# Patient Record
Sex: FEMALE | Race: WHITE | Employment: OTHER | URBAN - NONMETROPOLITAN AREA
[De-identification: names, ages, dates, MRNs, and addresses within clinical notes are randomized per-mention and may not be internally consistent; named-entity substitution may affect disease eponyms.]

---

## 2017-01-11 ENCOUNTER — TELEPHONE (OUTPATIENT)
Dept: UROLOGY | Age: 57
End: 2017-01-11

## 2017-01-11 DIAGNOSIS — N20.0 KIDNEY STONE: Primary | ICD-10-CM

## 2017-01-13 ENCOUNTER — OFFICE VISIT (OUTPATIENT)
Dept: UROLOGY | Age: 57
End: 2017-01-13
Payer: MEDICARE

## 2017-01-13 VITALS
WEIGHT: 212 LBS | BODY MASS INDEX: 37.56 KG/M2 | DIASTOLIC BLOOD PRESSURE: 76 MMHG | SYSTOLIC BLOOD PRESSURE: 136 MMHG | OXYGEN SATURATION: 98 % | HEART RATE: 76 BPM | HEIGHT: 63 IN | TEMPERATURE: 98.6 F

## 2017-01-13 DIAGNOSIS — M54.50 ACUTE RIGHT-SIDED LOW BACK PAIN WITHOUT SCIATICA: Primary | ICD-10-CM

## 2017-01-13 LAB
BILIRUBIN, POC: NORMAL
BLOOD URINE, POC: NORMAL
CLARITY, POC: CLEAR
COLOR, POC: YELLOW
GLUCOSE URINE, POC: NORMAL
KETONES, POC: NORMAL
LEUKOCYTE EST, POC: NORMAL
NITRITE, POC: NORMAL
PH, POC: 6
PROTEIN, POC: NORMAL
SPECIFIC GRAVITY, POC: 1.02
UROBILINOGEN, POC: 0.2

## 2017-01-13 PROCEDURE — G8427 DOCREV CUR MEDS BY ELIG CLIN: HCPCS | Performed by: PHYSICIAN ASSISTANT

## 2017-01-13 PROCEDURE — 99214 OFFICE O/P EST MOD 30 MIN: CPT | Performed by: PHYSICIAN ASSISTANT

## 2017-01-13 PROCEDURE — 3017F COLORECTAL CA SCREEN DOC REV: CPT | Performed by: PHYSICIAN ASSISTANT

## 2017-01-13 PROCEDURE — G8484 FLU IMMUNIZE NO ADMIN: HCPCS | Performed by: PHYSICIAN ASSISTANT

## 2017-01-13 PROCEDURE — 4004F PT TOBACCO SCREEN RCVD TLK: CPT | Performed by: PHYSICIAN ASSISTANT

## 2017-01-13 PROCEDURE — G8419 CALC BMI OUT NRM PARAM NOF/U: HCPCS | Performed by: PHYSICIAN ASSISTANT

## 2017-01-13 PROCEDURE — 81002 URINALYSIS NONAUTO W/O SCOPE: CPT | Performed by: PHYSICIAN ASSISTANT

## 2017-01-13 PROCEDURE — 3014F SCREEN MAMMO DOC REV: CPT | Performed by: PHYSICIAN ASSISTANT

## 2017-01-13 ASSESSMENT — ENCOUNTER SYMPTOMS
SPUTUM PRODUCTION: 0
ORTHOPNEA: 0
EYE REDNESS: 0
HEMOPTYSIS: 0
DIARRHEA: 0
EYE DISCHARGE: 0
CONSTIPATION: 0

## 2017-01-20 RX ORDER — CHLORTHALIDONE 50 MG/1
50 TABLET ORAL DAILY
Qty: 30 TABLET | Refills: 11 | Status: SHIPPED | OUTPATIENT
Start: 2017-01-20 | End: 2019-02-22 | Stop reason: SDUPTHER

## 2017-01-20 RX ORDER — POTASSIUM CITRATE 15 MEQ/1
1 TABLET, EXTENDED RELEASE ORAL 3 TIMES DAILY
Qty: 90 TABLET | Refills: 11 | Status: ON HOLD | OUTPATIENT
Start: 2017-01-20 | End: 2018-12-17 | Stop reason: ALTCHOICE

## 2017-02-01 PROCEDURE — 93299 PR REM INTERROG ICPMS/SCRMS <30 D TECH REVIEW: CPT | Performed by: CLINICAL NURSE SPECIALIST

## 2017-02-01 PROCEDURE — 93298 REM INTERROG DEV EVAL SCRMS: CPT | Performed by: CLINICAL NURSE SPECIALIST

## 2017-02-02 DIAGNOSIS — Z95.818 STATUS POST PLACEMENT OF IMPLANTABLE LOOP RECORDER: Primary | ICD-10-CM

## 2017-02-02 DIAGNOSIS — R55 ATYPICAL SYNCOPE: ICD-10-CM

## 2017-03-04 ENCOUNTER — OFFICE VISIT (OUTPATIENT)
Dept: URGENT CARE | Age: 57
End: 2017-03-04
Payer: MEDICARE

## 2017-03-04 VITALS
DIASTOLIC BLOOD PRESSURE: 73 MMHG | BODY MASS INDEX: 37.56 KG/M2 | RESPIRATION RATE: 18 BRPM | SYSTOLIC BLOOD PRESSURE: 107 MMHG | OXYGEN SATURATION: 92 % | TEMPERATURE: 98.6 F | HEART RATE: 82 BPM | WEIGHT: 212 LBS | HEIGHT: 63 IN

## 2017-03-04 DIAGNOSIS — J44.1 COPD EXACERBATION (HCC): Primary | ICD-10-CM

## 2017-03-04 DIAGNOSIS — R05.9 COUGH: ICD-10-CM

## 2017-03-04 DIAGNOSIS — F17.200 TOBACCO USE DISORDER: ICD-10-CM

## 2017-03-04 PROCEDURE — 3017F COLORECTAL CA SCREEN DOC REV: CPT | Performed by: FAMILY MEDICINE

## 2017-03-04 PROCEDURE — G8417 CALC BMI ABV UP PARAM F/U: HCPCS | Performed by: FAMILY MEDICINE

## 2017-03-04 PROCEDURE — 4004F PT TOBACCO SCREEN RCVD TLK: CPT | Performed by: FAMILY MEDICINE

## 2017-03-04 PROCEDURE — G8926 SPIRO NO PERF OR DOC: HCPCS | Performed by: FAMILY MEDICINE

## 2017-03-04 PROCEDURE — G8427 DOCREV CUR MEDS BY ELIG CLIN: HCPCS | Performed by: FAMILY MEDICINE

## 2017-03-04 PROCEDURE — 99406 BEHAV CHNG SMOKING 3-10 MIN: CPT | Performed by: FAMILY MEDICINE

## 2017-03-04 PROCEDURE — G8484 FLU IMMUNIZE NO ADMIN: HCPCS | Performed by: FAMILY MEDICINE

## 2017-03-04 PROCEDURE — 99214 OFFICE O/P EST MOD 30 MIN: CPT | Performed by: FAMILY MEDICINE

## 2017-03-04 PROCEDURE — 3014F SCREEN MAMMO DOC REV: CPT | Performed by: FAMILY MEDICINE

## 2017-03-04 PROCEDURE — 3023F SPIROM DOC REV: CPT | Performed by: FAMILY MEDICINE

## 2017-03-04 PROCEDURE — 96372 THER/PROPH/DIAG INJ SC/IM: CPT | Performed by: FAMILY MEDICINE

## 2017-03-04 RX ORDER — LEVOFLOXACIN 500 MG/1
500 TABLET, FILM COATED ORAL DAILY
Qty: 5 TABLET | Refills: 0 | Status: SHIPPED | OUTPATIENT
Start: 2017-03-04 | End: 2017-03-09

## 2017-03-04 RX ORDER — BENZONATATE 100 MG/1
100 CAPSULE ORAL 3 TIMES DAILY PRN
Qty: 20 CAPSULE | Refills: 0 | Status: SHIPPED | OUTPATIENT
Start: 2017-03-04 | End: 2017-03-11

## 2017-03-04 RX ORDER — METHYLPREDNISOLONE SODIUM SUCCINATE 40 MG/ML
80 INJECTION, POWDER, LYOPHILIZED, FOR SOLUTION INTRAMUSCULAR; INTRAVENOUS ONCE
Status: COMPLETED | OUTPATIENT
Start: 2017-03-04 | End: 2017-03-04

## 2017-03-04 RX ORDER — PREDNISONE 20 MG/1
40 TABLET ORAL DAILY
Qty: 10 TABLET | Refills: 0 | Status: SHIPPED | OUTPATIENT
Start: 2017-03-04 | End: 2017-03-09

## 2017-03-04 RX ORDER — ALBUTEROL SULFATE 90 UG/1
2 AEROSOL, METERED RESPIRATORY (INHALATION) EVERY 6 HOURS PRN
Qty: 1 INHALER | Refills: 0 | Status: SHIPPED | OUTPATIENT
Start: 2017-03-04 | End: 2018-07-31 | Stop reason: SDUPTHER

## 2017-03-04 RX ADMIN — METHYLPREDNISOLONE SODIUM SUCCINATE 80 MG: 40 INJECTION, POWDER, LYOPHILIZED, FOR SOLUTION INTRAMUSCULAR; INTRAVENOUS at 09:44

## 2017-03-04 ASSESSMENT — ENCOUNTER SYMPTOMS
WHEEZING: 1
SORE THROAT: 1
DIARRHEA: 0
VOMITING: 0
SINUS PRESSURE: 1
RHINORRHEA: 1
NAUSEA: 0
SHORTNESS OF BREATH: 1
COUGH: 1
CHEST TIGHTNESS: 0
CONSTIPATION: 0
ABDOMINAL PAIN: 0

## 2017-03-07 ENCOUNTER — OFFICE VISIT (OUTPATIENT)
Dept: CARDIOLOGY | Age: 57
End: 2017-03-07
Payer: MEDICARE

## 2017-03-07 VITALS
HEART RATE: 65 BPM | SYSTOLIC BLOOD PRESSURE: 120 MMHG | BODY MASS INDEX: 37.21 KG/M2 | HEIGHT: 63 IN | DIASTOLIC BLOOD PRESSURE: 78 MMHG | WEIGHT: 210 LBS

## 2017-03-07 DIAGNOSIS — R06.02 SOB (SHORTNESS OF BREATH): ICD-10-CM

## 2017-03-07 DIAGNOSIS — I10 ESSENTIAL HYPERTENSION: ICD-10-CM

## 2017-03-07 DIAGNOSIS — F17.200 SMOKER: ICD-10-CM

## 2017-03-07 DIAGNOSIS — Z95.818 STATUS POST PLACEMENT OF IMPLANTABLE LOOP RECORDER: ICD-10-CM

## 2017-03-07 DIAGNOSIS — Z82.49 FAMILY HISTORY OF CORONARY ARTERY DISEASE: ICD-10-CM

## 2017-03-07 DIAGNOSIS — R07.9 CHEST PAIN, UNSPECIFIED TYPE: Primary | ICD-10-CM

## 2017-03-07 PROCEDURE — 3017F COLORECTAL CA SCREEN DOC REV: CPT | Performed by: NURSE PRACTITIONER

## 2017-03-07 PROCEDURE — 99214 OFFICE O/P EST MOD 30 MIN: CPT | Performed by: NURSE PRACTITIONER

## 2017-03-07 PROCEDURE — G8427 DOCREV CUR MEDS BY ELIG CLIN: HCPCS | Performed by: NURSE PRACTITIONER

## 2017-03-07 PROCEDURE — G8484 FLU IMMUNIZE NO ADMIN: HCPCS | Performed by: NURSE PRACTITIONER

## 2017-03-07 PROCEDURE — G8417 CALC BMI ABV UP PARAM F/U: HCPCS | Performed by: NURSE PRACTITIONER

## 2017-03-07 PROCEDURE — 93291 INTERROG DEV EVAL SCRMS IP: CPT | Performed by: NURSE PRACTITIONER

## 2017-03-07 PROCEDURE — 3014F SCREEN MAMMO DOC REV: CPT | Performed by: NURSE PRACTITIONER

## 2017-03-07 PROCEDURE — 4004F PT TOBACCO SCREEN RCVD TLK: CPT | Performed by: NURSE PRACTITIONER

## 2017-03-07 RX ORDER — LISINOPRIL 10 MG/1
10 TABLET ORAL DAILY
COMMUNITY
End: 2017-09-01 | Stop reason: SDUPTHER

## 2017-03-12 ENCOUNTER — APPOINTMENT (OUTPATIENT)
Dept: CT IMAGING | Age: 57
End: 2017-03-12
Payer: MEDICARE

## 2017-03-12 ENCOUNTER — HOSPITAL ENCOUNTER (EMERGENCY)
Age: 57
Discharge: HOME | End: 2017-03-12
Attending: EMERGENCY MEDICINE
Payer: MEDICARE

## 2017-03-12 VITALS
BODY MASS INDEX: 37.39 KG/M2 | OXYGEN SATURATION: 99 % | DIASTOLIC BLOOD PRESSURE: 97 MMHG | HEIGHT: 63 IN | SYSTOLIC BLOOD PRESSURE: 163 MMHG | WEIGHT: 211 LBS | HEART RATE: 70 BPM | TEMPERATURE: 98.5 F | RESPIRATION RATE: 20 BRPM

## 2017-03-12 DIAGNOSIS — N20.0 NEPHROLITHIASIS: ICD-10-CM

## 2017-03-12 DIAGNOSIS — N13.9 OBSTRUCTIVE UROPATHY: Primary | ICD-10-CM

## 2017-03-12 LAB
ALBUMIN SERPL-MCNC: 4.3 G/DL (ref 3.5–5.2)
ALP BLD-CCNC: 67 U/L (ref 35–104)
ALT SERPL-CCNC: 12 U/L (ref 5–33)
ANION GAP SERPL CALCULATED.3IONS-SCNC: 15 MMOL/L (ref 7–19)
AST SERPL-CCNC: 13 U/L (ref 5–32)
BACTERIA: NEGATIVE /HPF
BASOPHILS ABSOLUTE: 0 K/UL (ref 0–0.2)
BASOPHILS RELATIVE PERCENT: 0.2 % (ref 0–1)
BILIRUB SERPL-MCNC: 0.3 MG/DL (ref 0.2–1.2)
BILIRUBIN URINE: NEGATIVE
BLOOD, URINE: ABNORMAL
BUN BLDV-MCNC: 11 MG/DL (ref 6–20)
CALCIUM SERPL-MCNC: 9.5 MG/DL (ref 8.6–10)
CHLORIDE BLD-SCNC: 99 MMOL/L (ref 98–111)
CLARITY: CLEAR
CO2: 28 MMOL/L (ref 22–29)
COLOR: YELLOW
CREAT SERPL-MCNC: 0.8 MG/DL (ref 0.5–0.9)
EOSINOPHILS ABSOLUTE: 0.2 K/UL (ref 0–0.6)
EOSINOPHILS RELATIVE PERCENT: 1.8 % (ref 0–5)
EPITHELIAL CELLS, UA: 0 /HPF (ref 0–5)
GFR NON-AFRICAN AMERICAN: >60
GLOBULIN: 2.8 G/DL
GLUCOSE BLD-MCNC: 89 MG/DL (ref 74–109)
GLUCOSE URINE: NEGATIVE MG/DL
HCT VFR BLD CALC: 44.1 % (ref 37–47)
HEMOGLOBIN: 14.1 G/DL (ref 12–16)
HYALINE CASTS: 0 /HPF (ref 0–8)
KETONES, URINE: NEGATIVE MG/DL
LEUKOCYTE ESTERASE, URINE: NEGATIVE
LIPASE: 74 U/L (ref 13–60)
LYMPHOCYTES ABSOLUTE: 3.2 K/UL (ref 1.1–4.5)
LYMPHOCYTES RELATIVE PERCENT: 29.5 % (ref 20–40)
MCH RBC QN AUTO: 27.4 PG (ref 27–31)
MCHC RBC AUTO-ENTMCNC: 32 G/DL (ref 33–37)
MCV RBC AUTO: 85.8 FL (ref 81–99)
MONOCYTES ABSOLUTE: 0.8 K/UL (ref 0–0.9)
MONOCYTES RELATIVE PERCENT: 7.5 % (ref 0–10)
NEUTROPHILS ABSOLUTE: 6.5 K/UL (ref 1.5–7.5)
NEUTROPHILS RELATIVE PERCENT: 61 % (ref 50–65)
NITRITE, URINE: NEGATIVE
PDW BLD-RTO: 13.6 % (ref 11.5–14.5)
PH UA: 7
PLATELET # BLD: 125 K/UL (ref 130–400)
PMV BLD AUTO: 12.4 FL (ref 7.4–10.4)
POTASSIUM SERPL-SCNC: 4.8 MMOL/L (ref 3.5–5)
PROTEIN UA: NEGATIVE MG/DL
RBC # BLD: 5.14 M/UL (ref 4.2–5.4)
RBC UA: 11 /HPF (ref 0–4)
SODIUM BLD-SCNC: 142 MMOL/L (ref 136–145)
SPECIFIC GRAVITY UA: 1.01
TOTAL PROTEIN: 7.1 G/DL (ref 6.6–8.7)
UROBILINOGEN, URINE: 0.2 E.U./DL
WBC # BLD: 10.7 K/UL (ref 4.8–10.8)
WBC UA: 0 /HPF (ref 0–5)

## 2017-03-12 PROCEDURE — 83690 ASSAY OF LIPASE: CPT

## 2017-03-12 PROCEDURE — 74150 CT ABDOMEN W/O CONTRAST: CPT

## 2017-03-12 PROCEDURE — 36415 COLL VENOUS BLD VENIPUNCTURE: CPT

## 2017-03-12 PROCEDURE — 96372 THER/PROPH/DIAG INJ SC/IM: CPT

## 2017-03-12 PROCEDURE — 85025 COMPLETE CBC W/AUTO DIFF WBC: CPT

## 2017-03-12 PROCEDURE — 80053 COMPREHEN METABOLIC PANEL: CPT

## 2017-03-12 PROCEDURE — 6360000002 HC RX W HCPCS: Performed by: PHYSICIAN ASSISTANT

## 2017-03-12 PROCEDURE — 96375 TX/PRO/DX INJ NEW DRUG ADDON: CPT

## 2017-03-12 PROCEDURE — 99284 EMERGENCY DEPT VISIT MOD MDM: CPT

## 2017-03-12 PROCEDURE — 81001 URINALYSIS AUTO W/SCOPE: CPT

## 2017-03-12 PROCEDURE — 99283 EMERGENCY DEPT VISIT LOW MDM: CPT | Performed by: PHYSICIAN ASSISTANT

## 2017-03-12 PROCEDURE — 96374 THER/PROPH/DIAG INJ IV PUSH: CPT

## 2017-03-12 RX ORDER — PROMETHAZINE HYDROCHLORIDE 25 MG/ML
12.5 INJECTION, SOLUTION INTRAMUSCULAR; INTRAVENOUS ONCE
Status: COMPLETED | OUTPATIENT
Start: 2017-03-12 | End: 2017-03-12

## 2017-03-12 RX ORDER — MORPHINE SULFATE 4 MG/ML
4 INJECTION, SOLUTION INTRAMUSCULAR; INTRAVENOUS ONCE
Status: COMPLETED | OUTPATIENT
Start: 2017-03-12 | End: 2017-03-12

## 2017-03-12 RX ORDER — ONDANSETRON 2 MG/ML
4 INJECTION INTRAMUSCULAR; INTRAVENOUS ONCE
Status: COMPLETED | OUTPATIENT
Start: 2017-03-12 | End: 2017-03-12

## 2017-03-12 RX ORDER — PROMETHAZINE HYDROCHLORIDE 25 MG/1
25 TABLET ORAL EVERY 6 HOURS PRN
Qty: 20 TABLET | Refills: 0 | Status: SHIPPED | OUTPATIENT
Start: 2017-03-12 | End: 2017-07-11

## 2017-03-12 RX ADMIN — PROMETHAZINE HYDROCHLORIDE 12.5 MG: 25 INJECTION, SOLUTION INTRAMUSCULAR; INTRAVENOUS at 18:20

## 2017-03-12 RX ADMIN — MORPHINE SULFATE 4 MG: 4 INJECTION, SOLUTION INTRAMUSCULAR; INTRAVENOUS at 15:50

## 2017-03-12 RX ADMIN — HYDROMORPHONE HYDROCHLORIDE 1 MG: 1 INJECTION, SOLUTION INTRAMUSCULAR; INTRAVENOUS; SUBCUTANEOUS at 18:20

## 2017-03-12 RX ADMIN — PROMETHAZINE HYDROCHLORIDE 12.5 MG: 25 INJECTION, SOLUTION INTRAMUSCULAR; INTRAVENOUS at 17:17

## 2017-03-12 RX ADMIN — HYDROMORPHONE HYDROCHLORIDE 1 MG: 1 INJECTION, SOLUTION INTRAMUSCULAR; INTRAVENOUS; SUBCUTANEOUS at 17:17

## 2017-03-12 RX ADMIN — ONDANSETRON 4 MG: 2 INJECTION INTRAMUSCULAR; INTRAVENOUS at 15:50

## 2017-03-12 ASSESSMENT — PAIN SCALES - GENERAL
PAINLEVEL_OUTOF10: 10
PAINLEVEL_OUTOF10: 10
PAINLEVEL_OUTOF10: 7
PAINLEVEL_OUTOF10: 10
PAINLEVEL_OUTOF10: 10

## 2017-03-12 ASSESSMENT — ENCOUNTER SYMPTOMS
ABDOMINAL PAIN: 0
WHEEZING: 0
VOMITING: 0
CONSTIPATION: 0
DIARRHEA: 0
NAUSEA: 0
COUGH: 0
BACK PAIN: 0
SHORTNESS OF BREATH: 0

## 2017-03-13 ENCOUNTER — OFFICE VISIT (OUTPATIENT)
Dept: UROLOGY | Age: 57
End: 2017-03-13
Payer: MEDICARE

## 2017-03-13 VITALS
OXYGEN SATURATION: 95 % | WEIGHT: 210 LBS | SYSTOLIC BLOOD PRESSURE: 132 MMHG | HEIGHT: 63 IN | HEART RATE: 78 BPM | TEMPERATURE: 97 F | BODY MASS INDEX: 37.21 KG/M2 | DIASTOLIC BLOOD PRESSURE: 70 MMHG

## 2017-03-13 DIAGNOSIS — N13.5 URETERAL STRICTURE, LEFT: ICD-10-CM

## 2017-03-13 DIAGNOSIS — N23 RENAL COLIC ON LEFT SIDE: Primary | ICD-10-CM

## 2017-03-13 LAB
BILIRUBIN, POC: NORMAL
BLOOD URINE, POC: NORMAL
CLARITY, POC: NORMAL
COLOR, POC: NORMAL
GLUCOSE URINE, POC: NORMAL
KETONES, POC: NORMAL
LEUKOCYTE EST, POC: NORMAL
NITRITE, POC: NORMAL
PH, POC: 8
PROTEIN, POC: NORMAL
SPECIFIC GRAVITY, POC: 1.01
UROBILINOGEN, POC: 0.2

## 2017-03-13 PROCEDURE — 3017F COLORECTAL CA SCREEN DOC REV: CPT | Performed by: UROLOGY

## 2017-03-13 PROCEDURE — G8427 DOCREV CUR MEDS BY ELIG CLIN: HCPCS | Performed by: UROLOGY

## 2017-03-13 PROCEDURE — 4004F PT TOBACCO SCREEN RCVD TLK: CPT | Performed by: UROLOGY

## 2017-03-13 PROCEDURE — 99214 OFFICE O/P EST MOD 30 MIN: CPT | Performed by: UROLOGY

## 2017-03-13 PROCEDURE — 81002 URINALYSIS NONAUTO W/O SCOPE: CPT | Performed by: UROLOGY

## 2017-03-13 PROCEDURE — 3014F SCREEN MAMMO DOC REV: CPT | Performed by: UROLOGY

## 2017-03-13 PROCEDURE — G8417 CALC BMI ABV UP PARAM F/U: HCPCS | Performed by: UROLOGY

## 2017-03-13 PROCEDURE — G8484 FLU IMMUNIZE NO ADMIN: HCPCS | Performed by: UROLOGY

## 2017-03-13 RX ORDER — PREDNISONE 10 MG/1
TABLET ORAL
COMMUNITY
Start: 2017-03-07 | End: 2017-07-11

## 2017-03-13 RX ORDER — OXYCODONE AND ACETAMINOPHEN 10; 325 MG/1; MG/1
1 TABLET ORAL EVERY 4 HOURS PRN
COMMUNITY
End: 2018-01-23 | Stop reason: SDUPTHER

## 2017-03-13 RX ORDER — HYDROCODONE BITARTRATE AND ACETAMINOPHEN 7.5; 325 MG/1; MG/1
1 TABLET ORAL EVERY 6 HOURS PRN
Qty: 30 TABLET | Refills: 0 | Status: SHIPPED | OUTPATIENT
Start: 2017-03-13 | End: 2017-03-20

## 2017-03-13 RX ORDER — TAMSULOSIN HYDROCHLORIDE 0.4 MG/1
0.4 CAPSULE ORAL DAILY
Qty: 14 CAPSULE | Refills: 1 | Status: SHIPPED | OUTPATIENT
Start: 2017-03-13 | End: 2017-09-20

## 2017-03-13 RX ORDER — AZITHROMYCIN 250 MG/1
TABLET, FILM COATED ORAL
COMMUNITY
Start: 2017-03-07 | End: 2017-07-11

## 2017-03-13 ASSESSMENT — ENCOUNTER SYMPTOMS
SORE THROAT: 0
BLURRED VISION: 0
WHEEZING: 0
NAUSEA: 0
COUGH: 1
SHORTNESS OF BREATH: 1
HEARTBURN: 0
BACK PAIN: 1
DOUBLE VISION: 0

## 2017-03-16 ENCOUNTER — HOSPITAL ENCOUNTER (OUTPATIENT)
Dept: CT IMAGING | Age: 57
Discharge: HOME | End: 2017-03-16
Payer: MEDICARE

## 2017-03-16 DIAGNOSIS — N23 RENAL COLIC ON LEFT SIDE: ICD-10-CM

## 2017-03-16 PROCEDURE — 6360000004 HC RX CONTRAST MEDICATION: Performed by: UROLOGY

## 2017-03-16 PROCEDURE — 74178 CT ABD&PLV WO CNTR FLWD CNTR: CPT

## 2017-03-16 RX ADMIN — IOVERSOL 75 ML: 741 INJECTION INTRA-ARTERIAL; INTRAVENOUS at 13:10

## 2017-03-20 ENCOUNTER — OFFICE VISIT (OUTPATIENT)
Dept: UROLOGY | Age: 57
End: 2017-03-20
Payer: MEDICARE

## 2017-03-20 VITALS
HEIGHT: 63 IN | OXYGEN SATURATION: 96 % | BODY MASS INDEX: 36.86 KG/M2 | WEIGHT: 208 LBS | TEMPERATURE: 98.3 F | HEART RATE: 81 BPM

## 2017-03-20 DIAGNOSIS — N20.0 RENAL CALCULUS, LEFT: Primary | ICD-10-CM

## 2017-03-20 LAB
APPEARANCE FLUID: CLEAR
BILIRUBIN, POC: NORMAL
BLOOD URINE, POC: NORMAL
CLARITY, POC: NORMAL
COLOR, POC: NORMAL
GLUCOSE URINE, POC: NORMAL
KETONES, POC: NORMAL
LEUKOCYTE EST, POC: NORMAL
NITRITE, POC: NORMAL
PH, POC: 6
PROTEIN, POC: NORMAL
SPECIFIC GRAVITY, POC: 1.02
UROBILINOGEN, POC: 0.2

## 2017-03-20 PROCEDURE — 3014F SCREEN MAMMO DOC REV: CPT | Performed by: UROLOGY

## 2017-03-20 PROCEDURE — G8417 CALC BMI ABV UP PARAM F/U: HCPCS | Performed by: UROLOGY

## 2017-03-20 PROCEDURE — 99214 OFFICE O/P EST MOD 30 MIN: CPT | Performed by: UROLOGY

## 2017-03-20 PROCEDURE — G8484 FLU IMMUNIZE NO ADMIN: HCPCS | Performed by: UROLOGY

## 2017-03-20 PROCEDURE — G8427 DOCREV CUR MEDS BY ELIG CLIN: HCPCS | Performed by: UROLOGY

## 2017-03-20 PROCEDURE — 4004F PT TOBACCO SCREEN RCVD TLK: CPT | Performed by: UROLOGY

## 2017-03-20 PROCEDURE — 3017F COLORECTAL CA SCREEN DOC REV: CPT | Performed by: UROLOGY

## 2017-03-20 ASSESSMENT — ENCOUNTER SYMPTOMS
NAUSEA: 0
BLURRED VISION: 0
SHORTNESS OF BREATH: 1
WHEEZING: 0
COUGH: 1
HEARTBURN: 0
BACK PAIN: 1
DOUBLE VISION: 0
SORE THROAT: 0

## 2017-04-19 DIAGNOSIS — Z95.818 STATUS POST PLACEMENT OF IMPLANTABLE LOOP RECORDER: Primary | ICD-10-CM

## 2017-04-19 DIAGNOSIS — R55 ATYPICAL SYNCOPE: ICD-10-CM

## 2017-04-19 PROCEDURE — 93299 PR REM INTERROG ICPMS/SCRMS <30 D TECH REVIEW: CPT | Performed by: CLINICAL NURSE SPECIALIST

## 2017-04-19 PROCEDURE — 93298 REM INTERROG DEV EVAL SCRMS: CPT | Performed by: CLINICAL NURSE SPECIALIST

## 2017-05-23 ENCOUNTER — HOSPITAL ENCOUNTER (OUTPATIENT)
Dept: CT IMAGING | Age: 57
Discharge: HOME | End: 2017-05-23
Payer: MEDICARE

## 2017-05-23 ENCOUNTER — OFFICE VISIT (OUTPATIENT)
Dept: URGENT CARE | Age: 57
End: 2017-05-23
Payer: MEDICARE

## 2017-05-23 VITALS
TEMPERATURE: 98.4 F | OXYGEN SATURATION: 99 % | DIASTOLIC BLOOD PRESSURE: 67 MMHG | BODY MASS INDEX: 36.7 KG/M2 | WEIGHT: 207.2 LBS | RESPIRATION RATE: 20 BRPM | HEART RATE: 69 BPM | SYSTOLIC BLOOD PRESSURE: 107 MMHG

## 2017-05-23 DIAGNOSIS — M54.5 ACUTE RIGHT-SIDED LOW BACK PAIN, WITH SCIATICA PRESENCE UNSPECIFIED: ICD-10-CM

## 2017-05-23 DIAGNOSIS — M54.5 ACUTE RIGHT-SIDED LOW BACK PAIN, WITH SCIATICA PRESENCE UNSPECIFIED: Primary | ICD-10-CM

## 2017-05-23 LAB
APPEARANCE FLUID: CLEAR
BILIRUBIN, POC: NORMAL
BLOOD URINE, POC: NORMAL
CLARITY, POC: CLEAR
COLOR, POC: YELLOW
GLUCOSE URINE, POC: NORMAL
KETONES, POC: NORMAL
LEUKOCYTE EST, POC: NORMAL
NITRITE, POC: NORMAL
PH, POC: 6.5
PROTEIN, POC: NORMAL
SPECIFIC GRAVITY, POC: 1.01
UROBILINOGEN, POC: 0.2

## 2017-05-23 PROCEDURE — 4004F PT TOBACCO SCREEN RCVD TLK: CPT | Performed by: NURSE PRACTITIONER

## 2017-05-23 PROCEDURE — 99213 OFFICE O/P EST LOW 20 MIN: CPT | Performed by: NURSE PRACTITIONER

## 2017-05-23 PROCEDURE — G8427 DOCREV CUR MEDS BY ELIG CLIN: HCPCS | Performed by: NURSE PRACTITIONER

## 2017-05-23 PROCEDURE — 3017F COLORECTAL CA SCREEN DOC REV: CPT | Performed by: NURSE PRACTITIONER

## 2017-05-23 PROCEDURE — 74176 CT ABD & PELVIS W/O CONTRAST: CPT

## 2017-05-23 PROCEDURE — 3014F SCREEN MAMMO DOC REV: CPT | Performed by: NURSE PRACTITIONER

## 2017-05-23 PROCEDURE — 81002 URINALYSIS NONAUTO W/O SCOPE: CPT | Performed by: NURSE PRACTITIONER

## 2017-05-23 PROCEDURE — G8417 CALC BMI ABV UP PARAM F/U: HCPCS | Performed by: NURSE PRACTITIONER

## 2017-05-23 ASSESSMENT — ENCOUNTER SYMPTOMS: BACK PAIN: 1

## 2017-05-24 ENCOUNTER — TELEPHONE (OUTPATIENT)
Dept: UROLOGY | Age: 57
End: 2017-05-24

## 2017-05-25 ENCOUNTER — TELEPHONE (OUTPATIENT)
Dept: UROLOGY | Age: 57
End: 2017-05-25

## 2017-06-01 DIAGNOSIS — R55 ATYPICAL SYNCOPE: ICD-10-CM

## 2017-06-01 DIAGNOSIS — Z95.818 STATUS POST PLACEMENT OF IMPLANTABLE LOOP RECORDER: Primary | ICD-10-CM

## 2017-06-01 PROCEDURE — 93298 REM INTERROG DEV EVAL SCRMS: CPT | Performed by: NURSE PRACTITIONER

## 2017-06-01 PROCEDURE — 93299 PR REM INTERROG ICPMS/SCRMS <30 D TECH REVIEW: CPT | Performed by: NURSE PRACTITIONER

## 2017-06-05 ENCOUNTER — TRANSCRIBE ORDERS (OUTPATIENT)
Dept: ADMINISTRATIVE | Facility: HOSPITAL | Age: 57
End: 2017-06-05

## 2017-06-05 DIAGNOSIS — M51.16 NEURITIS OR RADICULITIS DUE TO RUPTURE OF LUMBAR INTERVERTEBRAL DISC: Primary | ICD-10-CM

## 2017-06-05 DIAGNOSIS — M51.9 LUMBAR DISC DISORDER: ICD-10-CM

## 2017-06-05 DIAGNOSIS — M51.17 INTERVERTEBRAL DISC DISORDER WITH RADICULOPATHY OF LUMBOSACRAL REGION: ICD-10-CM

## 2017-06-08 ENCOUNTER — HOSPITAL ENCOUNTER (OUTPATIENT)
Dept: CT IMAGING | Facility: HOSPITAL | Age: 57
Discharge: HOME | End: 2017-06-08
Attending: PAIN MEDICINE | Admitting: PAIN MEDICINE

## 2017-06-08 DIAGNOSIS — M51.9 LUMBAR DISC DISORDER: ICD-10-CM

## 2017-06-08 LAB — CREAT BLDA-MCNC: 0.7 MG/DL (ref 0.6–1.3)

## 2017-06-08 PROCEDURE — 0 IOPAMIDOL 61 % SOLUTION: Performed by: PAIN MEDICINE

## 2017-06-08 PROCEDURE — 72133 CT LUMBAR SPINE W/O & W/DYE: CPT

## 2017-06-08 PROCEDURE — 82565 ASSAY OF CREATININE: CPT

## 2017-06-08 RX ADMIN — IOPAMIDOL 100 ML: 612 INJECTION, SOLUTION INTRAVENOUS at 12:00

## 2017-06-18 ENCOUNTER — APPOINTMENT (OUTPATIENT)
Dept: CT IMAGING | Facility: HOSPITAL | Age: 57
End: 2017-06-18

## 2017-06-18 ENCOUNTER — HOSPITAL ENCOUNTER (EMERGENCY)
Age: 57
Discharge: LEFT BEFORE DISCHARGE | End: 2017-06-18
Payer: MEDICARE

## 2017-06-18 ENCOUNTER — HOSPITAL ENCOUNTER (EMERGENCY)
Facility: HOSPITAL | Age: 57
Discharge: HOME | End: 2017-06-19
Attending: EMERGENCY MEDICINE | Admitting: EMERGENCY MEDICINE

## 2017-06-18 VITALS
OXYGEN SATURATION: 99 % | RESPIRATION RATE: 18 BRPM | DIASTOLIC BLOOD PRESSURE: 83 MMHG | SYSTOLIC BLOOD PRESSURE: 138 MMHG | TEMPERATURE: 97.3 F | HEART RATE: 77 BPM

## 2017-06-18 DIAGNOSIS — R10.9 RIGHT FLANK PAIN: Primary | ICD-10-CM

## 2017-06-18 LAB
ALBUMIN SERPL-MCNC: 3.9 G/DL (ref 3.5–5)
ALBUMIN/GLOB SERPL: 1.4 G/DL (ref 1.1–2.5)
ALP SERPL-CCNC: 49 U/L (ref 24–120)
ALT SERPL W P-5'-P-CCNC: 30 U/L (ref 0–54)
ANION GAP SERPL CALCULATED.3IONS-SCNC: 8 MMOL/L (ref 4–13)
AST SERPL-CCNC: 27 U/L (ref 7–45)
BACTERIA UR QL AUTO: ABNORMAL /HPF
BASOPHILS # BLD AUTO: 0.02 10*3/MM3 (ref 0–0.2)
BASOPHILS NFR BLD AUTO: 0.3 % (ref 0–2)
BILIRUB SERPL-MCNC: 0.3 MG/DL (ref 0.1–1)
BILIRUB UR QL STRIP: NEGATIVE
BUN BLD-MCNC: 10 MG/DL (ref 5–21)
BUN/CREAT SERPL: 15.6 (ref 7–25)
CALCIUM SPEC-SCNC: 9.2 MG/DL (ref 8.4–10.4)
CHLORIDE SERPL-SCNC: 105 MMOL/L (ref 98–110)
CLARITY UR: CLEAR
CO2 SERPL-SCNC: 31 MMOL/L (ref 24–31)
COLOR UR: YELLOW
CREAT BLD-MCNC: 0.64 MG/DL (ref 0.5–1.4)
CRP SERPL-MCNC: <0.5 MG/DL (ref 0–0.99)
DEPRECATED RDW RBC AUTO: 41.5 FL (ref 40–54)
EOSINOPHIL # BLD AUTO: 0.23 10*3/MM3 (ref 0–0.7)
EOSINOPHIL NFR BLD AUTO: 2.9 % (ref 0–4)
ERYTHROCYTE [DISTWIDTH] IN BLOOD BY AUTOMATED COUNT: 13.4 % (ref 12–15)
GFR SERPL CREATININE-BSD FRML MDRD: 96 ML/MIN/1.73
GLOBULIN UR ELPH-MCNC: 2.8 GM/DL
GLUCOSE BLD-MCNC: 88 MG/DL (ref 70–100)
GLUCOSE UR STRIP-MCNC: NEGATIVE MG/DL
HCT VFR BLD AUTO: 38.8 % (ref 37–47)
HGB BLD-MCNC: 12.4 G/DL (ref 12–16)
HGB UR QL STRIP.AUTO: NEGATIVE
HYALINE CASTS UR QL AUTO: ABNORMAL /LPF
IMM GRANULOCYTES # BLD: 0.02 10*3/MM3 (ref 0–0.03)
IMM GRANULOCYTES NFR BLD: 0.3 % (ref 0–5)
KETONES UR QL STRIP: NEGATIVE
LEUKOCYTE ESTERASE UR QL STRIP.AUTO: ABNORMAL
LYMPHOCYTES # BLD AUTO: 3.8 10*3/MM3 (ref 0.72–4.86)
LYMPHOCYTES NFR BLD AUTO: 47.6 % (ref 15–45)
MCH RBC QN AUTO: 27.2 PG (ref 28–32)
MCHC RBC AUTO-ENTMCNC: 32 G/DL (ref 33–36)
MCV RBC AUTO: 85.1 FL (ref 82–98)
MONOCYTES # BLD AUTO: 0.59 10*3/MM3 (ref 0.19–1.3)
MONOCYTES NFR BLD AUTO: 7.4 % (ref 4–12)
NEUTROPHILS # BLD AUTO: 3.33 10*3/MM3 (ref 1.87–8.4)
NEUTROPHILS NFR BLD AUTO: 41.5 % (ref 39–78)
NITRITE UR QL STRIP: NEGATIVE
PH UR STRIP.AUTO: 7.5 [PH] (ref 5–8)
PLATELET # BLD AUTO: 208 10*3/MM3 (ref 130–400)
PMV BLD AUTO: 12.3 FL (ref 6–12)
POTASSIUM BLD-SCNC: 4.1 MMOL/L (ref 3.5–5.3)
PROT SERPL-MCNC: 6.7 G/DL (ref 6.3–8.7)
PROT UR QL STRIP: NEGATIVE
RBC # BLD AUTO: 4.56 10*6/MM3 (ref 4.2–5.4)
RBC # UR: ABNORMAL /HPF
REF LAB TEST METHOD: ABNORMAL
SODIUM BLD-SCNC: 144 MMOL/L (ref 135–145)
SP GR UR STRIP: 1.01 (ref 1–1.03)
SQUAMOUS #/AREA URNS HPF: ABNORMAL /HPF
UROBILINOGEN UR QL STRIP: ABNORMAL
WBC NRBC COR # BLD: 7.99 10*3/MM3 (ref 4.8–10.8)
WBC UR QL AUTO: ABNORMAL /HPF

## 2017-06-18 PROCEDURE — 80053 COMPREHEN METABOLIC PANEL: CPT | Performed by: EMERGENCY MEDICINE

## 2017-06-18 PROCEDURE — 99283 EMERGENCY DEPT VISIT LOW MDM: CPT

## 2017-06-18 PROCEDURE — 86140 C-REACTIVE PROTEIN: CPT | Performed by: EMERGENCY MEDICINE

## 2017-06-18 PROCEDURE — 81001 URINALYSIS AUTO W/SCOPE: CPT | Performed by: EMERGENCY MEDICINE

## 2017-06-18 PROCEDURE — 74176 CT ABD & PELVIS W/O CONTRAST: CPT

## 2017-06-18 PROCEDURE — 25010000002 HYDROMORPHONE PER 4 MG: Performed by: EMERGENCY MEDICINE

## 2017-06-18 PROCEDURE — 96361 HYDRATE IV INFUSION ADD-ON: CPT

## 2017-06-18 PROCEDURE — 25010000002 ONDANSETRON PER 1 MG: Performed by: EMERGENCY MEDICINE

## 2017-06-18 PROCEDURE — 4500000002 HC ER NO CHARGE

## 2017-06-18 PROCEDURE — 96374 THER/PROPH/DIAG INJ IV PUSH: CPT

## 2017-06-18 PROCEDURE — 96375 TX/PRO/DX INJ NEW DRUG ADDON: CPT

## 2017-06-18 PROCEDURE — 85025 COMPLETE CBC W/AUTO DIFF WBC: CPT | Performed by: EMERGENCY MEDICINE

## 2017-06-18 RX ORDER — TAMSULOSIN HYDROCHLORIDE 0.4 MG/1
CAPSULE ORAL
COMMUNITY
Start: 2017-03-13 | End: 2021-04-21

## 2017-06-18 RX ORDER — SUCRALFATE ORAL 1 G/10ML
SUSPENSION ORAL
COMMUNITY
Start: 2013-10-02 | End: 2020-01-03

## 2017-06-18 RX ORDER — POTASSIUM CITRATE 15 MEQ/1
TABLET, EXTENDED RELEASE ORAL
COMMUNITY
Start: 2017-01-20 | End: 2020-01-03

## 2017-06-18 RX ORDER — PROMETHAZINE HYDROCHLORIDE 25 MG/1
TABLET ORAL
COMMUNITY
Start: 2017-03-12 | End: 2021-04-21

## 2017-06-18 RX ORDER — OMEPRAZOLE 20 MG/1
CAPSULE, DELAYED RELEASE ORAL
COMMUNITY
End: 2020-01-03

## 2017-06-18 RX ORDER — CHOLECALCIFEROL (VITAMIN D3) 125 MCG
CAPSULE ORAL
COMMUNITY
End: 2020-01-03

## 2017-06-18 RX ORDER — LISINOPRIL 10 MG/1
10 TABLET ORAL DAILY
COMMUNITY
End: 2021-07-21 | Stop reason: HOSPADM

## 2017-06-18 RX ORDER — CHLORTHALIDONE 50 MG/1
TABLET ORAL
COMMUNITY
Start: 2017-01-20 | End: 2020-01-03

## 2017-06-18 RX ORDER — ALPRAZOLAM 2 MG/1
TABLET ORAL
COMMUNITY
End: 2020-01-03

## 2017-06-18 RX ORDER — PREDNISONE 10 MG/1
TABLET ORAL
COMMUNITY
Start: 2017-03-07 | End: 2020-01-03

## 2017-06-18 RX ORDER — ACYCLOVIR 50 MG/G
OINTMENT TOPICAL
COMMUNITY
End: 2021-04-21

## 2017-06-18 RX ORDER — CHOLECALCIFEROL (VITAMIN D3) 25 MCG
CAPSULE ORAL
COMMUNITY
End: 2020-01-03

## 2017-06-18 RX ORDER — ATENOLOL 50 MG/1
TABLET ORAL
COMMUNITY
End: 2020-01-03

## 2017-06-18 RX ORDER — ALBUTEROL SULFATE 90 UG/1
AEROSOL, METERED RESPIRATORY (INHALATION)
COMMUNITY
Start: 2017-03-04 | End: 2020-01-03

## 2017-06-18 RX ORDER — OXYCODONE AND ACETAMINOPHEN 10; 325 MG/1; MG/1
1 TABLET ORAL EVERY 8 HOURS PRN
COMMUNITY
End: 2021-06-01

## 2017-06-18 RX ORDER — ACYCLOVIR 200 MG/1
CAPSULE ORAL
COMMUNITY
End: 2020-01-03

## 2017-06-18 RX ORDER — DICYCLOMINE HYDROCHLORIDE 10 MG/1
CAPSULE ORAL
COMMUNITY
End: 2020-01-03

## 2017-06-18 RX ORDER — ONDANSETRON 2 MG/ML
4 INJECTION INTRAMUSCULAR; INTRAVENOUS ONCE
Status: COMPLETED | OUTPATIENT
Start: 2017-06-18 | End: 2017-06-18

## 2017-06-18 RX ORDER — GABAPENTIN 300 MG/1
CAPSULE ORAL
COMMUNITY
Start: 2013-08-28 | End: 2020-01-03

## 2017-06-18 RX ORDER — FLUOXETINE 10 MG/1
CAPSULE ORAL
COMMUNITY
End: 2020-01-03

## 2017-06-18 RX ADMIN — SODIUM CHLORIDE 500 ML: 0.9 INJECTION, SOLUTION INTRAVENOUS at 22:45

## 2017-06-18 RX ADMIN — HYDROMORPHONE HYDROCHLORIDE 1 MG: 1 INJECTION, SOLUTION INTRAMUSCULAR; INTRAVENOUS; SUBCUTANEOUS at 23:15

## 2017-06-18 RX ADMIN — ONDANSETRON 4 MG: 2 INJECTION INTRAMUSCULAR; INTRAVENOUS at 23:15

## 2017-06-18 ASSESSMENT — PAIN DESCRIPTION - PAIN TYPE: TYPE: ACUTE PAIN

## 2017-06-18 ASSESSMENT — PAIN SCALES - GENERAL: PAINLEVEL_OUTOF10: 10

## 2017-06-18 ASSESSMENT — PAIN DESCRIPTION - ORIENTATION: ORIENTATION: RIGHT

## 2017-06-18 ASSESSMENT — PAIN DESCRIPTION - LOCATION: LOCATION: FLANK

## 2017-06-19 VITALS
TEMPERATURE: 97.7 F | BODY MASS INDEX: 36.23 KG/M2 | OXYGEN SATURATION: 99 % | HEIGHT: 63 IN | RESPIRATION RATE: 18 BRPM | WEIGHT: 204.5 LBS | HEART RATE: 64 BPM | SYSTOLIC BLOOD PRESSURE: 138 MMHG | DIASTOLIC BLOOD PRESSURE: 94 MMHG

## 2017-06-19 NOTE — ED PROVIDER NOTES
Subjective   Patient is a 57 y.o. female presenting with flank pain.   Flank Pain   Pain location:  R flank  Pain quality: aching and dull    Pain quality: not bloating, not burning, not squeezing and not tearing    Pain severity:  Moderate  Onset quality:  Gradual  Timing:  Constant  Progression:  Worsening  Chronicity:  New  Context: not alcohol use, not awakening from sleep, not diet changes, not eating, not previous surgeries, not recent illness, not recent travel, not sick contacts, not suspicious food intake and not trauma    Relieved by:  Nothing  Worsened by:  Nothing  Ineffective treatments:  None tried  Associated symptoms: no anorexia, no belching, no chest pain, no chills, no cough, no dysuria, no fatigue, no fever, no flatus, no hematemesis, no hematochezia, no hematuria, no melena, no nausea, no shortness of breath and no vomiting    Risk factors: no aspirin use and no recent hospitalization        Review of Systems   Constitutional: Negative.  Negative for chills, fatigue and fever.   HENT: Negative.    Eyes: Negative.    Respiratory: Negative.  Negative for cough and shortness of breath.    Cardiovascular: Negative.  Negative for chest pain.   Gastrointestinal: Negative for anorexia, flatus, hematemesis, hematochezia, melena, nausea and vomiting.   Endocrine: Negative.    Genitourinary: Positive for flank pain. Negative for dysuria and hematuria.   Skin: Negative.    Neurological: Negative.    Hematological: Negative.    All other systems reviewed and are negative.      Past Medical History:   Diagnosis Date   • Anxiety    • COPD (chronic obstructive pulmonary disease)    • Depression    • Emphysema/COPD    • Hypertension    • Injury of back    • Kidney stone        No Known Allergies    Past Surgical History:   Procedure Laterality Date   • CHOLECYSTECTOMY     • FINGER SURGERY     • HYSTERECTOMY     • KIDNEY STONE SURGERY     • KIDNEY SURGERY         History reviewed. No pertinent family  history.    Social History     Social History   • Marital status:      Spouse name: N/A   • Number of children: N/A   • Years of education: N/A     Social History Main Topics   • Smoking status: Current Every Day Smoker     Packs/day: 1.00     Types: Cigarettes   • Smokeless tobacco: None   • Alcohol use No   • Drug use: No   • Sexual activity: Not Asked     Other Topics Concern   • None     Social History Narrative   • None           Objective   Physical Exam   Constitutional: She is oriented to person, place, and time. She appears well-developed and well-nourished.  Non-toxic appearance.   HENT:   Head: Normocephalic and atraumatic.   Mouth/Throat: Oropharynx is clear and moist.   Eyes: Conjunctivae are normal. Pupils are equal, round, and reactive to light.   Neck: Normal range of motion. Neck supple. No hepatojugular reflux and no JVD present.   Cardiovascular: Normal rate, regular rhythm, normal heart sounds and intact distal pulses.  PMI is not displaced.  Exam reveals no decreased pulses.    No murmur heard.  Pulmonary/Chest: Effort normal and breath sounds normal. No accessory muscle usage. No apnea. No respiratory distress. She has no decreased breath sounds. She has no wheezes.   Abdominal: Normal appearance, normal aorta and bowel sounds are normal. She exhibits no shifting dullness, no distension, no fluid wave, no abdominal bruit, no ascites, no pulsatile midline mass and no mass. There is no tenderness. There is no guarding.   Musculoskeletal:        Lumbar back: She exhibits decreased range of motion. She exhibits no tenderness, no bony tenderness, no swelling, no edema, no deformity, no spasm and normal pulse.   Neurological: She is alert and oriented to person, place, and time. She has normal strength and normal reflexes. No cranial nerve deficit. GCS eye subscore is 4. GCS verbal subscore is 5. GCS motor subscore is 6.   Skin: Skin is warm and dry.   Psychiatric: She has a normal mood and  affect. Her behavior is normal.   Nursing note and vitals reviewed.      Procedures         ED Course  ED Course   Comment By Time   Patient complaining of flank pain she got a history of chronic back pain and has a history of injections to her back last time she had injections in April having pain in the flank and the lower back and subsequently had CTs with and without contrast of the lspine performed on June 6 which was negative for any acute abscess and now she comes in with flank pain history of kidney stones Shahram Huff MD 06/18 2202   Case turned over to Dr hawkins there is no neurological or clinical indication of an epidural abscess . The recent ct l spine done to rule that out is neg today it is more flank pain going into her groin Shahram Huff MD 06/18 2220                  MDM  Number of Diagnoses or Management Options  Right flank pain: new and requires workup  Diagnosis management comments: D/w pt about the results of the CT.  I explained to her that I did not have a source for the flank pain considering the CT results were essentially negative outside of some bilateral renal stones.  We will have her follow up with her chronic pain physician since he has been the one doing injections on her back and is also performed other ancillary testing for her back pain. Pt instructed to return to the ED if they has any further issues or new complaints.          Amount and/or Complexity of Data Reviewed  Clinical lab tests: reviewed and ordered  Tests in the radiology section of CPT®: ordered and reviewed  Tests in the medicine section of CPT®: ordered and reviewed  Independent visualization of images, tracings, or specimens: yes    Risk of Complications, Morbidity, and/or Mortality  Presenting problems: moderate  Diagnostic procedures: moderate  Management options: moderate    Patient Progress  Patient progress: stable      Final diagnoses:   Right flank pain            Chepe Hawkins MD  06/19/17 0007

## 2017-06-21 ENCOUNTER — HOSPITAL ENCOUNTER (EMERGENCY)
Facility: HOSPITAL | Age: 57
Discharge: HOME | End: 2017-06-21
Attending: EMERGENCY MEDICINE | Admitting: EMERGENCY MEDICINE

## 2017-06-21 VITALS
RESPIRATION RATE: 20 BRPM | BODY MASS INDEX: 36.14 KG/M2 | SYSTOLIC BLOOD PRESSURE: 119 MMHG | HEIGHT: 63 IN | WEIGHT: 204 LBS | TEMPERATURE: 97.8 F | OXYGEN SATURATION: 97 % | HEART RATE: 84 BPM | DIASTOLIC BLOOD PRESSURE: 72 MMHG

## 2017-06-21 DIAGNOSIS — G89.29 CHRONIC BILATERAL LOW BACK PAIN WITH SCIATICA, SCIATICA LATERALITY UNSPECIFIED: Primary | ICD-10-CM

## 2017-06-21 DIAGNOSIS — M54.40 CHRONIC BILATERAL LOW BACK PAIN WITH SCIATICA, SCIATICA LATERALITY UNSPECIFIED: Primary | ICD-10-CM

## 2017-06-21 PROCEDURE — 99282 EMERGENCY DEPT VISIT SF MDM: CPT

## 2017-06-21 RX ORDER — KETOROLAC TROMETHAMINE 10 MG/1
10 TABLET, FILM COATED ORAL EVERY 6 HOURS PRN
Qty: 15 TABLET | Refills: 0 | Status: SHIPPED | OUTPATIENT
Start: 2017-06-21 | End: 2020-01-03

## 2017-06-21 RX ORDER — HYDROMORPHONE HYDROCHLORIDE 2 MG/1
2 TABLET ORAL EVERY 6 HOURS PRN
Qty: 12 TABLET | Refills: 0 | Status: SHIPPED | OUTPATIENT
Start: 2017-06-21 | End: 2020-01-03

## 2017-06-21 NOTE — ED PROVIDER NOTES
Subjective back and leg pain  History of Present Illness  Speech is 57-year-old white female comes in today with chief complaint of back pain.  The patient has a long-standing history of chronic back pain for which she is currently on Percocet.  She admitted to me that she has had to stop her OxyContin.  It was done on her own accord.  She tells me it was horrible withdrawal and she was going through that again.  She tells me that she attempted very careful with her medication however at this point she is taken too much of her Percocet because she said she had to get up during the night a couple nights and take an extra pill.  He has been seen here by Dr. Dahl who CT of her abdomen pelvis and they did not find anything acute.  She was subsequently discharged to home.  The pain has seemed to worsen over the last several weeks.  She denies any urinary symptoms or paresthesias.  She denies any new injury.      Review of Systems   Genitourinary: Negative for dysuria, frequency and urgency.   Musculoskeletal: Positive for back pain.        Leg pain       Past Medical History:   Diagnosis Date   • Anxiety    • COPD (chronic obstructive pulmonary disease)    • Depression    • Emphysema/COPD    • Hypertension    • Injury of back    • Kidney stone        No Known Allergies    Past Surgical History:   Procedure Laterality Date   • CHOLECYSTECTOMY     • FINGER SURGERY     • HYSTERECTOMY     • KIDNEY STONE SURGERY     • KIDNEY SURGERY         History reviewed. No pertinent family history.    Social History     Social History   • Marital status:      Spouse name: N/A   • Number of children: N/A   • Years of education: N/A     Social History Main Topics   • Smoking status: Current Every Day Smoker     Packs/day: 1.00     Types: Cigarettes   • Smokeless tobacco: None   • Alcohol use No   • Drug use: No   • Sexual activity: Not Asked     Other Topics Concern   • None     Social History Narrative           Objective    Physical Exam   Constitutional: She is oriented to person, place, and time. She appears well-developed and well-nourished.   HENT:   Head: Normocephalic and atraumatic.   Right Ear: External ear normal.   Left Ear: External ear normal.   Eyes: EOM are normal. Right eye exhibits no discharge. Left eye exhibits no discharge.   Neck: Normal range of motion. Neck supple. No thyromegaly present.   Pulmonary/Chest: Effort normal. No respiratory distress.   Musculoskeletal: Normal range of motion. She exhibits tenderness. She exhibits no edema or deformity.   Tenderness to palpation in the lower back   Neurological: She is alert and oriented to person, place, and time. She has normal reflexes. No cranial nerve deficit.   Skin: Skin is warm and dry. No rash noted.   Psychiatric: Judgment normal.   Nursing note and vitals reviewed.      Procedures         ED Course  ED Course                  MDM  Number of Diagnoses or Management Options  Chronic bilateral low back pain with sciatica, sciatica laterality unspecified: established and worsening  Risk of Complications, Morbidity, and/or Mortality  Presenting problems: low  Diagnostic procedures: low  Management options: low    Patient Progress  Patient progress: stable      Final diagnoses:   Chronic bilateral low back pain with sciatica, sciatica laterality unspecified            Patrizia Zavala MD  06/21/17 2006

## 2017-06-22 NOTE — DISCHARGE INSTRUCTIONS
I recommend that you follow with your own primary care physician tomorrow.  Please call pain management, but not to give additional pain medication.  Of course if your symptoms worsen or you are unable to follow-up he should feel free to return to the ER at any time.

## 2017-06-22 NOTE — PROGRESS NOTES
Completed a  ED readmission questionnaire for COPD. Called [facility] for pt to make sure they are in network with [facility].  May Hoover RN.6/21/2017.7:45 PM.

## 2017-06-23 RX ORDER — GABAPENTIN 300 MG/1
300 CAPSULE ORAL 4 TIMES DAILY
Qty: 120 CAPSULE | Refills: 11 | Status: SHIPPED | OUTPATIENT
Start: 2017-06-23 | End: 2017-09-28 | Stop reason: SDUPTHER

## 2017-07-02 ENCOUNTER — HOSPITAL ENCOUNTER (EMERGENCY)
Facility: HOSPITAL | Age: 57
Discharge: HOME | End: 2017-07-02
Admitting: EMERGENCY MEDICINE

## 2017-07-02 VITALS
TEMPERATURE: 98 F | HEIGHT: 63 IN | SYSTOLIC BLOOD PRESSURE: 110 MMHG | HEART RATE: 75 BPM | BODY MASS INDEX: 36.41 KG/M2 | RESPIRATION RATE: 16 BRPM | WEIGHT: 205.5 LBS | OXYGEN SATURATION: 98 % | DIASTOLIC BLOOD PRESSURE: 68 MMHG

## 2017-07-02 DIAGNOSIS — M54.50 CHRONIC BILATERAL LOW BACK PAIN WITHOUT SCIATICA: Primary | ICD-10-CM

## 2017-07-02 DIAGNOSIS — G89.29 CHRONIC BILATERAL LOW BACK PAIN WITHOUT SCIATICA: Primary | ICD-10-CM

## 2017-07-02 PROCEDURE — 96372 THER/PROPH/DIAG INJ SC/IM: CPT

## 2017-07-02 PROCEDURE — 25010000002 ORPHENADRINE CITRATE PER 60 MG: Performed by: NURSE PRACTITIONER

## 2017-07-02 PROCEDURE — 25010000002 KETOROLAC TROMETHAMINE PER 15 MG: Performed by: NURSE PRACTITIONER

## 2017-07-02 PROCEDURE — 99283 EMERGENCY DEPT VISIT LOW MDM: CPT

## 2017-07-02 RX ORDER — TIZANIDINE 4 MG/1
4 TABLET ORAL EVERY 8 HOURS PRN
Qty: 15 TABLET | Refills: 0 | Status: SHIPPED | OUTPATIENT
Start: 2017-07-02 | End: 2020-01-03

## 2017-07-02 RX ORDER — ORPHENADRINE CITRATE 30 MG/ML
60 INJECTION INTRAMUSCULAR; INTRAVENOUS ONCE
Status: COMPLETED | OUTPATIENT
Start: 2017-07-02 | End: 2017-07-02

## 2017-07-02 RX ORDER — KETOROLAC TROMETHAMINE 30 MG/ML
60 INJECTION, SOLUTION INTRAMUSCULAR; INTRAVENOUS ONCE
Status: COMPLETED | OUTPATIENT
Start: 2017-07-02 | End: 2017-07-02

## 2017-07-02 RX ORDER — METHYLPREDNISOLONE 4 MG/1
TABLET ORAL
Qty: 21 TABLET | Refills: 0 | Status: SHIPPED | OUTPATIENT
Start: 2017-07-02 | End: 2020-01-03

## 2017-07-02 RX ADMIN — KETOROLAC TROMETHAMINE 60 MG: 30 INJECTION, SOLUTION INTRAMUSCULAR at 21:10

## 2017-07-02 RX ADMIN — ORPHENADRINE CITRATE 60 MG: 30 INJECTION INTRAMUSCULAR; INTRAVENOUS at 21:11

## 2017-07-03 NOTE — ED PROVIDER NOTES
"Subjective   HPI Comments:  is a 57-year-old white female here with exacerbation of her chronic low back pain radiating into her right lower extremity and leg.  This is been going on for the past month.  She was seen here a week ago in the emergency department per Dr. Zavala and states that she \"helped her out because she gave me an extra pain pills.\"  Patient states that she pushed about her yard a few weeks ago and that is when her pain started.  She denies any incontinence of urine or stool.  She has a follow-up appointment with pain management on July 7.    Patient is a 57 y.o. female presenting with back pain.   History provided by:  Patient   used: No    Back Pain       Review of Systems   Constitutional: Negative.    HENT: Negative.    Eyes: Negative.    Respiratory: Negative.    Cardiovascular: Negative.    Gastrointestinal: Negative.    Endocrine: Negative.    Genitourinary: Negative.    Musculoskeletal: Positive for back pain.        Pt presents with exacerbation with chronic low back pain with rle radic. She states that this has been going on for the past month. She states that she is followed by pain management.  She was seen here about a week ago and states that Dr. Zavala \"helped her out and now she is needing more of her pain medicine.\"  Patient is followed by pain management and has an appointment on July 7 for further treatment.  He denies any incontinence of urine or stool.  She had a negative CAT scan about 3 weeks ago of her L-spine.   Skin: Negative.    Allergic/Immunologic: Negative.    Neurological: Negative.    Hematological: Negative.    Psychiatric/Behavioral: Negative.    All other systems reviewed and are negative.      Past Medical History:   Diagnosis Date   • Anxiety    • COPD (chronic obstructive pulmonary disease)    • Depression    • Emphysema/COPD    • Hypertension    • Injury of back    • Kidney stone        No Known Allergies    Past Surgical " History:   Procedure Laterality Date   • CHOLECYSTECTOMY     • FINGER SURGERY     • HYSTERECTOMY     • KIDNEY STONE SURGERY     • KIDNEY SURGERY         History reviewed. No pertinent family history.    Social History     Social History   • Marital status:      Spouse name: N/A   • Number of children: N/A   • Years of education: N/A     Social History Main Topics   • Smoking status: Current Every Day Smoker     Packs/day: 1.00     Types: Cigarettes   • Smokeless tobacco: None   • Alcohol use No   • Drug use: No   • Sexual activity: Not Asked     Other Topics Concern   • None     Social History Narrative   • None       Prior to Admission medications    Medication Sig Start Date End Date Taking? Authorizing Provider   acyclovir (ZOVIRAX) 200 MG capsule Take  by mouth.    Historical Provider, MD   acyclovir (ZOVIRAX) 5 % ointment Apply  topically.    Historical Provider, MD   albuterol (PROVENTIL HFA;VENTOLIN HFA) 108 (90 BASE) MCG/ACT inhaler Inhale. 3/4/17   Historical Provider, MD   ALPRAZolam (XANAX) 2 MG tablet Take  by mouth.    Historical Provider, MD   aspirin 81 MG tablet Take  by mouth.    Historical Provider, MD   atenolol (TENORMIN) 50 MG tablet Take  by mouth.    Historical Provider, MD   chlorthalidone (HYGROTEN) 50 MG tablet Take  by mouth. 1/20/17   Historical Provider, MD   Cholecalciferol (VITAMIN D-3) 1000 UNITS capsule Take  by mouth.    Historical Provider, MD   dicyclomine (BENTYL) 10 MG capsule Take  by mouth.    Historical Provider, MD   FLUoxetine (PROzac) 10 MG capsule Take  by mouth.    Historical Provider, MD   gabapentin (NEURONTIN) 300 MG capsule Take  by mouth. 8/28/13   Historical Provider, MD   HYDROmorphone (DILAUDID) 2 MG tablet Take 1 tablet by mouth Every 6 (Six) Hours As Needed for Moderate Pain (4-6). 6/21/17   Patrizia Zavala MD   ketorolac (TORADOL) 10 MG tablet Take 1 tablet by mouth Every 6 (Six) Hours As Needed for Moderate Pain (4-6). 6/21/17   Patrizia MCMULLEN  "MD Lucas   lisinopril (PRINIVIL,ZESTRIL) 10 MG tablet Take  by mouth.    Historical Provider, MD   omeprazole (priLOSEC) 20 MG capsule Take  by mouth.    Historical Provider, MD   oxyCODONE-acetaminophen (PERCOCET)  MG per tablet Take  by mouth.    Historical Provider, MD   Potassium Citrate ER 15 MEQ (1620 MG) tablet controlled-release Take  by mouth. 1/20/17   Historical Provider, MD   predniSONE (DELTASONE) 10 MG tablet  3/7/17   Historical Provider, MD   promethazine (PHENERGAN) 25 MG tablet Take  by mouth. 3/12/17   Historical Provider, MD   sucralfate (CARAFATE) 1 GM/10ML suspension Take  by mouth. 10/2/13   Historical Provider, MD   tamsulosin (FLOMAX) 0.4 MG capsule 24 hr capsule Take  by mouth. 3/13/17   Historical Provider, MD   vitamin B-12 (CYANOCOBALAMIN) 500 MCG tablet Take  by mouth.    Historical Provider, MD       /68  Pulse 75  Temp 98 °F (36.7 °C)  Resp 16  Ht 63\" (160 cm)  Wt 205 lb 8 oz (93.2 kg)  SpO2 98%  BMI 36.4 kg/m2    Objective   Physical Exam   Constitutional: She is oriented to person, place, and time. She appears well-developed and well-nourished.   HENT:   Head: Normocephalic and atraumatic.   Eyes: Conjunctivae and EOM are normal. Pupils are equal, round, and reactive to light.   Neck: Normal range of motion. Neck supple. No tracheal deviation present. No thyromegaly present.   Cardiovascular: Normal rate, regular rhythm, normal heart sounds and intact distal pulses.    Pulmonary/Chest: Effort normal and breath sounds normal. No respiratory distress. She has no wheezes. She has no rales. She exhibits no tenderness.   Abdominal: Soft. Bowel sounds are normal.   Musculoskeletal:   Moderate tenderness on palpation of paraspinal muscles of lower lumbar spine bilat. No stepoff or laxity noted. dtrs intact. Foot push/pull strong, equal.    Neurological: She is alert and oriented to person, place, and time. She has normal reflexes. No cranial nerve deficit.   Skin: " Skin is warm and dry.   Psychiatric: She has a normal mood and affect. Her behavior is normal. Judgment and thought content normal.   Nursing note and vitals reviewed.      Procedures         Lab Results (last 24 hours)     ** No results found for the last 24 hours. **          No orders to display       ED Course  ED Course   Comment By Time   Advised patient that we would be able to administer some non narcotic pain medication.  Advised patient that the ER could not manage her chronic pain and referred to [facility].  Patient has an appointment with pain management on July 7 and advised her to follow-up with him at that time. Nasra Hair, ADIN 07/02 2110          MDM  Number of Diagnoses or Management Options  Chronic bilateral low back pain without sciatica: established and worsening  Patient Progress  Patient progress: stable      Final diagnoses:   Chronic bilateral low back pain without sciatica          ADIN Gibbs  07/02/17 6804

## 2017-07-05 LAB
ALBUMIN SERPL-MCNC: 4 G/DL (ref 3.5–5.2)
ALP BLD-CCNC: 48 U/L (ref 35–104)
ALT SERPL-CCNC: 12 U/L (ref 5–33)
ANION GAP SERPL CALCULATED.3IONS-SCNC: 13 MMOL/L (ref 7–19)
AST SERPL-CCNC: 16 U/L (ref 5–32)
BASOPHILS ABSOLUTE: 0 K/UL (ref 0–0.2)
BASOPHILS RELATIVE PERCENT: 0.5 % (ref 0–1)
BILIRUB SERPL-MCNC: <0.2 MG/DL (ref 0.2–1.2)
BUN BLDV-MCNC: 22 MG/DL (ref 6–20)
CALCIUM SERPL-MCNC: 9.4 MG/DL (ref 8.6–10)
CHLORIDE BLD-SCNC: 103 MMOL/L (ref 98–111)
CHOLESTEROL, TOTAL: 159 MG/DL (ref 160–199)
CO2: 27 MMOL/L (ref 22–29)
CREAT SERPL-MCNC: 0.8 MG/DL (ref 0.5–0.9)
EOSINOPHILS ABSOLUTE: 0.3 K/UL (ref 0–0.6)
EOSINOPHILS RELATIVE PERCENT: 2.9 % (ref 0–5)
GFR NON-AFRICAN AMERICAN: >60
GLUCOSE BLD-MCNC: 107 MG/DL (ref 74–109)
HCT VFR BLD CALC: 42.6 % (ref 37–47)
HDLC SERPL-MCNC: 47 MG/DL (ref 65–121)
HEMOGLOBIN: 13.2 G/DL (ref 12–16)
LDL CHOLESTEROL CALCULATED: 96 MG/DL
LYMPHOCYTES ABSOLUTE: 2.6 K/UL (ref 1.1–4.5)
LYMPHOCYTES RELATIVE PERCENT: 30.3 % (ref 20–40)
MCH RBC QN AUTO: 27.2 PG (ref 27–31)
MCHC RBC AUTO-ENTMCNC: 31 G/DL (ref 33–37)
MCV RBC AUTO: 87.7 FL (ref 81–99)
MONOCYTES ABSOLUTE: 0.7 K/UL (ref 0–0.9)
MONOCYTES RELATIVE PERCENT: 8.5 % (ref 0–10)
NEUTROPHILS ABSOLUTE: 4.9 K/UL (ref 1.5–7.5)
NEUTROPHILS RELATIVE PERCENT: 57.6 % (ref 50–65)
PDW BLD-RTO: 13.5 % (ref 11.5–14.5)
PLATELET # BLD: 214 K/UL (ref 130–400)
PMV BLD AUTO: 12.1 FL (ref 9.4–12.3)
POTASSIUM SERPL-SCNC: 5.2 MMOL/L (ref 3.5–5)
RBC # BLD: 4.86 M/UL (ref 4.2–5.4)
SODIUM BLD-SCNC: 143 MMOL/L (ref 136–145)
TOTAL PROTEIN: 7.4 G/DL (ref 6.6–8.7)
TRIGL SERPL-MCNC: 78 MG/DL (ref 150–199)
WBC # BLD: 8.6 K/UL (ref 4.8–10.8)

## 2017-07-11 ENCOUNTER — OFFICE VISIT (OUTPATIENT)
Dept: INTERNAL MEDICINE | Age: 57
End: 2017-07-11
Payer: MEDICARE

## 2017-07-11 VITALS
BODY MASS INDEX: 36.23 KG/M2 | DIASTOLIC BLOOD PRESSURE: 70 MMHG | WEIGHT: 204.5 LBS | HEART RATE: 73 BPM | HEIGHT: 63 IN | TEMPERATURE: 98.3 F | OXYGEN SATURATION: 97 % | SYSTOLIC BLOOD PRESSURE: 120 MMHG

## 2017-07-11 DIAGNOSIS — Z11.4 SCREENING FOR HIV WITHOUT PRESENCE OF RISK FACTORS: ICD-10-CM

## 2017-07-11 DIAGNOSIS — I10 ESSENTIAL HYPERTENSION: Primary | ICD-10-CM

## 2017-07-11 DIAGNOSIS — Z12.11 SCREENING FOR COLON CANCER: ICD-10-CM

## 2017-07-11 DIAGNOSIS — F17.218 CIGARETTE NICOTINE DEPENDENCE WITH OTHER NICOTINE-INDUCED DISORDER: ICD-10-CM

## 2017-07-11 DIAGNOSIS — N20.0 KIDNEY STONES: ICD-10-CM

## 2017-07-11 DIAGNOSIS — Z12.31 ENCOUNTER FOR SCREENING MAMMOGRAM FOR BREAST CANCER: ICD-10-CM

## 2017-07-11 PROCEDURE — 99214 OFFICE O/P EST MOD 30 MIN: CPT | Performed by: PHYSICIAN ASSISTANT

## 2017-07-11 ASSESSMENT — PATIENT HEALTH QUESTIONNAIRE - PHQ9
SUM OF ALL RESPONSES TO PHQ9 QUESTIONS 1 & 2: 2
SUM OF ALL RESPONSES TO PHQ QUESTIONS 1-9: 2
2. FEELING DOWN, DEPRESSED OR HOPELESS: 1
1. LITTLE INTEREST OR PLEASURE IN DOING THINGS: 1

## 2017-07-12 DIAGNOSIS — R55 ATYPICAL SYNCOPE: ICD-10-CM

## 2017-07-12 DIAGNOSIS — Z95.818 STATUS POST PLACEMENT OF IMPLANTABLE LOOP RECORDER: Primary | ICD-10-CM

## 2017-07-12 PROCEDURE — 93299 PR REM INTERROG ICPMS/SCRMS <30 D TECH REVIEW: CPT | Performed by: CLINICAL NURSE SPECIALIST

## 2017-07-12 PROCEDURE — 93298 REM INTERROG DEV EVAL SCRMS: CPT | Performed by: CLINICAL NURSE SPECIALIST

## 2017-07-18 ENCOUNTER — HOSPITAL ENCOUNTER (OUTPATIENT)
Dept: WOMENS IMAGING | Age: 57
Discharge: HOME | End: 2017-07-18
Payer: MEDICARE

## 2017-07-18 ENCOUNTER — TELEPHONE (OUTPATIENT)
Dept: CARDIOLOGY | Age: 57
End: 2017-07-18

## 2017-07-18 DIAGNOSIS — Z12.31 ENCOUNTER FOR SCREENING MAMMOGRAM FOR BREAST CANCER: ICD-10-CM

## 2017-07-18 PROCEDURE — G0202 SCR MAMMO BI INCL CAD: HCPCS

## 2017-08-11 RX ORDER — ACYCLOVIR 200 MG/1
CAPSULE ORAL
Qty: 100 CAPSULE | Refills: 1 | Status: SHIPPED | OUTPATIENT
Start: 2017-08-11 | End: 2018-06-29 | Stop reason: SDUPTHER

## 2017-08-23 DIAGNOSIS — R55 ATYPICAL SYNCOPE: ICD-10-CM

## 2017-08-23 DIAGNOSIS — Z95.818 STATUS POST PLACEMENT OF IMPLANTABLE LOOP RECORDER: Primary | ICD-10-CM

## 2017-08-23 PROCEDURE — 93298 REM INTERROG DEV EVAL SCRMS: CPT | Performed by: NURSE PRACTITIONER

## 2017-08-23 PROCEDURE — 93299 PR REM INTERROG ICPMS/SCRMS <30 D TECH REVIEW: CPT | Performed by: NURSE PRACTITIONER

## 2017-09-01 RX ORDER — LISINOPRIL 10 MG/1
TABLET ORAL
Qty: 90 TABLET | Refills: 0 | Status: SHIPPED | OUTPATIENT
Start: 2017-09-01 | End: 2018-02-16 | Stop reason: SDUPTHER

## 2017-09-06 RX ORDER — METOPROLOL SUCCINATE 50 MG/1
50 TABLET, EXTENDED RELEASE ORAL DAILY
Qty: 30 TABLET | Refills: 3 | Status: SHIPPED | OUTPATIENT
Start: 2017-09-06 | End: 2018-01-18 | Stop reason: SDUPTHER

## 2017-09-20 ENCOUNTER — HOSPITAL ENCOUNTER (OUTPATIENT)
Dept: GENERAL RADIOLOGY | Age: 57
Discharge: HOME | End: 2017-09-20
Payer: MEDICARE

## 2017-09-20 ENCOUNTER — OFFICE VISIT (OUTPATIENT)
Dept: UROLOGY | Age: 57
End: 2017-09-20
Payer: MEDICARE

## 2017-09-20 VITALS
HEIGHT: 65 IN | SYSTOLIC BLOOD PRESSURE: 136 MMHG | OXYGEN SATURATION: 97 % | BODY MASS INDEX: 35.16 KG/M2 | HEART RATE: 81 BPM | DIASTOLIC BLOOD PRESSURE: 70 MMHG | TEMPERATURE: 97.8 F | WEIGHT: 211 LBS

## 2017-09-20 DIAGNOSIS — R10.9 LEFT FLANK PAIN: ICD-10-CM

## 2017-09-20 DIAGNOSIS — N20.0 RENAL CALCULUS, LEFT: Primary | ICD-10-CM

## 2017-09-20 DIAGNOSIS — N20.0 RENAL CALCULUS, LEFT: ICD-10-CM

## 2017-09-20 LAB
APPEARANCE FLUID: CLEAR
BILIRUBIN, POC: NORMAL
BLOOD URINE, POC: NORMAL
CLARITY, POC: NORMAL
COLOR, POC: NORMAL
GLUCOSE URINE, POC: NORMAL
KETONES, POC: NORMAL
LEUKOCYTE EST, POC: NORMAL
NITRITE, POC: NORMAL
PH, POC: 6.5
PROTEIN, POC: NORMAL
SPECIFIC GRAVITY, POC: 1.02
UROBILINOGEN, POC: 0.2

## 2017-09-20 PROCEDURE — 74000 XR ABDOMEN LIMITED (KUB): CPT

## 2017-09-20 PROCEDURE — 3017F COLORECTAL CA SCREEN DOC REV: CPT | Performed by: PHYSICIAN ASSISTANT

## 2017-09-20 PROCEDURE — 4004F PT TOBACCO SCREEN RCVD TLK: CPT | Performed by: PHYSICIAN ASSISTANT

## 2017-09-20 PROCEDURE — 3014F SCREEN MAMMO DOC REV: CPT | Performed by: PHYSICIAN ASSISTANT

## 2017-09-20 PROCEDURE — G8417 CALC BMI ABV UP PARAM F/U: HCPCS | Performed by: PHYSICIAN ASSISTANT

## 2017-09-20 PROCEDURE — G8427 DOCREV CUR MEDS BY ELIG CLIN: HCPCS | Performed by: PHYSICIAN ASSISTANT

## 2017-09-20 PROCEDURE — 99213 OFFICE O/P EST LOW 20 MIN: CPT | Performed by: PHYSICIAN ASSISTANT

## 2017-09-20 ASSESSMENT — ENCOUNTER SYMPTOMS
HEARTBURN: 0
BLURRED VISION: 0
SHORTNESS OF BREATH: 0
SORE THROAT: 0
DOUBLE VISION: 0
WHEEZING: 0
NAUSEA: 0

## 2017-09-28 RX ORDER — GABAPENTIN 300 MG/1
300 CAPSULE ORAL 4 TIMES DAILY
Qty: 120 CAPSULE | Refills: 0 | OUTPATIENT
Start: 2017-09-28 | End: 2017-10-04 | Stop reason: SDUPTHER

## 2017-10-03 ENCOUNTER — OFFICE VISIT (OUTPATIENT)
Dept: UROLOGY | Age: 57
End: 2017-10-03
Payer: MEDICARE

## 2017-10-03 ENCOUNTER — HOSPITAL ENCOUNTER (OUTPATIENT)
Dept: CT IMAGING | Age: 57
Discharge: HOME | End: 2017-10-03
Payer: MEDICARE

## 2017-10-03 VITALS
BODY MASS INDEX: 37.39 KG/M2 | WEIGHT: 211 LBS | HEART RATE: 81 BPM | HEIGHT: 63 IN | OXYGEN SATURATION: 91 % | TEMPERATURE: 95.9 F

## 2017-10-03 DIAGNOSIS — N20.0 BILATERAL KIDNEY STONES: Primary | ICD-10-CM

## 2017-10-03 DIAGNOSIS — R10.9 LEFT FLANK PAIN: ICD-10-CM

## 2017-10-03 DIAGNOSIS — N20.0 RENAL CALCULUS, LEFT: ICD-10-CM

## 2017-10-03 LAB
APPEARANCE FLUID: NORMAL
BILIRUBIN, POC: NORMAL
BLOOD URINE, POC: NORMAL
CLARITY, POC: CLEAR
COLOR, POC: YELLOW
GLUCOSE URINE, POC: NORMAL
KETONES, POC: NORMAL
LEUKOCYTE EST, POC: NORMAL
NITRITE, POC: NORMAL
PH, POC: 7.5
PROTEIN, POC: NORMAL
SPECIFIC GRAVITY, POC: 1.02
UROBILINOGEN, POC: 0.2

## 2017-10-03 PROCEDURE — G8484 FLU IMMUNIZE NO ADMIN: HCPCS | Performed by: PHYSICIAN ASSISTANT

## 2017-10-03 PROCEDURE — G8417 CALC BMI ABV UP PARAM F/U: HCPCS | Performed by: PHYSICIAN ASSISTANT

## 2017-10-03 PROCEDURE — 3014F SCREEN MAMMO DOC REV: CPT | Performed by: PHYSICIAN ASSISTANT

## 2017-10-03 PROCEDURE — 3017F COLORECTAL CA SCREEN DOC REV: CPT | Performed by: PHYSICIAN ASSISTANT

## 2017-10-03 PROCEDURE — 99213 OFFICE O/P EST LOW 20 MIN: CPT | Performed by: PHYSICIAN ASSISTANT

## 2017-10-03 PROCEDURE — 4004F PT TOBACCO SCREEN RCVD TLK: CPT | Performed by: PHYSICIAN ASSISTANT

## 2017-10-03 PROCEDURE — 74176 CT ABD & PELVIS W/O CONTRAST: CPT

## 2017-10-03 PROCEDURE — G8427 DOCREV CUR MEDS BY ELIG CLIN: HCPCS | Performed by: PHYSICIAN ASSISTANT

## 2017-10-03 ASSESSMENT — ENCOUNTER SYMPTOMS
HEARTBURN: 0
WHEEZING: 0
SHORTNESS OF BREATH: 0
EYE REDNESS: 0
EYE DISCHARGE: 0
NAUSEA: 0
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
## 2017-10-04 DIAGNOSIS — Z95.818 STATUS POST PLACEMENT OF IMPLANTABLE LOOP RECORDER: Primary | ICD-10-CM

## 2017-10-04 DIAGNOSIS — R55 ATYPICAL SYNCOPE: ICD-10-CM

## 2017-10-04 PROCEDURE — 93298 REM INTERROG DEV EVAL SCRMS: CPT | Performed by: CLINICAL NURSE SPECIALIST

## 2017-10-04 PROCEDURE — 93299 PR REM INTERROG ICPMS/SCRMS <30 D TECH REVIEW: CPT | Performed by: CLINICAL NURSE SPECIALIST

## 2017-10-04 RX ORDER — GABAPENTIN 300 MG/1
300 CAPSULE ORAL 4 TIMES DAILY
Qty: 120 CAPSULE | Refills: 0 | Status: ON HOLD | OUTPATIENT
Start: 2017-10-04 | End: 2021-09-02 | Stop reason: HOSPADM

## 2017-10-31 ENCOUNTER — TELEPHONE (OUTPATIENT)
Dept: CARDIOLOGY | Age: 57
End: 2017-10-31

## 2017-11-15 DIAGNOSIS — Z95.818 STATUS POST PLACEMENT OF IMPLANTABLE LOOP RECORDER: Primary | ICD-10-CM

## 2017-11-15 DIAGNOSIS — R55 ATYPICAL SYNCOPE: ICD-10-CM

## 2017-11-15 PROCEDURE — 93299 PR REM INTERROG ICPMS/SCRMS <30 D TECH REVIEW: CPT | Performed by: CLINICAL NURSE SPECIALIST

## 2017-11-15 PROCEDURE — 93298 REM INTERROG DEV EVAL SCRMS: CPT | Performed by: CLINICAL NURSE SPECIALIST

## 2017-12-10 ENCOUNTER — HOSPITAL ENCOUNTER (EMERGENCY)
Age: 57
Discharge: HOME | End: 2017-12-10
Payer: MEDICARE

## 2017-12-10 ENCOUNTER — APPOINTMENT (OUTPATIENT)
Dept: GENERAL RADIOLOGY | Age: 57
End: 2017-12-10
Payer: MEDICARE

## 2017-12-10 VITALS
HEIGHT: 63 IN | DIASTOLIC BLOOD PRESSURE: 81 MMHG | BODY MASS INDEX: 35.26 KG/M2 | TEMPERATURE: 98.7 F | WEIGHT: 199 LBS | OXYGEN SATURATION: 98 % | SYSTOLIC BLOOD PRESSURE: 130 MMHG | RESPIRATION RATE: 18 BRPM | HEART RATE: 76 BPM

## 2017-12-10 DIAGNOSIS — S93.432A SPRAIN OF TIBIOFIBULAR LIGAMENT OF LEFT ANKLE, INITIAL ENCOUNTER: Primary | ICD-10-CM

## 2017-12-10 PROCEDURE — 73630 X-RAY EXAM OF FOOT: CPT

## 2017-12-10 PROCEDURE — 73610 X-RAY EXAM OF ANKLE: CPT

## 2017-12-10 PROCEDURE — 99282 EMERGENCY DEPT VISIT SF MDM: CPT | Performed by: NURSE PRACTITIONER

## 2017-12-10 PROCEDURE — 96372 THER/PROPH/DIAG INJ SC/IM: CPT

## 2017-12-10 PROCEDURE — 6360000002 HC RX W HCPCS: Performed by: NURSE PRACTITIONER

## 2017-12-10 PROCEDURE — 99283 EMERGENCY DEPT VISIT LOW MDM: CPT

## 2017-12-10 RX ORDER — KETOROLAC TROMETHAMINE 30 MG/ML
60 INJECTION, SOLUTION INTRAMUSCULAR; INTRAVENOUS ONCE
Status: COMPLETED | OUTPATIENT
Start: 2017-12-10 | End: 2017-12-10

## 2017-12-10 RX ORDER — NAPROXEN 500 MG/1
500 TABLET ORAL 2 TIMES DAILY
Qty: 20 TABLET | Refills: 0 | Status: SHIPPED | OUTPATIENT
Start: 2017-12-10 | End: 2018-05-11 | Stop reason: ALTCHOICE

## 2017-12-10 RX ADMIN — KETOROLAC TROMETHAMINE 60 MG: 30 INJECTION, SOLUTION INTRAMUSCULAR at 13:50

## 2017-12-10 ASSESSMENT — PAIN SCALES - GENERAL
PAINLEVEL_OUTOF10: 5
PAINLEVEL_OUTOF10: 5

## 2017-12-10 ASSESSMENT — PAIN DESCRIPTION - LOCATION: LOCATION: FOOT

## 2017-12-10 ASSESSMENT — PAIN DESCRIPTION - PAIN TYPE: TYPE: ACUTE PAIN

## 2017-12-10 NOTE — ED PROVIDER NOTES
Heart Attack Maternal Grandmother     Diabetes Maternal Grandmother     Coronary Art Dis Maternal Grandmother     Heart Defect Maternal Grandmother     Urolithiasis Other     Tuberculosis Other      pt states unknown    Ulcerative Colitis Other      pt states unknown    Seizures Son     Diabetes Paternal Grandmother           SOCIAL HISTORY       Social History     Social History    Marital status:      Spouse name: N/A    Number of children: N/A    Years of education: N/A     Social History Main Topics    Smoking status: Current Some Day Smoker     Packs/day: 0.50     Years: 35.00    Smokeless tobacco: Never Used    Alcohol use No    Drug use: No    Sexual activity: Not Asked     Other Topics Concern    None     Social History Narrative    None       SCREENINGS    Pass Christian Coma Scale  Eye Opening: Spontaneous  Best Verbal Response: Oriented  Best Motor Response: Obeys commands  [place] Coma Scale Score: 15      PHYSICAL EXAM    (up to 7 for level 4, 8 or more for level 5)     ED Triage Vitals [12/10/17 1237]   BP Temp Temp src Pulse Resp SpO2 Height Weight   -- 98.5 °F (36.9 °C) -- 79 20 96 % 5' 3\" (1.6 m) 199 lb (90.3 kg)       Physical Exam   Constitutional: She is oriented to person, place, and time. She appears well-developed and well-nourished. No distress. Musculoskeletal: She exhibits edema and tenderness. She exhibits no deformity. Feet:    Neurological: She is alert and oriented to person, place, and time. No cranial nerve deficit. Skin: Skin is warm and dry. She is not diaphoretic. No erythema. Psychiatric: She has a normal mood and affect. Nursing note and vitals reviewed.         DIAGNOSTIC RESULTS     RADIOLOGY:   Non-plain film images such as CT, Ultrasound and MRI are read by the radiologist. Plain radiographic images are visualized and preliminarily interpreted by No att. providers found with the below findings:        Interpretation per the Radiologist below,

## 2017-12-21 ENCOUNTER — TELEPHONE (OUTPATIENT)
Dept: CARDIOLOGY | Age: 57
End: 2017-12-21

## 2018-01-17 ENCOUNTER — TELEPHONE (OUTPATIENT)
Dept: INTERNAL MEDICINE | Age: 58
End: 2018-01-17

## 2018-01-18 ENCOUNTER — OFFICE VISIT (OUTPATIENT)
Dept: INTERNAL MEDICINE | Age: 58
End: 2018-01-18
Payer: MEDICARE

## 2018-01-18 VITALS
DIASTOLIC BLOOD PRESSURE: 70 MMHG | OXYGEN SATURATION: 97 % | SYSTOLIC BLOOD PRESSURE: 110 MMHG | TEMPERATURE: 97.6 F | BODY MASS INDEX: 37.03 KG/M2 | HEART RATE: 91 BPM | WEIGHT: 209 LBS | HEIGHT: 63 IN

## 2018-01-18 DIAGNOSIS — I10 ESSENTIAL HYPERTENSION: Primary | ICD-10-CM

## 2018-01-18 PROCEDURE — G8484 FLU IMMUNIZE NO ADMIN: HCPCS | Performed by: NURSE PRACTITIONER

## 2018-01-18 PROCEDURE — 99213 OFFICE O/P EST LOW 20 MIN: CPT | Performed by: NURSE PRACTITIONER

## 2018-01-18 PROCEDURE — 3017F COLORECTAL CA SCREEN DOC REV: CPT | Performed by: NURSE PRACTITIONER

## 2018-01-18 PROCEDURE — 3014F SCREEN MAMMO DOC REV: CPT | Performed by: NURSE PRACTITIONER

## 2018-01-18 PROCEDURE — 4004F PT TOBACCO SCREEN RCVD TLK: CPT | Performed by: NURSE PRACTITIONER

## 2018-01-18 PROCEDURE — G8427 DOCREV CUR MEDS BY ELIG CLIN: HCPCS | Performed by: NURSE PRACTITIONER

## 2018-01-18 PROCEDURE — G8417 CALC BMI ABV UP PARAM F/U: HCPCS | Performed by: NURSE PRACTITIONER

## 2018-01-18 RX ORDER — METOPROLOL SUCCINATE 50 MG/1
50 TABLET, EXTENDED RELEASE ORAL DAILY
Qty: 30 TABLET | Refills: 5 | Status: SHIPPED | OUTPATIENT
Start: 2018-01-18 | End: 2018-07-16 | Stop reason: SDUPTHER

## 2018-01-18 ASSESSMENT — ENCOUNTER SYMPTOMS
PHOTOPHOBIA: 0
RHINORRHEA: 0
COLOR CHANGE: 0
BACK PAIN: 0
VOMITING: 0
SHORTNESS OF BREATH: 0
COUGH: 0
NAUSEA: 0
VOICE CHANGE: 0
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
## 2018-01-23 ENCOUNTER — OFFICE VISIT (OUTPATIENT)
Dept: INTERNAL MEDICINE | Age: 58
End: 2018-01-23
Payer: MEDICARE

## 2018-01-23 VITALS
HEART RATE: 73 BPM | BODY MASS INDEX: 38.09 KG/M2 | OXYGEN SATURATION: 95 % | WEIGHT: 215 LBS | HEIGHT: 63 IN | SYSTOLIC BLOOD PRESSURE: 118 MMHG | RESPIRATION RATE: 16 BRPM | DIASTOLIC BLOOD PRESSURE: 74 MMHG

## 2018-01-23 DIAGNOSIS — F11.29 OPIOID DEPENDENCE WITH OPIOID-INDUCED DISORDER (HCC): Primary | ICD-10-CM

## 2018-01-23 PROCEDURE — 99213 OFFICE O/P EST LOW 20 MIN: CPT | Performed by: NURSE PRACTITIONER

## 2018-01-23 PROCEDURE — 4004F PT TOBACCO SCREEN RCVD TLK: CPT | Performed by: NURSE PRACTITIONER

## 2018-01-23 PROCEDURE — G8417 CALC BMI ABV UP PARAM F/U: HCPCS | Performed by: NURSE PRACTITIONER

## 2018-01-23 PROCEDURE — 3014F SCREEN MAMMO DOC REV: CPT | Performed by: NURSE PRACTITIONER

## 2018-01-23 PROCEDURE — G8484 FLU IMMUNIZE NO ADMIN: HCPCS | Performed by: NURSE PRACTITIONER

## 2018-01-23 PROCEDURE — 3017F COLORECTAL CA SCREEN DOC REV: CPT | Performed by: NURSE PRACTITIONER

## 2018-01-23 PROCEDURE — G8427 DOCREV CUR MEDS BY ELIG CLIN: HCPCS | Performed by: NURSE PRACTITIONER

## 2018-01-23 RX ORDER — OXYCODONE AND ACETAMINOPHEN 10; 325 MG/1; MG/1
1 TABLET ORAL EVERY 4 HOURS PRN
Qty: 20 TABLET | Refills: 0 | Status: SHIPPED | OUTPATIENT
Start: 2018-01-23 | End: 2018-01-26

## 2018-01-23 RX ORDER — FLUOXETINE HYDROCHLORIDE 20 MG/1
40 CAPSULE ORAL 2 TIMES DAILY
Status: ON HOLD | COMMUNITY
Start: 2018-01-19 | End: 2021-08-23 | Stop reason: HOSPADM

## 2018-01-23 ASSESSMENT — ENCOUNTER SYMPTOMS
COLOR CHANGE: 0
PHOTOPHOBIA: 0
NAUSEA: 0
VOMITING: 0
VOICE CHANGE: 0
COUGH: 0
BACK PAIN: 0
RHINORRHEA: 0
SHORTNESS OF BREATH: 0

## 2018-01-23 NOTE — PROGRESS NOTES
History:   Procedure Laterality Date    ABDOMEN SURGERY      Liposuction    BACK SURGERY  2011    Dr Fatuma Ram      reduction    CARDIAC CATHETERIZATION      x 4-Dr Rose Melton CHOLECYSTECTOMY      COLONOSCOPY  8-18-08    Dr Aisha Still    COLONOSCOPY  9-18-12    Dr Ro Lopez Left 10/5/2016    PLACEMENT OF STENT  performed by Devendra Michaels MD at [address] [facility], COLON, DIAGNOSTIC      FINGER SURGERY      FOOT SURGERY      Dr Zainab Beckham  9/2012    multiple    LITHOTRIPSY      LITHOTRIPSY Left 10/5/2016    LITHOTRIPSY LASER performed by Devendra Michaels MD at [address] [facility]  [place]  06-24-11    SKIN BIOPSY      TOE SURGERY      right great toe    TONSILLECTOMY      UPPER GASTROINTESTINAL ENDOSCOPY  8-29-08    Dr Cecelia Álvarez ENDOSCOPY  8-19-08    Dr Cecelia Álvarez ENDOSCOPY  10-24-13    Dr Taylor Canas Left 10/5/2016    URETEROSCOPY performed by Devendra Michaels MD at [facility] OR       Family History   Problem Relation Age of Onset    Other Father      committed suicide 2 years ago.  Heart Defect Mother      dysrythmia    Heart Disease Mother     Other Mother      h pylori/esoph.  issues    Stroke Maternal Grandmother     Heart Attack Maternal Grandmother     Diabetes Maternal Grandmother     Coronary Art Dis Maternal Grandmother     Heart Defect Maternal Grandmother     Urolithiasis Other     Tuberculosis Other      pt states unknown    Ulcerative Colitis Other      pt states unknown    Seizures Son     Diabetes Paternal Grandmother        Social History   Substance Use Topics    Smoking status: Current Some Day Smoker     Packs/day: 0.50     Years: 35.00    Smokeless tobacco: Never Used    Alcohol use No      Current Outpatient Prescriptions   Medication Sig Dispense Refill    FLUoxetine (PROZAC) 20 MG capsule       metoprolol succinate (TOPROL XL) 50 MG extended release tablet Take 1 tablet by mouth daily 30 tablet 5    naproxen (NAPROSYN) 500 MG tablet Take 1 tablet by mouth 2 times daily 20 tablet 0    gabapentin (NEURONTIN) 300 MG capsule Take 1 capsule by mouth 4 times daily Indications: Backache, 1 in am, 1 at noon, 2 in the pm. 120 capsule 0    lisinopril (PRINIVIL;ZESTRIL) 10 MG tablet TAKE 1 TABLET BY MOUTH ONCE DAILY. 90 tablet 0    acyclovir (ZOVIRAX) 200 MG capsule TAKE 1 CAPSULE 5 TIMES DAILY AS NEEDED 100 capsule 1    albuterol sulfate  (90 BASE) MCG/ACT inhaler Inhale 2 puffs into the lungs every 6 hours as needed for Wheezing 1 Inhaler 0    chlorthalidone (HYGROTEN) 50 MG tablet Take 1 tablet by mouth daily 30 tablet 11    Potassium Citrate ER 15 MEQ (1620 MG) TBCR Take 1 capsule by mouth three times daily 90 tablet 11    dicyclomine (BENTYL) 10 MG capsule Take 10 mg by mouth 3 times daily      omeprazole (PRILOSEC) 20 MG capsule Take 20 mg by mouth daily Indications: Acid Indigestion       ALPRAZolam (XANAX) 2 MG tablet Take 2 mg by mouth nightly       acyclovir (ZOVIRAX) 5 % ointment Apply topically every 3 hours as needed Apply topically every 3 hours.  aspirin 81 MG tablet Take 81 mg by mouth daily.  vitamin B-12 (CYANOCOBALAMIN) 500 MCG tablet Take 500 mcg by mouth daily.  sucralfate (CARAFATE) 1 GM/10ML suspension Take 10 mLs by mouth 3 times daily. (Patient taking differently: Take 1 g by mouth 2 times daily as needed ) 900 mL 3    oxyCODONE-acetaminophen (PERCOCET)  MG per tablet Take 1 tablet by mouth every 4 hours as needed for Pain for up to 3 days. Earliest Fill Date: 1/23/18 20 tablet 0     No current facility-administered medications for this visit.       No Known Allergies    Health Maintenance   Topic Date Due    Diabetic foot exam  01/08/1970    A1C test sounds. Pulmonary/Chest: Effort normal and breath sounds normal.   Abdominal: Soft. Bowel sounds are normal.   Musculoskeletal: Normal range of motion. Neurological: She is alert and oriented to person, place, and time. Skin: Skin is warm and dry. Psychiatric: She has a normal mood and affect. Her behavior is normal.   Nursing note and vitals reviewed. /74   Pulse 73   Resp 16   Ht 5' 3\" (1.6 m)   Wt 215 lb (97.5 kg)   SpO2 95%   BMI 38.09 kg/m²     Assessment:      1. Opioid dependence with opioid-induced disorder ([facility] [address]         Plan:     I have spoken with Dr. Tristin Echeverria and he agrees to provide her with #20 Percocet 10 to get her through until her pain management appointment. We will fax a note to their office to make them aware of this. She has been informed that we will not do this on a regular basis and she needs to make every effort to make her pain management appointments. Patient verbalizes understanding. Patient given educational materials - see patient instructions. Discussed use, benefit, and side effects of prescribed medications. All patient questions answered. Pt voiced understanding. Reviewed health maintenance. Instructed to continue current medications, diet and exercise. Patient agreed with treatment plan. Follow up as directed. MEDICATIONS:  No orders of the defined types were placed in this encounter. Quality & Risk Score Accuracy - MEDICARE ADVANTAGE    Last edited 45/08/60 28:75 CST by AMARIS Walker         ORDERS:  No orders of the defined types were placed in this encounter. Follow-up:  Return if symptoms worsen or fail to improve. PATIENT INSTRUCTIONS:  There are no Patient Instructions on file for this visit.   Electronically signed by AMARIS Walker on 1/23/2018 at 4:53 PM

## 2018-02-16 RX ORDER — LISINOPRIL 10 MG/1
TABLET ORAL
Qty: 90 TABLET | Refills: 3 | Status: SHIPPED | OUTPATIENT
Start: 2018-02-16 | End: 2019-02-19

## 2018-02-16 NOTE — TELEPHONE ENCOUNTER
Requested Prescriptions     Pending Prescriptions Disp Refills    lisinopril (PRINIVIL;ZESTRIL) 10 MG tablet [Pharmacy Med Name: LISINOPRIL 10 MG TAB] 90 tablet 3     Sig: TAKE 1 TABLET BY MOUTH ONCE DAILY.

## 2018-03-13 ENCOUNTER — TELEPHONE (OUTPATIENT)
Dept: INTERNAL MEDICINE | Age: 58
End: 2018-03-13

## 2018-03-13 DIAGNOSIS — K62.5 RECTAL BLEED: Primary | ICD-10-CM

## 2018-03-13 NOTE — TELEPHONE ENCOUNTER
Pt has started having some rectal bleeding Sunday night and wants to get a referral to Dr. Munira Brar. She has seen Ashlyn Loomis and does not want to see Ashlyn Loomis. He has a history of a GI bleed. I have pended referral if okay.

## 2018-04-03 ENCOUNTER — OFFICE VISIT (OUTPATIENT)
Dept: UROLOGY | Age: 58
End: 2018-04-03
Payer: MEDICARE

## 2018-04-03 ENCOUNTER — HOSPITAL ENCOUNTER (OUTPATIENT)
Dept: GENERAL RADIOLOGY | Age: 58
Discharge: HOME | End: 2018-04-03
Payer: MEDICARE

## 2018-04-03 ENCOUNTER — TELEPHONE (OUTPATIENT)
Dept: CARDIOLOGY | Age: 58
End: 2018-04-03

## 2018-04-03 VITALS
DIASTOLIC BLOOD PRESSURE: 64 MMHG | SYSTOLIC BLOOD PRESSURE: 106 MMHG | HEART RATE: 78 BPM | HEIGHT: 63 IN | BODY MASS INDEX: 37.92 KG/M2 | WEIGHT: 214 LBS | TEMPERATURE: 95.3 F

## 2018-04-03 DIAGNOSIS — R10.9 LEFT FLANK PAIN: ICD-10-CM

## 2018-04-03 DIAGNOSIS — R30.0 DYSURIA: ICD-10-CM

## 2018-04-03 DIAGNOSIS — N20.0 BILATERAL KIDNEY STONES: ICD-10-CM

## 2018-04-03 DIAGNOSIS — N20.0 BILATERAL KIDNEY STONES: Primary | ICD-10-CM

## 2018-04-03 LAB
BACTERIA URINE, POC: 0
BILIRUBIN URINE: 1 MG/DL
BLOOD, URINE: NEGATIVE
CASTS URINE, POC: ABNORMAL
CLARITY: CLEAR
COLOR: YELLOW
CRYSTALS URINE, POC: ABNORMAL
EPI CELLS URINE, POC: ABNORMAL
GLUCOSE URINE: ABNORMAL
KETONES, URINE: POSITIVE
LEUKOCYTE EST, POC: POSITIVE
NITRITE, URINE: NEGATIVE
PH UA: 5.5 (ref 4.5–8)
PROTEIN UA: POSITIVE
RBC URINE, POC: 0
SPECIFIC GRAVITY UA: 1.03 (ref 1–1.03)
UROBILINOGEN, URINE: NORMAL
WBC URINE, POC: ABNORMAL
YEAST URINE, POC: ABNORMAL

## 2018-04-03 PROCEDURE — 81000 URINALYSIS NONAUTO W/SCOPE: CPT | Performed by: PHYSICIAN ASSISTANT

## 2018-04-03 PROCEDURE — 3017F COLORECTAL CA SCREEN DOC REV: CPT | Performed by: PHYSICIAN ASSISTANT

## 2018-04-03 PROCEDURE — 3014F SCREEN MAMMO DOC REV: CPT | Performed by: PHYSICIAN ASSISTANT

## 2018-04-03 PROCEDURE — 99213 OFFICE O/P EST LOW 20 MIN: CPT | Performed by: PHYSICIAN ASSISTANT

## 2018-04-03 PROCEDURE — 4004F PT TOBACCO SCREEN RCVD TLK: CPT | Performed by: PHYSICIAN ASSISTANT

## 2018-04-03 PROCEDURE — 74018 RADEX ABDOMEN 1 VIEW: CPT

## 2018-04-03 PROCEDURE — G8417 CALC BMI ABV UP PARAM F/U: HCPCS | Performed by: PHYSICIAN ASSISTANT

## 2018-04-03 PROCEDURE — G8427 DOCREV CUR MEDS BY ELIG CLIN: HCPCS | Performed by: PHYSICIAN ASSISTANT

## 2018-04-03 PROCEDURE — P9612 CATHETERIZE FOR URINE SPEC: HCPCS | Performed by: PHYSICIAN ASSISTANT

## 2018-04-03 ASSESSMENT — ENCOUNTER SYMPTOMS
WHEEZING: 0
SHORTNESS OF BREATH: 0
BLURRED VISION: 0
BACK PAIN: 0
VOMITING: 0
SINUS PAIN: 0
EYE PAIN: 0
ABDOMINAL PAIN: 0

## 2018-04-05 LAB
ORGANISM: ABNORMAL
URINE CULTURE, ROUTINE: ABNORMAL
URINE CULTURE, ROUTINE: ABNORMAL

## 2018-04-10 ENCOUNTER — TELEPHONE (OUTPATIENT)
Dept: UROLOGY | Age: 58
End: 2018-04-10

## 2018-05-02 ENCOUNTER — OFFICE VISIT (OUTPATIENT)
Dept: UROLOGY | Age: 58
End: 2018-05-02
Payer: MEDICARE

## 2018-05-02 DIAGNOSIS — N39.0 URINARY TRACT INFECTION WITHOUT HEMATURIA, SITE UNSPECIFIED: Primary | ICD-10-CM

## 2018-05-02 LAB
APPEARANCE FLUID: ABNORMAL
BILIRUBIN, POC: ABNORMAL
BLOOD URINE, POC: ABNORMAL
CLARITY, POC: CLEAR
COLOR, POC: YELLOW
GLUCOSE URINE, POC: ABNORMAL
KETONES, POC: ABNORMAL
LEUKOCYTE EST, POC: ABNORMAL
NITRITE, POC: POSITIVE
PH, POC: 5.5
PROTEIN, POC: ABNORMAL
SPECIFIC GRAVITY, POC: 1.03
UROBILINOGEN, POC: 0.2

## 2018-05-02 PROCEDURE — G8427 DOCREV CUR MEDS BY ELIG CLIN: HCPCS | Performed by: PHYSICIAN ASSISTANT

## 2018-05-02 PROCEDURE — 3017F COLORECTAL CA SCREEN DOC REV: CPT | Performed by: PHYSICIAN ASSISTANT

## 2018-05-02 PROCEDURE — 4004F PT TOBACCO SCREEN RCVD TLK: CPT | Performed by: PHYSICIAN ASSISTANT

## 2018-05-02 PROCEDURE — G8417 CALC BMI ABV UP PARAM F/U: HCPCS | Performed by: PHYSICIAN ASSISTANT

## 2018-05-02 PROCEDURE — 99999 PR OFFICE/OUTPT VISIT,PROCEDURE ONLY: CPT | Performed by: PHYSICIAN ASSISTANT

## 2018-05-02 PROCEDURE — 99214 OFFICE O/P EST MOD 30 MIN: CPT | Performed by: PHYSICIAN ASSISTANT

## 2018-05-02 PROCEDURE — P9612 CATHETERIZE FOR URINE SPEC: HCPCS | Performed by: PHYSICIAN ASSISTANT

## 2018-05-02 PROCEDURE — 81003 URINALYSIS AUTO W/O SCOPE: CPT | Performed by: PHYSICIAN ASSISTANT

## 2018-05-02 RX ORDER — OXYCODONE AND ACETAMINOPHEN 10; 325 MG/1; MG/1
1 TABLET ORAL EVERY 8 HOURS PRN
COMMUNITY
Start: 2018-04-26 | End: 2018-06-12 | Stop reason: SDUPTHER

## 2018-05-02 ASSESSMENT — ENCOUNTER SYMPTOMS
DOUBLE VISION: 0
BLURRED VISION: 0
SORE THROAT: 0
WHEEZING: 0
HEARTBURN: 0
NAUSEA: 0
SHORTNESS OF BREATH: 0

## 2018-05-05 LAB
ORGANISM: ABNORMAL
URINE CULTURE, ROUTINE: ABNORMAL
URINE CULTURE, ROUTINE: ABNORMAL

## 2018-05-08 ENCOUNTER — TELEPHONE (OUTPATIENT)
Dept: UROLOGY | Age: 58
End: 2018-05-08

## 2018-05-11 ENCOUNTER — OFFICE VISIT (OUTPATIENT)
Dept: CARDIOLOGY | Age: 58
End: 2018-05-11
Payer: MEDICARE

## 2018-05-11 VITALS
DIASTOLIC BLOOD PRESSURE: 62 MMHG | WEIGHT: 214 LBS | HEART RATE: 71 BPM | BODY MASS INDEX: 37.92 KG/M2 | SYSTOLIC BLOOD PRESSURE: 124 MMHG | HEIGHT: 63 IN

## 2018-05-11 DIAGNOSIS — R07.89 CHEST PRESSURE: ICD-10-CM

## 2018-05-11 DIAGNOSIS — Z95.818 STATUS POST PLACEMENT OF IMPLANTABLE LOOP RECORDER: ICD-10-CM

## 2018-05-11 DIAGNOSIS — F17.200 SMOKER: ICD-10-CM

## 2018-05-11 DIAGNOSIS — R00.2 PALPITATIONS: ICD-10-CM

## 2018-05-11 DIAGNOSIS — I10 ESSENTIAL HYPERTENSION: Primary | ICD-10-CM

## 2018-05-11 PROCEDURE — 4004F PT TOBACCO SCREEN RCVD TLK: CPT | Performed by: CLINICAL NURSE SPECIALIST

## 2018-05-11 PROCEDURE — 99214 OFFICE O/P EST MOD 30 MIN: CPT | Performed by: CLINICAL NURSE SPECIALIST

## 2018-05-11 PROCEDURE — 3017F COLORECTAL CA SCREEN DOC REV: CPT | Performed by: CLINICAL NURSE SPECIALIST

## 2018-05-11 PROCEDURE — 93285 PRGRMG DEV EVAL SCRMS IP: CPT | Performed by: CLINICAL NURSE SPECIALIST

## 2018-05-11 PROCEDURE — G8417 CALC BMI ABV UP PARAM F/U: HCPCS | Performed by: CLINICAL NURSE SPECIALIST

## 2018-05-11 PROCEDURE — G8427 DOCREV CUR MEDS BY ELIG CLIN: HCPCS | Performed by: CLINICAL NURSE SPECIALIST

## 2018-05-11 RX ORDER — SUCRALFATE 1 G/1
1 TABLET ORAL 2 TIMES DAILY PRN
Status: ON HOLD | COMMUNITY
End: 2022-09-29 | Stop reason: HOSPADM

## 2018-05-15 ENCOUNTER — TELEPHONE (OUTPATIENT)
Dept: GASTROENTEROLOGY | Age: 58
End: 2018-05-15

## 2018-05-15 ENCOUNTER — TELEPHONE (OUTPATIENT)
Dept: INTERNAL MEDICINE | Age: 58
End: 2018-05-15

## 2018-05-15 DIAGNOSIS — K62.5 RECTAL BLEED: Primary | ICD-10-CM

## 2018-05-16 ENCOUNTER — OFFICE VISIT (OUTPATIENT)
Dept: UROLOGY | Age: 58
End: 2018-05-16
Payer: MEDICARE

## 2018-05-16 VITALS
DIASTOLIC BLOOD PRESSURE: 78 MMHG | TEMPERATURE: 96.7 F | WEIGHT: 217 LBS | BODY MASS INDEX: 38.45 KG/M2 | HEIGHT: 63 IN | HEART RATE: 97 BPM | SYSTOLIC BLOOD PRESSURE: 136 MMHG

## 2018-05-16 DIAGNOSIS — N39.0 URINARY TRACT INFECTION WITHOUT HEMATURIA, SITE UNSPECIFIED: Primary | ICD-10-CM

## 2018-05-16 DIAGNOSIS — N20.0 KIDNEY STONES: ICD-10-CM

## 2018-05-16 LAB
BACTERIA URINE, POC: NORMAL
BILIRUBIN URINE: 0 MG/DL
BLOOD, URINE: POSITIVE
CASTS URINE, POC: NORMAL
CLARITY: CLEAR
COLOR: YELLOW
CRYSTALS URINE, POC: NORMAL
EPI CELLS URINE, POC: NORMAL
GLUCOSE URINE: NORMAL
KETONES, URINE: NEGATIVE
LEUKOCYTE EST, POC: POSITIVE
NITRITE, URINE: NEGATIVE
PH UA: 6 (ref 4.5–8)
PROTEIN UA: NEGATIVE
RBC URINE, POC: NORMAL
SPECIFIC GRAVITY UA: 1.01 (ref 1–1.03)
UROBILINOGEN, URINE: NORMAL
WBC URINE, POC: NORMAL
YEAST URINE, POC: NORMAL

## 2018-05-16 PROCEDURE — G8417 CALC BMI ABV UP PARAM F/U: HCPCS | Performed by: PHYSICIAN ASSISTANT

## 2018-05-16 PROCEDURE — 4004F PT TOBACCO SCREEN RCVD TLK: CPT | Performed by: PHYSICIAN ASSISTANT

## 2018-05-16 PROCEDURE — 99213 OFFICE O/P EST LOW 20 MIN: CPT | Performed by: PHYSICIAN ASSISTANT

## 2018-05-16 PROCEDURE — G8427 DOCREV CUR MEDS BY ELIG CLIN: HCPCS | Performed by: PHYSICIAN ASSISTANT

## 2018-05-16 PROCEDURE — 81001 URINALYSIS AUTO W/SCOPE: CPT | Performed by: PHYSICIAN ASSISTANT

## 2018-05-16 PROCEDURE — 3017F COLORECTAL CA SCREEN DOC REV: CPT | Performed by: PHYSICIAN ASSISTANT

## 2018-05-16 RX ORDER — CIPROFLOXACIN 500 MG/1
TABLET, FILM COATED ORAL
COMMUNITY
Start: 2018-05-08 | End: 2018-06-12 | Stop reason: ALTCHOICE

## 2018-05-16 ASSESSMENT — ENCOUNTER SYMPTOMS
VOMITING: 0
SINUS PAIN: 0
BACK PAIN: 0
EYE PAIN: 0
BLURRED VISION: 0
SHORTNESS OF BREATH: 0
ABDOMINAL PAIN: 0
WHEEZING: 0

## 2018-05-31 ENCOUNTER — HOSPITAL ENCOUNTER (OUTPATIENT)
Dept: NON INVASIVE DIAGNOSTICS | Age: 58
Discharge: HOME | End: 2018-05-31
Payer: MEDICARE

## 2018-05-31 VITALS — HEART RATE: 103 BPM | SYSTOLIC BLOOD PRESSURE: 112 MMHG | DIASTOLIC BLOOD PRESSURE: 79 MMHG

## 2018-05-31 DIAGNOSIS — I10 ESSENTIAL HYPERTENSION: ICD-10-CM

## 2018-05-31 DIAGNOSIS — R00.2 PALPITATIONS: ICD-10-CM

## 2018-05-31 DIAGNOSIS — F17.200 SMOKER: ICD-10-CM

## 2018-05-31 DIAGNOSIS — Z95.818 STATUS POST PLACEMENT OF IMPLANTABLE LOOP RECORDER: ICD-10-CM

## 2018-05-31 DIAGNOSIS — R07.89 CHEST PRESSURE: ICD-10-CM

## 2018-05-31 PROCEDURE — 6360000002 HC RX W HCPCS: Performed by: INTERNAL MEDICINE

## 2018-05-31 PROCEDURE — 2580000003 HC RX 258: Performed by: INTERNAL MEDICINE

## 2018-05-31 PROCEDURE — 6360000002 HC RX W HCPCS

## 2018-05-31 PROCEDURE — 93350 STRESS TTE ONLY: CPT

## 2018-05-31 RX ORDER — DOBUTAMINE HYDROCHLORIDE 200 MG/100ML
10 INJECTION INTRAVENOUS CONTINUOUS PRN
Status: DISCONTINUED | OUTPATIENT
Start: 2018-05-31 | End: 2018-05-31 | Stop reason: ALTCHOICE

## 2018-05-31 RX ORDER — ATROPINE SULFATE 0.1 MG/ML
1 INJECTION INTRAVENOUS PRN
Status: ACTIVE | OUTPATIENT
Start: 2018-05-31 | End: 2018-06-01

## 2018-05-31 RX ORDER — SODIUM CHLORIDE 0.9 % (FLUSH) 0.9 %
10 SYRINGE (ML) INJECTION PRN
Status: ACTIVE | OUTPATIENT
Start: 2018-05-31 | End: 2018-06-01

## 2018-05-31 RX ORDER — SODIUM CHLORIDE 9 MG/ML
INJECTION, SOLUTION INTRAVENOUS
Status: COMPLETED | OUTPATIENT
Start: 2018-05-31 | End: 2018-05-31

## 2018-05-31 RX ADMIN — DOBUTAMINE HYDROCHLORIDE 10 MCG/KG/MIN: 200 INJECTION INTRAVENOUS at 11:38

## 2018-05-31 RX ADMIN — Medication 10 ML: at 11:30

## 2018-05-31 RX ADMIN — SODIUM CHLORIDE 250 ML: 9 INJECTION, SOLUTION INTRAVENOUS at 11:37

## 2018-05-31 RX ADMIN — ATROPINE SULFATE 1 MG: 0.1 INJECTION PARENTERAL at 11:46

## 2018-06-03 ENCOUNTER — APPOINTMENT (OUTPATIENT)
Dept: CT IMAGING | Age: 58
End: 2018-06-03
Payer: MEDICARE

## 2018-06-03 ENCOUNTER — HOSPITAL ENCOUNTER (EMERGENCY)
Age: 58
Discharge: HOME | End: 2018-06-03
Payer: MEDICARE

## 2018-06-03 VITALS
TEMPERATURE: 97.9 F | BODY MASS INDEX: 37.56 KG/M2 | RESPIRATION RATE: 18 BRPM | DIASTOLIC BLOOD PRESSURE: 63 MMHG | HEART RATE: 60 BPM | SYSTOLIC BLOOD PRESSURE: 95 MMHG | OXYGEN SATURATION: 94 % | WEIGHT: 212 LBS | HEIGHT: 63 IN

## 2018-06-03 DIAGNOSIS — N20.0 NEPHROLITHIASIS: Primary | ICD-10-CM

## 2018-06-03 DIAGNOSIS — R82.81 PYURIA: ICD-10-CM

## 2018-06-03 LAB
ALBUMIN SERPL-MCNC: 3.9 G/DL (ref 3.5–5.2)
ALP BLD-CCNC: 45 U/L (ref 35–104)
ALT SERPL-CCNC: 7 U/L (ref 5–33)
ANION GAP SERPL CALCULATED.3IONS-SCNC: 8 MMOL/L (ref 7–19)
AST SERPL-CCNC: 12 U/L (ref 5–32)
BACTERIA: NEGATIVE /HPF
BASOPHILS ABSOLUTE: 0 K/UL (ref 0–0.2)
BASOPHILS RELATIVE PERCENT: 0.4 % (ref 0–1)
BILIRUB SERPL-MCNC: <0.2 MG/DL (ref 0.2–1.2)
BILIRUBIN URINE: NEGATIVE
BLOOD, URINE: ABNORMAL
BUN BLDV-MCNC: 28 MG/DL (ref 6–20)
CALCIUM SERPL-MCNC: 9.2 MG/DL (ref 8.6–10)
CHLORIDE BLD-SCNC: 102 MMOL/L (ref 98–111)
CLARITY: CLEAR
CO2: 30 MMOL/L (ref 22–29)
COLOR: YELLOW
CREAT SERPL-MCNC: 0.7 MG/DL (ref 0.5–0.9)
EOSINOPHILS ABSOLUTE: 0.2 K/UL (ref 0–0.6)
EOSINOPHILS RELATIVE PERCENT: 3 % (ref 0–5)
EPITHELIAL CELLS, UA: 2 /HPF (ref 0–5)
GFR NON-AFRICAN AMERICAN: >60
GLUCOSE BLD-MCNC: 122 MG/DL (ref 74–109)
GLUCOSE URINE: NEGATIVE MG/DL
HCT VFR BLD CALC: 41.1 % (ref 37–47)
HEMOGLOBIN: 12.5 G/DL (ref 12–16)
HYALINE CASTS: 0 /HPF (ref 0–8)
KETONES, URINE: NEGATIVE MG/DL
LEUKOCYTE ESTERASE, URINE: ABNORMAL
LYMPHOCYTES ABSOLUTE: 3.7 K/UL (ref 1.1–4.5)
LYMPHOCYTES RELATIVE PERCENT: 48.4 % (ref 20–40)
MCH RBC QN AUTO: 26.6 PG (ref 27–31)
MCHC RBC AUTO-ENTMCNC: 30.4 G/DL (ref 33–37)
MCV RBC AUTO: 87.4 FL (ref 81–99)
MONOCYTES ABSOLUTE: 0.7 K/UL (ref 0–0.9)
MONOCYTES RELATIVE PERCENT: 8.9 % (ref 0–10)
NEUTROPHILS ABSOLUTE: 3 K/UL (ref 1.5–7.5)
NEUTROPHILS RELATIVE PERCENT: 39.2 % (ref 50–65)
NITRITE, URINE: NEGATIVE
PDW BLD-RTO: 13.4 % (ref 11.5–14.5)
PH UA: 6
PLATELET # BLD: 209 K/UL (ref 130–400)
PMV BLD AUTO: 11.9 FL (ref 9.4–12.3)
POTASSIUM SERPL-SCNC: 4.3 MMOL/L (ref 3.5–5)
PROTEIN UA: ABNORMAL MG/DL
RBC # BLD: 4.7 M/UL (ref 4.2–5.4)
RBC UA: 376 /HPF (ref 0–4)
SODIUM BLD-SCNC: 140 MMOL/L (ref 136–145)
SPECIFIC GRAVITY UA: 1.02
TOTAL PROTEIN: 6.5 G/DL (ref 6.6–8.7)
URINE REFLEX TO CULTURE: YES
UROBILINOGEN, URINE: 0.2 E.U./DL
WBC # BLD: 7.6 K/UL (ref 4.8–10.8)
WBC UA: 15 /HPF (ref 0–5)

## 2018-06-03 PROCEDURE — 81001 URINALYSIS AUTO W/SCOPE: CPT

## 2018-06-03 PROCEDURE — 2580000003 HC RX 258: Performed by: NURSE PRACTITIONER

## 2018-06-03 PROCEDURE — 36415 COLL VENOUS BLD VENIPUNCTURE: CPT

## 2018-06-03 PROCEDURE — 99283 EMERGENCY DEPT VISIT LOW MDM: CPT

## 2018-06-03 PROCEDURE — 80053 COMPREHEN METABOLIC PANEL: CPT

## 2018-06-03 PROCEDURE — 85025 COMPLETE CBC W/AUTO DIFF WBC: CPT

## 2018-06-03 PROCEDURE — 99284 EMERGENCY DEPT VISIT MOD MDM: CPT | Performed by: NURSE PRACTITIONER

## 2018-06-03 PROCEDURE — 87086 URINE CULTURE/COLONY COUNT: CPT

## 2018-06-03 PROCEDURE — 74150 CT ABDOMEN W/O CONTRAST: CPT

## 2018-06-03 RX ORDER — 0.9 % SODIUM CHLORIDE 0.9 %
1000 INTRAVENOUS SOLUTION INTRAVENOUS ONCE
Status: COMPLETED | OUTPATIENT
Start: 2018-06-03 | End: 2018-06-03

## 2018-06-03 RX ADMIN — SODIUM CHLORIDE 1000 ML: 9 INJECTION, SOLUTION INTRAVENOUS at 12:23

## 2018-06-03 ASSESSMENT — ENCOUNTER SYMPTOMS
RESPIRATORY NEGATIVE: 1
GASTROINTESTINAL NEGATIVE: 1

## 2018-06-03 ASSESSMENT — PAIN SCALES - GENERAL: PAINLEVEL_OUTOF10: 6

## 2018-06-05 ENCOUNTER — CARE COORDINATION (OUTPATIENT)
Dept: CARE COORDINATION | Age: 58
End: 2018-06-05

## 2018-06-05 ENCOUNTER — OFFICE VISIT (OUTPATIENT)
Dept: UROLOGY | Age: 58
End: 2018-06-05
Payer: MEDICARE

## 2018-06-05 VITALS
DIASTOLIC BLOOD PRESSURE: 81 MMHG | HEART RATE: 68 BPM | WEIGHT: 219 LBS | SYSTOLIC BLOOD PRESSURE: 125 MMHG | HEIGHT: 62 IN | BODY MASS INDEX: 40.3 KG/M2 | TEMPERATURE: 97.6 F

## 2018-06-05 DIAGNOSIS — N20.1 RIGHT URETERAL STONE: Primary | ICD-10-CM

## 2018-06-05 DIAGNOSIS — R31.0 GROSS HEMATURIA: ICD-10-CM

## 2018-06-05 LAB
BACTERIA URINE, POC: ABNORMAL
BILIRUBIN URINE: 1 MG/DL
BLOOD, URINE: POSITIVE
CASTS URINE, POC: ABNORMAL
CLARITY: ABNORMAL
COLOR: ABNORMAL
CRYSTALS URINE, POC: ABNORMAL
EPI CELLS URINE, POC: ABNORMAL
GLUCOSE URINE: ABNORMAL
KETONES, URINE: NEGATIVE
LEUKOCYTE EST, POC: ABNORMAL
NITRITE, URINE: NEGATIVE
PH UA: 5.5 (ref 4.5–8)
PROTEIN UA: POSITIVE
RBC URINE, POC: ABNORMAL
SPECIFIC GRAVITY UA: 1.03 (ref 1–1.03)
URINE CULTURE, ROUTINE: NORMAL
UROBILINOGEN, URINE: NORMAL
WBC URINE, POC: ABNORMAL
YEAST URINE, POC: ABNORMAL

## 2018-06-05 PROCEDURE — 99214 OFFICE O/P EST MOD 30 MIN: CPT | Performed by: PHYSICIAN ASSISTANT

## 2018-06-05 PROCEDURE — 4004F PT TOBACCO SCREEN RCVD TLK: CPT | Performed by: PHYSICIAN ASSISTANT

## 2018-06-05 PROCEDURE — G8417 CALC BMI ABV UP PARAM F/U: HCPCS | Performed by: PHYSICIAN ASSISTANT

## 2018-06-05 PROCEDURE — 3017F COLORECTAL CA SCREEN DOC REV: CPT | Performed by: PHYSICIAN ASSISTANT

## 2018-06-05 PROCEDURE — 81000 URINALYSIS NONAUTO W/SCOPE: CPT | Performed by: PHYSICIAN ASSISTANT

## 2018-06-05 PROCEDURE — G8427 DOCREV CUR MEDS BY ELIG CLIN: HCPCS | Performed by: PHYSICIAN ASSISTANT

## 2018-06-05 RX ORDER — TAMSULOSIN HYDROCHLORIDE 0.4 MG/1
0.4 CAPSULE ORAL DAILY
Qty: 30 CAPSULE | Refills: 0 | Status: ON HOLD | OUTPATIENT
Start: 2018-06-05 | End: 2018-06-21

## 2018-06-05 RX ORDER — PROMETHAZINE HYDROCHLORIDE 25 MG/1
25 TABLET ORAL EVERY 6 HOURS PRN
Qty: 20 TABLET | Refills: 0 | Status: SHIPPED | OUTPATIENT
Start: 2018-06-05 | End: 2018-06-12

## 2018-06-05 ASSESSMENT — ENCOUNTER SYMPTOMS
BLURRED VISION: 0
SORE THROAT: 0
WHEEZING: 0
HEARTBURN: 0
DOUBLE VISION: 0
SHORTNESS OF BREATH: 0
NAUSEA: 0

## 2018-06-12 ENCOUNTER — OFFICE VISIT (OUTPATIENT)
Dept: INTERNAL MEDICINE | Age: 58
End: 2018-06-12
Payer: MEDICARE

## 2018-06-12 ENCOUNTER — TELEPHONE (OUTPATIENT)
Dept: CARDIOLOGY | Age: 58
End: 2018-06-12

## 2018-06-12 VITALS
WEIGHT: 221.6 LBS | BODY MASS INDEX: 37.83 KG/M2 | RESPIRATION RATE: 18 BRPM | OXYGEN SATURATION: 97 % | HEIGHT: 64 IN | DIASTOLIC BLOOD PRESSURE: 68 MMHG | HEART RATE: 80 BPM | SYSTOLIC BLOOD PRESSURE: 102 MMHG

## 2018-06-12 DIAGNOSIS — E11.9 TYPE 2 DIABETES MELLITUS WITHOUT COMPLICATION, WITHOUT LONG-TERM CURRENT USE OF INSULIN (HCC): ICD-10-CM

## 2018-06-12 DIAGNOSIS — Z12.39 BREAST CANCER SCREENING: ICD-10-CM

## 2018-06-12 DIAGNOSIS — M48.061 SPINAL STENOSIS, LUMBAR REGION, WITHOUT NEUROGENIC CLAUDICATION: ICD-10-CM

## 2018-06-12 DIAGNOSIS — K52.9 CHRONIC DIARRHEA: Primary | Chronic | ICD-10-CM

## 2018-06-12 DIAGNOSIS — Z13.820 SCREENING FOR OSTEOPOROSIS: ICD-10-CM

## 2018-06-12 DIAGNOSIS — I10 ESSENTIAL HYPERTENSION: ICD-10-CM

## 2018-06-12 DIAGNOSIS — J44.1 COPD EXACERBATION (HCC): ICD-10-CM

## 2018-06-12 DIAGNOSIS — B18.2 CHRONIC ACTIVE HEPATITIS C (HCC): Chronic | ICD-10-CM

## 2018-06-12 DIAGNOSIS — M51.26 DISPLACEMENT OF LUMBAR INTERVERTEBRAL DISC WITHOUT MYELOPATHY: ICD-10-CM

## 2018-06-12 DIAGNOSIS — Z78.0 POSTMENOPAUSAL: ICD-10-CM

## 2018-06-12 PROBLEM — Z82.49 FAMILY HISTORY OF CORONARY ARTERY DISEASE: Status: RESOLVED | Noted: 2017-03-07 | Resolved: 2018-06-12

## 2018-06-12 PROBLEM — R06.02 SOB (SHORTNESS OF BREATH): Status: RESOLVED | Noted: 2017-03-07 | Resolved: 2018-06-12

## 2018-06-12 PROBLEM — N13.5 URETERAL STRICTURE, LEFT: Status: RESOLVED | Noted: 2017-03-13 | Resolved: 2018-06-12

## 2018-06-12 PROBLEM — R07.9 CHEST PAIN: Status: RESOLVED | Noted: 2017-03-07 | Resolved: 2018-06-12

## 2018-06-12 PROCEDURE — 99214 OFFICE O/P EST MOD 30 MIN: CPT | Performed by: INTERNAL MEDICINE

## 2018-06-12 PROCEDURE — 3017F COLORECTAL CA SCREEN DOC REV: CPT | Performed by: INTERNAL MEDICINE

## 2018-06-12 PROCEDURE — 3046F HEMOGLOBIN A1C LEVEL >9.0%: CPT | Performed by: INTERNAL MEDICINE

## 2018-06-12 PROCEDURE — 2022F DILAT RTA XM EVC RTNOPTHY: CPT | Performed by: INTERNAL MEDICINE

## 2018-06-12 PROCEDURE — 3023F SPIROM DOC REV: CPT | Performed by: INTERNAL MEDICINE

## 2018-06-12 PROCEDURE — G8926 SPIRO NO PERF OR DOC: HCPCS | Performed by: INTERNAL MEDICINE

## 2018-06-12 PROCEDURE — G8427 DOCREV CUR MEDS BY ELIG CLIN: HCPCS | Performed by: INTERNAL MEDICINE

## 2018-06-12 PROCEDURE — 4004F PT TOBACCO SCREEN RCVD TLK: CPT | Performed by: INTERNAL MEDICINE

## 2018-06-12 PROCEDURE — G8417 CALC BMI ABV UP PARAM F/U: HCPCS | Performed by: INTERNAL MEDICINE

## 2018-06-12 RX ORDER — COLESEVELAM 180 1/1
TABLET ORAL
Qty: 180 TABLET | Refills: 3 | Status: SHIPPED | OUTPATIENT
Start: 2018-06-12 | End: 2018-07-30 | Stop reason: ALTCHOICE

## 2018-06-12 RX ORDER — OXYCODONE AND ACETAMINOPHEN 10; 325 MG/1; MG/1
1 TABLET ORAL EVERY 8 HOURS PRN
Qty: 20 TABLET | Refills: 0 | Status: SHIPPED | OUTPATIENT
Start: 2018-06-12 | End: 2018-06-19

## 2018-06-12 ASSESSMENT — ENCOUNTER SYMPTOMS
BLOOD IN STOOL: 0
SHORTNESS OF BREATH: 0
DIARRHEA: 1
CONSTIPATION: 0
NAUSEA: 0
ABDOMINAL PAIN: 0
COUGH: 0
BACK PAIN: 1

## 2018-06-12 ASSESSMENT — PATIENT HEALTH QUESTIONNAIRE - PHQ9
6. FEELING BAD ABOUT YOURSELF - OR THAT YOU ARE A FAILURE OR HAVE LET YOURSELF OR YOUR FAMILY DOWN: 1
7. TROUBLE CONCENTRATING ON THINGS, SUCH AS READING THE NEWSPAPER OR WATCHING TELEVISION: 1
9. THOUGHTS THAT YOU WOULD BE BETTER OFF DEAD, OR OF HURTING YOURSELF: 0
8. MOVING OR SPEAKING SO SLOWLY THAT OTHER PEOPLE COULD HAVE NOTICED. OR THE OPPOSITE, BEING SO FIGETY OR RESTLESS THAT YOU HAVE BEEN MOVING AROUND A LOT MORE THAN USUAL: 0
1. LITTLE INTEREST OR PLEASURE IN DOING THINGS: 3
10. IF YOU CHECKED OFF ANY PROBLEMS, HOW DIFFICULT HAVE THESE PROBLEMS MADE IT FOR YOU TO DO YOUR WORK, TAKE CARE OF THINGS AT HOME, OR GET ALONG WITH OTHER PEOPLE: 2
3. TROUBLE FALLING OR STAYING ASLEEP: 3
5. POOR APPETITE OR OVEREATING: 2
2. FEELING DOWN, DEPRESSED OR HOPELESS: 3
SUM OF ALL RESPONSES TO PHQ QUESTIONS 1-9: 16
4. FEELING TIRED OR HAVING LITTLE ENERGY: 3
SUM OF ALL RESPONSES TO PHQ9 QUESTIONS 1 & 2: 6

## 2018-06-13 ENCOUNTER — TELEPHONE (OUTPATIENT)
Dept: INTERNAL MEDICINE | Age: 58
End: 2018-06-13

## 2018-06-19 ENCOUNTER — TELEPHONE (OUTPATIENT)
Dept: INTERNAL MEDICINE | Age: 58
End: 2018-06-19

## 2018-06-20 ENCOUNTER — HOSPITAL ENCOUNTER (OUTPATIENT)
Dept: CT IMAGING | Age: 58
Discharge: HOME | End: 2018-06-20
Payer: MEDICARE

## 2018-06-20 ENCOUNTER — OFFICE VISIT (OUTPATIENT)
Dept: UROLOGY | Age: 58
End: 2018-06-20
Payer: MEDICARE

## 2018-06-20 VITALS
HEART RATE: 77 BPM | DIASTOLIC BLOOD PRESSURE: 84 MMHG | BODY MASS INDEX: 37.05 KG/M2 | TEMPERATURE: 96.6 F | WEIGHT: 217 LBS | SYSTOLIC BLOOD PRESSURE: 122 MMHG | HEIGHT: 64 IN

## 2018-06-20 DIAGNOSIS — R31.0 GROSS HEMATURIA: ICD-10-CM

## 2018-06-20 DIAGNOSIS — N20.1 RIGHT URETERAL STONE: Primary | ICD-10-CM

## 2018-06-20 DIAGNOSIS — R10.9 RIGHT FLANK PAIN: ICD-10-CM

## 2018-06-20 DIAGNOSIS — N20.1 RIGHT URETERAL STONE: ICD-10-CM

## 2018-06-20 LAB
BACTERIA URINE, POC: NORMAL
BILIRUBIN URINE: 0 MG/DL
BLOOD, URINE: POSITIVE
CASTS URINE, POC: NORMAL
CLARITY: CLEAR
COLOR: YELLOW
CRYSTALS URINE, POC: NORMAL
EPI CELLS URINE, POC: NORMAL
GLUCOSE URINE: NORMAL
KETONES, URINE: NEGATIVE
LEUKOCYTE EST, POC: NORMAL
NITRITE, URINE: NEGATIVE
PH UA: 6.5 (ref 4.5–8)
PROTEIN UA: NEGATIVE
RBC URINE, POC: NORMAL
SPECIFIC GRAVITY UA: 1.02 (ref 1–1.03)
UROBILINOGEN, URINE: NORMAL
WBC URINE, POC: NORMAL
YEAST URINE, POC: NORMAL

## 2018-06-20 PROCEDURE — 4004F PT TOBACCO SCREEN RCVD TLK: CPT | Performed by: PHYSICIAN ASSISTANT

## 2018-06-20 PROCEDURE — G8417 CALC BMI ABV UP PARAM F/U: HCPCS | Performed by: PHYSICIAN ASSISTANT

## 2018-06-20 PROCEDURE — 99214 OFFICE O/P EST MOD 30 MIN: CPT | Performed by: PHYSICIAN ASSISTANT

## 2018-06-20 PROCEDURE — 74176 CT ABD & PELVIS W/O CONTRAST: CPT

## 2018-06-20 PROCEDURE — 81001 URINALYSIS AUTO W/SCOPE: CPT | Performed by: PHYSICIAN ASSISTANT

## 2018-06-20 PROCEDURE — 3017F COLORECTAL CA SCREEN DOC REV: CPT | Performed by: PHYSICIAN ASSISTANT

## 2018-06-20 PROCEDURE — G8427 DOCREV CUR MEDS BY ELIG CLIN: HCPCS | Performed by: PHYSICIAN ASSISTANT

## 2018-06-20 ASSESSMENT — ENCOUNTER SYMPTOMS
DOUBLE VISION: 0
NAUSEA: 0
SHORTNESS OF BREATH: 0
WHEEZING: 0
SORE THROAT: 0
HEARTBURN: 0
BLURRED VISION: 0

## 2018-06-21 ENCOUNTER — ANESTHESIA EVENT (OUTPATIENT)
Dept: OPERATING ROOM | Age: 58
End: 2018-06-21
Payer: MEDICARE

## 2018-06-21 ENCOUNTER — APPOINTMENT (OUTPATIENT)
Dept: GENERAL RADIOLOGY | Age: 58
End: 2018-06-21
Attending: UROLOGY
Payer: MEDICARE

## 2018-06-21 ENCOUNTER — HOSPITAL ENCOUNTER (OUTPATIENT)
Age: 58
Setting detail: OUTPATIENT SURGERY
Discharge: HOME | End: 2018-06-21
Attending: UROLOGY | Admitting: UROLOGY
Payer: MEDICARE

## 2018-06-21 ENCOUNTER — ANESTHESIA (OUTPATIENT)
Dept: OPERATING ROOM | Age: 58
End: 2018-06-21
Payer: MEDICARE

## 2018-06-21 VITALS
WEIGHT: 217 LBS | DIASTOLIC BLOOD PRESSURE: 94 MMHG | TEMPERATURE: 97 F | HEART RATE: 63 BPM | RESPIRATION RATE: 20 BRPM | SYSTOLIC BLOOD PRESSURE: 155 MMHG | OXYGEN SATURATION: 99 % | HEIGHT: 64 IN | BODY MASS INDEX: 37.05 KG/M2

## 2018-06-21 VITALS
SYSTOLIC BLOOD PRESSURE: 106 MMHG | RESPIRATION RATE: 13 BRPM | OXYGEN SATURATION: 100 % | TEMPERATURE: 95.8 F | DIASTOLIC BLOOD PRESSURE: 63 MMHG

## 2018-06-21 DIAGNOSIS — N20.1 RIGHT URETERAL CALCULUS: Primary | ICD-10-CM

## 2018-06-21 DIAGNOSIS — R31.0 GROSS HEMATURIA: ICD-10-CM

## 2018-06-21 DIAGNOSIS — N20.1 RIGHT URETERAL STONE: ICD-10-CM

## 2018-06-21 PROCEDURE — 6370000000 HC RX 637 (ALT 250 FOR IP): Performed by: UROLOGY

## 2018-06-21 PROCEDURE — 3600000004 HC SURGERY LEVEL 4 BASE: Performed by: UROLOGY

## 2018-06-21 PROCEDURE — 2580000003 HC RX 258: Performed by: ANESTHESIOLOGY

## 2018-06-21 PROCEDURE — 52356 CYSTO/URETERO W/LITHOTRIPSY: CPT | Performed by: UROLOGY

## 2018-06-21 PROCEDURE — 7100000001 HC PACU RECOVERY - ADDTL 15 MIN: Performed by: UROLOGY

## 2018-06-21 PROCEDURE — C1758 CATHETER, URETERAL: HCPCS | Performed by: UROLOGY

## 2018-06-21 PROCEDURE — C2617 STENT, NON-COR, TEM W/O DEL: HCPCS | Performed by: UROLOGY

## 2018-06-21 PROCEDURE — 2720000010 HC SURG SUPPLY STERILE: Performed by: UROLOGY

## 2018-06-21 PROCEDURE — 7100000000 HC PACU RECOVERY - FIRST 15 MIN: Performed by: UROLOGY

## 2018-06-21 PROCEDURE — 3600000014 HC SURGERY LEVEL 4 ADDTL 15MIN: Performed by: UROLOGY

## 2018-06-21 PROCEDURE — 2580000003 HC RX 258: Performed by: UROLOGY

## 2018-06-21 PROCEDURE — 6360000002 HC RX W HCPCS: Performed by: UROLOGY

## 2018-06-21 PROCEDURE — 2720000000 HC MISC SURG SUPPLY STERILE $0-50: Performed by: UROLOGY

## 2018-06-21 PROCEDURE — 2500000003 HC RX 250 WO HCPCS

## 2018-06-21 PROCEDURE — 3209999900 FLUORO FOR SURGICAL PROCEDURES

## 2018-06-21 PROCEDURE — 3700000000 HC ANESTHESIA ATTENDED CARE: Performed by: UROLOGY

## 2018-06-21 PROCEDURE — 6370000000 HC RX 637 (ALT 250 FOR IP): Performed by: ANESTHESIOLOGY

## 2018-06-21 PROCEDURE — 6360000002 HC RX W HCPCS: Performed by: ANESTHESIOLOGY

## 2018-06-21 PROCEDURE — 3700000001 HC ADD 15 MINUTES (ANESTHESIA): Performed by: UROLOGY

## 2018-06-21 PROCEDURE — 6360000002 HC RX W HCPCS

## 2018-06-21 PROCEDURE — 7100000011 HC PHASE II RECOVERY - ADDTL 15 MIN: Performed by: UROLOGY

## 2018-06-21 PROCEDURE — C1726 CATH, BAL DIL, NON-VASCULAR: HCPCS | Performed by: UROLOGY

## 2018-06-21 PROCEDURE — C1769 GUIDE WIRE: HCPCS | Performed by: UROLOGY

## 2018-06-21 PROCEDURE — 7100000010 HC PHASE II RECOVERY - FIRST 15 MIN: Performed by: UROLOGY

## 2018-06-21 DEVICE — URETERAL STENT
Type: IMPLANTABLE DEVICE | Site: URETER | Status: FUNCTIONAL
Brand: POLARIS™ ULTRA

## 2018-06-21 RX ORDER — SODIUM CHLORIDE, SODIUM LACTATE, POTASSIUM CHLORIDE, CALCIUM CHLORIDE 600; 310; 30; 20 MG/100ML; MG/100ML; MG/100ML; MG/100ML
INJECTION, SOLUTION INTRAVENOUS CONTINUOUS
Status: DISCONTINUED | OUTPATIENT
Start: 2018-06-21 | End: 2018-06-21 | Stop reason: HOSPADM

## 2018-06-21 RX ORDER — MORPHINE SULFATE 1 MG/ML
2 INJECTION, SOLUTION EPIDURAL; INTRATHECAL; INTRAVENOUS EVERY 5 MIN PRN
Status: DISCONTINUED | OUTPATIENT
Start: 2018-06-21 | End: 2018-06-21 | Stop reason: HOSPADM

## 2018-06-21 RX ORDER — SODIUM CHLORIDE 9 MG/ML
INJECTION, SOLUTION INTRAVENOUS CONTINUOUS
Status: DISCONTINUED | OUTPATIENT
Start: 2018-06-21 | End: 2018-06-21 | Stop reason: HOSPADM

## 2018-06-21 RX ORDER — SODIUM CHLORIDE 0.9 % (FLUSH) 0.9 %
10 SYRINGE (ML) INJECTION EVERY 12 HOURS SCHEDULED
Status: DISCONTINUED | OUTPATIENT
Start: 2018-06-21 | End: 2018-06-21 | Stop reason: HOSPADM

## 2018-06-21 RX ORDER — LABETALOL HYDROCHLORIDE 5 MG/ML
5 INJECTION, SOLUTION INTRAVENOUS EVERY 10 MIN PRN
Status: DISCONTINUED | OUTPATIENT
Start: 2018-06-21 | End: 2018-06-21 | Stop reason: HOSPADM

## 2018-06-21 RX ORDER — PHENAZOPYRIDINE HYDROCHLORIDE 100 MG/1
100 TABLET, FILM COATED ORAL 3 TIMES DAILY PRN
Qty: 30 TABLET | Refills: 1 | Status: SHIPPED | OUTPATIENT
Start: 2018-06-21 | End: 2018-07-30 | Stop reason: ALTCHOICE

## 2018-06-21 RX ORDER — MEPERIDINE HYDROCHLORIDE 50 MG/ML
12.5 INJECTION INTRAMUSCULAR; INTRAVENOUS; SUBCUTANEOUS EVERY 5 MIN PRN
Status: DISCONTINUED | OUTPATIENT
Start: 2018-06-21 | End: 2018-06-21 | Stop reason: HOSPADM

## 2018-06-21 RX ORDER — HYDROMORPHONE HCL IN 0.9% NACL 0.5 MG/ML
0.5 SYRINGE (ML) INTRAVENOUS EVERY 5 MIN PRN
Status: DISCONTINUED | OUTPATIENT
Start: 2018-06-21 | End: 2018-06-21 | Stop reason: HOSPADM

## 2018-06-21 RX ORDER — METOCLOPRAMIDE HYDROCHLORIDE 5 MG/ML
10 INJECTION INTRAMUSCULAR; INTRAVENOUS
Status: DISCONTINUED | OUTPATIENT
Start: 2018-06-21 | End: 2018-06-21 | Stop reason: HOSPADM

## 2018-06-21 RX ORDER — PROPOFOL 10 MG/ML
INJECTION, EMULSION INTRAVENOUS PRN
Status: DISCONTINUED | OUTPATIENT
Start: 2018-06-21 | End: 2018-06-21 | Stop reason: SDUPTHER

## 2018-06-21 RX ORDER — HYDROMORPHONE HCL IN 0.9% NACL 0.5 MG/ML
0.25 SYRINGE (ML) INTRAVENOUS EVERY 5 MIN PRN
Status: DISCONTINUED | OUTPATIENT
Start: 2018-06-21 | End: 2018-06-21 | Stop reason: HOSPADM

## 2018-06-21 RX ORDER — FENTANYL CITRATE 50 UG/ML
25 INJECTION, SOLUTION INTRAMUSCULAR; INTRAVENOUS
Status: DISCONTINUED | OUTPATIENT
Start: 2018-06-21 | End: 2018-06-21 | Stop reason: HOSPADM

## 2018-06-21 RX ORDER — LIDOCAINE HYDROCHLORIDE 10 MG/ML
INJECTION, SOLUTION EPIDURAL; INFILTRATION; INTRACAUDAL; PERINEURAL PRN
Status: DISCONTINUED | OUTPATIENT
Start: 2018-06-21 | End: 2018-06-21 | Stop reason: SDUPTHER

## 2018-06-21 RX ORDER — SCOLOPAMINE TRANSDERMAL SYSTEM 1 MG/1
1 PATCH, EXTENDED RELEASE TRANSDERMAL ONCE
Status: DISCONTINUED | OUTPATIENT
Start: 2018-06-21 | End: 2018-06-21 | Stop reason: HOSPADM

## 2018-06-21 RX ORDER — TAMSULOSIN HYDROCHLORIDE 0.4 MG/1
0.4 CAPSULE ORAL ONCE
Status: COMPLETED | OUTPATIENT
Start: 2018-06-21 | End: 2018-06-21

## 2018-06-21 RX ORDER — SODIUM CHLORIDE 0.9 % (FLUSH) 0.9 %
10 SYRINGE (ML) INJECTION PRN
Status: DISCONTINUED | OUTPATIENT
Start: 2018-06-21 | End: 2018-06-21 | Stop reason: HOSPADM

## 2018-06-21 RX ORDER — ONDANSETRON 2 MG/ML
INJECTION INTRAMUSCULAR; INTRAVENOUS PRN
Status: DISCONTINUED | OUTPATIENT
Start: 2018-06-21 | End: 2018-06-21 | Stop reason: SDUPTHER

## 2018-06-21 RX ORDER — FENTANYL CITRATE 50 UG/ML
50 INJECTION, SOLUTION INTRAMUSCULAR; INTRAVENOUS
Status: DISCONTINUED | OUTPATIENT
Start: 2018-06-21 | End: 2018-06-21 | Stop reason: HOSPADM

## 2018-06-21 RX ORDER — GLYCOPYRROLATE 0.2 MG/ML
INJECTION INTRAMUSCULAR; INTRAVENOUS PRN
Status: DISCONTINUED | OUTPATIENT
Start: 2018-06-21 | End: 2018-06-21 | Stop reason: SDUPTHER

## 2018-06-21 RX ORDER — OXYCODONE HYDROCHLORIDE AND ACETAMINOPHEN 5; 325 MG/1; MG/1
2 TABLET ORAL EVERY 4 HOURS PRN
Status: DISCONTINUED | OUTPATIENT
Start: 2018-06-21 | End: 2018-06-21 | Stop reason: HOSPADM

## 2018-06-21 RX ORDER — ONDANSETRON 2 MG/ML
4 INJECTION INTRAMUSCULAR; INTRAVENOUS EVERY 4 HOURS PRN
Status: DISCONTINUED | OUTPATIENT
Start: 2018-06-21 | End: 2018-06-21 | Stop reason: HOSPADM

## 2018-06-21 RX ORDER — ENALAPRILAT 2.5 MG/2ML
1.25 INJECTION INTRAVENOUS
Status: DISCONTINUED | OUTPATIENT
Start: 2018-06-21 | End: 2018-06-21 | Stop reason: HOSPADM

## 2018-06-21 RX ORDER — FENTANYL CITRATE 50 UG/ML
INJECTION, SOLUTION INTRAMUSCULAR; INTRAVENOUS PRN
Status: DISCONTINUED | OUTPATIENT
Start: 2018-06-21 | End: 2018-06-21 | Stop reason: SDUPTHER

## 2018-06-21 RX ORDER — ATROPA BELLADONNA AND OPIUM 16.2; 6 MG/1; MG/1
SUPPOSITORY RECTAL PRN
Status: DISCONTINUED | OUTPATIENT
Start: 2018-06-21 | End: 2018-06-21 | Stop reason: HOSPADM

## 2018-06-21 RX ORDER — HYDRALAZINE HYDROCHLORIDE 20 MG/ML
5 INJECTION INTRAMUSCULAR; INTRAVENOUS EVERY 10 MIN PRN
Status: DISCONTINUED | OUTPATIENT
Start: 2018-06-21 | End: 2018-06-21 | Stop reason: HOSPADM

## 2018-06-21 RX ORDER — APREPITANT 40 MG/1
40 CAPSULE ORAL ONCE
Status: COMPLETED | OUTPATIENT
Start: 2018-06-21 | End: 2018-06-21

## 2018-06-21 RX ORDER — HYDROMORPHONE HCL IN 0.9% NACL 0.5 MG/ML
1 SYRINGE (ML) INTRAVENOUS
Status: DISCONTINUED | OUTPATIENT
Start: 2018-06-21 | End: 2018-06-21 | Stop reason: HOSPADM

## 2018-06-21 RX ORDER — MIDAZOLAM HYDROCHLORIDE 1 MG/ML
INJECTION INTRAMUSCULAR; INTRAVENOUS PRN
Status: DISCONTINUED | OUTPATIENT
Start: 2018-06-21 | End: 2018-06-21 | Stop reason: SDUPTHER

## 2018-06-21 RX ORDER — MIDAZOLAM HYDROCHLORIDE 1 MG/ML
2 INJECTION INTRAMUSCULAR; INTRAVENOUS
Status: DISCONTINUED | OUTPATIENT
Start: 2018-06-21 | End: 2018-06-21 | Stop reason: HOSPADM

## 2018-06-21 RX ORDER — DIPHENHYDRAMINE HYDROCHLORIDE 50 MG/ML
12.5 INJECTION INTRAMUSCULAR; INTRAVENOUS
Status: DISCONTINUED | OUTPATIENT
Start: 2018-06-21 | End: 2018-06-21 | Stop reason: HOSPADM

## 2018-06-21 RX ORDER — TAMSULOSIN HYDROCHLORIDE 0.4 MG/1
0.4 CAPSULE ORAL DAILY
Qty: 7 CAPSULE | Refills: 0 | Status: SHIPPED | OUTPATIENT
Start: 2018-06-21 | End: 2018-07-30 | Stop reason: ALTCHOICE

## 2018-06-21 RX ORDER — OXYCODONE HYDROCHLORIDE AND ACETAMINOPHEN 5; 325 MG/1; MG/1
1 TABLET ORAL EVERY 4 HOURS PRN
Qty: 30 TABLET | Refills: 0 | Status: SHIPPED | OUTPATIENT
Start: 2018-06-21 | End: 2018-06-28

## 2018-06-21 RX ORDER — ROCURONIUM BROMIDE 10 MG/ML
INJECTION, SOLUTION INTRAVENOUS PRN
Status: DISCONTINUED | OUTPATIENT
Start: 2018-06-21 | End: 2018-06-21 | Stop reason: SDUPTHER

## 2018-06-21 RX ORDER — LIDOCAINE HYDROCHLORIDE 10 MG/ML
1 INJECTION, SOLUTION EPIDURAL; INFILTRATION; INTRACAUDAL; PERINEURAL
Status: DISCONTINUED | OUTPATIENT
Start: 2018-06-21 | End: 2018-06-21 | Stop reason: HOSPADM

## 2018-06-21 RX ORDER — CIPROFLOXACIN 2 MG/ML
400 INJECTION, SOLUTION INTRAVENOUS
Status: COMPLETED | OUTPATIENT
Start: 2018-06-21 | End: 2018-06-21

## 2018-06-21 RX ORDER — PHENAZOPYRIDINE HYDROCHLORIDE 100 MG/1
100 TABLET, FILM COATED ORAL ONCE
Status: COMPLETED | OUTPATIENT
Start: 2018-06-21 | End: 2018-06-21

## 2018-06-21 RX ORDER — MORPHINE SULFATE 1 MG/ML
4 INJECTION, SOLUTION EPIDURAL; INTRATHECAL; INTRAVENOUS EVERY 5 MIN PRN
Status: DISCONTINUED | OUTPATIENT
Start: 2018-06-21 | End: 2018-06-21 | Stop reason: HOSPADM

## 2018-06-21 RX ORDER — SULFAMETHOXAZOLE AND TRIMETHOPRIM 800; 160 MG/1; MG/1
1 TABLET ORAL DAILY
Qty: 20 TABLET | Refills: 0 | Status: SHIPPED | OUTPATIENT
Start: 2018-06-21 | End: 2018-06-28

## 2018-06-21 RX ORDER — PROMETHAZINE HYDROCHLORIDE 25 MG/ML
6.25 INJECTION, SOLUTION INTRAMUSCULAR; INTRAVENOUS
Status: DISCONTINUED | OUTPATIENT
Start: 2018-06-21 | End: 2018-06-21 | Stop reason: HOSPADM

## 2018-06-21 RX ORDER — DEXAMETHASONE SODIUM PHOSPHATE 10 MG/ML
INJECTION INTRAMUSCULAR; INTRAVENOUS PRN
Status: DISCONTINUED | OUTPATIENT
Start: 2018-06-21 | End: 2018-06-21 | Stop reason: SDUPTHER

## 2018-06-21 RX ADMIN — PROPOFOL 200 MG: 10 INJECTION, EMULSION INTRAVENOUS at 14:16

## 2018-06-21 RX ADMIN — FENTANYL CITRATE 50 MCG: 50 INJECTION, SOLUTION INTRAMUSCULAR; INTRAVENOUS at 14:16

## 2018-06-21 RX ADMIN — DEXAMETHASONE SODIUM PHOSPHATE 10 MG: 10 INJECTION INTRAMUSCULAR; INTRAVENOUS at 14:16

## 2018-06-21 RX ADMIN — NEOSTIGMINE METHYLSULFATE 3 MG: 1 INJECTION, SOLUTION INTRAMUSCULAR; INTRAVENOUS; SUBCUTANEOUS at 14:55

## 2018-06-21 RX ADMIN — ROCURONIUM BROMIDE 40 MG: 10 INJECTION INTRAVENOUS at 14:16

## 2018-06-21 RX ADMIN — CIPROFLOXACIN 400 MG: 2 INJECTION, SOLUTION INTRAVENOUS at 14:20

## 2018-06-21 RX ADMIN — SODIUM CHLORIDE, POTASSIUM CHLORIDE, SODIUM LACTATE AND CALCIUM CHLORIDE: 600; 310; 30; 20 INJECTION, SOLUTION INTRAVENOUS at 13:52

## 2018-06-21 RX ADMIN — TAMSULOSIN HYDROCHLORIDE 0.4 MG: 0.4 CAPSULE ORAL at 15:44

## 2018-06-21 RX ADMIN — APREPITANT 40 MG: 40 CAPSULE ORAL at 13:51

## 2018-06-21 RX ADMIN — MIDAZOLAM HYDROCHLORIDE 2 MG: 1 INJECTION, SOLUTION INTRAMUSCULAR; INTRAVENOUS at 14:11

## 2018-06-21 RX ADMIN — LIDOCAINE HYDROCHLORIDE 50 MG: 10 INJECTION, SOLUTION EPIDURAL; INFILTRATION; INTRACAUDAL; PERINEURAL at 14:16

## 2018-06-21 RX ADMIN — OXYCODONE HYDROCHLORIDE AND ACETAMINOPHEN 2 TABLET: 5; 325 TABLET ORAL at 15:43

## 2018-06-21 RX ADMIN — SODIUM CHLORIDE, SODIUM LACTATE, POTASSIUM CHLORIDE, AND CALCIUM CHLORIDE: 600; 310; 30; 20 INJECTION, SOLUTION INTRAVENOUS at 14:13

## 2018-06-21 RX ADMIN — GLYCOPYRROLATE 0.6 MG: 0.2 INJECTION, SOLUTION INTRAMUSCULAR; INTRAVENOUS at 14:55

## 2018-06-21 RX ADMIN — ONDANSETRON HYDROCHLORIDE 4 MG: 2 SOLUTION INTRAMUSCULAR; INTRAVENOUS at 14:53

## 2018-06-21 RX ADMIN — PHENAZOPYRIDINE HYDROCHLORIDE 100 MG: 100 TABLET ORAL at 15:44

## 2018-06-21 RX ADMIN — Medication 0.5 MG: at 15:09

## 2018-06-21 ASSESSMENT — PAIN SCALES - GENERAL
PAINLEVEL_OUTOF10: 9
PAINLEVEL_OUTOF10: 7
PAINLEVEL_OUTOF10: 10

## 2018-06-21 ASSESSMENT — PAIN DESCRIPTION - DESCRIPTORS: DESCRIPTORS: ACHING;CRAMPING

## 2018-06-21 ASSESSMENT — PAIN DESCRIPTION - PAIN TYPE: TYPE: SURGICAL PAIN

## 2018-06-21 ASSESSMENT — PAIN DESCRIPTION - ORIENTATION: ORIENTATION: LOWER

## 2018-06-21 ASSESSMENT — LIFESTYLE VARIABLES: SMOKING_STATUS: 1

## 2018-06-21 ASSESSMENT — ENCOUNTER SYMPTOMS
SHORTNESS OF BREATH: 1
DYSPNEA ACTIVITY LEVEL: AFTER AMBULATING 1 FLIGHT OF STAIRS

## 2018-06-21 ASSESSMENT — PAIN DESCRIPTION - LOCATION: LOCATION: ABDOMEN

## 2018-06-26 ENCOUNTER — TELEPHONE (OUTPATIENT)
Dept: UROLOGY | Age: 58
End: 2018-06-26

## 2018-06-28 ENCOUNTER — TELEPHONE (OUTPATIENT)
Dept: INTERNAL MEDICINE | Age: 58
End: 2018-06-28

## 2018-06-29 RX ORDER — ACYCLOVIR 200 MG/1
CAPSULE ORAL
Qty: 100 CAPSULE | Refills: 1 | Status: ON HOLD | OUTPATIENT
Start: 2018-06-29 | End: 2019-02-20

## 2018-07-16 RX ORDER — METOPROLOL SUCCINATE 50 MG/1
TABLET, EXTENDED RELEASE ORAL
Qty: 30 TABLET | Refills: 5 | Status: SHIPPED | OUTPATIENT
Start: 2018-07-16 | End: 2019-01-14 | Stop reason: SDUPTHER

## 2018-07-16 NOTE — TELEPHONE ENCOUNTER
Requested Prescriptions     Pending Prescriptions Disp Refills    metoprolol succinate (TOPROL XL) 50 MG extended release tablet [Pharmacy Med Name: METOPROLOL SUCC ER 50 MG *DD] 30 tablet 5     Sig: TAKE 1 TABLET BY MOUTH ONCE DAILY.

## 2018-07-18 RX ORDER — ONDANSETRON 8 MG/1
TABLET, ORALLY DISINTEGRATING ORAL
Qty: 90 TABLET | Refills: 0 | Status: SHIPPED | OUTPATIENT
Start: 2018-07-18 | End: 2019-02-19

## 2018-07-30 ENCOUNTER — HOSPITAL ENCOUNTER (OUTPATIENT)
Dept: PSYCHIATRY | Age: 58
Setting detail: THERAPIES SERIES
Discharge: HOME | End: 2018-07-30
Payer: MEDICARE

## 2018-07-30 VITALS
SYSTOLIC BLOOD PRESSURE: 109 MMHG | BODY MASS INDEX: 38.62 KG/M2 | RESPIRATION RATE: 18 BRPM | WEIGHT: 218 LBS | DIASTOLIC BLOOD PRESSURE: 74 MMHG | HEIGHT: 63 IN | HEART RATE: 69 BPM | OXYGEN SATURATION: 97 % | TEMPERATURE: 97.8 F

## 2018-07-30 RX ORDER — OXYCODONE AND ACETAMINOPHEN 10; 325 MG/1; MG/1
1 TABLET ORAL EVERY 8 HOURS PRN
COMMUNITY
End: 2019-02-14 | Stop reason: DRUGHIGH

## 2018-07-30 ASSESSMENT — PAIN DESCRIPTION - DESCRIPTORS: DESCRIPTORS: ACHING;BURNING

## 2018-07-30 ASSESSMENT — PAIN SCALES - GENERAL: PAINLEVEL_OUTOF10: 8

## 2018-07-30 ASSESSMENT — PAIN DESCRIPTION - ORIENTATION: ORIENTATION: MID;LOWER

## 2018-07-30 ASSESSMENT — PAIN DESCRIPTION - LOCATION: LOCATION: BACK

## 2018-07-30 ASSESSMENT — PAIN DESCRIPTION - DIRECTION: RADIATING_TOWARDS: DOWN LT LEG

## 2018-07-30 ASSESSMENT — PAIN DESCRIPTION - ONSET: ONSET: AWAKENED FROM SLEEP

## 2018-07-30 ASSESSMENT — PAIN DESCRIPTION - PAIN TYPE: TYPE: CHRONIC PAIN

## 2018-07-30 ASSESSMENT — PAIN DESCRIPTION - FREQUENCY: FREQUENCY: CONTINUOUS

## 2018-07-30 ASSESSMENT — PAIN DESCRIPTION - PROGRESSION: CLINICAL_PROGRESSION: GRADUALLY WORSENING

## 2018-07-30 NOTE — PLAN OF CARE
Problem: Falls - Risk of:  Goal: Will remain free from falls  Will remain free from falls  Outcome: Ongoing  D:  Pt admitted to [facility] today. She denies hx of falls but reports some numbness in her legs and feet and chronic pain issues. A:  Pt given verbal and written info on measures that may reduce RTF  Including to change position slowly. R:  Pt gait slow but steady today. Pt verbalized understanding of info provided and plans to follow.
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
## 2018-08-01 ENCOUNTER — HOSPITAL ENCOUNTER (OUTPATIENT)
Dept: PSYCHIATRY | Age: 58
Setting detail: THERAPIES SERIES
Discharge: HOME | End: 2018-08-01
Payer: MEDICARE

## 2018-08-01 PROCEDURE — 90853 GROUP PSYCHOTHERAPY: CPT | Performed by: SOCIAL WORKER

## 2018-08-01 NOTE — PLAN OF CARE
Problem: Depressive Behavior With or Without Suicide Precautions:  Goal: Able to verbalize acceptance of life and situations over which he or she has no control  Able to verbalize acceptance of life and situations over which he or she has no control   Outcome: Ongoing                                                                      Group Therapy Note    Date: 8/1/2018  Start Time: 1000  End Time:  1100  Number of Participants: 8    Type of Group: Psychoeducation    Wellness Binder Information  Module Name:  Stress  Session Number:  5    Patient's Goal:  To improve knowledge of effective stress management techniques    Notes:  Pt demonstrated improved knowledge of effective stress management techniques by actively participating in group discussion.     Status After Intervention:  Unchanged    Participation Level: Interactive    Participation Quality: Attentive      Speech:  normal      Thought Process/Content: Logical      Affective Functioning: Congruent      Mood: anxious and depressed      Level of consciousness:  Alert and Oriented x4      Response to Learning: Able to verbalize current knowledge/experience, Able to verbalize/acknowledge new learning and Progressing to goal      Endings: None Reported    Modes of Intervention: Education      Discipline Responsible: Psychoeducational Specialist      Signature:  Shelia Champion

## 2018-08-01 NOTE — PLAN OF CARE
Problem: Depressive Behavior With or Without Suicide Precautions:  Goal: Able to verbalize acceptance of life and situations over which he or she has no control  Able to verbalize acceptance of life and situations over which he or she has no control   Outcome: Ongoing                                                                      Group Therapy Note    Date: 8/1/2018  Start Time: 0830  End Time:  0945  Number of Participants: 8    Type of Group: Psychotherapy    Patient's Goal:  Process emotions and actions in how to relate to others or their relationship with others. Notes:  Pt attended process group as schedule. Pt participated by identified an issue to work on today regarding how they interact and relate to others. Participated in group discussion. Pt gave support and encouragement to group members. Pt shared with the group her stressors, LCSW made multiple attempts to refocus pt's attention to herself and what she can control versus what she can't control. Status After Intervention:  Unchanged    Participation Level:  Active Listener and Interactive    Participation Quality: Appropriate, Attentive, Sharing and Supportive      Speech:  normal      Thought Process/Content: Linear      Affective Functioning: Congruent      Mood: anxious, depressed and fearful      Level of consciousness:  Alert, Oriented x4 and Attentive      Response to Learning: Able to verbalize current knowledge/experience      Endings: None Reported    Modes of Intervention: Education, Support, Socialization, Exploration and Clarifying      Discipline Responsible: /Counselor      Signature:  Karol Brittle, LISW

## 2018-08-03 DIAGNOSIS — K52.9 CHRONIC DIARRHEA: Primary | ICD-10-CM

## 2018-08-06 ENCOUNTER — TELEPHONE (OUTPATIENT)
Dept: PSYCHIATRY | Age: 58
End: 2018-08-06

## 2018-08-06 NOTE — TELEPHONE ENCOUNTER
Pt missed IOP today--unable to reach her by phone. Carmen Benson (ROSEMARIE) checked on the pt and stated that pt said her phone is not working correctly. She reports that the pt said she did not feel well physically and would call IOP in a couple of hours. Carmen Benson reported that pt was not a risk to herself. Travis Marquez therapist made aware.

## 2018-08-08 ENCOUNTER — TELEPHONE (OUTPATIENT)
Dept: PSYCHIATRY | Age: 58
End: 2018-08-08

## 2018-08-08 NOTE — TELEPHONE ENCOUNTER
Pt not here today as scheduled. Attempted to contact pt left  with request for call back. Attempted to contact pt's daughter in law--left  with request to check on pt and call back.

## 2018-08-10 ENCOUNTER — TELEPHONE (OUTPATIENT)
Dept: PSYCHIATRY | Age: 58
End: 2018-08-10

## 2018-08-10 NOTE — TELEPHONE ENCOUNTER
D:  Pt did not show for IOP as scheduled. She stated earlier this week that she was going to discuss not wanting to return to IOP with Dr. Kathryn Velasquez who encouraged to come and then she was encouraged to call IOP staff back with her decision. Pt has not called back. A:  Attempted to contact her at this time--left Vm with request for call back. Narciso Servin LCSW made aware. R:  Pt has not been compliant with IOP attendance.

## 2018-08-13 ENCOUNTER — TELEPHONE (OUTPATIENT)
Dept: PSYCHIATRY | Age: 58
End: 2018-08-13

## 2018-08-13 NOTE — TELEPHONE ENCOUNTER
Pt did not attend IOP as scheduled today. Pt has requested discharge from [facility]. She has not returned VMs left to complete the discharge process. She has often not returned VM left during the IOP and contact was made through her daughter in law. Plan is to discharge the pt tomorrow from [facility]. Will inform her psychiatrist Dr. Tacos Bah.

## 2018-08-14 ENCOUNTER — TELEPHONE (OUTPATIENT)
Dept: PSYCHIATRY | Age: 58
End: 2018-08-14

## 2018-08-15 ENCOUNTER — TELEPHONE (OUTPATIENT)
Dept: PSYCHIATRY | Age: 58
End: 2018-08-15

## 2018-08-15 NOTE — TELEPHONE ENCOUNTER
Follow up call completed post discharge from [facility]:  Attempted to contact pt by phone--left VM with request for call back if has any questions related to discharge from [facility]. Reminded that Dr. Magdy Garibay will call her for appt-if does not receive call from him she was encouraged to call him for an appt and that he will discuss therapist f/u with her and that her discharge instructions from [facility] have been placed in the mail.

## 2018-08-16 ENCOUNTER — TELEPHONE (OUTPATIENT)
Dept: PSYCHIATRY | Age: 58
End: 2018-08-16

## 2018-09-18 ENCOUNTER — TELEPHONE (OUTPATIENT)
Dept: CARDIOLOGY | Age: 58
End: 2018-09-18

## 2018-10-01 DIAGNOSIS — I10 ESSENTIAL HYPERTENSION: ICD-10-CM

## 2018-10-01 DIAGNOSIS — Z12.39 BREAST CANCER SCREENING: ICD-10-CM

## 2018-10-01 DIAGNOSIS — Z13.820 SCREENING FOR OSTEOPOROSIS: ICD-10-CM

## 2018-10-01 DIAGNOSIS — K52.9 CHRONIC DIARRHEA: Chronic | ICD-10-CM

## 2018-10-01 DIAGNOSIS — M51.26 DISPLACEMENT OF LUMBAR INTERVERTEBRAL DISC WITHOUT MYELOPATHY: ICD-10-CM

## 2018-10-01 DIAGNOSIS — B18.2 CHRONIC ACTIVE HEPATITIS C (HCC): Chronic | ICD-10-CM

## 2018-10-01 DIAGNOSIS — M48.061 SPINAL STENOSIS, LUMBAR REGION, WITHOUT NEUROGENIC CLAUDICATION: ICD-10-CM

## 2018-10-01 DIAGNOSIS — E11.9 TYPE 2 DIABETES MELLITUS WITHOUT COMPLICATION, WITHOUT LONG-TERM CURRENT USE OF INSULIN (HCC): ICD-10-CM

## 2018-10-01 DIAGNOSIS — J44.1 COPD EXACERBATION (HCC): ICD-10-CM

## 2018-10-01 LAB
ALBUMIN SERPL-MCNC: 4.3 G/DL (ref 3.5–5.2)
ALP BLD-CCNC: 59 U/L (ref 35–104)
ALT SERPL-CCNC: 12 U/L (ref 5–33)
ANION GAP SERPL CALCULATED.3IONS-SCNC: 13 MMOL/L (ref 7–19)
AST SERPL-CCNC: 13 U/L (ref 5–32)
BILIRUB SERPL-MCNC: <0.2 MG/DL (ref 0.2–1.2)
BUN BLDV-MCNC: 25 MG/DL (ref 6–20)
CALCIUM SERPL-MCNC: 9.7 MG/DL (ref 8.6–10)
CHLORIDE BLD-SCNC: 104 MMOL/L (ref 98–111)
CHOLESTEROL, TOTAL: 169 MG/DL (ref 160–199)
CO2: 26 MMOL/L (ref 22–29)
CREAT SERPL-MCNC: 0.7 MG/DL (ref 0.5–0.9)
GFR NON-AFRICAN AMERICAN: >60
GLUCOSE BLD-MCNC: 98 MG/DL (ref 74–109)
HBA1C MFR BLD: 5.8 % (ref 4–6)
HCT VFR BLD CALC: 42.4 % (ref 37–47)
HDLC SERPL-MCNC: 41 MG/DL (ref 65–121)
HEMOGLOBIN: 12.8 G/DL (ref 12–16)
LDL CHOLESTEROL CALCULATED: 96 MG/DL
MCH RBC QN AUTO: 26.8 PG (ref 27–31)
MCHC RBC AUTO-ENTMCNC: 30.2 G/DL (ref 33–37)
MCV RBC AUTO: 88.9 FL (ref 81–99)
PDW BLD-RTO: 13.2 % (ref 11.5–14.5)
PLATELET # BLD: 208 K/UL (ref 130–400)
PMV BLD AUTO: 13.1 FL (ref 9.4–12.3)
POTASSIUM SERPL-SCNC: 4.5 MMOL/L (ref 3.5–5)
RBC # BLD: 4.77 M/UL (ref 4.2–5.4)
SODIUM BLD-SCNC: 143 MMOL/L (ref 136–145)
TOTAL PROTEIN: 7.2 G/DL (ref 6.6–8.7)
TRIGL SERPL-MCNC: 162 MG/DL (ref 0–149)
TSH SERPL DL<=0.05 MIU/L-ACNC: 1.37 UIU/ML (ref 0.27–4.2)
WBC # BLD: 8.1 K/UL (ref 4.8–10.8)

## 2018-10-05 LAB
HCV QNT BY NAAT IU/ML: NOT DETECTED
HCV QNT BY NAAT LOG IU/ML: NOT DETECTED

## 2018-10-08 ENCOUNTER — OFFICE VISIT (OUTPATIENT)
Dept: INTERNAL MEDICINE | Age: 58
End: 2018-10-08
Payer: MEDICARE

## 2018-10-08 VITALS
WEIGHT: 218.8 LBS | BODY MASS INDEX: 38.77 KG/M2 | OXYGEN SATURATION: 95 % | HEART RATE: 81 BPM | DIASTOLIC BLOOD PRESSURE: 80 MMHG | HEIGHT: 63 IN | SYSTOLIC BLOOD PRESSURE: 110 MMHG

## 2018-10-08 DIAGNOSIS — E11.42 DIABETIC POLYNEUROPATHY ASSOCIATED WITH TYPE 2 DIABETES MELLITUS (HCC): ICD-10-CM

## 2018-10-08 DIAGNOSIS — M51.26 DISPLACEMENT OF LUMBAR INTERVERTEBRAL DISC WITHOUT MYELOPATHY: ICD-10-CM

## 2018-10-08 DIAGNOSIS — Z12.31 ENCOUNTER FOR SCREENING MAMMOGRAM FOR BREAST CANCER: ICD-10-CM

## 2018-10-08 DIAGNOSIS — B18.2 CHRONIC ACTIVE HEPATITIS C (HCC): Chronic | ICD-10-CM

## 2018-10-08 DIAGNOSIS — I10 ESSENTIAL HYPERTENSION: Primary | ICD-10-CM

## 2018-10-08 DIAGNOSIS — M79.18 MYOFASCIAL MUSCLE PAIN: Chronic | ICD-10-CM

## 2018-10-08 DIAGNOSIS — K52.9 CHRONIC DIARRHEA: Chronic | ICD-10-CM

## 2018-10-08 DIAGNOSIS — Z78.0 POST-MENOPAUSAL: ICD-10-CM

## 2018-10-08 DIAGNOSIS — R19.7 DIARRHEA, UNSPECIFIED TYPE: ICD-10-CM

## 2018-10-08 DIAGNOSIS — E11.9 TYPE 2 DIABETES MELLITUS WITHOUT COMPLICATION, WITHOUT LONG-TERM CURRENT USE OF INSULIN (HCC): ICD-10-CM

## 2018-10-08 DIAGNOSIS — Z23 NEED FOR INFLUENZA VACCINATION: ICD-10-CM

## 2018-10-08 PROCEDURE — 2022F DILAT RTA XM EVC RTNOPTHY: CPT | Performed by: INTERNAL MEDICINE

## 2018-10-08 PROCEDURE — G0008 ADMIN INFLUENZA VIRUS VAC: HCPCS | Performed by: INTERNAL MEDICINE

## 2018-10-08 PROCEDURE — 90682 RIV4 VACC RECOMBINANT DNA IM: CPT | Performed by: INTERNAL MEDICINE

## 2018-10-08 PROCEDURE — G8427 DOCREV CUR MEDS BY ELIG CLIN: HCPCS | Performed by: INTERNAL MEDICINE

## 2018-10-08 PROCEDURE — 3017F COLORECTAL CA SCREEN DOC REV: CPT | Performed by: INTERNAL MEDICINE

## 2018-10-08 PROCEDURE — 99214 OFFICE O/P EST MOD 30 MIN: CPT | Performed by: INTERNAL MEDICINE

## 2018-10-08 PROCEDURE — 3044F HG A1C LEVEL LT 7.0%: CPT | Performed by: INTERNAL MEDICINE

## 2018-10-08 PROCEDURE — G8417 CALC BMI ABV UP PARAM F/U: HCPCS | Performed by: INTERNAL MEDICINE

## 2018-10-08 PROCEDURE — 4004F PT TOBACCO SCREEN RCVD TLK: CPT | Performed by: INTERNAL MEDICINE

## 2018-10-08 PROCEDURE — G8482 FLU IMMUNIZE ORDER/ADMIN: HCPCS | Performed by: INTERNAL MEDICINE

## 2018-10-08 RX ORDER — TIZANIDINE 4 MG/1
4 TABLET ORAL EVERY 8 HOURS PRN
Qty: 60 TABLET | Refills: 1 | Status: SHIPPED | OUTPATIENT
Start: 2018-10-08 | End: 2018-11-23 | Stop reason: SDUPTHER

## 2018-10-08 ASSESSMENT — ENCOUNTER SYMPTOMS
DIARRHEA: 1
BACK PAIN: 1
ABDOMINAL PAIN: 0
NAUSEA: 0
BLOOD IN STOOL: 0
SHORTNESS OF BREATH: 0
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
## 2018-10-15 ENCOUNTER — HOSPITAL ENCOUNTER (OUTPATIENT)
Dept: WOMENS IMAGING | Age: 58
Discharge: HOME | End: 2018-10-15
Payer: MEDICARE

## 2018-10-15 DIAGNOSIS — Z12.31 ENCOUNTER FOR SCREENING MAMMOGRAM FOR BREAST CANCER: ICD-10-CM

## 2018-10-15 DIAGNOSIS — Z78.0 POST-MENOPAUSAL: ICD-10-CM

## 2018-10-15 PROCEDURE — 77080 DXA BONE DENSITY AXIAL: CPT

## 2018-10-15 PROCEDURE — 77063 BREAST TOMOSYNTHESIS BI: CPT

## 2018-10-26 ENCOUNTER — TELEPHONE (OUTPATIENT)
Dept: INTERNAL MEDICINE | Age: 58
End: 2018-10-26

## 2018-10-26 DIAGNOSIS — E55.9 VITAMIN D DEFICIENCY: Primary | ICD-10-CM

## 2018-11-26 RX ORDER — TIZANIDINE 4 MG/1
TABLET ORAL
Qty: 60 TABLET | Refills: 5 | Status: SHIPPED | OUTPATIENT
Start: 2018-11-26 | End: 2019-02-19

## 2018-12-03 ENCOUNTER — OFFICE VISIT (OUTPATIENT)
Dept: UROLOGY | Age: 58
End: 2018-12-03
Payer: MEDICARE

## 2018-12-03 ENCOUNTER — HOSPITAL ENCOUNTER (OUTPATIENT)
Dept: CT IMAGING | Age: 58
Discharge: HOME | End: 2018-12-03
Payer: MEDICARE

## 2018-12-03 VITALS — BODY MASS INDEX: 38.58 KG/M2 | HEIGHT: 64 IN | WEIGHT: 226 LBS | TEMPERATURE: 97.4 F

## 2018-12-03 DIAGNOSIS — R31.0 GROSS HEMATURIA: Primary | ICD-10-CM

## 2018-12-03 DIAGNOSIS — R10.9 RIGHT FLANK PAIN: ICD-10-CM

## 2018-12-03 DIAGNOSIS — R31.0 GROSS HEMATURIA: ICD-10-CM

## 2018-12-03 DIAGNOSIS — N20.1 LEFT URETERAL STONE: ICD-10-CM

## 2018-12-03 LAB
BACTERIA URINE, POC: NORMAL
BILIRUBIN URINE: 0 MG/DL
BLOOD, URINE: POSITIVE
CASTS URINE, POC: NORMAL
CLARITY: CLEAR
COLOR: YELLOW
CRYSTALS URINE, POC: NORMAL
EPI CELLS URINE, POC: NORMAL
GLUCOSE URINE: NORMAL
KETONES, URINE: NEGATIVE
LEUKOCYTE EST, POC: NORMAL
NITRITE, URINE: NEGATIVE
PH UA: 6 (ref 4.5–8)
PROTEIN UA: NEGATIVE
RBC URINE, POC: NORMAL
SPECIFIC GRAVITY UA: 1.01 (ref 1–1.03)
UROBILINOGEN, URINE: NORMAL
WBC URINE, POC: NORMAL
YEAST URINE, POC: NORMAL

## 2018-12-03 PROCEDURE — 99214 OFFICE O/P EST MOD 30 MIN: CPT | Performed by: PHYSICIAN ASSISTANT

## 2018-12-03 PROCEDURE — G8417 CALC BMI ABV UP PARAM F/U: HCPCS | Performed by: PHYSICIAN ASSISTANT

## 2018-12-03 PROCEDURE — G8427 DOCREV CUR MEDS BY ELIG CLIN: HCPCS | Performed by: PHYSICIAN ASSISTANT

## 2018-12-03 PROCEDURE — 3017F COLORECTAL CA SCREEN DOC REV: CPT | Performed by: PHYSICIAN ASSISTANT

## 2018-12-03 PROCEDURE — 81001 URINALYSIS AUTO W/SCOPE: CPT | Performed by: PHYSICIAN ASSISTANT

## 2018-12-03 PROCEDURE — 74150 CT ABDOMEN W/O CONTRAST: CPT

## 2018-12-03 PROCEDURE — 4004F PT TOBACCO SCREEN RCVD TLK: CPT | Performed by: PHYSICIAN ASSISTANT

## 2018-12-03 PROCEDURE — G8482 FLU IMMUNIZE ORDER/ADMIN: HCPCS | Performed by: PHYSICIAN ASSISTANT

## 2018-12-03 RX ORDER — TAMSULOSIN HYDROCHLORIDE 0.4 MG/1
0.4 CAPSULE ORAL DAILY
Qty: 30 CAPSULE | Refills: 0 | Status: ON HOLD | OUTPATIENT
Start: 2018-12-03 | End: 2019-02-20

## 2018-12-03 RX ORDER — OXYCODONE HYDROCHLORIDE AND ACETAMINOPHEN 5; 325 MG/1; MG/1
1 TABLET ORAL EVERY 6 HOURS PRN
Qty: 12 TABLET | Refills: 0
Start: 2018-12-03 | End: 2018-12-06

## 2018-12-03 ASSESSMENT — ENCOUNTER SYMPTOMS
BACK PAIN: 0
ABDOMINAL PAIN: 0
WHEEZING: 0
EYE PAIN: 0
VOMITING: 0
SHORTNESS OF BREATH: 0
SINUS PAIN: 0

## 2018-12-03 NOTE — PROGRESS NOTES
Subjective:      Patient ID: Kelly Medina is a 62 y.o. female.     HPI    Review of Systems    Objective:   Physical Exam    Assessment:      ***      Plan:      ***        Danii Carbajal PA-C

## 2018-12-04 ENCOUNTER — TELEPHONE (OUTPATIENT)
Dept: INTERNAL MEDICINE | Age: 58
End: 2018-12-04

## 2018-12-05 RX ORDER — POTASSIUM CHLORIDE 750 MG/1
TABLET, EXTENDED RELEASE ORAL
Qty: 30 TABLET | Refills: 5 | Status: SHIPPED | OUTPATIENT
Start: 2018-12-05 | End: 2019-02-19

## 2018-12-05 RX ORDER — FUROSEMIDE 20 MG/1
TABLET ORAL
Qty: 30 TABLET | Refills: 5 | Status: SHIPPED | OUTPATIENT
Start: 2018-12-05 | End: 2019-02-19

## 2018-12-10 ENCOUNTER — HOSPITAL ENCOUNTER (OUTPATIENT)
Dept: GENERAL RADIOLOGY | Age: 58
Discharge: HOME | End: 2018-12-10
Payer: MEDICARE

## 2018-12-10 ENCOUNTER — OFFICE VISIT (OUTPATIENT)
Dept: CARDIOLOGY | Age: 58
End: 2018-12-10
Payer: MEDICARE

## 2018-12-10 ENCOUNTER — HOSPITAL ENCOUNTER (OUTPATIENT)
Dept: LAB | Age: 58
Discharge: HOME | End: 2018-12-10
Payer: MEDICARE

## 2018-12-10 VITALS
SYSTOLIC BLOOD PRESSURE: 130 MMHG | DIASTOLIC BLOOD PRESSURE: 80 MMHG | HEIGHT: 63 IN | WEIGHT: 224 LBS | HEART RATE: 80 BPM | BODY MASS INDEX: 39.69 KG/M2

## 2018-12-10 DIAGNOSIS — I10 ESSENTIAL HYPERTENSION: ICD-10-CM

## 2018-12-10 DIAGNOSIS — R07.9 CHEST PAIN, UNSPECIFIED TYPE: ICD-10-CM

## 2018-12-10 DIAGNOSIS — F17.200 SMOKER: ICD-10-CM

## 2018-12-10 DIAGNOSIS — Z95.818 STATUS POST PLACEMENT OF IMPLANTABLE LOOP RECORDER: ICD-10-CM

## 2018-12-10 DIAGNOSIS — R07.9 CHEST PAIN, UNSPECIFIED TYPE: Primary | ICD-10-CM

## 2018-12-10 DIAGNOSIS — Z87.898 HISTORY OF SYNCOPE: ICD-10-CM

## 2018-12-10 DIAGNOSIS — Z82.49 FAMILY HISTORY OF CORONARY ARTERY DISEASE: ICD-10-CM

## 2018-12-10 DIAGNOSIS — R06.09 DOE (DYSPNEA ON EXERTION): ICD-10-CM

## 2018-12-10 LAB
ANION GAP SERPL CALCULATED.3IONS-SCNC: 10 MMOL/L (ref 7–19)
BASOPHILS ABSOLUTE: 0 K/UL (ref 0–0.2)
BASOPHILS RELATIVE PERCENT: 0.3 % (ref 0–1)
BUN BLDV-MCNC: 16 MG/DL (ref 6–20)
CALCIUM SERPL-MCNC: 10.1 MG/DL (ref 8.6–10)
CHLORIDE BLD-SCNC: 99 MMOL/L (ref 98–111)
CO2: 30 MMOL/L (ref 22–29)
CREAT SERPL-MCNC: 0.6 MG/DL (ref 0.5–0.9)
EOSINOPHILS ABSOLUTE: 0.2 K/UL (ref 0–0.6)
EOSINOPHILS RELATIVE PERCENT: 2 % (ref 0–5)
GFR NON-AFRICAN AMERICAN: >60
GLUCOSE BLD-MCNC: 89 MG/DL (ref 74–109)
HCT VFR BLD CALC: 43.7 % (ref 37–47)
HEMOGLOBIN: 13.3 G/DL (ref 12–16)
LYMPHOCYTES ABSOLUTE: 3.2 K/UL (ref 1.1–4.5)
LYMPHOCYTES RELATIVE PERCENT: 35.5 % (ref 20–40)
MCH RBC QN AUTO: 27.1 PG (ref 27–31)
MCHC RBC AUTO-ENTMCNC: 30.4 G/DL (ref 33–37)
MCV RBC AUTO: 89 FL (ref 81–99)
MONOCYTES ABSOLUTE: 0.6 K/UL (ref 0–0.9)
MONOCYTES RELATIVE PERCENT: 7.1 % (ref 0–10)
NEUTROPHILS ABSOLUTE: 4.9 K/UL (ref 1.5–7.5)
NEUTROPHILS RELATIVE PERCENT: 54.7 % (ref 50–65)
PDW BLD-RTO: 13.5 % (ref 11.5–14.5)
PLATELET # BLD: 275 K/UL (ref 130–400)
PMV BLD AUTO: 11.5 FL (ref 9.4–12.3)
POTASSIUM SERPL-SCNC: 4.5 MMOL/L (ref 3.5–5)
RBC # BLD: 4.91 M/UL (ref 4.2–5.4)
SODIUM BLD-SCNC: 139 MMOL/L (ref 136–145)
WBC # BLD: 9 K/UL (ref 4.8–10.8)

## 2018-12-10 PROCEDURE — G8427 DOCREV CUR MEDS BY ELIG CLIN: HCPCS | Performed by: NURSE PRACTITIONER

## 2018-12-10 PROCEDURE — G8482 FLU IMMUNIZE ORDER/ADMIN: HCPCS | Performed by: NURSE PRACTITIONER

## 2018-12-10 PROCEDURE — 99214 OFFICE O/P EST MOD 30 MIN: CPT | Performed by: NURSE PRACTITIONER

## 2018-12-10 PROCEDURE — 4004F PT TOBACCO SCREEN RCVD TLK: CPT | Performed by: NURSE PRACTITIONER

## 2018-12-10 PROCEDURE — G8417 CALC BMI ABV UP PARAM F/U: HCPCS | Performed by: NURSE PRACTITIONER

## 2018-12-10 PROCEDURE — 3017F COLORECTAL CA SCREEN DOC REV: CPT | Performed by: NURSE PRACTITIONER

## 2018-12-10 PROCEDURE — 93291 INTERROG DEV EVAL SCRMS IP: CPT | Performed by: NURSE PRACTITIONER

## 2018-12-10 PROCEDURE — 71046 X-RAY EXAM CHEST 2 VIEWS: CPT

## 2018-12-10 NOTE — PATIENT INSTRUCTIONS
Continue current medications as prescribed. Continue to follow up with primary care provider for non cardiac medical problems. Call the office with any problems, questions or concerns at [phone]. Follow up as scheduled with your cardiologist- as scheduled after cardiac cath. The following educational material has been included in this after visit summary for your review: Life simple 7. Heart health. Smoking cessation.     Additional instructions:  Coronary artery disease risk factors you can control: Smoking, high blood pressure, high cholesterol, diabetes, being overweight, lack of exercise and stress. Continue heart healthy diet. Take medications as directed. Exercise as tolerated. Strive for 15 minutes of exercise most days of the week. If asked to keep a blood pressure log, do so for 2 weeks. Call the office to report readings at [phone]. Blood pressure goal  is less than 120/70. Elevated blood pressure at 120-129/80 or less. High blood pressure at 130-139/80-89. If you are taking cholesterol lowering medications, it is recommended that lab work be checked annually. Always keep a current medication list. Bring your medications to every office visit. Life simple 7  1) Manage blood pressure - high blood pressure is a major risk factor for heart disease and stroke. Keeping blood pressure in health range reduces strain on your heart, arteries and kidneys. 2) Control cholesterol - contributes to plaque, which can clog arteries and lead to heart disease and stroke. When you control your cholesterol you are giving your arteries their best chance to remain clear. 3) Reduce blood sugar - most of the food we eat is turning into glucose or blood sugar that our body uses for energy. Over time, high levels of blood sugar can damage your heart, kidneys, eyes and nerves. 4) Get active - living an active life is one of the most rewarding gifts you can give yourself and those you love. Simply put, daily physical activity increases your length and quality of life. 5)  Eat better - A healthy diet is one of your best weapons for fighting cardiovascular disease. When you eat a heart healthy diet, you improve your chances for feeling good and staying healthy for life. 6)  Lose weight - when you shed extra fat an unnecessary pounds, you reduce the burden on your hear, lungs, blood vessels and skeleton. You give yourself the gift of active living, you lower your blood pressure and help yourself feel better. 7) Stop smoking - cigarette smokers have a higher risk of developing cardiovascular disease. If  You smoke, quitting is the best thing you can do for your health. Check American Heart Association on line for more information on Life's Simple 7 and tips for healthy living.     Cardiac Catheterization Instructions   (after 10:00 am)  You will need to have lab and a chest x-ray completed today in preparation for the cardiac catheterization. Please go to first floor of this building to Outpatient registration for the testing. Date/Time: Monday 12/17/18. Check in at 11:30 am.  [place] at the [facility] and [address] [place] [address] located on the first floor of [place] [address] through the hospital main entrance and turn immediately to your left. Cardiac Catheterization Instructions:   · Light breakfast with nothing by mouth six hours prior to procedure. May take morning medications unless otherwise directed not to. · Glucophage (Metformin) and/or Coumadin (Warfarin) should be stopped two days prior to the cardiac catheterization procedure. Pradaxa should be stopped one day prior to the procedure. · You should arrange to have someone take you home rather than drive yourself. · Further plan will depend upon the result of the cardiac catheterization.     If for any reason you are unable to keep this appointment, please contact [facility] Registration, [phone], as soon when you think you need it. With time, your taste buds will adjust to less salt.     · Eat fewer snack items, fast foods, canned soups, and other high-salt, high-fat, processed foods.     · Read food labels and try to avoid saturated and trans fats. They increase your risk of heart disease by raising cholesterol levels.     · Limit the amount of solid fat-butter, margarine, and shortening-you eat. Use olive, peanut, or canola oil when you cook. Bake, broil, and steam foods instead of frying them.     · Eating fish can lower your risk for heart disease. Eat at least 2 servings of fish a week. [place], mackerel, herring, sardines, and chunk light tuna are very good choices. These fish contain omega-3 fatty acids.     · Eat a variety of fruit and vegetables every day. Dark green, deep orange, red, or yellow fruits and vegetables are especially good for you. Examples include spinach, carrots, peaches, and berries.     · Foods high in fiber can reduce your cholesterol and provide important vitamins and minerals. High-fiber foods include whole-grain cereals and breads, oatmeal, beans, brown rice, citrus fruits, and apples.     · Limit drinks and foods with added sugar. These include candy, desserts, and soda pop.    Lifestyle changes    · If your doctor recommends it, get more exercise. Walking is a good choice. Bit by bit, increase the amount you walk every day. Try for at least 30 minutes on most days of the week. You also may want to swim, bike, or do other activities.     · Do not smoke. If you need help quitting, talk to your doctor about stop-smoking programs and medicines. These can increase your chances of quitting for good. Quitting smoking may be the most important step you can take to protect your heart. It is never too late to quit. You will get health benefits right away.     · Limit alcohol to 2 drinks a day for men and 1 drink a day for women. Too much alcohol can cause health problems.    Medicines    · Take

## 2018-12-10 NOTE — PROGRESS NOTES
Day Smoker     Packs/day: 0.50     Years: 35.00    Smokeless tobacco: Never Used      Comment: Pt says she would take the handout for futue use and info given.  Alcohol use No      Current Outpatient Prescriptions   Medication Sig Dispense Refill    furosemide (LASIX) 20 MG tablet TAKE (1) TABLET BY MOUTH DAILY AS NEEDED. 30 tablet 5    potassium chloride (KLOR-CON M) 10 MEQ extended release tablet TAKE 1 TABLET BY MOUTH ONCE DAILY. 30 tablet 5    tiZANidine (ZANAFLEX) 4 MG tablet TAKE (1) TABLET BY MOUTH EVERY EIGHT HOURS AS NEEDED FOR UPPER BACK PAIN 60 tablet 5    Diabetic Shoe MISC 1 pair of diabetic shoes and 3 pair of heat molded inserts E11.9 1 each 0    Misc. Devices MISC Diabetic shoes with inserts. Dx: E11.9 2 each 0    VENTOLIN  (90 Base) MCG/ACT inhaler INHALE 2 PUFFS BY MOUTH INTO LUNGS 4 TIMES DAILY AS DIRECTED. 18 g 5    oxyCODONE-acetaminophen (PERCOCET)  MG per tablet Take 1 tablet by mouth every 8 hours as needed for Pain. .      ondansetron (ZOFRAN-ODT) 8 MG disintegrating tablet TAKE (1) TABLET BY MOUTH EVERY 8 HOURS. 90 tablet 0    metoprolol succinate (TOPROL XL) 50 MG extended release tablet TAKE 1 TABLET BY MOUTH ONCE DAILY. 30 tablet 5    acyclovir (ZOVIRAX) 200 MG capsule TAKE 1 CAPSULE 5 TIMES DAILY AS NEEDED 100 capsule 1    sucralfate (CARAFATE) 1 GM tablet Take 1 g by mouth 2 times daily      lisinopril (PRINIVIL;ZESTRIL) 10 MG tablet TAKE 1 TABLET BY MOUTH ONCE DAILY. 90 tablet 3    FLUoxetine (PROZAC) 20 MG capsule Take 40 mg by mouth 2 times daily       gabapentin (NEURONTIN) 300 MG capsule Take 1 capsule by mouth 4 times daily Indications: Backache, 1 in am, 1 at noon, 2 in the pm. (Patient taking differently: Take 300 mg by mouth 4 times daily.  Pain Management.) 120 capsule 0    omeprazole (PRILOSEC) 20 MG capsule Take 20 mg by mouth daily Indications: Acid Indigestion       ALPRAZolam (XANAX) 2 MG tablet Take 2 mg by mouth 3 times daily as needed [facility] [place] located on the first floor of [place] [address] through the hospital main entrance and turn immediately to your left. Cardiac Catheterization Instructions:   · Light breakfast with nothing by mouth six hours prior to procedure. May take morning medications unless otherwise directed not to. · Glucophage (Metformin) and/or Coumadin (Warfarin) should be stopped two days prior to the cardiac catheterization procedure. Pradaxa should be stopped one day prior to the procedure. · You should arrange to have someone take you home rather than drive yourself. · Further plan will depend upon the result of the cardiac catheterization. If for any reason you are unable to keep this appointment, please contact [facility] Registration, [phone], as soon as possible to reschedule.      Ezra Whitfield, APRN

## 2018-12-13 ENCOUNTER — OFFICE VISIT (OUTPATIENT)
Dept: UROLOGY | Age: 58
End: 2018-12-13
Payer: MEDICARE

## 2018-12-13 ENCOUNTER — HOSPITAL ENCOUNTER (OUTPATIENT)
Dept: CT IMAGING | Age: 58
Discharge: HOME | End: 2018-12-13
Payer: MEDICARE

## 2018-12-13 ENCOUNTER — TELEPHONE (OUTPATIENT)
Dept: UROLOGY | Age: 58
End: 2018-12-13

## 2018-12-13 VITALS
HEIGHT: 63 IN | WEIGHT: 220 LBS | BODY MASS INDEX: 38.98 KG/M2 | RESPIRATION RATE: 18 BRPM | TEMPERATURE: 97.6 F | SYSTOLIC BLOOD PRESSURE: 118 MMHG | HEART RATE: 92 BPM | DIASTOLIC BLOOD PRESSURE: 83 MMHG

## 2018-12-13 DIAGNOSIS — N20.1 LEFT URETERAL STONE: ICD-10-CM

## 2018-12-13 DIAGNOSIS — R10.9 LEFT FLANK PAIN: ICD-10-CM

## 2018-12-13 DIAGNOSIS — R31.0 GROSS HEMATURIA: Primary | ICD-10-CM

## 2018-12-13 LAB
APPEARANCE FLUID: CLEAR
BILIRUBIN, POC: NORMAL
BLOOD URINE, POC: NORMAL
CLARITY, POC: CLEAR
COLOR, POC: YELLOW
GLUCOSE URINE, POC: NORMAL
KETONES, POC: NORMAL
LEUKOCYTE EST, POC: NORMAL
NITRITE, POC: NORMAL
PH, POC: 6
PROTEIN, POC: NORMAL
SPECIFIC GRAVITY, POC: 1.01
UROBILINOGEN, POC: 0.2

## 2018-12-13 PROCEDURE — 81002 URINALYSIS NONAUTO W/O SCOPE: CPT | Performed by: PHYSICIAN ASSISTANT

## 2018-12-13 PROCEDURE — G8427 DOCREV CUR MEDS BY ELIG CLIN: HCPCS | Performed by: PHYSICIAN ASSISTANT

## 2018-12-13 PROCEDURE — G8417 CALC BMI ABV UP PARAM F/U: HCPCS | Performed by: PHYSICIAN ASSISTANT

## 2018-12-13 PROCEDURE — 4004F PT TOBACCO SCREEN RCVD TLK: CPT | Performed by: PHYSICIAN ASSISTANT

## 2018-12-13 PROCEDURE — G8482 FLU IMMUNIZE ORDER/ADMIN: HCPCS | Performed by: PHYSICIAN ASSISTANT

## 2018-12-13 PROCEDURE — 74150 CT ABDOMEN W/O CONTRAST: CPT

## 2018-12-13 PROCEDURE — 3017F COLORECTAL CA SCREEN DOC REV: CPT | Performed by: PHYSICIAN ASSISTANT

## 2018-12-13 PROCEDURE — 99214 OFFICE O/P EST MOD 30 MIN: CPT | Performed by: PHYSICIAN ASSISTANT

## 2018-12-13 RX ORDER — HYDROCODONE BITARTRATE AND ACETAMINOPHEN 5; 325 MG/1; MG/1
1 TABLET ORAL EVERY 4 HOURS PRN
Qty: 18 TABLET | Refills: 0 | Status: SHIPPED | OUTPATIENT
Start: 2018-12-13 | End: 2018-12-16

## 2018-12-13 ASSESSMENT — ENCOUNTER SYMPTOMS
RESPIRATORY NEGATIVE: 1
GASTROINTESTINAL NEGATIVE: 1
EYES NEGATIVE: 1
BACK PAIN: 1
ALLERGIC/IMMUNOLOGIC NEGATIVE: 1
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
## 2018-12-17 ENCOUNTER — HOSPITAL ENCOUNTER (OUTPATIENT)
Dept: CARDIAC CATH/INVASIVE PROCEDURES | Age: 58
Discharge: HOME | End: 2018-12-17
Attending: INTERNAL MEDICINE | Admitting: INTERNAL MEDICINE
Payer: MEDICARE

## 2018-12-17 VITALS
RESPIRATION RATE: 14 BRPM | HEART RATE: 83 BPM | DIASTOLIC BLOOD PRESSURE: 71 MMHG | WEIGHT: 224 LBS | SYSTOLIC BLOOD PRESSURE: 121 MMHG | BODY MASS INDEX: 39.69 KG/M2 | HEIGHT: 63 IN | OXYGEN SATURATION: 94 % | TEMPERATURE: 97.1 F

## 2018-12-17 LAB
EKG P AXIS: 50 DEGREES
EKG P-R INTERVAL: 122 MS
EKG Q-T INTERVAL: 386 MS
EKG QRS DURATION: 90 MS
EKG QTC CALCULATION (BAZETT): 412 MS
EKG T AXIS: 50 DEGREES

## 2018-12-17 PROCEDURE — 93458 L HRT ARTERY/VENTRICLE ANGIO: CPT | Performed by: INTERNAL MEDICINE

## 2018-12-17 PROCEDURE — 2500000003 HC RX 250 WO HCPCS

## 2018-12-17 PROCEDURE — 6360000004 HC RX CONTRAST MEDICATION: Performed by: INTERNAL MEDICINE

## 2018-12-17 PROCEDURE — 99152 MOD SED SAME PHYS/QHP 5/>YRS: CPT | Performed by: INTERNAL MEDICINE

## 2018-12-17 PROCEDURE — C1760 CLOSURE DEV, VASC: HCPCS

## 2018-12-17 PROCEDURE — 2709999900 HC NON-CHARGEABLE SUPPLY

## 2018-12-17 PROCEDURE — 33284 HC RMVL IMPLANTABLE PT ACTIVATED CAR EVENT RECORDER: CPT | Performed by: INTERNAL MEDICINE

## 2018-12-17 PROCEDURE — 99153 MOD SED SAME PHYS/QHP EA: CPT | Performed by: INTERNAL MEDICINE

## 2018-12-17 PROCEDURE — 33284 PR RMVL IMPLANTABLE PT-ACTIVATED CAR EVENT RECORDER: CPT | Performed by: INTERNAL MEDICINE

## 2018-12-17 PROCEDURE — 93005 ELECTROCARDIOGRAM TRACING: CPT

## 2018-12-17 PROCEDURE — 6370000000 HC RX 637 (ALT 250 FOR IP): Performed by: INTERNAL MEDICINE

## 2018-12-17 PROCEDURE — C1894 INTRO/SHEATH, NON-LASER: HCPCS

## 2018-12-17 PROCEDURE — 2580000003 HC RX 258: Performed by: INTERNAL MEDICINE

## 2018-12-17 PROCEDURE — 99024 POSTOP FOLLOW-UP VISIT: CPT | Performed by: INTERNAL MEDICINE

## 2018-12-17 PROCEDURE — 6360000002 HC RX W HCPCS

## 2018-12-17 RX ORDER — CHLORHEXIDINE GLUCONATE 4 G/100ML
SOLUTION TOPICAL ONCE
Status: COMPLETED | OUTPATIENT
Start: 2018-12-17 | End: 2018-12-17

## 2018-12-17 RX ORDER — SODIUM CHLORIDE 9 MG/ML
INJECTION, SOLUTION INTRAVENOUS CONTINUOUS
Status: DISCONTINUED | OUTPATIENT
Start: 2018-12-17 | End: 2018-12-17 | Stop reason: HOSPADM

## 2018-12-17 RX ORDER — SODIUM CHLORIDE 0.9 % (FLUSH) 0.9 %
10 SYRINGE (ML) INJECTION PRN
Status: DISCONTINUED | OUTPATIENT
Start: 2018-12-17 | End: 2018-12-17 | Stop reason: HOSPADM

## 2018-12-17 RX ORDER — SODIUM CHLORIDE 0.9 % (FLUSH) 0.9 %
10 SYRINGE (ML) INJECTION EVERY 12 HOURS SCHEDULED
Status: DISCONTINUED | OUTPATIENT
Start: 2018-12-17 | End: 2018-12-17 | Stop reason: HOSPADM

## 2018-12-17 RX ADMIN — IOPAMIDOL 66 ML: 612 INJECTION, SOLUTION INTRATHECAL at 17:46

## 2018-12-17 RX ADMIN — CHLORHEXIDINE GLUCONATE: 213 SOLUTION TOPICAL at 14:00

## 2018-12-17 RX ADMIN — SODIUM CHLORIDE: 9 INJECTION, SOLUTION INTRAVENOUS at 18:07

## 2018-12-17 RX ADMIN — SODIUM CHLORIDE: 9 INJECTION, SOLUTION INTRAVENOUS at 12:24

## 2018-12-17 NOTE — PROCEDURES
Cardiac Risk Profile -         Risk Factor Yes / No / Unknown       Gender Female   Cigarette Use Yes:    Family History of Cardiovascular Disease Yes: heart defect, heart disease, stroke, heart attack, diabetes, coronary art dis   Diabetes Mellitus yes   Hypercholesteremia no   Hypertension yes        Prior Cardiac History -    6/12/2014  DSE negative for myocardial ischemia  6/12/2014  Echo  Normal LVFX  2/8/2016  Loop recorder placed  5/31/18 DSE negative for myocardial ischemia  12/10/2018  Office visit, persistent chest discomfort despite negative DSE on 5/31/2018, AUC indication 19, AUC score 7  12/17/18  Loop recorder removed  12/17/18  Cath  NCA, normal LVFX    Indications for Cardiac Catheterization -  12/10/2018  Office visit, persistent chest discomfort despite negative DSE on 5/31/2018, AUC indication 19, AUC score 7, Diagnostic Catheterization Appropriate Use Criteria Description, [facility] 2012;[id number]    After obtaining informed written consent, the patient was brought to the catheterization laboratory where the right groin was prepared in the usual sterile fashion. Moderate conscious sedation was provided at [facility]. 2% lidocaine was injected above the common femoral artery. Utilizing ultrasound assistance and the Seldinger technique, the common femoral artery was cannulated and bright red pulsatile blood returned. Using fluoroscopy guidance, the J-tip wire was placed in the common femoral artery. A 6-Fr sheath was inserted. Utilizing 6-Nauruan Goldy catheters, selective coronary arteriography was performed followed by left ventriculography. At this stage, the equipment was removed. A right femoral arteriogram was performed to ensure patency of the vessel so that a vascular access closure device could be deployed. A 6-Nauruan Mynx vascular access closure device was then inserted. Hemostasis was achieved. A sterile dressing was applied.  Preparations were then made to remove the loop

## 2018-12-17 NOTE — H&P
venous distention. Auscultation reveals regular rate and rhythm. No audible clicks, gallop or rub. No murmur. No lower extremity varicosities. No carotid bruits. Abdominal - No visible distention, mass or pulsations. Extremities - No clubbing or cyanosis. No statis dermatitis or ulcers. No edema. Musculoskeletal - No Osler's nodes. No kyphosis or scoliosis. Gait is even and regular without limp or shuffle. Ambulates without assistance. Skin - Warm and dry; no rash or pallor. No new surgical wound. Neurological - No focal neurological deficits. Thought processes coherent. No apparent tremor. Oriented to person, place and time. Psychiatric - Appropriate affect and mood. Assessment:    Diagnosis Orders   1. Chest pain, unspecified type  Basic Metabolic Panel    CBC Auto Differential   2. Status post placement of implantable loop recorder     3. Essential hypertension     4. FERNANDEZ (dyspnea on exertion)     5. History of syncope     6. Family history of coronary artery disease     7. Smoker       Loop recorder interrogation completed. Battery status good. No symptom, pause, tachy, waqas or AT/AF episodes. Time corrected. Due to persistent chest pain and FERNANDEZ, with risk factors, cardiac catheterization recommended. Procedure and risk explained with understanding verbalized. Patient opted to proceed. No hx of iodinated contrast or shellfish allergy. Not taking oral anticoagulation or metformin at this time. L and C with removal of loop recorder scheduled 12/17/18 arrival at 11:30 am.   Pre-cath instructions reviewed -understanding verbalized. Patient is compliant with medication regimen.          BP Readings from Last 3 Encounters: Pulse Readings from Last 3 Encounters:   12/10/18 130/80 12/10/18 80   10/08/18 110/80 10/08/18 81   07/30/18 109/74 07/30/18 69             Wt Readings from Last 3 Encounters:   12/10/18 224 lb (101.6 kg)   12/03/18 226 lb (102.5 kg)   10/08/18 218 lb 12.8 oz (99.2 kg)     Recent Results                                                                                                                                                                                                                                                                                                                                                                                                                    Plan   Previous cardiac history and records reviewed. Continue current medications as prescribed. Continue to follow up with primary care provider for non cardiac medical problems. Call the office with any problems, questions or concerns at [phone]. Follow up as scheduled with your cardiologist- as scheduled after cardiac cath. The following educational material has been included in this after visit summary for your review: Life simple 7. Heart health. Smoking cessation. Additional instructions:   Coronary artery disease risk factors you can control: Smoking, high blood pressure, high cholesterol, diabetes, being overweight, lack of exercise and stress. Continue heart healthy diet. Take medications as directed. Exercise as tolerated. Strive for 15 minutes of exercise most days of the week. If asked to keep a blood pressure log, do so for 2 weeks. Call the office to report readings at [phone]. Blood pressure goal is less than 120/70. Elevated blood pressure at 120-129/80 or less. High blood pressure at 130-139/80-89. If you are taking cholesterol lowering medications, it is recommended that lab work be checked annually. Always keep a current medication list. Bring your medications to every office visit. Life simple 7   1) Manage blood pressure - high blood pressure is a major risk factor for heart disease and stroke. Keeping blood pressure in health range reduces strain on your heart, arteries and kidneys.    2) Control cholesterol - contributes to plaque, which can clog arteries and lead to heart disease and stroke. When you control your cholesterol you are giving your arteries their best chance to remain clear. 3) Reduce blood sugar - most of the food we eat is turning into glucose or blood sugar that our body uses for energy. Over time, high levels of blood sugar can damage your heart, kidneys, eyes and nerves. 4) Get active - living an active life is one of the most rewarding gifts you can give yourself and those you love. Simply put, daily physical activity increases your length and quality of life. 5) Eat better - A healthy diet is one of your best weapons for fighting cardiovascular disease. When you eat a heart healthy diet, you improve your chances for feeling good and staying healthy for life. 6) Lose weight - when you shed extra fat an unnecessary pounds, you reduce the burden on your hear, lungs, blood vessels and skeleton. You give yourself the gift of active living, you lower your blood pressure and help yourself feel better. 7) Stop smoking - cigarette smokers have a higher risk of developing cardiovascular disease. If You smoke, quitting is the best thing you can do for your health. Check American Heart Association on line for more information on Life's Simple 7 and tips for healthy living. Cardiac Catheterization Instructions (after 10:00 am)   You will need to have lab and a chest x-ray completed today in preparation for the cardiac catheterization. Please go to first floor of this [facility] to Outpatient registration for the testing. Date/Time: Monday 12/17/18. Check in at 11:30 am.   [place] at the [facility] and [address] [facility] located on the first floor [place] [phone] through the hospital main entrance and turn immediately to your left. Cardiac Catheterization Instructions:   Light breakfast with nothing by mouth six hours prior to procedure.  May take morning medications unless otherwise directed not to. Glucophage (Metformin) and/or Coumadin (Warfarin) should be stopped two days prior to the cardiac catheterization procedure. Pradaxa should be stopped one day prior to the procedure. You should arrange to have someone take you home rather than drive yourself. Further plan will depend upon the result of the cardiac catheterization. If for any reason you are unable to keep this appointment, please contact [facility] Registration, [phone], as soon as possible to reschedule. Coleen Rojas, APRN      12/17/2018       Proposed Procedure:      1. Removal of loop recorder  2. Selective left heart and coronary arteriography, (femoral approach)      Indications:      1. Patient request to remove the loop recorder  2.  12/10/2018  Office visit, persistent chest discomfort despite negative DSE on 5/31/2018, AUC indication 19, AUC score 7     I have discussed the risks, benefits and options with the patient and her family. They appear to understand, have no questions, and wish to proceed. This procedure is scheduled for today.       Electronically signed by Mónica Niño MD on 12/17/18

## 2018-12-28 ENCOUNTER — OFFICE VISIT (OUTPATIENT)
Dept: CARDIOLOGY | Age: 58
End: 2018-12-28

## 2018-12-28 DIAGNOSIS — Z51.89 VISIT FOR WOUND CHECK: Primary | ICD-10-CM

## 2018-12-28 PROCEDURE — 99024 POSTOP FOLLOW-UP VISIT: CPT | Performed by: CLINICAL NURSE SPECIALIST

## 2018-12-28 NOTE — PROGRESS NOTES
Patient here for a wound check visit status post loop recorder removal.  Incision dry and intact. No redness, swelling, or drainage noted. See wound photo. Edges well approximated  Instructed patient to wash area with antibacterial soap and keep it clean and dry. Also looked at right groin site from recent heart cath. Site has completely healed.

## 2019-01-06 ENCOUNTER — LAB REQUISITION (OUTPATIENT)
Dept: LAB | Facility: HOSPITAL | Age: 59
End: 2019-01-06

## 2019-01-06 DIAGNOSIS — Z00.00 ROUTINE GENERAL MEDICAL EXAMINATION AT A HEALTH CARE FACILITY: ICD-10-CM

## 2019-01-06 LAB
ADV 40+41 DNA STL QL NAA+NON-PROBE: NOT DETECTED
ASTRO TYP 1-8 RNA STL QL NAA+NON-PROBE: NOT DETECTED
C CAYETANENSIS DNA STL QL NAA+NON-PROBE: NOT DETECTED
C DIFF TOX GENS STL QL NAA+PROBE: NOT DETECTED
CAMPY SP DNA.DIARRHEA STL QL NAA+PROBE: NOT DETECTED
CRYPTOSP STL CULT: NOT DETECTED
E COLI DNA SPEC QL NAA+PROBE: NOT DETECTED
E HISTOLYT AG STL-ACNC: NOT DETECTED
EAEC PAA PLAS AGGR+AATA ST NAA+NON-PRB: NOT DETECTED
EC STX1 + STX2 GENES STL NAA+PROBE: NOT DETECTED
EPEC EAE GENE STL QL NAA+NON-PROBE: NOT DETECTED
ETEC LTA+ST1A+ST1B TOX ST NAA+NON-PROBE: NOT DETECTED
G LAMBLIA DNA SPEC QL NAA+PROBE: NOT DETECTED
NOROVIRUS GI+II RNA STL QL NAA+NON-PROBE: NOT DETECTED
P SHIGELLOIDES DNA STL QL NAA+NON-PROBE: NOT DETECTED
RV RNA STL NAA+PROBE: NOT DETECTED
SALMONELLA DNA SPEC QL NAA+PROBE: NOT DETECTED
SAPO I+II+IV+V RNA STL QL NAA+NON-PROBE: NOT DETECTED
SHIGELLA SP+EIEC IPAH STL QL NAA+PROBE: NOT DETECTED
V CHOLERAE DNA SPEC QL NAA+PROBE: NOT DETECTED
VIBRIO DNA SPEC NAA+PROBE: NOT DETECTED
YERSINIA STL CULT: NOT DETECTED

## 2019-01-06 PROCEDURE — 87507 IADNA-DNA/RNA PROBE TQ 12-25: CPT

## 2019-01-09 ENCOUNTER — OFFICE VISIT (OUTPATIENT)
Dept: UROLOGY | Age: 59
End: 2019-01-09
Payer: MEDICARE

## 2019-01-09 VITALS — WEIGHT: 231 LBS | TEMPERATURE: 96.7 F | BODY MASS INDEX: 40.93 KG/M2 | HEIGHT: 63 IN

## 2019-01-09 DIAGNOSIS — N20.0 RENAL CALCULUS: Primary | ICD-10-CM

## 2019-01-09 LAB
BILIRUBIN, POC: 0
BLOOD URINE, POC: NORMAL
CLARITY, POC: CLEAR
COLOR, POC: YELLOW
GLUCOSE URINE, POC: NORMAL
KETONES, POC: NORMAL
LEUKOCYTE EST, POC: NORMAL
NITRITE, POC: NORMAL
PH, POC: 7
PROTEIN, POC: NORMAL
SPECIFIC GRAVITY, POC: 1.02
UROBILINOGEN, POC: 0.2

## 2019-01-09 PROCEDURE — 4004F PT TOBACCO SCREEN RCVD TLK: CPT | Performed by: UROLOGY

## 2019-01-09 PROCEDURE — G8482 FLU IMMUNIZE ORDER/ADMIN: HCPCS | Performed by: UROLOGY

## 2019-01-09 PROCEDURE — 3017F COLORECTAL CA SCREEN DOC REV: CPT | Performed by: UROLOGY

## 2019-01-09 PROCEDURE — G8417 CALC BMI ABV UP PARAM F/U: HCPCS | Performed by: UROLOGY

## 2019-01-09 PROCEDURE — 81003 URINALYSIS AUTO W/O SCOPE: CPT | Performed by: UROLOGY

## 2019-01-09 PROCEDURE — 99213 OFFICE O/P EST LOW 20 MIN: CPT | Performed by: UROLOGY

## 2019-01-09 PROCEDURE — G8427 DOCREV CUR MEDS BY ELIG CLIN: HCPCS | Performed by: UROLOGY

## 2019-01-09 ASSESSMENT — ENCOUNTER SYMPTOMS
EYE PAIN: 0
VOMITING: 0
BACK PAIN: 0
ABDOMINAL PAIN: 0
WHEEZING: 0
SHORTNESS OF BREATH: 0
SINUS PAIN: 0

## 2019-01-10 ENCOUNTER — HOSPITAL ENCOUNTER (OUTPATIENT)
Dept: GENERAL RADIOLOGY | Age: 59
Discharge: HOME | End: 2019-01-10
Payer: MEDICARE

## 2019-01-10 DIAGNOSIS — R13.10 PROBLEMS WITH SWALLOWING AND MASTICATION: ICD-10-CM

## 2019-01-10 PROCEDURE — 74220 X-RAY XM ESOPHAGUS 1CNTRST: CPT

## 2019-01-14 RX ORDER — METOPROLOL SUCCINATE 50 MG/1
TABLET, EXTENDED RELEASE ORAL
Qty: 30 TABLET | Refills: 11 | Status: SHIPPED | OUTPATIENT
Start: 2019-01-14 | End: 2019-02-19

## 2019-01-29 ENCOUNTER — TELEPHONE (OUTPATIENT)
Dept: INTERNAL MEDICINE | Age: 59
End: 2019-01-29

## 2019-02-08 ENCOUNTER — TELEPHONE (OUTPATIENT)
Dept: INTERNAL MEDICINE | Age: 59
End: 2019-02-08

## 2019-02-11 ENCOUNTER — HOSPITAL ENCOUNTER (OUTPATIENT)
Dept: GENERAL RADIOLOGY | Age: 59
Discharge: HOME | End: 2019-02-11
Payer: MEDICARE

## 2019-02-11 ENCOUNTER — OFFICE VISIT (OUTPATIENT)
Dept: UROLOGY | Age: 59
End: 2019-02-11
Payer: MEDICARE

## 2019-02-11 VITALS — WEIGHT: 235 LBS | TEMPERATURE: 97.4 F | HEIGHT: 66 IN | BODY MASS INDEX: 37.77 KG/M2

## 2019-02-11 DIAGNOSIS — E11.9 TYPE 2 DIABETES MELLITUS WITHOUT COMPLICATION, WITHOUT LONG-TERM CURRENT USE OF INSULIN (HCC): ICD-10-CM

## 2019-02-11 DIAGNOSIS — N20.0 RENAL CALCULUS: ICD-10-CM

## 2019-02-11 DIAGNOSIS — R31.0 GROSS HEMATURIA: Primary | ICD-10-CM

## 2019-02-11 DIAGNOSIS — R19.7 DIARRHEA, UNSPECIFIED TYPE: ICD-10-CM

## 2019-02-11 DIAGNOSIS — E55.9 VITAMIN D DEFICIENCY: ICD-10-CM

## 2019-02-11 DIAGNOSIS — B18.2 CHRONIC ACTIVE HEPATITIS C (HCC): Chronic | ICD-10-CM

## 2019-02-11 DIAGNOSIS — I10 ESSENTIAL HYPERTENSION: ICD-10-CM

## 2019-02-11 LAB
ALBUMIN SERPL-MCNC: 4.5 G/DL (ref 3.5–5.2)
ALP BLD-CCNC: 61 U/L (ref 35–104)
ALT SERPL-CCNC: 14 U/L (ref 5–33)
ANION GAP SERPL CALCULATED.3IONS-SCNC: 15 MMOL/L (ref 7–19)
AST SERPL-CCNC: 13 U/L (ref 5–32)
BASOPHILS ABSOLUTE: 0.1 K/UL (ref 0–0.2)
BASOPHILS RELATIVE PERCENT: 0.6 % (ref 0–1)
BILIRUB SERPL-MCNC: <0.2 MG/DL (ref 0.2–1.2)
BUN BLDV-MCNC: 17 MG/DL (ref 6–20)
CALCIUM SERPL-MCNC: 9.4 MG/DL (ref 8.6–10)
CHLORIDE BLD-SCNC: 100 MMOL/L (ref 98–111)
CHOLESTEROL, TOTAL: 201 MG/DL (ref 160–199)
CO2: 25 MMOL/L (ref 22–29)
CREAT SERPL-MCNC: 0.7 MG/DL (ref 0.5–0.9)
EOSINOPHILS ABSOLUTE: 0.2 K/UL (ref 0–0.6)
EOSINOPHILS RELATIVE PERCENT: 2.3 % (ref 0–5)
GFR NON-AFRICAN AMERICAN: >60
GLUCOSE BLD-MCNC: 92 MG/DL (ref 74–109)
HBA1C MFR BLD: 5.6 % (ref 4–6)
HCT VFR BLD CALC: 42.3 % (ref 37–47)
HDLC SERPL-MCNC: 55 MG/DL (ref 65–121)
HEMOGLOBIN: 13.4 G/DL (ref 12–16)
LDL CHOLESTEROL CALCULATED: 132 MG/DL
LYMPHOCYTES ABSOLUTE: 3.1 K/UL (ref 1.1–4.5)
LYMPHOCYTES RELATIVE PERCENT: 36.6 % (ref 20–40)
MCH RBC QN AUTO: 28 PG (ref 27–31)
MCHC RBC AUTO-ENTMCNC: 31.7 G/DL (ref 33–37)
MCV RBC AUTO: 88.5 FL (ref 81–99)
MONOCYTES ABSOLUTE: 0.5 K/UL (ref 0–0.9)
MONOCYTES RELATIVE PERCENT: 5.9 % (ref 0–10)
NEUTROPHILS ABSOLUTE: 4.7 K/UL (ref 1.5–7.5)
NEUTROPHILS RELATIVE PERCENT: 54.3 % (ref 50–65)
PDW BLD-RTO: 13 % (ref 11.5–14.5)
PLATELET # BLD: 277 K/UL (ref 130–400)
PMV BLD AUTO: 12.1 FL (ref 9.4–12.3)
POTASSIUM SERPL-SCNC: 4.5 MMOL/L (ref 3.5–5)
RBC # BLD: 4.78 M/UL (ref 4.2–5.4)
SODIUM BLD-SCNC: 140 MMOL/L (ref 136–145)
TOTAL PROTEIN: 7.5 G/DL (ref 6.6–8.7)
TRIGL SERPL-MCNC: 72 MG/DL (ref 0–149)
VITAMIN D 25-HYDROXY: 19.9 NG/ML
WBC # BLD: 8.6 K/UL (ref 4.8–10.8)

## 2019-02-11 PROCEDURE — G8427 DOCREV CUR MEDS BY ELIG CLIN: HCPCS | Performed by: PHYSICIAN ASSISTANT

## 2019-02-11 PROCEDURE — 4004F PT TOBACCO SCREEN RCVD TLK: CPT | Performed by: PHYSICIAN ASSISTANT

## 2019-02-11 PROCEDURE — 99214 OFFICE O/P EST MOD 30 MIN: CPT | Performed by: PHYSICIAN ASSISTANT

## 2019-02-11 PROCEDURE — G8482 FLU IMMUNIZE ORDER/ADMIN: HCPCS | Performed by: PHYSICIAN ASSISTANT

## 2019-02-11 PROCEDURE — 3017F COLORECTAL CA SCREEN DOC REV: CPT | Performed by: PHYSICIAN ASSISTANT

## 2019-02-11 PROCEDURE — 74018 RADEX ABDOMEN 1 VIEW: CPT

## 2019-02-11 PROCEDURE — G8417 CALC BMI ABV UP PARAM F/U: HCPCS | Performed by: PHYSICIAN ASSISTANT

## 2019-02-11 ASSESSMENT — ENCOUNTER SYMPTOMS
VOMITING: 0
SHORTNESS OF BREATH: 0
BACK PAIN: 0
EYE PAIN: 0
WHEEZING: 0
ABDOMINAL PAIN: 0
SINUS PAIN: 0

## 2019-02-14 ENCOUNTER — OFFICE VISIT (OUTPATIENT)
Dept: UROLOGY | Age: 59
End: 2019-02-14
Payer: MEDICARE

## 2019-02-14 ENCOUNTER — HOSPITAL ENCOUNTER (OUTPATIENT)
Dept: GENERAL RADIOLOGY | Age: 59
Discharge: HOME | End: 2019-02-14
Payer: MEDICARE

## 2019-02-14 ENCOUNTER — HOSPITAL ENCOUNTER (OUTPATIENT)
Dept: CT IMAGING | Age: 59
Discharge: HOME | End: 2019-02-14
Payer: MEDICARE

## 2019-02-14 DIAGNOSIS — N20.1 LEFT URETERAL STONE: ICD-10-CM

## 2019-02-14 DIAGNOSIS — R31.0 GROSS HEMATURIA: ICD-10-CM

## 2019-02-14 DIAGNOSIS — R31.0 GROSS HEMATURIA: Primary | ICD-10-CM

## 2019-02-14 PROCEDURE — G8417 CALC BMI ABV UP PARAM F/U: HCPCS | Performed by: PHYSICIAN ASSISTANT

## 2019-02-14 PROCEDURE — 4004F PT TOBACCO SCREEN RCVD TLK: CPT | Performed by: PHYSICIAN ASSISTANT

## 2019-02-14 PROCEDURE — 6360000004 HC RX CONTRAST MEDICATION: Performed by: PHYSICIAN ASSISTANT

## 2019-02-14 PROCEDURE — 99214 OFFICE O/P EST MOD 30 MIN: CPT | Performed by: PHYSICIAN ASSISTANT

## 2019-02-14 PROCEDURE — G8427 DOCREV CUR MEDS BY ELIG CLIN: HCPCS | Performed by: PHYSICIAN ASSISTANT

## 2019-02-14 PROCEDURE — 3017F COLORECTAL CA SCREEN DOC REV: CPT | Performed by: PHYSICIAN ASSISTANT

## 2019-02-14 PROCEDURE — G8482 FLU IMMUNIZE ORDER/ADMIN: HCPCS | Performed by: PHYSICIAN ASSISTANT

## 2019-02-14 PROCEDURE — 74178 CT ABD&PLV WO CNTR FLWD CNTR: CPT

## 2019-02-14 PROCEDURE — 74018 RADEX ABDOMEN 1 VIEW: CPT

## 2019-02-14 RX ORDER — OXYCODONE HYDROCHLORIDE AND ACETAMINOPHEN 5; 325 MG/1; MG/1
1 TABLET ORAL EVERY 4 HOURS PRN
Qty: 18 TABLET | Refills: 0 | Status: SHIPPED | OUTPATIENT
Start: 2019-02-14 | End: 2019-02-17

## 2019-02-14 RX ADMIN — IOPAMIDOL 75 ML: 755 INJECTION, SOLUTION INTRAVENOUS at 10:44

## 2019-02-14 ASSESSMENT — ENCOUNTER SYMPTOMS
EYE PAIN: 0
SHORTNESS OF BREATH: 0
SINUS PAIN: 0
WHEEZING: 0
BACK PAIN: 0
VOMITING: 0
ABDOMINAL PAIN: 0

## 2019-02-18 ENCOUNTER — TELEPHONE (OUTPATIENT)
Dept: UROLOGY | Age: 59
End: 2019-02-18

## 2019-02-18 ENCOUNTER — TELEPHONE (OUTPATIENT)
Dept: INTERNAL MEDICINE | Age: 59
End: 2019-02-18

## 2019-02-18 DIAGNOSIS — E55.9 VITAMIN D DEFICIENCY: Primary | ICD-10-CM

## 2019-02-18 RX ORDER — ERGOCALCIFEROL 1.25 MG/1
50000 CAPSULE ORAL WEEKLY
Qty: 12 CAPSULE | Refills: 3 | Status: SHIPPED | OUTPATIENT
Start: 2019-02-18 | End: 2020-02-13

## 2019-02-19 ENCOUNTER — HOSPITAL ENCOUNTER (OUTPATIENT)
Dept: PREADMISSION TESTING | Age: 59
Discharge: HOME | End: 2019-02-23
Payer: MEDICARE

## 2019-02-19 VITALS — BODY MASS INDEX: 38.98 KG/M2 | WEIGHT: 220 LBS | HEIGHT: 63 IN

## 2019-02-19 LAB
ALBUMIN SERPL-MCNC: 4.8 G/DL (ref 3.5–5.2)
ALP BLD-CCNC: 66 U/L (ref 35–104)
ALT SERPL-CCNC: 16 U/L (ref 5–33)
ANION GAP SERPL CALCULATED.3IONS-SCNC: 21 MMOL/L (ref 7–19)
AST SERPL-CCNC: 19 U/L (ref 5–32)
BACTERIA: ABNORMAL /HPF
BASOPHILS ABSOLUTE: 0 K/UL (ref 0–0.2)
BASOPHILS RELATIVE PERCENT: 0.3 % (ref 0–1)
BILIRUB SERPL-MCNC: 0.5 MG/DL (ref 0.2–1.2)
BILIRUBIN URINE: NEGATIVE
BLOOD, URINE: NEGATIVE
BUN BLDV-MCNC: 13 MG/DL (ref 6–20)
CALCIUM SERPL-MCNC: 9.6 MG/DL (ref 8.6–10)
CASTS: ABNORMAL /LPF
CHLORIDE BLD-SCNC: 96 MMOL/L (ref 98–111)
CLARITY: ABNORMAL
CO2: 24 MMOL/L (ref 22–29)
COLOR: ABNORMAL
CREAT SERPL-MCNC: 0.7 MG/DL (ref 0.5–0.9)
CRYSTALS, UA: ABNORMAL
EKG P AXIS: 50 DEGREES
EKG P-R INTERVAL: 130 MS
EKG Q-T INTERVAL: 412 MS
EKG QRS DURATION: 98 MS
EKG QTC CALCULATION (BAZETT): 413 MS
EKG T AXIS: 57 DEGREES
EOSINOPHILS ABSOLUTE: 0.1 K/UL (ref 0–0.6)
EOSINOPHILS RELATIVE PERCENT: 0.6 % (ref 0–5)
EPITHELIAL CELLS, UA: ABNORMAL /HPF
GFR NON-AFRICAN AMERICAN: >60
GLUCOSE BLD-MCNC: 93 MG/DL (ref 74–109)
GLUCOSE URINE: NEGATIVE MG/DL
HCT VFR BLD CALC: 46.5 % (ref 37–47)
HEMOGLOBIN: 14.2 G/DL (ref 12–16)
KETONES, URINE: 15 MG/DL
LEUKOCYTE ESTERASE, URINE: NEGATIVE
LYMPHOCYTES ABSOLUTE: 2.2 K/UL (ref 1.1–4.5)
LYMPHOCYTES RELATIVE PERCENT: 20.6 % (ref 20–40)
MCH RBC QN AUTO: 26.9 PG (ref 27–31)
MCHC RBC AUTO-ENTMCNC: 30.5 G/DL (ref 33–37)
MCV RBC AUTO: 88.1 FL (ref 81–99)
MONOCYTES ABSOLUTE: 0.6 K/UL (ref 0–0.9)
MONOCYTES RELATIVE PERCENT: 5.8 % (ref 0–10)
NEUTROPHILS ABSOLUTE: 7.6 K/UL (ref 1.5–7.5)
NEUTROPHILS RELATIVE PERCENT: 72.4 % (ref 50–65)
NITRITE, URINE: NEGATIVE
PDW BLD-RTO: 12.8 % (ref 11.5–14.5)
PH UA: 5.5
PLATELET # BLD: 263 K/UL (ref 130–400)
PMV BLD AUTO: 12.6 FL (ref 9.4–12.3)
POTASSIUM SERPL-SCNC: 3.3 MMOL/L (ref 3.5–5)
PROTEIN UA: 100 MG/DL
RBC # BLD: 5.28 M/UL (ref 4.2–5.4)
RBC UA: ABNORMAL /HPF (ref 0–2)
SODIUM BLD-SCNC: 141 MMOL/L (ref 136–145)
SPECIFIC GRAVITY UA: 1.03
TOTAL PROTEIN: 8.2 G/DL (ref 6.6–8.7)
URINE REFLEX TO CULTURE: ABNORMAL
UROBILINOGEN, URINE: 1 E.U./DL
WBC # BLD: 10.5 K/UL (ref 4.8–10.8)
WBC UA: ABNORMAL /HPF (ref 0–5)

## 2019-02-19 PROCEDURE — 93005 ELECTROCARDIOGRAM TRACING: CPT

## 2019-02-19 PROCEDURE — 80053 COMPREHEN METABOLIC PANEL: CPT

## 2019-02-19 PROCEDURE — 81001 URINALYSIS AUTO W/SCOPE: CPT

## 2019-02-19 PROCEDURE — 85025 COMPLETE CBC W/AUTO DIFF WBC: CPT

## 2019-02-19 RX ORDER — METOPROLOL SUCCINATE 50 MG/1
50 TABLET, EXTENDED RELEASE ORAL DAILY
COMMUNITY
End: 2019-12-19

## 2019-02-19 RX ORDER — OXYCODONE HYDROCHLORIDE 5 MG/1
10 CAPSULE ORAL EVERY 4 HOURS PRN
Status: ON HOLD | COMMUNITY
End: 2019-02-20

## 2019-02-19 RX ORDER — LISINOPRIL 10 MG/1
TABLET ORAL
Qty: 90 TABLET | Refills: 0 | Status: ON HOLD | OUTPATIENT
Start: 2019-02-19 | End: 2019-02-20

## 2019-02-19 RX ORDER — ONDANSETRON HYDROCHLORIDE 8 MG/1
8 TABLET, FILM COATED ORAL EVERY 8 HOURS PRN
COMMUNITY
End: 2019-04-17

## 2019-02-19 RX ORDER — POTASSIUM CHLORIDE 20 MEQ/1
20 TABLET, EXTENDED RELEASE ORAL DAILY PRN
COMMUNITY
End: 2019-05-15 | Stop reason: ALTCHOICE

## 2019-02-19 RX ORDER — TIZANIDINE 4 MG/1
4 TABLET ORAL EVERY 8 HOURS PRN
COMMUNITY
End: 2020-08-26

## 2019-02-19 RX ORDER — CIPROFLOXACIN 2 MG/ML
400 INJECTION, SOLUTION INTRAVENOUS ONCE
Status: CANCELLED | OUTPATIENT
Start: 2019-02-20

## 2019-02-19 RX ORDER — LISINOPRIL 10 MG/1
10 TABLET ORAL DAILY
COMMUNITY
End: 2019-02-19 | Stop reason: SDUPTHER

## 2019-02-19 RX ORDER — FUROSEMIDE 20 MG/1
20 TABLET ORAL DAILY PRN
COMMUNITY
End: 2019-03-08 | Stop reason: ALTCHOICE

## 2019-02-19 RX ORDER — ALBUTEROL SULFATE 90 UG/1
2 AEROSOL, METERED RESPIRATORY (INHALATION) EVERY 6 HOURS PRN
COMMUNITY
End: 2019-09-03 | Stop reason: SDUPTHER

## 2019-02-20 ENCOUNTER — HOSPITAL ENCOUNTER (OUTPATIENT)
Age: 59
Setting detail: OUTPATIENT SURGERY
Discharge: HOME | End: 2019-02-20
Attending: UROLOGY | Admitting: UROLOGY
Payer: MEDICARE

## 2019-02-20 ENCOUNTER — ANESTHESIA EVENT (OUTPATIENT)
Dept: OPERATING ROOM | Age: 59
End: 2019-02-20
Payer: MEDICARE

## 2019-02-20 ENCOUNTER — APPOINTMENT (OUTPATIENT)
Dept: GENERAL RADIOLOGY | Age: 59
End: 2019-02-20
Attending: UROLOGY
Payer: MEDICARE

## 2019-02-20 ENCOUNTER — ANESTHESIA (OUTPATIENT)
Dept: OPERATING ROOM | Age: 59
End: 2019-02-20
Payer: MEDICARE

## 2019-02-20 VITALS
SYSTOLIC BLOOD PRESSURE: 104 MMHG | OXYGEN SATURATION: 92 % | HEART RATE: 84 BPM | HEIGHT: 63 IN | BODY MASS INDEX: 38.98 KG/M2 | DIASTOLIC BLOOD PRESSURE: 67 MMHG | TEMPERATURE: 97.7 F | RESPIRATION RATE: 14 BRPM | WEIGHT: 220 LBS

## 2019-02-20 VITALS
OXYGEN SATURATION: 100 % | DIASTOLIC BLOOD PRESSURE: 94 MMHG | RESPIRATION RATE: 7 BRPM | SYSTOLIC BLOOD PRESSURE: 142 MMHG

## 2019-02-20 DIAGNOSIS — N21.8 CALCULUS OF OTHER LOWER URINARY TRACT LOCATION: ICD-10-CM

## 2019-02-20 DIAGNOSIS — G89.18 POSTOPERATIVE PAIN: Primary | ICD-10-CM

## 2019-02-20 LAB
GLUCOSE BLD-MCNC: 125 MG/DL (ref 70–99)
PERFORMED ON: ABNORMAL

## 2019-02-20 PROCEDURE — 6360000002 HC RX W HCPCS: Performed by: ANESTHESIOLOGY

## 2019-02-20 PROCEDURE — 3600000004 HC SURGERY LEVEL 4 BASE: Performed by: UROLOGY

## 2019-02-20 PROCEDURE — 6370000000 HC RX 637 (ALT 250 FOR IP): Performed by: UROLOGY

## 2019-02-20 PROCEDURE — 6370000000 HC RX 637 (ALT 250 FOR IP): Performed by: ANESTHESIOLOGY

## 2019-02-20 PROCEDURE — 6360000002 HC RX W HCPCS: Performed by: PHYSICIAN ASSISTANT

## 2019-02-20 PROCEDURE — 3209999900 FLUORO FOR SURGICAL PROCEDURES

## 2019-02-20 PROCEDURE — 7100000000 HC PACU RECOVERY - FIRST 15 MIN: Performed by: UROLOGY

## 2019-02-20 PROCEDURE — 99999 PR OFFICE/OUTPT VISIT,PROCEDURE ONLY: CPT | Performed by: UROLOGY

## 2019-02-20 PROCEDURE — 2500000003 HC RX 250 WO HCPCS: Performed by: NURSE ANESTHETIST, CERTIFIED REGISTERED

## 2019-02-20 PROCEDURE — 82948 REAGENT STRIP/BLOOD GLUCOSE: CPT

## 2019-02-20 PROCEDURE — 2580000003 HC RX 258: Performed by: NURSE ANESTHETIST, CERTIFIED REGISTERED

## 2019-02-20 PROCEDURE — 74420 UROGRAPHY RTRGR +-KUB: CPT | Performed by: UROLOGY

## 2019-02-20 PROCEDURE — 52356 CYSTO/URETERO W/LITHOTRIPSY: CPT | Performed by: UROLOGY

## 2019-02-20 PROCEDURE — C2617 STENT, NON-COR, TEM W/O DEL: HCPCS | Performed by: UROLOGY

## 2019-02-20 PROCEDURE — 7100000001 HC PACU RECOVERY - ADDTL 15 MIN: Performed by: UROLOGY

## 2019-02-20 PROCEDURE — C1726 CATH, BAL DIL, NON-VASCULAR: HCPCS | Performed by: UROLOGY

## 2019-02-20 PROCEDURE — 6360000002 HC RX W HCPCS: Performed by: UROLOGY

## 2019-02-20 PROCEDURE — 3600000014 HC SURGERY LEVEL 4 ADDTL 15MIN: Performed by: UROLOGY

## 2019-02-20 PROCEDURE — C1769 GUIDE WIRE: HCPCS | Performed by: UROLOGY

## 2019-02-20 PROCEDURE — 2580000003 HC RX 258: Performed by: UROLOGY

## 2019-02-20 PROCEDURE — 7100000011 HC PHASE II RECOVERY - ADDTL 15 MIN: Performed by: UROLOGY

## 2019-02-20 PROCEDURE — C1758 CATHETER, URETERAL: HCPCS | Performed by: UROLOGY

## 2019-02-20 PROCEDURE — 3700000001 HC ADD 15 MINUTES (ANESTHESIA): Performed by: UROLOGY

## 2019-02-20 PROCEDURE — 3700000000 HC ANESTHESIA ATTENDED CARE: Performed by: UROLOGY

## 2019-02-20 PROCEDURE — 7100000010 HC PHASE II RECOVERY - FIRST 15 MIN: Performed by: UROLOGY

## 2019-02-20 PROCEDURE — C1773 RET DEV, INSERTABLE: HCPCS | Performed by: UROLOGY

## 2019-02-20 PROCEDURE — 2720000010 HC SURG SUPPLY STERILE: Performed by: UROLOGY

## 2019-02-20 PROCEDURE — 6360000002 HC RX W HCPCS: Performed by: NURSE ANESTHETIST, CERTIFIED REGISTERED

## 2019-02-20 PROCEDURE — 2709999900 HC NON-CHARGEABLE SUPPLY: Performed by: UROLOGY

## 2019-02-20 PROCEDURE — 52352 CYSTOURETERO W/STONE REMOVE: CPT | Performed by: UROLOGY

## 2019-02-20 DEVICE — STENT URET 6FR L24CM PERCFLX HYDR+ DBL PGTL THRD 2: Type: IMPLANTABLE DEVICE | Site: URETER | Status: FUNCTIONAL

## 2019-02-20 RX ORDER — SCOLOPAMINE TRANSDERMAL SYSTEM 1 MG/1
1 PATCH, EXTENDED RELEASE TRANSDERMAL ONCE
Status: DISCONTINUED | OUTPATIENT
Start: 2019-02-20 | End: 2019-02-20 | Stop reason: HOSPADM

## 2019-02-20 RX ORDER — MORPHINE SULFATE 4 MG/ML
4 INJECTION, SOLUTION INTRAMUSCULAR; INTRAVENOUS
Status: DISCONTINUED | OUTPATIENT
Start: 2019-02-20 | End: 2019-02-20 | Stop reason: HOSPADM

## 2019-02-20 RX ORDER — CIPROFLOXACIN 2 MG/ML
400 INJECTION, SOLUTION INTRAVENOUS ONCE
Status: COMPLETED | OUTPATIENT
Start: 2019-02-20 | End: 2019-02-20

## 2019-02-20 RX ORDER — ROCURONIUM BROMIDE 10 MG/ML
INJECTION, SOLUTION INTRAVENOUS PRN
Status: DISCONTINUED | OUTPATIENT
Start: 2019-02-20 | End: 2019-02-20 | Stop reason: SDUPTHER

## 2019-02-20 RX ORDER — SODIUM CHLORIDE 0.9 % (FLUSH) 0.9 %
10 SYRINGE (ML) INJECTION EVERY 12 HOURS SCHEDULED
Status: DISCONTINUED | OUTPATIENT
Start: 2019-02-20 | End: 2019-02-20 | Stop reason: HOSPADM

## 2019-02-20 RX ORDER — OXYCODONE HYDROCHLORIDE AND ACETAMINOPHEN 5; 325 MG/1; MG/1
2 TABLET ORAL EVERY 4 HOURS PRN
Status: DISCONTINUED | OUTPATIENT
Start: 2019-02-20 | End: 2019-02-20 | Stop reason: HOSPADM

## 2019-02-20 RX ORDER — ACYCLOVIR 200 MG/1
200 CAPSULE ORAL EVERY 4 HOURS PRN
COMMUNITY
End: 2019-07-18 | Stop reason: SDUPTHER

## 2019-02-20 RX ORDER — PHENAZOPYRIDINE HYDROCHLORIDE 100 MG/1
100 TABLET, FILM COATED ORAL 3 TIMES DAILY PRN
Qty: 30 TABLET | Refills: 1 | Status: SHIPPED | OUTPATIENT
Start: 2019-02-20 | End: 2019-03-08 | Stop reason: ALTCHOICE

## 2019-02-20 RX ORDER — APREPITANT 40 MG/1
40 CAPSULE ORAL ONCE
Status: COMPLETED | OUTPATIENT
Start: 2019-02-20 | End: 2019-02-20

## 2019-02-20 RX ORDER — FENTANYL CITRATE 50 UG/ML
50 INJECTION, SOLUTION INTRAMUSCULAR; INTRAVENOUS
Status: COMPLETED | OUTPATIENT
Start: 2019-02-20 | End: 2019-02-20

## 2019-02-20 RX ORDER — SODIUM CHLORIDE, SODIUM LACTATE, POTASSIUM CHLORIDE, CALCIUM CHLORIDE 600; 310; 30; 20 MG/100ML; MG/100ML; MG/100ML; MG/100ML
INJECTION, SOLUTION INTRAVENOUS CONTINUOUS
Status: DISCONTINUED | OUTPATIENT
Start: 2019-02-20 | End: 2019-02-20 | Stop reason: HOSPADM

## 2019-02-20 RX ORDER — ATROPA BELLADONNA AND OPIUM 16.2; 6 MG/1; MG/1
SUPPOSITORY RECTAL PRN
Status: DISCONTINUED | OUTPATIENT
Start: 2019-02-20 | End: 2019-02-20 | Stop reason: ALTCHOICE

## 2019-02-20 RX ORDER — KETOROLAC TROMETHAMINE 30 MG/ML
15 INJECTION, SOLUTION INTRAMUSCULAR; INTRAVENOUS ONCE
Status: COMPLETED | OUTPATIENT
Start: 2019-02-20 | End: 2019-02-20

## 2019-02-20 RX ORDER — ONDANSETRON 2 MG/ML
4 INJECTION INTRAMUSCULAR; INTRAVENOUS EVERY 4 HOURS PRN
Status: DISCONTINUED | OUTPATIENT
Start: 2019-02-20 | End: 2019-02-20 | Stop reason: HOSPADM

## 2019-02-20 RX ORDER — LABETALOL HYDROCHLORIDE 5 MG/ML
INJECTION, SOLUTION INTRAVENOUS PRN
Status: DISCONTINUED | OUTPATIENT
Start: 2019-02-20 | End: 2019-02-20 | Stop reason: SDUPTHER

## 2019-02-20 RX ORDER — PHENAZOPYRIDINE HYDROCHLORIDE 100 MG/1
100 TABLET, FILM COATED ORAL ONCE
Status: COMPLETED | OUTPATIENT
Start: 2019-02-20 | End: 2019-02-20

## 2019-02-20 RX ORDER — SODIUM CHLORIDE, SODIUM LACTATE, POTASSIUM CHLORIDE, CALCIUM CHLORIDE 600; 310; 30; 20 MG/100ML; MG/100ML; MG/100ML; MG/100ML
INJECTION, SOLUTION INTRAVENOUS CONTINUOUS PRN
Status: DISCONTINUED | OUTPATIENT
Start: 2019-02-20 | End: 2019-02-20 | Stop reason: SDUPTHER

## 2019-02-20 RX ORDER — SODIUM CHLORIDE 0.9 % (FLUSH) 0.9 %
10 SYRINGE (ML) INJECTION PRN
Status: DISCONTINUED | OUTPATIENT
Start: 2019-02-20 | End: 2019-02-20 | Stop reason: HOSPADM

## 2019-02-20 RX ORDER — DEXAMETHASONE SODIUM PHOSPHATE 10 MG/ML
INJECTION INTRAMUSCULAR; INTRAVENOUS PRN
Status: DISCONTINUED | OUTPATIENT
Start: 2019-02-20 | End: 2019-02-20 | Stop reason: SDUPTHER

## 2019-02-20 RX ORDER — TAMSULOSIN HYDROCHLORIDE 0.4 MG/1
0.4 CAPSULE ORAL DAILY
Qty: 14 CAPSULE | Refills: 1 | Status: SHIPPED | OUTPATIENT
Start: 2019-02-20 | End: 2019-10-16 | Stop reason: ALTCHOICE

## 2019-02-20 RX ORDER — MIDAZOLAM HYDROCHLORIDE 1 MG/ML
2 INJECTION INTRAMUSCULAR; INTRAVENOUS
Status: COMPLETED | OUTPATIENT
Start: 2019-02-20 | End: 2019-02-20

## 2019-02-20 RX ORDER — SODIUM CHLORIDE 9 MG/ML
INJECTION, SOLUTION INTRAVENOUS CONTINUOUS
Status: DISCONTINUED | OUTPATIENT
Start: 2019-02-20 | End: 2019-02-20 | Stop reason: HOSPADM

## 2019-02-20 RX ORDER — ONDANSETRON 2 MG/ML
INJECTION INTRAMUSCULAR; INTRAVENOUS PRN
Status: DISCONTINUED | OUTPATIENT
Start: 2019-02-20 | End: 2019-02-20 | Stop reason: SDUPTHER

## 2019-02-20 RX ORDER — OXYCODONE HYDROCHLORIDE 5 MG/1
5 CAPSULE ORAL EVERY 6 HOURS PRN
Qty: 40 CAPSULE | Refills: 0 | Status: SHIPPED | OUTPATIENT
Start: 2019-02-20 | End: 2019-03-06

## 2019-02-20 RX ORDER — PROPOFOL 10 MG/ML
INJECTION, EMULSION INTRAVENOUS PRN
Status: DISCONTINUED | OUTPATIENT
Start: 2019-02-20 | End: 2019-02-20 | Stop reason: SDUPTHER

## 2019-02-20 RX ORDER — LISINOPRIL 10 MG/1
10 TABLET ORAL DAILY
COMMUNITY
End: 2019-12-19

## 2019-02-20 RX ORDER — CIPROFLOXACIN 500 MG/1
500 TABLET, FILM COATED ORAL 2 TIMES DAILY
Qty: 6 TABLET | Refills: 0 | Status: SHIPPED | OUTPATIENT
Start: 2019-02-20 | End: 2019-02-23

## 2019-02-20 RX ORDER — LIDOCAINE HYDROCHLORIDE 10 MG/ML
INJECTION, SOLUTION INFILTRATION; PERINEURAL PRN
Status: DISCONTINUED | OUTPATIENT
Start: 2019-02-20 | End: 2019-02-20 | Stop reason: SDUPTHER

## 2019-02-20 RX ORDER — LIDOCAINE HYDROCHLORIDE 10 MG/ML
1 INJECTION, SOLUTION EPIDURAL; INFILTRATION; INTRACAUDAL; PERINEURAL
Status: DISCONTINUED | OUTPATIENT
Start: 2019-02-20 | End: 2019-02-20 | Stop reason: HOSPADM

## 2019-02-20 RX ORDER — LIDOCAINE HYDROCHLORIDE 40 MG/ML
INJECTION, SOLUTION RETROBULBAR; TOPICAL PRN
Status: DISCONTINUED | OUTPATIENT
Start: 2019-02-20 | End: 2019-02-20 | Stop reason: SDUPTHER

## 2019-02-20 RX ORDER — TAMSULOSIN HYDROCHLORIDE 0.4 MG/1
0.4 CAPSULE ORAL ONCE
Status: COMPLETED | OUTPATIENT
Start: 2019-02-20 | End: 2019-02-20

## 2019-02-20 RX ADMIN — DEXAMETHASONE SODIUM PHOSPHATE 10 MG: 10 INJECTION INTRAMUSCULAR; INTRAVENOUS at 08:06

## 2019-02-20 RX ADMIN — ONDANSETRON HYDROCHLORIDE 4 MG: 2 INJECTION, SOLUTION INTRAMUSCULAR; INTRAVENOUS at 08:08

## 2019-02-20 RX ADMIN — ROCURONIUM BROMIDE 50 MG: 10 INJECTION INTRAVENOUS at 08:03

## 2019-02-20 RX ADMIN — LIDOCAINE HYDROCHLORIDE 3 ML: 40 INJECTION, SOLUTION RETROBULBAR; TOPICAL at 08:04

## 2019-02-20 RX ADMIN — TAMSULOSIN HYDROCHLORIDE 0.4 MG: 0.4 CAPSULE ORAL at 09:52

## 2019-02-20 RX ADMIN — CIPROFLOXACIN 400 MG: 2 INJECTION, SOLUTION INTRAVENOUS at 08:11

## 2019-02-20 RX ADMIN — MIDAZOLAM 2 MG: 1 INJECTION INTRAMUSCULAR; INTRAVENOUS at 07:59

## 2019-02-20 RX ADMIN — FENTANYL CITRATE 50 MCG: 50 INJECTION INTRAMUSCULAR; INTRAVENOUS at 08:03

## 2019-02-20 RX ADMIN — APREPITANT 40 MG: 40 CAPSULE ORAL at 07:23

## 2019-02-20 RX ADMIN — SODIUM CHLORIDE, SODIUM LACTATE, POTASSIUM CHLORIDE, AND CALCIUM CHLORIDE: 600; 310; 30; 20 INJECTION, SOLUTION INTRAVENOUS at 07:59

## 2019-02-20 RX ADMIN — PHENAZOPYRIDINE HYDROCHLORIDE 100 MG: 100 TABLET ORAL at 09:52

## 2019-02-20 RX ADMIN — OXYCODONE AND ACETAMINOPHEN 2 TABLET: 5; 325 TABLET ORAL at 09:52

## 2019-02-20 RX ADMIN — SODIUM CHLORIDE, SODIUM LACTATE, POTASSIUM CHLORIDE, AND CALCIUM CHLORIDE: 600; 310; 30; 20 INJECTION, SOLUTION INTRAVENOUS at 09:01

## 2019-02-20 RX ADMIN — FENTANYL CITRATE 50 MCG: 50 INJECTION INTRAMUSCULAR; INTRAVENOUS at 08:26

## 2019-02-20 RX ADMIN — HYDROMORPHONE HYDROCHLORIDE 0.5 MG: 1 INJECTION, SOLUTION INTRAMUSCULAR; INTRAVENOUS; SUBCUTANEOUS at 08:59

## 2019-02-20 RX ADMIN — SODIUM CHLORIDE, POTASSIUM CHLORIDE, SODIUM LACTATE AND CALCIUM CHLORIDE: 600; 310; 30; 20 INJECTION, SOLUTION INTRAVENOUS at 07:03

## 2019-02-20 RX ADMIN — KETOROLAC TROMETHAMINE 15 MG: 30 INJECTION, SOLUTION INTRAMUSCULAR at 09:52

## 2019-02-20 RX ADMIN — PHENYLEPHRINE HYDROCHLORIDE 40 MCG: 10 INJECTION INTRAVENOUS at 08:08

## 2019-02-20 RX ADMIN — HYDROMORPHONE HYDROCHLORIDE 0.5 MG: 1 INJECTION, SOLUTION INTRAMUSCULAR; INTRAVENOUS; SUBCUTANEOUS at 08:41

## 2019-02-20 RX ADMIN — LIDOCAINE HYDROCHLORIDE 50 MG: 10 INJECTION, SOLUTION INFILTRATION; PERINEURAL at 08:03

## 2019-02-20 RX ADMIN — PROPOFOL 200 MG: 10 INJECTION, EMULSION INTRAVENOUS at 08:03

## 2019-02-20 RX ADMIN — LABETALOL 20 MG/4 ML (5 MG/ML) INTRAVENOUS SYRINGE 5 MG: at 08:34

## 2019-02-20 ASSESSMENT — PAIN SCALES - GENERAL
PAINLEVEL_OUTOF10: 6
PAINLEVEL_OUTOF10: 9
PAINLEVEL_OUTOF10: 9

## 2019-02-20 ASSESSMENT — PAIN DESCRIPTION - ORIENTATION
ORIENTATION: LEFT
ORIENTATION: LEFT

## 2019-02-20 ASSESSMENT — LIFESTYLE VARIABLES: SMOKING_STATUS: 1

## 2019-02-20 ASSESSMENT — ENCOUNTER SYMPTOMS
SHORTNESS OF BREATH: 1
DYSPNEA ACTIVITY LEVEL: AFTER AMBULATING 1 FLIGHT OF STAIRS

## 2019-02-20 ASSESSMENT — PAIN DESCRIPTION - FREQUENCY
FREQUENCY: CONTINUOUS
FREQUENCY: CONTINUOUS

## 2019-02-20 ASSESSMENT — PAIN DESCRIPTION - DESCRIPTORS
DESCRIPTORS: DULL;DISCOMFORT
DESCRIPTORS: SHARP

## 2019-02-20 ASSESSMENT — PAIN DESCRIPTION - PROGRESSION
CLINICAL_PROGRESSION: NOT CHANGED
CLINICAL_PROGRESSION: GRADUALLY IMPROVING

## 2019-02-20 ASSESSMENT — PAIN DESCRIPTION - LOCATION
LOCATION: FLANK
LOCATION: FLANK

## 2019-02-20 ASSESSMENT — PAIN DESCRIPTION - PAIN TYPE
TYPE: SURGICAL PAIN
TYPE: SURGICAL PAIN

## 2019-02-21 ENCOUNTER — TELEPHONE (OUTPATIENT)
Dept: UROLOGY | Age: 59
End: 2019-02-21

## 2019-02-22 DIAGNOSIS — N20.0 KIDNEY STONE: ICD-10-CM

## 2019-02-22 RX ORDER — CHLORTHALIDONE 50 MG/1
TABLET ORAL
Qty: 30 TABLET | Refills: 0 | Status: SHIPPED | OUTPATIENT
Start: 2019-02-22 | End: 2019-03-20 | Stop reason: SDUPTHER

## 2019-02-22 RX ORDER — POTASSIUM CITRATE 15 MEQ/1
TABLET, EXTENDED RELEASE ORAL
Qty: 90 TABLET | Refills: 0 | Status: SHIPPED | OUTPATIENT
Start: 2019-02-22 | End: 2019-03-08 | Stop reason: ALTCHOICE

## 2019-03-08 ENCOUNTER — PROCEDURE VISIT (OUTPATIENT)
Dept: UROLOGY | Age: 59
End: 2019-03-08
Payer: MEDICARE

## 2019-03-08 VITALS — HEIGHT: 63 IN | BODY MASS INDEX: 38.62 KG/M2 | TEMPERATURE: 96.9 F | WEIGHT: 218 LBS

## 2019-03-08 DIAGNOSIS — N20.1 LEFT URETERAL STONE: ICD-10-CM

## 2019-03-08 DIAGNOSIS — N13.5 URETERAL STRICTURE, LEFT: ICD-10-CM

## 2019-03-08 DIAGNOSIS — N20.0 RENAL CALCULUS, LEFT: Primary | ICD-10-CM

## 2019-03-08 LAB
BACTERIA URINE, POC: 0
BILIRUBIN URINE: ABNORMAL MG/DL
BLOOD, URINE: POSITIVE
CASTS URINE, POC: 0
CLARITY: ABNORMAL
COLOR: ABNORMAL
CRYSTALS URINE, POC: 0
EPI CELLS URINE, POC: 0
GLUCOSE URINE: ABNORMAL
KETONES, URINE: POSITIVE
LEUKOCYTE EST, POC: POSITIVE
NITRITE, URINE: POSITIVE
PH UA: 6 (ref 4.5–8)
PROTEIN UA: POSITIVE
RBC URINE, POC: ABNORMAL
SPECIFIC GRAVITY UA: 1.02 (ref 1–1.03)
UROBILINOGEN, URINE: NORMAL
WBC URINE, POC: 0
YEAST URINE, POC: 0

## 2019-03-08 PROCEDURE — 52310 CYSTOSCOPY AND TREATMENT: CPT | Performed by: UROLOGY

## 2019-03-08 PROCEDURE — 81001 URINALYSIS AUTO W/SCOPE: CPT | Performed by: UROLOGY

## 2019-03-08 PROCEDURE — 99999 PR OFFICE/OUTPT VISIT,PROCEDURE ONLY: CPT | Performed by: UROLOGY

## 2019-03-12 ENCOUNTER — TELEPHONE (OUTPATIENT)
Dept: INTERNAL MEDICINE | Age: 59
End: 2019-03-12

## 2019-03-14 ENCOUNTER — TELEPHONE (OUTPATIENT)
Dept: UROLOGY | Age: 59
End: 2019-03-14

## 2019-03-15 ENCOUNTER — TELEPHONE (OUTPATIENT)
Dept: UROLOGY | Age: 59
End: 2019-03-15

## 2019-03-20 ENCOUNTER — TELEPHONE (OUTPATIENT)
Dept: UROLOGY | Age: 59
End: 2019-03-20

## 2019-03-20 DIAGNOSIS — N20.0 KIDNEY STONE: ICD-10-CM

## 2019-03-21 RX ORDER — POTASSIUM CITRATE 15 MEQ/1
TABLET, EXTENDED RELEASE ORAL
Qty: 90 TABLET | Refills: 0 | Status: SHIPPED | OUTPATIENT
Start: 2019-03-21 | End: 2019-04-29 | Stop reason: SDUPTHER

## 2019-03-21 RX ORDER — CHLORTHALIDONE 50 MG/1
TABLET ORAL
Qty: 30 TABLET | Refills: 0 | Status: SHIPPED | OUTPATIENT
Start: 2019-03-21 | End: 2019-04-29 | Stop reason: SDUPTHER

## 2019-04-08 ENCOUNTER — HOSPITAL ENCOUNTER (OUTPATIENT)
Dept: CT IMAGING | Age: 59
Discharge: HOME | End: 2019-04-08
Payer: MEDICARE

## 2019-04-08 DIAGNOSIS — N20.0 RENAL CALCULUS, LEFT: ICD-10-CM

## 2019-04-08 PROCEDURE — 74176 CT ABD & PELVIS W/O CONTRAST: CPT

## 2019-04-17 ENCOUNTER — OFFICE VISIT (OUTPATIENT)
Dept: UROLOGY | Age: 59
End: 2019-04-17
Payer: MEDICARE

## 2019-04-17 VITALS — BODY MASS INDEX: 37.74 KG/M2 | WEIGHT: 213 LBS | TEMPERATURE: 97.2 F | HEIGHT: 63 IN

## 2019-04-17 DIAGNOSIS — N20.1 LEFT URETERAL CALCULUS: ICD-10-CM

## 2019-04-17 DIAGNOSIS — N13.5 URETERAL STRICTURE, LEFT: Primary | ICD-10-CM

## 2019-04-17 LAB
APPEARANCE FLUID: NORMAL
BILIRUBIN, POC: 0
BLOOD URINE, POC: NEGATIVE
CLARITY, POC: CLEAR
COLOR, POC: YELLOW
GLUCOSE URINE, POC: NEGATIVE
KETONES, POC: NEGATIVE
LEUKOCYTE EST, POC: NORMAL
NITRITE, POC: NEGATIVE
PH, POC: 6.5
PROTEIN, POC: NEGATIVE
SPECIFIC GRAVITY, POC: 1.01
UROBILINOGEN, POC: 0.2

## 2019-04-17 PROCEDURE — 4004F PT TOBACCO SCREEN RCVD TLK: CPT | Performed by: UROLOGY

## 2019-04-17 PROCEDURE — 3017F COLORECTAL CA SCREEN DOC REV: CPT | Performed by: UROLOGY

## 2019-04-17 PROCEDURE — G8427 DOCREV CUR MEDS BY ELIG CLIN: HCPCS | Performed by: UROLOGY

## 2019-04-17 PROCEDURE — 99213 OFFICE O/P EST LOW 20 MIN: CPT | Performed by: UROLOGY

## 2019-04-17 PROCEDURE — 81002 URINALYSIS NONAUTO W/O SCOPE: CPT | Performed by: UROLOGY

## 2019-04-17 PROCEDURE — G8417 CALC BMI ABV UP PARAM F/U: HCPCS | Performed by: UROLOGY

## 2019-04-17 ASSESSMENT — ENCOUNTER SYMPTOMS
BACK PAIN: 0
WHEEZING: 0
VOMITING: 0
SINUS PAIN: 0
ABDOMINAL PAIN: 0
EYE PAIN: 0
SHORTNESS OF BREATH: 0
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
## 2019-04-20 ENCOUNTER — APPOINTMENT (OUTPATIENT)
Dept: GENERAL RADIOLOGY | Age: 59
End: 2019-04-20
Payer: OTHER MISCELLANEOUS

## 2019-04-20 ENCOUNTER — HOSPITAL ENCOUNTER (EMERGENCY)
Age: 59
Discharge: HOME | End: 2019-04-20
Payer: OTHER MISCELLANEOUS

## 2019-04-20 VITALS
RESPIRATION RATE: 15 BRPM | DIASTOLIC BLOOD PRESSURE: 86 MMHG | SYSTOLIC BLOOD PRESSURE: 125 MMHG | BODY MASS INDEX: 41.22 KG/M2 | HEIGHT: 62 IN | WEIGHT: 224 LBS | OXYGEN SATURATION: 92 % | TEMPERATURE: 97.5 F | HEART RATE: 97 BPM

## 2019-04-20 DIAGNOSIS — G89.29 ACUTE EXACERBATION OF CHRONIC LOW BACK PAIN: ICD-10-CM

## 2019-04-20 DIAGNOSIS — M54.50 ACUTE EXACERBATION OF CHRONIC LOW BACK PAIN: ICD-10-CM

## 2019-04-20 DIAGNOSIS — S40.012A CONTUSION OF LEFT SHOULDER, INITIAL ENCOUNTER: ICD-10-CM
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
## 2019-04-20 PROCEDURE — 73030 X-RAY EXAM OF SHOULDER: CPT

## 2019-04-20 PROCEDURE — 72100 X-RAY EXAM L-S SPINE 2/3 VWS: CPT

## 2019-04-20 PROCEDURE — 99283 EMERGENCY DEPT VISIT LOW MDM: CPT

## 2019-04-20 PROCEDURE — 99285 EMERGENCY DEPT VISIT HI MDM: CPT | Performed by: NURSE PRACTITIONER

## 2019-04-20 PROCEDURE — 71100 X-RAY EXAM RIBS UNI 2 VIEWS: CPT

## 2019-04-20 PROCEDURE — 96372 THER/PROPH/DIAG INJ SC/IM: CPT

## 2019-04-20 PROCEDURE — 6360000002 HC RX W HCPCS: Performed by: NURSE PRACTITIONER

## 2019-04-20 PROCEDURE — 71046 X-RAY EXAM CHEST 2 VIEWS: CPT

## 2019-04-20 RX ORDER — ORPHENADRINE CITRATE 30 MG/ML
60 INJECTION INTRAMUSCULAR; INTRAVENOUS ONCE
Status: COMPLETED | OUTPATIENT
Start: 2019-04-20 | End: 2019-04-20

## 2019-04-20 RX ORDER — MORPHINE SULFATE 10 MG/ML
4 INJECTION, SOLUTION INTRAMUSCULAR; INTRAVENOUS ONCE
Status: COMPLETED | OUTPATIENT
Start: 2019-04-20 | End: 2019-04-20

## 2019-04-20 RX ADMIN — MORPHINE SULFATE 4 MG: 10 INJECTION INTRAVENOUS at 17:56

## 2019-04-20 RX ADMIN — ORPHENADRINE CITRATE 60 MG: 30 INJECTION INTRAMUSCULAR; INTRAVENOUS at 17:56

## 2019-04-20 ASSESSMENT — PAIN DESCRIPTION - ORIENTATION: ORIENTATION: LEFT

## 2019-04-20 ASSESSMENT — PAIN SCALES - GENERAL: PAINLEVEL_OUTOF10: 7

## 2019-04-20 ASSESSMENT — PAIN DESCRIPTION - LOCATION: LOCATION: SHOULDER
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
## 2019-04-21 ASSESSMENT — ENCOUNTER SYMPTOMS
ABDOMINAL PAIN: 0
SHORTNESS OF BREATH: 0
BACK PAIN: 1

## 2019-04-29 DIAGNOSIS — N20.0 KIDNEY STONE: ICD-10-CM

## 2019-04-30 RX ORDER — CHLORTHALIDONE 50 MG/1
TABLET ORAL
Qty: 30 TABLET | Refills: 0 | Status: SHIPPED | OUTPATIENT
Start: 2019-04-30 | End: 2019-06-12 | Stop reason: SDUPTHER

## 2019-04-30 RX ORDER — POTASSIUM CITRATE 15 MEQ/1
TABLET, EXTENDED RELEASE ORAL
Qty: 90 TABLET | Refills: 0 | Status: SHIPPED | OUTPATIENT
Start: 2019-04-30 | End: 2019-08-23 | Stop reason: SDUPTHER

## 2019-05-15 ENCOUNTER — HOSPITAL ENCOUNTER (OUTPATIENT)
Dept: CT IMAGING | Age: 59
Discharge: HOME | End: 2019-05-15
Payer: OTHER MISCELLANEOUS

## 2019-05-15 ENCOUNTER — OFFICE VISIT (OUTPATIENT)
Dept: URGENT CARE | Age: 59
End: 2019-05-15
Payer: OTHER MISCELLANEOUS

## 2019-05-15 VITALS
SYSTOLIC BLOOD PRESSURE: 102 MMHG | HEIGHT: 63 IN | DIASTOLIC BLOOD PRESSURE: 79 MMHG | RESPIRATION RATE: 18 BRPM | TEMPERATURE: 98.6 F | BODY MASS INDEX: 36.86 KG/M2 | WEIGHT: 208 LBS | OXYGEN SATURATION: 96 % | HEART RATE: 75 BPM

## 2019-05-15 DIAGNOSIS — M54.40 MIDLINE LOW BACK PAIN WITH SCIATICA, SCIATICA LATERALITY UNSPECIFIED, UNSPECIFIED CHRONICITY: ICD-10-CM

## 2019-05-15 DIAGNOSIS — M25.512 ACUTE PAIN OF LEFT SHOULDER DUE TO TRAUMA: Primary | ICD-10-CM

## 2019-05-15 DIAGNOSIS — G89.11 ACUTE PAIN OF LEFT SHOULDER DUE TO TRAUMA: Primary | ICD-10-CM
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
## 2019-05-15 DIAGNOSIS — M25.512 ACUTE PAIN OF LEFT SHOULDER DUE TO TRAUMA: ICD-10-CM

## 2019-05-15 DIAGNOSIS — G89.11 ACUTE PAIN OF LEFT SHOULDER DUE TO TRAUMA: ICD-10-CM

## 2019-05-15 PROCEDURE — 99213 OFFICE O/P EST LOW 20 MIN: CPT | Performed by: NURSE PRACTITIONER

## 2019-05-15 PROCEDURE — 72131 CT LUMBAR SPINE W/O DYE: CPT

## 2019-05-15 PROCEDURE — 73200 CT UPPER EXTREMITY W/O DYE: CPT

## 2019-05-15 ASSESSMENT — ENCOUNTER SYMPTOMS
BACK PAIN: 1
SORE THROAT: 0
EYES NEGATIVE: 1
SHORTNESS OF BREATH: 0
COUGH: 0
RESPIRATORY NEGATIVE: 1
GASTROINTESTINAL NEGATIVE: 1
ABDOMINAL PAIN: 0
ALLERGIC/IMMUNOLOGIC NEGATIVE: 1

## 2019-05-15 NOTE — PATIENT INSTRUCTIONS
Discussed CT scans of left shoulder and lumbar spine with pt  Follow-up with OI with Dr. Guido Huynh per her requests  Follow-up with PCP as needed  Continue prescribed pain medications as she is taking
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
## 2019-05-16 ENCOUNTER — OFFICE VISIT (OUTPATIENT)
Dept: INTERNAL MEDICINE | Age: 59
End: 2019-05-16
Payer: OTHER MISCELLANEOUS

## 2019-05-16 VITALS
HEART RATE: 90 BPM | DIASTOLIC BLOOD PRESSURE: 64 MMHG | OXYGEN SATURATION: 93 % | HEIGHT: 63 IN | SYSTOLIC BLOOD PRESSURE: 110 MMHG | WEIGHT: 210 LBS | BODY MASS INDEX: 37.21 KG/M2

## 2019-05-16 DIAGNOSIS — G89.29 CHRONIC RIGHT-SIDED LOW BACK PAIN WITH SCIATICA, SCIATICA LATERALITY UNSPECIFIED: ICD-10-CM
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
## 2019-05-16 DIAGNOSIS — M54.40 CHRONIC RIGHT-SIDED LOW BACK PAIN WITH SCIATICA, SCIATICA LATERALITY UNSPECIFIED: ICD-10-CM

## 2019-05-16 PROCEDURE — G8417 CALC BMI ABV UP PARAM F/U: HCPCS | Performed by: NURSE PRACTITIONER

## 2019-05-16 PROCEDURE — 96372 THER/PROPH/DIAG INJ SC/IM: CPT | Performed by: NURSE PRACTITIONER

## 2019-05-16 PROCEDURE — 99213 OFFICE O/P EST LOW 20 MIN: CPT | Performed by: NURSE PRACTITIONER

## 2019-05-16 PROCEDURE — G8427 DOCREV CUR MEDS BY ELIG CLIN: HCPCS | Performed by: NURSE PRACTITIONER

## 2019-05-16 PROCEDURE — 3017F COLORECTAL CA SCREEN DOC REV: CPT | Performed by: NURSE PRACTITIONER

## 2019-05-16 PROCEDURE — 4004F PT TOBACCO SCREEN RCVD TLK: CPT | Performed by: NURSE PRACTITIONER

## 2019-05-16 RX ORDER — METHYLPREDNISOLONE ACETATE 80 MG/ML
80 INJECTION, SUSPENSION INTRA-ARTICULAR; INTRALESIONAL; INTRAMUSCULAR; SOFT TISSUE ONCE
Status: COMPLETED | OUTPATIENT
Start: 2019-05-16 | End: 2019-05-16

## 2019-05-16 RX ORDER — OXYCODONE AND ACETAMINOPHEN 10; 325 MG/1; MG/1
1 TABLET ORAL EVERY 8 HOURS PRN
COMMUNITY
End: 2019-06-19 | Stop reason: SDUPTHER

## 2019-05-16 RX ORDER — LIDOCAINE 4 G/G
1 PATCH TOPICAL DAILY
Qty: 30 PATCH | Refills: 0 | Status: SHIPPED | OUTPATIENT
Start: 2019-05-16 | End: 2019-06-15

## 2019-05-16 RX ORDER — METHYLPREDNISOLONE 4 MG/1
TABLET ORAL
Qty: 1 KIT | Refills: 0 | Status: SHIPPED | OUTPATIENT
Start: 2019-05-16 | End: 2019-05-22

## 2019-05-16 RX ADMIN — METHYLPREDNISOLONE ACETATE 80 MG: 80 INJECTION, SUSPENSION INTRA-ARTICULAR; INTRALESIONAL; INTRAMUSCULAR; SOFT TISSUE at 11:47

## 2019-05-16 ASSESSMENT — ENCOUNTER SYMPTOMS
RHINORRHEA: 0
COUGH: 0
SHORTNESS OF BREATH: 0
BACK PAIN: 1
PHOTOPHOBIA: 0
NAUSEA: 0
VOMITING: 0
COLOR CHANGE: 0
VOICE CHANGE: 0

## 2019-05-16 NOTE — PROGRESS NOTES
After obtaining consent, and per orders of Belinda Letters, APRN, an injection of methylPREDNISolone acetate (DEPO-MEDROL) injection 80 mg   was given in Right upper quad. gluteus by Noman De Leon. Patient tolerated well with no immediate adverse reaction. Patient was advised to remain in the exam room for 20 minutes and report any adverse reactions to me immediately.
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
## 2019-05-16 NOTE — PATIENT INSTRUCTIONS
1. Steroid injection today. 2. Begin medrol dose pack tomorrow. 3. Lidocaine patch daily to affected area. 4. Follow-up if symptoms worsen or fail to improve.

## 2019-05-23 RX ORDER — LISINOPRIL 10 MG/1
TABLET ORAL
Qty: 90 TABLET | Refills: 2 | Status: SHIPPED | OUTPATIENT
Start: 2019-05-23 | End: 2019-06-19

## 2019-06-11 ENCOUNTER — TELEPHONE (OUTPATIENT)
Dept: UROLOGY | Age: 59
End: 2019-06-11

## 2019-06-12 DIAGNOSIS — N20.0 KIDNEY STONE: ICD-10-CM

## 2019-06-12 RX ORDER — CHLORTHALIDONE 50 MG/1
50 TABLET ORAL DAILY
Qty: 30 TABLET | Refills: 5 | Status: SHIPPED | OUTPATIENT
Start: 2019-06-12 | End: 2019-10-16 | Stop reason: SDUPTHER

## 2019-06-19 ENCOUNTER — OFFICE VISIT (OUTPATIENT)
Dept: INTERNAL MEDICINE | Age: 59
End: 2019-06-19
Payer: MEDICARE

## 2019-06-19 VITALS
DIASTOLIC BLOOD PRESSURE: 80 MMHG | BODY MASS INDEX: 37.03 KG/M2 | SYSTOLIC BLOOD PRESSURE: 118 MMHG | HEART RATE: 74 BPM | OXYGEN SATURATION: 94 % | HEIGHT: 63 IN | WEIGHT: 209 LBS

## 2019-06-19 DIAGNOSIS — I10 ESSENTIAL HYPERTENSION: ICD-10-CM

## 2019-06-19 DIAGNOSIS — E11.42 DIABETIC POLYNEUROPATHY ASSOCIATED WITH TYPE 2 DIABETES MELLITUS (HCC): ICD-10-CM

## 2019-06-19 DIAGNOSIS — F11.29 OPIOID DEPENDENCE WITH OPIOID-INDUCED DISORDER (HCC): ICD-10-CM

## 2019-06-19 DIAGNOSIS — R73.03 PRE-DIABETES: Primary | ICD-10-CM

## 2019-06-19 DIAGNOSIS — Z95.818 STATUS POST PLACEMENT OF IMPLANTABLE LOOP RECORDER: ICD-10-CM

## 2019-06-19 DIAGNOSIS — J44.1 COPD EXACERBATION (HCC): ICD-10-CM

## 2019-06-19 DIAGNOSIS — M48.061 SPINAL STENOSIS, LUMBAR REGION, WITHOUT NEUROGENIC CLAUDICATION: ICD-10-CM

## 2019-06-19 DIAGNOSIS — M51.26 DISPLACEMENT OF LUMBAR INTERVERTEBRAL DISC WITHOUT MYELOPATHY: ICD-10-CM

## 2019-06-19 PROCEDURE — 99214 OFFICE O/P EST MOD 30 MIN: CPT | Performed by: INTERNAL MEDICINE

## 2019-06-19 RX ORDER — OXYCODONE AND ACETAMINOPHEN 10; 325 MG/1; MG/1
1 TABLET ORAL DAILY
Qty: 3 TABLET | Refills: 0 | Status: SHIPPED | OUTPATIENT
Start: 2019-06-19 | End: 2019-06-22

## 2019-06-19 ASSESSMENT — ENCOUNTER SYMPTOMS
RESPIRATORY NEGATIVE: 1
CONSTIPATION: 1
BACK PAIN: 1
ALLERGIC/IMMUNOLOGIC NEGATIVE: 1
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
## 2019-06-19 NOTE — PATIENT INSTRUCTIONS
The medication list included in this document is our record of what you are currently taking, including any changes that were made at today's visit.  If you find any differences when compared to your medications at home, or have any questions that were not answered at your visit, please contact the office.   Patient advised moderate aerobic activity 150 minutes per week  Sun avoidance between 10 - 2, reapply sun block every 20 minutes  Consume a heart healthy diet, mediterranean diet   Avoidance of Tobacco Use  Alcohol consumption, limit to one drink per day
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
## 2019-07-18 RX ORDER — ACYCLOVIR 200 MG/1
200 CAPSULE ORAL
Qty: 150 CAPSULE | Refills: 0 | Status: SHIPPED | OUTPATIENT
Start: 2019-07-18 | End: 2020-07-23

## 2019-08-15 ENCOUNTER — TELEPHONE (OUTPATIENT)
Dept: INTERNAL MEDICINE | Age: 59
End: 2019-08-15

## 2019-08-23 RX ORDER — POTASSIUM CITRATE 15 MEQ/1
TABLET, EXTENDED RELEASE ORAL
Qty: 90 TABLET | Refills: 0 | Status: SHIPPED | OUTPATIENT
Start: 2019-08-23 | End: 2019-10-16 | Stop reason: SDUPTHER

## 2019-08-26 ENCOUNTER — NURSE TRIAGE (OUTPATIENT)
Dept: OTHER | Facility: CLINIC | Age: 59
End: 2019-08-26

## 2019-08-26 NOTE — TELEPHONE ENCOUNTER
Reason for Disposition   All other earaches (Exceptions: earache lasting < 1 hour, and earache from air travel)    Protocols used: EARACHE-ADULT-[place]    Patient called pre-service center [facility]) to schedule appointment, with red flag complaint, transferred to RN access for triage. Pt c/o right ear pain since Friday night. Denies fever or drainage. States she also has some dizziness and cough. Triage indicates pt to be seen today or tomorrow. [facility] gave pt an appointment tomorrow at [address] [facility] pt to keep this appointment for tomorrow. She verbalized understanding and has no other questions or concerns.

## 2019-08-27 ENCOUNTER — OFFICE VISIT (OUTPATIENT)
Dept: INTERNAL MEDICINE | Age: 59
End: 2019-08-27
Payer: MEDICARE

## 2019-08-27 VITALS
DIASTOLIC BLOOD PRESSURE: 72 MMHG | SYSTOLIC BLOOD PRESSURE: 124 MMHG | HEIGHT: 63 IN | HEART RATE: 76 BPM | TEMPERATURE: 98.3 F | BODY MASS INDEX: 36.32 KG/M2 | WEIGHT: 205 LBS | OXYGEN SATURATION: 90 %

## 2019-08-27 DIAGNOSIS — J20.9 ACUTE BRONCHITIS, UNSPECIFIED ORGANISM: ICD-10-CM

## 2019-08-27 DIAGNOSIS — H61.21 HEARING LOSS OF RIGHT EAR DUE TO CERUMEN IMPACTION: Primary | ICD-10-CM

## 2019-08-27 DIAGNOSIS — E55.9 VITAMIN D DEFICIENCY: ICD-10-CM

## 2019-08-27 DIAGNOSIS — Z00.00 HEALTHCARE MAINTENANCE: ICD-10-CM

## 2019-08-27 DIAGNOSIS — I10 ESSENTIAL HYPERTENSION: ICD-10-CM

## 2019-08-27 DIAGNOSIS — E87.6 HYPOKALEMIA: ICD-10-CM

## 2019-08-27 PROCEDURE — 69210 REMOVE IMPACTED EAR WAX UNI: CPT | Performed by: NURSE PRACTITIONER

## 2019-08-27 PROCEDURE — 3017F COLORECTAL CA SCREEN DOC REV: CPT | Performed by: NURSE PRACTITIONER

## 2019-08-27 PROCEDURE — 99214 OFFICE O/P EST MOD 30 MIN: CPT | Performed by: NURSE PRACTITIONER

## 2019-08-27 PROCEDURE — G8417 CALC BMI ABV UP PARAM F/U: HCPCS | Performed by: NURSE PRACTITIONER

## 2019-08-27 PROCEDURE — G8427 DOCREV CUR MEDS BY ELIG CLIN: HCPCS | Performed by: NURSE PRACTITIONER

## 2019-08-27 PROCEDURE — 4004F PT TOBACCO SCREEN RCVD TLK: CPT | Performed by: NURSE PRACTITIONER

## 2019-08-27 PROCEDURE — 96372 THER/PROPH/DIAG INJ SC/IM: CPT | Performed by: NURSE PRACTITIONER

## 2019-08-27 RX ORDER — CEFDINIR 300 MG/1
300 CAPSULE ORAL 2 TIMES DAILY
Qty: 14 CAPSULE | Refills: 0 | Status: SHIPPED | OUTPATIENT
Start: 2019-08-27 | End: 2019-09-03

## 2019-08-27 RX ORDER — METHYLPREDNISOLONE ACETATE 80 MG/ML
80 INJECTION, SUSPENSION INTRA-ARTICULAR; INTRALESIONAL; INTRAMUSCULAR; SOFT TISSUE ONCE
Status: COMPLETED | OUTPATIENT
Start: 2019-08-27 | End: 2019-08-27

## 2019-08-27 RX ADMIN — METHYLPREDNISOLONE ACETATE 80 MG: 80 INJECTION, SUSPENSION INTRA-ARTICULAR; INTRALESIONAL; INTRAMUSCULAR; SOFT TISSUE at 15:36

## 2019-08-27 ASSESSMENT — ENCOUNTER SYMPTOMS
NAUSEA: 0
DIARRHEA: 0
CHOKING: 0
COUGH: 0
CONSTIPATION: 0
COLOR CHANGE: 0
VOMITING: 0
WHEEZING: 0
EYE DISCHARGE: 0
BLOOD IN STOOL: 0
SHORTNESS OF BREATH: 0
TROUBLE SWALLOWING: 0
ABDOMINAL PAIN: 0
EYE ITCHING: 0
SORE THROAT: 0
STRIDOR: 0
ABDOMINAL DISTENTION: 0

## 2019-08-27 NOTE — PROGRESS NOTES
 Hepatitis B Vaccine (1 of 3 - Risk 3-dose series) 01/08/1979    DTaP/Tdap/Td vaccine (1 - Tdap) 01/08/1979    Cervical cancer screen  01/08/1981    Shingles Vaccine (1 of 2) 01/08/2010    Flu vaccine (1) 09/01/2019    Breast cancer screen  10/15/2019    A1C test (Diabetic or Prediabetic)  02/11/2020    Lipid screen  02/11/2020    Potassium monitoring  02/19/2020    Creatinine monitoring  02/19/2020    DEXA (modify frequency per FRAX score)  10/15/2020    Colon cancer screen colonoscopy  03/12/2025    Pneumococcal 0-64 years Vaccine  Completed       Lab Results   Component Value Date    LABA1C 5.6 02/11/2019     No results found for: PSA, PSADIA  TSH   Date Value Ref Range Status   10/01/2018 1.370 0.270 - 4.200 uIU/mL Final   ]  Lab Results   Component Value Date     02/19/2019    K 3.3 (L) 02/19/2019    CL 96 (L) 02/19/2019    CO2 24 02/19/2019    BUN 13 02/19/2019    CREATININE 0.7 02/19/2019    GLUCOSE 93 02/19/2019    CALCIUM 9.6 02/19/2019    PROT 8.2 02/19/2019    LABALBU 4.8 02/19/2019    BILITOT 0.5 02/19/2019    ALKPHOS 66 02/19/2019    AST 19 02/19/2019    ALT 16 02/19/2019    LABGLOM >60 02/19/2019    GLOB 2.8 03/12/2017     Lab Results   Component Value Date    CHOL 201 (H) 02/11/2019    CHOL 169 10/01/2018    CHOL 159 (L) 07/05/2017     Lab Results   Component Value Date    TRIG 72 02/11/2019    TRIG 162 (H) 10/01/2018    TRIG 78 (L) 07/05/2017     Lab Results   Component Value Date    HDL 55 (L) 02/11/2019    HDL 41 (L) 10/01/2018    HDL 47 (L) 07/05/2017     Lab Results   Component Value Date    LDLCALC 132 02/11/2019    LDLCALC 96 10/01/2018    LDLCALC 96 07/05/2017     Lab Results   Component Value Date     02/19/2019     03/05/2012    K 3.3 02/19/2019    K 3.7 03/05/2012    CL 96 02/19/2019     03/05/2012    CO2 24 02/19/2019    BUN 13 02/19/2019    CREATININE 0.7 02/19/2019    CREATININE 0.8 03/05/2012    GLUCOSE 93 02/19/2019    CALCIUM 9.6 02/19/2019 discharge. No scleral icterus. Neck: Normal range of motion. Neck supple. No JVD present. No thyromegaly present. Cardiovascular: Normal rate, regular rhythm and normal heart sounds. No murmur heard. Pulmonary/Chest: Effort normal and breath sounds normal. No respiratory distress. She has no wheezes. She has no rales. Coarse cough; Abdominal: Soft. Bowel sounds are normal. She exhibits no distension and no mass. There is no tenderness. There is no rebound and no guarding. Musculoskeletal: Normal range of motion. She exhibits no edema or tenderness. Neurological: She is alert and oriented to person, place, and time. She has normal reflexes. She displays normal reflexes. No cranial nerve deficit. Coordination normal.   Skin: Skin is warm and dry. No rash noted. No erythema. Psychiatric: Her behavior is normal. Judgment and thought content normal. She does not exhibit a depressed mood. /72   Pulse 76   Temp 98.3 °F (36.8 °C)   Ht 5' 3\" (1.6 m)   Wt 205 lb (93 kg)   SpO2 90%   BMI 36.31 kg/m²   Cerumen impaction removed from the right ear with curette patient tolerated procedure well with a erythematous TM afterward  Assessment:       Diagnosis Orders   1. Hearing loss of right ear due to cerumen impaction     2. Acute bronchitis, unspecified organism       Labs reviewedfrom last 6 months     Plan:        Patient given educational materials - see patient instructions. Discussed use, benefit, and side effects of prescribed medications. Allpatient questions answered. Pt voiced understanding. Reviewed health maintenance. Instructed to continue current medications, diet and exercise. Patient agreed with treatment plan. Follow up as directed.    MEDICATIONS:  Orders Placed This Encounter   Medications    cefdinir (OMNICEF) 300 MG capsule     Sig: Take 1 capsule by mouth 2 times daily for 7 days     Dispense:  14 capsule     Refill:  0    methylPREDNISolone acetate (DEPO-MEDROL)

## 2019-08-28 DIAGNOSIS — Z00.00 HEALTHCARE MAINTENANCE: ICD-10-CM

## 2019-08-28 DIAGNOSIS — E87.6 HYPOKALEMIA: ICD-10-CM

## 2019-08-28 DIAGNOSIS — E55.9 VITAMIN D DEFICIENCY: ICD-10-CM

## 2019-08-28 LAB
ALBUMIN SERPL-MCNC: 3.8 G/DL (ref 3.5–5.2)
ALP BLD-CCNC: 64 U/L (ref 35–104)
ALT SERPL-CCNC: 13 U/L (ref 5–33)
ANION GAP SERPL CALCULATED.3IONS-SCNC: 10 MMOL/L (ref 7–19)
AST SERPL-CCNC: 15 U/L (ref 5–32)
BACTERIA: NORMAL /HPF
BASOPHILS ABSOLUTE: 0 K/UL (ref 0–0.2)
BASOPHILS RELATIVE PERCENT: 0.2 % (ref 0–1)
BILIRUB SERPL-MCNC: <0.2 MG/DL (ref 0.2–1.2)
BILIRUBIN URINE: NEGATIVE
BLOOD, URINE: NEGATIVE
BUN BLDV-MCNC: 14 MG/DL (ref 6–20)
CALCIUM SERPL-MCNC: 9.8 MG/DL (ref 8.6–10)
CHLORIDE BLD-SCNC: 100 MMOL/L (ref 98–111)
CHOLESTEROL, TOTAL: 138 MG/DL (ref 160–199)
CLARITY: CLEAR
CO2: 30 MMOL/L (ref 22–29)
COLOR: YELLOW
CREAT SERPL-MCNC: 0.6 MG/DL (ref 0.5–0.9)
EOSINOPHILS ABSOLUTE: 0.2 K/UL (ref 0–0.6)
EOSINOPHILS RELATIVE PERCENT: 2.1 % (ref 0–5)
EPITHELIAL CELLS, UA: 5 /HPF (ref 0–5)
GFR NON-AFRICAN AMERICAN: >60
GLUCOSE BLD-MCNC: 87 MG/DL (ref 74–109)
GLUCOSE URINE: NEGATIVE MG/DL
HCT VFR BLD CALC: 39.8 % (ref 37–47)
HDLC SERPL-MCNC: 44 MG/DL (ref 65–121)
HEMOGLOBIN: 12.3 G/DL (ref 12–16)
HYALINE CASTS: 4 /HPF (ref 0–8)
IMMATURE GRANULOCYTES #: 0 K/UL
KETONES, URINE: NEGATIVE MG/DL
LDL CHOLESTEROL CALCULATED: 77 MG/DL
LEUKOCYTE ESTERASE, URINE: NEGATIVE
LYMPHOCYTES ABSOLUTE: 1.8 K/UL (ref 1.1–4.5)
LYMPHOCYTES RELATIVE PERCENT: 21.4 % (ref 20–40)
MCH RBC QN AUTO: 28 PG (ref 27–31)
MCHC RBC AUTO-ENTMCNC: 30.9 G/DL (ref 33–37)
MCV RBC AUTO: 90.5 FL (ref 81–99)
MONOCYTES ABSOLUTE: 0.6 K/UL (ref 0–0.9)
MONOCYTES RELATIVE PERCENT: 6.9 % (ref 0–10)
NEUTROPHILS ABSOLUTE: 5.9 K/UL (ref 1.5–7.5)
NEUTROPHILS RELATIVE PERCENT: 69.2 % (ref 50–65)
NITRITE, URINE: NEGATIVE
PDW BLD-RTO: 14.5 % (ref 11.5–14.5)
PH UA: 8 (ref 5–8)
PLATELET # BLD: 251 K/UL (ref 130–400)
PMV BLD AUTO: 12.7 FL (ref 9.4–12.3)
POTASSIUM SERPL-SCNC: 4.3 MMOL/L (ref 3.5–5)
PROTEIN UA: 30 MG/DL
RBC # BLD: 4.4 M/UL (ref 4.2–5.4)
RBC UA: 1 /HPF (ref 0–4)
SODIUM BLD-SCNC: 140 MMOL/L (ref 136–145)
SPECIFIC GRAVITY UA: 1.02 (ref 1–1.03)
TOTAL PROTEIN: 6.7 G/DL (ref 6.6–8.7)
TRIGL SERPL-MCNC: 86 MG/DL (ref 0–149)
TSH SERPL DL<=0.05 MIU/L-ACNC: 0.39 UIU/ML (ref 0.27–4.2)
URINE REFLEX TO CULTURE: YES
UROBILINOGEN, URINE: 0.2 E.U./DL
VITAMIN D 25-HYDROXY: 50.8 NG/ML
WBC # BLD: 8.6 K/UL (ref 4.8–10.8)
WBC UA: 5 /HPF (ref 0–5)

## 2019-09-02 PROBLEM — J44.1 COPD EXACERBATION (HCC): Status: RESOLVED | Noted: 2017-03-04 | Resolved: 2019-09-02

## 2019-09-02 PROBLEM — E66.9 CLASS 2 OBESITY IN ADULT: Status: ACTIVE | Noted: 2019-09-02

## 2019-09-02 PROBLEM — E66.812 CLASS 2 OBESITY IN ADULT: Status: ACTIVE | Noted: 2019-09-02

## 2019-09-02 ASSESSMENT — ENCOUNTER SYMPTOMS
ALLERGIC/IMMUNOLOGIC NEGATIVE: 1
CONSTIPATION: 1
BACK PAIN: 1
RESPIRATORY NEGATIVE: 1
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
## 2019-09-03 ENCOUNTER — OFFICE VISIT (OUTPATIENT)
Dept: INTERNAL MEDICINE | Age: 59
End: 2019-09-03
Payer: MEDICARE

## 2019-09-03 VITALS
OXYGEN SATURATION: 94 % | HEIGHT: 63 IN | SYSTOLIC BLOOD PRESSURE: 102 MMHG | DIASTOLIC BLOOD PRESSURE: 62 MMHG | HEART RATE: 75 BPM | WEIGHT: 199.6 LBS | BODY MASS INDEX: 35.37 KG/M2

## 2019-09-03 DIAGNOSIS — E66.9 OBESITY (BMI 30.0-34.9): ICD-10-CM

## 2019-09-03 DIAGNOSIS — F32.0 CURRENT MILD EPISODE OF MAJOR DEPRESSIVE DISORDER WITHOUT PRIOR EPISODE (HCC): ICD-10-CM

## 2019-09-03 DIAGNOSIS — R41.3 GRADUAL-ONSET MEMORY IMPAIRMENT: Primary | ICD-10-CM

## 2019-09-03 DIAGNOSIS — B18.2 CHRONIC ACTIVE HEPATITIS C (HCC): ICD-10-CM

## 2019-09-03 DIAGNOSIS — H92.01 DISCOMFORT OF RIGHT EAR: ICD-10-CM

## 2019-09-03 DIAGNOSIS — J41.0 SIMPLE CHRONIC BRONCHITIS (HCC): ICD-10-CM

## 2019-09-03 DIAGNOSIS — F11.29 OPIOID DEPENDENCE WITH OPIOID-INDUCED DISORDER (HCC): ICD-10-CM

## 2019-09-03 DIAGNOSIS — E66.01 CLASS 2 SEVERE OBESITY DUE TO EXCESS CALORIES WITH SERIOUS COMORBIDITY IN ADULT, UNSPECIFIED BMI (HCC): ICD-10-CM

## 2019-09-03 DIAGNOSIS — Z86.19 HISTORY OF HEPATITIS C: ICD-10-CM

## 2019-09-03 PROBLEM — R06.09 DOE (DYSPNEA ON EXERTION): Status: RESOLVED | Noted: 2017-03-07 | Resolved: 2019-09-03

## 2019-09-03 PROBLEM — R07.9 CHEST PAIN: Status: RESOLVED | Noted: 2017-03-07 | Resolved: 2019-09-03

## 2019-09-03 PROBLEM — E66.811 OBESITY (BMI 30.0-34.9): Status: ACTIVE | Noted: 2019-09-03

## 2019-09-03 PROBLEM — K52.9 CHRONIC DIARRHEA: Chronic | Status: RESOLVED | Noted: 2018-06-12 | Resolved: 2019-09-03

## 2019-09-03 PROBLEM — Z87.898 HISTORY OF SYNCOPE: Status: RESOLVED | Noted: 2018-12-10 | Resolved: 2019-09-03

## 2019-09-03 PROCEDURE — 99214 OFFICE O/P EST MOD 30 MIN: CPT | Performed by: INTERNAL MEDICINE

## 2019-09-03 RX ORDER — OXYCODONE AND ACETAMINOPHEN 10; 325 MG/1; MG/1
1 TABLET ORAL 3 TIMES DAILY PRN
Status: ON HOLD | COMMUNITY
End: 2021-08-24 | Stop reason: HOSPADM

## 2019-09-03 RX ORDER — ALBUTEROL SULFATE 90 UG/1
2 AEROSOL, METERED RESPIRATORY (INHALATION) EVERY 6 HOURS PRN
Qty: 1 INHALER | Refills: 3 | Status: SHIPPED | OUTPATIENT
Start: 2019-09-03 | End: 2020-08-21 | Stop reason: SDUPTHER

## 2019-09-03 ASSESSMENT — ENCOUNTER SYMPTOMS
TROUBLE SWALLOWING: 0
RHINORRHEA: 0
RESPIRATORY NEGATIVE: 1
ALLERGIC/IMMUNOLOGIC NEGATIVE: 1
CONSTIPATION: 1
CHEST TIGHTNESS: 0
BLOOD IN STOOL: 0
DIARRHEA: 0
COUGH: 0
BACK PAIN: 1
SHORTNESS OF BREATH: 0
VOMITING: 0
SINUS PAIN: 0
ABDOMINAL PAIN: 0
WHEEZING: 0
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
## 2019-09-05 ENCOUNTER — HOSPITAL ENCOUNTER (OUTPATIENT)
Dept: CT IMAGING | Age: 59
Discharge: HOME | End: 2019-09-05
Payer: OTHER MISCELLANEOUS

## 2019-09-05 DIAGNOSIS — M47.896 OTHER SPONDYLOSIS, LUMBAR REGION: ICD-10-CM

## 2019-09-05 DIAGNOSIS — M51.36 LUMBAR DEGENERATIVE DISC DISEASE: ICD-10-CM

## 2019-09-05 PROCEDURE — 72131 CT LUMBAR SPINE W/O DYE: CPT

## 2019-09-09 ENCOUNTER — TELEPHONE (OUTPATIENT)
Dept: INTERNAL MEDICINE | Age: 59
End: 2019-09-09

## 2019-09-09 DIAGNOSIS — M54.40 CHRONIC RIGHT-SIDED LOW BACK PAIN WITH SCIATICA, SCIATICA LATERALITY UNSPECIFIED: ICD-10-CM

## 2019-09-09 DIAGNOSIS — M48.061 SPINAL STENOSIS, LUMBAR REGION, WITHOUT NEUROGENIC CLAUDICATION: Primary | ICD-10-CM

## 2019-09-09 DIAGNOSIS — G89.29 CHRONIC RIGHT-SIDED LOW BACK PAIN WITH SCIATICA, SCIATICA LATERALITY UNSPECIFIED: ICD-10-CM

## 2019-09-09 RX ORDER — AMOXICILLIN AND CLAVULANATE POTASSIUM 875; 125 MG/1; MG/1
1 TABLET, FILM COATED ORAL 2 TIMES DAILY
Qty: 14 TABLET | Refills: 0 | Status: SHIPPED | OUTPATIENT
Start: 2019-09-09 | End: 2019-09-16

## 2019-09-09 NOTE — TELEPHONE ENCOUNTER
Patient called she said that she is going to have to have dental work on Wednesday and she always has to have PCN for this. She is going to have several apts and asked if we could send her in a RX with a bout 4 refills for all the dental work to [place].

## 2019-09-18 ENCOUNTER — OFFICE VISIT (OUTPATIENT)
Dept: URGENT CARE | Age: 59
End: 2019-09-18
Payer: OTHER MISCELLANEOUS

## 2019-09-18 ENCOUNTER — HOSPITAL ENCOUNTER (OUTPATIENT)
Dept: GENERAL RADIOLOGY | Age: 59
Discharge: HOME | End: 2019-09-18
Payer: OTHER MISCELLANEOUS

## 2019-09-18 VITALS
HEIGHT: 63 IN | HEART RATE: 74 BPM | TEMPERATURE: 98 F | WEIGHT: 205 LBS | SYSTOLIC BLOOD PRESSURE: 104 MMHG | DIASTOLIC BLOOD PRESSURE: 65 MMHG | BODY MASS INDEX: 36.32 KG/M2 | OXYGEN SATURATION: 97 % | RESPIRATION RATE: 16 BRPM

## 2019-09-18 DIAGNOSIS — M25.511 ACUTE PAIN OF RIGHT SHOULDER: Primary | ICD-10-CM

## 2019-09-18 DIAGNOSIS — M25.511 ACUTE PAIN OF RIGHT SHOULDER: ICD-10-CM

## 2019-09-18 PROCEDURE — 73030 X-RAY EXAM OF SHOULDER: CPT

## 2019-09-18 PROCEDURE — G8427 DOCREV CUR MEDS BY ELIG CLIN: HCPCS | Performed by: NURSE PRACTITIONER

## 2019-09-18 PROCEDURE — G8417 CALC BMI ABV UP PARAM F/U: HCPCS | Performed by: NURSE PRACTITIONER

## 2019-09-18 PROCEDURE — 3017F COLORECTAL CA SCREEN DOC REV: CPT | Performed by: NURSE PRACTITIONER

## 2019-09-18 PROCEDURE — 4004F PT TOBACCO SCREEN RCVD TLK: CPT | Performed by: NURSE PRACTITIONER

## 2019-09-18 PROCEDURE — 99213 OFFICE O/P EST LOW 20 MIN: CPT | Performed by: NURSE PRACTITIONER
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
## 2019-10-10 ENCOUNTER — TELEPHONE (OUTPATIENT)
Dept: NEUROLOGY | Age: 59
End: 2019-10-10

## 2019-10-15 ENCOUNTER — PROCEDURE VISIT (OUTPATIENT)
Dept: OTOLARYNGOLOGY | Age: 59
End: 2019-10-15
Payer: MEDICARE

## 2019-10-15 ENCOUNTER — OFFICE VISIT (OUTPATIENT)
Dept: OTOLARYNGOLOGY | Age: 59
End: 2019-10-15
Payer: MEDICARE

## 2019-10-15 VITALS
TEMPERATURE: 97.4 F | DIASTOLIC BLOOD PRESSURE: 84 MMHG | HEART RATE: 67 BPM | HEIGHT: 63 IN | WEIGHT: 199 LBS | SYSTOLIC BLOOD PRESSURE: 136 MMHG | BODY MASS INDEX: 35.26 KG/M2

## 2019-10-15 DIAGNOSIS — R42 VERTIGO: ICD-10-CM

## 2019-10-15 DIAGNOSIS — H05.52: ICD-10-CM

## 2019-10-15 PROCEDURE — 99203 OFFICE O/P NEW LOW 30 MIN: CPT | Performed by: OTOLARYNGOLOGY

## 2019-10-15 PROCEDURE — 92550 TYMPANOMETRY & REFLEX THRESH: CPT | Performed by: AUDIOLOGIST

## 2019-10-15 PROCEDURE — 92557 COMPREHENSIVE HEARING TEST: CPT | Performed by: AUDIOLOGIST

## 2019-10-15 RX ORDER — DIAZEPAM 2 MG/1
TABLET ORAL
Qty: 40 TABLET | Refills: 2 | Status: SHIPPED | OUTPATIENT
Start: 2019-10-15 | End: 2020-01-15

## 2019-10-16 ENCOUNTER — TELEPHONE (OUTPATIENT)
Dept: UROLOGY | Age: 59
End: 2019-10-16

## 2019-10-16 ENCOUNTER — TELEPHONE (OUTPATIENT)
Dept: OTOLARYNGOLOGY | Age: 59
End: 2019-10-16

## 2019-10-16 ENCOUNTER — OFFICE VISIT (OUTPATIENT)
Dept: UROLOGY | Age: 59
End: 2019-10-16
Payer: MEDICARE

## 2019-10-16 VITALS
WEIGHT: 208 LBS | TEMPERATURE: 96.8 F | HEIGHT: 67 IN | HEART RATE: 86 BPM | DIASTOLIC BLOOD PRESSURE: 74 MMHG | SYSTOLIC BLOOD PRESSURE: 125 MMHG | BODY MASS INDEX: 32.65 KG/M2

## 2019-10-16 DIAGNOSIS — N13.5 URETERAL STRICTURE, LEFT: ICD-10-CM

## 2019-10-16 DIAGNOSIS — N20.0 KIDNEY STONE: Primary | ICD-10-CM

## 2019-10-16 LAB
APPEARANCE FLUID: NORMAL
BILIRUBIN, POC: 0
BLOOD URINE, POC: NORMAL
CLARITY, POC: CLEAR
COLOR, POC: YELLOW
GLUCOSE URINE, POC: NORMAL
KETONES, POC: NORMAL
LEUKOCYTE EST, POC: NORMAL
NITRITE, POC: NORMAL
PH, POC: 5.5
PROTEIN, POC: NORMAL
SPECIFIC GRAVITY, POC: 1.02
UROBILINOGEN, POC: 0.2

## 2019-10-16 PROCEDURE — 81002 URINALYSIS NONAUTO W/O SCOPE: CPT | Performed by: UROLOGY

## 2019-10-16 PROCEDURE — 99213 OFFICE O/P EST LOW 20 MIN: CPT | Performed by: UROLOGY

## 2019-10-16 RX ORDER — POTASSIUM CITRATE 15 MEQ/1
TABLET, EXTENDED RELEASE ORAL
Qty: 90 TABLET | Refills: 11 | Status: ON HOLD | OUTPATIENT
Start: 2019-10-16 | End: 2022-09-29 | Stop reason: HOSPADM

## 2019-10-16 RX ORDER — CHLORTHALIDONE 50 MG/1
50 TABLET ORAL DAILY
Qty: 90 TABLET | Refills: 3 | Status: ON HOLD | OUTPATIENT
Start: 2019-10-16 | End: 2021-08-23 | Stop reason: HOSPADM

## 2019-10-16 ASSESSMENT — ENCOUNTER SYMPTOMS
SHORTNESS OF BREATH: 0
EYE REDNESS: 0
COUGH: 0
WHEEZING: 0
BACK PAIN: 0
EYE DISCHARGE: 0
DIARRHEA: 0
CONSTIPATION: 0
ABDOMINAL PAIN: 0

## 2019-10-17 RX ORDER — ONDANSETRON 8 MG/1
8 TABLET, ORALLY DISINTEGRATING ORAL EVERY 8 HOURS PRN
Qty: 12 TABLET | Refills: 0 | Status: SHIPPED | OUTPATIENT
Start: 2019-10-17 | End: 2020-02-25

## 2019-12-03 ENCOUNTER — TELEPHONE (OUTPATIENT)
Dept: INTERNAL MEDICINE | Age: 59
End: 2019-12-03

## 2019-12-10 ENCOUNTER — TELEPHONE (OUTPATIENT)
Dept: INTERNAL MEDICINE | Age: 59
End: 2019-12-10

## 2019-12-10 DIAGNOSIS — M54.40 CHRONIC RIGHT-SIDED LOW BACK PAIN WITH SCIATICA, SCIATICA LATERALITY UNSPECIFIED: Primary | ICD-10-CM

## 2019-12-10 DIAGNOSIS — M25.519 SHOULDER PAIN, UNSPECIFIED CHRONICITY, UNSPECIFIED LATERALITY: ICD-10-CM

## 2019-12-10 DIAGNOSIS — G89.29 CHRONIC RIGHT-SIDED LOW BACK PAIN WITH SCIATICA, SCIATICA LATERALITY UNSPECIFIED: Primary | ICD-10-CM

## 2019-12-19 RX ORDER — LISINOPRIL 10 MG/1
TABLET ORAL
Qty: 30 TABLET | Refills: 0 | Status: SHIPPED | OUTPATIENT
Start: 2019-12-19 | End: 2020-02-13

## 2019-12-19 RX ORDER — METOPROLOL SUCCINATE 50 MG/1
TABLET, EXTENDED RELEASE ORAL
Qty: 30 TABLET | Refills: 0 | Status: SHIPPED | OUTPATIENT
Start: 2019-12-19 | End: 2020-02-13

## 2020-01-02 ENCOUNTER — HOSPITAL ENCOUNTER (EMERGENCY)
Age: 60
Discharge: HOME | End: 2020-01-02
Payer: MEDICARE

## 2020-01-02 ENCOUNTER — APPOINTMENT (OUTPATIENT)
Dept: GENERAL RADIOLOGY | Age: 60
End: 2020-01-02
Payer: MEDICARE

## 2020-01-02 VITALS
OXYGEN SATURATION: 93 % | HEART RATE: 88 BPM | DIASTOLIC BLOOD PRESSURE: 77 MMHG | SYSTOLIC BLOOD PRESSURE: 122 MMHG | TEMPERATURE: 97.4 F | BODY MASS INDEX: 31.01 KG/M2 | WEIGHT: 198 LBS | RESPIRATION RATE: 17 BRPM

## 2020-01-02 PROCEDURE — 73030 X-RAY EXAM OF SHOULDER: CPT

## 2020-01-02 PROCEDURE — 6360000002 HC RX W HCPCS: Performed by: NURSE PRACTITIONER

## 2020-01-02 PROCEDURE — 96372 THER/PROPH/DIAG INJ SC/IM: CPT

## 2020-01-02 PROCEDURE — 99999 PR OFFICE/OUTPT VISIT,PROCEDURE ONLY: CPT | Performed by: NURSE PRACTITIONER

## 2020-01-02 PROCEDURE — 99283 EMERGENCY DEPT VISIT LOW MDM: CPT

## 2020-01-02 RX ORDER — PROMETHAZINE HYDROCHLORIDE 25 MG/ML
25 INJECTION, SOLUTION INTRAMUSCULAR; INTRAVENOUS ONCE
Status: COMPLETED | OUTPATIENT
Start: 2020-01-02 | End: 2020-01-02

## 2020-01-02 RX ORDER — DEXAMETHASONE SODIUM PHOSPHATE 10 MG/ML
10 INJECTION, SOLUTION INTRAMUSCULAR; INTRAVENOUS ONCE
Status: COMPLETED | OUTPATIENT
Start: 2020-01-02 | End: 2020-01-02

## 2020-01-02 RX ORDER — METHYLPREDNISOLONE 4 MG/1
TABLET ORAL
Qty: 1 KIT | Refills: 0 | Status: SHIPPED | OUTPATIENT
Start: 2020-01-02 | End: 2020-01-08

## 2020-01-02 RX ADMIN — DEXAMETHASONE SODIUM PHOSPHATE 10 MG: 10 INJECTION, SOLUTION INTRAMUSCULAR; INTRAVENOUS at 20:41

## 2020-01-02 RX ADMIN — PROMETHAZINE HYDROCHLORIDE 25 MG: 25 INJECTION INTRAMUSCULAR; INTRAVENOUS at 20:40

## 2020-01-02 RX ADMIN — HYDROMORPHONE HYDROCHLORIDE 1 MG: 1 INJECTION, SOLUTION INTRAMUSCULAR; INTRAVENOUS; SUBCUTANEOUS at 20:41

## 2020-01-02 ASSESSMENT — PAIN SCALES - GENERAL: PAINLEVEL_OUTOF10: 7

## 2020-01-03 ASSESSMENT — ENCOUNTER SYMPTOMS
SHORTNESS OF BREATH: 0
ABDOMINAL PAIN: 0
VOMITING: 0
CONSTIPATION: 0
SORE THROAT: 0
NAUSEA: 0
RHINORRHEA: 0
TROUBLE SWALLOWING: 0
DIARRHEA: 0
COUGH: 0
SINUS PRESSURE: 0
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
## 2020-01-07 ENCOUNTER — OFFICE VISIT (OUTPATIENT)
Dept: INTERNAL MEDICINE | Age: 60
End: 2020-01-07
Payer: MEDICARE

## 2020-01-07 VITALS
TEMPERATURE: 96.4 F | SYSTOLIC BLOOD PRESSURE: 132 MMHG | HEIGHT: 63 IN | OXYGEN SATURATION: 97 % | WEIGHT: 214 LBS | HEART RATE: 82 BPM | BODY MASS INDEX: 37.92 KG/M2 | DIASTOLIC BLOOD PRESSURE: 80 MMHG

## 2020-01-07 PROCEDURE — 99213 OFFICE O/P EST LOW 20 MIN: CPT | Performed by: PHYSICIAN ASSISTANT

## 2020-01-07 ASSESSMENT — ENCOUNTER SYMPTOMS
SHORTNESS OF BREATH: 0
SINUS PRESSURE: 0
SORE THROAT: 0
EYE REDNESS: 0
COUGH: 0
PHOTOPHOBIA: 0
BACK PAIN: 1
CONSTIPATION: 0
EYE PAIN: 0
VOMITING: 0
CHEST TIGHTNESS: 0
ABDOMINAL PAIN: 0
WHEEZING: 0
COLOR CHANGE: 0
DIARRHEA: 0
NAUSEA: 0
RHINORRHEA: 0
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
## 2020-01-21 ENCOUNTER — HOSPITAL ENCOUNTER (OUTPATIENT)
Dept: GENERAL RADIOLOGY | Age: 60
Discharge: HOME | End: 2020-01-21
Payer: OTHER MISCELLANEOUS

## 2020-01-21 ENCOUNTER — HOSPITAL ENCOUNTER (OUTPATIENT)
Dept: CT IMAGING | Age: 60
Discharge: HOME | End: 2020-01-21
Payer: OTHER MISCELLANEOUS

## 2020-01-21 PROCEDURE — 73201 CT UPPER EXTREMITY W/DYE: CPT

## 2020-01-21 PROCEDURE — 6360000004 HC RX CONTRAST MEDICATION: Performed by: ORTHOPAEDIC SURGERY

## 2020-01-21 PROCEDURE — 23350 INJECTION FOR SHOULDER X-RAY: CPT

## 2020-01-21 RX ADMIN — IOPAMIDOL 10 ML: 408 INJECTION, SOLUTION INTRATHECAL at 09:20

## 2020-02-06 ENCOUNTER — OFFICE VISIT (OUTPATIENT)
Dept: INTERNAL MEDICINE | Age: 60
End: 2020-02-06
Payer: MEDICARE

## 2020-02-06 VITALS
WEIGHT: 210.2 LBS | HEIGHT: 63 IN | BODY MASS INDEX: 37.25 KG/M2 | DIASTOLIC BLOOD PRESSURE: 60 MMHG | OXYGEN SATURATION: 98 % | HEART RATE: 84 BPM | SYSTOLIC BLOOD PRESSURE: 112 MMHG

## 2020-02-06 PROBLEM — M25.519 ACUTE SHOULDER PAIN: Status: ACTIVE | Noted: 2020-02-06

## 2020-02-06 PROCEDURE — 99213 OFFICE O/P EST LOW 20 MIN: CPT | Performed by: INTERNAL MEDICINE

## 2020-02-06 RX ORDER — AZITHROMYCIN 500 MG/1
500 TABLET, FILM COATED ORAL DAILY
Qty: 3 TABLET | Refills: 0 | Status: SHIPPED | OUTPATIENT
Start: 2020-02-06 | End: 2020-02-09

## 2020-02-06 ASSESSMENT — ENCOUNTER SYMPTOMS
BACK PAIN: 1
WHEEZING: 0
RHINORRHEA: 1
COUGH: 1

## 2020-02-06 NOTE — PROGRESS NOTES
Pancreatitis     h/o per patient    Recurrent UTI     Restless leg     with burning neuropathy symptoms    SOB (shortness of breath)     Status post placement of implantable loop recorder 2/8/16    Weight loss        Past Surgical History:   Procedure Laterality Date    ABDOMEN SURGERY      Liposuction    BACK SURGERY  2011    Dr Barry Cristina Bilateral    [place]      reduction    CARDIAC CATHETERIZATION      x 4-Dr Canelo Bull CHOLECYSTECTOMY      COLONOSCOPY  8-18-08    Dr Sakina Pate    COLONOSCOPY  9-18-12    Dr Matty Ayers [facility] 10/5/2016    PLACEMENT OF STENT  performed by Nakul Shen MD at [facility] 2/20/2019    CYSTOSCOPY LEFT URETEROSCOPY  LASER LITHOTRIPSY BALLOON DIALATION OF URETERAL STRICTURE performed by Nakul Shen MD at [facility] 2/20/2019    PLACEMENT OF LEFT DOUBLE J STENT performed by Nakul Shen MD at [address] [facility] SURGERY      FOOT SURGERY      Dr Samantha Benton  9/2012    multiple    KIDNEY SURGERY      LITHOTRIPSY      LITHOTRIPSY Left 10/5/2016    LITHOTRIPSY LASER performed by Nakul Shen MD at [address] [facility]  06-24-11    [facility] CYSTO/URETERO/PYELOSCOPY W/LITHOTRIPSY Right 6/21/2018    CYSTOSCOPY; RIGHT RETROGRADE PYELOGRAM; RIGHT URETERAL DILATATION; RIGHT URETEROSCPY WITH LASER LITHOTRIPSY performed by Nakul Shen MD at [address] [facility] Right 6/21/2018    INSERTION OF RIGHT URETERAL STENT performed by Nakul Shen MD at [facility]      TOE SURGERY      right great toe    TONSILLECTOMY      UPPER GASTROINTESTINAL ENDOSCOPY  8-29-08    Dr Sakina Pate    UPPER GASTROINTESTINAL ENDOSCOPY  8-19-08    Dr Ayden Sevilal 43-23-19    Dr Jesusita Palafox Left 10/5/2016    URETEROSCOPY performed by Stacy Rios MD at [place] [address] History     Socioeconomic History    Marital status:      Spouse name: Not on file    Number of children: 1    Years of education: 15    Highest education level: Not on file   Occupational History    Occupation: disabled   Social Needs    Financial resource strain: Not on file    Food insecurity:     Worry: Not on file     Inability: Not on file   Sarta needs:     Medical: Not on file     Non-medical: Not on file   Tobacco Use    Smoking status: Current Every Day Smoker     Packs/day: 0.50     Years: 42.00     Pack years: 21.00    Smokeless tobacco: Never Used    Tobacco comment: Pt says she would take the handout for futue use and info given. Substance and Sexual Activity    Alcohol use: No    Drug use: Yes     Types: Cocaine     Comment: when teenager only    Sexual activity: Never   Lifestyle    Physical activity:     Days per week: Not on file     Minutes per session: Not on file    Stress: Not on file   Relationships    Social connections:     Talks on phone: Not on file     Gets together: Not on file     Attends [employer] service: Not on file     Active member of club or organization: Not on file     Attends meetings of clubs or organizations: Not on file     Relationship status: Not on file    Intimate partner violence:     Fear of current or ex partner: Not on file     Emotionally abused: Not on file     Physically abused: Not on file     Forced sexual activity: Not on file   Other Topics Concern    Not on file   Social History Narrative    Not on file       Family History   Problem Relation Age of Onset    Other Father         committed suicide 2 years ago.  Heart Defect Mother         dysrythmia    Heart Disease Mother     Other Mother         h pylori/esoph.  issues    Stroke Maternal Grandmother     Heart Attack Maternal erythema. Cardiovascular:      Rate and Rhythm: Normal rate. Heart sounds: Normal heart sounds. Pulmonary:      Effort: Pulmonary effort is normal.      Breath sounds: Normal breath sounds and air entry. Musculoskeletal:      Right lower leg: No edema. Left lower leg: No edema. Lymphadenopathy:      Cervical: No cervical adenopathy. Right cervical: No superficial cervical adenopathy. Left cervical: No superficial cervical adenopathy. Neurological:      Mental Status: She is alert. ASSESSMENT:      Diagnosis Orders   1. Subacute frontal sinusitis  azithromycin (ZITHROMAX) 500 MG tablet   2. Acute pain of left shoulder   Patient patient was seen by orthopedics this morning and was told her primary care can give her medication for acute pain before procedure patient was given instructions that primary care will not write prescription and that orthopedics who saw her today should have given her some medication until procedure        PLAN:      Medications Ordered:  Orders Placed This Encounter   Medications    azithromycin (ZITHROMAX) 500 MG tablet     Sig: Take 1 tablet by mouth daily for 3 days     Dispense:  3 tablet     Refill:  0       Other Orders Placed:  No orders of the defined types were placed in this encounter. No follow-ups on file.            Electronically signed by Mary Jordan MD on 2/6/2020 at 12:40 PM

## 2020-02-13 RX ORDER — METOPROLOL SUCCINATE 50 MG/1
TABLET, EXTENDED RELEASE ORAL
Qty: 30 TABLET | Refills: 2 | Status: SHIPPED | OUTPATIENT
Start: 2020-02-13 | End: 2020-05-21

## 2020-02-13 RX ORDER — ERGOCALCIFEROL 1.25 MG/1
50000 CAPSULE ORAL
Qty: 3 CAPSULE | Refills: 3 | Status: SHIPPED | OUTPATIENT
Start: 2020-02-13 | End: 2020-05-27 | Stop reason: SDUPTHER

## 2020-02-13 RX ORDER — LISINOPRIL 10 MG/1
TABLET ORAL
Qty: 30 TABLET | Refills: 2 | Status: SHIPPED | OUTPATIENT
Start: 2020-02-13 | End: 2020-05-21

## 2020-02-25 RX ORDER — ONDANSETRON 8 MG/1
TABLET, ORALLY DISINTEGRATING ORAL
Qty: 12 TABLET | Refills: 0 | Status: SHIPPED | OUTPATIENT
Start: 2020-02-25

## 2020-02-28 DIAGNOSIS — E66.01 CLASS 2 SEVERE OBESITY DUE TO EXCESS CALORIES WITH SERIOUS COMORBIDITY IN ADULT, UNSPECIFIED BMI (HCC): ICD-10-CM

## 2020-02-28 DIAGNOSIS — F32.0 CURRENT MILD EPISODE OF MAJOR DEPRESSIVE DISORDER WITHOUT PRIOR EPISODE (HCC): ICD-10-CM

## 2020-02-28 LAB
ALBUMIN SERPL-MCNC: 4.2 G/DL (ref 3.5–5.2)
ALP BLD-CCNC: 56 U/L (ref 35–104)
ALT SERPL-CCNC: 14 U/L (ref 5–33)
ANION GAP SERPL CALCULATED.3IONS-SCNC: 10 MMOL/L (ref 7–19)
AST SERPL-CCNC: 14 U/L (ref 5–32)
BILIRUB SERPL-MCNC: <0.2 MG/DL (ref 0.2–1.2)
BILIRUBIN DIRECT: 0.2 MG/DL (ref 0–0.3)
BILIRUBIN, INDIRECT: 0 MG/DL (ref 0.1–1)
BUN BLDV-MCNC: 16 MG/DL (ref 8–23)
CALCIUM SERPL-MCNC: 9.4 MG/DL (ref 8.8–10.2)
CHLORIDE BLD-SCNC: 102 MMOL/L (ref 98–111)
CHOLESTEROL, TOTAL: 181 MG/DL (ref 160–199)
CO2: 30 MMOL/L (ref 22–29)
CREAT SERPL-MCNC: 0.7 MG/DL (ref 0.5–0.9)
GFR NON-AFRICAN AMERICAN: >60
GLUCOSE BLD-MCNC: 108 MG/DL (ref 74–109)
HBA1C MFR BLD: 5.7 % (ref 4–6)
HCT VFR BLD CALC: 42.3 % (ref 37–47)
HDLC SERPL-MCNC: 57 MG/DL (ref 65–121)
HEMOGLOBIN: 12.7 G/DL (ref 12–16)
LDL CHOLESTEROL CALCULATED: 110 MG/DL
MCH RBC QN AUTO: 27.3 PG (ref 27–31)
MCHC RBC AUTO-ENTMCNC: 30 G/DL (ref 33–37)
MCV RBC AUTO: 91 FL (ref 81–99)
PDW BLD-RTO: 13.2 % (ref 11.5–14.5)
PLATELET # BLD: 234 K/UL (ref 130–400)
PMV BLD AUTO: 12.4 FL (ref 9.4–12.3)
POTASSIUM SERPL-SCNC: 4.5 MMOL/L (ref 3.5–5)
RBC # BLD: 4.65 M/UL (ref 4.2–5.4)
SODIUM BLD-SCNC: 142 MMOL/L (ref 136–145)
TOTAL PROTEIN: 7.2 G/DL (ref 6.6–8.7)
TRIGL SERPL-MCNC: 68 MG/DL (ref 0–149)
WBC # BLD: 6.2 K/UL (ref 4.8–10.8)

## 2020-03-04 ENCOUNTER — TELEPHONE (OUTPATIENT)
Dept: INTERNAL MEDICINE | Age: 60
End: 2020-03-04

## 2020-03-04 NOTE — TELEPHONE ENCOUNTER
Patient notified of the labs and voice understood she said she had canceled her apt due to getting ready to have surgery on the 13th. She said she is having a great deal of pain and is having trouble driving. She said she will reschedule after surgery.

## 2020-03-08 ENCOUNTER — APPOINTMENT (OUTPATIENT)
Dept: GENERAL RADIOLOGY | Age: 60
End: 2020-03-08
Payer: OTHER MISCELLANEOUS

## 2020-03-08 ENCOUNTER — HOSPITAL ENCOUNTER (EMERGENCY)
Age: 60
Discharge: HOME | End: 2020-03-08
Payer: OTHER MISCELLANEOUS

## 2020-03-08 VITALS
HEIGHT: 63 IN | DIASTOLIC BLOOD PRESSURE: 68 MMHG | WEIGHT: 210 LBS | RESPIRATION RATE: 18 BRPM | BODY MASS INDEX: 37.21 KG/M2 | SYSTOLIC BLOOD PRESSURE: 145 MMHG | HEART RATE: 88 BPM | OXYGEN SATURATION: 99 % | TEMPERATURE: 97.9 F

## 2020-03-08 PROCEDURE — 6370000000 HC RX 637 (ALT 250 FOR IP): Performed by: NURSE PRACTITIONER

## 2020-03-08 PROCEDURE — 99283 EMERGENCY DEPT VISIT LOW MDM: CPT

## 2020-03-08 PROCEDURE — 96372 THER/PROPH/DIAG INJ SC/IM: CPT

## 2020-03-08 PROCEDURE — 6360000002 HC RX W HCPCS: Performed by: NURSE PRACTITIONER

## 2020-03-08 PROCEDURE — 73030 X-RAY EXAM OF SHOULDER: CPT

## 2020-03-08 RX ORDER — KETOROLAC TROMETHAMINE 30 MG/ML
60 INJECTION, SOLUTION INTRAMUSCULAR; INTRAVENOUS ONCE
Status: COMPLETED | OUTPATIENT
Start: 2020-03-08 | End: 2020-03-08

## 2020-03-08 RX ORDER — PROMETHAZINE HYDROCHLORIDE 25 MG/1
25 TABLET ORAL ONCE
Status: COMPLETED | OUTPATIENT
Start: 2020-03-08 | End: 2020-03-08

## 2020-03-08 RX ORDER — ORPHENADRINE CITRATE 30 MG/ML
60 INJECTION INTRAMUSCULAR; INTRAVENOUS ONCE
Status: COMPLETED | OUTPATIENT
Start: 2020-03-08 | End: 2020-03-08

## 2020-03-08 RX ADMIN — PROMETHAZINE HYDROCHLORIDE 25 MG: 25 TABLET ORAL at 20:39

## 2020-03-08 RX ADMIN — ORPHENADRINE CITRATE 60 MG: 30 INJECTION INTRAMUSCULAR; INTRAVENOUS at 18:50

## 2020-03-08 RX ADMIN — HYDROMORPHONE HYDROCHLORIDE 1 MG: 1 INJECTION, SOLUTION INTRAMUSCULAR; INTRAVENOUS; SUBCUTANEOUS at 20:29

## 2020-03-08 RX ADMIN — KETOROLAC TROMETHAMINE 60 MG: 30 INJECTION, SOLUTION INTRAMUSCULAR at 18:49

## 2020-03-08 ASSESSMENT — PAIN DESCRIPTION - ORIENTATION: ORIENTATION: LEFT

## 2020-03-08 ASSESSMENT — PAIN SCALES - GENERAL
PAINLEVEL_OUTOF10: 10

## 2020-03-08 ASSESSMENT — PAIN DESCRIPTION - LOCATION: LOCATION: SHOULDER

## 2020-03-08 ASSESSMENT — PAIN DESCRIPTION - PAIN TYPE: TYPE: ACUTE PAIN

## 2020-03-08 NOTE — ED TRIAGE NOTES
Pt set to have shoulder surgery and today just states it hurts to bad to deal with and her pain meds arent working
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
## 2020-05-21 RX ORDER — LISINOPRIL 10 MG/1
TABLET ORAL
Qty: 30 TABLET | Refills: 0 | Status: SHIPPED | OUTPATIENT
Start: 2020-05-21 | End: 2020-06-19 | Stop reason: SDUPTHER

## 2020-05-21 RX ORDER — METOPROLOL SUCCINATE 50 MG/1
TABLET, EXTENDED RELEASE ORAL
Qty: 30 TABLET | Refills: 0 | Status: SHIPPED | OUTPATIENT
Start: 2020-05-21 | End: 2020-06-19 | Stop reason: SDUPTHER

## 2020-05-27 ENCOUNTER — VIRTUAL VISIT (OUTPATIENT)
Dept: INTERNAL MEDICINE | Age: 60
End: 2020-05-27
Payer: MEDICARE

## 2020-05-27 PROCEDURE — 99214 OFFICE O/P EST MOD 30 MIN: CPT | Performed by: INTERNAL MEDICINE

## 2020-05-27 RX ORDER — ERGOCALCIFEROL 1.25 MG/1
50000 CAPSULE ORAL
Qty: 3 CAPSULE | Refills: 3 | Status: SHIPPED | OUTPATIENT
Start: 2020-05-27 | End: 2022-09-28

## 2020-05-27 ASSESSMENT — ENCOUNTER SYMPTOMS
BLOOD IN STOOL: 0
CHEST TIGHTNESS: 0
ABDOMINAL PAIN: 0
SINUS PAIN: 0
RHINORRHEA: 0
DIARRHEA: 0
COUGH: 0
BACK PAIN: 1
SHORTNESS OF BREATH: 0
CONSTIPATION: 0
TROUBLE SWALLOWING: 0
VOMITING: 0
WHEEZING: 0
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
## 2020-05-29 ENCOUNTER — HOSPITAL ENCOUNTER (OUTPATIENT)
Dept: WOMENS IMAGING | Age: 60
Discharge: HOME | End: 2020-05-29
Payer: MEDICARE

## 2020-05-29 PROCEDURE — 76705 ECHO EXAM OF ABDOMEN: CPT

## 2020-05-29 PROCEDURE — 77063 BREAST TOMOSYNTHESIS BI: CPT

## 2020-06-01 ENCOUNTER — TELEPHONE (OUTPATIENT)
Dept: INTERNAL MEDICINE | Age: 60
End: 2020-06-01

## 2020-06-02 ENCOUNTER — TELEPHONE (OUTPATIENT)
Dept: INTERNAL MEDICINE | Age: 60
End: 2020-06-02

## 2020-06-02 NOTE — TELEPHONE ENCOUNTER
PT wants to know with the results on the liver US if she needs to go back to [facility] since she is still having pain in that area?

## 2020-06-19 RX ORDER — METOPROLOL SUCCINATE 50 MG/1
TABLET, EXTENDED RELEASE ORAL
Qty: 90 TABLET | Refills: 3 | Status: ON HOLD | OUTPATIENT
Start: 2020-06-19 | End: 2021-08-23 | Stop reason: HOSPADM

## 2020-06-19 RX ORDER — LISINOPRIL 10 MG/1
TABLET ORAL
Qty: 90 TABLET | Refills: 3 | Status: ON HOLD | OUTPATIENT
Start: 2020-06-19 | End: 2021-08-23 | Stop reason: HOSPADM

## 2020-06-19 NOTE — TELEPHONE ENCOUNTER
Kalina Clemente called requesting a refill of the below medication which has been pended for you:     Requested Prescriptions     Pending Prescriptions Disp Refills    metoprolol succinate (TOPROL XL) 50 MG extended release tablet [Pharmacy Med Name: METOPROL XL 50 MG TAB *DD] 30 tablet 0     Sig: TAKE 1 TABLET BY MOUTH ONCE DAILY.  lisinopril (PRINIVIL;ZESTRIL) 10 MG tablet [Pharmacy Med Name: LISINOPRIL 10 MG TAB *DD*] 30 tablet 0     Sig: TAKE 1 TABLET BY MOUTH ONCE DAILY.        Last Appointment Date: 2/6/2020  Next Appointment Date: 11/30/2020    No Known Allergies

## 2020-06-26 DIAGNOSIS — Z11.4 SCREENING FOR HIV WITHOUT PRESENCE OF RISK FACTORS: ICD-10-CM

## 2020-06-26 DIAGNOSIS — B18.2 CHRONIC ACTIVE HEPATITIS C (HCC): Chronic | ICD-10-CM

## 2020-06-26 LAB
HEPATITIS C ANTIBODY INTERPRETATION: REACTIVE
HIV-1 P24 AG: NORMAL
RAPID HIV 1&2: NORMAL

## 2020-06-30 LAB
HCV QNT BY NAAT IU/ML: NOT DETECTED IU/ML
HCV QNT BY NAAT LOG IU/ML: NOT DETECTED LOG IU/ML
INTERPRETATION: NOT DETECTED

## 2020-07-23 RX ORDER — ACYCLOVIR 200 MG/1
200 CAPSULE ORAL
Qty: 150 CAPSULE | Refills: 0 | Status: ON HOLD | OUTPATIENT
Start: 2020-07-23 | End: 2021-08-23 | Stop reason: HOSPADM

## 2020-07-23 NOTE — TELEPHONE ENCOUNTER
Shalini Gold called requesting a refill of the below medication which has been pended for you:     Requested Prescriptions     Pending Prescriptions Disp Refills    acyclovir (ZOVIRAX) 200 MG capsule [Pharmacy Med Name: ACYCLOVIR 200MG CAPSULES] 150 capsule 0     Sig: TAKE 1 CAPSULE BY MOUTH 5 TIMES DAILY.        Last Appointment Date: 2/6/2020  Next Appointment Date: 11/30/2020    No Known Allergies

## 2020-08-17 ENCOUNTER — TELEPHONE (OUTPATIENT)
Dept: UROLOGY | Age: 60
End: 2020-08-17

## 2020-08-17 NOTE — TELEPHONE ENCOUNTER
Prudencio Massey requests that nurse  return their call. The best time to reach her is Anytime. Patient having a lot of pain and she thinks she might have a stone ,please call the patient. Thank you.

## 2020-08-21 RX ORDER — ALBUTEROL SULFATE 90 UG/1
2 AEROSOL, METERED RESPIRATORY (INHALATION) EVERY 6 HOURS PRN
Qty: 18 G | Refills: 0 | Status: SHIPPED | OUTPATIENT
Start: 2020-08-21

## 2020-08-21 NOTE — TELEPHONE ENCOUNTER
Clifford Prather called requesting a refill of the below medication which has been pended for you:     Requested Prescriptions     Pending Prescriptions Disp Refills    albuterol sulfate  (90 Base) MCG/ACT inhaler [Pharmacy Med Name: ALBUTEROL SULF HFA] 18 g 0     Sig: INHALE 2 PUFFS INTO THE LUNGS EVERY 6 HOURS AS NEEDED FOR WHEEZING       Last Appointment Date: 5/27/2020  Next Appointment Date: 11/30/2020    No Known Allergies

## 2020-08-26 ENCOUNTER — OFFICE VISIT (OUTPATIENT)
Dept: UROLOGY | Age: 60
End: 2020-08-26
Payer: MEDICARE

## 2020-08-26 VITALS — WEIGHT: 204 LBS | TEMPERATURE: 97.6 F | HEIGHT: 63 IN | BODY MASS INDEX: 36.14 KG/M2

## 2020-08-26 PROBLEM — R34 DECREASED URINE OUTPUT: Status: ACTIVE | Noted: 2020-08-26

## 2020-08-26 LAB
BILIRUBIN, POC: NORMAL
BLOOD URINE, POC: NORMAL
CLARITY, POC: CLEAR
COLOR, POC: YELLOW
GLUCOSE URINE, POC: NORMAL
KETONES, POC: NORMAL
LEUKOCYTE EST, POC: NORMAL
NITRITE, POC: NORMAL
PH, POC: 5.5
PROTEIN, POC: NORMAL
SPECIFIC GRAVITY, POC: 1.03
UROBILINOGEN, POC: 0.2

## 2020-08-26 PROCEDURE — 99213 OFFICE O/P EST LOW 20 MIN: CPT | Performed by: PHYSICIAN ASSISTANT

## 2020-08-26 PROCEDURE — 51798 US URINE CAPACITY MEASURE: CPT | Performed by: PHYSICIAN ASSISTANT

## 2020-08-26 PROCEDURE — 81003 URINALYSIS AUTO W/O SCOPE: CPT | Performed by: PHYSICIAN ASSISTANT

## 2020-08-26 RX ORDER — DICYCLOMINE HCL 20 MG
TABLET ORAL
Status: ON HOLD | COMMUNITY
Start: 2020-08-10 | End: 2022-09-29 | Stop reason: HOSPADM

## 2020-08-26 ASSESSMENT — ENCOUNTER SYMPTOMS
BACK PAIN: 0
EYE REDNESS: 0
DIARRHEA: 0
COUGH: 0
WHEEZING: 0
SHORTNESS OF BREATH: 0
CONSTIPATION: 0
ABDOMINAL PAIN: 0
EYE DISCHARGE: 0

## 2020-09-02 LAB
ALBUMIN SERPL-MCNC: 4.4 G/DL (ref 3.5–5.2)
ALP BLD-CCNC: 56 U/L (ref 35–104)
ALT SERPL-CCNC: 10 U/L (ref 5–33)
ANION GAP SERPL CALCULATED.3IONS-SCNC: 14 MMOL/L (ref 7–19)
AST SERPL-CCNC: 13 U/L (ref 5–32)
BASOPHILS ABSOLUTE: 0 K/UL (ref 0–0.2)
BASOPHILS RELATIVE PERCENT: 0.4 % (ref 0–1)
BILIRUB SERPL-MCNC: <0.2 MG/DL (ref 0.2–1.2)
BUN BLDV-MCNC: 13 MG/DL (ref 8–23)
CALCIUM SERPL-MCNC: 10 MG/DL (ref 8.8–10.2)
CHLORIDE BLD-SCNC: 96 MMOL/L (ref 98–111)
CHOLESTEROL, TOTAL: 162 MG/DL (ref 160–199)
CO2: 30 MMOL/L (ref 22–29)
CREAT SERPL-MCNC: 0.7 MG/DL (ref 0.5–0.9)
EOSINOPHILS ABSOLUTE: 0.1 K/UL (ref 0–0.6)
EOSINOPHILS RELATIVE PERCENT: 1.3 % (ref 0–5)
GFR AFRICAN AMERICAN: >59
GFR NON-AFRICAN AMERICAN: >60
GLUCOSE BLD-MCNC: 121 MG/DL (ref 74–109)
HCT VFR BLD CALC: 46.2 % (ref 37–47)
HDLC SERPL-MCNC: 47 MG/DL (ref 65–121)
HEMOGLOBIN: 14.2 G/DL (ref 12–16)
IMMATURE GRANULOCYTES #: 0 K/UL
LDL CHOLESTEROL CALCULATED: 94 MG/DL
LYMPHOCYTES ABSOLUTE: 2.5 K/UL (ref 1.1–4.5)
LYMPHOCYTES RELATIVE PERCENT: 36.8 % (ref 20–40)
MCH RBC QN AUTO: 27 PG (ref 27–31)
MCHC RBC AUTO-ENTMCNC: 30.7 G/DL (ref 33–37)
MCV RBC AUTO: 87.8 FL (ref 81–99)
MONOCYTES ABSOLUTE: 0.5 K/UL (ref 0–0.9)
MONOCYTES RELATIVE PERCENT: 8 % (ref 0–10)
NEUTROPHILS ABSOLUTE: 3.6 K/UL (ref 1.5–7.5)
NEUTROPHILS RELATIVE PERCENT: 53.4 % (ref 50–65)
PDW BLD-RTO: 13.2 % (ref 11.5–14.5)
PLATELET # BLD: 221 K/UL (ref 130–400)
PMV BLD AUTO: 13.3 FL (ref 9.4–12.3)
POTASSIUM SERPL-SCNC: 4.4 MMOL/L (ref 3.5–5)
RBC # BLD: 5.26 M/UL (ref 4.2–5.4)
SODIUM BLD-SCNC: 140 MMOL/L (ref 136–145)
TOTAL PROTEIN: 7.4 G/DL (ref 6.6–8.7)
TRIGL SERPL-MCNC: 104 MG/DL (ref 0–149)
TSH REFLEX FT4: 0.66 UIU/ML (ref 0.35–5.5)
VITAMIN B-12: >2000 PG/ML (ref 211–946)
VITAMIN D 25-HYDROXY: 41.3 NG/ML
WBC # BLD: 6.7 K/UL (ref 4.8–10.8)

## 2020-09-17 LAB
C DIFF TOXIN/ANTIGEN: NORMAL
C. DIFFICILE TOXIN MOLECULAR: ABNORMAL
CRYPTOSPORIDIUM ANTIGEN STOOL: NORMAL
GIARDIA ANTIGEN STOOL: NORMAL
ORGANISM: ABNORMAL

## 2020-09-19 LAB
CAMPYLOBACTER ANTIGEN: NORMAL
CULTURE, STOOL: NORMAL
E COLI SHIGA TOXIN ASSAY: NORMAL

## 2020-09-22 DIAGNOSIS — I10 ESSENTIAL HYPERTENSION: ICD-10-CM

## 2020-09-22 DIAGNOSIS — Z13.220 SCREENING FOR LIPID DISORDERS: ICD-10-CM

## 2020-09-22 DIAGNOSIS — E55.9 HYPOVITAMINOSIS D: ICD-10-CM

## 2020-09-22 DIAGNOSIS — Z13.29 SCREENING FOR THYROID DISORDER: ICD-10-CM

## 2020-09-22 LAB
ALBUMIN SERPL-MCNC: 4.2 G/DL (ref 3.5–5.2)
ALP BLD-CCNC: 54 U/L (ref 35–104)
ALT SERPL-CCNC: 21 U/L (ref 5–33)
ANION GAP SERPL CALCULATED.3IONS-SCNC: 14 MMOL/L (ref 7–19)
AST SERPL-CCNC: 20 U/L (ref 5–32)
BILIRUB SERPL-MCNC: <0.2 MG/DL (ref 0.2–1.2)
BILIRUBIN DIRECT: 0.1 MG/DL (ref 0–0.3)
BILIRUBIN, INDIRECT: 0.1 MG/DL (ref 0.1–1)
BUN BLDV-MCNC: 10 MG/DL (ref 8–23)
CALCIUM SERPL-MCNC: 9.5 MG/DL (ref 8.8–10.2)
CHLORIDE BLD-SCNC: 105 MMOL/L (ref 98–111)
CHOLESTEROL, TOTAL: 196 MG/DL (ref 160–199)
CO2: 28 MMOL/L (ref 22–29)
CREAT SERPL-MCNC: 0.6 MG/DL (ref 0.5–0.9)
GFR AFRICAN AMERICAN: >59
GFR NON-AFRICAN AMERICAN: >60
GLUCOSE BLD-MCNC: 90 MG/DL (ref 74–109)
HDLC SERPL-MCNC: 66 MG/DL (ref 65–121)
LDL CHOLESTEROL CALCULATED: 111 MG/DL
POTASSIUM SERPL-SCNC: 4.3 MMOL/L (ref 3.5–5)
SODIUM BLD-SCNC: 147 MMOL/L (ref 136–145)
TOTAL PROTEIN: 7 G/DL (ref 6.6–8.7)
TRIGL SERPL-MCNC: 94 MG/DL (ref 0–149)
TSH REFLEX FT4: 1.64 UIU/ML (ref 0.35–5.5)
VITAMIN D 25-HYDROXY: 32.5 NG/ML

## 2020-09-24 ENCOUNTER — HOSPITAL ENCOUNTER (OUTPATIENT)
Dept: CT IMAGING | Age: 60
Discharge: HOME | End: 2020-09-24
Payer: MEDICARE

## 2020-09-24 PROCEDURE — 6360000004 HC RX CONTRAST MEDICATION: Performed by: PHYSICIAN ASSISTANT

## 2020-09-24 PROCEDURE — 74178 CT ABD&PLV WO CNTR FLWD CNTR: CPT

## 2020-09-24 RX ADMIN — IOPAMIDOL 75 ML: 755 INJECTION, SOLUTION INTRAVENOUS at 10:41

## 2020-09-25 ENCOUNTER — TELEPHONE (OUTPATIENT)
Dept: UROLOGY | Age: 60
End: 2020-09-25

## 2020-09-25 NOTE — TELEPHONE ENCOUNTER
There is a 4 mm nonobstructing stone in the right lower pole no stones in the left or right ureter no obstruction noted no source of acute pain.

## 2020-10-28 ENCOUNTER — OFFICE VISIT (OUTPATIENT)
Age: 60
End: 2020-10-28

## 2020-10-28 VITALS — TEMPERATURE: 96.1 F | HEART RATE: 80 BPM | OXYGEN SATURATION: 94 %

## 2020-10-31 LAB — SARS-COV-2, NAA: NOT DETECTED

## 2020-11-09 ENCOUNTER — TELEPHONE (OUTPATIENT)
Dept: UROLOGY | Age: 60
End: 2020-11-09

## 2020-11-09 NOTE — TELEPHONE ENCOUNTER
Patient had a CT Abd Pel W/ W/out on 9/24/20 which showed  4mm stone in right kidney, no obstruction or hydro. I will send message to Rafia Morgan to see what he recommends at this point.

## 2020-11-09 NOTE — TELEPHONE ENCOUNTER
[facility] requests that nurse return their call. The best time to reach her is Anytime. Patient having some bleeding when she urinate,please call the patient. Thank you.

## 2020-11-09 NOTE — TELEPHONE ENCOUNTER
CT reveals a 4 mm nonobstructing stone no other ureteral stones are identified really no acute urologic pathology. No indication for any intervention recommend patient follow-up 3 months with [facility] to see whether that stone on the right could be seen for shockwave lithotripsy.

## 2020-12-07 ENCOUNTER — TELEPHONE (OUTPATIENT)
Dept: INTERNAL MEDICINE | Age: 60
End: 2020-12-07

## 2020-12-07 NOTE — TELEPHONE ENCOUNTER
----- Message from Katelyn Tirado MD sent at 12/7/2020  2:22 PM CST -----  Does she have a new PCP, it shows on EPIC

## 2020-12-11 ENCOUNTER — HOSPITAL ENCOUNTER (OUTPATIENT)
Dept: ULTRASOUND IMAGING | Age: 60
Discharge: HOME | End: 2020-12-11
Payer: MEDICARE

## 2020-12-11 PROCEDURE — 76830 TRANSVAGINAL US NON-OB: CPT

## 2020-12-22 ENCOUNTER — HOSPITAL ENCOUNTER (OUTPATIENT)
Dept: CT IMAGING | Age: 60
Discharge: HOME | End: 2020-12-22
Payer: MEDICARE

## 2020-12-22 PROCEDURE — G0297 LDCT FOR LUNG CA SCREEN: HCPCS

## 2021-01-07 ENCOUNTER — TELEPHONE (OUTPATIENT)
Dept: OBGYN CLINIC | Age: 61
End: 2021-01-07

## 2021-01-07 ENCOUNTER — TELEPHONE (OUTPATIENT)
Dept: UROLOGY | Age: 61
End: 2021-01-07

## 2021-01-07 NOTE — TELEPHONE ENCOUNTER
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 She has had an us that was normal but still having bleeding and has had a total hysterectomy.  Jazzmine/MONICA

## 2021-01-07 NOTE — TELEPHONE ENCOUNTER
Pt called stating she has an extensive hx of kidney stones and has had 20+ surgeries for them. She said if it was a kidney stone moving she would know but it doesn't feel like that. She said she has had bright red blood once and dark red blood every time she wipes on the toilet paper. She said her discharge has been brown and even a neon yellow but she hasn't had medication or foods to cause that. She said this has been going on for about a month. Her urine frequency is normal and the only pain she has is on the right side of abdomen around liver. She has a history of liver issues and has a bx at [facility] about 15 years ago where it showed cirrhosis. She called GI and they did not think issue was related. Her PCP also sent her to GYN and they did not find anything either. She goes through 4-5 ultra thin pads daily, mostly due to odor. She has also had a hysterectomy and hasn't had periods in years. She is also weak and fatigued. Since she has been to her PCP, an OBGYN, and spoke to GI she decided to call here to see if it could be a urological issue.

## 2021-01-07 NOTE — TELEPHONE ENCOUNTER
According to [facility] TE encounter:    \"CT reveals a 4 mm nonobstructing stone no other ureteral stones are identified really no acute urologic pathology. No indication for any intervention recommend patient follow-up 3 months with [facility] to see whether that stone on the right could be seen for shockwave lithotripsy\"    She shouldn't be having any symptoms from these since they are non obstructing. Patient can contact her PCP or you can move her 3 month follow up up with [facility] prior.

## 2021-01-08 NOTE — TELEPHONE ENCOUNTER
Called pt and she stated that she thinks her problem is an female problem. I offered to move appointment up and she refused. She said that she would just wait and she has an incoming appointment with OBGYN and she thinks she will find out more information.

## 2021-01-08 NOTE — TELEPHONE ENCOUNTER
Returned call to pt. Pt states she had a hysterectomy at age 25, but has some vaginal bleeding. She had an appt yesterday but was a no show, and was rescheduled for 1-26-21 but cancelled that as well. appt made for next week. Pt v/u.
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
## 2021-02-03 ENCOUNTER — OFFICE VISIT (OUTPATIENT)
Dept: UROLOGY | Age: 61
End: 2021-02-03
Payer: MEDICARE

## 2021-02-03 VITALS
HEIGHT: 63 IN | DIASTOLIC BLOOD PRESSURE: 76 MMHG | TEMPERATURE: 97 F | BODY MASS INDEX: 37.39 KG/M2 | WEIGHT: 211 LBS | SYSTOLIC BLOOD PRESSURE: 128 MMHG

## 2021-02-03 DIAGNOSIS — R31.0 GROSS HEMATURIA: ICD-10-CM

## 2021-02-03 DIAGNOSIS — N13.5 URETERAL STRICTURE, LEFT: ICD-10-CM

## 2021-02-03 DIAGNOSIS — R10.9 RIGHT FLANK PAIN: ICD-10-CM

## 2021-02-03 DIAGNOSIS — R31.0 GROSS HEMATURIA: Primary | ICD-10-CM

## 2021-02-03 DIAGNOSIS — N89.8 VAGINAL DISCHARGE: ICD-10-CM

## 2021-02-03 LAB
ALBUMIN SERPL-MCNC: 4.6 G/DL (ref 3.5–5.2)
ALP BLD-CCNC: 65 U/L (ref 35–104)
ALT SERPL-CCNC: 12 U/L (ref 5–33)
ANION GAP SERPL CALCULATED.3IONS-SCNC: 6 MMOL/L (ref 7–19)
AST SERPL-CCNC: 10 U/L (ref 5–32)
BACTERIA URINE, POC: ABNORMAL
BILIRUB SERPL-MCNC: 0.3 MG/DL (ref 0.2–1.2)
BILIRUBIN URINE: 0 MG/DL
BLOOD, URINE: POSITIVE
BUN BLDV-MCNC: 14 MG/DL (ref 8–23)
CALCIUM SERPL-MCNC: 9.6 MG/DL (ref 8.8–10.2)
CASTS URINE, POC: ABNORMAL
CHLORIDE BLD-SCNC: 104 MMOL/L (ref 98–111)
CLARITY: CLEAR
CO2: 32 MMOL/L (ref 22–29)
COLOR: YELLOW
CREAT SERPL-MCNC: 0.6 MG/DL (ref 0.5–0.9)
CRYSTALS URINE, POC: ABNORMAL
EPI CELLS URINE, POC: ABNORMAL
GFR AFRICAN AMERICAN: >59
GFR NON-AFRICAN AMERICAN: >60
GLUCOSE BLD-MCNC: 88 MG/DL (ref 74–109)
GLUCOSE URINE: ABNORMAL
KETONES, URINE: NEGATIVE
LEUKOCYTE EST, POC: POSITIVE
NITRITE, URINE: NEGATIVE
PH UA: 5.5 (ref 4.5–8)
POTASSIUM SERPL-SCNC: 4.7 MMOL/L (ref 3.5–5)
PROTEIN UA: NEGATIVE
RBC URINE, POC: ABNORMAL
SODIUM BLD-SCNC: 142 MMOL/L (ref 136–145)
SPECIFIC GRAVITY UA: 1.02 (ref 1–1.03)
TOTAL PROTEIN: 7.3 G/DL (ref 6.6–8.7)
UROBILINOGEN, URINE: NORMAL
WBC URINE, POC: 2
YEAST URINE, POC: ABNORMAL

## 2021-02-03 PROCEDURE — 81000 URINALYSIS NONAUTO W/SCOPE: CPT | Performed by: NURSE PRACTITIONER

## 2021-02-03 PROCEDURE — 99214 OFFICE O/P EST MOD 30 MIN: CPT | Performed by: NURSE PRACTITIONER

## 2021-02-03 ASSESSMENT — ENCOUNTER SYMPTOMS
CHEST TIGHTNESS: 0
ABDOMINAL DISTENTION: 0
COLOR CHANGE: 0
NAUSEA: 1
DIARRHEA: 1
VOMITING: 0
CONSTIPATION: 0
ABDOMINAL PAIN: 0
SHORTNESS OF BREATH: 0

## 2021-02-05 LAB — URINE CULTURE, ROUTINE: NORMAL

## 2021-02-08 ENCOUNTER — HOSPITAL ENCOUNTER (OUTPATIENT)
Dept: CT IMAGING | Age: 61
Discharge: HOME | End: 2021-02-08
Payer: MEDICARE

## 2021-02-08 DIAGNOSIS — R10.9 RIGHT FLANK PAIN: ICD-10-CM

## 2021-02-08 DIAGNOSIS — R31.0 GROSS HEMATURIA: ICD-10-CM

## 2021-02-08 PROCEDURE — 74178 CT ABD&PLV WO CNTR FLWD CNTR: CPT

## 2021-02-08 PROCEDURE — 6360000004 HC RX CONTRAST MEDICATION: Performed by: NURSE PRACTITIONER

## 2021-02-08 RX ADMIN — IOPAMIDOL 75 ML: 755 INJECTION, SOLUTION INTRAVENOUS at 13:31

## 2021-02-10 ENCOUNTER — TELEPHONE (OUTPATIENT)
Dept: GASTROENTEROLOGY | Age: 61
End: 2021-02-10

## 2021-02-10 ENCOUNTER — TELEPHONE (OUTPATIENT)
Dept: UROLOGY | Age: 61
End: 2021-02-10

## 2021-02-10 DIAGNOSIS — R31.0 GROSS HEMATURIA: Primary | ICD-10-CM

## 2021-02-10 NOTE — TELEPHONE ENCOUNTER
I called her. I discussed her results of her CT. I sent her results to Dr. Glenny Trejo as well. She is going to make an appt with Dr. Glenny Trejo to go over abnormal CT results and any further testing that may be needed that is not urologic in nature. She is also scheduled for a cystoscopy with Dr. Marylou Bloom on March 22nd for gross hematuria.

## 2021-02-10 NOTE — TELEPHONE ENCOUNTER
2/10/2021; PATIENT CALLED COMPLAINING OF LIVER PAIN (BIOPDY WAS DONE 15 YRS AGO, SHE WAS AT STAGE 3). SHE ALSO STATED THAT SHE HAD A HYSTERECTOMY WHEN SHE WAS 25 AND SHE IS CURRENTLY BLEEDING AND USING KOTEX. I MADE HER APPT WITH A DR AS SHE REQUESTED AND ADVISED HER TO HAVE PCP SEND OVER RECORDS.

## 2021-02-19 ENCOUNTER — TELEPHONE (OUTPATIENT)
Dept: OBGYN CLINIC | Age: 61
End: 2021-02-19

## 2021-02-19 NOTE — TELEPHONE ENCOUNTER
Appt has been made for PMB. Pt had US in Dec & CT in Feb.  Do you want her to have any other imaging?

## 2021-03-01 ENCOUNTER — OFFICE VISIT (OUTPATIENT)
Dept: OBGYN CLINIC | Age: 61
End: 2021-03-01
Payer: MEDICARE

## 2021-03-01 ENCOUNTER — TELEPHONE (OUTPATIENT)
Dept: OBGYN CLINIC | Age: 61
End: 2021-03-01

## 2021-03-01 VITALS
WEIGHT: 207 LBS | HEIGHT: 63 IN | DIASTOLIC BLOOD PRESSURE: 82 MMHG | SYSTOLIC BLOOD PRESSURE: 116 MMHG | BODY MASS INDEX: 36.68 KG/M2

## 2021-03-01 DIAGNOSIS — Z12.4 SCREENING FOR CERVICAL CANCER: ICD-10-CM

## 2021-03-01 DIAGNOSIS — Z11.51 SCREENING FOR HPV (HUMAN PAPILLOMAVIRUS): ICD-10-CM

## 2021-03-01 DIAGNOSIS — N89.8 VAGINAL ODOR: ICD-10-CM

## 2021-03-01 DIAGNOSIS — N93.9 VAGINAL BLEEDING: Primary | ICD-10-CM

## 2021-03-01 DIAGNOSIS — Z01.411 ENCOUNTER FOR GYNECOLOGICAL EXAMINATION (GENERAL) (ROUTINE) WITH ABNORMAL FINDINGS: ICD-10-CM

## 2021-03-01 DIAGNOSIS — Z12.72 SCREENING FOR VAGINAL CANCER: ICD-10-CM

## 2021-03-01 DIAGNOSIS — N89.8 VAGINAL MASS: ICD-10-CM

## 2021-03-01 DIAGNOSIS — Z90.710 HISTORY OF HYSTERECTOMY: ICD-10-CM

## 2021-03-01 PROCEDURE — 99204 OFFICE O/P NEW MOD 45 MIN: CPT | Performed by: OBSTETRICS & GYNECOLOGY

## 2021-03-01 ASSESSMENT — ENCOUNTER SYMPTOMS
RESPIRATORY NEGATIVE: 1
GASTROINTESTINAL NEGATIVE: 1
ALLERGIC/IMMUNOLOGIC NEGATIVE: 1
EYES NEGATIVE: 1
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
## 2021-03-02 ENCOUNTER — TELEPHONE (OUTPATIENT)
Dept: OBGYN CLINIC | Age: 61
End: 2021-03-02

## 2021-03-02 NOTE — TELEPHONE ENCOUNTER
Called pt re Gyn Onc referral.  Told pt that she has to go to [facility] because of her insurance. I told her they will call her today with an appointment. I also told her that she needs to keep all other appointments and imaging that she has scheduled. Pt is very anxious about this vaginal mass. Pt v/u. Will fax records.

## 2021-03-04 LAB
HPV COMMENT: ABNORMAL
HPV COMMENT: ABNORMAL
HPV TYPE 16: DETECTED
HPV TYPE 16: DETECTED
HPV TYPE 18: NOT DETECTED
HPV TYPE 18: NOT DETECTED
HPVOH (OTHER TYPES): NOT DETECTED
HPVOH (OTHER TYPES): NOT DETECTED

## 2021-03-15 ENCOUNTER — OFFICE VISIT (OUTPATIENT)
Age: 61
End: 2021-03-15

## 2021-03-15 VITALS — HEART RATE: 88 BPM | TEMPERATURE: 97 F | OXYGEN SATURATION: 100 %

## 2021-03-15 VITALS — HEART RATE: 88 BPM | OXYGEN SATURATION: 100 % | TEMPERATURE: 97 F

## 2021-03-15 DIAGNOSIS — Z11.59 SCREENING FOR VIRAL DISEASE: Primary | ICD-10-CM

## 2021-03-15 PROCEDURE — 99999 PR OFFICE/OUTPT VISIT,PROCEDURE ONLY: CPT | Performed by: NURSE PRACTITIONER

## 2021-03-16 LAB — SARS-COV-2, NAA: NOT DETECTED

## 2021-04-01 ENCOUNTER — HOSPITAL ENCOUNTER (EMERGENCY)
Age: 61
Discharge: HOME | End: 2021-04-01
Attending: EMERGENCY MEDICINE
Payer: MEDICARE

## 2021-04-01 VITALS
HEART RATE: 78 BPM | RESPIRATION RATE: 16 BRPM | DIASTOLIC BLOOD PRESSURE: 65 MMHG | SYSTOLIC BLOOD PRESSURE: 112 MMHG | OXYGEN SATURATION: 95 % | TEMPERATURE: 98.1 F

## 2021-04-01 DIAGNOSIS — G89.3 CANCER-RELATED PAIN: Primary | ICD-10-CM

## 2021-04-01 DIAGNOSIS — Z78.9 HAS RUN OUT OF MEDICATIONS: ICD-10-CM

## 2021-04-01 PROCEDURE — 6370000000 HC RX 637 (ALT 250 FOR IP): Performed by: EMERGENCY MEDICINE

## 2021-04-01 PROCEDURE — 6360000002 HC RX W HCPCS: Performed by: EMERGENCY MEDICINE

## 2021-04-01 PROCEDURE — 96374 THER/PROPH/DIAG INJ IV PUSH: CPT

## 2021-04-01 PROCEDURE — 99283 EMERGENCY DEPT VISIT LOW MDM: CPT

## 2021-04-01 RX ORDER — MORPHINE SULFATE 10 MG/ML
8 INJECTION, SOLUTION INTRAMUSCULAR; INTRAVENOUS ONCE
Status: DISCONTINUED | OUTPATIENT
Start: 2021-04-01 | End: 2021-04-01

## 2021-04-01 RX ORDER — HYDROMORPHONE HYDROCHLORIDE 1 MG/ML
0.5 INJECTION, SOLUTION INTRAMUSCULAR; INTRAVENOUS; SUBCUTANEOUS ONCE
Status: COMPLETED | OUTPATIENT
Start: 2021-04-01 | End: 2021-04-01

## 2021-04-01 RX ORDER — HYDROMORPHONE HYDROCHLORIDE 1 MG/ML
0.5 INJECTION, SOLUTION INTRAMUSCULAR; INTRAVENOUS; SUBCUTANEOUS ONCE
Status: DISCONTINUED | OUTPATIENT
Start: 2021-04-01 | End: 2021-04-01

## 2021-04-01 RX ORDER — OXYCODONE AND ACETAMINOPHEN 10; 325 MG/1; MG/1
1 TABLET ORAL ONCE
Status: COMPLETED | OUTPATIENT
Start: 2021-04-01 | End: 2021-04-01

## 2021-04-01 RX ADMIN — HYDROMORPHONE HYDROCHLORIDE 0.5 MG: 1 INJECTION, SOLUTION INTRAMUSCULAR; INTRAVENOUS; SUBCUTANEOUS at 15:21

## 2021-04-01 RX ADMIN — OXYCODONE HYDROCHLORIDE AND ACETAMINOPHEN 1 TABLET: 10; 325 TABLET ORAL at 14:29

## 2021-04-01 ASSESSMENT — ENCOUNTER SYMPTOMS
COUGH: 0
VOICE CHANGE: 0
EYE REDNESS: 0
EYE PAIN: 0
ABDOMINAL PAIN: 0
RHINORRHEA: 0
SHORTNESS OF BREATH: 0

## 2021-04-01 ASSESSMENT — PAIN SCALES - GENERAL
PAINLEVEL_OUTOF10: 9
PAINLEVEL_OUTOF10: 9

## 2021-04-01 ASSESSMENT — PAIN DESCRIPTION - LOCATION: LOCATION: RECTUM
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
## 2021-04-01 NOTE — ED TRIAGE NOTES
Pt here with rectal pain from a cancerous mass. 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   Pt states the pain is much worse today

## 2021-04-13 ENCOUNTER — TELEPHONE (OUTPATIENT)
Dept: RADIATION ONCOLOGY | Facility: HOSPITAL | Age: 61
End: 2021-04-13

## 2021-04-13 DIAGNOSIS — C21.0 ANAL CANCER (HCC): Primary | ICD-10-CM

## 2021-04-13 NOTE — TELEPHONE ENCOUNTER
Patient is scheduled for consult with Dr. Jadiel Guthrie 4/21 at 9:00am  Referred by Dr. Kevin Diaz at [facility].

## 2021-04-16 PROBLEM — C44.520: Status: ACTIVE | Noted: 2021-04-12
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
## 2021-04-19 PROBLEM — F17.200 CURRENT EVERY DAY SMOKER: Status: ACTIVE | Noted: 2021-04-19

## 2021-04-20 ENCOUNTER — OFFICE VISIT (OUTPATIENT)
Dept: HEMATOLOGY | Age: 61
End: 2021-04-20
Payer: MEDICARE

## 2021-04-20 ENCOUNTER — HOSPITAL ENCOUNTER (OUTPATIENT)
Dept: INFUSION THERAPY | Age: 61
Discharge: HOME | End: 2021-04-20
Payer: MEDICARE

## 2021-04-20 ENCOUNTER — TRANSCRIBE ORDERS (OUTPATIENT)
Dept: ADMINISTRATIVE | Facility: HOSPITAL | Age: 61
End: 2021-04-20

## 2021-04-20 ENCOUNTER — HOSPITAL ENCOUNTER (OUTPATIENT)
Dept: RADIATION ONCOLOGY | Facility: HOSPITAL | Age: 61
Setting detail: RADIATION/ONCOLOGY SERIES
End: 2021-04-20

## 2021-04-20 VITALS
HEART RATE: 60 BPM | BODY MASS INDEX: 35.51 KG/M2 | HEIGHT: 63 IN | DIASTOLIC BLOOD PRESSURE: 80 MMHG | OXYGEN SATURATION: 97 % | TEMPERATURE: 97.1 F | WEIGHT: 200.4 LBS | SYSTOLIC BLOOD PRESSURE: 128 MMHG

## 2021-04-20 DIAGNOSIS — C21.1 MALIGNANT NEOPLASM OF ANAL CANAL (HCC): Primary | ICD-10-CM

## 2021-04-20 DIAGNOSIS — Z71.89 CARE PLAN DISCUSSED WITH PATIENT: ICD-10-CM

## 2021-04-20 DIAGNOSIS — Z71.89 COORDINATION OF COMPLEX CARE: ICD-10-CM

## 2021-04-20 DIAGNOSIS — Z86.19 HISTORY OF HEPATITIS C: ICD-10-CM

## 2021-04-20 DIAGNOSIS — N82.3 RECTO-VAGINAL FISTULA: ICD-10-CM

## 2021-04-20 DIAGNOSIS — C21.1 MALIGNANT NEOPLASM OF ANAL CANAL (HCC): ICD-10-CM

## 2021-04-20 DIAGNOSIS — Z87.891 SMOKING HX: ICD-10-CM

## 2021-04-20 DIAGNOSIS — R59.0 INGUINAL LYMPHADENOPATHY: ICD-10-CM

## 2021-04-20 DIAGNOSIS — C21.0 ANAL CANCER (HCC): ICD-10-CM

## 2021-04-20 DIAGNOSIS — C21.0 ANAL CANCER (HCC): Primary | ICD-10-CM

## 2021-04-20 DIAGNOSIS — N82.3 RECTOVAGINAL FISTULA: ICD-10-CM

## 2021-04-20 DIAGNOSIS — R59.0 LYMPHADENOPATHY, INGUINAL: ICD-10-CM

## 2021-04-20 LAB
BASOPHILS ABSOLUTE: 0.02 K/UL (ref 0.01–0.08)
BASOPHILS RELATIVE PERCENT: 0.2 % (ref 0.1–1.2)
EOSINOPHILS ABSOLUTE: 0.09 K/UL (ref 0.04–0.54)
EOSINOPHILS RELATIVE PERCENT: 1 % (ref 0.7–7)
HCT VFR BLD CALC: 44.1 % (ref 34.1–44.9)
HEMOGLOBIN: 14 G/DL (ref 11.2–15.7)
LYMPHOCYTES ABSOLUTE: 2.6 K/UL (ref 1.18–3.74)
LYMPHOCYTES RELATIVE PERCENT: 29.7 % (ref 19.3–53.1)
MCH RBC QN AUTO: 28.1 PG (ref 25.6–32.2)
MCHC RBC AUTO-ENTMCNC: 31.7 G/DL (ref 32.3–35.5)
MCV RBC AUTO: 88.6 FL (ref 79.4–94.8)
MONOCYTES ABSOLUTE: 0.78 K/UL (ref 0.24–0.82)
MONOCYTES RELATIVE PERCENT: 8.9 % (ref 4.7–12.5)
NEUTROPHILS ABSOLUTE: 5.27 K/UL (ref 1.56–6.13)
NEUTROPHILS RELATIVE PERCENT: 60.2 % (ref 34–71.1)
PDW BLD-RTO: 13 % (ref 11.7–14.4)
PLATELET # BLD: 176 K/UL (ref 182–369)
PMV BLD AUTO: 11.8 FL (ref 7.4–10.4)
RBC # BLD: 4.98 M/UL (ref 3.93–5.22)
WBC # BLD: 8.76 K/UL (ref 3.98–10.04)

## 2021-04-20 PROCEDURE — 99215 OFFICE O/P EST HI 40 MIN: CPT | Performed by: INTERNAL MEDICINE

## 2021-04-20 PROCEDURE — 85025 COMPLETE CBC W/AUTO DIFF WBC: CPT

## 2021-04-20 PROCEDURE — 99213 OFFICE O/P EST LOW 20 MIN: CPT

## 2021-04-20 NOTE — PROGRESS NOTES
[facility], P.SNoahCMD Shweta Swanson BSN, PA-C  __________________________________  [facility]  Department of Radiation Oncology  [address] [place] [id number]  Office:  [phone]  Fax: [phone]    DATE:  04/21/2021  PATIENT: Lenora Kathleen  1960                         MEDICAL RECORD #:  [id number]                                                       REASON FOR CONSULTATION:    Chief Complaint   Patient presents with   • Appointment     Anal Cancer     Lenora Kathleen is a female that has been referred to our office for radiotherapy considerations for carcinoma of the anal canal. Reports activity change, appetite change, fatigue, wheezing, blood in stool, diarrhea, rectal pain, pelvic pain, urgency with urination, vaginal bleeding/discharge, vaginal pain, and back pain. Denies unexpected weight change, cough, choking, chest tightness, SOB, abdominal distention, nausea/vomiting, decreased urine volume, frequency with urination, hematuria, dysuria, light-headedness, weakness, and headaches. Follows MDs at [facility].     History of Present Illness:      09/24/2020 - CT Abdomen/Pelvis with and without contrast:  • Right nephrolithiasis. A 4 mm calculus is present lower pole the right kidney.   • The left renal contour is visualized. There may be very subtle pelvocaliectasis on the left     12/11/2020 - Transvaginal Ultrasound due to vaginal bleeding:  • Status post total abdominal hysterectomy.   • No evidence of discrete mass or free fluid.     12/22/2020 - CT Lung Screening:  • No suspicious pulmonary nodules or pathologic intrathoracic lymphadenopathy. Mild ectasia ascending thoracic aorta with mild vascular calcification.   • Lung RADS category 1-negative exam. Continued annual screening with low-dose CT chest in 12 months recommended.     02/03/2021 - Appointment with ADIN Santos - [facility] Urology:  • Right flank pain  o Complaints of  right flank pain greater than left. History of 4mm calculi in lower pole of right kidney. Will obtain new CT to determine exact location of stone.   • Gross hematuria  o This has been intermittent since Oct 2020. Urine ranges from clear yellow to bloody. History of stones. RBC's detected on UA. Will obtain CT to evaluate stones.   • Vaginal discharge  o She states a malodorous discharge that she thought was coming through her urine. I was unable to identify any infection in her urine on microscope. Will order urine culture to rule out any possible infection. Recommended a visit to her GYN for swab of possible vaginal discharge.   • Ureteral stricture, left  o History of scarring and ureteral stricture on left. Has had a previous balloon dilation however Dr. Yeboah did discuss more definitive repair by specialist but was not having pain nor hydronephrosis. Will order CT urogram to further evaluate.  • Return in about 1 week (around 2/10/2021) for follow up, CT prior to appt, with labs prior.     02/08/2021 - CT Abdomen/Pelvis due to hematuria:  • Area of decreased ureteral distention on delayed phase images may reflect ureteral wall thickening or may be physiologic. No surrounding inflammatory change is identified. This is new compared to the prior exam.   • Punctate nonobstructive bilateral renal calculi.   • Mildly enlarged right inguinal lymph node of uncertain   • significance. Single enlarged lymph node is likely reactive but cannot be confirmed.   • Atherosclerotic disease.     03/01/2021 - Appointment with Dr.Slatter Shelton - OB/GYN - [facility]:  • Lenora Kathleen presents today for complaints of vaginal bleeding.   • Pt has had a hysterectomy, due to abnormal bleeding.   • Pt had some bleeding in October when wiping. Pt thought it was from kidney stones. A few months later, she had some red bleeding. Pt has had bleeding on and off. Pt also has an odor.   Recommendations:  • Pt has a GI appt next week in  [place], encourage pt to keep appt and get a colonoscopy asap.   • Refer to Gyn Onc, will call with appt.   • Pt told she has a vaginal /rectal mass that needs to be evaluated further with likely biopsies.   • Vaginal pap and HPV  • Posterior vaginal wall specimen sent in thin prep  • Diatherix swab sent     03/01/2021 - Pap smear per Dr.Slatter Shelton:  • Satisfactory for Evaluation.    • Endocervical cells/transformation zone component present.     03/09/2021 - Appointment with Edward Clarke MD at [facility] Gynecologic oncology:  Assessment, Management and Plan:   • 60 y/o presenting for evaluation of rectovaginal mass.   • Last colonoscopy was 5-6 years ago.   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 Given this finding, it is more concerning for rectal cancer as mass is more prominent in rectum.   • Will need Exam under anesthesia and proctoscopy with biopsies.   • COVID testing to be performed prior to test  • Discussed with the patient the risk, benefits and alternatives to surgery, including but not limited to: possible infection, bleeding, fluid overload, injuring to vital structures, fistula formation, blood transfusion, anesthesia risks, thrombus formation which could lead to DVT, CVA, PE, MI, as well as possibility of death. Patient verbalized understanding and wishes to proceed. All questions were answered. Informed consent obtained.  • Discussed the procedure and post-operative expectations.    03/19/2021 - EUA, anoscopy and anal biopsy per Edward Clarke MD at [facility] Gynecologic oncology:  Surgical Findings:  • Normal appearing external genitalia.  • Surgically absent uterus and intact vaginal cuff.  • Firm ~3cm mass palpated just distal to the introitus of the vagina with dimpling and evidence of a small rectovaginal fistula with water noted to be coming from it when injected into the rectum.  • Vaginal mucosa looked grossly normal in the are  overlying the palpated mass.  • On rectovaginal exam the mass palpated from the vaginal side appears to be an anal mass along the anterior wall of the anus.  • On proctoscopy an ~3cm anterior, pale mass was noted in the anus distal to the anal sphincter.  • There seems to be extensions of the mass into the right perirectal space, no evidence on the left.   Pathology:  • Anus, biopsy:   o Invasive squamous cell carcinoma (see comment)   Comment: Histologic sections of the anal biopsy demonstrate involvement by an invasive squamous cell carcinoma, characterized by moderate pleomorphism and focal keratinization. Mitoses are readily seen. Immunohistochemistry for p16 is diffusely and strongly positive.     03/22/2021 - Telemedicine :Edward Clarke MD [facility] GynOncology:   Assessment, Management and Plan:   • Telemedicine visit today via Zoom.  • Advised final pathology c/w SCC of the anus.   • Discussed in detail surgical findings and recommend referral to CRS and Medical Oncology for treatment.  • Will place referrals today.  • Marie with schedule and contact with date and time of appts.   • May follow-up with our office on a PRN basis.     04/05/2021 - [facility]:   Assessment, Management and Plan:   • Telemedicine visit today via Zoom for F/U.  • Newly diagnosed SCC of the anus.   • TB recommendations for referral to CRS and Medical Oncology for treatment recommendations.  • Has initial consultation with Dr. Levine, CRS, and Medical Oncology, Dr. Diaz on 4/12/2021.  • We discussed her situation in detail and answered several questions regarding her diagnosis and treatment. She does have a small fistula and will defer to Dr. Levine for treatment recommendations.   • May F/U with our office on a PRN basis.     04/12/2021 - Consult Wiliam Levine MD- [facility] GynOncology:   Diagnoses:   • Primary squamous cell carcinoma of anal canal   • Rectovaginal fistula     Assessment, Management and Plan:   • I could not view pts recent CT scan but will review once it is available. Based on the impression of her CT scan, I informed her lymph node is enlarged and she may require PET scan.   • I discussed potential treatment options of chemoradiation but treatment is complicated with her rectovaginal fistula. I dicussed potential surgical intervention of APR which would require permanent colostomy.   • After her visit with Dr. Diaz, I will further discuss the best plan moving forward. All pt questions and concerns were answered.   • RTC precautions discussed, otherwise f/u after discussion with Dr. Diaz.   • RVF makes treatment decisions difficult. Consider pre-treatment stoma vs. Start treatment and reconsider if symptoms worsen. Suspect patient will require permanent stoma in future.   · Follow up in about 3 months (around 7/12/2021).    History obtained from  PATIENT, FAMILY, and CHART    PAST MEDICAL HISTORY  Past Medical History:   Diagnosis Date   • Anxiety    • COPD (chronic obstructive pulmonary disease) (CMS/HCC)    • Depression    • Emphysema/COPD (CMS/HCC)    • Hypertension    • Injury of back    • Kidney stone       PAST SURGICAL HISTORY  Past Surgical History:   Procedure Laterality Date   • CHOLECYSTECTOMY     • COLONOSCOPY  03/12/2015    normal   • FINGER SURGERY     • HYSTERECTOMY     • KIDNEY STONE SURGERY     • KIDNEY SURGERY        FAMILY HISTORY  family history is not on file.     SOCIAL HISTORY  Social History     Tobacco Use   • Smoking status: Current Every Day Smoker     Packs/day: 1.00     Years: 45.00     Pack years: 45.00     Types: Cigarettes   • Smokeless tobacco: Never Used   • Tobacco comment: Would like to; trying to cut down   Substance Use Topics   • Alcohol use: No   • Drug use: No     ALLERGIES  Morphine     MEDICATIONS    Current Outpatient Medications:   •  acyclovir (ZOVIRAX) 200 MG capsule, Take 200 mg by mouth 5 (Five) Times a Day As Needed.  For fever blisters, Disp: , Rfl:   •  albuterol sulfate  (90 Base) MCG/ACT inhaler, Inhale 2 puffs As Needed., Disp: , Rfl:   •  ALPRAZolam (XANAX) 2 MG tablet, Take 2 mg by mouth Every Night., Disp: , Rfl:   •  Ascorbic Acid 100 MG chewable tablet, Chew 1 tablet Daily., Disp: , Rfl:   •  chlorthalidone (HYGROTEN) 50 MG tablet, Take 1 tablet by mouth Daily., Disp: , Rfl:   •  Cyanocobalamin (VITAMIN B 12) 500 MCG tablet, Take 500 mcg by mouth Daily., Disp: , Rfl:   •  FLUoxetine (PROZAC) 20 MG capsule, Take 40 mg by mouth 2 (Two) Times a Day., Disp: , Rfl:   •  gabapentin (NEURONTIN) 300 MG capsule, Take 300 mg by mouth 4 (Four) Times a Day., Disp: , Rfl:   •  ibuprofen (ADVIL,MOTRIN) 600 MG tablet, Take 1 tablet by mouth Every 8 (Eight) Hours As Needed., Disp: , Rfl:   •  lisinopril (PRINIVIL,ZESTRIL) 10 MG tablet, Take 10 mg by mouth Daily., Disp: , Rfl:   •  metoprolol succinate XL (TOPROL-XL) 50 MG 24 hr tablet, Take 50 mg by mouth Daily., Disp: , Rfl:   •  ondansetron ODT (ZOFRAN-ODT) 8 MG disintegrating tablet, Place 8 mg under the tongue As Needed., Disp: , Rfl:   •  oxyCODONE-acetaminophen (PERCOCET)  MG per tablet, Take 1 tablet by mouth Every 8 (Eight) Hours As Needed. Pain Management   OIWK, Disp: , Rfl:   •  Potassium Citrate ER 15 MEQ (1620 MG) tablet controlled-release, Take 15 mEq by mouth 3 (Three) Times a Day., Disp: , Rfl:   •  vitamin D (ERGOCALCIFEROL) 1.25 MG (48965 UT) capsule capsule, Take 50,000 Units by mouth 1 (One) Time Per Week., Disp: , Rfl:   •  fentaNYL (DURAGESIC) 25 MCG/HR patch, Place 1 patch on the skin as directed by provider Every 72 (Seventy-Two) Hours., Disp: 5 patch, Rfl: 0    Current outpatient and discharge medications have been reconciled for the patient.  Reviewed by: Jadiel Guthrie III, MD    Cancer-related family history is not on file.     The following portions of the patient's history were reviewed and updated as appropriate: allergies, current  "medications, past family history, past medical history, past social history, past surgical history and problem list.    REVIEW OF SYSTEMS  Review of Systems   Constitutional: Positive for activity change, appetite change and fatigue. Negative for chills, diaphoresis, fever and unexpected weight change.   HENT: Negative.    Eyes: Negative.    Respiratory: Positive for wheezing. Negative for apnea, cough, choking, chest tightness, shortness of breath and stridor.         Albuterol for wheezing prn   Cardiovascular: Negative.    Gastrointestinal: Positive for blood in stool, diarrhea and rectal pain. Negative for abdominal distention, abdominal pain, anal bleeding, constipation, nausea and vomiting.   Endocrine: Negative.    Genitourinary: Positive for pelvic pain, urgency, vaginal bleeding, vaginal discharge and vaginal pain. Negative for decreased urine volume, difficulty urinating, dyspareunia, dysuria, enuresis, flank pain, frequency, genital sores, hematuria and menstrual problem.        Has rectal vaginal fistula   Musculoskeletal: Positive for back pain. Negative for arthralgias, gait problem, joint swelling, myalgias, neck pain and neck stiffness.        Chronic back pain  Sees Marie Yen at [facility]   Skin: Negative.    Allergic/Immunologic: Negative.    Neurological: Negative.    Hematological: Negative.    Psychiatric/Behavioral: Negative.         Depression  Anxiety  Sees Dr. Dakota Grant     PHYSICAL EXAM  VITAL SIGNS:   Vitals:    04/21/21 0929   BP: 106/64   Pulse: 77   Resp: 18   SpO2: 98%   Weight: 90.3 kg (199 lb)   Height: 160 cm (63\")   PainSc: 10-Worst pain ever   PainLoc: Groin  Comment: Pelvis; back/ vagina; fistula      General Appearance:  awake, alert, oriented, very uncomforable. Vitals reviewed  Head: Normocephalic, without obvious abnormality  Eyes: Conjunctiva pink, pupils equal and reactive.   Ears:  Normal externally.   Nose/Sinuses:  Mucosa normal.   Mouth/Throat:  Mucosa " moist, no lesions  Neck: Supple without adenopathy.  Lungs:  Normal expansion.  Clear to auscultation.    Heart:  Regular rate and rhythm without murmur, gallop or rub.  Abdomen:  Soft, non-tender, normal bowel sounds  Rectal exam: deferred due to severe pain, will perform after diversion  Extremities: Warm to touch, pink, with no edema.  Musculoskeletal: strength and sensation grossly normal  Neurologic:  Alert and oriented, gait normal, non-focal exam  Psych exam: normal situational behavior   Skin:  Warm and moist. No suspicious lesions or rashes of concern     Performance Status: ECOG (2)    Clinical Quality Measures  -Pain Documented by Standardized Tool, FPS Lenora Kathleen reports a pain score of 10. Given her pain assessment as noted, treatment options were discussed and the following options were decided upon as a follow-up plan to address the patient's pain: continuation of current treatment plan for pain and use of non-medical modalities (ice, heat, stretching and/or behavior modifications).    Pain Medications             FLUoxetine (PROZAC) 20 MG capsule Take 40 mg by mouth 2 (Two) Times a Day.    gabapentin (NEURONTIN) 300 MG capsule Take 300 mg by mouth 4 (Four) Times a Day.    ibuprofen (ADVIL,MOTRIN) 600 MG tablet Take 1 tablet by mouth Every 8 (Eight) Hours As Needed.    oxyCODONE-acetaminophen (PERCOCET)  MG per tablet Take 1 tablet by mouth Every 8 (Eight) Hours As Needed. Pain Management   OIWK        -Advanced Care Planning    Advance Care Planning   ACP discussion was held with the patient during this visit. Patient does not have an advance directive, information provided.    -Body Mass Index Screening and Follow-Up Plan Patient's Body mass index is 35.25 kg/m². BMI is above normal parameters. Recommendations include: educational material.  -Tobacco Use: Screening and Cessation Intervention Social History    Tobacco Use      Smoking status: Current Every Day Smoker        Packs/day: 1.00         Years: 45.00        Pack years: 45        Types: Cigarettes      Smokeless tobacco: Never Used      Tobacco comment: Would like to; trying to cut down   Smoking cessation information given in after visit summary.    ASSESSMENT AND PLAN  1. Primary squamous cell carcinoma of anal canal    2. Current every day smoker    3. Cancer related pain    4. Rectal bleeding      Orders Placed This Encounter   Procedures   • Ambulatory Referral to ONC Social Work     Referral Priority:   Routine     Referral Type:   Clinical Pathway     Referral Reason:   Specialty Services Required     Requested Specialty:        Number of Visits Requested:   1   • Ambulatory Referral to General Surgery     Referral Priority:   Urgent     Referral Type:   Consultation     Referral Reason:   Specialty Services Required     Referred to Provider:   Robbin Allison MD     Requested Specialty:   General Surgery     Number of Visits Requested:   1   • Ambulatory Referral to Pain Management     Referral Priority:   Urgent     Referral Type:   Pain Management     Referral Reason:   Specialty Services Required     Referred to Provider:   Raleigh Sauceda DO     Requested Specialty:   Pain Medicine     Number of Visits Requested:   1     RECOMMENDATIONS: Lenora Kathleen was diagnosed with anal carcinoma with rectovaginal fistula, reporting severe 10/10 pelvic/vaginal/rectal pain, vaginal bleeding, vaginal discharge and back pain with blood in stools.    Indications and rationale of neoadjuvant radiation therapy delivered concurrent with chemotherapy to the colorectal region according to NCCN Guidelines were discussed today. I have extensively reviewed the risks, benefits and alternatives of therapy as well as possible progression of disease in spite of therapy with either local or systemic failure.  Side effects include but are not limited to sunburn-type skin irritation of the targeted area (which may range from mild to  Intense,  red, dry, tender, or itchy skin, general fatigue, diarrhea, rectal bleeding, hemorrhoids, vaginal itching, burning, and dryness for women, Incontinence of bowel or bladder, bladder irritation and sexual problems.    I have seen, examined and reviewed this patient's medication list, appropriate labs and imaging studies as well as other physician notes. We discussed the goals and plans of care and answered all questions. In consideration of the diagnotic data and evaluation of the patient, a course of chemoradiation is recommended, concurrent chemotherapy will be delivered under the direction oncology, I anticipate a dose of 5040 cGy over 28 fractions, final dose dependant on PET findings. We discussed the need for an urgent diverting colostomy to minimize her symptoms.     The patient and family verbalize understanding of this discussion, voice no further questions and wish to proceed with recommendations.  We will simulate treatment fields today, will fuse with PET scan when obtained and will notify her when ready to begin.  Continue ongoing management per primary care physician and other specialists. Pain mgmt referral, she will need this during treatment.    Lenora Kathleen  reports that she has been smoking cigarettes. She has a 45.00 pack-year smoking history. She has never used smokeless tobacco. I have educated her on the risk of diseases from using tobacco products such as cancer, COPD and heart disease. I advised her to quit and she is not willing to quit. I  spent >10 minutes counseling the patient.     Thank you for allowing me to assist in this patients care.    Patient Instructions   1)  Top priority is PET scan and diverting colostomy.  2)  I have placed urgent referral to general surgery and pain management.  They will call you.  3)  We will start radiation planning and fuse with PET scan  4)  Plan on chemotherapy and radiation, total number of treatments depend on PET scan.    Time Spent: I spent 64  minutes caring for Lenora on this date of service. This time includes time spent by me in the following activities: preparing for the visit, reviewing tests, obtaining and/or reviewing a separately obtained history, performing a medically appropriate examination and/or evaluation, counseling and educating the patient/family/caregiver, ordering medications, tests, or procedures, referring and communicating with other health care professionals and documenting information in the medical record.   Jadiel Guthrie III, MD  04/21/2021

## 2021-04-21 ENCOUNTER — TELEPHONE (OUTPATIENT)
Dept: RADIATION ONCOLOGY | Facility: HOSPITAL | Age: 61
End: 2021-04-21

## 2021-04-21 ENCOUNTER — DOCUMENTATION (OUTPATIENT)
Dept: RADIATION ONCOLOGY | Facility: HOSPITAL | Age: 61
End: 2021-04-21

## 2021-04-21 ENCOUNTER — CONSULT (OUTPATIENT)
Dept: RADIATION ONCOLOGY | Facility: HOSPITAL | Age: 61
End: 2021-04-21

## 2021-04-21 VITALS
BODY MASS INDEX: 35.26 KG/M2 | OXYGEN SATURATION: 98 % | WEIGHT: 199 LBS | HEIGHT: 63 IN | HEART RATE: 77 BPM | DIASTOLIC BLOOD PRESSURE: 64 MMHG | RESPIRATION RATE: 18 BRPM | SYSTOLIC BLOOD PRESSURE: 106 MMHG

## 2021-04-21 DIAGNOSIS — K62.5 RECTAL BLEEDING: ICD-10-CM

## 2021-04-21 DIAGNOSIS — G89.3 CANCER RELATED PAIN: ICD-10-CM

## 2021-04-21 DIAGNOSIS — F17.200 CURRENT EVERY DAY SMOKER: ICD-10-CM

## 2021-04-21 DIAGNOSIS — C44.520 PRIMARY SQUAMOUS CELL CARCINOMA OF ANAL CANAL: Primary | ICD-10-CM

## 2021-04-21 PROCEDURE — 77334 RADIATION TREATMENT AID(S): CPT | Performed by: RADIOLOGY

## 2021-04-21 RX ORDER — CAPECITABINE 500 MG/1
1500 TABLET, FILM COATED ORAL 2 TIMES DAILY
Qty: 168 TABLET | Refills: 0 | Status: SHIPPED | OUTPATIENT
Start: 2021-04-21 | End: 2021-05-19

## 2021-04-21 RX ORDER — CAPECITABINE 150 MG/1
150 TABLET, FILM COATED ORAL 2 TIMES DAILY
Qty: 56 TABLET | Refills: 0 | Status: SHIPPED | OUTPATIENT
Start: 2021-04-21 | End: 2021-05-19

## 2021-04-21 RX ORDER — IBUPROFEN 600 MG/1
1 TABLET ORAL EVERY 8 HOURS PRN
COMMUNITY
Start: 2021-03-19 | End: 2021-06-01

## 2021-04-21 RX ORDER — FENTANYL 25 UG/H
1 PATCH TRANSDERMAL
Qty: 5 PATCH | Refills: 0 | Status: SHIPPED | OUTPATIENT
Start: 2021-04-21 | End: 2021-07-01

## 2021-04-21 RX ORDER — CHLORTHALIDONE 50 MG/1
1 TABLET ORAL DAILY
COMMUNITY
End: 2021-06-01

## 2021-04-21 NOTE — PATIENT INSTRUCTIONS
1)  Top priority is PET scan and diverting colostomy.  2)  I have placed urgent referral to general surgery and pain management.  They will call you.  3)  We will start radiation planning and fuse with PET scan  4)  Plan on chemotherapy and radiation, total number of treatments depend on PET scan.

## 2021-04-21 NOTE — PROGRESS NOTES
KEVIN met with Ms. Kathleen due to her high distress score. She is here for a radiation consultation for anal cancer. KEVIN introduced self and explained role and source of support. She is a 61 year old female who lives alone. Her support system includes her sister and her two nieces. Her nieces will assist her with cleaning and errands. She does have one son but he has personal issues going on at this time. She has multiple stressors. She is concerns with the cost of care due to having a fixed income. KEVIN provided her with information for the financial counselors. Her family plans to drive her to treatment and if they are unable to [employer] friends will assist. KEVIN did inform her if she did need gas assistance or assistance with her utilities, when she is under active treatment, to let this writer know and she could be evaluated for assistance through the [employer]. She is also feeling nervous about radiation treatment due to knowing family members who have had it in the past. Emotional support provided and encouraged her to express her feelings. Ms. Kathleen has been seeing Dr. Dakota Grant since 2006 after her dad passed away. Her mother and her dog also passed away. Ms. Kathleen states she adopted a new dog last year, but she plans to rehome him due to all her health condition. KEVIN discussed positive coping strategies with her and provided her with resources on ways to help reduce anxiety, support groups including, [employer], [employer], and provided her with a gratitude journal. KEVIN offered to call the support groups for Ms. Kathleen but she declined. She does have pain and goes to pain management. KEVIN encouraged Ms. Kathleen to call when needed and KEVIN will follow up with her once she starts treatment.

## 2021-04-21 NOTE — TELEPHONE ENCOUNTER
Dr. Paulette Villegas will see the patient and she has an appointment to see her tomorrow at 1:00 for consideration of diverting colostomy.

## 2021-04-22 ENCOUNTER — HOSPITAL ENCOUNTER (OUTPATIENT)
Dept: CT IMAGING | Facility: HOSPITAL | Age: 61
Discharge: HOME | End: 2021-04-22

## 2021-04-22 ENCOUNTER — TRANSCRIBE ORDERS (OUTPATIENT)
Dept: LAB | Facility: HOSPITAL | Age: 61
End: 2021-04-22

## 2021-04-22 ENCOUNTER — DOCUMENTATION (OUTPATIENT)
Dept: RADIATION ONCOLOGY | Facility: HOSPITAL | Age: 61
End: 2021-04-22

## 2021-04-22 ENCOUNTER — TELEPHONE (OUTPATIENT)
Dept: RADIATION ONCOLOGY | Facility: HOSPITAL | Age: 61
End: 2021-04-22

## 2021-04-22 ENCOUNTER — LAB (OUTPATIENT)
Dept: LAB | Facility: HOSPITAL | Age: 61
End: 2021-04-22

## 2021-04-22 DIAGNOSIS — Z01.818 PREOPERATIVE TESTING: Primary | ICD-10-CM

## 2021-04-22 LAB — SARS-COV-2 ORF1AB RESP QL NAA+PROBE: NOT DETECTED

## 2021-04-22 PROCEDURE — 0 FLUDEOXYGLUCOSE F18 SOLUTION: Performed by: INTERNAL MEDICINE

## 2021-04-22 PROCEDURE — C9803 HOPD COVID-19 SPEC COLLECT: HCPCS | Performed by: STUDENT IN AN ORGANIZED HEALTH CARE EDUCATION/TRAINING PROGRAM

## 2021-04-22 PROCEDURE — A9552 F18 FDG: HCPCS | Performed by: INTERNAL MEDICINE

## 2021-04-22 PROCEDURE — 78815 PET IMAGE W/CT SKULL-THIGH: CPT

## 2021-04-22 PROCEDURE — U0004 COV-19 TEST NON-CDC HGH THRU: HCPCS | Performed by: STUDENT IN AN ORGANIZED HEALTH CARE EDUCATION/TRAINING PROGRAM

## 2021-04-22 RX ORDER — MAGNESIUM CARB/ALUMINUM HYDROX 105-160MG
296 TABLET,CHEWABLE ORAL ONCE
Qty: 296 ML | Refills: 0 | Status: SHIPPED | OUTPATIENT
Start: 2021-04-22 | End: 2021-04-26 | Stop reason: HOSPADM

## 2021-04-22 RX ADMIN — FLUDEOXYGLUCOSE F18 1 DOSE: 300 INJECTION INTRAVENOUS at 08:12

## 2021-04-23 ENCOUNTER — ANESTHESIA EVENT (OUTPATIENT)
Dept: PERIOP | Facility: HOSPITAL | Age: 61
End: 2021-04-23

## 2021-04-23 ENCOUNTER — ANESTHESIA (OUTPATIENT)
Dept: PERIOP | Facility: HOSPITAL | Age: 61
End: 2021-04-23

## 2021-04-23 ENCOUNTER — TELEPHONE (OUTPATIENT)
Dept: INFUSION THERAPY | Age: 61
End: 2021-04-23

## 2021-04-23 ENCOUNTER — HOSPITAL ENCOUNTER (INPATIENT)
Facility: HOSPITAL | Age: 61
LOS: 3 days | Discharge: HOME HEALTH | End: 2021-04-26
Attending: STUDENT IN AN ORGANIZED HEALTH CARE EDUCATION/TRAINING PROGRAM | Admitting: STUDENT IN AN ORGANIZED HEALTH CARE EDUCATION/TRAINING PROGRAM

## 2021-04-23 DIAGNOSIS — N82.3 RECTOVAGINAL FISTULA: Primary | ICD-10-CM

## 2021-04-23 LAB
ALBUMIN SERPL-MCNC: 4 G/DL (ref 3.5–5.2)
ALBUMIN/GLOB SERPL: 1.5 G/DL
ALP SERPL-CCNC: 58 U/L (ref 39–117)
ALT SERPL W P-5'-P-CCNC: 11 U/L (ref 1–33)
ANION GAP SERPL CALCULATED.3IONS-SCNC: 9 MMOL/L (ref 5–15)
AST SERPL-CCNC: 17 U/L (ref 1–32)
BASOPHILS # BLD AUTO: 0.02 10*3/MM3 (ref 0–0.2)
BASOPHILS NFR BLD AUTO: 0.2 % (ref 0–1.5)
BILIRUB SERPL-MCNC: 0.3 MG/DL (ref 0–1.2)
BUN SERPL-MCNC: 14 MG/DL (ref 8–23)
BUN/CREAT SERPL: 25 (ref 7–25)
CALCIUM SPEC-SCNC: 9.2 MG/DL (ref 8.6–10.5)
CHLORIDE SERPL-SCNC: 106 MMOL/L (ref 98–107)
CO2 SERPL-SCNC: 29 MMOL/L (ref 22–29)
CREAT SERPL-MCNC: 0.56 MG/DL (ref 0.57–1)
DEPRECATED RDW RBC AUTO: 41.2 FL (ref 37–54)
EOSINOPHIL # BLD AUTO: 0.1 10*3/MM3 (ref 0–0.4)
EOSINOPHIL NFR BLD AUTO: 1 % (ref 0.3–6.2)
ERYTHROCYTE [DISTWIDTH] IN BLOOD BY AUTOMATED COUNT: 13.2 % (ref 12.3–15.4)
GFR SERPL CREATININE-BSD FRML MDRD: 110 ML/MIN/1.73
GLOBULIN UR ELPH-MCNC: 2.6 GM/DL
GLUCOSE SERPL-MCNC: 115 MG/DL (ref 65–99)
HCT VFR BLD AUTO: 38.6 % (ref 34–46.6)
HGB BLD-MCNC: 12.3 G/DL (ref 12–15.9)
IMM GRANULOCYTES # BLD AUTO: 0.02 10*3/MM3 (ref 0–0.05)
IMM GRANULOCYTES NFR BLD AUTO: 0.2 % (ref 0–0.5)
LYMPHOCYTES # BLD AUTO: 3 10*3/MM3 (ref 0.7–3.1)
LYMPHOCYTES NFR BLD AUTO: 31 % (ref 19.6–45.3)
MCH RBC QN AUTO: 27.3 PG (ref 26.6–33)
MCHC RBC AUTO-ENTMCNC: 31.9 G/DL (ref 31.5–35.7)
MCV RBC AUTO: 85.6 FL (ref 79–97)
MONOCYTES # BLD AUTO: 0.7 10*3/MM3 (ref 0.1–0.9)
MONOCYTES NFR BLD AUTO: 7.2 % (ref 5–12)
NEUTROPHILS NFR BLD AUTO: 5.83 10*3/MM3 (ref 1.7–7)
NEUTROPHILS NFR BLD AUTO: 60.4 % (ref 42.7–76)
NRBC BLD AUTO-RTO: 0 /100 WBC (ref 0–0.2)
PLATELET # BLD AUTO: 234 10*3/MM3 (ref 140–450)
PMV BLD AUTO: 11.9 FL (ref 6–12)
POTASSIUM SERPL-SCNC: 4.2 MMOL/L (ref 3.5–5.2)
PROT SERPL-MCNC: 6.6 G/DL (ref 6–8.5)
RBC # BLD AUTO: 4.51 10*6/MM3 (ref 3.77–5.28)
SODIUM SERPL-SCNC: 144 MMOL/L (ref 136–145)
WBC # BLD AUTO: 9.67 10*3/MM3 (ref 3.4–10.8)

## 2021-04-23 PROCEDURE — 25010000002 DEXAMETHASONE PER 1 MG: Performed by: ANESTHESIOLOGY

## 2021-04-23 PROCEDURE — 25010000002 HEPARIN (PORCINE) PER 1000 UNITS: Performed by: STUDENT IN AN ORGANIZED HEALTH CARE EDUCATION/TRAINING PROGRAM

## 2021-04-23 PROCEDURE — 25010000002 DEXAMETHASONE PER 1 MG: Performed by: NURSE ANESTHETIST, CERTIFIED REGISTERED

## 2021-04-23 PROCEDURE — 25010000002 CEFAZOLIN PER 500 MG: Performed by: STUDENT IN AN ORGANIZED HEALTH CARE EDUCATION/TRAINING PROGRAM

## 2021-04-23 PROCEDURE — 25010000002 HYDROMORPHONE PER 4 MG: Performed by: STUDENT IN AN ORGANIZED HEALTH CARE EDUCATION/TRAINING PROGRAM

## 2021-04-23 PROCEDURE — 25010000003 MEPERIDINE PER 100 MG: Performed by: ANESTHESIOLOGY

## 2021-04-23 PROCEDURE — 25010000002 MIDAZOLAM PER 1 MG: Performed by: ANESTHESIOLOGY

## 2021-04-23 PROCEDURE — 25010000002 ONDANSETRON PER 1 MG: Performed by: NURSE ANESTHETIST, CERTIFIED REGISTERED

## 2021-04-23 PROCEDURE — 93005 ELECTROCARDIOGRAM TRACING: CPT | Performed by: STUDENT IN AN ORGANIZED HEALTH CARE EDUCATION/TRAINING PROGRAM

## 2021-04-23 PROCEDURE — 25010000002 LIDOCAINE PER 10 MG: Performed by: NURSE ANESTHETIST, CERTIFIED REGISTERED

## 2021-04-23 PROCEDURE — 93010 ELECTROCARDIOGRAM REPORT: CPT | Performed by: INTERNAL MEDICINE

## 2021-04-23 PROCEDURE — C1889 IMPLANT/INSERT DEVICE, NOC: HCPCS | Performed by: STUDENT IN AN ORGANIZED HEALTH CARE EDUCATION/TRAINING PROGRAM

## 2021-04-23 PROCEDURE — 94799 UNLISTED PULMONARY SVC/PX: CPT

## 2021-04-23 PROCEDURE — 85025 COMPLETE CBC W/AUTO DIFF WBC: CPT | Performed by: STUDENT IN AN ORGANIZED HEALTH CARE EDUCATION/TRAINING PROGRAM

## 2021-04-23 PROCEDURE — 25010000002 PROPOFOL 10 MG/ML EMULSION: Performed by: NURSE ANESTHETIST, CERTIFIED REGISTERED

## 2021-04-23 PROCEDURE — 80053 COMPREHEN METABOLIC PANEL: CPT | Performed by: STUDENT IN AN ORGANIZED HEALTH CARE EDUCATION/TRAINING PROGRAM

## 2021-04-23 PROCEDURE — 99223 1ST HOSP IP/OBS HIGH 75: CPT | Performed by: NURSE PRACTITIONER

## 2021-04-23 DEVICE — LIGACLIP MCA MULTIPLE CLIP APPLIERS, 20 MEDIUM CLIPS
Type: IMPLANTABLE DEVICE | Site: ABDOMEN | Status: FUNCTIONAL
Brand: LIGACLIP

## 2021-04-23 RX ORDER — SODIUM CHLORIDE 0.9 % (FLUSH) 0.9 %
3 SYRINGE (ML) INJECTION AS NEEDED
Status: DISCONTINUED | OUTPATIENT
Start: 2021-04-23 | End: 2021-04-23 | Stop reason: HOSPADM

## 2021-04-23 RX ORDER — SUFENTANIL CITRATE 50 UG/ML
INJECTION EPIDURAL; INTRAVENOUS AS NEEDED
Status: DISCONTINUED | OUTPATIENT
Start: 2021-04-23 | End: 2021-04-23 | Stop reason: SURG

## 2021-04-23 RX ORDER — ONDANSETRON 2 MG/ML
4 INJECTION INTRAMUSCULAR; INTRAVENOUS AS NEEDED
Status: DISCONTINUED | OUTPATIENT
Start: 2021-04-23 | End: 2021-04-23 | Stop reason: HOSPADM

## 2021-04-23 RX ORDER — KETAMINE HYDROCHLORIDE 50 MG/ML
INJECTION, SOLUTION, CONCENTRATE INTRAMUSCULAR; INTRAVENOUS AS NEEDED
Status: DISCONTINUED | OUTPATIENT
Start: 2021-04-23 | End: 2021-04-23 | Stop reason: SURG

## 2021-04-23 RX ORDER — SODIUM CHLORIDE, SODIUM LACTATE, POTASSIUM CHLORIDE, CALCIUM CHLORIDE 600; 310; 30; 20 MG/100ML; MG/100ML; MG/100ML; MG/100ML
100 INJECTION, SOLUTION INTRAVENOUS CONTINUOUS
Status: DISCONTINUED | OUTPATIENT
Start: 2021-04-23 | End: 2021-04-24

## 2021-04-23 RX ORDER — HYDROMORPHONE HYDROCHLORIDE 1 MG/ML
0.5 INJECTION, SOLUTION INTRAMUSCULAR; INTRAVENOUS; SUBCUTANEOUS
Status: DISCONTINUED | OUTPATIENT
Start: 2021-04-23 | End: 2021-04-26 | Stop reason: HOSPADM

## 2021-04-23 RX ORDER — SODIUM CHLORIDE 0.9 % (FLUSH) 0.9 %
3 SYRINGE (ML) INJECTION EVERY 12 HOURS SCHEDULED
Status: DISCONTINUED | OUTPATIENT
Start: 2021-04-23 | End: 2021-04-23 | Stop reason: HOSPADM

## 2021-04-23 RX ORDER — HEPARIN SODIUM 5000 [USP'U]/ML
5000 INJECTION, SOLUTION INTRAVENOUS; SUBCUTANEOUS EVERY 8 HOURS SCHEDULED
Status: DISCONTINUED | OUTPATIENT
Start: 2021-04-23 | End: 2021-04-26 | Stop reason: HOSPADM

## 2021-04-23 RX ORDER — IBUPROFEN 600 MG/1
600 TABLET ORAL ONCE AS NEEDED
Status: DISCONTINUED | OUTPATIENT
Start: 2021-04-23 | End: 2021-04-23 | Stop reason: HOSPADM

## 2021-04-23 RX ORDER — LIDOCAINE HYDROCHLORIDE 10 MG/ML
0.5 INJECTION, SOLUTION EPIDURAL; INFILTRATION; INTRACAUDAL; PERINEURAL ONCE AS NEEDED
Status: DISCONTINUED | OUTPATIENT
Start: 2021-04-23 | End: 2021-04-23 | Stop reason: HOSPADM

## 2021-04-23 RX ORDER — FENTANYL CITRATE 50 UG/ML
25 INJECTION, SOLUTION INTRAMUSCULAR; INTRAVENOUS
Status: DISCONTINUED | OUTPATIENT
Start: 2021-04-23 | End: 2021-04-23 | Stop reason: HOSPADM

## 2021-04-23 RX ORDER — FLUOXETINE HYDROCHLORIDE 20 MG/1
40 CAPSULE ORAL 2 TIMES DAILY
Status: DISCONTINUED | OUTPATIENT
Start: 2021-04-24 | End: 2021-04-26 | Stop reason: HOSPADM

## 2021-04-23 RX ORDER — OXYCODONE HYDROCHLORIDE 5 MG/1
5 TABLET ORAL EVERY 4 HOURS PRN
Status: DISCONTINUED | OUTPATIENT
Start: 2021-04-30 | End: 2021-04-26 | Stop reason: HOSPADM

## 2021-04-23 RX ORDER — POLYETHYLENE GLYCOL 3350 17 G/17G
17 POWDER, FOR SOLUTION ORAL DAILY
Status: DISCONTINUED | OUTPATIENT
Start: 2021-04-23 | End: 2021-04-26 | Stop reason: HOSPADM

## 2021-04-23 RX ORDER — FLUMAZENIL 0.1 MG/ML
0.2 INJECTION INTRAVENOUS AS NEEDED
Status: DISCONTINUED | OUTPATIENT
Start: 2021-04-23 | End: 2021-04-23 | Stop reason: HOSPADM

## 2021-04-23 RX ORDER — ALPRAZOLAM 0.5 MG/1
2 TABLET ORAL 3 TIMES DAILY PRN
Status: DISCONTINUED | OUTPATIENT
Start: 2021-04-23 | End: 2021-04-26 | Stop reason: HOSPADM

## 2021-04-23 RX ORDER — LIDOCAINE HYDROCHLORIDE ANHYDROUS AND DEXTROSE MONOHYDRATE 5; 400 G/100ML; MG/100ML
INJECTION, SOLUTION INTRAVENOUS CONTINUOUS PRN
Status: DISCONTINUED | OUTPATIENT
Start: 2021-04-23 | End: 2021-04-23 | Stop reason: SURG

## 2021-04-23 RX ORDER — SCOLOPAMINE TRANSDERMAL SYSTEM 1 MG/1
1 PATCH, EXTENDED RELEASE TRANSDERMAL CONTINUOUS
Status: DISCONTINUED | OUTPATIENT
Start: 2021-04-23 | End: 2021-04-23

## 2021-04-23 RX ORDER — CALCIUM CARBONATE 200(500)MG
2 TABLET,CHEWABLE ORAL 2 TIMES DAILY PRN
Status: DISCONTINUED | OUTPATIENT
Start: 2021-04-23 | End: 2021-04-26 | Stop reason: HOSPADM

## 2021-04-23 RX ORDER — METOCLOPRAMIDE HYDROCHLORIDE 5 MG/ML
5 INJECTION INTRAMUSCULAR; INTRAVENOUS EVERY 6 HOURS PRN
Status: DISCONTINUED | OUTPATIENT
Start: 2021-04-23 | End: 2021-04-26 | Stop reason: HOSPADM

## 2021-04-23 RX ORDER — PROPOFOL 10 MG/ML
VIAL (ML) INTRAVENOUS AS NEEDED
Status: DISCONTINUED | OUTPATIENT
Start: 2021-04-23 | End: 2021-04-23 | Stop reason: SURG

## 2021-04-23 RX ORDER — GABAPENTIN 300 MG/1
300 CAPSULE ORAL 4 TIMES DAILY
Status: DISCONTINUED | OUTPATIENT
Start: 2021-04-23 | End: 2021-04-26 | Stop reason: HOSPADM

## 2021-04-23 RX ORDER — SODIUM CHLORIDE, SODIUM LACTATE, POTASSIUM CHLORIDE, CALCIUM CHLORIDE 600; 310; 30; 20 MG/100ML; MG/100ML; MG/100ML; MG/100ML
100 INJECTION, SOLUTION INTRAVENOUS CONTINUOUS
Status: DISCONTINUED | OUTPATIENT
Start: 2021-04-23 | End: 2021-04-23

## 2021-04-23 RX ORDER — OXYCODONE HYDROCHLORIDE 5 MG/1
5 TABLET ORAL EVERY 4 HOURS PRN
Status: DISCONTINUED | OUTPATIENT
Start: 2021-04-23 | End: 2021-04-26 | Stop reason: HOSPADM

## 2021-04-23 RX ORDER — IBUPROFEN 600 MG/1
600 TABLET ORAL EVERY 8 HOURS PRN
Status: DISCONTINUED | OUTPATIENT
Start: 2021-04-23 | End: 2021-04-26 | Stop reason: HOSPADM

## 2021-04-23 RX ORDER — OXYCODONE AND ACETAMINOPHEN 10; 325 MG/1; MG/1
1 TABLET ORAL ONCE AS NEEDED
Status: DISCONTINUED | OUTPATIENT
Start: 2021-04-23 | End: 2021-04-23 | Stop reason: HOSPADM

## 2021-04-23 RX ORDER — DEXAMETHASONE SODIUM PHOSPHATE 4 MG/ML
INJECTION, SOLUTION INTRA-ARTICULAR; INTRALESIONAL; INTRAMUSCULAR; INTRAVENOUS; SOFT TISSUE AS NEEDED
Status: DISCONTINUED | OUTPATIENT
Start: 2021-04-23 | End: 2021-04-23 | Stop reason: SURG

## 2021-04-23 RX ORDER — HYDROMORPHONE HYDROCHLORIDE 1 MG/ML
0.5 INJECTION, SOLUTION INTRAMUSCULAR; INTRAVENOUS; SUBCUTANEOUS
Status: DISCONTINUED | OUTPATIENT
Start: 2021-04-23 | End: 2021-04-23 | Stop reason: HOSPADM

## 2021-04-23 RX ORDER — MIDAZOLAM HYDROCHLORIDE 1 MG/ML
1 INJECTION INTRAMUSCULAR; INTRAVENOUS
Status: DISCONTINUED | OUTPATIENT
Start: 2021-04-23 | End: 2021-04-23 | Stop reason: HOSPADM

## 2021-04-23 RX ORDER — BUPIVACAINE HYDROCHLORIDE 5 MG/ML
INJECTION, SOLUTION EPIDURAL; INTRACAUDAL AS NEEDED
Status: DISCONTINUED | OUTPATIENT
Start: 2021-04-23 | End: 2021-04-23 | Stop reason: HOSPADM

## 2021-04-23 RX ORDER — ACETAMINOPHEN 500 MG
1000 TABLET ORAL ONCE
Status: COMPLETED | OUTPATIENT
Start: 2021-04-23 | End: 2021-04-23

## 2021-04-23 RX ORDER — SODIUM CHLORIDE 0.9 % (FLUSH) 0.9 %
3-10 SYRINGE (ML) INJECTION AS NEEDED
Status: DISCONTINUED | OUTPATIENT
Start: 2021-04-23 | End: 2021-04-23 | Stop reason: HOSPADM

## 2021-04-23 RX ORDER — ONDANSETRON 2 MG/ML
4 INJECTION INTRAMUSCULAR; INTRAVENOUS EVERY 6 HOURS PRN
Status: DISCONTINUED | OUTPATIENT
Start: 2021-04-23 | End: 2021-04-26 | Stop reason: HOSPADM

## 2021-04-23 RX ORDER — FENTANYL 25 UG/H
1 PATCH TRANSDERMAL
Status: DISCONTINUED | OUTPATIENT
Start: 2021-04-24 | End: 2021-04-26 | Stop reason: HOSPADM

## 2021-04-23 RX ORDER — HEPARIN SODIUM 5000 [USP'U]/ML
5000 INJECTION, SOLUTION INTRAVENOUS; SUBCUTANEOUS ONCE
Status: COMPLETED | OUTPATIENT
Start: 2021-04-23 | End: 2021-04-23

## 2021-04-23 RX ORDER — SODIUM CHLORIDE 0.9 % (FLUSH) 0.9 %
10 SYRINGE (ML) INJECTION EVERY 12 HOURS SCHEDULED
Status: DISCONTINUED | OUTPATIENT
Start: 2021-04-23 | End: 2021-04-26 | Stop reason: HOSPADM

## 2021-04-23 RX ORDER — ONDANSETRON 2 MG/ML
INJECTION INTRAMUSCULAR; INTRAVENOUS AS NEEDED
Status: DISCONTINUED | OUTPATIENT
Start: 2021-04-23 | End: 2021-04-23 | Stop reason: SURG

## 2021-04-23 RX ORDER — LIDOCAINE 50 MG/G
1 PATCH TOPICAL
Status: DISCONTINUED | OUTPATIENT
Start: 2021-04-23 | End: 2021-04-26 | Stop reason: HOSPADM

## 2021-04-23 RX ORDER — MIDAZOLAM HYDROCHLORIDE 1 MG/ML
2 INJECTION INTRAMUSCULAR; INTRAVENOUS
Status: DISCONTINUED | OUTPATIENT
Start: 2021-04-23 | End: 2021-04-23 | Stop reason: HOSPADM

## 2021-04-23 RX ORDER — CHLORTHALIDONE 25 MG/1
50 TABLET ORAL DAILY
Status: DISCONTINUED | OUTPATIENT
Start: 2021-04-24 | End: 2021-04-26 | Stop reason: HOSPADM

## 2021-04-23 RX ORDER — DIPHENHYDRAMINE HCL 25 MG
25 CAPSULE ORAL NIGHTLY PRN
Status: DISCONTINUED | OUTPATIENT
Start: 2021-04-23 | End: 2021-04-26 | Stop reason: HOSPADM

## 2021-04-23 RX ORDER — BUPIVACAINE HCL/0.9 % NACL/PF 0.1 %
2 PLASTIC BAG, INJECTION (ML) EPIDURAL ONCE
Status: COMPLETED | OUTPATIENT
Start: 2021-04-23 | End: 2021-04-23

## 2021-04-23 RX ORDER — SODIUM CHLORIDE 9 MG/ML
INJECTION, SOLUTION INTRAVENOUS AS NEEDED
Status: DISCONTINUED | OUTPATIENT
Start: 2021-04-23 | End: 2021-04-23 | Stop reason: HOSPADM

## 2021-04-23 RX ORDER — NALOXONE HCL 0.4 MG/ML
0.04 VIAL (ML) INJECTION AS NEEDED
Status: DISCONTINUED | OUTPATIENT
Start: 2021-04-23 | End: 2021-04-23 | Stop reason: HOSPADM

## 2021-04-23 RX ORDER — LABETALOL HYDROCHLORIDE 5 MG/ML
5 INJECTION, SOLUTION INTRAVENOUS
Status: DISCONTINUED | OUTPATIENT
Start: 2021-04-23 | End: 2021-04-23 | Stop reason: HOSPADM

## 2021-04-23 RX ORDER — SODIUM CHLORIDE 0.9 % (FLUSH) 0.9 %
10 SYRINGE (ML) INJECTION AS NEEDED
Status: DISCONTINUED | OUTPATIENT
Start: 2021-04-23 | End: 2021-04-26 | Stop reason: HOSPADM

## 2021-04-23 RX ORDER — FAMOTIDINE 10 MG/ML
20 INJECTION, SOLUTION INTRAVENOUS
Status: COMPLETED | OUTPATIENT
Start: 2021-04-23 | End: 2021-04-23

## 2021-04-23 RX ORDER — ALBUTEROL SULFATE 2.5 MG/3ML
2.5 SOLUTION RESPIRATORY (INHALATION)
Status: DISCONTINUED | OUTPATIENT
Start: 2021-04-23 | End: 2021-04-26 | Stop reason: HOSPADM

## 2021-04-23 RX ORDER — SODIUM CHLORIDE, SODIUM LACTATE, POTASSIUM CHLORIDE, CALCIUM CHLORIDE 600; 310; 30; 20 MG/100ML; MG/100ML; MG/100ML; MG/100ML
1000 INJECTION, SOLUTION INTRAVENOUS CONTINUOUS
Status: DISCONTINUED | OUTPATIENT
Start: 2021-04-23 | End: 2021-04-23

## 2021-04-23 RX ORDER — LIDOCAINE HYDROCHLORIDE 20 MG/ML
INJECTION, SOLUTION EPIDURAL; INFILTRATION; INTRACAUDAL; PERINEURAL AS NEEDED
Status: DISCONTINUED | OUTPATIENT
Start: 2021-04-23 | End: 2021-04-23 | Stop reason: SURG

## 2021-04-23 RX ORDER — MEPERIDINE HYDROCHLORIDE 25 MG/ML
25 INJECTION INTRAMUSCULAR; INTRAVENOUS; SUBCUTANEOUS ONCE
Status: COMPLETED | OUTPATIENT
Start: 2021-04-23 | End: 2021-04-23

## 2021-04-23 RX ORDER — ALBUTEROL SULFATE 90 UG/1
2 AEROSOL, METERED RESPIRATORY (INHALATION)
Status: DISCONTINUED | OUTPATIENT
Start: 2021-04-23 | End: 2021-04-23 | Stop reason: CLARIF

## 2021-04-23 RX ORDER — DOCUSATE SODIUM 100 MG/1
100 CAPSULE, LIQUID FILLED ORAL 2 TIMES DAILY
Status: DISCONTINUED | OUTPATIENT
Start: 2021-04-23 | End: 2021-04-26 | Stop reason: HOSPADM

## 2021-04-23 RX ORDER — NALOXONE HCL 0.4 MG/ML
0.4 VIAL (ML) INJECTION
Status: DISCONTINUED | OUTPATIENT
Start: 2021-04-23 | End: 2021-04-26 | Stop reason: HOSPADM

## 2021-04-23 RX ORDER — ROCURONIUM BROMIDE 10 MG/ML
INJECTION, SOLUTION INTRAVENOUS AS NEEDED
Status: DISCONTINUED | OUTPATIENT
Start: 2021-04-23 | End: 2021-04-23 | Stop reason: SURG

## 2021-04-23 RX ORDER — DEXAMETHASONE SODIUM PHOSPHATE 4 MG/ML
4 INJECTION, SOLUTION INTRA-ARTICULAR; INTRALESIONAL; INTRAMUSCULAR; INTRAVENOUS; SOFT TISSUE ONCE AS NEEDED
Status: COMPLETED | OUTPATIENT
Start: 2021-04-23 | End: 2021-04-23

## 2021-04-23 RX ORDER — ACETAMINOPHEN 500 MG
1000 TABLET ORAL EVERY 8 HOURS
Status: DISCONTINUED | OUTPATIENT
Start: 2021-04-23 | End: 2021-04-26 | Stop reason: HOSPADM

## 2021-04-23 RX ORDER — LISINOPRIL 10 MG/1
10 TABLET ORAL DAILY
Status: DISCONTINUED | OUTPATIENT
Start: 2021-04-24 | End: 2021-04-26 | Stop reason: HOSPADM

## 2021-04-23 RX ADMIN — Medication 2 G: at 13:45

## 2021-04-23 RX ADMIN — DEXAMETHASONE SODIUM PHOSPHATE 8 MG: 4 INJECTION, SOLUTION INTRAMUSCULAR; INTRAVENOUS at 14:02

## 2021-04-23 RX ADMIN — ROCURONIUM BROMIDE 50 MG: 10 INJECTION INTRAVENOUS at 13:34

## 2021-04-23 RX ADMIN — LIDOCAINE 1 PATCH: 50 PATCH CUTANEOUS at 18:03

## 2021-04-23 RX ADMIN — ALPRAZOLAM 2 MG: 0.5 TABLET ORAL at 20:14

## 2021-04-23 RX ADMIN — ONDANSETRON HYDROCHLORIDE 4 MG: 2 SOLUTION INTRAMUSCULAR; INTRAVENOUS at 15:03

## 2021-04-23 RX ADMIN — METRONIDAZOLE 500 MG: 500 INJECTION, SOLUTION INTRAVENOUS at 12:59

## 2021-04-23 RX ADMIN — MIDAZOLAM 2 MG: 1 INJECTION INTRAMUSCULAR; INTRAVENOUS at 12:58

## 2021-04-23 RX ADMIN — SUFENTANIL CITRATE 12.5 MCG: 50 INJECTION EPIDURAL; INTRAVENOUS at 13:34

## 2021-04-23 RX ADMIN — HEPARIN SODIUM 5000 UNITS: 5000 INJECTION, SOLUTION INTRAVENOUS; SUBCUTANEOUS at 20:13

## 2021-04-23 RX ADMIN — DOCUSATE SODIUM 100 MG: 100 CAPSULE ORAL at 20:13

## 2021-04-23 RX ADMIN — SUFENTANIL CITRATE 12.5 MCG: 50 INJECTION EPIDURAL; INTRAVENOUS at 14:36

## 2021-04-23 RX ADMIN — SODIUM CHLORIDE, POTASSIUM CHLORIDE, SODIUM LACTATE AND CALCIUM CHLORIDE 100 ML/HR: 600; 310; 30; 20 INJECTION, SOLUTION INTRAVENOUS at 18:02

## 2021-04-23 RX ADMIN — ROCURONIUM BROMIDE 20 MG: 10 INJECTION INTRAVENOUS at 14:30

## 2021-04-23 RX ADMIN — HYDROMORPHONE HYDROCHLORIDE 0.5 MG: 1 INJECTION, SOLUTION INTRAMUSCULAR; INTRAVENOUS; SUBCUTANEOUS at 19:50

## 2021-04-23 RX ADMIN — SUGAMMADEX 200 MG: 100 INJECTION, SOLUTION INTRAVENOUS at 15:02

## 2021-04-23 RX ADMIN — MEPERIDINE HYDROCHLORIDE 25 MG: 25 INJECTION INTRAMUSCULAR; INTRAVENOUS; SUBCUTANEOUS at 15:52

## 2021-04-23 RX ADMIN — SODIUM CHLORIDE, POTASSIUM CHLORIDE, SODIUM LACTATE AND CALCIUM CHLORIDE 100 ML/HR: 600; 310; 30; 20 INJECTION, SOLUTION INTRAVENOUS at 19:50

## 2021-04-23 RX ADMIN — ACETAMINOPHEN 1000 MG: 500 TABLET, FILM COATED ORAL at 12:58

## 2021-04-23 RX ADMIN — PROPOFOL 200 MG: 10 INJECTION, EMULSION INTRAVENOUS at 13:34

## 2021-04-23 RX ADMIN — GABAPENTIN 300 MG: 300 CAPSULE ORAL at 20:13

## 2021-04-23 RX ADMIN — FAMOTIDINE 20 MG: 10 INJECTION INTRAVENOUS at 12:58

## 2021-04-23 RX ADMIN — ACETAMINOPHEN 1000 MG: 500 TABLET, FILM COATED ORAL at 20:12

## 2021-04-23 RX ADMIN — KETAMINE HYDROCHLORIDE 25 MG: 50 INJECTION, SOLUTION INTRAMUSCULAR; INTRAVENOUS at 14:31

## 2021-04-23 RX ADMIN — SODIUM CHLORIDE, POTASSIUM CHLORIDE, SODIUM LACTATE AND CALCIUM CHLORIDE 1000 ML: 600; 310; 30; 20 INJECTION, SOLUTION INTRAVENOUS at 12:34

## 2021-04-23 RX ADMIN — LIDOCAINE HYDROCHLORIDE 100 MG: 20 INJECTION, SOLUTION EPIDURAL; INFILTRATION; INTRACAUDAL; PERINEURAL at 13:34

## 2021-04-23 RX ADMIN — KETAMINE HYDROCHLORIDE 50 MG: 50 INJECTION, SOLUTION INTRAMUSCULAR; INTRAVENOUS at 13:34

## 2021-04-23 RX ADMIN — SODIUM CHLORIDE, POTASSIUM CHLORIDE, SODIUM LACTATE AND CALCIUM CHLORIDE: 600; 310; 30; 20 INJECTION, SOLUTION INTRAVENOUS at 15:03

## 2021-04-23 RX ADMIN — HEPARIN SODIUM 5000 UNITS: 5000 INJECTION, SOLUTION INTRAVENOUS; SUBCUTANEOUS at 13:24

## 2021-04-23 RX ADMIN — LIDOCAINE HYDROCHLORIDE 2 MG/MIN: 4 INJECTION, SOLUTION INTRAVENOUS at 13:52

## 2021-04-23 RX ADMIN — POLYETHYLENE GLYCOL 3350 17 G: 17 POWDER, FOR SOLUTION ORAL at 18:13

## 2021-04-23 RX ADMIN — SUFENTANIL CITRATE 12.5 MCG: 50 INJECTION EPIDURAL; INTRAVENOUS at 13:33

## 2021-04-23 RX ADMIN — GABAPENTIN 300 MG: 300 CAPSULE ORAL at 18:02

## 2021-04-23 RX ADMIN — SCOPALAMINE 1 PATCH: 1 PATCH, EXTENDED RELEASE TRANSDERMAL at 12:58

## 2021-04-23 RX ADMIN — DEXAMETHASONE SODIUM PHOSPHATE 4 MG: 4 INJECTION, SOLUTION INTRA-ARTICULAR; INTRALESIONAL; INTRAMUSCULAR; INTRAVENOUS; SOFT TISSUE at 12:58

## 2021-04-23 NOTE — TELEPHONE ENCOUNTER
I spoke with this patient this evening advised her PET scan today did not show any evidence of metastatic disease other than bilateral inguinal adenopathy.  There is no signs of distant metastases.    She will have a diverting colostomy performed tomorrow by Dr. Paulette Villegas.    We will give her approximately 1 week to recover before proceeding with combined definitive chemoradiation.

## 2021-04-23 NOTE — ANESTHESIA PREPROCEDURE EVALUATION
Anesthesia Evaluation     Patient summary reviewed and Nursing notes reviewed   history of anesthetic complications: PONV  NPO Solid Status: > 8 hours  NPO Liquid Status: > 8 hours           Airway   Mallampati: I  TM distance: >3 FB  Neck ROM: full  No difficulty expected  Dental      Pulmonary    (+) a smoker Current, COPD,   Cardiovascular   Exercise tolerance: good (4-7 METS)    Patient on routine beta blocker and Beta blocker given within 24 hours of surgery    (+) hypertension,     ROS comment: Pt reports occassional chest tightness, she has had normal stress test and normal holter monitor    Neuro/Psych  (+) psychiatric history Anxiety and Depression,     (-) seizures, TIA, CVA  GI/Hepatic/Renal/Endo    (+) obesity,   hepatitis C, liver disease, renal disease stones,     Musculoskeletal     Abdominal    Substance History      OB/GYN          Other      history of cancer (rectal cancer) active                    Anesthesia Plan    ASA 3     general     intravenous induction     Anesthetic plan, all risks, benefits, and alternatives have been provided, discussed and informed consent has been obtained with: patient.

## 2021-04-23 NOTE — ANESTHESIA PROCEDURE NOTES
Airway  Urgency: elective    Date/Time: 4/23/2021 1:37 PM  Airway not difficult    General Information and Staff    Patient location during procedure: OR  CRNA: Mary Kate Wright CRNA    Indications and Patient Condition  Indications for airway management: airway protection    Preoxygenated: yes  Mask difficulty assessment: 1 - vent by mask    Final Airway Details  Final airway type: endotracheal airway      Successful airway: ETT  Cuffed: yes   Successful intubation technique: direct laryngoscopy  Facilitating devices/methods: intubating stylet  Endotracheal tube insertion site: oral  Blade: Wells  Blade size: 2  ETT size (mm): 7.0  Cormack-Lehane Classification: grade I - full view of glottis  Placement verified by: chest auscultation and capnometry   Cuff volume (mL): 8  Measured from: lips  ETT/EBT  to lips (cm): 21  Number of attempts at approach: 1  Assessment: lips, teeth, and gum same as pre-op and atraumatic intubation

## 2021-04-24 LAB
ANION GAP SERPL CALCULATED.3IONS-SCNC: 8 MMOL/L (ref 5–15)
BASOPHILS # BLD AUTO: 0.02 10*3/MM3 (ref 0–0.2)
BASOPHILS NFR BLD AUTO: 0.2 % (ref 0–1.5)
BUN SERPL-MCNC: 12 MG/DL (ref 8–23)
BUN/CREAT SERPL: 21.4 (ref 7–25)
CALCIUM SPEC-SCNC: 9.1 MG/DL (ref 8.6–10.5)
CHLORIDE SERPL-SCNC: 106 MMOL/L (ref 98–107)
CO2 SERPL-SCNC: 27 MMOL/L (ref 22–29)
CREAT SERPL-MCNC: 0.56 MG/DL (ref 0.57–1)
DEPRECATED RDW RBC AUTO: 39.2 FL (ref 37–54)
EOSINOPHIL # BLD AUTO: 0 10*3/MM3 (ref 0–0.4)
EOSINOPHIL NFR BLD AUTO: 0 % (ref 0.3–6.2)
ERYTHROCYTE [DISTWIDTH] IN BLOOD BY AUTOMATED COUNT: 12.7 % (ref 12.3–15.4)
GFR SERPL CREATININE-BSD FRML MDRD: 110 ML/MIN/1.73
GLUCOSE SERPL-MCNC: 129 MG/DL (ref 65–99)
HCT VFR BLD AUTO: 37 % (ref 34–46.6)
HGB BLD-MCNC: 11.8 G/DL (ref 12–15.9)
IMM GRANULOCYTES # BLD AUTO: 0.05 10*3/MM3 (ref 0–0.05)
IMM GRANULOCYTES NFR BLD AUTO: 0.6 % (ref 0–0.5)
LYMPHOCYTES # BLD AUTO: 1.46 10*3/MM3 (ref 0.7–3.1)
LYMPHOCYTES NFR BLD AUTO: 16.4 % (ref 19.6–45.3)
MAGNESIUM SERPL-MCNC: 2.1 MG/DL (ref 1.6–2.4)
MCH RBC QN AUTO: 27.1 PG (ref 26.6–33)
MCHC RBC AUTO-ENTMCNC: 31.9 G/DL (ref 31.5–35.7)
MCV RBC AUTO: 85.1 FL (ref 79–97)
MONOCYTES # BLD AUTO: 0.53 10*3/MM3 (ref 0.1–0.9)
MONOCYTES NFR BLD AUTO: 6 % (ref 5–12)
NEUTROPHILS NFR BLD AUTO: 6.83 10*3/MM3 (ref 1.7–7)
NEUTROPHILS NFR BLD AUTO: 76.8 % (ref 42.7–76)
NRBC BLD AUTO-RTO: 0 /100 WBC (ref 0–0.2)
PHOSPHATE SERPL-MCNC: 4 MG/DL (ref 2.5–4.5)
PLATELET # BLD AUTO: 228 10*3/MM3 (ref 140–450)
PMV BLD AUTO: 12.5 FL (ref 6–12)
POTASSIUM SERPL-SCNC: 4.2 MMOL/L (ref 3.5–5.2)
RBC # BLD AUTO: 4.35 10*6/MM3 (ref 3.77–5.28)
SODIUM SERPL-SCNC: 141 MMOL/L (ref 136–145)
WBC # BLD AUTO: 8.89 10*3/MM3 (ref 3.4–10.8)

## 2021-04-24 PROCEDURE — 25010000002 HYDROMORPHONE PER 4 MG: Performed by: STUDENT IN AN ORGANIZED HEALTH CARE EDUCATION/TRAINING PROGRAM

## 2021-04-24 PROCEDURE — 25010000003 POTASSIUM CHLORIDE PER 2 MEQ: Performed by: STUDENT IN AN ORGANIZED HEALTH CARE EDUCATION/TRAINING PROGRAM

## 2021-04-24 PROCEDURE — 25010000002 HEPARIN (PORCINE) PER 1000 UNITS: Performed by: STUDENT IN AN ORGANIZED HEALTH CARE EDUCATION/TRAINING PROGRAM

## 2021-04-24 PROCEDURE — 80048 BASIC METABOLIC PNL TOTAL CA: CPT | Performed by: STUDENT IN AN ORGANIZED HEALTH CARE EDUCATION/TRAINING PROGRAM

## 2021-04-24 PROCEDURE — 84100 ASSAY OF PHOSPHORUS: CPT | Performed by: STUDENT IN AN ORGANIZED HEALTH CARE EDUCATION/TRAINING PROGRAM

## 2021-04-24 PROCEDURE — 85025 COMPLETE CBC W/AUTO DIFF WBC: CPT | Performed by: STUDENT IN AN ORGANIZED HEALTH CARE EDUCATION/TRAINING PROGRAM

## 2021-04-24 PROCEDURE — 83735 ASSAY OF MAGNESIUM: CPT | Performed by: STUDENT IN AN ORGANIZED HEALTH CARE EDUCATION/TRAINING PROGRAM

## 2021-04-24 RX ORDER — SODIUM CHLORIDE AND POTASSIUM CHLORIDE 150; 450 MG/100ML; MG/100ML
75 INJECTION, SOLUTION INTRAVENOUS CONTINUOUS
Status: DISCONTINUED | OUTPATIENT
Start: 2021-04-24 | End: 2021-04-25

## 2021-04-24 RX ADMIN — LIDOCAINE 1 PATCH: 50 PATCH CUTANEOUS at 17:31

## 2021-04-24 RX ADMIN — DOCUSATE SODIUM 100 MG: 100 CAPSULE ORAL at 08:22

## 2021-04-24 RX ADMIN — HYDROMORPHONE HYDROCHLORIDE 0.5 MG: 1 INJECTION, SOLUTION INTRAMUSCULAR; INTRAVENOUS; SUBCUTANEOUS at 05:22

## 2021-04-24 RX ADMIN — HYDROMORPHONE HYDROCHLORIDE 0.5 MG: 1 INJECTION, SOLUTION INTRAMUSCULAR; INTRAVENOUS; SUBCUTANEOUS at 15:41

## 2021-04-24 RX ADMIN — FLUOXETINE HYDROCHLORIDE 40 MG: 20 CAPSULE ORAL at 08:22

## 2021-04-24 RX ADMIN — POLYETHYLENE GLYCOL 3350 17 G: 17 POWDER, FOR SOLUTION ORAL at 08:22

## 2021-04-24 RX ADMIN — ACETAMINOPHEN 1000 MG: 500 TABLET, FILM COATED ORAL at 14:18

## 2021-04-24 RX ADMIN — HEPARIN SODIUM 5000 UNITS: 5000 INJECTION, SOLUTION INTRAVENOUS; SUBCUTANEOUS at 14:18

## 2021-04-24 RX ADMIN — HYDROMORPHONE HYDROCHLORIDE 0.5 MG: 1 INJECTION, SOLUTION INTRAMUSCULAR; INTRAVENOUS; SUBCUTANEOUS at 20:50

## 2021-04-24 RX ADMIN — GABAPENTIN 300 MG: 300 CAPSULE ORAL at 08:22

## 2021-04-24 RX ADMIN — SODIUM CHLORIDE, POTASSIUM CHLORIDE, SODIUM LACTATE AND CALCIUM CHLORIDE 100 ML/HR: 600; 310; 30; 20 INJECTION, SOLUTION INTRAVENOUS at 04:22

## 2021-04-24 RX ADMIN — GABAPENTIN 300 MG: 300 CAPSULE ORAL at 20:49

## 2021-04-24 RX ADMIN — POTASSIUM CHLORIDE AND SODIUM CHLORIDE 75 ML/HR: 450; 150 INJECTION, SOLUTION INTRAVENOUS at 23:27

## 2021-04-24 RX ADMIN — HYDROMORPHONE HYDROCHLORIDE 0.5 MG: 1 INJECTION, SOLUTION INTRAMUSCULAR; INTRAVENOUS; SUBCUTANEOUS at 18:23

## 2021-04-24 RX ADMIN — OXYCODONE HYDROCHLORIDE 5 MG: 5 TABLET ORAL at 08:22

## 2021-04-24 RX ADMIN — HYDROMORPHONE HYDROCHLORIDE 0.5 MG: 1 INJECTION, SOLUTION INTRAMUSCULAR; INTRAVENOUS; SUBCUTANEOUS at 09:42

## 2021-04-24 RX ADMIN — GABAPENTIN 300 MG: 300 CAPSULE ORAL at 11:07

## 2021-04-24 RX ADMIN — HEPARIN SODIUM 5000 UNITS: 5000 INJECTION, SOLUTION INTRAVENOUS; SUBCUTANEOUS at 21:27

## 2021-04-24 RX ADMIN — HYDROMORPHONE HYDROCHLORIDE 0.5 MG: 1 INJECTION, SOLUTION INTRAMUSCULAR; INTRAVENOUS; SUBCUTANEOUS at 02:35

## 2021-04-24 RX ADMIN — ALPRAZOLAM 2 MG: 0.5 TABLET ORAL at 18:23

## 2021-04-24 RX ADMIN — HYDROMORPHONE HYDROCHLORIDE 0.5 MG: 1 INJECTION, SOLUTION INTRAMUSCULAR; INTRAVENOUS; SUBCUTANEOUS at 13:33

## 2021-04-24 RX ADMIN — ALPRAZOLAM 2 MG: 0.5 TABLET ORAL at 11:07

## 2021-04-24 RX ADMIN — FENTANYL 1 PATCH: 25 PATCH TRANSDERMAL at 08:23

## 2021-04-24 RX ADMIN — POTASSIUM CHLORIDE AND SODIUM CHLORIDE 75 ML/HR: 450; 150 INJECTION, SOLUTION INTRAVENOUS at 10:58

## 2021-04-24 RX ADMIN — HYDROMORPHONE HYDROCHLORIDE 0.5 MG: 1 INJECTION, SOLUTION INTRAMUSCULAR; INTRAVENOUS; SUBCUTANEOUS at 23:33

## 2021-04-24 RX ADMIN — DOCUSATE SODIUM 100 MG: 100 CAPSULE ORAL at 20:47

## 2021-04-24 RX ADMIN — ACETAMINOPHEN 1000 MG: 500 TABLET, FILM COATED ORAL at 05:22

## 2021-04-24 RX ADMIN — FLUOXETINE HYDROCHLORIDE 40 MG: 20 CAPSULE ORAL at 20:47

## 2021-04-24 RX ADMIN — ACETAMINOPHEN 1000 MG: 500 TABLET, FILM COATED ORAL at 20:47

## 2021-04-24 RX ADMIN — HEPARIN SODIUM 5000 UNITS: 5000 INJECTION, SOLUTION INTRAVENOUS; SUBCUTANEOUS at 05:22

## 2021-04-24 RX ADMIN — GABAPENTIN 300 MG: 300 CAPSULE ORAL at 17:31

## 2021-04-24 NOTE — ANESTHESIA POSTPROCEDURE EVALUATION
"Patient: Lenora Kathleen    Procedure Summary     Date: 04/23/21 Room / Location:  [facility] OR 09 /  [facility] OR    Anesthesia Start: 1332 Anesthesia Stop: 1517    Procedure: LAPAROSCOPIC DIVERTING COLOSTOMY (N/A Abdomen) Diagnosis: (RECTOVAGINAL FISTULA)    Surgeons: Paulette Villegas MD Provider: Mary Kate Wright CRNA    Anesthesia Type: general ASA Status: 3          Anesthesia Type: general    Vitals  Vitals Value Taken Time   /65 04/23/21 1630   Temp 98 °F (36.7 °C) 04/23/21 1628   Pulse 53 04/23/21 1637   Resp 11 04/23/21 1628   SpO2 99 % 04/23/21 1637   Vitals shown include unvalidated device data.        Post Anesthesia Care and Evaluation    Patient location during evaluation: PACU  Patient participation: complete - patient participated  Level of consciousness: awake and alert  Pain management: adequate  Airway patency: patent  Anesthetic complications: No anesthetic complications    Cardiovascular status: acceptable  Respiratory status: acceptable  Hydration status: acceptable    Comments: Blood pressure 119/60, pulse 51, temperature 98.8 °F (37.1 °C), temperature source Oral, resp. rate 16, height 161.5 cm (63.58\"), weight 90 kg (198 lb 6.6 oz), SpO2 98 %, not currently breastfeeding.    Pt discharged from PACU based on doron score >8      "

## 2021-04-25 LAB
ANION GAP SERPL CALCULATED.3IONS-SCNC: 6 MMOL/L (ref 5–15)
BASOPHILS # BLD AUTO: 0.04 10*3/MM3 (ref 0–0.2)
BASOPHILS NFR BLD AUTO: 0.4 % (ref 0–1.5)
BUN SERPL-MCNC: 16 MG/DL (ref 8–23)
BUN/CREAT SERPL: 28.1 (ref 7–25)
CALCIUM SPEC-SCNC: 7.9 MG/DL (ref 8.6–10.5)
CHLORIDE SERPL-SCNC: 110 MMOL/L (ref 98–107)
CO2 SERPL-SCNC: 26 MMOL/L (ref 22–29)
CREAT SERPL-MCNC: 0.57 MG/DL (ref 0.57–1)
DEPRECATED RDW RBC AUTO: 40.9 FL (ref 37–54)
EOSINOPHIL # BLD AUTO: 0.06 10*3/MM3 (ref 0–0.4)
EOSINOPHIL NFR BLD AUTO: 0.7 % (ref 0.3–6.2)
ERYTHROCYTE [DISTWIDTH] IN BLOOD BY AUTOMATED COUNT: 13.1 % (ref 12.3–15.4)
GFR SERPL CREATININE-BSD FRML MDRD: 108 ML/MIN/1.73
GLUCOSE SERPL-MCNC: 121 MG/DL (ref 65–99)
HCT VFR BLD AUTO: 33.4 % (ref 34–46.6)
HGB BLD-MCNC: 10.5 G/DL (ref 12–15.9)
LYMPHOCYTES # BLD AUTO: 3.7 10*3/MM3 (ref 0.7–3.1)
LYMPHOCYTES NFR BLD AUTO: 41.1 % (ref 19.6–45.3)
MAGNESIUM SERPL-MCNC: 1.9 MG/DL (ref 1.6–2.4)
MCH RBC QN AUTO: 27.3 PG (ref 26.6–33)
MCHC RBC AUTO-ENTMCNC: 31.4 G/DL (ref 31.5–35.7)
MCV RBC AUTO: 86.8 FL (ref 79–97)
MONOCYTES # BLD AUTO: 0.71 10*3/MM3 (ref 0.1–0.9)
MONOCYTES NFR BLD AUTO: 7.9 % (ref 5–12)
NEUTROPHILS NFR BLD AUTO: 4.47 10*3/MM3 (ref 1.7–7)
NEUTROPHILS NFR BLD AUTO: 49.7 % (ref 42.7–76)
PHOSPHATE SERPL-MCNC: 3.4 MG/DL (ref 2.5–4.5)
PLATELET # BLD AUTO: 193 10*3/MM3 (ref 140–450)
PMV BLD AUTO: 13.2 FL (ref 6–12)
POTASSIUM SERPL-SCNC: 3.7 MMOL/L (ref 3.5–5.2)
QT INTERVAL: 428 MS
QTC INTERVAL: 409 MS
RBC # BLD AUTO: 3.85 10*6/MM3 (ref 3.77–5.28)
SODIUM SERPL-SCNC: 142 MMOL/L (ref 136–145)
WBC # BLD AUTO: 9 10*3/MM3 (ref 3.4–10.8)

## 2021-04-25 PROCEDURE — 84100 ASSAY OF PHOSPHORUS: CPT | Performed by: STUDENT IN AN ORGANIZED HEALTH CARE EDUCATION/TRAINING PROGRAM

## 2021-04-25 PROCEDURE — 83735 ASSAY OF MAGNESIUM: CPT | Performed by: STUDENT IN AN ORGANIZED HEALTH CARE EDUCATION/TRAINING PROGRAM

## 2021-04-25 PROCEDURE — 85025 COMPLETE CBC W/AUTO DIFF WBC: CPT | Performed by: STUDENT IN AN ORGANIZED HEALTH CARE EDUCATION/TRAINING PROGRAM

## 2021-04-25 PROCEDURE — 25010000002 HEPARIN (PORCINE) PER 1000 UNITS: Performed by: STUDENT IN AN ORGANIZED HEALTH CARE EDUCATION/TRAINING PROGRAM

## 2021-04-25 PROCEDURE — 25010000002 HYDROMORPHONE PER 4 MG: Performed by: STUDENT IN AN ORGANIZED HEALTH CARE EDUCATION/TRAINING PROGRAM

## 2021-04-25 PROCEDURE — 80048 BASIC METABOLIC PNL TOTAL CA: CPT | Performed by: STUDENT IN AN ORGANIZED HEALTH CARE EDUCATION/TRAINING PROGRAM

## 2021-04-25 RX ADMIN — METOPROLOL TARTRATE 25 MG: 25 TABLET, FILM COATED ORAL at 08:23

## 2021-04-25 RX ADMIN — HYDROMORPHONE HYDROCHLORIDE 0.5 MG: 1 INJECTION, SOLUTION INTRAMUSCULAR; INTRAVENOUS; SUBCUTANEOUS at 08:22

## 2021-04-25 RX ADMIN — HYDROMORPHONE HYDROCHLORIDE 0.5 MG: 1 INJECTION, SOLUTION INTRAMUSCULAR; INTRAVENOUS; SUBCUTANEOUS at 17:47

## 2021-04-25 RX ADMIN — GABAPENTIN 300 MG: 300 CAPSULE ORAL at 21:08

## 2021-04-25 RX ADMIN — LISINOPRIL 10 MG: 10 TABLET ORAL at 08:23

## 2021-04-25 RX ADMIN — ACETAMINOPHEN 1000 MG: 500 TABLET, FILM COATED ORAL at 14:11

## 2021-04-25 RX ADMIN — POLYETHYLENE GLYCOL 3350 17 G: 17 POWDER, FOR SOLUTION ORAL at 08:23

## 2021-04-25 RX ADMIN — LIDOCAINE 1 PATCH: 50 PATCH CUTANEOUS at 17:47

## 2021-04-25 RX ADMIN — HEPARIN SODIUM 5000 UNITS: 5000 INJECTION, SOLUTION INTRAVENOUS; SUBCUTANEOUS at 06:36

## 2021-04-25 RX ADMIN — GABAPENTIN 300 MG: 300 CAPSULE ORAL at 17:47

## 2021-04-25 RX ADMIN — HYDROMORPHONE HYDROCHLORIDE 0.5 MG: 1 INJECTION, SOLUTION INTRAMUSCULAR; INTRAVENOUS; SUBCUTANEOUS at 23:44

## 2021-04-25 RX ADMIN — CHLORTHALIDONE 50 MG: 25 TABLET ORAL at 08:23

## 2021-04-25 RX ADMIN — FLUOXETINE HYDROCHLORIDE 40 MG: 20 CAPSULE ORAL at 20:59

## 2021-04-25 RX ADMIN — GABAPENTIN 300 MG: 300 CAPSULE ORAL at 08:23

## 2021-04-25 RX ADMIN — HEPARIN SODIUM 5000 UNITS: 5000 INJECTION, SOLUTION INTRAVENOUS; SUBCUTANEOUS at 21:00

## 2021-04-25 RX ADMIN — OXYCODONE HYDROCHLORIDE 5 MG: 5 TABLET ORAL at 21:08

## 2021-04-25 RX ADMIN — ALPRAZOLAM 2 MG: 0.5 TABLET ORAL at 21:08

## 2021-04-25 RX ADMIN — SODIUM CHLORIDE, PRESERVATIVE FREE 10 ML: 5 INJECTION INTRAVENOUS at 20:59

## 2021-04-25 RX ADMIN — ACETAMINOPHEN 1000 MG: 500 TABLET, FILM COATED ORAL at 20:59

## 2021-04-25 RX ADMIN — HEPARIN SODIUM 5000 UNITS: 5000 INJECTION, SOLUTION INTRAVENOUS; SUBCUTANEOUS at 14:11

## 2021-04-25 RX ADMIN — ALPRAZOLAM 2 MG: 0.5 TABLET ORAL at 06:36

## 2021-04-25 RX ADMIN — HYDROMORPHONE HYDROCHLORIDE 0.5 MG: 1 INJECTION, SOLUTION INTRAMUSCULAR; INTRAVENOUS; SUBCUTANEOUS at 14:11

## 2021-04-25 RX ADMIN — SODIUM CHLORIDE, PRESERVATIVE FREE 10 ML: 5 INJECTION INTRAVENOUS at 08:23

## 2021-04-25 RX ADMIN — DOCUSATE SODIUM 100 MG: 100 CAPSULE ORAL at 20:59

## 2021-04-25 RX ADMIN — HYDROMORPHONE HYDROCHLORIDE 0.5 MG: 1 INJECTION, SOLUTION INTRAMUSCULAR; INTRAVENOUS; SUBCUTANEOUS at 10:59

## 2021-04-25 RX ADMIN — ACETAMINOPHEN 1000 MG: 500 TABLET, FILM COATED ORAL at 04:36

## 2021-04-25 RX ADMIN — FLUOXETINE HYDROCHLORIDE 40 MG: 20 CAPSULE ORAL at 08:23

## 2021-04-25 RX ADMIN — GABAPENTIN 300 MG: 300 CAPSULE ORAL at 11:02

## 2021-04-25 RX ADMIN — DOCUSATE SODIUM 100 MG: 100 CAPSULE ORAL at 08:23

## 2021-04-25 RX ADMIN — HYDROMORPHONE HYDROCHLORIDE 0.5 MG: 1 INJECTION, SOLUTION INTRAMUSCULAR; INTRAVENOUS; SUBCUTANEOUS at 04:36

## 2021-04-26 ENCOUNTER — TELEPHONE (OUTPATIENT)
Dept: INFUSION THERAPY | Age: 61
End: 2021-04-26

## 2021-04-26 VITALS
HEIGHT: 64 IN | WEIGHT: 198.41 LBS | BODY MASS INDEX: 33.87 KG/M2 | OXYGEN SATURATION: 94 % | SYSTOLIC BLOOD PRESSURE: 104 MMHG | DIASTOLIC BLOOD PRESSURE: 63 MMHG | HEART RATE: 64 BPM | TEMPERATURE: 98.5 F | RESPIRATION RATE: 16 BRPM

## 2021-04-26 PROBLEM — E43 SEVERE MALNUTRITION (HCC): Status: ACTIVE | Noted: 2021-04-26

## 2021-04-26 LAB
ANION GAP SERPL CALCULATED.3IONS-SCNC: 8 MMOL/L (ref 5–15)
BASOPHILS # BLD AUTO: 0.02 10*3/MM3 (ref 0–0.2)
BASOPHILS NFR BLD AUTO: 0.3 % (ref 0–1.5)
BUN SERPL-MCNC: 10 MG/DL (ref 8–23)
BUN/CREAT SERPL: 20 (ref 7–25)
CALCIUM SPEC-SCNC: 8.6 MG/DL (ref 8.6–10.5)
CHLORIDE SERPL-SCNC: 105 MMOL/L (ref 98–107)
CO2 SERPL-SCNC: 28 MMOL/L (ref 22–29)
CREAT SERPL-MCNC: 0.5 MG/DL (ref 0.57–1)
DEPRECATED RDW RBC AUTO: 39.8 FL (ref 37–54)
EOSINOPHIL # BLD AUTO: 0.12 10*3/MM3 (ref 0–0.4)
EOSINOPHIL NFR BLD AUTO: 1.6 % (ref 0.3–6.2)
ERYTHROCYTE [DISTWIDTH] IN BLOOD BY AUTOMATED COUNT: 12.9 % (ref 12.3–15.4)
GFR SERPL CREATININE-BSD FRML MDRD: 125 ML/MIN/1.73
GLUCOSE SERPL-MCNC: 104 MG/DL (ref 65–99)
HCT VFR BLD AUTO: 35.7 % (ref 34–46.6)
HGB BLD-MCNC: 11.4 G/DL (ref 12–15.9)
IMM GRANULOCYTES # BLD AUTO: 0.02 10*3/MM3 (ref 0–0.05)
IMM GRANULOCYTES NFR BLD AUTO: 0.3 % (ref 0–0.5)
LYMPHOCYTES # BLD AUTO: 2.6 10*3/MM3 (ref 0.7–3.1)
LYMPHOCYTES NFR BLD AUTO: 34.5 % (ref 19.6–45.3)
MAGNESIUM SERPL-MCNC: 1.7 MG/DL (ref 1.6–2.4)
MCH RBC QN AUTO: 27.2 PG (ref 26.6–33)
MCHC RBC AUTO-ENTMCNC: 31.9 G/DL (ref 31.5–35.7)
MCV RBC AUTO: 85.2 FL (ref 79–97)
MONOCYTES # BLD AUTO: 0.79 10*3/MM3 (ref 0.1–0.9)
MONOCYTES NFR BLD AUTO: 10.5 % (ref 5–12)
NEUTROPHILS NFR BLD AUTO: 3.98 10*3/MM3 (ref 1.7–7)
NEUTROPHILS NFR BLD AUTO: 52.8 % (ref 42.7–76)
NRBC BLD AUTO-RTO: 0 /100 WBC (ref 0–0.2)
PHOSPHATE SERPL-MCNC: 3.6 MG/DL (ref 2.5–4.5)
PLATELET # BLD AUTO: 215 10*3/MM3 (ref 140–450)
PMV BLD AUTO: 13 FL (ref 6–12)
POTASSIUM SERPL-SCNC: 3.9 MMOL/L (ref 3.5–5.2)
RBC # BLD AUTO: 4.19 10*6/MM3 (ref 3.77–5.28)
SODIUM SERPL-SCNC: 141 MMOL/L (ref 136–145)
WBC # BLD AUTO: 7.53 10*3/MM3 (ref 3.4–10.8)

## 2021-04-26 PROCEDURE — 94799 UNLISTED PULMONARY SVC/PX: CPT

## 2021-04-26 PROCEDURE — 80048 BASIC METABOLIC PNL TOTAL CA: CPT | Performed by: STUDENT IN AN ORGANIZED HEALTH CARE EDUCATION/TRAINING PROGRAM

## 2021-04-26 PROCEDURE — 83735 ASSAY OF MAGNESIUM: CPT | Performed by: STUDENT IN AN ORGANIZED HEALTH CARE EDUCATION/TRAINING PROGRAM

## 2021-04-26 PROCEDURE — 99233 SBSQ HOSP IP/OBS HIGH 50: CPT | Performed by: NURSE PRACTITIONER

## 2021-04-26 PROCEDURE — 84100 ASSAY OF PHOSPHORUS: CPT | Performed by: STUDENT IN AN ORGANIZED HEALTH CARE EDUCATION/TRAINING PROGRAM

## 2021-04-26 PROCEDURE — 85025 COMPLETE CBC W/AUTO DIFF WBC: CPT | Performed by: STUDENT IN AN ORGANIZED HEALTH CARE EDUCATION/TRAINING PROGRAM

## 2021-04-26 PROCEDURE — 25010000002 HYDROMORPHONE PER 4 MG: Performed by: STUDENT IN AN ORGANIZED HEALTH CARE EDUCATION/TRAINING PROGRAM

## 2021-04-26 PROCEDURE — 25010000002 HEPARIN (PORCINE) PER 1000 UNITS: Performed by: STUDENT IN AN ORGANIZED HEALTH CARE EDUCATION/TRAINING PROGRAM

## 2021-04-26 PROCEDURE — 0D1N4Z4 BYPASS SIGMOID COLON TO CUTANEOUS, PERCUTANEOUS ENDOSCOPIC APPROACH: ICD-10-PCS | Performed by: STUDENT IN AN ORGANIZED HEALTH CARE EDUCATION/TRAINING PROGRAM

## 2021-04-26 RX ORDER — IBUPROFEN 200 MG
600 TABLET ORAL EVERY 8 HOURS
Qty: 100 TABLET | Refills: 2
Start: 2021-04-26 | End: 2021-07-21 | Stop reason: HOSPADM

## 2021-04-26 RX ORDER — ONDANSETRON 4 MG/1
4 TABLET, FILM COATED ORAL EVERY 8 HOURS PRN
Qty: 20 TABLET | Refills: 1 | Status: SHIPPED | OUTPATIENT
Start: 2021-04-26 | End: 2021-11-05 | Stop reason: SDUPTHER

## 2021-04-26 RX ORDER — ACETAMINOPHEN 325 MG/1
975 TABLET ORAL EVERY 8 HOURS
Qty: 100 TABLET | Refills: 2
Start: 2021-04-26 | End: 2021-07-21 | Stop reason: HOSPADM

## 2021-04-26 RX ORDER — OXYCODONE HYDROCHLORIDE 5 MG/1
5 TABLET ORAL EVERY 8 HOURS PRN
Qty: 15 TABLET | Refills: 0 | Status: SHIPPED | OUTPATIENT
Start: 2021-04-26 | End: 2021-06-01 | Stop reason: CLARIF

## 2021-04-26 RX ADMIN — HYDROMORPHONE HYDROCHLORIDE 0.5 MG: 1 INJECTION, SOLUTION INTRAMUSCULAR; INTRAVENOUS; SUBCUTANEOUS at 11:52

## 2021-04-26 RX ADMIN — FLUOXETINE HYDROCHLORIDE 40 MG: 20 CAPSULE ORAL at 09:15

## 2021-04-26 RX ADMIN — OXYCODONE HYDROCHLORIDE 5 MG: 5 TABLET ORAL at 06:00

## 2021-04-26 RX ADMIN — ACETAMINOPHEN 1000 MG: 500 TABLET, FILM COATED ORAL at 06:00

## 2021-04-26 RX ADMIN — POLYETHYLENE GLYCOL 3350 17 G: 17 POWDER, FOR SOLUTION ORAL at 09:15

## 2021-04-26 RX ADMIN — DOCUSATE SODIUM 100 MG: 100 CAPSULE ORAL at 09:15

## 2021-04-26 RX ADMIN — HYDROMORPHONE HYDROCHLORIDE 0.5 MG: 1 INJECTION, SOLUTION INTRAMUSCULAR; INTRAVENOUS; SUBCUTANEOUS at 07:39

## 2021-04-26 RX ADMIN — IBUPROFEN 600 MG: 600 TABLET, FILM COATED ORAL at 09:19

## 2021-04-26 RX ADMIN — GABAPENTIN 300 MG: 300 CAPSULE ORAL at 07:38

## 2021-04-26 RX ADMIN — CHLORTHALIDONE 50 MG: 25 TABLET ORAL at 09:15

## 2021-04-26 RX ADMIN — ALPRAZOLAM 2 MG: 0.5 TABLET ORAL at 06:00

## 2021-04-26 RX ADMIN — HEPARIN SODIUM 5000 UNITS: 5000 INJECTION, SOLUTION INTRAVENOUS; SUBCUTANEOUS at 06:00

## 2021-04-26 RX ADMIN — OXYCODONE HYDROCHLORIDE 5 MG: 5 TABLET ORAL at 10:53

## 2021-04-26 RX ADMIN — HYDROMORPHONE HYDROCHLORIDE 0.5 MG: 1 INJECTION, SOLUTION INTRAMUSCULAR; INTRAVENOUS; SUBCUTANEOUS at 03:17

## 2021-04-26 NOTE — TELEPHONE ENCOUNTER
PT and Toño Armando called reporting that PT is in hospital still at [facility]. PT state she wants to push biopsy out at this time d/t still in pain from colostomy. PT's biopsy rescheduled for Friday 04/30/2021 and Dr. Maryjo Banks appointment scheduled for Tuesday 05/04/2021.

## 2021-04-27 ENCOUNTER — TRANSCRIBE ORDERS (OUTPATIENT)
Dept: LAB | Facility: HOSPITAL | Age: 61
End: 2021-04-27

## 2021-04-27 ENCOUNTER — APPOINTMENT (OUTPATIENT)
Dept: ULTRASOUND IMAGING | Facility: HOSPITAL | Age: 61
End: 2021-04-27

## 2021-04-27 DIAGNOSIS — Z01.818 PREOPERATIVE TESTING: Primary | ICD-10-CM

## 2021-04-28 ENCOUNTER — LAB (OUTPATIENT)
Dept: LAB | Facility: HOSPITAL | Age: 61
End: 2021-04-28

## 2021-04-28 DIAGNOSIS — Z01.818 PREOPERATIVE TESTING: ICD-10-CM

## 2021-04-28 LAB — SARS-COV-2 ORF1AB RESP QL NAA+PROBE: NOT DETECTED

## 2021-04-28 PROCEDURE — U0004 COV-19 TEST NON-CDC HGH THRU: HCPCS

## 2021-04-28 PROCEDURE — C9803 HOPD COVID-19 SPEC COLLECT: HCPCS

## 2021-04-30 ENCOUNTER — APPOINTMENT (OUTPATIENT)
Dept: INFUSION THERAPY | Age: 61
End: 2021-04-30
Payer: MEDICARE

## 2021-04-30 ENCOUNTER — HOSPITAL ENCOUNTER (OUTPATIENT)
Dept: ULTRASOUND IMAGING | Facility: HOSPITAL | Age: 61
Discharge: HOME | End: 2021-04-30
Admitting: INTERNAL MEDICINE

## 2021-04-30 DIAGNOSIS — R59.0 INGUINAL LYMPHADENOPATHY: ICD-10-CM

## 2021-04-30 DIAGNOSIS — C21.1 MALIGNANT NEOPLASM OF ANAL CANAL (HCC): ICD-10-CM

## 2021-04-30 PROCEDURE — 76942 ECHO GUIDE FOR BIOPSY: CPT

## 2021-04-30 PROCEDURE — 76882 US LMTD JT/FCL EVL NVASC XTR: CPT

## 2021-04-30 PROCEDURE — 88172 CYTP DX EVAL FNA 1ST EA SITE: CPT | Performed by: INTERNAL MEDICINE

## 2021-04-30 PROCEDURE — 88305 TISSUE EXAM BY PATHOLOGIST: CPT | Performed by: INTERNAL MEDICINE

## 2021-05-03 ENCOUNTER — HOSPITAL ENCOUNTER (OUTPATIENT)
Dept: RADIATION ONCOLOGY | Facility: HOSPITAL | Age: 61
Setting detail: RADIATION/ONCOLOGY SERIES
End: 2021-05-03

## 2021-05-03 LAB
CYTO UR: NORMAL
LAB AP CASE REPORT: NORMAL
LAB AP CLINICAL INFORMATION: NORMAL
Lab: NORMAL
PATH REPORT.FINAL DX SPEC: NORMAL
PATH REPORT.GROSS SPEC: NORMAL
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
## 2021-05-04 ENCOUNTER — TELEPHONE (OUTPATIENT)
Dept: RADIATION ONCOLOGY | Facility: HOSPITAL | Age: 61
End: 2021-05-04

## 2021-05-04 ENCOUNTER — OFFICE VISIT (OUTPATIENT)
Dept: HEMATOLOGY | Age: 61
End: 2021-05-04
Payer: MEDICARE

## 2021-05-04 ENCOUNTER — DOCUMENTATION (OUTPATIENT)
Dept: RADIATION ONCOLOGY | Facility: HOSPITAL | Age: 61
End: 2021-05-04

## 2021-05-04 ENCOUNTER — HOSPITAL ENCOUNTER (OUTPATIENT)
Dept: INFUSION THERAPY | Age: 61
Discharge: HOME | End: 2021-05-04
Payer: MEDICARE

## 2021-05-04 ENCOUNTER — APPOINTMENT (OUTPATIENT)
Dept: INFUSION THERAPY | Age: 61
End: 2021-05-04
Payer: MEDICARE

## 2021-05-04 VITALS
TEMPERATURE: 96.9 F | SYSTOLIC BLOOD PRESSURE: 124 MMHG | HEIGHT: 63 IN | WEIGHT: 200.8 LBS | BODY MASS INDEX: 35.58 KG/M2 | DIASTOLIC BLOOD PRESSURE: 62 MMHG | OXYGEN SATURATION: 94 % | HEART RATE: 78 BPM

## 2021-05-04 DIAGNOSIS — Z71.89 CARE PLAN DISCUSSED WITH PATIENT: ICD-10-CM

## 2021-05-04 DIAGNOSIS — C21.0 ANAL CANCER (HCC): ICD-10-CM

## 2021-05-04 DIAGNOSIS — C77.4 METASTASIS TO GROIN LYMPH NODE (HCC): ICD-10-CM

## 2021-05-04 DIAGNOSIS — Z71.89 COORDINATION OF COMPLEX CARE: ICD-10-CM

## 2021-05-04 DIAGNOSIS — C21.0 ANAL CANCER (HCC): Primary | ICD-10-CM

## 2021-05-04 LAB
BASOPHILS ABSOLUTE: 0.01 K/UL (ref 0.01–0.08)
BASOPHILS RELATIVE PERCENT: 0.1 % (ref 0.1–1.2)
EOSINOPHILS ABSOLUTE: 0.19 K/UL (ref 0.04–0.54)
EOSINOPHILS RELATIVE PERCENT: 1.6 % (ref 0.7–7)
HCT VFR BLD CALC: 41.5 % (ref 34.1–44.9)
HEMOGLOBIN: 13.2 G/DL (ref 11.2–15.7)
LYMPHOCYTES ABSOLUTE: 2.19 K/UL (ref 1.18–3.74)
LYMPHOCYTES RELATIVE PERCENT: 18 % (ref 19.3–53.1)
MCH RBC QN AUTO: 27.7 PG (ref 25.6–32.2)
MCHC RBC AUTO-ENTMCNC: 31.8 G/DL (ref 32.3–35.5)
MCV RBC AUTO: 87.2 FL (ref 79.4–94.8)
MONOCYTES ABSOLUTE: 0.73 K/UL (ref 0.24–0.82)
MONOCYTES RELATIVE PERCENT: 6 % (ref 4.7–12.5)
NEUTROPHILS ABSOLUTE: 9.06 K/UL (ref 1.56–6.13)
NEUTROPHILS RELATIVE PERCENT: 74.3 % (ref 34–71.1)
PDW BLD-RTO: 12.6 % (ref 11.7–14.4)
PLATELET # BLD: 267 K/UL (ref 182–369)
PMV BLD AUTO: 11.8 FL (ref 7.4–10.4)
RBC # BLD: 4.76 M/UL (ref 3.93–5.22)
WBC # BLD: 12.18 K/UL (ref 3.98–10.04)

## 2021-05-04 PROCEDURE — 85025 COMPLETE CBC W/AUTO DIFF WBC: CPT

## 2021-05-04 PROCEDURE — 77301 RADIOTHERAPY DOSE PLAN IMRT: CPT | Performed by: RADIOLOGY

## 2021-05-04 PROCEDURE — 77338 DESIGN MLC DEVICE FOR IMRT: CPT | Performed by: RADIOLOGY

## 2021-05-04 PROCEDURE — 99214 OFFICE O/P EST MOD 30 MIN: CPT | Performed by: INTERNAL MEDICINE

## 2021-05-04 PROCEDURE — 99211 OFF/OP EST MAY X REQ PHY/QHP: CPT

## 2021-05-04 PROCEDURE — 77300 RADIATION THERAPY DOSE PLAN: CPT | Performed by: RADIOLOGY

## 2021-05-04 RX ORDER — IBUPROFEN 600 MG/1
1 TABLET ORAL EVERY 8 HOURS PRN
Status: ON HOLD | COMMUNITY
Start: 2021-03-19 | End: 2022-09-29 | Stop reason: HOSPADM

## 2021-05-04 RX ORDER — ACETAMINOPHEN 325 MG/1
325 TABLET ORAL EVERY 4 HOURS PRN
COMMUNITY
Start: 2021-04-26 | End: 2022-09-28

## 2021-05-04 RX ORDER — FENTANYL 25 UG/H
PATCH TRANSDERMAL
Status: ON HOLD | COMMUNITY
Start: 2021-04-21 | End: 2021-08-23 | Stop reason: HOSPADM

## 2021-05-04 RX ORDER — OXYCODONE HYDROCHLORIDE 10 MG/1
TABLET ORAL
Status: ON HOLD | COMMUNITY
Start: 2021-04-27 | End: 2021-08-23 | Stop reason: HOSPADM

## 2021-05-04 RX ORDER — ONDANSETRON 4 MG/1
TABLET, FILM COATED ORAL
Status: ON HOLD | COMMUNITY
Start: 2021-04-26 | End: 2021-08-23 | Stop reason: HOSPADM

## 2021-05-04 NOTE — PROGRESS NOTES
I spoke with Dr. Canseco today to coordinate initiation of concomitant chemoradiation for anal canal carcinoma.    We will plan to start chemotherapy and radiation on Monday, May 10, 2021

## 2021-05-10 ENCOUNTER — HOSPITAL ENCOUNTER (OUTPATIENT)
Dept: RADIATION ONCOLOGY | Facility: HOSPITAL | Age: 61
Discharge: HOME | End: 2021-05-10

## 2021-05-10 ENCOUNTER — HOSPITAL ENCOUNTER (OUTPATIENT)
Dept: INFUSION THERAPY | Age: 61
Discharge: HOME | End: 2021-05-10
Payer: MEDICARE

## 2021-05-10 VITALS
HEIGHT: 63 IN | DIASTOLIC BLOOD PRESSURE: 70 MMHG | BODY MASS INDEX: 35.97 KG/M2 | OXYGEN SATURATION: 94 % | WEIGHT: 203 LBS | HEART RATE: 56 BPM | SYSTOLIC BLOOD PRESSURE: 101 MMHG | TEMPERATURE: 97.1 F

## 2021-05-10 DIAGNOSIS — C21.0 ANAL CANCER (HCC): Primary | ICD-10-CM

## 2021-05-10 LAB
ALBUMIN SERPL-MCNC: 4.2 G/DL (ref 3.5–5.2)
ALP BLD-CCNC: 54 U/L (ref 35–104)
ALT SERPL-CCNC: 18 U/L (ref 9–52)
ANION GAP SERPL CALCULATED.3IONS-SCNC: 7 MMOL/L (ref 7–19)
AST SERPL-CCNC: 40 U/L (ref 14–36)
BASOPHILS ABSOLUTE: 0.03 K/UL (ref 0.01–0.08)
BASOPHILS RELATIVE PERCENT: 0.4 % (ref 0.1–1.2)
BILIRUB SERPL-MCNC: <0.2 MG/DL (ref 0.2–1.3)
BUN BLDV-MCNC: 28 MG/DL (ref 7–17)
CALCIUM SERPL-MCNC: 9.6 MG/DL (ref 8.4–10.2)
CHLORIDE BLD-SCNC: 100 MMOL/L (ref 98–111)
CO2: 31 MMOL/L (ref 22–29)
CREAT SERPL-MCNC: 0.6 MG/DL (ref 0.5–1)
EOSINOPHILS ABSOLUTE: 0.2 K/UL (ref 0.04–0.54)
EOSINOPHILS RELATIVE PERCENT: 2.9 % (ref 0.7–7)
GFR NON-AFRICAN AMERICAN: >60
GLUCOSE BLD-MCNC: 93 MG/DL (ref 74–106)
HCT VFR BLD CALC: 37.3 % (ref 34.1–44.9)
HEMOGLOBIN: 12.1 G/DL (ref 11.2–15.7)
LYMPHOCYTES ABSOLUTE: 2.4 K/UL (ref 1.18–3.74)
LYMPHOCYTES RELATIVE PERCENT: 34.5 % (ref 19.3–53.1)
MCH RBC QN AUTO: 28.1 PG (ref 25.6–32.2)
MCHC RBC AUTO-ENTMCNC: 32.4 G/DL (ref 32.3–35.5)
MCV RBC AUTO: 86.7 FL (ref 79.4–94.8)
MONOCYTES ABSOLUTE: 0.53 K/UL (ref 0.24–0.82)
MONOCYTES RELATIVE PERCENT: 7.6 % (ref 4.7–12.5)
NEUTROPHILS ABSOLUTE: 3.8 K/UL (ref 1.56–6.13)
NEUTROPHILS RELATIVE PERCENT: 54.6 % (ref 34–71.1)
PDW BLD-RTO: 12.5 % (ref 11.7–14.4)
PLATELET # BLD: 257 K/UL (ref 182–369)
PMV BLD AUTO: 11.1 FL (ref 7.4–10.4)
POTASSIUM SERPL-SCNC: 4.6 MMOL/L (ref 3.5–5.1)
RBC # BLD: 4.3 M/UL (ref 3.93–5.22)
SODIUM BLD-SCNC: 138 MMOL/L (ref 137–145)
TOTAL PROTEIN: 7.2 G/DL (ref 6.3–8.2)
WBC # BLD: 6.96 K/UL (ref 3.98–10.04)

## 2021-05-10 PROCEDURE — 77386: CPT | Performed by: RADIOLOGY

## 2021-05-10 PROCEDURE — 85025 COMPLETE CBC W/AUTO DIFF WBC: CPT

## 2021-05-10 PROCEDURE — 2580000003 HC RX 258: Performed by: INTERNAL MEDICINE

## 2021-05-10 PROCEDURE — 6360000002 HC RX W HCPCS: Performed by: INTERNAL MEDICINE

## 2021-05-10 PROCEDURE — 80053 COMPREHEN METABOLIC PANEL: CPT

## 2021-05-10 PROCEDURE — 96367 TX/PROPH/DG ADDL SEQ IV INF: CPT

## 2021-05-10 PROCEDURE — 96413 CHEMO IV INFUSION 1 HR: CPT

## 2021-05-10 RX ORDER — METHYLPREDNISOLONE SODIUM SUCCINATE 125 MG/2ML
125 INJECTION, POWDER, LYOPHILIZED, FOR SOLUTION INTRAMUSCULAR; INTRAVENOUS ONCE
Status: CANCELLED | OUTPATIENT
Start: 2021-06-08 | End: 2021-06-07

## 2021-05-10 RX ORDER — EPINEPHRINE 1 MG/ML
0.3 INJECTION, SOLUTION, CONCENTRATE INTRAVENOUS PRN
Status: CANCELLED | OUTPATIENT
Start: 2021-05-10

## 2021-05-10 RX ORDER — SODIUM CHLORIDE 9 MG/ML
20 INJECTION, SOLUTION INTRAVENOUS ONCE
Status: CANCELLED | OUTPATIENT
Start: 2021-06-08 | End: 2021-06-07

## 2021-05-10 RX ORDER — SODIUM CHLORIDE 9 MG/ML
25 INJECTION, SOLUTION INTRAVENOUS PRN
Status: CANCELLED | OUTPATIENT
Start: 2021-05-10

## 2021-05-10 RX ORDER — SODIUM CHLORIDE 0.9 % (FLUSH) 0.9 %
5-40 SYRINGE (ML) INJECTION PRN
Status: CANCELLED | OUTPATIENT
Start: 2021-05-10

## 2021-05-10 RX ORDER — MITOMYCIN 40 MG/80ML
10 INJECTION, POWDER, LYOPHILIZED, FOR SOLUTION INTRAVENOUS ONCE
Status: DISCONTINUED | OUTPATIENT
Start: 2021-05-10 | End: 2021-05-10

## 2021-05-10 RX ORDER — HEPARIN SODIUM (PORCINE) LOCK FLUSH IV SOLN 100 UNIT/ML 100 UNIT/ML
500 SOLUTION INTRAVENOUS PRN
Status: CANCELLED | OUTPATIENT
Start: 2021-05-10

## 2021-05-10 RX ORDER — DIPHENHYDRAMINE HYDROCHLORIDE 50 MG/ML
50 INJECTION INTRAMUSCULAR; INTRAVENOUS ONCE
Status: CANCELLED | OUTPATIENT
Start: 2021-05-10 | End: 2021-05-10

## 2021-05-10 RX ORDER — METHYLPREDNISOLONE SODIUM SUCCINATE 125 MG/2ML
125 INJECTION, POWDER, LYOPHILIZED, FOR SOLUTION INTRAMUSCULAR; INTRAVENOUS ONCE
Status: CANCELLED | OUTPATIENT
Start: 2021-05-10 | End: 2021-05-10

## 2021-05-10 RX ORDER — MITOMYCIN 5 MG/10ML
10 INJECTION, POWDER, LYOPHILIZED, FOR SOLUTION INTRAVENOUS ONCE
Status: CANCELLED | OUTPATIENT
Start: 2021-05-10

## 2021-05-10 RX ORDER — DIPHENHYDRAMINE HYDROCHLORIDE 50 MG/ML
50 INJECTION INTRAMUSCULAR; INTRAVENOUS ONCE
Status: CANCELLED | OUTPATIENT
Start: 2021-06-08 | End: 2021-06-07

## 2021-05-10 RX ORDER — EPINEPHRINE 1 MG/ML
0.3 INJECTION, SOLUTION, CONCENTRATE INTRAVENOUS PRN
Status: CANCELLED | OUTPATIENT
Start: 2021-06-08

## 2021-05-10 RX ORDER — HEPARIN SODIUM (PORCINE) LOCK FLUSH IV SOLN 100 UNIT/ML 100 UNIT/ML
500 SOLUTION INTRAVENOUS PRN
Status: CANCELLED | OUTPATIENT
Start: 2021-06-08

## 2021-05-10 RX ORDER — SODIUM CHLORIDE 9 MG/ML
INJECTION, SOLUTION INTRAVENOUS CONTINUOUS
Status: CANCELLED | OUTPATIENT
Start: 2021-06-08

## 2021-05-10 RX ORDER — MITOMYCIN 5 MG/10ML
10 INJECTION, POWDER, LYOPHILIZED, FOR SOLUTION INTRAVENOUS ONCE
Status: CANCELLED | OUTPATIENT
Start: 2021-06-08

## 2021-05-10 RX ORDER — SODIUM CHLORIDE 9 MG/ML
20 INJECTION, SOLUTION INTRAVENOUS ONCE
Status: DISCONTINUED | OUTPATIENT
Start: 2021-05-10 | End: 2021-05-12 | Stop reason: HOSPADM

## 2021-05-10 RX ORDER — SODIUM CHLORIDE 9 MG/ML
INJECTION, SOLUTION INTRAVENOUS CONTINUOUS
Status: CANCELLED | OUTPATIENT
Start: 2021-05-10

## 2021-05-10 RX ORDER — SODIUM CHLORIDE 0.9 % (FLUSH) 0.9 %
5-40 SYRINGE (ML) INJECTION PRN
Status: CANCELLED | OUTPATIENT
Start: 2021-06-08

## 2021-05-10 RX ORDER — SODIUM CHLORIDE 9 MG/ML
25 INJECTION, SOLUTION INTRAVENOUS PRN
Status: CANCELLED | OUTPATIENT
Start: 2021-06-08

## 2021-05-10 RX ADMIN — MITOMYCIN 20 MG: 20 INJECTION, POWDER, LYOPHILIZED, FOR SOLUTION INTRAVENOUS at 15:50

## 2021-05-10 RX ADMIN — DEXAMETHASONE SODIUM PHOSPHATE: 10 INJECTION, SOLUTION INTRAMUSCULAR; INTRAVENOUS at 14:44

## 2021-05-10 RX ADMIN — FOSAPREPITANT 150 MG: 150 INJECTION, POWDER, LYOPHILIZED, FOR SOLUTION INTRAVENOUS at 15:13

## 2021-05-11 ENCOUNTER — LAB (OUTPATIENT)
Dept: LAB | Facility: HOSPITAL | Age: 61
End: 2021-05-11

## 2021-05-11 ENCOUNTER — HOSPITAL ENCOUNTER (OUTPATIENT)
Dept: RADIATION ONCOLOGY | Facility: HOSPITAL | Age: 61
Setting detail: RADIATION/ONCOLOGY SERIES
Discharge: HOME | End: 2021-05-11

## 2021-05-11 DIAGNOSIS — R30.0 DYSURIA: Primary | ICD-10-CM

## 2021-05-11 DIAGNOSIS — R30.0 DYSURIA: ICD-10-CM

## 2021-05-11 PROCEDURE — 87086 URINE CULTURE/COLONY COUNT: CPT

## 2021-05-11 PROCEDURE — 81001 URINALYSIS AUTO W/SCOPE: CPT

## 2021-05-11 PROCEDURE — 77386: CPT | Performed by: RADIOLOGY

## 2021-05-11 RX ORDER — CIPROFLOXACIN 250 MG/1
250 TABLET, FILM COATED ORAL 2 TIMES DAILY
Qty: 14 TABLET | Refills: 0 | Status: SHIPPED | OUTPATIENT
Start: 2021-05-11 | End: 2021-05-18

## 2021-05-12 ENCOUNTER — TELEPHONE (OUTPATIENT)
Dept: WOUND CARE | Facility: HOSPITAL | Age: 61
End: 2021-05-12

## 2021-05-12 ENCOUNTER — HOSPITAL ENCOUNTER (OUTPATIENT)
Dept: RADIATION ONCOLOGY | Facility: HOSPITAL | Age: 61
Setting detail: RADIATION/ONCOLOGY SERIES
Discharge: HOME | End: 2021-05-12

## 2021-05-12 PROCEDURE — 77336 RADIATION PHYSICS CONSULT: CPT | Performed by: RADIOLOGY

## 2021-05-12 PROCEDURE — 77386: CPT | Performed by: RADIOLOGY

## 2021-05-12 NOTE — TELEPHONE ENCOUNTER
Lenora Kathleen has a diverting loop colostomy of the LLQ that was created by Dr. Villegas on 04/23/2021.  She is experiencing issues with her appliance leaking.  Discussion was had with her and her sister, Ms. De La Paz.  Her sister continues to change her pouch.  She states it leaks at 3 and 9 o'clock.  Assessment of ostomy is completed with picture.  Ulcerations continue to heal at 3 and 9 o'clock where the leaks are occurring.  These healing ulcerations are residual from the stent placement.  No periwound skin irritation present.      Plan for continued use of barrier ring to be placed over ulcerations.  Cut barrier to fit around stoma only.  Discontinue use of extenders.  Also instructed patient of cleaning with water alone.  Discussed use of Coloplast products which she agreed to be signed up for samples to be delivered to her house.  (Coloplast Sensura Cedar Flex Convex 2 piece pouches)

## 2021-05-13 ENCOUNTER — HOSPITAL ENCOUNTER (OUTPATIENT)
Dept: RADIATION ONCOLOGY | Facility: HOSPITAL | Age: 61
Setting detail: RADIATION/ONCOLOGY SERIES
Discharge: HOME | End: 2021-05-13

## 2021-05-13 PROCEDURE — 77386: CPT | Performed by: RADIOLOGY

## 2021-05-14 ENCOUNTER — HOSPITAL ENCOUNTER (OUTPATIENT)
Dept: RADIATION ONCOLOGY | Facility: HOSPITAL | Age: 61
Setting detail: RADIATION/ONCOLOGY SERIES
Discharge: HOME | End: 2021-05-14

## 2021-05-14 PROCEDURE — 77386: CPT | Performed by: RADIOLOGY

## 2021-05-17 ENCOUNTER — HOSPITAL ENCOUNTER (OUTPATIENT)
Dept: RADIATION ONCOLOGY | Facility: HOSPITAL | Age: 61
Setting detail: RADIATION/ONCOLOGY SERIES
Discharge: HOME | End: 2021-05-17

## 2021-05-17 PROCEDURE — 77386: CPT | Performed by: RADIOLOGY

## 2021-05-18 ENCOUNTER — HOSPITAL ENCOUNTER (OUTPATIENT)
Dept: RADIATION ONCOLOGY | Facility: HOSPITAL | Age: 61
Setting detail: RADIATION/ONCOLOGY SERIES
Discharge: HOME | End: 2021-05-18

## 2021-05-18 ENCOUNTER — TELEPHONE (OUTPATIENT)
Dept: RADIATION ONCOLOGY | Facility: HOSPITAL | Age: 61
End: 2021-05-18

## 2021-05-18 ENCOUNTER — TELEPHONE (OUTPATIENT)
Dept: HEMATOLOGY | Age: 61
End: 2021-05-18

## 2021-05-18 PROCEDURE — 77386: CPT | Performed by: RADIOLOGY

## 2021-05-18 RX ORDER — SCOLOPAMINE TRANSDERMAL SYSTEM 1 MG/1
1 PATCH, EXTENDED RELEASE TRANSDERMAL
Qty: 1 PATCH | Refills: 0 | Status: SHIPPED | OUTPATIENT
Start: 2021-05-18 | End: 2021-07-21 | Stop reason: HOSPADM

## 2021-05-18 NOTE — TELEPHONE ENCOUNTER
Birgit De La Paz (sister) called.  Lenora Kathleen is having extreme nausea.  Has tried Zofran and has questions about how many of these to take.  States patient is taking Miralax to help with ostomy functioning.    I encouraged her to contact Dr. Canseco's office for further instructions and assistance with nausea and chemo pills.  Birgit De La Paz verbalized understanding.

## 2021-05-18 NOTE — TELEPHONE ENCOUNTER
Sister/Jade called stating pt is having a lot of nausea and has tried oral Zofran and inhaling peppermint oil, but it hasn't helped. She said they saw Dr. Bell Lucas today and he gave her a Scopolamine patch to use. Eddie Shaikh said she is afraid to use Phenergan since she takes other meds that make her sleepy.  Signer offered IVFs and IV nausea meds, but Jade wanted to try the patch 1st.

## 2021-05-19 ENCOUNTER — HOSPITAL ENCOUNTER (OUTPATIENT)
Dept: RADIATION ONCOLOGY | Facility: HOSPITAL | Age: 61
Setting detail: RADIATION/ONCOLOGY SERIES
Discharge: HOME | End: 2021-05-19

## 2021-05-19 DIAGNOSIS — R63.4 WEIGHT LOSS: ICD-10-CM

## 2021-05-19 DIAGNOSIS — C44.520 PRIMARY SQUAMOUS CELL CARCINOMA OF ANAL CANAL: Primary | ICD-10-CM

## 2021-05-19 PROCEDURE — 77386: CPT | Performed by: RADIOLOGY

## 2021-05-20 ENCOUNTER — HOSPITAL ENCOUNTER (OUTPATIENT)
Dept: RADIATION ONCOLOGY | Facility: HOSPITAL | Age: 61
Setting detail: RADIATION/ONCOLOGY SERIES
Discharge: HOME | End: 2021-05-20

## 2021-05-20 PROCEDURE — 77336 RADIATION PHYSICS CONSULT: CPT | Performed by: RADIOLOGY

## 2021-05-20 PROCEDURE — 77386: CPT | Performed by: RADIOLOGY

## 2021-05-21 ENCOUNTER — HOSPITAL ENCOUNTER (OUTPATIENT)
Dept: RADIATION ONCOLOGY | Facility: HOSPITAL | Age: 61
Setting detail: RADIATION/ONCOLOGY SERIES
Discharge: HOME | End: 2021-05-21

## 2021-05-21 PROCEDURE — 77386: CPT | Performed by: RADIOLOGY

## 2021-05-21 RX ORDER — PHENAZOPYRIDINE HYDROCHLORIDE 100 MG/1
200 TABLET, FILM COATED ORAL 3 TIMES DAILY PRN
Qty: 20 TABLET | Refills: 1 | Status: SHIPPED | OUTPATIENT
Start: 2021-05-21 | End: 2021-06-01

## 2021-05-24 ENCOUNTER — HOSPITAL ENCOUNTER (OUTPATIENT)
Dept: RADIATION ONCOLOGY | Facility: HOSPITAL | Age: 61
Setting detail: RADIATION/ONCOLOGY SERIES
Discharge: HOME | End: 2021-05-24

## 2021-05-24 PROCEDURE — 77386: CPT | Performed by: RADIOLOGY

## 2021-05-25 ENCOUNTER — OFFICE VISIT (OUTPATIENT)
Dept: SPEECH THERAPY | Facility: HOSPITAL | Age: 61
End: 2021-05-25

## 2021-05-25 ENCOUNTER — HOSPITAL ENCOUNTER (OUTPATIENT)
Dept: RADIATION ONCOLOGY | Facility: HOSPITAL | Age: 61
Setting detail: RADIATION/ONCOLOGY SERIES
Discharge: HOME | End: 2021-05-25

## 2021-05-25 ENCOUNTER — APPOINTMENT (OUTPATIENT)
Dept: SPEECH THERAPY | Facility: HOSPITAL | Age: 61
End: 2021-05-25

## 2021-05-25 VITALS — BODY MASS INDEX: 34.11 KG/M2 | WEIGHT: 199.8 LBS | HEIGHT: 64 IN

## 2021-05-25 DIAGNOSIS — C44.520 PRIMARY SQUAMOUS CELL CARCINOMA OF ANAL CANAL: Primary | ICD-10-CM

## 2021-05-25 PROCEDURE — 77386: CPT | Performed by: RADIOLOGY

## 2021-05-25 PROCEDURE — 77300 RADIATION THERAPY DOSE PLAN: CPT | Performed by: RADIOLOGY

## 2021-05-25 NOTE — PROGRESS NOTES
"Adult Outpatient Nutrition  Assessment/PES    Patient Name:  Lenora Kathleen  YOB: 1960  MRN: [id number]    Assessment Date:  5/25/2021        General Info             Today's Session    Person(s) attending today's session  Patient;Family sister       General Information    Preferred Language  English    Lives With  alone    Oncology patient?  yes       Oncology Specific Assessment    Type of treatment  Radiation    Goal of treatment  Currative    Reported symptoms  Nausea;Other (comment) Gas; constipation and diarrhea are both under control with Miralax at this time        Physical Findings              Physical Findings    Gastrointestinal  colostomy         Anthropometrics              Anthropometrics    Height  161.5 cm (63.58\")  --    Weight  --  90.6 kg (199 lb 12.8 oz)       Ideal Body Weight (IBW)    Ideal Body Weight (IBW) (kg)  54.05  --       Usual Body Weight (UBW)    Usual Body Weight  --  90.3 kg (199 lb) initial wt recorded on XRT intake sheet; reported UBW prior to sickness was 241 lbs    % Usual Body Weight  --  100.4    Weight Loss Time Frame  --  current wt 199.8 lbs (currently stable, increase of .8 lbs in the past month)          Home Nutrition Report              Home Nutrition Report    Typical Food/Fluid Intake  24 hr recall: steak baked potatoe, small salad with lettuce, carrots, cheese, rice, and dressing. Drinking water and tea through a large tumbler.     Food Preferences  will eat toast, eggs, cereal. Is able to take in Boost HP and High protein yogurt.     Factors Affecting Nutritional Intake  altered gastrointestinal function         Estimated/Assessed Needs              Calculation Measurements    Weight Used For Calculations  90.6 kg (199 lb 12.8 oz)     Height  161.5 cm (63.58\")        Estimated/Assessed Needs    Additional Documentation  Fluid Requirements (Group);Protein Requirements (Group);[place]-St. Jeor Equation (Group)        [place]-St. Jeor Equation    RMR " (Glascock-St. Jeor Equation)  [id number]     Glascock-St. Jeor Activity Factors  1.6 - 1.7     Activity Factors (Glascock-St. Jeor)  [id number] - [id number]        Protein Requirements    Weight Used For Protein Calculations  90.6 kg (199 lb 12.8 oz)     Est Protein Requirement Amount (gms/kg)  1.3 gm protein     Estimated Protein Requirements (gms/day)  117.82        Fluid Requirements    Fluid Requirements (mL/day)  2319     Estimated Fluid Requirement Method  RDA Method     RDA Method (mL)  2319                 Problem/Interventions:  Problem 1              Nutrition Diagnoses Problem 1    Problem 1  Knowledge Deficit     Etiology (related to)  Medical Diagnosis     Gastrointestinal  Colostomy     Oncology  Other (comment) Anal cancer     Signs/Symptoms (evidenced by)  Reported  Information Deficit;Demonstrated Information Deficit questions regarding appropriate nutrition for recent colostomy                 Intervention Goal              Intervention Goal    General  Nutrition support treatment;Provide information regarding MNT for treatment/condition;Disease management/therapy;Meet nutritional needs for age/condition     PO  Increase intake;Meet estimated needs     Weight  Maintain weight             Education/Evaluation              Education    Education  Education topics;Advised regarding habits/behavior;No discharge needs identified at this time     Education Topics  Basic nutrition;Other (comment);Fluid;Fiber Colostomy teaching     Advised Regarding Habits/Behavior  Appropriate beverage;Food choices;Other (comment) monitoring of symptoms        Monitor/Evaluation    Monitor  Per protocol;PO intake;Symptoms     Education Follow-up  Reinforce PRN           Electronically signed by:  Nadia Pineda RDN, LIZY  05/25/21 15:51 CDT

## 2021-05-26 ENCOUNTER — TELEPHONE (OUTPATIENT)
Dept: INFUSION THERAPY | Age: 61
End: 2021-05-26

## 2021-05-26 ENCOUNTER — APPOINTMENT (OUTPATIENT)
Dept: RADIATION ONCOLOGY | Facility: HOSPITAL | Age: 61
End: 2021-05-26

## 2021-05-26 NOTE — TELEPHONE ENCOUNTER
Charito Yañez called reporting that the PT did not go to radiation today and will not go Monday. She has been instructed to not take chemo pills when she is not going to radiation. No further questions at this time.

## 2021-05-27 ENCOUNTER — HOSPITAL ENCOUNTER (OUTPATIENT)
Dept: RADIATION ONCOLOGY | Facility: HOSPITAL | Age: 61
Setting detail: RADIATION/ONCOLOGY SERIES
Discharge: HOME | End: 2021-05-27

## 2021-05-27 PROCEDURE — 77336 RADIATION PHYSICS CONSULT: CPT | Performed by: RADIOLOGY

## 2021-05-27 PROCEDURE — 77386: CPT | Performed by: RADIOLOGY

## 2021-05-28 ENCOUNTER — HOSPITAL ENCOUNTER (OUTPATIENT)
Dept: RADIATION ONCOLOGY | Facility: HOSPITAL | Age: 61
Setting detail: RADIATION/ONCOLOGY SERIES
Discharge: HOME | End: 2021-05-28

## 2021-05-28 PROCEDURE — 77386: CPT | Performed by: RADIOLOGY

## 2021-05-31 ENCOUNTER — APPOINTMENT (OUTPATIENT)
Dept: RADIATION ONCOLOGY | Facility: HOSPITAL | Age: 61
End: 2021-05-31

## 2021-06-01 ENCOUNTER — HOSPITAL ENCOUNTER (OUTPATIENT)
Dept: RADIATION ONCOLOGY | Facility: HOSPITAL | Age: 61
Setting detail: RADIATION/ONCOLOGY SERIES
Discharge: HOME | End: 2021-06-01

## 2021-06-01 ENCOUNTER — OFFICE VISIT (OUTPATIENT)
Dept: SPEECH THERAPY | Facility: HOSPITAL | Age: 61
End: 2021-06-01

## 2021-06-01 ENCOUNTER — OFFICE VISIT (OUTPATIENT)
Dept: INTERNAL MEDICINE | Facility: CLINIC | Age: 61
End: 2021-06-01

## 2021-06-01 ENCOUNTER — HOSPITAL ENCOUNTER (OUTPATIENT)
Dept: RADIATION ONCOLOGY | Facility: HOSPITAL | Age: 61
Setting detail: RADIATION/ONCOLOGY SERIES
End: 2021-06-01

## 2021-06-01 VITALS
BODY MASS INDEX: 33.34 KG/M2 | DIASTOLIC BLOOD PRESSURE: 60 MMHG | HEART RATE: 65 BPM | TEMPERATURE: 98.5 F | RESPIRATION RATE: 15 BRPM | SYSTOLIC BLOOD PRESSURE: 113 MMHG | WEIGHT: 195.3 LBS | OXYGEN SATURATION: 92 % | HEIGHT: 64 IN

## 2021-06-01 DIAGNOSIS — R73.03 PREDIABETES: ICD-10-CM

## 2021-06-01 DIAGNOSIS — I10 ESSENTIAL HYPERTENSION: ICD-10-CM

## 2021-06-01 DIAGNOSIS — C44.520 PRIMARY SQUAMOUS CELL CARCINOMA OF ANAL CANAL: Primary | ICD-10-CM

## 2021-06-01 DIAGNOSIS — N82.3 RECTOVAGINAL FISTULA: ICD-10-CM

## 2021-06-01 PROBLEM — F41.9 ANXIETY: Status: ACTIVE | Noted: 2021-06-01

## 2021-06-01 PROCEDURE — 77386: CPT | Performed by: RADIOLOGY

## 2021-06-01 PROCEDURE — 99204 OFFICE O/P NEW MOD 45 MIN: CPT | Performed by: INTERNAL MEDICINE

## 2021-06-01 RX ORDER — ACYCLOVIR 50 MG/G
1 OINTMENT TOPICAL
COMMUNITY
End: 2021-07-21 | Stop reason: HOSPADM

## 2021-06-01 RX ORDER — OXYCODONE HYDROCHLORIDE 15 MG/1
1 TABLET ORAL 4 TIMES DAILY
COMMUNITY
Start: 2021-05-13 | End: 2021-07-21 | Stop reason: HOSPADM

## 2021-06-01 RX ORDER — CAPECITABINE 500 MG/1
TABLET, FILM COATED ORAL
COMMUNITY
Start: 2021-05-21 | End: 2021-09-09

## 2021-06-01 RX ORDER — CAPECITABINE 150 MG/1
TABLET, FILM COATED ORAL
COMMUNITY
Start: 2021-05-21 | End: 2021-09-09

## 2021-06-01 NOTE — PROGRESS NOTES
"Adult Outpatient Nutrition  Assessment/PES    Patient Name:  Lenora Kathleen  YOB: 1960  MRN: [id number]    Assessment Date:  6/1/2021        General Info             Today's Session    Person(s) attending today's session  Patient       General Information    Preferred Language  English    Lives With  alone    Oncology patient?  yes       Oncology Specific Assessment    Type of treatment  Radiation;Chemotherapy    Reported symptoms  Diarrhea frequent loose stools        Physical Findings              Physical Findings    Gastrointestinal  colostomy         Anthropometrics              Usual Body Weight (UBW)    Usual Body Weight  90.3 kg (199 lb) initial wt recorded on XRT intake sheet was 199 lbs; reported UBW prior to \"being sick\" is 241 lbs           Home Nutrition Report              Home Nutrition Report    Typical Food/Fluid Intake  Pt states she isn't feeling good, no better no worse than last week. Is uncomfortable having to change colostomy bag frequently due to loose stools.   States her appetite is fair.         Estimated/Assessed Needs              Estimated/Assessed Needs    Additional Documentation  -- from initial eval 2319 to 2464 kcal/d and 118 gm PRO/d             Problem/Interventions:  Problem 1              Nutrition Diagnoses Problem 1    Problem 1  Predicted Suboptimal Intake     Etiology (related to)  Medical Diagnosis     Gastrointestinal  Colostomy     Oncology  Other (comment) anal cancer     Signs/Symptoms (evidenced by)  Report/Observation     Appetite  Fair     Reported GI Symptoms  Diarrhea                 Intervention Goal              Intervention Goal    General  Provide information regarding MNT for treatment/condition;Disease management/therapy;Reduce/improve symptoms     PO  Meet estimated needs     Weight  Maintain weight             Education/Evaluation              Education    Education  Provided education regarding;Education topics;Advised regarding " habits/behavior     Education Topics  Fiber;Other (comment) Colostomy teaching; strategies to thicken stool        Monitor/Evaluation    Monitor  Per protocol           Electronically signed by:  Nadia Pineda RDN, LIZY  06/01/21 15:05 CDT

## 2021-06-01 NOTE — PROGRESS NOTES
Chief Complaint   Patient presents with   • Establish Care     New Provider         History:  Lenora Kathleen is a 61 y.o. female who presents today for evaluation of the above problems.      She presents today to establish care.  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  She also has rectovaginal fistula with a diverting colostomy.  She is getting chemo and radiation.  She has been referred by Dr. Guthrie    She is a previous patient of Dr. Shikha Dahl.  She sees Dr. Sauceda for chronic pain and pain medications in the form of fentanyl patch and Roxicodone and Percocet, and Dr. Grant for anxiety (alprazolam)    She is currently doing chemotherapy at home.  She has felt bad for a very long time with fatigue.  She is also had decreased appetite and change in her taste from the chemotherapy.  She has been using boost with high-protein content and usually eats about 1 good meal per day.  Since the diagnosis she is lost about 5 pounds.    She has a chronic back pain and is being followed by Dr. Sauceda.    She has had an estimated 20 surgeries for kidney stones.  She sees Dr. Yeboah.    She quit smoking last month.    She has not had COVID-19 vaccine and does not want it at this time but may want it later on.  Thanks inject  HPI    ROS:  Review of Systems   Constitutional: Positive for activity change, appetite change and fatigue.   Respiratory: Negative for shortness of breath.    Cardiovascular: Negative for chest pain.   Gastrointestinal:        Colostomy   Musculoskeletal: Positive for arthralgias, back pain and gait problem.         Current Outpatient Medications:   •  acetaminophen (Tylenol) 325 MG tablet, Take 3 tablets by mouth Every 8 (Eight) Hours. Take every 8 hours for 3 days then take prn as needed., Disp: 100 tablet, Rfl: 2  •  acyclovir (ZOVIRAX) 200 MG capsule, Take 200 mg by mouth 5 (Five) Times a Day As Needed. For fever blisters, Disp: , Rfl:   •   acyclovir (ZOVIRAX) 5 % ointment, Apply 1 application topically to the appropriate area as directed Every 3 (Three) Hours., Disp: , Rfl:   •  albuterol sulfate  (90 Base) MCG/ACT inhaler, Inhale 2 puffs As Needed., Disp: , Rfl:   •  ALPRAZolam (XANAX) 2 MG tablet, Take 2 mg by mouth 4 (Four) Times a Day., Disp: , Rfl:   •  capecitabine (XELODA) 150 MG chemo tablet, , Disp: , Rfl:   •  capecitabine (XELODA) 500 MG chemo tablet, Take  by mouth. 3 500 mg tablets in the morning AND evening PLUS 150 mg mg Monday- FRIDAY, Disp: , Rfl:   •  Cyanocobalamin (VITAMIN B 12) 500 MCG tablet, Take 500 mcg by mouth Daily., Disp: , Rfl:   •  fentaNYL (DURAGESIC) 25 MCG/HR patch, Place 1 patch on the skin as directed by provider Every 72 (Seventy-Two) Hours., Disp: 5 patch, Rfl: 0  •  FLUoxetine (PROZAC) 20 MG capsule, Take 20 mg by mouth 4 (Four) Times a Day., Disp: , Rfl:   •  gabapentin (NEURONTIN) 300 MG capsule, Take 300 mg by mouth 4 (Four) Times a Day., Disp: , Rfl:   •  ibuprofen (Motrin IB) 200 MG tablet, Take 3 tablets by mouth Every 8 (Eight) Hours. Take every 8 hours for three days then take as needed., Disp: 100 tablet, Rfl: 2  •  lisinopril (PRINIVIL,ZESTRIL) 10 MG tablet, Take 10 mg by mouth Daily., Disp: , Rfl:   •  metoprolol succinate XL (TOPROL-XL) 50 MG 24 hr tablet, Take 50 mg by mouth Daily., Disp: , Rfl:   •  ondansetron (Zofran) 4 MG tablet, Take 1 tablet by mouth Every 8 (Eight) Hours As Needed for Nausea or Vomiting., Disp: 20 tablet, Rfl: 1  •  oxyCODONE (ROXICODONE) 15 MG immediate release tablet, Take 1 tablet by mouth 4 (Four) Times a Day., Disp: , Rfl:   •  vitamin D (ERGOCALCIFEROL) 1.25 MG (21136 UT) capsule capsule, Take 50,000 Units by mouth 1 (One) Time Per Week., Disp: , Rfl:   •  Scopolamine (TRANSDERM-SCOP) 1.5 MG/3DAYS patch, Place 1 patch on the skin as directed by provider Every 72 (Seventy-Two) Hours., Disp: 1 patch, Rfl: 0    Lab Results   Component Value Date    GLUCOSE 93  05/10/2021    BUN 28 (H) 05/10/2021    CREATININE 0.6 05/10/2021    EGFRIFNONA >60 05/10/2021    EGFRIFAFRI >59 02/03/2021    BCR 20.0 04/26/2021    K 4.6 05/10/2021    CO2 31 (H) 05/10/2021    CALCIUM 9.6 05/10/2021    ALBUMIN 4.2 05/10/2021    AST 40 (H) 05/10/2021    ALT 18 05/10/2021       WBC   Date Value Ref Range Status   05/10/2021 6.96 3.98 - 10.04 K/uL Final     RBC   Date Value Ref Range Status   05/10/2021 4.30 3.93 - 5.22 M/uL Final     Hemoglobin   Date Value Ref Range Status   05/10/2021 12.1 11.2 - 15.7 g/dL Final     Hematocrit   Date Value Ref Range Status   05/10/2021 37.3 34.1 - 44.9 % Final     MCV   Date Value Ref Range Status   05/10/2021 86.7 79.4 - 94.8 fL Final     MCH   Date Value Ref Range Status   05/10/2021 28.1 25.6 - 32.2 pg Final     MCHC   Date Value Ref Range Status   05/10/2021 32.4 32.3 - 35.5 g/dL Final     RDW   Date Value Ref Range Status   05/10/2021 12.5 11.7 - 14.4 % Final     RDW-SD   Date Value Ref Range Status   04/26/2021 39.8 37.0 - 54.0 fl Final     MPV   Date Value Ref Range Status   05/10/2021 11.1 (H) 7.4 - 10.4 fL Final     Platelets   Date Value Ref Range Status   05/10/2021 257 182 - 369 K/uL Final     Neutrophil Rel %   Date Value Ref Range Status   05/10/2021 54.6 34.0 - 71.1 % Final     Lymphocyte Rel %   Date Value Ref Range Status   05/10/2021 34.5 19.3 - 53.1 % Final     Monocyte Rel %   Date Value Ref Range Status   05/10/2021 7.6 4.7 - 12.5 % Final     Eosinophil Rel %   Date Value Ref Range Status   05/10/2021 2.9 0.7 - 7.0 % Final     Basophil Rel %   Date Value Ref Range Status   05/10/2021 0.4 0.1 - 1.2 % Final     Immature Grans %   Date Value Ref Range Status   04/26/2021 0.3 0.0 - 0.5 % Final     Neutrophils Absolute   Date Value Ref Range Status   05/10/2021 3.80 1.56 - 6.13 K/uL Final     Lymphocytes Absolute   Date Value Ref Range Status   05/10/2021 2.40 1.18 - 3.74 K/uL Final     Monocytes Absolute   Date Value Ref Range Status   05/10/2021  "0.53 0.24 - 0.82 K/uL Final     Eosinophils Absolute   Date Value Ref Range Status   05/10/2021 0.20 0.04 - 0.54 K/uL Final     Basophils Absolute   Date Value Ref Range Status   05/10/2021 0.03 0.01 - 0.08 K/uL Final     Immature Grans, Absolute   Date Value Ref Range Status   04/26/2021 0.02 0.00 - 0.05 10*3/mm3 Final   09/02/2020 0.0 K/uL Final     nRBC   Date Value Ref Range Status   04/26/2021 0.0 0.0 - 0.2 /100 WBC Final         OBJECTIVE:  Visit Vitals  /60 (BP Location: Left arm, Patient Position: Sitting, Cuff Size: Adult)   Pulse 65   Temp 98.5 °F (36.9 °C) (Oral)   Resp 15   Ht 161.5 cm (63.58\")   Wt 88.6 kg (195 lb 4.8 oz)   SpO2 92%   BMI 33.96 kg/m²      Physical Exam  Vitals reviewed.   Constitutional:       General: She is not in acute distress.     Appearance: She is well-developed. She is not diaphoretic.      Comments: Pleasant   HENT:      Head: Normocephalic and atraumatic.   Eyes:      Pupils: Pupils are equal, round, and reactive to light.   Neck:      Thyroid: No thyromegaly.      Trachea: Phonation normal.   Cardiovascular:      Rate and Rhythm: Normal rate and regular rhythm.      Heart sounds: No murmur heard.     Pulmonary:      Effort: Pulmonary effort is normal. No respiratory distress.      Breath sounds: Normal breath sounds. No stridor. No wheezing, rhonchi or rales.   Abdominal:      General: Abdomen is flat. There is no distension.      Palpations: Abdomen is soft.      Tenderness: There is no abdominal tenderness.      Comments: Left-sided colostomy with brown stool   Skin:     General: Skin is warm and dry.      Coloration: Skin is not jaundiced or pale.      Findings: No bruising or erythema.   Neurological:      Mental Status: She is alert.   Psychiatric:         Behavior: Behavior normal.         Thought Content: Thought content normal.         Judgment: Judgment normal.       Review Dr. Canseco's note from May 4, 2021.  Reviewed most recent labs on May 10 showing a CO2 " of 31, with BUN of 28 and ALT 40.  CBC unremarkable    Assessment/Plan    Diagnoses and all orders for this visit:    1. Primary squamous cell carcinoma of anal canal (Primary)    2. Rectovaginal fistula    3. Essential hypertension    4. Prediabetes      She is currently undergoing chemo and radiation treatment for primary squamous cell carcinoma of anal canal.  She is feeling tired/fatigued related to her treatment and is hopeful to start feeling better soon.  I did review her recent labs and they are essentially unremarkable.  She also has a rectovaginal fistula status post diverting colostomy.    We discussed COVID-19 vaccine and that I would recommend getting 1.  However, I would discuss with her oncologist and radiation oncologist prior to getting it.  Likely, she will need to wait until after her treatment to get the vaccine.    She has prediabetes with the last A1c of 5.7 February 20, 2020.    Blood pressure well controlled with lisinopril 10 mg daily and metoprolol succinate.    55 minutes was spent total on the day of the visit.    Return in about 3 months (around 9/1/2021) for Medicare Wellness.    Patient was given instructions and counseling regarding his/her condition or for health maintenance advice. Please see specific information pulled into the AVS if appropriate.     LOC Weaver MD   14:49 CDT  6/1/2021

## 2021-06-02 ENCOUNTER — HOSPITAL ENCOUNTER (OUTPATIENT)
Dept: RADIATION ONCOLOGY | Facility: HOSPITAL | Age: 61
Setting detail: RADIATION/ONCOLOGY SERIES
Discharge: HOME | End: 2021-06-02

## 2021-06-02 PROCEDURE — 77386: CPT | Performed by: RADIOLOGY

## 2021-06-03 ENCOUNTER — HOSPITAL ENCOUNTER (OUTPATIENT)
Dept: RADIATION ONCOLOGY | Facility: HOSPITAL | Age: 61
Setting detail: RADIATION/ONCOLOGY SERIES
Discharge: HOME | End: 2021-06-03

## 2021-06-03 PROCEDURE — 77386: CPT | Performed by: RADIOLOGY

## 2021-06-04 ENCOUNTER — HOSPITAL ENCOUNTER (OUTPATIENT)
Dept: RADIATION ONCOLOGY | Facility: HOSPITAL | Age: 61
Setting detail: RADIATION/ONCOLOGY SERIES
Discharge: HOME | End: 2021-06-04

## 2021-06-04 ENCOUNTER — TELEPHONE (OUTPATIENT)
Dept: INTERNAL MEDICINE | Facility: CLINIC | Age: 61
End: 2021-06-04

## 2021-06-04 PROCEDURE — 77336 RADIATION PHYSICS CONSULT: CPT | Performed by: RADIOLOGY

## 2021-06-04 PROCEDURE — 77386: CPT | Performed by: RADIOLOGY
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
## 2021-06-04 NOTE — TELEPHONE ENCOUNTER
Spoke with Ela she was just confirming that we would be following patient for any orders requested. She also wanted to make me aware that she is seeing the patient for 3 more visits. Once the patient is discharged from home care she will need to be set up with a company who sends the patient her supplies for the colostomy.

## 2021-06-04 NOTE — TELEPHONE ENCOUNTER
MARIA ELENA FROM [facility] CALLED IN REQUESTING A CALL BACK TO GET SOME ORDERS FOR PATIENT NOW THAT PATIENT IS SEEING DR BESS      PLEASE CALL BACK AT   [phone]

## 2021-06-07 ENCOUNTER — APPOINTMENT (OUTPATIENT)
Dept: RADIATION ONCOLOGY | Facility: HOSPITAL | Age: 61
End: 2021-06-07

## 2021-06-07 ENCOUNTER — TELEPHONE (OUTPATIENT)
Dept: RADIATION ONCOLOGY | Facility: HOSPITAL | Age: 61
End: 2021-06-07

## 2021-06-07 NOTE — TELEPHONE ENCOUNTER
Birgit De La Paz, Sister, called to give us an update on patient.  She states she has complained all weekend of burning of skin/ dysuria etc.  Does not feel like she can come in today.  Due to see Dr. Canseco tomorrow for infusion and extra IV Fluids.  She has had 18 XRT treatments.    She will return on Wednesday for re-evaluation.

## 2021-06-08 ENCOUNTER — TELEPHONE (OUTPATIENT)
Dept: VASCULAR SURGERY | Age: 61
End: 2021-06-08

## 2021-06-08 ENCOUNTER — OFFICE VISIT (OUTPATIENT)
Dept: HEMATOLOGY | Age: 61
End: 2021-06-08
Payer: MEDICARE

## 2021-06-08 ENCOUNTER — APPOINTMENT (OUTPATIENT)
Dept: RADIATION ONCOLOGY | Facility: HOSPITAL | Age: 61
End: 2021-06-08

## 2021-06-08 ENCOUNTER — HOSPITAL ENCOUNTER (OUTPATIENT)
Dept: INFUSION THERAPY | Age: 61
Discharge: HOME | End: 2021-06-08
Payer: MEDICARE

## 2021-06-08 ENCOUNTER — PREP FOR PROCEDURE (OUTPATIENT)
Dept: VASCULAR SURGERY | Age: 61
End: 2021-06-08

## 2021-06-08 ENCOUNTER — HOSPITAL ENCOUNTER (OUTPATIENT)
Dept: INFUSION THERAPY | Age: 61
Setting detail: INFUSION SERIES
Discharge: HOME | End: 2021-06-08
Payer: MEDICARE

## 2021-06-08 ENCOUNTER — HOSPITAL ENCOUNTER (OUTPATIENT)
Dept: INTERVENTIONAL RADIOLOGY/VASCULAR | Age: 61
Discharge: HOME | End: 2021-06-08
Payer: MEDICARE

## 2021-06-08 VITALS
WEIGHT: 192.5 LBS | HEART RATE: 72 BPM | TEMPERATURE: 98.1 F | SYSTOLIC BLOOD PRESSURE: 104 MMHG | BODY MASS INDEX: 34.1 KG/M2 | DIASTOLIC BLOOD PRESSURE: 60 MMHG

## 2021-06-08 VITALS
OXYGEN SATURATION: 96 % | SYSTOLIC BLOOD PRESSURE: 112 MMHG | HEART RATE: 66 BPM | DIASTOLIC BLOOD PRESSURE: 73 MMHG | RESPIRATION RATE: 16 BRPM

## 2021-06-08 VITALS — BODY MASS INDEX: 34.1 KG/M2 | OXYGEN SATURATION: 95 % | HEIGHT: 63 IN

## 2021-06-08 VITALS — HEART RATE: 62 BPM | OXYGEN SATURATION: 90 % | RESPIRATION RATE: 14 BRPM

## 2021-06-08 DIAGNOSIS — E86.0 DEHYDRATION: ICD-10-CM

## 2021-06-08 DIAGNOSIS — I87.8 POOR VENOUS ACCESS: ICD-10-CM

## 2021-06-08 DIAGNOSIS — Z45.2 ENCOUNTER FOR PERIPHERALLY INSERTED CENTRAL CATHETER (PICC) FLUSH: ICD-10-CM

## 2021-06-08 DIAGNOSIS — Z51.11 CHEMOTHERAPY MANAGEMENT, ENCOUNTER FOR: ICD-10-CM

## 2021-06-08 DIAGNOSIS — C21.0 ANAL CANCER (HCC): Primary | ICD-10-CM

## 2021-06-08 DIAGNOSIS — I87.8 POOR VENOUS ACCESS: Primary | ICD-10-CM

## 2021-06-08 DIAGNOSIS — K31.6 HIGH-OUTPUT EXTERNAL GASTROINTESTINAL FISTULA: ICD-10-CM

## 2021-06-08 DIAGNOSIS — E86.0 DEHYDRATION: Primary | ICD-10-CM

## 2021-06-08 DIAGNOSIS — Z51.11 ENCOUNTER FOR ANTINEOPLASTIC CHEMOTHERAPY: ICD-10-CM

## 2021-06-08 DIAGNOSIS — L58.9 RADIODERMATITIS: ICD-10-CM

## 2021-06-08 DIAGNOSIS — Z71.89 CARE PLAN DISCUSSED WITH PATIENT: ICD-10-CM

## 2021-06-08 DIAGNOSIS — C21.0 ANAL CANCER (HCC): ICD-10-CM

## 2021-06-08 DIAGNOSIS — T45.1X5D ADVERSE EFFECT OF CHEMOTHERAPY, SUBSEQUENT ENCOUNTER: ICD-10-CM

## 2021-06-08 DIAGNOSIS — Z45.2 PICC (PERIPHERALLY INSERTED CENTRAL CATHETER) IN PLACE: ICD-10-CM

## 2021-06-08 LAB — SARS-COV-2, NAAT: NOT DETECTED

## 2021-06-08 PROCEDURE — C1769 GUIDE WIRE: HCPCS

## 2021-06-08 PROCEDURE — 77300 RADIATION THERAPY DOSE PLAN: CPT | Performed by: RADIOLOGY

## 2021-06-08 PROCEDURE — 6360000002 HC RX W HCPCS: Performed by: SURGERY

## 2021-06-08 PROCEDURE — 96368 THER/DIAG CONCURRENT INF: CPT

## 2021-06-08 PROCEDURE — 96366 THER/PROPH/DIAG IV INF ADDON: CPT

## 2021-06-08 PROCEDURE — 87635 SARS-COV-2 COVID-19 AMP PRB: CPT

## 2021-06-08 PROCEDURE — 36573 INSJ PICC RS&I 5 YR+: CPT | Performed by: SURGERY

## 2021-06-08 PROCEDURE — 99214 OFFICE O/P EST MOD 30 MIN: CPT | Performed by: INTERNAL MEDICINE

## 2021-06-08 PROCEDURE — 2580000003 HC RX 258: Performed by: INTERNAL MEDICINE

## 2021-06-08 PROCEDURE — 6360000002 HC RX W HCPCS: Performed by: INTERNAL MEDICINE

## 2021-06-08 PROCEDURE — 2500000003 HC RX 250 WO HCPCS: Performed by: SURGERY

## 2021-06-08 PROCEDURE — 96365 THER/PROPH/DIAG IV INF INIT: CPT

## 2021-06-08 RX ORDER — HEPARIN SODIUM (PORCINE) LOCK FLUSH IV SOLN 100 UNIT/ML 100 UNIT/ML
500 SOLUTION INTRAVENOUS PRN
Status: CANCELLED | OUTPATIENT
Start: 2021-06-08

## 2021-06-08 RX ORDER — SODIUM CHLORIDE 0.9 % (FLUSH) 0.9 %
5-40 SYRINGE (ML) INJECTION PRN
Status: CANCELLED | OUTPATIENT
Start: 2021-06-08

## 2021-06-08 RX ORDER — HEPARIN SODIUM 5000 [USP'U]/ML
INJECTION, SOLUTION INTRAVENOUS; SUBCUTANEOUS
Status: COMPLETED | OUTPATIENT
Start: 2021-06-08 | End: 2021-06-08

## 2021-06-08 RX ORDER — 0.9 % SODIUM CHLORIDE 0.9 %
1000 INTRAVENOUS SOLUTION INTRAVENOUS ONCE
Status: CANCELLED | OUTPATIENT
Start: 2021-06-08 | End: 2021-06-08

## 2021-06-08 RX ORDER — 0.9 % SODIUM CHLORIDE 0.9 %
1000 INTRAVENOUS SOLUTION INTRAVENOUS ONCE
Status: COMPLETED | OUTPATIENT
Start: 2021-06-08 | End: 2021-06-08

## 2021-06-08 RX ORDER — SODIUM CHLORIDE 9 MG/ML
INJECTION INTRAVENOUS
Qty: 30 SYRINGE | Refills: 2 | Status: ON HOLD | OUTPATIENT
Start: 2021-06-08 | End: 2021-08-23 | Stop reason: HOSPADM

## 2021-06-08 RX ORDER — SODIUM CHLORIDE 9 MG/ML
25 INJECTION, SOLUTION INTRAVENOUS PRN
Status: CANCELLED | OUTPATIENT
Start: 2021-06-08

## 2021-06-08 RX ORDER — LIDOCAINE HYDROCHLORIDE 20 MG/ML
INJECTION, SOLUTION INFILTRATION; PERINEURAL
Status: COMPLETED | OUTPATIENT
Start: 2021-06-08 | End: 2021-06-08

## 2021-06-08 RX ORDER — 0.9 % SODIUM CHLORIDE 0.9 %
1000 INTRAVENOUS SOLUTION INTRAVENOUS ONCE
Status: DISCONTINUED | OUTPATIENT
Start: 2021-06-08 | End: 2021-06-08

## 2021-06-08 RX ORDER — HEPARIN SODIUM (PORCINE) LOCK FLUSH IV SOLN 100 UNIT/ML 100 UNIT/ML
SOLUTION INTRAVENOUS
Qty: 30 VIAL | Refills: 2 | Status: ON HOLD | OUTPATIENT
Start: 2021-06-08 | End: 2021-08-23 | Stop reason: HOSPADM

## 2021-06-08 RX ADMIN — SODIUM CHLORIDE 1000 ML: 9 INJECTION, SOLUTION INTRAVENOUS at 15:30

## 2021-06-08 RX ADMIN — ONDANSETRON HYDROCHLORIDE 16 MG: 2 SOLUTION INTRAMUSCULAR; INTRAVENOUS at 16:22

## 2021-06-08 RX ADMIN — HEPARIN SODIUM 5000 UNITS: 5000 INJECTION, SOLUTION INTRAVENOUS; SUBCUTANEOUS at 14:20

## 2021-06-08 RX ADMIN — LIDOCAINE HYDROCHLORIDE 5 ML: 20 INJECTION, SOLUTION INFILTRATION; PERINEURAL at 14:19

## 2021-06-08 NOTE — TELEPHONE ENCOUNTER
I spoke with Patricia from Dr. [facility] office today, Viviane Sinha is a patient who receives radiation/ chemotherapy. Has not been eating/drinking and is in need of fluids. They will need a PICC line placed in order for patient to receive fluids. Dr. Emery Olsen will place the PICC Line today at 1:30pm. I spoke with Minoo Brooks and made her aware of the plans. I got a RAPID COVID test approval from [facility] Paget via email today for patient to have COVID test done in cath holding prior to having PICC line placed. I spoke with Claudia Payan in cath holding and made them aware of the plans and faxed the approval to cath holding. Patient will arrive at 12:30pm to [facility] wearing a mask. Patient will have RAPID COVID test on arrival due to the flu clinic only testing on Monday and Thursdays. Alonso President will make patient aware of the plans. I have added this to the specials schedule with Kailyn from [facility].
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
## 2021-06-08 NOTE — PROGRESS NOTES
Patient back from vascular lab. [facility] Pic line site c/d/i.   Electronically signed by Chuck Escobar RN on 6/8/2021 at 3:04 PM

## 2021-06-08 NOTE — PROGRESS NOTES
Pt arrived for D29C1 treatment for Anal CA. Pt is slightly tearful. C/o severe pain and bleeding from vaginal area to anal area. Dr. Ellen Pak saw pt. Treatment deferred until 6-18. Hydration and labs ordered.

## 2021-06-08 NOTE — H&P (VIEW-ONLY)
Irregular heart beat     Kidney stones     with reflux of left ureter/kidney    Memory loss     Dr Estephania Cline, per patient \"lapse in memory\"    Mitral valve disease     Neuropathy     lower legs    Osteoarthritis     Other malaise and fatigue     Pancreatitis     h/o per patient    Prediabetes     not currently taking any meds    Recurrent UTI     Restless leg     with burning neuropathy symptoms    SOB (shortness of breath)     Status post placement of implantable loop recorder 2/8/16    Weight loss       Past Surgical History:   Procedure Laterality Date    ABDOMEN SURGERY      Liposuction    BACK SURGERY  2011    Dr Sylvia Barfield Bilateral    [place]      reduction    CARDIAC CATHETERIZATION      x 4-Dr Shawnee Shelley    CHOLECYSTECTOMY      COLONOSCOPY  08/18/2008    Dr Meza Prost COLONOSCOPY  09/18/2012    Dr Kela Ganser colonic polyp & diarrhea    COLONOSCOPY  03/12/2015    Dr Veto Wolfe Left 10/05/2016    PLACEMENT OF STENT  performed by Delma Delgado MD at [facility] 02/20/2019    CYSTOSCOPY LEFT URETEROSCOPY  LASER LITHOTRIPSY BALLOON DIALATION OF URETERAL STRICTURE performed by Delma Delgado MD at [facility] 02/20/2019    PLACEMENT OF LEFT DOUBLE J STENT performed by Delma Delgado MD at [facility], DIAGNOSTIC      Louisa Papoula 1998 SURGERY  10/2011    Dr Diaz Sim  09/2012    multiple    KIDNEY SURGERY      LITHOTRIPSY      LITHOTRIPSY Left 10/05/2016    LITHOTRIPSY LASER performed by Delma Delgado MD at [address] [place] [address]  08/13/2008    Dr Kela Ganser, Grade 2, stage 1 liver biopsy ([facility]), Mild piecemeal necrosis, Mild lobular inflam, Portal fibrosis.    .    LUMBAR LAMINECTOMY  06/24/2011    DC CYSTO/URETERO/PYELOSCOPY _______________________________________________________________________  Signature     Date    Time

## 2021-06-08 NOTE — PROGRESS NOTES
Patient wheeled to outpatient infusion.   Electronically signed by Pierre Perez RN on 6/8/2021 at 3:04 PM

## 2021-06-09 ENCOUNTER — HOSPITAL ENCOUNTER (OUTPATIENT)
Dept: RADIATION ONCOLOGY | Facility: HOSPITAL | Age: 61
Setting detail: RADIATION/ONCOLOGY SERIES
Discharge: HOME | End: 2021-06-09

## 2021-06-09 RX ORDER — MEGESTROL ACETATE 125 MG/ML
625 SUSPENSION ORAL DAILY
Qty: 150 ML | Refills: 0 | Status: SHIPPED | OUTPATIENT
Start: 2021-06-09 | End: 2021-10-18

## 2021-06-10 ENCOUNTER — HOSPITAL ENCOUNTER (OUTPATIENT)
Dept: RADIATION ONCOLOGY | Facility: HOSPITAL | Age: 61
Setting detail: RADIATION/ONCOLOGY SERIES
End: 2021-06-10

## 2021-06-11 ENCOUNTER — APPOINTMENT (OUTPATIENT)
Dept: RADIATION ONCOLOGY | Facility: HOSPITAL | Age: 61
End: 2021-06-11

## 2021-06-11 ENCOUNTER — TELEPHONE (OUTPATIENT)
Dept: INFUSION THERAPY | Age: 61
End: 2021-06-11

## 2021-06-14 ENCOUNTER — APPOINTMENT (OUTPATIENT)
Dept: RADIATION ONCOLOGY | Facility: HOSPITAL | Age: 61
End: 2021-06-14

## 2021-06-15 ENCOUNTER — APPOINTMENT (OUTPATIENT)
Dept: ONCOLOGY | Facility: HOSPITAL | Age: 61
End: 2021-06-15

## 2021-06-15 ENCOUNTER — APPOINTMENT (OUTPATIENT)
Dept: RADIATION ONCOLOGY | Facility: HOSPITAL | Age: 61
End: 2021-06-15

## 2021-06-16 ENCOUNTER — HOSPITAL ENCOUNTER (OUTPATIENT)
Dept: RADIATION ONCOLOGY | Facility: HOSPITAL | Age: 61
Setting detail: RADIATION/ONCOLOGY SERIES
Discharge: HOME | End: 2021-06-16

## 2021-06-16 PROCEDURE — 77386: CPT | Performed by: RADIOLOGY

## 2021-06-17 ENCOUNTER — HOME HEALTH ADMISSION (OUTPATIENT)
Dept: HOME HEALTH SERVICES | Facility: HOME HEALTHCARE | Age: 61
End: 2021-06-17

## 2021-06-17 ENCOUNTER — HOSPITAL ENCOUNTER (OUTPATIENT)
Dept: RADIATION ONCOLOGY | Facility: HOSPITAL | Age: 61
Setting detail: RADIATION/ONCOLOGY SERIES
Discharge: HOME | End: 2021-06-17

## 2021-06-17 ENCOUNTER — TRANSCRIBE ORDERS (OUTPATIENT)
Dept: HOME HEALTH SERVICES | Facility: HOME HEALTHCARE | Age: 61
End: 2021-06-17

## 2021-06-17 DIAGNOSIS — Z48.3 AFTERCARE FOLLOWING SURGERY FOR NEOPLASM: Primary | ICD-10-CM

## 2021-06-17 PROCEDURE — 77386: CPT | Performed by: RADIOLOGY

## 2021-06-18 ENCOUNTER — TELEPHONE (OUTPATIENT)
Dept: INTERNAL MEDICINE | Facility: CLINIC | Age: 61
End: 2021-06-18

## 2021-06-18 ENCOUNTER — HOSPITAL ENCOUNTER (OUTPATIENT)
Dept: RADIATION ONCOLOGY | Facility: HOSPITAL | Age: 61
Setting detail: RADIATION/ONCOLOGY SERIES
Discharge: HOME | End: 2021-06-18

## 2021-06-18 PROCEDURE — 77386: CPT | Performed by: RADIOLOGY

## 2021-06-18 NOTE — TELEPHONE ENCOUNTER
MARIA ELENA WITH [facility].  SHE WOULD LIKE TO HAVE A VERBAL ORDER FOR SPEECH THERAPY.   PATIENT WILL ALSO NEED ANOTHER 30 DAYS OF HOME CARE.  PLEASE GIVE VERBAL.   MARIA ELENA--  [phone]  AND   [phone] AS TO SPEAK TO THE NURSE

## 2021-06-18 NOTE — TELEPHONE ENCOUNTER
MARIA ELENA  WITH [facility] HAS BEEN CALLED, GIVEN VERBAL ORDER AND VOICED UNDERSTANDING.

## 2021-06-21 ENCOUNTER — HOME CARE VISIT (OUTPATIENT)
Dept: HOME HEALTH SERVICES | Facility: CLINIC | Age: 61
End: 2021-06-21

## 2021-06-21 ENCOUNTER — TELEPHONE (OUTPATIENT)
Dept: HEMATOLOGY | Age: 61
End: 2021-06-21

## 2021-06-21 ENCOUNTER — HOSPITAL ENCOUNTER (OUTPATIENT)
Dept: RADIATION ONCOLOGY | Facility: HOSPITAL | Age: 61
Setting detail: RADIATION/ONCOLOGY SERIES
Discharge: HOME | End: 2021-06-21

## 2021-06-21 VITALS
HEART RATE: 59 BPM | SYSTOLIC BLOOD PRESSURE: 98 MMHG | OXYGEN SATURATION: 99 % | RESPIRATION RATE: 18 BRPM | DIASTOLIC BLOOD PRESSURE: 60 MMHG | TEMPERATURE: 98.4 F

## 2021-06-21 PROCEDURE — 77386: CPT | Performed by: RADIOLOGY

## 2021-06-21 PROCEDURE — G0299 HHS/HOSPICE OF RN EA 15 MIN: HCPCS

## 2021-06-21 RX ORDER — CAPECITABINE 500 MG/1
TABLET, FILM COATED ORAL
Qty: 9 TABLET | Refills: 0 | Status: ON HOLD
Start: 2021-06-21 | End: 2021-08-23 | Stop reason: HOSPADM

## 2021-06-21 RX ORDER — CAPECITABINE 150 MG/1
TABLET, FILM COATED ORAL
Qty: 3 TABLET | Refills: 0 | Status: ON HOLD
Start: 2021-06-21 | End: 2021-08-23 | Stop reason: HOSPADM

## 2021-06-22 ENCOUNTER — HOSPITAL ENCOUNTER (OUTPATIENT)
Dept: RADIATION ONCOLOGY | Facility: HOSPITAL | Age: 61
Setting detail: RADIATION/ONCOLOGY SERIES
Discharge: HOME | End: 2021-06-22

## 2021-06-22 ENCOUNTER — APPOINTMENT (OUTPATIENT)
Dept: ONCOLOGY | Facility: HOSPITAL | Age: 61
End: 2021-06-22

## 2021-06-22 PROCEDURE — 77386: CPT | Performed by: RADIOLOGY

## 2021-06-23 ENCOUNTER — HOSPITAL ENCOUNTER (OUTPATIENT)
Dept: RADIATION ONCOLOGY | Facility: HOSPITAL | Age: 61
Setting detail: RADIATION/ONCOLOGY SERIES
Discharge: HOME | End: 2021-06-23

## 2021-06-23 PROCEDURE — 77386: CPT | Performed by: RADIOLOGY

## 2021-06-24 ENCOUNTER — HOSPITAL ENCOUNTER (OUTPATIENT)
Dept: RADIATION ONCOLOGY | Facility: HOSPITAL | Age: 61
Setting detail: RADIATION/ONCOLOGY SERIES
Discharge: HOME | End: 2021-06-24

## 2021-06-24 PROCEDURE — 77386: CPT | Performed by: RADIOLOGY

## 2021-06-25 ENCOUNTER — HOSPITAL ENCOUNTER (OUTPATIENT)
Dept: RADIATION ONCOLOGY | Facility: HOSPITAL | Age: 61
Setting detail: RADIATION/ONCOLOGY SERIES
Discharge: HOME | End: 2021-06-25

## 2021-06-25 PROCEDURE — 77336 RADIATION PHYSICS CONSULT: CPT | Performed by: RADIOLOGY

## 2021-06-25 PROCEDURE — 77386: CPT | Performed by: RADIOLOGY

## 2021-06-28 ENCOUNTER — HOSPITAL ENCOUNTER (OUTPATIENT)
Dept: RADIATION ONCOLOGY | Facility: HOSPITAL | Age: 61
Setting detail: RADIATION/ONCOLOGY SERIES
Discharge: HOME | End: 2021-06-28

## 2021-06-28 ENCOUNTER — HOME CARE VISIT (OUTPATIENT)
Dept: HOME HEALTH SERVICES | Facility: CLINIC | Age: 61
End: 2021-06-28

## 2021-06-28 VITALS
OXYGEN SATURATION: 94 % | HEART RATE: 66 BPM | TEMPERATURE: 98.1 F | SYSTOLIC BLOOD PRESSURE: 108 MMHG | DIASTOLIC BLOOD PRESSURE: 60 MMHG | RESPIRATION RATE: 18 BRPM

## 2021-06-28 PROCEDURE — 77386: CPT | Performed by: RADIOLOGY

## 2021-06-28 PROCEDURE — G0299 HHS/HOSPICE OF RN EA 15 MIN: HCPCS

## 2021-06-29 ENCOUNTER — APPOINTMENT (OUTPATIENT)
Dept: ONCOLOGY | Facility: HOSPITAL | Age: 61
End: 2021-06-29

## 2021-06-29 ENCOUNTER — HOSPITAL ENCOUNTER (OUTPATIENT)
Dept: RADIATION ONCOLOGY | Facility: HOSPITAL | Age: 61
Setting detail: RADIATION/ONCOLOGY SERIES
Discharge: HOME | End: 2021-06-29

## 2021-06-29 PROCEDURE — 77386: CPT | Performed by: RADIOLOGY

## 2021-06-30 ENCOUNTER — OFFICE VISIT (OUTPATIENT)
Dept: HEMATOLOGY | Age: 61
End: 2021-06-30
Payer: MEDICARE

## 2021-06-30 ENCOUNTER — HOSPITAL ENCOUNTER (OUTPATIENT)
Dept: INFUSION THERAPY | Age: 61
Discharge: HOME | End: 2021-06-30
Payer: MEDICARE

## 2021-06-30 ENCOUNTER — HOSPITAL ENCOUNTER (OUTPATIENT)
Dept: RADIATION ONCOLOGY | Facility: HOSPITAL | Age: 61
Setting detail: RADIATION/ONCOLOGY SERIES
Discharge: HOME | End: 2021-06-30

## 2021-06-30 ENCOUNTER — TELEPHONE (OUTPATIENT)
Dept: RADIATION ONCOLOGY | Facility: HOSPITAL | Age: 61
End: 2021-06-30

## 2021-06-30 VITALS
TEMPERATURE: 98.1 F | HEIGHT: 63 IN | WEIGHT: 195.2 LBS | HEART RATE: 84 BPM | RESPIRATION RATE: 18 BRPM | DIASTOLIC BLOOD PRESSURE: 60 MMHG | SYSTOLIC BLOOD PRESSURE: 98 MMHG | BODY MASS INDEX: 34.59 KG/M2

## 2021-06-30 DIAGNOSIS — C21.0 ANAL CANCER (HCC): Primary | ICD-10-CM

## 2021-06-30 DIAGNOSIS — Z71.89 CARE PLAN DISCUSSED WITH PATIENT: ICD-10-CM

## 2021-06-30 DIAGNOSIS — Z51.11 CHEMOTHERAPY MANAGEMENT, ENCOUNTER FOR: ICD-10-CM

## 2021-06-30 DIAGNOSIS — D64.9 ANEMIA, UNSPECIFIED TYPE: ICD-10-CM

## 2021-06-30 DIAGNOSIS — E86.0 DEHYDRATION: ICD-10-CM

## 2021-06-30 DIAGNOSIS — T45.1X5D ADVERSE EFFECT OF CHEMOTHERAPY, SUBSEQUENT ENCOUNTER: ICD-10-CM

## 2021-06-30 DIAGNOSIS — L58.9 RADIODERMATITIS: ICD-10-CM

## 2021-06-30 LAB
ALBUMIN SERPL-MCNC: 3.9 G/DL (ref 3.5–5.2)
ALP BLD-CCNC: 63 U/L (ref 35–104)
ALT SERPL-CCNC: 28 U/L (ref 9–52)
ANION GAP SERPL CALCULATED.3IONS-SCNC: 10 MMOL/L (ref 7–19)
AST SERPL-CCNC: 34 U/L (ref 14–36)
BASOPHILS ABSOLUTE: 0.01 K/UL (ref 0.01–0.08)
BASOPHILS RELATIVE PERCENT: 0.3 % (ref 0.1–1.2)
BILIRUB SERPL-MCNC: 0.4 MG/DL (ref 0.2–1.3)
BUN BLDV-MCNC: 27 MG/DL (ref 7–17)
CALCIUM SERPL-MCNC: 9.3 MG/DL (ref 8.4–10.2)
CHLORIDE BLD-SCNC: 102 MMOL/L (ref 98–111)
CO2: 23 MMOL/L (ref 22–29)
CREAT SERPL-MCNC: 0.7 MG/DL (ref 0.5–1)
EOSINOPHILS ABSOLUTE: 0.03 K/UL (ref 0.04–0.54)
EOSINOPHILS RELATIVE PERCENT: 0.8 % (ref 0.7–7)
GFR NON-AFRICAN AMERICAN: >60
GLOBULIN: 2.5 G/DL
GLUCOSE BLD-MCNC: 112 MG/DL (ref 74–106)
HCT VFR BLD CALC: 28.1 % (ref 34.1–44.9)
HEMOGLOBIN: 9.4 G/DL (ref 11.2–15.7)
LYMPHOCYTES ABSOLUTE: 0.82 K/UL (ref 1.18–3.74)
LYMPHOCYTES RELATIVE PERCENT: 20.6 % (ref 19.3–53.1)
MCH RBC QN AUTO: 29.1 PG (ref 25.6–32.2)
MCHC RBC AUTO-ENTMCNC: 33.5 G/DL (ref 32.3–35.5)
MCV RBC AUTO: 87 FL (ref 79.4–94.8)
MONOCYTES ABSOLUTE: 0.63 K/UL (ref 0.24–0.82)
MONOCYTES RELATIVE PERCENT: 15.8 % (ref 4.7–12.5)
NEUTROPHILS ABSOLUTE: 2.49 K/UL (ref 1.56–6.13)
NEUTROPHILS RELATIVE PERCENT: 62.5 % (ref 34–71.1)
PDW BLD-RTO: 17.1 % (ref 11.7–14.4)
PLATELET # BLD: 161 K/UL (ref 182–369)
PMV BLD AUTO: 10.1 FL (ref 7.4–10.4)
POTASSIUM SERPL-SCNC: 3.6 MMOL/L (ref 3.5–5.1)
RBC # BLD: 3.23 M/UL (ref 3.93–5.22)
SODIUM BLD-SCNC: 135 MMOL/L (ref 137–145)
TOTAL PROTEIN: 6.4 G/DL (ref 6.3–8.2)
WBC # BLD: 3.98 K/UL (ref 3.98–10.04)

## 2021-06-30 PROCEDURE — 99214 OFFICE O/P EST MOD 30 MIN: CPT | Performed by: INTERNAL MEDICINE

## 2021-06-30 PROCEDURE — 85025 COMPLETE CBC W/AUTO DIFF WBC: CPT

## 2021-06-30 PROCEDURE — 80053 COMPREHEN METABOLIC PANEL: CPT

## 2021-06-30 PROCEDURE — 77386: CPT | Performed by: RADIOLOGY

## 2021-06-30 PROCEDURE — 96367 TX/PROPH/DG ADDL SEQ IV INF: CPT

## 2021-06-30 PROCEDURE — 6360000002 HC RX W HCPCS: Performed by: INTERNAL MEDICINE

## 2021-06-30 PROCEDURE — 2580000003 HC RX 258: Performed by: INTERNAL MEDICINE

## 2021-06-30 PROCEDURE — 96413 CHEMO IV INFUSION 1 HR: CPT

## 2021-06-30 RX ORDER — HEPARIN SODIUM (PORCINE) LOCK FLUSH IV SOLN 100 UNIT/ML 100 UNIT/ML
500 SOLUTION INTRAVENOUS PRN
Status: CANCELLED | OUTPATIENT
Start: 2021-06-30

## 2021-06-30 RX ORDER — METHYLPREDNISOLONE SODIUM SUCCINATE 125 MG/2ML
125 INJECTION, POWDER, LYOPHILIZED, FOR SOLUTION INTRAMUSCULAR; INTRAVENOUS ONCE
Status: CANCELLED | OUTPATIENT
Start: 2021-06-30 | End: 2021-06-30

## 2021-06-30 RX ORDER — EPINEPHRINE 1 MG/ML
0.3 INJECTION, SOLUTION, CONCENTRATE INTRAVENOUS PRN
Status: CANCELLED | OUTPATIENT
Start: 2021-06-30

## 2021-06-30 RX ORDER — SODIUM CHLORIDE 9 MG/ML
20 INJECTION, SOLUTION INTRAVENOUS ONCE
Status: CANCELLED | OUTPATIENT
Start: 2021-06-30 | End: 2021-06-30

## 2021-06-30 RX ORDER — SODIUM CHLORIDE 9 MG/ML
INJECTION, SOLUTION INTRAVENOUS CONTINUOUS
Status: CANCELLED | OUTPATIENT
Start: 2021-06-30

## 2021-06-30 RX ORDER — HEPARIN SODIUM (PORCINE) LOCK FLUSH IV SOLN 100 UNIT/ML 100 UNIT/ML
500 SOLUTION INTRAVENOUS PRN
Status: DISCONTINUED | OUTPATIENT
Start: 2021-06-30 | End: 2021-07-01 | Stop reason: HOSPADM

## 2021-06-30 RX ORDER — SODIUM CHLORIDE 0.9 % (FLUSH) 0.9 %
5-40 SYRINGE (ML) INJECTION PRN
Status: DISCONTINUED | OUTPATIENT
Start: 2021-06-30 | End: 2021-07-01 | Stop reason: HOSPADM

## 2021-06-30 RX ORDER — SODIUM CHLORIDE 0.9 % (FLUSH) 0.9 %
5-40 SYRINGE (ML) INJECTION PRN
Status: CANCELLED | OUTPATIENT
Start: 2021-06-30

## 2021-06-30 RX ORDER — DIPHENHYDRAMINE HYDROCHLORIDE 50 MG/ML
50 INJECTION INTRAMUSCULAR; INTRAVENOUS ONCE
Status: CANCELLED | OUTPATIENT
Start: 2021-06-30 | End: 2021-06-30

## 2021-06-30 RX ORDER — MITOMYCIN 5 MG/10ML
10 INJECTION, POWDER, LYOPHILIZED, FOR SOLUTION INTRAVENOUS ONCE
Status: CANCELLED | OUTPATIENT
Start: 2021-06-30

## 2021-06-30 RX ORDER — SODIUM CHLORIDE 9 MG/ML
25 INJECTION, SOLUTION INTRAVENOUS PRN
Status: CANCELLED | OUTPATIENT
Start: 2021-06-30

## 2021-06-30 RX ORDER — SODIUM CHLORIDE 9 MG/ML
20 INJECTION, SOLUTION INTRAVENOUS ONCE
Status: DISCONTINUED | OUTPATIENT
Start: 2021-06-30 | End: 2021-07-02 | Stop reason: HOSPADM

## 2021-06-30 RX ADMIN — MITOMYCIN 20 MG: 20 INJECTION, POWDER, LYOPHILIZED, FOR SOLUTION INTRAVENOUS at 16:32

## 2021-06-30 RX ADMIN — DEXAMETHASONE SODIUM PHOSPHATE: 10 INJECTION, SOLUTION INTRAMUSCULAR; INTRAVENOUS at 15:49

## 2021-06-30 RX ADMIN — SODIUM CHLORIDE, PRESERVATIVE FREE 10 ML: 5 INJECTION INTRAVENOUS at 17:05

## 2021-06-30 RX ADMIN — HEPARIN 500 UNITS: 100 SYRINGE at 17:05

## 2021-06-30 RX ADMIN — FOSAPREPITANT 150 MG: 150 INJECTION, POWDER, LYOPHILIZED, FOR SOLUTION INTRAVENOUS at 15:49

## 2021-06-30 NOTE — TELEPHONE ENCOUNTER
Received a call from Ela with home health.  Patient has been receiving IV fluids three times a week and her support system has changed and the person who has been overseeing the IV Fluids/ flushing PICC is not available to perform these functions.    I explained that Radiation does not give IV Fluids.  IV fluids would need to be administered in OP Center at [facility].    Dr. Canseco's office has been writing the orders for fluids.    I did tell her that patient completes radiation tomorrow.    She verbalized understanding.
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
## 2021-06-30 NOTE — TELEPHONE ENCOUNTER
Received call from Ms. Kathleen who states she is feeling nervous due to returning to her home. She was previously staying with her sister and they had a “falling out.” Ms. Kathleen states, “I’m ready to return home and be in my own place.” Ms. Kathleen said she needs ostomy education, since her sister had been taking care of it. Informed Ms. Kathleen she will receive education after her radiation treatment today. This writer was informed Ms. Kathleen has received ostomy education in the past. Ms. Kathleen also requested a list of her medications and would like to pick it up after her treatment today. KEVIN informed her a list can be printed but it may not be the most up to date. Encouraged her to contact her pharmacy for a list and informed her the pharmacy may be able to set up her medications in a pill box for her. She verbalized understanding. KEVIN offered to set her up with transportation to her radiation treatment but she declined and said, “I can drive.”  She is also receiving home healthcare services and they are involved looking into possible placement and other options for Ms. Kathleen.

## 2021-07-01 ENCOUNTER — TELEPHONE (OUTPATIENT)
Dept: INTERNAL MEDICINE | Facility: CLINIC | Age: 61
End: 2021-07-01

## 2021-07-01 ENCOUNTER — TELEPHONE (OUTPATIENT)
Dept: HEMATOLOGY | Age: 61
End: 2021-07-01

## 2021-07-01 ENCOUNTER — HOSPITAL ENCOUNTER (OUTPATIENT)
Dept: RADIATION ONCOLOGY | Facility: HOSPITAL | Age: 61
Setting detail: RADIATION/ONCOLOGY SERIES
Discharge: HOME | End: 2021-07-01

## 2021-07-01 ENCOUNTER — HOSPITAL ENCOUNTER (OUTPATIENT)
Dept: RADIATION ONCOLOGY | Facility: HOSPITAL | Age: 61
Setting detail: RADIATION/ONCOLOGY SERIES
End: 2021-07-01

## 2021-07-01 ENCOUNTER — HOME CARE VISIT (OUTPATIENT)
Dept: HOME HEALTH SERVICES | Facility: CLINIC | Age: 61
End: 2021-07-01

## 2021-07-01 VITALS
RESPIRATION RATE: 20 BRPM | TEMPERATURE: 98 F | DIASTOLIC BLOOD PRESSURE: 72 MMHG | OXYGEN SATURATION: 95 % | HEART RATE: 100 BPM | SYSTOLIC BLOOD PRESSURE: 118 MMHG

## 2021-07-01 PROCEDURE — 77336 RADIATION PHYSICS CONSULT: CPT | Performed by: RADIOLOGY

## 2021-07-01 PROCEDURE — 77386: CPT | Performed by: RADIOLOGY

## 2021-07-01 PROCEDURE — G0153 HHCP-SVS OF S/L PATH,EA 15MN: HCPCS

## 2021-07-01 NOTE — TELEPHONE ENCOUNTER
Ela from [facility] stated that once the patient got discharged she would need a DME company to do her colostomy supplies.
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
## 2021-07-02 ENCOUNTER — HOME CARE VISIT (OUTPATIENT)
Dept: HOME HEALTH SERVICES | Facility: CLINIC | Age: 61
End: 2021-07-02

## 2021-07-02 VITALS
TEMPERATURE: 98.4 F | HEART RATE: 84 BPM | RESPIRATION RATE: 18 BRPM | DIASTOLIC BLOOD PRESSURE: 52 MMHG | SYSTOLIC BLOOD PRESSURE: 98 MMHG | OXYGEN SATURATION: 94 %

## 2021-07-02 PROCEDURE — G0299 HHS/HOSPICE OF RN EA 15 MIN: HCPCS

## 2021-07-05 ENCOUNTER — HOME CARE VISIT (OUTPATIENT)
Dept: HOME HEALTH SERVICES | Facility: CLINIC | Age: 61
End: 2021-07-05

## 2021-07-05 VITALS
TEMPERATURE: 97.9 F | RESPIRATION RATE: 16 BRPM | SYSTOLIC BLOOD PRESSURE: 106 MMHG | DIASTOLIC BLOOD PRESSURE: 62 MMHG | HEART RATE: 96 BPM | OXYGEN SATURATION: 96 %

## 2021-07-05 PROCEDURE — G0299 HHS/HOSPICE OF RN EA 15 MIN: HCPCS

## 2021-07-07 RX ORDER — ACYCLOVIR 200 MG/1
200 CAPSULE ORAL
Qty: 50 CAPSULE | Refills: 0 | Status: SHIPPED | OUTPATIENT
Start: 2021-07-07 | End: 2021-07-21 | Stop reason: HOSPADM

## 2021-07-09 ENCOUNTER — HOME CARE VISIT (OUTPATIENT)
Dept: HOME HEALTH SERVICES | Facility: CLINIC | Age: 61
End: 2021-07-09

## 2021-07-09 NOTE — CASE COMMUNICATION
Patient missed a MSW home visit from [facility] on 7/9/21    Reason: No insurance approval for the visit    For your records only.   As per home health protocol, MD must be notified of missed/cancelled visits; therefore the prescribed frequency was not met.

## 2021-07-13 ENCOUNTER — HOME CARE VISIT (OUTPATIENT)
Dept: HOME HEALTH SERVICES | Facility: CLINIC | Age: 61
End: 2021-07-13

## 2021-07-13 VITALS
TEMPERATURE: 98.5 F | SYSTOLIC BLOOD PRESSURE: 100 MMHG | HEART RATE: 72 BPM | OXYGEN SATURATION: 94 % | RESPIRATION RATE: 18 BRPM | DIASTOLIC BLOOD PRESSURE: 56 MMHG

## 2021-07-13 PROCEDURE — G0299 HHS/HOSPICE OF RN EA 15 MIN: HCPCS

## 2021-07-14 ENCOUNTER — HOME CARE VISIT (OUTPATIENT)
Dept: HOME HEALTH SERVICES | Facility: CLINIC | Age: 61
End: 2021-07-14

## 2021-07-14 PROCEDURE — G0155 HHCP-SVS OF CSW,EA 15 MIN: HCPCS

## 2021-07-15 NOTE — HOME HEALTH
SKILLED SERVICES PROVIDED / MEDICAL   ASSESSMENT OF SOCIAL AND EMOTIONAL FACTORS  COUNSELING FOR LONG RANGE PLANNING AND DECISION MAKING  PSYCHOSOCIAL ASSESSMENT COMPLETED    HOMEBOUND STATUS-taxing effort to leave home, falls risk, confusion    HOME/SOCIAL ENVIRONMENT- Patient lives alone in a nice comfortable apt. She has no family support and relies on her friend to help her daily with needs. Patient needs more in-home support or nursing home placement for rehab    CASE COMMUNICATION with Ela Bae RN

## 2021-07-16 ENCOUNTER — HOME CARE VISIT (OUTPATIENT)
Dept: HOME HEALTH SERVICES | Facility: HOME HEALTHCARE | Age: 61
End: 2021-07-16

## 2021-07-16 ENCOUNTER — HOSPITAL ENCOUNTER (INPATIENT)
Facility: HOSPITAL | Age: 61
LOS: 5 days | Discharge: NURSING, REHAB OR LONG-TERM CARE | End: 2021-07-21
Attending: INTERNAL MEDICINE | Admitting: INTERNAL MEDICINE

## 2021-07-16 ENCOUNTER — APPOINTMENT (OUTPATIENT)
Dept: CT IMAGING | Facility: HOSPITAL | Age: 61
End: 2021-07-16

## 2021-07-16 DIAGNOSIS — Z74.09 IMPAIRED MOBILITY AND ADLS: ICD-10-CM

## 2021-07-16 DIAGNOSIS — Z74.09 IMPAIRED MOBILITY: ICD-10-CM

## 2021-07-16 DIAGNOSIS — R62.7 FAILURE TO THRIVE IN ADULT: ICD-10-CM

## 2021-07-16 DIAGNOSIS — Z78.9 IMPAIRED MOBILITY AND ADLS: ICD-10-CM

## 2021-07-16 DIAGNOSIS — K52.9 ENTEROCOLITIS: ICD-10-CM

## 2021-07-16 DIAGNOSIS — E86.0 DEHYDRATION: Primary | ICD-10-CM

## 2021-07-16 PROBLEM — R27.0 ATAXIA: Status: ACTIVE | Noted: 2021-07-16

## 2021-07-16 PROBLEM — D63.8 ANEMIA OF CHRONIC DISEASE: Status: ACTIVE | Noted: 2021-07-16

## 2021-07-16 PROBLEM — E87.6 HYPOKALEMIA: Status: ACTIVE | Noted: 2021-07-16

## 2021-07-16 PROBLEM — I95.89 HYPOTENSION DUE TO HYPOVOLEMIA: Status: ACTIVE | Noted: 2021-07-16

## 2021-07-16 PROBLEM — E86.1 HYPOTENSION DUE TO HYPOVOLEMIA: Status: ACTIVE | Noted: 2021-07-16

## 2021-07-16 PROBLEM — R19.7 DIARRHEA: Status: ACTIVE | Noted: 2021-07-16

## 2021-07-16 PROBLEM — G93.40 ENCEPHALOPATHY: Status: ACTIVE | Noted: 2021-07-16

## 2021-07-16 LAB
ALBUMIN SERPL-MCNC: 3 G/DL (ref 3.5–5.2)
ALBUMIN/GLOB SERPL: 1.4 G/DL
ALP SERPL-CCNC: 48 U/L (ref 39–117)
ALT SERPL W P-5'-P-CCNC: 28 U/L (ref 1–33)
AMMONIA BLD-SCNC: 11 UMOL/L (ref 11–51)
ANION GAP SERPL CALCULATED.3IONS-SCNC: 8 MMOL/L (ref 5–15)
AST SERPL-CCNC: 53 U/L (ref 1–32)
BACTERIA UR QL AUTO: ABNORMAL /HPF
BASOPHILS # BLD AUTO: 0.03 10*3/MM3 (ref 0–0.2)
BASOPHILS NFR BLD AUTO: 0.8 % (ref 0–1.5)
BILIRUB SERPL-MCNC: 0.2 MG/DL (ref 0–1.2)
BILIRUB UR QL STRIP: NEGATIVE
BUN SERPL-MCNC: 9 MG/DL (ref 8–23)
BUN/CREAT SERPL: 17.6 (ref 7–25)
CALCIUM SPEC-SCNC: 8.4 MG/DL (ref 8.6–10.5)
CHLORIDE SERPL-SCNC: 105 MMOL/L (ref 98–107)
CLARITY UR: CLEAR
CO2 SERPL-SCNC: 26 MMOL/L (ref 22–29)
COLOR UR: YELLOW
CREAT SERPL-MCNC: 0.51 MG/DL (ref 0.57–1)
D-LACTATE SERPL-SCNC: 1.1 MMOL/L (ref 0.5–2)
DEPRECATED RDW RBC AUTO: 64.8 FL (ref 37–54)
EOSINOPHIL # BLD AUTO: 0.02 10*3/MM3 (ref 0–0.4)
EOSINOPHIL NFR BLD AUTO: 0.5 % (ref 0.3–6.2)
ERYTHROCYTE [DISTWIDTH] IN BLOOD BY AUTOMATED COUNT: 21.1 % (ref 12.3–15.4)
GFR SERPL CREATININE-BSD FRML MDRD: 123 ML/MIN/1.73
GLOBULIN UR ELPH-MCNC: 2.2 GM/DL
GLUCOSE SERPL-MCNC: 88 MG/DL (ref 65–99)
GLUCOSE UR STRIP-MCNC: NEGATIVE MG/DL
HCT VFR BLD AUTO: 27.6 % (ref 34–46.6)
HGB BLD-MCNC: 8.7 G/DL (ref 12–15.9)
HGB UR QL STRIP.AUTO: NEGATIVE
HYALINE CASTS UR QL AUTO: ABNORMAL /LPF
IMM GRANULOCYTES # BLD AUTO: 0.17 10*3/MM3 (ref 0–0.05)
IMM GRANULOCYTES NFR BLD AUTO: 4.4 % (ref 0–0.5)
KETONES UR QL STRIP: ABNORMAL
LEUKOCYTE ESTERASE UR QL STRIP.AUTO: ABNORMAL
LYMPHOCYTES # BLD AUTO: 1.35 10*3/MM3 (ref 0.7–3.1)
LYMPHOCYTES NFR BLD AUTO: 35.1 % (ref 19.6–45.3)
MCH RBC QN AUTO: 28.9 PG (ref 26.6–33)
MCHC RBC AUTO-ENTMCNC: 31.5 G/DL (ref 31.5–35.7)
MCV RBC AUTO: 91.7 FL (ref 79–97)
MONOCYTES # BLD AUTO: 0.66 10*3/MM3 (ref 0.1–0.9)
MONOCYTES NFR BLD AUTO: 17.1 % (ref 5–12)
NEUTROPHILS NFR BLD AUTO: 1.62 10*3/MM3 (ref 1.7–7)
NEUTROPHILS NFR BLD AUTO: 42.1 % (ref 42.7–76)
NITRITE UR QL STRIP: NEGATIVE
NRBC BLD AUTO-RTO: 0 /100 WBC (ref 0–0.2)
PH UR STRIP.AUTO: 7 [PH] (ref 5–8)
PLATELET # BLD AUTO: 167 10*3/MM3 (ref 140–450)
PMV BLD AUTO: 11.1 FL (ref 6–12)
POTASSIUM SERPL-SCNC: 3.3 MMOL/L (ref 3.5–5.2)
PROCALCITONIN SERPL-MCNC: 0.06 NG/ML (ref 0–0.25)
PROT SERPL-MCNC: 5.2 G/DL (ref 6–8.5)
PROT UR QL STRIP: NEGATIVE
RBC # BLD AUTO: 3.01 10*6/MM3 (ref 3.77–5.28)
RBC # UR: ABNORMAL /HPF
REF LAB TEST METHOD: ABNORMAL
SARS-COV-2 RNA PNL SPEC NAA+PROBE: NOT DETECTED
SODIUM SERPL-SCNC: 139 MMOL/L (ref 136–145)
SP GR UR STRIP: 1.01 (ref 1–1.03)
SQUAMOUS #/AREA URNS HPF: ABNORMAL /HPF
UROBILINOGEN UR QL STRIP: ABNORMAL
WBC # BLD AUTO: 3.85 10*3/MM3 (ref 3.4–10.8)
WBC UR QL AUTO: ABNORMAL /HPF

## 2021-07-16 PROCEDURE — 87040 BLOOD CULTURE FOR BACTERIA: CPT | Performed by: PHYSICIAN ASSISTANT

## 2021-07-16 PROCEDURE — 83605 ASSAY OF LACTIC ACID: CPT | Performed by: PHYSICIAN ASSISTANT

## 2021-07-16 PROCEDURE — 80053 COMPREHEN METABOLIC PANEL: CPT | Performed by: PHYSICIAN ASSISTANT

## 2021-07-16 PROCEDURE — 36415 COLL VENOUS BLD VENIPUNCTURE: CPT | Performed by: PHYSICIAN ASSISTANT

## 2021-07-16 PROCEDURE — 85025 COMPLETE CBC W/AUTO DIFF WBC: CPT | Performed by: PHYSICIAN ASSISTANT

## 2021-07-16 PROCEDURE — 25010000002 LEVOFLOXACIN PER 250 MG: Performed by: NURSE PRACTITIONER

## 2021-07-16 PROCEDURE — P9612 CATHETERIZE FOR URINE SPEC: HCPCS

## 2021-07-16 PROCEDURE — 82140 ASSAY OF AMMONIA: CPT | Performed by: PHYSICIAN ASSISTANT

## 2021-07-16 PROCEDURE — 87635 SARS-COV-2 COVID-19 AMP PRB: CPT | Performed by: PHYSICIAN ASSISTANT

## 2021-07-16 PROCEDURE — 81001 URINALYSIS AUTO W/SCOPE: CPT | Performed by: PHYSICIAN ASSISTANT

## 2021-07-16 PROCEDURE — 74177 CT ABD & PELVIS W/CONTRAST: CPT

## 2021-07-16 PROCEDURE — 99284 EMERGENCY DEPT VISIT MOD MDM: CPT

## 2021-07-16 PROCEDURE — 25010000002 IOPAMIDOL 61 % SOLUTION: Performed by: PHYSICIAN ASSISTANT

## 2021-07-16 PROCEDURE — 84145 PROCALCITONIN (PCT): CPT | Performed by: PHYSICIAN ASSISTANT

## 2021-07-16 RX ORDER — ACETAMINOPHEN 160 MG/5ML
650 SOLUTION ORAL EVERY 4 HOURS PRN
Status: DISCONTINUED | OUTPATIENT
Start: 2021-07-16 | End: 2021-07-21 | Stop reason: HOSPADM

## 2021-07-16 RX ORDER — ALPRAZOLAM 0.25 MG/1
0.25 TABLET ORAL 4 TIMES DAILY PRN
Status: DISCONTINUED | OUTPATIENT
Start: 2021-07-16 | End: 2021-07-21 | Stop reason: HOSPADM

## 2021-07-16 RX ORDER — LEVOFLOXACIN 5 MG/ML
500 INJECTION, SOLUTION INTRAVENOUS EVERY 24 HOURS
Status: DISCONTINUED | OUTPATIENT
Start: 2021-07-16 | End: 2021-07-17

## 2021-07-16 RX ORDER — SODIUM CHLORIDE 0.9 % (FLUSH) 0.9 %
10 SYRINGE (ML) INJECTION EVERY 12 HOURS SCHEDULED
Status: DISCONTINUED | OUTPATIENT
Start: 2021-07-16 | End: 2021-07-21 | Stop reason: HOSPADM

## 2021-07-16 RX ORDER — ACETAMINOPHEN 650 MG/1
650 SUPPOSITORY RECTAL EVERY 4 HOURS PRN
Status: DISCONTINUED | OUTPATIENT
Start: 2021-07-16 | End: 2021-07-21 | Stop reason: HOSPADM

## 2021-07-16 RX ORDER — ONDANSETRON 4 MG/1
4 TABLET, FILM COATED ORAL EVERY 6 HOURS PRN
Status: DISCONTINUED | OUTPATIENT
Start: 2021-07-16 | End: 2021-07-21 | Stop reason: HOSPADM

## 2021-07-16 RX ORDER — FENTANYL 50 UG/H
1 PATCH TRANSDERMAL
Status: DISCONTINUED | OUTPATIENT
Start: 2021-07-16 | End: 2021-07-21 | Stop reason: HOSPADM

## 2021-07-16 RX ORDER — ONDANSETRON 2 MG/ML
4 INJECTION INTRAMUSCULAR; INTRAVENOUS EVERY 6 HOURS PRN
Status: DISCONTINUED | OUTPATIENT
Start: 2021-07-16 | End: 2021-07-21 | Stop reason: HOSPADM

## 2021-07-16 RX ORDER — SODIUM CHLORIDE 0.9 % (FLUSH) 0.9 %
10 SYRINGE (ML) INJECTION AS NEEDED
Status: DISCONTINUED | OUTPATIENT
Start: 2021-07-16 | End: 2021-07-21 | Stop reason: HOSPADM

## 2021-07-16 RX ORDER — POTASSIUM CHLORIDE 750 MG/1
40 CAPSULE, EXTENDED RELEASE ORAL ONCE
Status: COMPLETED | OUTPATIENT
Start: 2021-07-16 | End: 2021-07-16

## 2021-07-16 RX ORDER — ACETAMINOPHEN 325 MG/1
650 TABLET ORAL EVERY 4 HOURS PRN
Status: DISCONTINUED | OUTPATIENT
Start: 2021-07-16 | End: 2021-07-21 | Stop reason: HOSPADM

## 2021-07-16 RX ORDER — IBUPROFEN 600 MG/1
600 TABLET ORAL EVERY 8 HOURS PRN
Status: DISCONTINUED | OUTPATIENT
Start: 2021-07-16 | End: 2021-07-21 | Stop reason: HOSPADM

## 2021-07-16 RX ORDER — SODIUM CHLORIDE AND POTASSIUM CHLORIDE 150; 900 MG/100ML; MG/100ML
100 INJECTION, SOLUTION INTRAVENOUS CONTINUOUS
Status: DISCONTINUED | OUTPATIENT
Start: 2021-07-16 | End: 2021-07-17

## 2021-07-16 RX ORDER — GABAPENTIN 100 MG/1
100 CAPSULE ORAL 4 TIMES DAILY
Status: DISCONTINUED | OUTPATIENT
Start: 2021-07-16 | End: 2021-07-21 | Stop reason: HOSPADM

## 2021-07-16 RX ORDER — FLUOXETINE HYDROCHLORIDE 20 MG/1
20 CAPSULE ORAL 4 TIMES DAILY
Status: DISCONTINUED | OUTPATIENT
Start: 2021-07-16 | End: 2021-07-21 | Stop reason: HOSPADM

## 2021-07-16 RX ADMIN — LEVOFLOXACIN 500 MG: 500 INJECTION, SOLUTION INTRAVENOUS at 21:05

## 2021-07-16 RX ADMIN — SODIUM CHLORIDE, PRESERVATIVE FREE 10 ML: 5 INJECTION INTRAVENOUS at 21:06

## 2021-07-16 RX ADMIN — ACETAMINOPHEN 650 MG: 325 TABLET, FILM COATED ORAL at 21:05

## 2021-07-16 RX ADMIN — IOPAMIDOL 100 ML: 612 INJECTION, SOLUTION INTRAVENOUS at 17:06

## 2021-07-16 RX ADMIN — NYSTATIN 500000 UNITS: 100000 SUSPENSION ORAL at 21:05

## 2021-07-16 RX ADMIN — METRONIDAZOLE 500 MG: 500 INJECTION, SOLUTION INTRAVENOUS at 18:41

## 2021-07-16 RX ADMIN — SODIUM CHLORIDE 1000 ML: 9 INJECTION, SOLUTION INTRAVENOUS at 14:59

## 2021-07-16 RX ADMIN — POTASSIUM CHLORIDE 40 MEQ: 750 CAPSULE, EXTENDED RELEASE ORAL at 21:05

## 2021-07-16 RX ADMIN — ALPRAZOLAM 0.25 MG: 0.25 TABLET ORAL at 21:05

## 2021-07-16 RX ADMIN — FLUOXETINE HYDROCHLORIDE 20 MG: 20 CAPSULE ORAL at 21:05

## 2021-07-16 RX ADMIN — FENTANYL 1 PATCH: 50 PATCH, EXTENDED RELEASE TRANSDERMAL at 21:00

## 2021-07-16 RX ADMIN — GABAPENTIN 100 MG: 100 CAPSULE ORAL at 21:05

## 2021-07-16 NOTE — H&P
[facility] Medicine Services  HISTORY AND PHYSICAL    Date of Admission: 7/16/2021  Primary Care Physician: SAM Weaver MD    Subjective     Chief Complaint: Diarrhea, increasing confusion, danger to self    History of Present Illness  Lenora Kathleen is a 61-year-old female with a past medical history of colorectal cancer followed by Dr. Canseco oncology and Dr. Guthrie radiation oncology.  Both therapies have been completed.  Patient does have a colostomy that she is unable to care for herself.  Her friend is in attendance and states she has had increasing confusion over the past few days.  Of that protective services have been working with the patient for approximately a week trying to get her placement in rehab.  Currently they are investigating [place].  Per friend at bedside patient pulls colostomy bag off.  She states the house is a mess from liquid stool.  Patient has been having significant diarrhea.  Patient is complaining of hurting all over.  She states she fell yesterday after feeling dizzy and was unsteady on her feet.  ER work-up revealed potassium 3.3, hemoglobin 8.7, acute enterocolitis.  Patient did states she was going to need another surgery to close the fistula.  She is followed by Dr. Paulette Villegas.  She is admitted for further evaluation treatment.    Friend at bedside states she she has been managing patient's medications with a weekly medication tray.  She feels patient is either not taking her medications or may in fact be hiding them.  Patient lives alone.  She has no other assistance and friend is no longer going to be able to continue to provide intensive home care.    Review of Systems   A 10 point review of systems was completed, all negative except for those discussed in HPI    Past Medical History:   Past Medical History:   Diagnosis Date   • Anxiety    • Blood in urine    • Chest tightness    • Colon polyps    • COPD (chronic obstructive  pulmonary disease) (CMS/HCC)    • Depression    • Emphysema/COPD (CMS/HCC)    • Hepatitis C    • Hypertension    • Injury of back    • Kidney stone    • Palpitation    • PONV (postoperative nausea and vomiting)    • Rectal cancer (CMS/HCC)        Past Surgical History:   Past Surgical History:   Procedure Laterality Date   • CHOLECYSTECTOMY     • COLONOSCOPY  03/12/2015    normal   • COLOSTOMY N/A 4/23/2021    Procedure: LAPAROSCOPIC DIVERTING COLOSTOMY;  Surgeon: Paulette Villegas MD;  Location: [facility];  Service: General;  Laterality: N/A;   • FINGER SURGERY     • HYSTERECTOMY     • KIDNEY STONE SURGERY     • KIDNEY SURGERY     • OTHER SURGICAL HISTORY      POSSIBLE CARDIAC MONITOR (LOOP RECORDER?)--IMPLANTED AND EXPLANTED    • US GUIDED LYMPH NODE BIOPSY  4/30/2021       Family History: family history includes Heart disease in her mother; No Known Problems in her father.    Social History:  reports that she has quit smoking. Her smoking use included cigarettes. She has a 11.25 pack-year smoking history. She has never used smokeless tobacco. She reports that she does not drink alcohol and does not use drugs.    Code Status: Full, due to confusion unable to discuss CODE STATUS with patient.  Record review does not reveal order from documentation.  She has no one to speak for her if unable speak for herself      Allergies:  Allergies   Allergen Reactions   • Morphine GI Intolerance and Nausea And Vomiting     Other reaction(s): Vomiting         Medications:  Prior to Admission medications    Medication Sig Start Date End Date Taking? Authorizing Provider   acetaminophen (Tylenol) 325 MG tablet Take 3 tablets by mouth Every 8 (Eight) Hours. Take every 8 hours for 3 days then take prn as needed. 4/26/21 4/26/22  Paulette Villegas MD   acyclovir (ZOVIRAX) 200 MG capsule Take 1 capsule by mouth 5 (Five) Times a Day. For fever blisters 7/7/21   Jadiel Guthrie III, MD   acyclovir (ZOVIRAX) 5 % ointment Apply 1  application topically to the appropriate area as directed Every 3 (Three) Hours.    Eloina Price MD   albuterol sulfate  (90 Base) MCG/ACT inhaler Inhale 2 puffs As Needed. 9/3/19   Nurse Octaviano Coelho RN   ALPRAZolam (XANAX) 2 MG tablet Take 2 mg by mouth 4 (Four) Times a Day.    Nurse Octaviano Coelho RN   capecitabine (XELODA) 150 MG chemo tablet  5/21/21   Eloina Price MD   capecitabine (XELODA) 500 MG chemo tablet Take  by mouth. 3 500 mg tablets in the morning AND evening PLUS 150 mg mg Monday- FRIDAY 5/21/21   Eloina Price MD   Cyanocobalamin (VITAMIN B 12) 500 MCG tablet Take 500 mcg by mouth Daily.    Nurse Octaviano Coelho RN   fentaNYL (DURAGESIC) 50 MCG/HR patch Place 1 patch on the skin as directed by provider Every 72 (Seventy-Two) Hours. 6/14/21   Eloina Price MD   FLUoxetine (PROZAC) 20 MG capsule Take 20 mg by mouth 4 (Four) Times a Day. 1/19/18   Nurse Octaviano Coelho RN   gabapentin (NEURONTIN) 300 MG capsule Take 300 mg by mouth 4 (Four) Times a Day. 10/4/17   Nurse Octaviano Coelho RN   ibuprofen (Motrin IB) 200 MG tablet Take 3 tablets by mouth Every 8 (Eight) Hours. Take every 8 hours for three days then take as needed. 4/26/21 4/26/22  Paulette Villegas MD   lisinopril (PRINIVIL,ZESTRIL) 10 MG tablet Take 10 mg by mouth Daily.    Eloina Price MD   megestrol (MEGACE ES) 625 MG/5ML suspension Take 5 mL by mouth Daily. 6/9/21   Jadiel Guthrie III, MD   megestrol (MEGACE) 40 MG/ML suspension Take 600 mg by mouth Daily. 6/14/21   Eloina Price MD   metoprolol succinate XL (TOPROL-XL) 50 MG 24 hr tablet Take 50 mg by mouth Daily. 12/19/19   Nurse Octaviano Coelho RN   ondansetron (Zofran) 4 MG tablet Take 1 tablet by mouth Every 8 (Eight) Hours As Needed for Nausea or Vomiting. 4/26/21 4/26/22  Paulette Villegas MD   oxyCODONE (ROXICODONE) 15 MG immediate release tablet Take 1 tablet by mouth 4 (Four) Times a Day. 5/13/21    "Provider, MD Eloina   Scopolamine (TRANSDERM-SCOP) 1.5 MG/3DAYS patch Place 1 patch on the skin as directed by provider Every 72 (Seventy-Two) Hours. 5/18/21   Jadiel Guthrie III, MD   vitamin D (ERGOCALCIFEROL) 1.25 MG (35011 UT) capsule capsule Take 50,000 Units by mouth 1 (One) Time Per Week. 2/18/19   Emergency, Nurse Epic, RN       Objective     /49 (BP Location: Left arm, Patient Position: Lying)   Pulse 63   Temp 98.3 °F (36.8 °C) (Oral)   Resp 20   Ht 172.7 cm (68\")   Wt 88.6 kg (195 lb 6.4 oz)   SpO2 97%   BMI 29.71 kg/m²   Physical Exam  Vitals reviewed.   Constitutional:       Appearance: She is ill-appearing.   HENT:      Head: Normocephalic and atraumatic.      Mouth/Throat:      Mouth: Mucous membranes are dry.      Pharynx: Oropharyngeal exudate present.   Eyes:      Extraocular Movements: Extraocular movements intact.      Pupils: Pupils are equal, round, and reactive to light.   Cardiovascular:      Rate and Rhythm: Normal rate and regular rhythm.   Pulmonary:      Effort: Pulmonary effort is normal.      Breath sounds: Normal breath sounds.   Abdominal:      General: Bowel sounds are normal. There is no distension.      Palpations: Abdomen is soft.      Tenderness: There is no abdominal tenderness.      Comments: Colostomy left abdomen   Musculoskeletal:      Cervical back: Normal range of motion and neck supple.      Right lower leg: No edema.      Left lower leg: No edema.      Comments: Generalized weakness and debility   Skin:     General: Skin is warm and dry.      Comments: Sallow   Neurological:      Mental Status: She is alert.      Comments: Confusion with intermittent clarity   Psychiatric:         Behavior: Behavior normal.      Comments: Flat affect       Pertinent Data:   Lab Results (last 72 hours)     Procedure Component Value Units Date/Time    Procalcitonin [[id number]]  (Normal) Collected: 07/16/21 1637    Specimen: Blood Updated: 07/16/21 1655     Procalcitonin " 0.06 ng/mL     Comprehensive Metabolic Panel [[id number]]  (Abnormal) Collected: 07/16/21 1637    Specimen: Blood Updated: 07/16/21 1654     Glucose 88 mg/dL      BUN 9 mg/dL      Creatinine 0.51 mg/dL      Sodium 139 mmol/L      Potassium 3.3 mmol/L      Chloride 105 mmol/L      CO2 26.0 mmol/L      Calcium 8.4 mg/dL      Total Protein 5.2 g/dL      Albumin 3.00 g/dL      ALT (SGPT) 28 U/L      AST (SGOT) 53 U/L      Alkaline Phosphatase 48 U/L      Total Bilirubin 0.2 mg/dL      eGFR Non African Amer 123 mL/min/1.73      Globulin 2.2 gm/dL      A/G Ratio 1.4 g/dL      BUN/Creatinine Ratio 17.6     Anion Gap 8.0 mmol/L     Ammonia [[id number]]  (Normal) Collected: 07/16/21 1637    Specimen: Blood Updated: 07/16/21 1653     Ammonia 11 umol/L     COVID-19,[facility] IN-HOUSE,Nasal Swab No Transport Media 3-4 HR TAT - Swab, Nasal Cavity [[id number]]  (Normal) Collected: 07/16/21 1527    Specimen: Swab from Nasal Cavity Updated: 07/16/21 1607     COVID19 Not Detected    Urinalysis, Microscopic Only - Urine, Catheter [[id number]]  (Abnormal) Collected: 07/16/21 1527    Specimen: Urine, Catheter Updated: 07/16/21 1539     RBC, UA 3-5 /HPF      WBC, UA 3-5 /HPF      Bacteria, UA None Seen /HPF      Squamous Epithelial Cells, UA 0-2 /HPF      Hyaline Casts, UA 0-2 /LPF      Methodology Automated Microscopy    Urinalysis With Culture If Indicated - Urine, Catheter [[id number]]  (Abnormal) Collected: 07/16/21 1527    Specimen: Urine, Catheter Updated: 07/16/21 1539     Color, UA Yellow     Appearance, UA Clear     pH, UA 7.0     Specific Gravity, UA 1.014     Glucose, UA Negative     Ketones, UA Trace     Bilirubin, UA Negative     Blood, UA Negative     Protein, UA Negative     Leuk Esterase, UA Trace     Nitrite, UA Negative     Urobilinogen, UA 0.2 E.U./dL    Lactic Acid, Plasma [[id number]]  (Normal) Collected: 07/16/21 1457    Specimen: Blood Updated: 07/16/21 1532     Lactate 1.1 mmol/L     Blood Culture - Blood, Arm, Left  [[id number]] Collected: 07/16/21 1452    Specimen: Blood from Arm, Left Updated: 07/16/21 1515    Blood Culture - Blood, Arm, Right [[id number]] Collected: 07/16/21 1434    Specimen: Blood from Arm, Right Updated: 07/16/21 1515    CBC Auto Differential [[id number]]  (Abnormal) Collected: 07/16/21 1457    Specimen: Blood Updated: 07/16/21 1512     WBC 3.85 10*3/mm3      RBC 3.01 10*6/mm3      Hemoglobin 8.7 g/dL      Hematocrit 27.6 %      MCV 91.7 fL      MCH 28.9 pg      MCHC 31.5 g/dL      RDW 21.1 %      RDW-SD 64.8 fl      MPV 11.1 fL      Platelets 167 10*3/mm3      Neutrophil % 42.1 %      Lymphocyte % 35.1 %      Monocyte % 17.1 %      Eosinophil % 0.5 %      Basophil % 0.8 %      Immature Grans % 4.4 %      Neutrophils, Absolute 1.62 10*3/mm3      Lymphocytes, Absolute 1.35 10*3/mm3      Monocytes, Absolute 0.66 10*3/mm3      Eosinophils, Absolute 0.02 10*3/mm3      Basophils, Absolute 0.03 10*3/mm3      Immature Grans, Absolute 0.17 10*3/mm3      nRBC 0.0 /100 WBC         Imaging Results (Last 24 Hours)     Procedure Component Value Units Date/Time    CT Abdomen Pelvis With Contrast [[id number]] Collected: 07/16/21 1707     Updated: 07/16/21 1719    Narrative:      EXAM: CT ABDOMEN PELVIS W CONTRAST-     INDICATION: has open ostomy with no bag for multiple days. Possible  fever. confusion. Hx colorectal/anal cancer     COMPARISON: 2/8/2021     DOSE LENGTH PRODUCT: 554 mGy cm. Automated exposure control was also  utilized to decrease patient radiation dose.     FINDINGS:     Included lung bases are clear. No suspicious liver lesion. Prior  cholecystectomy. Mild prominence of the biliary tree is stable and  likely reservoir phenomenon. Pancreas, spleen, and adrenal glands appear  normal. No solid renal mass. No urolithiasis or hydronephrosis. Mild  urinary bladder wall thickening.     During LEFT lower quadrant colostomy. Normal appendix. Wall thickening  of the ascending colon. Multiple small bowel loops in the  pelvis  demonstrate wall thickening and mucosal hyperemia. A small amount of  reactive free pelvic fluid is present. Distal esophagus and stomach  appear unremarkable. No bowel obstruction.     Nonaneurysmal abdominal aorta with scattered atherosclerotic  calcification. No enlarged retroperitoneal, mesenteric, pelvic, or LEFT  inguinal lymph node. Enlarging RIGHT inguinal lymph node on image 79  measures 2.4 x 1.7 cm. No acute osseous finding.       Impression:         1.  Multiple inflamed small bowel loops in the pelvis. Wall thickening  and inflammation of the rectum and sigmoid colon. Findings are most  compatible with acute enterocolitis.  2.  Urinary bladder wall thickening, correlate for cystitis.  3.  Enlarging RIGHT inguinal lymph node.  This report was finalized on 07/16/2021 17:16 by Dr. Patrick Farrell MD.            Assessment / Plan     Assessment:   Active Hospital Problems    Diagnosis    • Enterocolitis    • Dehydration    • Hypotension due to hypovolemia    • Encephalopathy    • Ataxia    • Self-care deficit    • Anemia of chronic disease    • Diarrhea    • Hypokalemia    • Primary squamous cell carcinoma of anal canal        Plan:   1.  Admit as inpatient  2.  Flagyl and Levaquin  3.  Normal saline with 20 mEq potassium at 100 mL/hour  4.  Replace potassium  5.  Home medications reviewed; holding antihypertensives for now, decreased dosages of medications causing altered sensorium  5.  Consult , PT/OT  6.  Labs in a.m.  7.  Stool PCR and stool for C. difficile  8.  DVT prophylaxis with SCDs  9.  Supplemental oxygen as needed, continuous pulse oximetry    I discussed the patient's findings and my recommendations with: Raleigh Villa MD  Time spent 40 minutes    Patient seen and examined by me on 7/16/2021 at 7:19 PM.    Electronically signed by ADIN Childers, 07/16/21, 19:35 CDT.

## 2021-07-16 NOTE — ED TRIAGE NOTES
Caregiver states that patient has colon cancer and recently completed chemo and radiation.  Caregiver now states the patient has increased confusion and will not keep colostomy.  Caregiver is concerned patient can not live at home alone safely and patient has no family to stay with.

## 2021-07-16 NOTE — ED PROVIDER NOTES
"Subjective   History of Present Illness    Patient is a 61-year-old female presenting to ED with confusion and ostomy bag problem.  PMH significant for colorectal cancer with completed chemo and radiation, colostomy status, history hepatitis C, COPD, hypertension, COPD.  Patient's friend and caregiver at bedside to help provide additional history due to patient's confusion. Patient friend/caregiver at bedside reported that she initiated chemo and radiation on 5/10/21 and completed her last treatment on 7/1/21.  Caregiver reports that over the past 3 days patient has had slight increasing confusion, generalized weakness, and will no longer keep her ostomy bags on.  Caregiver reports that she thinks patient is accidentally turning them off and is not able to remember.  Patient has had confusion and has been misplacing her medications for which caregiver reports she has been wearing the same fentanyl patch since last Friday.  Caregiver stated they have been working with APS as well as home health trying to get patient placed into an assisted living facility and were advised that [facility] had no beds.  They are currently working with [employer] to solve insurance issues.  Caregiver reports that patient formally had her sister involved in care and no longer is able to do this at this point she is living in taking care of herself alone.  Patient reports that she feels weak all over, confused, and feels \"overwhelmed\" by her illness.  Caregiver noted patient is also being treated for right buttock shingles at this time however she is concerned because patient has not been taking her medications routinely and since last night is only taken a Xanax.    Records reviewed show patient was last seen outpatient by oncology on 6/30/2021 for management of anal cancer, chemo.  Patient follows up with radiation oncology with treatment on 7/1/2021.  Patient has been having home health visits with the most recent on " 7/13/2021.  Patient had a home health encounter on 7/14/2021 where they noted she lives alone with no support from family and currently relying on friends. Patient needing more in-home support or nursing home placement for rehabilitation. [facility] in [place] were contacted regarding placement.    Patient was admitted on 4/23/2021 by Dr. 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    Review of Systems   Reason unable to perform ROS: Limited ability to obtain ROS due to patient's confusion.   Constitutional: Negative for fever.   HENT: Negative.    Eyes: Negative.    Respiratory: Negative.    Cardiovascular: Negative.    Gastrointestinal: Positive for abdominal pain (Surrounding left lower quadrant ostomy site).   Musculoskeletal: Negative for myalgias.   Skin: Positive for pallor and wound (Shingles, right buttocks).   Neurological: Positive for weakness (Generalized).   Psychiatric/Behavioral: Positive for confusion.       Past Medical History:   Diagnosis Date   • Anxiety    • Blood in urine    • Chest tightness    • Colon polyps    • COPD (chronic obstructive pulmonary disease) (CMS/HCC)    • Depression    • Emphysema/COPD (CMS/HCC)    • Hepatitis C    • Hypertension    • Injury of back    • Kidney stone    • Palpitation    • PONV (postoperative nausea and vomiting)    • Rectal cancer (CMS/HCC)        Allergies   Allergen Reactions   • Morphine GI Intolerance and Nausea And Vomiting     Other reaction(s): Vomiting         Past Surgical History:   Procedure Laterality Date   • CHOLECYSTECTOMY     • COLONOSCOPY  03/12/2015    normal   • COLOSTOMY N/A 4/23/2021    Procedure: LAPAROSCOPIC DIVERTING COLOSTOMY;  Surgeon: Paulette Villegas MD;  Location: [facility];  Service: General;  Laterality: N/A;   • FINGER SURGERY     • HYSTERECTOMY     • KIDNEY STONE SURGERY     • KIDNEY SURGERY     • OTHER SURGICAL HISTORY      POSSIBLE CARDIAC MONITOR (LOOP RECORDER?)--IMPLANTED  AND EXPLANTED    • US GUIDED LYMPH NODE BIOPSY  4/30/2021       Family History   Problem Relation Age of Onset   • Heart disease Mother    • No Known Problems Father        Social History     Socioeconomic History   • Marital status:      Spouse name: Not on file   • Number of children: Not on file   • Years of education: Not on file   • Highest education level: Not on file   Tobacco Use   • Smoking status: Former Smoker     Packs/day: 0.25     Years: 45.00     Pack years: 11.25     Types: Cigarettes   • Smokeless tobacco: Never Used   Substance and Sexual Activity   • Alcohol use: No   • Drug use: No   • Sexual activity: Defer     Partners: Male           Objective   Physical Exam  Vitals and nursing note reviewed.   Constitutional:       Appearance: Normal appearance. She is well-developed. She is ill-appearing. She is not toxic-appearing or diaphoretic.      Comments: Patient unkempt with dried stool on lower abdomen skin folds coming from ostomy bag   HENT:      Head: Normocephalic.      Mouth/Throat:      Mouth: Mucous membranes are dry.      Pharynx: Oropharynx is clear.   Eyes:      General: No scleral icterus.     Extraocular Movements: Extraocular movements intact.      Conjunctiva/sclera: Conjunctivae normal.      Pupils: Pupils are equal, round, and reactive to light.   Cardiovascular:      Rate and Rhythm: Normal rate and regular rhythm.   Pulmonary:      Effort: Pulmonary effort is normal.      Breath sounds: Normal breath sounds.   Abdominal:      General: Bowel sounds are normal.      Palpations: Abdomen is soft.          Comments: Ostomy site to the LLQ with the bag ripped off.  There is discharge and stool coming from the area with a cloth overlying it.  Skin inspection around ostomy sticker well-appearing with no erythema.  Slight tenderness to palpitation around this area with normal inspection to the remainder of the abdomen.   Musculoskeletal:         General: Normal range of motion.       Cervical back: Normal range of motion and neck supple.      Comments: Fentanyl patch to left scapula   Skin:     General: Skin is warm.      Coloration: Skin is pale. Skin is not jaundiced.      Findings: Rash present.             Comments: Healing rash consistent with shingles to right buttocks with no further dermatome involvements.  The areas are well-healed and crusting over with no weeping lesions or vesicles.  No further rashes or injuries noted to dermatological evaluation   Neurological:      Mental Status: She is alert and oriented to person, place, and time. She is confused.   Psychiatric:         Attention and Perception: Attention normal.         Mood and Affect: Affect is tearful.         Speech: Speech normal.         Behavior: Behavior normal. Behavior is cooperative.         Procedures           ED Course                                           MDM  Number of Diagnoses or Management Options     Amount and/or Complexity of Data Reviewed  Clinical lab tests: reviewed and ordered  Tests in the radiology section of CPT®: reviewed and ordered  Tests in the medicine section of CPT®: reviewed and ordered  Decide to obtain previous medical records or to obtain history from someone other than the patient: yes  Obtain history from someone other than the patient: yes (Friend/caregiver)  Review and summarize past medical records: yes  Discuss the patient with other providers: yes (Dr. Hardik Rosenberg (attending)  Dr. Villa (hospitalist))    Patient Progress  Patient progress: stable      Patient is a 61-year-old female presenting to ED with confusion and ostomy bag problem.  PMH significant for colorectal cancer with completed chemo and radiation, colostomy status, history hepatitis C, COPD, hypertension, COPD.  CBC with white blood cell count 3.85, H&H 8.7/27.6.  ANC 1.62.  Lactic acid 1.1.  CBC with hypokalemia 3.3, hypocalcemia 8.4.  AST 53.  Urine studies with trace leukocytes, trace ketones, negative  nitrites, 3-5 RBC, 3-5 WBC, no bacteria.  Covid testing negative.  Ammonia 11.  Procalcitonin 0.06.  Patient remained afebrile with no evidence of tachycardia.  Patient was given a fluid bolus. CT imaging of the abdomen and pelvis showed: Multiple inflamed small bowel loops in the pelvis, wall thickening inflammation of the rectum and sigmoid colon, findings most consistent with acute enterocolitis.  Urinary bladder wall thickening.  Enlarging right inguinal lymph node.  Patient continued to express great concern for going home as she felt too weak and unable to perform ADLs and care for herself.  Case was discussed with Dr. Mcgarry, hospitalist, who will kindly accept patient for admission at this time for further evaluation and continued work in nursing home placement.  Case discussed with Dr. Rosenberg is in agreement no further recommendations.      Final diagnoses:   Dehydration   Failure to thrive in adult   Enterocolitis       ED Disposition  ED Disposition     ED Disposition Condition Comment    Decision to Admit  Level of Care: Med/Surg [1]   Diagnosis: Colitis [[id number]]   Admitting Physician: NENA MCGARRY [[id number]]   Certification: I Certify That Inpatient Hospital Services Are Medically Necessary For Greater Than 2 Midnights            No follow-up provider specified.       Medication List      No changes were made to your prescriptions during this visit.          Portillo Zee PA-C  07/17/21 [id number]

## 2021-07-17 PROBLEM — G93.41 METABOLIC ENCEPHALOPATHY: Status: ACTIVE | Noted: 2021-07-17

## 2021-07-17 PROBLEM — A04.72 CLOSTRIDIUM DIFFICILE ENTEROCOLITIS: Status: ACTIVE | Noted: 2021-07-17

## 2021-07-17 LAB
ADV 40+41 DNA STL QL NAA+NON-PROBE: NOT DETECTED
ANION GAP SERPL CALCULATED.3IONS-SCNC: 12 MMOL/L (ref 5–15)
ASTRO TYP 1-8 RNA STL QL NAA+NON-PROBE: NOT DETECTED
BUN SERPL-MCNC: 6 MG/DL (ref 8–23)
BUN/CREAT SERPL: 13.6 (ref 7–25)
C CAYETANENSIS DNA STL QL NAA+NON-PROBE: NOT DETECTED
C COLI+JEJ+UPSA DNA STL QL NAA+NON-PROBE: NOT DETECTED
C DIFF TOX GENS STL QL NAA+PROBE: POSITIVE
CALCIUM SPEC-SCNC: 8.3 MG/DL (ref 8.6–10.5)
CHLORIDE SERPL-SCNC: 107 MMOL/L (ref 98–107)
CO2 SERPL-SCNC: 21 MMOL/L (ref 22–29)
CREAT SERPL-MCNC: 0.44 MG/DL (ref 0.57–1)
CRYPTOSP DNA STL QL NAA+NON-PROBE: NOT DETECTED
DEPRECATED RDW RBC AUTO: 67.7 FL (ref 37–54)
E HISTOLYT DNA STL QL NAA+NON-PROBE: NOT DETECTED
EAEC PAA PLAS AGGR+AATA ST NAA+NON-PRB: NOT DETECTED
EC STX1+STX2 GENES STL QL NAA+NON-PROBE: NOT DETECTED
EPEC EAE GENE STL QL NAA+NON-PROBE: NOT DETECTED
ERYTHROCYTE [DISTWIDTH] IN BLOOD BY AUTOMATED COUNT: 21.2 % (ref 12.3–15.4)
ETEC LTA+ST1A+ST1B TOX ST NAA+NON-PROBE: NOT DETECTED
G LAMBLIA DNA STL QL NAA+NON-PROBE: NOT DETECTED
GFR SERPL CREATININE-BSD FRML MDRD: 145 ML/MIN/1.73
GLUCOSE SERPL-MCNC: 78 MG/DL (ref 65–99)
HCT VFR BLD AUTO: 29.7 % (ref 34–46.6)
HGB BLD-MCNC: 9.3 G/DL (ref 12–15.9)
MCH RBC QN AUTO: 29.2 PG (ref 26.6–33)
MCHC RBC AUTO-ENTMCNC: 31.3 G/DL (ref 31.5–35.7)
MCV RBC AUTO: 93.1 FL (ref 79–97)
NOROVIRUS GI+II RNA STL QL NAA+NON-PROBE: NOT DETECTED
P SHIGELLOIDES DNA STL QL NAA+NON-PROBE: NOT DETECTED
PLATELET # BLD AUTO: 132 10*3/MM3 (ref 140–450)
PMV BLD AUTO: 11.9 FL (ref 6–12)
POTASSIUM SERPL-SCNC: 3.6 MMOL/L (ref 3.5–5.2)
RBC # BLD AUTO: 3.19 10*6/MM3 (ref 3.77–5.28)
RVA RNA STL QL NAA+NON-PROBE: NOT DETECTED
S ENT+BONG DNA STL QL NAA+NON-PROBE: NOT DETECTED
SAPO I+II+IV+V RNA STL QL NAA+NON-PROBE: NOT DETECTED
SHIGELLA SP+EIEC IPAH ST NAA+NON-PROBE: NOT DETECTED
SODIUM SERPL-SCNC: 140 MMOL/L (ref 136–145)
V CHOL+PARA+VUL DNA STL QL NAA+NON-PROBE: NOT DETECTED
V CHOLERAE DNA STL QL NAA+NON-PROBE: NOT DETECTED
WBC # BLD AUTO: 3.04 10*3/MM3 (ref 3.4–10.8)
Y ENTEROCOL DNA STL QL NAA+NON-PROBE: NOT DETECTED

## 2021-07-17 PROCEDURE — 85027 COMPLETE CBC AUTOMATED: CPT | Performed by: NURSE PRACTITIONER

## 2021-07-17 PROCEDURE — C1751 CATH, INF, PER/CENT/MIDLINE: HCPCS

## 2021-07-17 PROCEDURE — 97162 PT EVAL MOD COMPLEX 30 MIN: CPT | Performed by: PHYSICAL THERAPIST

## 2021-07-17 PROCEDURE — 0097U HC BIOFIRE FILMARRAY GI PANEL: CPT | Performed by: NURSE PRACTITIONER

## 2021-07-17 PROCEDURE — 36410 VNPNXR 3YR/> PHY/QHP DX/THER: CPT

## 2021-07-17 PROCEDURE — 80048 BASIC METABOLIC PNL TOTAL CA: CPT | Performed by: NURSE PRACTITIONER

## 2021-07-17 PROCEDURE — 25810000003 SODIUM CHLORIDE 0.9 % WITH KCL 20 MEQ 20-0.9 MEQ/L-% SOLUTION: Performed by: NURSE PRACTITIONER

## 2021-07-17 PROCEDURE — 25010000002 VANCOMYCIN 1 G RECONSTITUTED SOLUTION 1 EACH VIAL: Performed by: INTERNAL MEDICINE

## 2021-07-17 PROCEDURE — 97166 OT EVAL MOD COMPLEX 45 MIN: CPT | Performed by: OCCUPATIONAL THERAPIST

## 2021-07-17 PROCEDURE — 25010000003 LIDOCAINE 1 % SOLUTION: Performed by: INTERNAL MEDICINE

## 2021-07-17 PROCEDURE — 87493 C DIFF AMPLIFIED PROBE: CPT | Performed by: NURSE PRACTITIONER

## 2021-07-17 PROCEDURE — 25010000002 ONDANSETRON PER 1 MG: Performed by: NURSE PRACTITIONER

## 2021-07-17 RX ORDER — LIDOCAINE HYDROCHLORIDE 10 MG/ML
1 INJECTION, SOLUTION INFILTRATION; PERINEURAL ONCE
Status: COMPLETED | OUTPATIENT
Start: 2021-07-17 | End: 2021-07-17

## 2021-07-17 RX ORDER — SODIUM CHLORIDE AND POTASSIUM CHLORIDE 150; 900 MG/100ML; MG/100ML
100 INJECTION, SOLUTION INTRAVENOUS CONTINUOUS
Status: DISCONTINUED | OUTPATIENT
Start: 2021-07-17 | End: 2021-07-19

## 2021-07-17 RX ADMIN — LIDOCAINE HYDROCHLORIDE 1 ML: 10 INJECTION, SOLUTION INFILTRATION; PERINEURAL at 18:57

## 2021-07-17 RX ADMIN — GABAPENTIN 100 MG: 100 CAPSULE ORAL at 12:52

## 2021-07-17 RX ADMIN — POTASSIUM CHLORIDE AND SODIUM CHLORIDE 100 ML/HR: 900; 150 INJECTION, SOLUTION INTRAVENOUS at 01:27

## 2021-07-17 RX ADMIN — IBUPROFEN 600 MG: 600 TABLET, FILM COATED ORAL at 17:59

## 2021-07-17 RX ADMIN — VANCOMYCIN HYDROCHLORIDE 500 MG: 1 INJECTION, POWDER, LYOPHILIZED, FOR SOLUTION INTRAVENOUS at 10:14

## 2021-07-17 RX ADMIN — NYSTATIN 500000 UNITS: 100000 SUSPENSION ORAL at 18:56

## 2021-07-17 RX ADMIN — POTASSIUM CHLORIDE AND SODIUM CHLORIDE 100 ML/HR: 900; 150 INJECTION, SOLUTION INTRAVENOUS at 19:00

## 2021-07-17 RX ADMIN — FLUOXETINE HYDROCHLORIDE 20 MG: 20 CAPSULE ORAL at 23:06

## 2021-07-17 RX ADMIN — FLUOXETINE HYDROCHLORIDE 20 MG: 20 CAPSULE ORAL at 10:13

## 2021-07-17 RX ADMIN — VANCOMYCIN HYDROCHLORIDE 500 MG: 1 INJECTION, POWDER, LYOPHILIZED, FOR SOLUTION INTRAVENOUS at 22:59

## 2021-07-17 RX ADMIN — NYSTATIN 500000 UNITS: 100000 SUSPENSION ORAL at 12:52

## 2021-07-17 RX ADMIN — NYSTATIN 500000 UNITS: 100000 SUSPENSION ORAL at 23:06

## 2021-07-17 RX ADMIN — VANCOMYCIN HYDROCHLORIDE 500 MG: 1 INJECTION, POWDER, LYOPHILIZED, FOR SOLUTION INTRAVENOUS at 15:26

## 2021-07-17 RX ADMIN — VANCOMYCIN HYDROCHLORIDE 125 MG: KIT at 18:56

## 2021-07-17 RX ADMIN — VANCOMYCIN HYDROCHLORIDE 125 MG: KIT at 12:52

## 2021-07-17 RX ADMIN — METRONIDAZOLE 500 MG: 500 INJECTION, SOLUTION INTRAVENOUS at 01:32

## 2021-07-17 RX ADMIN — ACETAMINOPHEN 650 MG: 325 TABLET, FILM COATED ORAL at 15:58

## 2021-07-17 RX ADMIN — GABAPENTIN 100 MG: 100 CAPSULE ORAL at 10:13

## 2021-07-17 RX ADMIN — SODIUM CHLORIDE, PRESERVATIVE FREE 10 ML: 5 INJECTION INTRAVENOUS at 10:15

## 2021-07-17 RX ADMIN — FLUOXETINE HYDROCHLORIDE 20 MG: 20 CAPSULE ORAL at 18:56

## 2021-07-17 RX ADMIN — GABAPENTIN 100 MG: 100 CAPSULE ORAL at 18:56

## 2021-07-17 RX ADMIN — NYSTATIN 500000 UNITS: 100000 SUSPENSION ORAL at 10:14

## 2021-07-17 RX ADMIN — ONDANSETRON 4 MG: 2 INJECTION INTRAMUSCULAR; INTRAVENOUS at 10:15

## 2021-07-17 RX ADMIN — FLUOXETINE HYDROCHLORIDE 20 MG: 20 CAPSULE ORAL at 12:52

## 2021-07-17 RX ADMIN — GABAPENTIN 100 MG: 100 CAPSULE ORAL at 23:06

## 2021-07-17 RX ADMIN — VANCOMYCIN HYDROCHLORIDE 125 MG: KIT at 10:11

## 2021-07-17 NOTE — PLAN OF CARE
Goal Outcome Evaluation:  Plan of Care Reviewed With: patient        Progress: no change  Outcome Summary: Pt c/o constant abd pain while awake, PRN Tylenol given with moderate results. VSS. Pt up with assist x1 to bathroom, gait slightly unsteady, bed check on. Safety maintain. Contact spore isolation intiated for positive c-diff results. Pt noted to have intermittant confusion to situation and place. Pt a privacy patient, her friend Idalia Glover is only one allowed to know she is admitted. Friend had previously contacted APS regarding pt care at home.

## 2021-07-17 NOTE — PLAN OF CARE
Goal Outcome Evaluation:  Plan of Care Reviewed With: patient        Progress: no change  Outcome Summary: OT eval completed. pt reports back pain 8/10. Pt reports fatigue. Pt was supervision with set up for perineal hygiene, but dependent for ostomy care. Pt was CGA for all functional mobility within room. Pt requires verbal cues and encouragement. Pt demo's mild confusion. Skilled OT recommended to address adls, functional mobility and endurance. Recommended d/c SNF.

## 2021-07-17 NOTE — CONSULTS
Left arm painful to patient. Attempted right basilic first. Unable to advance guide wire. 12 cm Midline catheter placed in pt right brachial vein. Pt tolerated procedure well. Line flushes and draws well. Sterile dressing applied.

## 2021-07-17 NOTE — PROGRESS NOTES
[facility] Medicine Services  INPATIENT PROGRESS NOTE    Length of Stay: 1  Date of Admission: 7/16/2021  Primary Care Physician: SAM Weaver MD    Subjective   Chief Complaint: Follow-up worsening diarrhea, confusion  HPI   To ER 7/16 with worsening diarrhea, abdominal pain, dizziness and unsteady gait.  Patient has history of colorectal cancer followed by Dr. Canseco and Dr. Guthrie.  He has completed radiation and chemotherapy.  Patient lives alone and unable to take care of herself.  Friend has been assisting patient.  Friend found ostomy bag off.  Potassium 3.3 with hemoglobin 8.7.    Lying in bed.  Tearful.  She tells me she hurts all over.  She plans to go to [place] as she is unable to take care of her self.  She complains of feeling weak.  She denies chest pain or palpitations.  No oxygen in use.  Ostomy in place with watery stool.  Stool culture positive for C. difficile.  Has been started on oral vancomycin and vancomycin per ostomy.  Social service assisting with discharge planning.  She denies fever or chills but reports abdominal pain.    Review of Systems   Constitutional: Positive for activity change, appetite change and fatigue. Negative for chills and fever.   HENT: Negative for congestion and trouble swallowing.    Eyes: Negative for photophobia and visual disturbance.   Respiratory: Negative for cough, shortness of breath and wheezing.    Cardiovascular: Negative for chest pain, palpitations and leg swelling.   Gastrointestinal: Positive for abdominal pain and diarrhea. Negative for nausea.        Ostomy with watery diarrhea.   Endocrine: Negative for cold intolerance, heat intolerance and polyuria.   Genitourinary: Negative for dysuria, frequency and urgency.   Musculoskeletal: Positive for gait problem.   Skin: Negative for color change, pallor, rash and wound.   Neurological: Positive for weakness. Negative for light-headedness and headaches.    Hematological: Negative for adenopathy. Does not bruise/bleed easily.   Psychiatric/Behavioral: Negative for agitation, behavioral problems and confusion.      All pertinent negatives and positives are as above. All other systems have been reviewed and are negative unless otherwise stated.     Objective    Temp:  [98.3 °F (36.8 °C)-98.5 °F (36.9 °C)] 98.4 °F (36.9 °C)  Heart Rate:  [62-86] 69  Resp:  [16-20] 16  BP: ()/(49-59) 105/59  Physical Exam  Vitals and nursing note reviewed.   Constitutional:       Appearance: She is ill-appearing (Chronically).      Comments: Lying in bed.  No oxygen use.  Tearful.   HENT:      Head: Normocephalic and atraumatic.      Nose: No congestion.      Mouth/Throat:      Pharynx: Oropharynx is clear. No oropharyngeal exudate.   Eyes:      Extraocular Movements: Extraocular movements intact.      Pupils: Pupils are equal, round, and reactive to light.   Cardiovascular:      Rate and Rhythm: Normal rate and regular rhythm.      Heart sounds: No murmur heard.        Comments: Normal sinus rhythm 61-67 on telemetry  Pulmonary:      Breath sounds: No wheezing, rhonchi or rales.      Comments: No oxygen in use.  Abdominal:      Tenderness: There is abdominal tenderness (Lower abdomen.).      Comments: Ostomy left side of abdomen with liquid clear stool   Genitourinary:     Comments: Voiding.  Musculoskeletal:         General: No swelling or tenderness.      Cervical back: Normal range of motion and neck supple.      Comments: SCDs   Skin:     General: Skin is warm and dry.   Neurological:      General: No focal deficit present.      Mental Status: She is alert and oriented to person, place, and time.   Psychiatric:         Mood and Affect: Mood normal.         Behavior: Behavior normal.         Thought Content: Thought content normal.         Judgment: Judgment normal.       Results Review:  I have reviewed the labs, radiology results, and diagnostic studies.    Laboratory Data:       Results from last 7 days   Lab Units 07/17/21  0558 07/16/21  1457   WBC 10*3/mm3 3.04* 3.85   HEMOGLOBIN g/dL 9.3* 8.7*   HEMATOCRIT % 29.7* 27.6*   PLATELETS 10*3/mm3 132* 167        Results from last 7 days   Lab Units 07/17/21  0557 07/16/21  1637   SODIUM mmol/L 140 139   POTASSIUM mmol/L 3.6 3.3*   CHLORIDE mmol/L 107 105   CO2 mmol/L 21.0* 26.0   BUN mg/dL 6* 9   CREATININE mg/dL 0.44* 0.51*   GLUCOSE mg/dL 78 88   CALCIUM mg/dL 8.3* 8.4*   ALT (SGPT) U/L  --  28     Culture Data:      Blood Culture   Date Value Ref Range Status   07/16/2021 No growth at 24 hours  Preliminary   07/16/2021 No growth at 24 hours  Preliminary     t Component Value   Clostridium difficile (toxin A/B) PositiveAbnormal         Radiology Data:   Imaging Results (Last 7 Days)     Procedure Component Value Units Date/Time    CT Abdomen Pelvis With Contrast [[id number]] Collected: 07/16/21 1707     Updated: 07/16/21 1719    Narrative:      EXAM: CT ABDOMEN PELVIS W CONTRAST-     INDICATION: has open ostomy with no bag for multiple days. Possible  fever. confusion. Hx colorectal/anal cancer     COMPARISON: 2/8/2021     DOSE LENGTH PRODUCT: 554 mGy cm. Automated exposure control was also  utilized to decrease patient radiation dose.     FINDINGS:     Included lung bases are clear. No suspicious liver lesion. Prior  cholecystectomy. Mild prominence of the biliary tree is stable and  likely reservoir phenomenon. Pancreas, spleen, and adrenal glands appear  normal. No solid renal mass. No urolithiasis or hydronephrosis. Mild  urinary bladder wall thickening.     During LEFT lower quadrant colostomy. Normal appendix. Wall thickening  of the ascending colon. Multiple small bowel loops in the pelvis  demonstrate wall thickening and mucosal hyperemia. A small amount of  reactive free pelvic fluid is present. Distal esophagus and stomach  appear unremarkable. No bowel obstruction.     Nonaneurysmal abdominal aorta with scattered  atherosclerotic  calcification. No enlarged retroperitoneal, mesenteric, pelvic, or LEFT  inguinal lymph node. Enlarging RIGHT inguinal lymph node on image 79  measures 2.4 x 1.7 cm. No acute osseous finding.       Impression:         1.  Multiple inflamed small bowel loops in the pelvis. Wall thickening  and inflammation of the rectum and sigmoid colon. Findings are most  compatible with acute enterocolitis.  2.  Urinary bladder wall thickening, correlate for cystitis.  3.  Enlarging RIGHT inguinal lymph node.  This report was finalized on 07/16/2021 17:16 by Dr. Patrick Farrell MD.        Intake/Output    Intake/Output Summary (Last 24 hours) at 7/17/2021 1635  Last data filed at 7/17/2021 1605  Gross per 24 hour   Intake 1500 ml   Output 1100 ml   Net 400 ml     Scheduled Meds  fentaNYL, 1 patch, Transdermal, Q72H  FLUoxetine, 20 mg, Oral, 4x Daily  gabapentin, 100 mg, Oral, 4x Daily  nystatin, 5 mL, Swish & Swallow, 4x Daily  sodium chloride, 10 mL, Intravenous, Q12H  vancomycin, 500 mg, Rectal, Q6H  vancomycin, 125 mg, Oral, Q6H      I have reviewed the patient current medications.     Assessment/Plan     Active Hospital Problems    Diagnosis    • **Clostridium difficile enterocolitis    • Metabolic encephalopathy    • Enterocolitis    • Dehydration    • Hypotension due to hypovolemia    • Encephalopathy    • Ataxia    • Self-care deficit    • Anemia of chronic disease    • Diarrhea    • Hypokalemia    • Primary squamous cell carcinoma of anal canal      Plan:  1.  To ER 7/16 with increasing diarrhea, confusion.  History of colorectal cancer followed by Dr. Canseco with Dr. Guthrie. Completed chemotherapy and radiation therapy.  Colostomy in place.  Increased diarrhea loose stool from ostomy and abdominal pain.  Patient reported falling after feeling dizzy.  Potassium 3.3, hemoglobin 8.7.  Normal saline fluid bolus Flagyl given in ER.  2.  CT scan of the abdomen multiple inflamed small bowel loops in the pelvis.   Wall thickening and inflammation of the rectum and sigmoid colon.  Findings compatible with acute enterocolitis.  3.  Levaquin and Flagyl started on admission.  Discontinued today.  4.  Clostridium difficile positive.  GI PCR panel negative. Vancomycin 125 mg orally every 6 hours x10 days started today.  Vancomycin enema every 6 hours for 3 days.  5.  Blood cultures no growth at 24 hours.  6.  Full liquid diet as tolerated.  7.  IV fluids normal saline with 20 mEq of potassium at 100 mL/h.  8.  Potassium 3.3.  Replaced.  Potassium 3.6 today.  BMP in a.m.  9.  Chronic anemia, chemotherapy-induced, hemoglobin 8.7 on admission, 9.3 today.  10.  Home medications reviewed.  11.  Social service consulted for discharge planning as patient unable to care for herself.  Referral to [facility].    CODE STATUS/Advanced Care Planning: Full code  The patient surrogate decision-maker is Idalia Glover, friend.    The above documentation resulted from a face-to-face encounter by me Danielle OAKLEY, [facility].    Discharge Planning: I expect patient to be discharged to [facility] when bed offered and insurance approves.      Electronically signed by ADIN Joya, 7/17/2021, 16:35 CDT.

## 2021-07-17 NOTE — THERAPY EVALUATION
Patient Name: Lenora Kathleen  : 1960    MRN: [id number]                              Today's Date: 2021       Admit Date: 2021    Visit Dx:     ICD-10-CM ICD-9-CM   1. Dehydration  E86.0 276.51   2. Failure to thrive in adult  R62.7 783.7   3. Enterocolitis  K52.9 558.9   4. Impaired mobility  Z74.09 799.89   5. Impaired mobility and ADLs  Z74.09 V49.89    Z78.9      Patient Active Problem List   Diagnosis   • Primary squamous cell carcinoma of anal canal   • Current every day smoker   • Rectovaginal fistula   • Severe malnutrition (CMS/HCC)   • Prediabetes   • Anxiety   • Enterocolitis   • Dehydration   • Hypotension due to hypovolemia   • Encephalopathy   • Ataxia   • Self-care deficit   • Anemia of chronic disease   • Diarrhea   • Hypokalemia     Past Medical History:   Diagnosis Date   • Anxiety    • Blood in urine    • Chest tightness    • Colon polyps    • COPD (chronic obstructive pulmonary disease) (CMS/HCC)    • Depression    • Emphysema/COPD (CMS/HCC)    • Hepatitis C    • Hypertension    • Injury of back    • Kidney stone    • Palpitation    • PONV (postoperative nausea and vomiting)    • Rectal cancer (CMS/HCC)      Past Surgical History:   Procedure Laterality Date   • CHOLECYSTECTOMY     • COLONOSCOPY  2015    normal   • COLOSTOMY N/A 2021    Procedure: LAPAROSCOPIC DIVERTING COLOSTOMY;  Surgeon: Paulette Villegas MD;  Location: [facility];  Service: General;  Laterality: N/A;   • FINGER SURGERY     • HYSTERECTOMY     • KIDNEY STONE SURGERY     • KIDNEY SURGERY     • OTHER SURGICAL HISTORY      POSSIBLE CARDIAC MONITOR (LOOP RECORDER?)--IMPLANTED AND EXPLANTED    • US GUIDED LYMPH NODE BIOPSY  2021     General Information     Row Name 21 0904          OT Time and Intention    Document Type  evaluation presents with Diarrhea, increasing confusion, danger to self; dx colitis, c diff; past medical history of colorectal cancer  -MM     Mode of Treatment  occupational  therapy  -MM     Row Name 07/17/21 0904          General Information    Patient Profile Reviewed  yes  -MM     Prior Level of Function  independent:;all household mobility;ADL's unsure of accuracy due to confusion; friend was helping with meds  -MM     Existing Precautions/Restrictions  fall  -MM     Barriers to Rehab  medically complex;cognitive status  -MM     Row Name 07/17/21 0904          Living Environment    Lives With  alone shower chair, walk in shower  -MM     Row Name 07/17/21 0904          Home Main Entrance    Number of Stairs, Main Entrance  none  -MM     Stair Railings, Main Entrance  none  -MM     Row Name 07/17/21 0904          Stairs Within Home, Primary    Number of Stairs, Within Home, Primary  none  -MM     Stair Railings, Within Home, Primary  none  -MM     Row Name 07/17/21 0904          Cognition    Orientation Status (Cognition)  oriented to;person;place;time;verbal cues/prompts needed for orientation;situation  -MM     Row Name 07/17/21 0904          Safety Issues, Functional Mobility    Safety Issues Affecting Function (Mobility)  ability to follow commands;at risk behavior observed;safety precaution awareness;safety precautions follow-through/compliance;insight into deficits/self-awareness  -MM     Impairments Affecting Function (Mobility)  cognition;pain;balance;endurance/activity tolerance;shortness of breath;strength  -MM     Cognitive Impairments, Mobility Safety/Performance  insight into deficits/self-awareness;safety precaution awareness;safety precaution follow-through  -       User Key  (r) = Recorded By, (t) = Taken By, (c) = Cosigned By    Initials Name Provider Type    MM Nicolas Otero, OTR/L Occupational Therapist          Mobility/ADL's     Row Name 07/17/21 0904          Bed Mobility    Bed Mobility  sidelying-sit;rolling left  -MM     Supine-Sit [place] (Bed Mobility)  verbal cues;contact guard  -MM     Sidelying-Sit [place] (Bed Mobility)  verbal  cues;contact guard  -MM     Assistive Device (Bed Mobility)  bed rails  -MM     Row Name 07/17/21 0904          Transfers    Transfers  sit-stand transfer;toilet transfer  -MM     Sit-Stand [place] (Transfers)  contact guard;verbal cues  -MM     [place] Level (Toilet Transfer)  contact guard;verbal cues  -MM     Row Name 07/17/21 0904          Toilet Transfer    Type (Toilet Transfer)  sit-stand;stand-sit  -MM     Row Name 07/17/21 0904          Functional Mobility    Functional Mobility- Ind. Level  contact guard assist;minimum assist (75% patient effort);verbal cues required  -MM     Functional Mobility- Comment  bed to BR to chair, reaches for furniture  -MM     Row Name 07/17/21 0904          Activities of Daily Living    BADL Assessment/Intervention  lower body dressing;toileting  -MM     Row Name 07/17/21 0904          Lower Body Dressing Assessment/Training    [place] Level (Lower Body Dressing)  don;socks;contact guard assist;verbal cues  -MM     Position (Lower Body Dressing)  edge of bed sitting  -MM     Comment (Lower Body Dressing)  to adjust socks  -MM     Row Name 07/17/21 0904          Toileting Assessment/Training    [place] Level (Toileting)  perform perineal hygiene;supervision;set up;dependent (less than 25% patient effort)  -MM     Position (Toileting)  supported sitting  -MM     Comment (Toileting)  dep for ostomy care  -MM       User Key  (r) = Recorded By, (t) = Taken By, (c) = Cosigned By    Initials Name Provider Type    MM Nicolas Otero, OTR/L Occupational Therapist        Obj/Interventions     Row Name 07/17/21 0904          Sensory Assessment (Somatosensory)    Sensory Assessment (Somatosensory)  UE sensation intact  -MM     Row Name 07/17/21 0904          Vision Assessment/Intervention    Visual Impairment/Limitations  WNL per pt  -MM     [facility] Name 07/17/21 0904          Range of Motion Comprehensive    General Range of Motion  bilateral upper extremity ROM WNL   -MM     [facility] Name 07/17/21 0904          Strength Comprehensive (MMT)    Comment, General Manual Muscle Testing (MMT) Assessment  BUE 4-/5  -MM     [facility] Name 07/17/21 0904          Motor Skills    Motor Skills  coordination  -MM     Coordination  fine motor deficit;gross motor deficit;bilateral;upper extremity;minimal impairment  -MM     [facility] Name 07/17/21 0904          Balance    Balance Assessment  sitting static balance;sitting dynamic balance;standing static balance;standing dynamic balance  -MM     Static Sitting Balance  WFL;unsupported;sitting, edge of bed  -MM     Dynamic Sitting Balance  WFL;unsupported;sitting, edge of bed  -MM     Static Standing Balance  mild impairment;supported;standing  -MM     Dynamic Standing Balance  mild impairment;supported;asymmetrical weight shifting  -MM       User Key  (r) = Recorded By, (t) = Taken By, (c) = Cosigned By    Initials Name Provider Type    MM Nicolas Otero, OTR/L Occupational Therapist        Goals/Plan     Row Name 07/17/21 0904          Dressing Goal 1 (OT)    Activity/Device (Dressing Goal 1, OT)  dressing skills, all  -MM     [place]/Cues Needed (Dressing Goal 1, OT)  independent  -MM     Time Frame (Dressing Goal 1, OT)  long term goal (LTG);by discharge  -MM     Progress/Outcome (Dressing Goal 1, OT)  goal ongoing  -MM     Row Name 07/17/21 0904          Toileting Goal 1 (OT)    Activity/Device (Toileting Goal 1, OT)  toileting skills, all  -MM     [place] Level/Cues Needed (Toileting Goal 1, OT)  independent  -MM     Time Frame (Toileting Goal 1, OT)  long term goal (LTG);by discharge  -MM     Progress/Outcome (Toileting Goal 1, OT)  goal ongoing  -MM     Row Name 07/17/21 0904          Grooming Goal 1 (OT)    Activity/Device (Grooming Goal 1, OT)  grooming skills, all  -MM     [place] (Grooming Goal 1, OT)  independent  -MM     Time Frame (Grooming Goal 1, OT)  long term goal (LTG);by discharge  -MM     Strategies/Barriers (Grooming Goal  1, OT)  standing at sink level  -MM     Progress/Outcome (Grooming Goal 1, OT)  goal ongoing  -MM     Row Name 07/17/21 0904          Therapy Assessment/Plan (OT)    Planned Therapy Interventions (OT)  activity tolerance training;adaptive equipment training;BADL retraining;functional balance retraining;neuromuscular control/coordination retraining;occupation/activity based interventions;patient/caregiver education/training;passive ROM/stretching;ROM/therapeutic exercise;strengthening exercise;transfer/mobility retraining;cognitive/visual perception retraining  -MM       User Key  (r) = Recorded By, (t) = Taken By, (c) = Cosigned By    Initials Name Provider Type    MM Nicolas Otero, OTR/L Occupational Therapist        Clinical Impression     Row Name 07/17/21 0904          Pain Assessment    Additional Documentation  Pain Scale: Numbers Pre/Post-Treatment (Group)  -MM     Row Name 07/17/21 0904          Pain Scale: Numbers Pre/Post-Treatment    Pretreatment Pain Rating  8/10  -MM     Posttreatment Pain Rating  8/10  -MM     Pain Location  back  -MM     Pre/Posttreatment Pain Comment  c/o fatigue  -MM     Pain Intervention(s)  Medication (See MAR);Repositioned;Ambulation/increased activity  -MM     Row Name 07/17/21 0904          Plan of Care Review    Plan of Care Reviewed With  patient  -MM     Progress  no change  -MM     Outcome Summary  OT eval completed. pt reports back pain 8/10. Pt reports fatigue. Pt was supervision with set up for perineal hygiene, but dependent for ostomy care. Pt was CGA for all functional mobility within room. Pt requires verbal cues and encouragement. Pt demo's mild confusion. Skilled OT recommended to address adls, functional mobility and endurance. Recommended d/c SNF.  -MM     Row Name 07/17/21 0904          Therapy Assessment/Plan (OT)    Patient/Family Therapy Goal Statement (OT)  return home  -MM     Rehab Potential (OT)  good, to achieve stated therapy goals  -MM     Criteria  for Skilled Therapeutic Interventions Met (OT)  yes;skilled treatment is necessary  -MM     Therapy Frequency (OT)  5 times/wk  -MM     Predicted Duration of Therapy Intervention (OT)  until d/c  -MM     Row Name 07/17/21 0904          Therapy Plan Review/Discharge Plan (OT)    Anticipated Discharge Disposition (OT)  skilled nursing facility  -MM     Row Name 07/17/21 0904          Vital Signs    O2 Delivery Pre Treatment  room air  -MM     O2 Delivery Intra Treatment  room air  -MM     O2 Delivery Post Treatment  room air  -MM     Pre Patient Position  Supine  -MM     Intra Patient Position  Standing  -MM     Post Patient Position  Sitting  -MM     Row Name 07/17/21 0904          Positioning and Restraints    Pre-Treatment Position  in bed  -MM     Post Treatment Position  chair  -MM     In Chair  notified nsg;reclined;call light within reach;encouraged to call for assist;exit alarm on;LUE elevated;heels elevated;legs elevated  -MM       User Key  (r) = Recorded By, (t) = Taken By, (c) = Cosigned By    Initials Name Provider Type    MM Nicolas Otero, OTR/L Occupational Therapist        Outcome Measures     Row Name 07/17/21 0904          How much help from another is currently needed...    Putting on and taking off regular lower body clothing?  2  -MM     Bathing (including washing, rinsing, and drying)  2  -MM     Toileting (which includes using toilet bed pan or urinal)  2  -MM     Putting on and taking off regular upper body clothing  3  -MM     Taking care of personal grooming (such as brushing teeth)  3  -MM     Eating meals  3  -MM     AM-PAC 6 Clicks Score (OT)  15  -MM     Row Name 07/17/21 0835          How much help from another person do you currently need...    Turning from your back to your side while in flat bed without using bedrails?  3  -SB     Moving from lying on back to sitting on the side of a flat bed without bedrails?  3  -SB     Moving to and from a bed to a chair (including a  wheelchair)?  3  -SB     Standing up from a chair using your arms (e.g., wheelchair, bedside chair)?  3  -SB     Climbing 3-5 steps with a railing?  2  -SB     To walk in hospital room?  3  -SB     AM-PAC 6 Clicks Score (PT)  17  -SB     Row Name 07/17/21 0904 07/17/21 0835       Functional Assessment    Outcome Measure Options  AM-PAC 6 Clicks Daily Activity (OT)  -MM  AM-PAC 6 Clicks Basic Mobility (PT)  -SB      User Key  (r) = Recorded By, (t) = Taken By, (c) = Cosigned By    Initials Name Provider Type    MM Nicolas Otero E, OTR/L Occupational Therapist    Tiff Rodriguez PT DPT Physical Therapist        Occupational Therapy Education                 Title: PT OT SLP Therapies (In Progress)     Topic: Occupational Therapy (In Progress)     Point: ADL training (Done)     Description:   Instruct learner(s) on proper safety adaptation and remediation techniques during self care or transfers.   Instruct in proper use of assistive devices.              Learning Progress Summary           Patient Acceptance, E, VU by MM at 7/17/2021 1550    Comment: OT role, benefits, POC                   Point: Home exercise program (Not Started)     Description:   Instruct learner(s) on appropriate technique for monitoring, assisting and/or progressing therapeutic exercises/activities.              Learner Progress:  Not documented in this visit.          Point: Precautions (Done)     Description:   Instruct learner(s) on prescribed precautions during self-care and functional transfers.              Learning Progress Summary           Patient Acceptance, E, VU by MM at 7/17/2021 1550    Comment: OT role, benefits, POC                   Point: Body mechanics (Done)     Description:   Instruct learner(s) on proper positioning and spine alignment during self-care, functional mobility activities and/or exercises.              Learning Progress Summary           Patient Acceptance, E, VU by MM at 7/17/2021 1550    Comment: OT  role, benefits, POC                               User Key     Initials Effective Dates Name Provider Type Discipline     06/16/21 -  Nicolas Otero, OTR/L Occupational Therapist OT              OT Recommendation and Plan  Planned Therapy Interventions (OT): activity tolerance training, adaptive equipment training, BADL retraining, functional balance retraining, neuromuscular control/coordination retraining, occupation/activity based interventions, patient/caregiver education/training, passive ROM/stretching, ROM/therapeutic exercise, strengthening exercise, transfer/mobility retraining, cognitive/visual perception retraining  Therapy Frequency (OT): 5 times/wk  Plan of Care Review  Plan of Care Reviewed With: patient  Progress: no change  Outcome Summary: OT eval completed. pt reports back pain 8/10. Pt reports fatigue. Pt was supervision with set up for perineal hygiene, but dependent for ostomy care. Pt was CGA for all functional mobility within room. Pt requires verbal cues and encouragement. Pt demo's mild confusion. Skilled OT recommended to address adls, functional mobility and endurance. Recommended d/c SNF.     Time Calculation:   Time Calculation- OT     Row Name 07/17/21 0904             Time Calculation- OT    OT Start Time  0904  -MM      OT Stop Time  0958  -MM      OT Time Calculation (min)  54 min  -MM      OT Received On  07/17/21  -MM      OT Goal Re-Cert Due Date  07/27/21  -MM        User Key  (r) = Recorded By, (t) = Taken By, (c) = Cosigned By    Initials Name Provider Type     Nicolas Otero, OTR/L Occupational Therapist        Therapy Charges for Today     Code Description Service Date Service Provider Modifiers Qty    [id number]  OT EVAL MOD COMPLEXITY 4 7/17/2021 Nicolas Otero, OTR/L GO 1               Nicolas Otero OTR/BENEDICT  7/17/2021

## 2021-07-17 NOTE — PLAN OF CARE
Goal Outcome Evaluation:  Plan of Care Reviewed With: patient        Progress: no change  Outcome Summary: Pt still confused on the situation and asks questions repeatedly; case mgt called patient about SNF placement and the patient requested [facility], now has been asking where she's going and thinks she's going today, I have attempted to explain the situation and reorient her several times this shift; new IV started by VATS; IVF continue; PO Vanco and Vanco given via enema into ostomy; up with standby assist; voiding; tolerating diet; no c/o pain; will monitor.

## 2021-07-17 NOTE — PLAN OF CARE
Goal Outcome Evaluation:  Plan of Care Reviewed With: patient        Progress: no change  Outcome Summary: PT eval completed. Pt lethargic and reqd cues for orientation, c/o pain in low back and generalized fatigue. Pt reqd inc time and VC for command following, but improved with time. Pt performed bed mob with CGA and use of bedrails, sits EOB with sup. Pt with generalized weakness in BLE. Pt performed sit to stand and gait within room with HHA, constantly reaching for objects with guarded posture due to fear of falling and dec balance. Pt with dec strength, balance, endurance and safety and will benefit from skilled PT. Recommend d/c SNF.

## 2021-07-17 NOTE — CASE MANAGEMENT/SOCIAL WORK
Discharge Planning Assessment  [facility]     Patient Name: Lenora Kathleen  MRN: [id number]  Today's Date: 7/17/2021    Admit Date: 7/16/2021    Discharge Needs Assessment     Row Name 07/17/21 1251       Living Environment    Lives With  alone    Current Living Arrangements  home/apartment/condo    Primary Care Provided by  self    Provides Primary Care For  no one    Family Caregiver if Needed  child(edilberto), adult    Quality of Family Relationships  helpful;involved;supportive    Able to Return to Prior Arrangements  yes       Resource/Environmental Concerns    Resource/Environmental Concerns  none    Transportation Concerns  car, none       Transition Planning    Patient/Family Anticipates Transition to  home with family    Patient/Family Anticipated Services at Transition  none    Transportation Anticipated  family or friend will provide       Discharge Needs Assessment    Readmission Within the Last 30 Days  no previous admission in last 30 days    Equipment Currently Used at Home  none    Concerns to be Addressed  no discharge needs identified;denies needs/concerns at this time    Anticipated Changes Related to Illness  none    Equipment Needed After Discharge  none    Provided Post Acute Provider List?  Yes    Post Acute Provider List  Nursing Home    Provided Post Acute Provider Quality & Resource List?  Yes    Post Acute Provider Quality and Resource List  Nursing Home    Delivered To  Patient    Method of Delivery  Telephone    Patient's Choice of Community Agency(s)  [facility]    Discharge Coordination/Progress  Pt has RX coverage and a PCP. SW received consult stating pt is no longer able to care for herself at home. SW spoke with pt who states that she lived alone prior to admission and is requesting a referral to [facility]. SW faxed info and will await possible bed offer        Discharge Plan    No documentation.       Continued Care and Services - Admitted Since 7/16/2021    Coordination has not been  started for this encounter.     Selected Continued Care - Prior Encounters Includes selections from prior encounters from 4/17/2021 to 7/17/2021    Discharged on 4/26/2021 Admission date: 4/23/2021 - Discharge disposition: Home-Health Care c    Home Medical Care     Service Provider Selected Services Address Phone Fax Patient Preferred    [facility] - [facility]  Home Health Services [address], [facility] [address] [phone] --                      Demographic Summary    No documentation.       Functional Status    No documentation.       Psychosocial    No documentation.       Abuse/Neglect    No documentation.       Legal    No documentation.       Substance Abuse    No documentation.       Patient Forms    No documentation.           Torri Pimentel

## 2021-07-17 NOTE — THERAPY EVALUATION
Patient Name: Lenora Kathleen  : 1960    MRN: [id number]                              Today's Date: 2021       Admit Date: 2021    Visit Dx:     ICD-10-CM ICD-9-CM   1. Dehydration  E86.0 276.51   2. Failure to thrive in adult  R62.7 783.7   3. Enterocolitis  K52.9 558.9   4. Impaired mobility  Z74.09 799.89     Patient Active Problem List   Diagnosis   • Primary squamous cell carcinoma of anal canal   • Current every day smoker   • Rectovaginal fistula   • Severe malnutrition (CMS/HCC)   • Prediabetes   • Anxiety   • Enterocolitis   • Dehydration   • Hypotension due to hypovolemia   • Encephalopathy   • Ataxia   • Self-care deficit   • Anemia of chronic disease   • Diarrhea   • Hypokalemia     Past Medical History:   Diagnosis Date   • Anxiety    • Blood in urine    • Chest tightness    • Colon polyps    • COPD (chronic obstructive pulmonary disease) (CMS/HCC)    • Depression    • Emphysema/COPD (CMS/HCC)    • Hepatitis C    • Hypertension    • Injury of back    • Kidney stone    • Palpitation    • PONV (postoperative nausea and vomiting)    • Rectal cancer (CMS/HCC)      Past Surgical History:   Procedure Laterality Date   • CHOLECYSTECTOMY     • COLONOSCOPY  2015    normal   • COLOSTOMY N/A 2021    Procedure: LAPAROSCOPIC DIVERTING COLOSTOMY;  Surgeon: Paulette Villegas MD;  Location: [facility];  Service: General;  Laterality: N/A;   • FINGER SURGERY     • HYSTERECTOMY     • KIDNEY STONE SURGERY     • KIDNEY SURGERY     • OTHER SURGICAL HISTORY      POSSIBLE CARDIAC MONITOR (LOOP RECORDER?)--IMPLANTED AND EXPLANTED    • US GUIDED LYMPH NODE BIOPSY  2021     General Information     Row Name 21 0835          Physical Therapy Time and Intention    Document Type  evaluation presents with Diarrhea, increasing confusion, danger to self; dx colitis, c diff; past medical history of colorectal cancer  -SB     Mode of Treatment  physical therapy  -SB     Row Name 21 0835           General Information    Patient Profile Reviewed  yes  -SB     Prior Level of Function  independent:;all household mobility;ADL's unsure of accuracy due to confusion; friend was helping with meds  -SB     Existing Precautions/Restrictions  fall  -SB     Barriers to Rehab  cognitive status;medically complex  -SB     Row Name 07/17/21 0835          Living Environment    Lives With  alone shower chair, walk in shower  -SB     Row Name 07/17/21 0835          Home Main Entrance    Number of Stairs, Main Entrance  none  -SB     Row Name 07/17/21 0835          Stairs Within Home, Primary    Number of Stairs, Within Home, Primary  none  -SB     Row Name 07/17/21 0835          Cognition    Orientation Status (Cognition)  oriented to;person;place;time;verbal cues/prompts needed for orientation;situation  -SB     Row Name 07/17/21 0835          Safety Issues, Functional Mobility    Safety Issues Affecting Function (Mobility)  ability to follow commands;at risk behavior observed;safety precaution awareness;safety precautions follow-through/compliance;insight into deficits/self-awareness  -SB     Impairments Affecting Function (Mobility)  cognition;pain;balance;endurance/activity tolerance;shortness of breath;strength  -SB     Cognitive Impairments, Mobility Safety/Performance  safety precaution awareness;safety precaution follow-through  -SB       User Key  (r) = Recorded By, (t) = Taken By, (c) = Cosigned By    Initials Name Provider Type    Tiff Rodriguez PT DPT Physical Therapist        Mobility     Row Name 07/17/21 0835          Bed Mobility    Bed Mobility  sidelying-sit;rolling left  -SB     Supine-Sit [place] (Bed Mobility)  verbal cues;contact guard  -SB     Sidelying-Sit [place] (Bed Mobility)  verbal cues;contact guard  -SB     Assistive Device (Bed Mobility)  bed rails  -SB     Row Name 07/17/21 0835          Sit-Stand Transfer    Sit-Stand [place] (Transfers)  contact guard;verbal cues  -SB      Row Name 07/17/21 0835          Gait/Stairs (Locomotion)    [place] Level (Gait)  contact guard;minimum assist (75% patient effort)  -SB     Assistive Device (Gait)  -- HHA  -SB     Distance in Feet (Gait)  15+20  -SB     Deviations/Abnormal Patterns (Gait)  stride length decreased;gait speed decreased;shan decreased  -SB     Bilateral Gait Deviations  forward flexed posture  -SB     Comment (Gait/Stairs)  pt with sig dec in gait speed and reaches for objects to steady herself throughout eval  -SB       User Key  (r) = Recorded By, (t) = Taken By, (c) = Cosigned By    Initials Name Provider Type    SB Tiff Stephens PT DPT Physical Therapist        Obj/Interventions     Row Name 07/17/21 0835          Range of Motion Comprehensive    General Range of Motion  bilateral lower extremity ROM WFL  -SB     Row Name 07/17/21 0835          Strength Comprehensive (MMT)    General Manual Muscle Testing (MMT) Assessment  lower extremity strength deficits identified  -SB     Comment, General Manual Muscle Testing (MMT) Assessment  BLE 4-/5  -SB     Row Name 07/17/21 0835          Balance    Balance Assessment  sitting static balance;sitting dynamic balance;standing static balance  -SB     Static Sitting Balance  WFL;unsupported;sitting, edge of bed  -SB     Dynamic Sitting Balance  WFL;unsupported;sitting, edge of bed  -SB     Static Standing Balance  mild impairment;supported;standing HHA  -SB     Row Name 07/17/21 0835          Sensory Assessment (Somatosensory)    Sensory Assessment (Somatosensory)  LE sensation intact  -SB       User Key  (r) = Recorded By, (t) = Taken By, (c) = Cosigned By    Initials Name Provider Type    Tiff Rodriguez PT DPT Physical Therapist        Goals/Plan     Row Name 07/17/21 0835          Bed Mobility Goal 1 (PT)    Activity/Assistive Device (Bed Mobility Goal 1, PT)  rolling to left;rolling to right;sidelying to sit;sit to sidelying  -SB     [place] Level/Cues Needed (Bed  Mobility Goal 1, PT)  supervision required  -SB     Time Frame (Bed Mobility Goal 1, PT)  long term goal (LTG)  -SB     Progress/Outcomes (Bed Mobility Goal 1, PT)  goal ongoing  -SB     Row Name 07/17/21 0835          Transfer Goal 1 (PT)    Activity/Assistive Device (Transfer Goal 1, PT)  sit-to-stand/stand-to-sit;bed-to-chair/chair-to-bed  -SB     Door Level/Cues Needed (Transfer Goal 1, PT)  supervision required  -SB     Time Frame (Transfer Goal 1, PT)  long term goal (LTG)  -SB     Progress/Outcome (Transfer Goal 1, PT)  goal ongoing  -SB     Row Name 07/17/21 0835          Gait Training Goal 1 (PT)    Activity/Assistive Device (Gait Training Goal 1, PT)  gait (walking locomotion);increase endurance/gait distance;increase energy conservation;improve balance and speed;decrease fall risk;assistive device use  -SB     Door Level (Gait Training Goal 1, PT)  standby assist  -SB     Distance (Gait Training Goal 1, PT)  75  -SB     Time Frame (Gait Training Goal 1, PT)  long term goal (LTG)  -SB     Progress/Outcome (Gait Training Goal 1, PT)  goal ongoing  -SB       User Key  (r) = Recorded By, (t) = Taken By, (c) = Cosigned By    Initials Name Provider Type    SB Tiff Stephens, PT DPT Physical Therapist        Clinical Impression     Row Name 07/17/21 0835          Pain    Additional Documentation  Pain Scale: Numbers Pre/Post-Treatment (Group)  -SB     Row Name 07/17/21 0835          Pain Scale: Numbers Pre/Post-Treatment    Pretreatment Pain Rating  8/10  -SB     Pain Location  back  -SB     Pre/Posttreatment Pain Comment  c/o fatigue  -SB     Pain Intervention(s)  Medication (See MAR);Repositioned;Ambulation/increased activity  -SB     Row Name 07/17/21 0835          Plan of Care Review    Plan of Care Reviewed With  patient  -SB     Progress  no change  -SB     Outcome Summary  PT eval completed. Pt lethargic and reqd cues for orientation, c/o pain in low back and generalized fatigue. Pt reqd  inc time and VC for command following, but improved with time. Pt performed bed mob with CGA and use of bedrails, sits EOB with sup. Pt with generalized weakness in BLE. Pt performed sit to stand and gait within room with HHA, constantly reaching for objects with guarded posture due to fear of falling and dec balance. Pt with dec strength, balance, endurance and safety and will benefit from skilled PT. Recommend d/c SNF.  -SB     Row Name 07/17/21 0835          Therapy Assessment/Plan (PT)    Patient/Family Therapy Goals Statement (PT)  go home  -SB     Rehab Potential (PT)  good, to achieve stated therapy goals  -SB     Criteria for Skilled Interventions Met (PT)  yes;meets criteria;skilled treatment is necessary  -SB     Predicted Duration of Therapy Intervention (PT)  until d/c or goals achieved  -SB     Row Name 07/17/21 0835          Vital Signs    O2 Delivery Pre Treatment  room air  -SB     O2 Delivery Intra Treatment  room air  -SB     O2 Delivery Post Treatment  room air  -SB     Row Name 07/17/21 0835          Positioning and Restraints    Pre-Treatment Position  in bed  -SB     Post Treatment Position  chair  -SB     In Chair  notified nsg;reclined;encouraged to call for assist;call light within reach;exit alarm on;LUE elevated  -SB       User Key  (r) = Recorded By, (t) = Taken By, (c) = Cosigned By    Initials Name Provider Type    Tiff Rodriguez, PT DPT Physical Therapist        Outcome Measures     Row Name 07/17/21 0835          How much help from another person do you currently need...    Turning from your back to your side while in flat bed without using bedrails?  3  -SB     Moving from lying on back to sitting on the side of a flat bed without bedrails?  3  -SB     Moving to and from a bed to a chair (including a wheelchair)?  3  -SB     Standing up from a chair using your arms (e.g., wheelchair, bedside chair)?  3  -SB     Climbing 3-5 steps with a railing?  2  -SB     To walk in hospital  room?  3  -SB     AM-PAC 6 Clicks Score (PT)  17  -SB     Row Name 07/17/21 0835          Functional Assessment    Outcome Measure Options  AM-PAC 6 Clicks Basic Mobility (PT)  -SB       User Key  (r) = Recorded By, (t) = Taken By, (c) = Cosigned By    Initials Name Provider Type    SB Tiff Stephens, PT DPT Physical Therapist        Physical Therapy Education                 Title: PT OT SLP Therapies (In Progress)     Topic: Physical Therapy (In Progress)     Point: Mobility training (Done)     Learning Progress Summary           Patient Acceptance, E, VU,NR by SB at 7/17/2021 0912    Comment: pt edu on POC, benefits of act, d/c plans                   Point: Home exercise program (Not Started)     Learner Progress:  Not documented in this visit.          Point: Body mechanics (Done)     Learning Progress Summary           Patient Acceptance, E, VU,NR by SB at 7/17/2021 0912    Comment: pt edu on POC, benefits of act, d/c plans                   Point: Precautions (Done)     Learning Progress Summary           Patient Acceptance, E, VU,NR by SB at 7/17/2021 0912    Comment: pt edu on POC, benefits of act, d/c plans                               User Key     Initials Effective Dates Name Provider Type Discipline    SB 06/16/21 -  Tiff Stephens, PT DPT Physical Therapist PT              PT Recommendation and Plan  Planned Therapy Interventions (PT): balance training, gait training, bed mobility training, patient/family education, strengthening, transfer training  Plan of Care Reviewed With: patient  Progress: no change  Outcome Summary: PT eval completed. Pt lethargic and reqd cues for orientation, c/o pain in low back and generalized fatigue. Pt reqd inc time and VC for command following, but improved with time. Pt performed bed mob with CGA and use of bedrails, sits EOB with sup. Pt with generalized weakness in BLE. Pt performed sit to stand and gait within room with HHA, constantly reaching for objects with  guarded posture due to fear of falling and dec balance. Pt with dec strength, balance, endurance and safety and will benefit from skilled PT. Recommend d/c SNF.     Time Calculation:   PT Charges     Row Name 07/17/21 1005             Time Calculation    Start Time  0900 12 min chart review for total of 57 min  -SB      Stop Time  0945  -SB      Time Calculation (min)  45 min  -SB      PT Received On  07/17/21  -SB      PT Goal Re-Cert Due Date  07/27/21  -SB        User Key  (r) = Recorded By, (t) = Taken By, (c) = Cosigned By    Initials Name Provider Type    SB Tiff Stephens, PT DPT Physical Therapist        Therapy Charges for Today     Code Description Service Date Service Provider Modifiers Qty    94657733848 HC PT EVAL MOD COMPLEXITY 4 7/17/2021 Tiff Stephens PT DPT GP 1          PT G-Codes  Outcome Measure Options: AM-PAC 6 Clicks Basic Mobility (PT)  AM-PAC 6 Clicks Score (PT): 17    Tiff Stephens PT DPT  7/17/2021

## 2021-07-18 LAB
ANION GAP SERPL CALCULATED.3IONS-SCNC: 6 MMOL/L (ref 5–15)
ANISOCYTOSIS BLD QL: ABNORMAL
BUN SERPL-MCNC: 6 MG/DL (ref 8–23)
BUN/CREAT SERPL: 15.4 (ref 7–25)
CALCIUM SPEC-SCNC: 8 MG/DL (ref 8.6–10.5)
CHLORIDE SERPL-SCNC: 110 MMOL/L (ref 98–107)
CO2 SERPL-SCNC: 23 MMOL/L (ref 22–29)
CREAT SERPL-MCNC: 0.39 MG/DL (ref 0.57–1)
DEPRECATED RDW RBC AUTO: 68.7 FL (ref 37–54)
EOSINOPHIL # BLD MANUAL: 0.03 10*3/MM3 (ref 0–0.4)
EOSINOPHIL NFR BLD MANUAL: 1 % (ref 0.3–6.2)
ERYTHROCYTE [DISTWIDTH] IN BLOOD BY AUTOMATED COUNT: 21.2 % (ref 12.3–15.4)
GFR SERPL CREATININE-BSD FRML MDRD: >150 ML/MIN/1.73
GIANT PLATELETS: ABNORMAL
GLUCOSE SERPL-MCNC: 93 MG/DL (ref 65–99)
HCT VFR BLD AUTO: 27.4 % (ref 34–46.6)
HGB BLD-MCNC: 8.3 G/DL (ref 12–15.9)
LYMPHOCYTES # BLD MANUAL: 0.69 10*3/MM3 (ref 0.7–3.1)
LYMPHOCYTES NFR BLD MANUAL: 10.2 % (ref 5–12)
LYMPHOCYTES NFR BLD MANUAL: 27.6 % (ref 19.6–45.3)
MCH RBC QN AUTO: 28.6 PG (ref 26.6–33)
MCHC RBC AUTO-ENTMCNC: 30.3 G/DL (ref 31.5–35.7)
MCV RBC AUTO: 94.5 FL (ref 79–97)
MONOCYTES # BLD AUTO: 0.26 10*3/MM3 (ref 0.1–0.9)
MYELOCYTES NFR BLD MANUAL: 2 % (ref 0–0)
NEUTROPHILS # BLD AUTO: 1.49 10*3/MM3 (ref 1.7–7)
NEUTROPHILS NFR BLD MANUAL: 55.1 % (ref 42.7–76)
NEUTS BAND NFR BLD MANUAL: 4.1 % (ref 0–5)
PLATELET # BLD AUTO: 142 10*3/MM3 (ref 140–450)
PMV BLD AUTO: 11.1 FL (ref 6–12)
POIKILOCYTOSIS BLD QL SMEAR: ABNORMAL
POTASSIUM SERPL-SCNC: 3.9 MMOL/L (ref 3.5–5.2)
RBC # BLD AUTO: 2.9 10*6/MM3 (ref 3.77–5.28)
SODIUM SERPL-SCNC: 139 MMOL/L (ref 136–145)
WBC # BLD AUTO: 2.51 10*3/MM3 (ref 3.4–10.8)
WBC MORPH BLD: NORMAL

## 2021-07-18 PROCEDURE — 25810000003 SODIUM CHLORIDE 0.9 % WITH KCL 20 MEQ 20-0.9 MEQ/L-% SOLUTION: Performed by: NURSE PRACTITIONER

## 2021-07-18 PROCEDURE — 85025 COMPLETE CBC W/AUTO DIFF WBC: CPT | Performed by: NURSE PRACTITIONER

## 2021-07-18 PROCEDURE — 25010000002 VANCOMYCIN 1 G RECONSTITUTED SOLUTION 1 EACH VIAL: Performed by: INTERNAL MEDICINE

## 2021-07-18 PROCEDURE — 80048 BASIC METABOLIC PNL TOTAL CA: CPT | Performed by: NURSE PRACTITIONER

## 2021-07-18 PROCEDURE — 85007 BL SMEAR W/DIFF WBC COUNT: CPT | Performed by: NURSE PRACTITIONER

## 2021-07-18 PROCEDURE — 97116 GAIT TRAINING THERAPY: CPT

## 2021-07-18 RX ORDER — OXYCODONE HYDROCHLORIDE AND ACETAMINOPHEN 5; 325 MG/1; MG/1
1 TABLET ORAL EVERY 6 HOURS PRN
Status: DISCONTINUED | OUTPATIENT
Start: 2021-07-18 | End: 2021-07-21 | Stop reason: HOSPADM

## 2021-07-18 RX ADMIN — NYSTATIN 500000 UNITS: 100000 SUSPENSION ORAL at 09:11

## 2021-07-18 RX ADMIN — VANCOMYCIN HYDROCHLORIDE 500 MG: 1 INJECTION, POWDER, LYOPHILIZED, FOR SOLUTION INTRAVENOUS at 04:00

## 2021-07-18 RX ADMIN — POTASSIUM CHLORIDE AND SODIUM CHLORIDE 100 ML/HR: 900; 150 INJECTION, SOLUTION INTRAVENOUS at 14:02

## 2021-07-18 RX ADMIN — GABAPENTIN 100 MG: 100 CAPSULE ORAL at 17:48

## 2021-07-18 RX ADMIN — OXYCODONE HYDROCHLORIDE AND ACETAMINOPHEN 1 TABLET: 5; 325 TABLET ORAL at 17:48

## 2021-07-18 RX ADMIN — FLUOXETINE HYDROCHLORIDE 20 MG: 20 CAPSULE ORAL at 09:11

## 2021-07-18 RX ADMIN — OXYCODONE HYDROCHLORIDE AND ACETAMINOPHEN 1 TABLET: 5; 325 TABLET ORAL at 23:45

## 2021-07-18 RX ADMIN — GABAPENTIN 100 MG: 100 CAPSULE ORAL at 12:14

## 2021-07-18 RX ADMIN — VANCOMYCIN HYDROCHLORIDE 500 MG: 1 INJECTION, POWDER, LYOPHILIZED, FOR SOLUTION INTRAVENOUS at 12:14

## 2021-07-18 RX ADMIN — NYSTATIN 500000 UNITS: 100000 SUSPENSION ORAL at 12:14

## 2021-07-18 RX ADMIN — FLUOXETINE HYDROCHLORIDE 20 MG: 20 CAPSULE ORAL at 21:13

## 2021-07-18 RX ADMIN — POTASSIUM CHLORIDE AND SODIUM CHLORIDE 100 ML/HR: 900; 150 INJECTION, SOLUTION INTRAVENOUS at 05:39

## 2021-07-18 RX ADMIN — ACETAMINOPHEN 650 MG: 325 TABLET, FILM COATED ORAL at 15:22

## 2021-07-18 RX ADMIN — FLUOXETINE HYDROCHLORIDE 20 MG: 20 CAPSULE ORAL at 17:48

## 2021-07-18 RX ADMIN — FLUOXETINE HYDROCHLORIDE 20 MG: 20 CAPSULE ORAL at 12:14

## 2021-07-18 RX ADMIN — VANCOMYCIN HYDROCHLORIDE 125 MG: KIT at 23:45

## 2021-07-18 RX ADMIN — VANCOMYCIN HYDROCHLORIDE 125 MG: KIT at 12:14

## 2021-07-18 RX ADMIN — VANCOMYCIN HYDROCHLORIDE 125 MG: KIT at 02:35

## 2021-07-18 RX ADMIN — GABAPENTIN 100 MG: 100 CAPSULE ORAL at 21:13

## 2021-07-18 RX ADMIN — ACETAMINOPHEN 650 MG: 325 TABLET, FILM COATED ORAL at 09:22

## 2021-07-18 RX ADMIN — VANCOMYCIN HYDROCHLORIDE 125 MG: KIT at 17:48

## 2021-07-18 RX ADMIN — IBUPROFEN 600 MG: 600 TABLET, FILM COATED ORAL at 17:48

## 2021-07-18 RX ADMIN — GABAPENTIN 100 MG: 100 CAPSULE ORAL at 09:11

## 2021-07-18 RX ADMIN — SODIUM CHLORIDE, PRESERVATIVE FREE 10 ML: 5 INJECTION INTRAVENOUS at 09:11

## 2021-07-18 RX ADMIN — VANCOMYCIN HYDROCHLORIDE 125 MG: KIT at 05:39

## 2021-07-18 RX ADMIN — NYSTATIN 500000 UNITS: 100000 SUSPENSION ORAL at 17:48

## 2021-07-18 RX ADMIN — VANCOMYCIN HYDROCHLORIDE 500 MG: 1 INJECTION, POWDER, LYOPHILIZED, FOR SOLUTION INTRAVENOUS at 17:48

## 2021-07-18 RX ADMIN — NYSTATIN 500000 UNITS: 100000 SUSPENSION ORAL at 21:13

## 2021-07-18 RX ADMIN — SODIUM CHLORIDE, PRESERVATIVE FREE 10 ML: 5 INJECTION INTRAVENOUS at 21:15

## 2021-07-18 NOTE — PLAN OF CARE
Goal Outcome Evaluation:  Plan of Care Reviewed With: patient        Progress: no change  Outcome Summary: Pt remains confused, repeatedly asking if she is still at [facility]. Bed alarm on, safety maintained, gait appears to be more steady than when admitted. Vanc enema given q6hr through colostomy stoma. No c/o pain this shift. IVF continue to as ordered.

## 2021-07-18 NOTE — PAYOR COMM NOTE
"ADMIT INPT 7-16-21  UR  [phone]  PLEASE NOTE:  NOT A LATE NOTIFICATION DUE TO WEEKEND    Lenroa Singh (61 y.o. Female)     Date of Birth Social Security Number Address Home Phone MRN    1960  [id number]  [address]  [facility] [id number] [phone] [id number]    [place] Marital Status          Confucianism        Admission Date Admission Type Admitting Provider Attending Provider Department, Room/Bed    7/16/21 Emergency Raleigh Villa MD Fleming, John Eric, MD [facility] 3C, 385/1    Discharge Date Discharge Disposition Discharge Destination                       Attending Provider: Raleigh Villa MD    Allergies: Morphine    Isolation: Spore   Infection: COVID (rule out) (07/16/21), C.difficile (07/17/21)   Code Status: CPR    Ht: 172.7 cm (68\")   Wt: 88.6 kg (195 lb 6.4 oz)    Admission Cmt: None   Principal Problem: Clostridium difficile enterocolitis [A04.72]                 Active Insurance as of 7/16/2021     Primary Coverage     Payor Plan Insurance Group Employer/Plan Group    [facility] MEDICARE REPLACEMENT [employer] MEDICARE REPLACEMENT      Payor Plan Address Payor Plan Phone Number Payor Plan Fax Number Effective Dates    [address] [address] [phone]  4/1/2021 - None Entered    [facility] [id number]       Subscriber Name Subscriber Birth Date Member ID       LENORA SINGH 1960 [id number]                 Emergency Contacts      (Rel.) Home Phone Work Phone Mobile Phone    Rodolfo Pineda (Son) [phone] -- --    ETELVINA PINEDA (Relative) [phone] -- --    MACIE PURVIS (Other) -- -- [phone]               History & Physical      May Vargas APRN at 07/16/21 1818     Attestation signed by Raleigh Villa MD at 07/16/21 2100    I personally evaluated and examined the patient in conjunction with ADIN Kumar and agree with the assessment, treatment plan, and disposition of the patient as recorded by her. " My history, exam, and further recommendations are:     Patient seen and examined at bedside.  The patient noted to appear acute on chronically ill.  Patient not able to take care of herself even with assistance.  She will likely need to be admitted to a SNF for rehabilitation to be able to continue cancer treatment.  May benefit from a hospital/palliative care consultation.    Electronically signed by Raleigh Villa MD, 7/16/2021, 20:59 CDT.                      [facility] Medicine Services  HISTORY AND PHYSICAL    Date of Admission: 7/16/2021  Primary Care Physician: ASM Weaver MD    Subjective     Chief Complaint: Diarrhea, increasing confusion, danger to self    History of Present Illness  Lenora Kathleen is a 61-year-old female with a past medical history of colorectal cancer followed by Dr. Canseco oncology and Dr. Guthrie radiation oncology.  Both therapies have been completed.  Patient does have a colostomy that she is unable to care for herself.  Her friend is in attendance and states she has had increasing confusion over the past few days.  Of that protective services have been working with the patient for approximately a week trying to get her placement in rehab.  Currently they are investigating [facility].  Per friend at bedside patient pulls colostomy bag off.  She states the house is a mess from liquid stool.  Patient has been having significant diarrhea.  Patient is complaining of hurting all over.  She states she fell yesterday after feeling dizzy and was unsteady on her feet.  ER work-up revealed potassium 3.3, hemoglobin 8.7, acute enterocolitis.  Patient did states she was going to need another surgery to close the fistula.  She is followed by Dr. Paulette Villegas.  She is admitted for further evaluation treatment.    Friend at bedside states she she has been managing patient's medications with a weekly medication tray.  She feels patient is either not  taking her medications or may in fact be hiding them.  Patient lives alone.  She has no other assistance and friend is no longer going to be able to continue to provide intensive home care.    Review of Systems   A 10 point review of systems was completed, all negative except for those discussed in HPI    Past Medical History:   Past Medical History:   Diagnosis Date   • Anxiety    • Blood in urine    • Chest tightness    • Colon polyps    • COPD (chronic obstructive pulmonary disease) (CMS/HCC)    • Depression    • Emphysema/COPD (CMS/HCC)    • Hepatitis C    • Hypertension    • Injury of back    • Kidney stone    • Palpitation    • PONV (postoperative nausea and vomiting)    • Rectal cancer (CMS/HCC)        Past Surgical History:   Past Surgical History:   Procedure Laterality Date   • CHOLECYSTECTOMY     • COLONOSCOPY  03/12/2015    normal   • COLOSTOMY N/A 4/23/2021    Procedure: LAPAROSCOPIC DIVERTING COLOSTOMY;  Surgeon: Paulette Villegas MD;  Location: [facility];  Service: General;  Laterality: N/A;   • FINGER SURGERY     • HYSTERECTOMY     • KIDNEY STONE SURGERY     • KIDNEY SURGERY     • OTHER SURGICAL HISTORY      POSSIBLE CARDIAC MONITOR (LOOP RECORDER?)--IMPLANTED AND EXPLANTED    • US GUIDED LYMPH NODE BIOPSY  4/30/2021       Family History: family history includes Heart disease in her mother; No Known Problems in her father.    Social History:  reports that she has quit smoking. Her smoking use included cigarettes. She has a 11.25 pack-year smoking history. She has never used smokeless tobacco. She reports that she does not drink alcohol and does not use drugs.    Code Status: Full, due to confusion unable to discuss CODE STATUS with patient.  Record review does not reveal order from documentation.  She has no one to speak for her if unable speak for herself      Allergies:  Allergies   Allergen Reactions   • Morphine GI Intolerance and Nausea And Vomiting     Other reaction(s): Vomiting          Medications:  Prior to Admission medications    Medication Sig Start Date End Date Taking? Authorizing Provider   acetaminophen (Tylenol) 325 MG tablet Take 3 tablets by mouth Every 8 (Eight) Hours. Take every 8 hours for 3 days then take prn as needed. 4/26/21 4/26/22  Paulette Villegas MD   acyclovir (ZOVIRAX) 200 MG capsule Take 1 capsule by mouth 5 (Five) Times a Day. For fever blisters 7/7/21   Jadiel Guthrie III, MD   acyclovir (ZOVIRAX) 5 % ointment Apply 1 application topically to the appropriate area as directed Every 3 (Three) Hours.    ProviderEloina MD   albuterol sulfate  (90 Base) MCG/ACT inhaler Inhale 2 puffs As Needed. 9/3/19   Nurse Octaviano Coelho RN   ALPRAZolam (XANAX) 2 MG tablet Take 2 mg by mouth 4 (Four) Times a Day.    Nurse Octaviano Coelho RN   capecitabine (XELODA) 150 MG chemo tablet  5/21/21   Eloina Price MD   capecitabine (XELODA) 500 MG chemo tablet Take  by mouth. 3 500 mg tablets in the morning AND evening PLUS 150 mg mg Monday- FRIDAY 5/21/21   Eloina Price MD   Cyanocobalamin (VITAMIN B 12) 500 MCG tablet Take 500 mcg by mouth Daily.    Nurse Octaviano Coelho RN   fentaNYL (DURAGESIC) 50 MCG/HR patch Place 1 patch on the skin as directed by provider Every 72 (Seventy-Two) Hours. 6/14/21   Eloina Price MD   FLUoxetine (PROZAC) 20 MG capsule Take 20 mg by mouth 4 (Four) Times a Day. 1/19/18   Nurse Octaviano Coelho RN   gabapentin (NEURONTIN) 300 MG capsule Take 300 mg by mouth 4 (Four) Times a Day. 10/4/17   Nurse Octaviano Coelho RN   ibuprofen (Motrin IB) 200 MG tablet Take 3 tablets by mouth Every 8 (Eight) Hours. Take every 8 hours for three days then take as needed. 4/26/21 4/26/22  Paulette Villegas MD   lisinopril (PRINIVIL,ZESTRIL) 10 MG tablet Take 10 mg by mouth Daily.    ProviderEloina MD   megestrol (MEGACE ES) 625 MG/5ML suspension Take 5 mL by mouth Daily. 6/9/21   Jadiel Guthrie III, MD   megestrol  "(MEGACE) 40 MG/ML suspension Take 600 mg by mouth Daily. 6/14/21   Provider, MD Eloina   metoprolol succinate XL (TOPROL-XL) 50 MG 24 hr tablet Take 50 mg by mouth Daily. 12/19/19   Emergency, Nurse Octaviano, NAEL   ondansetron (Zofran) 4 MG tablet Take 1 tablet by mouth Every 8 (Eight) Hours As Needed for Nausea or Vomiting. 4/26/21 4/26/22  Paulette Villegas MD   oxyCODONE (ROXICODONE) 15 MG immediate release tablet Take 1 tablet by mouth 4 (Four) Times a Day. 5/13/21   ProviderEloina MD   Scopolamine (TRANSDERM-SCOP) 1.5 MG/3DAYS patch Place 1 patch on the skin as directed by provider Every 72 (Seventy-Two) Hours. 5/18/21   Jadiel Guthrie III, MD   vitamin D (ERGOCALCIFEROL) 1.25 MG (63147 UT) capsule capsule Take 50,000 Units by mouth 1 (One) Time Per Week. 2/18/19   Emergency, Nurse Epic, RN       Objective     /49 (BP Location: Left arm, Patient Position: Lying)   Pulse 63   Temp 98.3 °F (36.8 °C) (Oral)   Resp 20   Ht 172.7 cm (68\")   Wt 88.6 kg (195 lb 6.4 oz)   SpO2 97%   BMI 29.71 kg/m²   Physical Exam  Vitals reviewed.   Constitutional:       Appearance: She is ill-appearing.   HENT:      Head: Normocephalic and atraumatic.      Mouth/Throat:      Mouth: Mucous membranes are dry.      Pharynx: Oropharyngeal exudate present.   Eyes:      Extraocular Movements: Extraocular movements intact.      Pupils: Pupils are equal, round, and reactive to light.   Cardiovascular:      Rate and Rhythm: Normal rate and regular rhythm.   Pulmonary:      Effort: Pulmonary effort is normal.      Breath sounds: Normal breath sounds.   Abdominal:      General: Bowel sounds are normal. There is no distension.      Palpations: Abdomen is soft.      Tenderness: There is no abdominal tenderness.      Comments: Colostomy left abdomen   Musculoskeletal:      Cervical back: Normal range of motion and neck supple.      Right lower leg: No edema.      Left lower leg: No edema.      Comments: Generalized weakness " and debility   Skin:     General: Skin is warm and dry.      Comments: [place]   Neurological:      Mental Status: She is alert.      Comments: Confusion with intermittent clarity   Psychiatric:         Behavior: Behavior normal.      Comments: Flat affect       Pertinent Data:   Lab Results (last 72 hours)     Procedure Component Value Units Date/Time    Procalcitonin [[id number]]  (Normal) Collected: 07/16/21 1637    Specimen: Blood Updated: 07/16/21 1659     Procalcitonin 0.06 ng/mL     Comprehensive Metabolic Panel [[id number]]  (Abnormal) Collected: 07/16/21 1637    Specimen: Blood Updated: 07/16/21 1654     Glucose 88 mg/dL      BUN 9 mg/dL      Creatinine 0.51 mg/dL      Sodium 139 mmol/L      Potassium 3.3 mmol/L      Chloride 105 mmol/L      CO2 26.0 mmol/L      Calcium 8.4 mg/dL      Total Protein 5.2 g/dL      Albumin 3.00 g/dL      ALT (SGPT) 28 U/L      AST (SGOT) 53 U/L      Alkaline Phosphatase 48 U/L      Total Bilirubin 0.2 mg/dL      eGFR Non African Amer 123 mL/min/1.73      Globulin 2.2 gm/dL      A/G Ratio 1.4 g/dL      BUN/Creatinine Ratio 17.6     Anion Gap 8.0 mmol/L     Ammonia [[id number]]  (Normal) Collected: 07/16/21 1637    Specimen: Blood Updated: 07/16/21 1653     Ammonia 11 umol/L     COVID-19,[facility] [employer] IN-HOUSE,Nasal Swab No Transport Media 3-4 HR TAT - Swab, Nasal Cavity [[id number]]  (Normal) Collected: 07/16/21 1527    Specimen: Swab from Nasal Cavity Updated: 07/16/21 1607     COVID19 Not Detected    Urinalysis, Microscopic Only - Urine, Catheter [[id number]]  (Abnormal) Collected: 07/16/21 1527    Specimen: Urine, Catheter Updated: 07/16/21 1539     RBC, UA 3-5 /HPF      WBC, UA 3-5 /HPF      Bacteria, UA None Seen /HPF      Squamous Epithelial Cells, UA 0-2 /HPF      Hyaline Casts, UA 0-2 /LPF      Methodology Automated Microscopy    Urinalysis With Culture If Indicated - Urine, Catheter [[id number]]  (Abnormal) Collected: 07/16/21 1527    Specimen: Urine, Catheter Updated: 07/16/21  1539     Color, UA Yellow     Appearance, UA Clear     pH, UA 7.0     Specific Gravity, UA 1.014     Glucose, UA Negative     Ketones, UA Trace     Bilirubin, UA Negative     Blood, UA Negative     Protein, UA Negative     Leuk Esterase, UA Trace     Nitrite, UA Negative     Urobilinogen, UA 0.2 E.U./dL    Lactic Acid, Plasma [[id number]]  (Normal) Collected: 07/16/21 1457    Specimen: Blood Updated: 07/16/21 1532     Lactate 1.1 mmol/L     Blood Culture - Blood, Arm, Left [[id number]] Collected: 07/16/21 1452    Specimen: Blood from Arm, Left Updated: 07/16/21 1515    Blood Culture - Blood, Arm, Right [[id number]] Collected: 07/16/21 1434    Specimen: Blood from Arm, Right Updated: 07/16/21 1515    CBC Auto Differential [[id number]]  (Abnormal) Collected: 07/16/21 1457    Specimen: Blood Updated: 07/16/21 1512     WBC 3.85 10*3/mm3      RBC 3.01 10*6/mm3      Hemoglobin 8.7 g/dL      Hematocrit 27.6 %      MCV 91.7 fL      MCH 28.9 pg      MCHC 31.5 g/dL      RDW 21.1 %      RDW-SD 64.8 fl      MPV 11.1 fL      Platelets 167 10*3/mm3      Neutrophil % 42.1 %      Lymphocyte % 35.1 %      Monocyte % 17.1 %      Eosinophil % 0.5 %      Basophil % 0.8 %      Immature Grans % 4.4 %      Neutrophils, Absolute 1.62 10*3/mm3      Lymphocytes, Absolute 1.35 10*3/mm3      Monocytes, Absolute 0.66 10*3/mm3      Eosinophils, Absolute 0.02 10*3/mm3      Basophils, Absolute 0.03 10*3/mm3      Immature Grans, Absolute 0.17 10*3/mm3      nRBC 0.0 /100 WBC         Imaging Results (Last 24 Hours)     Procedure Component Value Units Date/Time    CT Abdomen Pelvis With Contrast [[id number]] Collected: 07/16/21 1707     Updated: 07/16/21 1719    Narrative:      EXAM: CT ABDOMEN PELVIS W CONTRAST-     INDICATION: has open ostomy with no bag for multiple days. Possible  fever. confusion. Hx colorectal/anal cancer     COMPARISON: 2/8/2021     DOSE LENGTH PRODUCT: 554 mGy cm. Automated exposure control was also  utilized to decrease patient  radiation dose.     FINDINGS:     Included lung bases are clear. No suspicious liver lesion. Prior  cholecystectomy. Mild prominence of the biliary tree is stable and  likely reservoir phenomenon. Pancreas, spleen, and adrenal glands appear  normal. No solid renal mass. No urolithiasis or hydronephrosis. Mild  urinary bladder wall thickening.     During LEFT lower quadrant colostomy. Normal appendix. Wall thickening  of the ascending colon. Multiple small bowel loops in the pelvis  demonstrate wall thickening and mucosal hyperemia. A small amount of  reactive free pelvic fluid is present. Distal esophagus and stomach  appear unremarkable. No bowel obstruction.     Nonaneurysmal abdominal aorta with scattered atherosclerotic  calcification. No enlarged retroperitoneal, mesenteric, pelvic, or LEFT  inguinal lymph node. Enlarging RIGHT inguinal lymph node on image 79  measures 2.4 x 1.7 cm. No acute osseous finding.       Impression:         1.  Multiple inflamed small bowel loops in the pelvis. Wall thickening  and inflammation of the rectum and sigmoid colon. Findings are most  compatible with acute enterocolitis.  2.  Urinary bladder wall thickening, correlate for cystitis.  3.  Enlarging RIGHT inguinal lymph node.  This report was finalized on 07/16/2021 17:16 by Dr. Patrick Farrell MD.            Assessment / Plan     Assessment:   Active Hospital Problems    Diagnosis    • Enterocolitis    • Dehydration    • Hypotension due to hypovolemia    • Encephalopathy    • Ataxia    • Self-care deficit    • Anemia of chronic disease    • Diarrhea    • Hypokalemia    • Primary squamous cell carcinoma of anal canal        Plan:   1.  Admit as inpatient  2.  Flagyl and Levaquin  3.  Normal saline with 20 mEq potassium at 100 mL/hour  4.  Replace potassium  5.  Home medications reviewed; holding antihypertensives for now, decreased dosages of medications causing altered sensorium  5.  Consult , PT/OT  6.  Labs in  a.m.  7.  Stool PCR and stool for C. difficile  8.  DVT prophylaxis with SCDs  9.  Supplemental oxygen as needed, continuous pulse oximetry    I discussed the patient's findings and my recommendations with: Raleigh Villa MD  Time spent 40 minutes    Patient seen and examined by me on 7/16/2021 at 7:19 PM.    Electronically signed by ADIN Childers, 07/16/21, 19:35 CDT.    Electronically signed by Raleigh Villa MD at 07/16/21 2100          Emergency Department Notes      Carmen Marcial, RN at 07/16/21 1359        Caregiver states that patient has colon cancer and recently completed chemo and radiation.  Caregiver now states the patient has increased confusion and will not keep colostomy.  Caregiver is concerned patient can not live at home alone safely and patient has no family to stay with.     Electronically signed by Carmen Marcial RN at 07/16/21 1402     Portillo Zee PA-C at 07/16/21 1403     Attestation signed by Catalino Rosenberg Jr., MD at 07/17/21 2017          For this patient encounter, I reviewed the NP or PA documentation, treatment plan, and medical decision making. Catalino Rosenberg Jr, MD 7/17/2021 20:17 CDT                  Subjective   History of Present Illness    Patient is a 61-year-old female presenting to ED with confusion and ostomy bag problem.  PMH significant for colorectal cancer with completed chemo and radiation, colostomy status, history hepatitis C, COPD, hypertension, COPD.  Patient's friend and caregiver at bedside to help provide additional history due to patient's confusion. Patient friend/caregiver at bedside reported that she initiated chemo and radiation on 5/10/21 and completed her last treatment on 7/1/21.  Caregiver reports that over the past 3 days patient has had slight increasing confusion, generalized weakness, and will no longer keep her ostomy bags on.  Caregiver reports that she thinks patient is accidentally turning them off and is not able to  "remember.  Patient has had confusion and has been misplacing her medications for which caregiver reports she has been wearing the same fentanyl patch since last Friday.  Caregiver stated they have been working with APS as well as home health trying to get patient placed into an assisted living facility and were advised that [facility] had no beds.  They are currently working with [facility] to solve insurance issues.  Caregiver reports that patient formally had her sister involved in care and no longer is able to do this at this point she is living in taking care of herself alone.  Patient reports that she feels weak all over, confused, and feels \"overwhelmed\" by her illness.  Caregiver noted patient is also being treated for right buttock shingles at this time however she is concerned because patient has not been taking her medications routinely and since last night is only taken a Xanax.    Records reviewed show patient was last seen outpatient by oncology on 6/30/2021 for management of anal cancer, chemo.  Patient follows up with radiation oncology with treatment on 7/1/2021.  Patient has been having home health visits with the most recent on 7/13/2021.  Patient had a home health encounter on 7/14/2021 where they noted she lives alone with no support from family and currently relying on friends. Patient needing more in-home support or nursing home placement for rehabilitation. [facility] in [place] were contacted regarding placement.    Patient was admitted on 4/23/2021 by Dr. 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    Review of Systems   Reason unable to perform ROS: Limited ability to obtain ROS due to patient's confusion.   Constitutional: Negative for fever.   HENT: Negative.    Eyes: Negative.    Respiratory: Negative.    Cardiovascular: Negative.    Gastrointestinal: Positive for abdominal pain (Surrounding left lower quadrant ostomy site). "   Musculoskeletal: Negative for myalgias.   Skin: Positive for pallor and wound (Shingles, right buttocks).   Neurological: Positive for weakness (Generalized).   Psychiatric/Behavioral: Positive for confusion.       Past Medical History:   Diagnosis Date   • Anxiety    • Blood in urine    • Chest tightness    • Colon polyps    • COPD (chronic obstructive pulmonary disease) (CMS/HCC)    • Depression    • Emphysema/COPD (CMS/HCC)    • Hepatitis C    • Hypertension    • Injury of back    • Kidney stone    • Palpitation    • PONV (postoperative nausea and vomiting)    • Rectal cancer (CMS/HCC)        Allergies   Allergen Reactions   • Morphine GI Intolerance and Nausea And Vomiting     Other reaction(s): Vomiting         Past Surgical History:   Procedure Laterality Date   • CHOLECYSTECTOMY     • COLONOSCOPY  03/12/2015    normal   • COLOSTOMY N/A 4/23/2021    Procedure: LAPAROSCOPIC DIVERTING COLOSTOMY;  Surgeon: Paulette Villegas MD;  [facility]: [facility];  Service: General;  Laterality: N/A;   • FINGER SURGERY     • HYSTERECTOMY     • KIDNEY STONE SURGERY     • KIDNEY SURGERY     • OTHER SURGICAL HISTORY      POSSIBLE CARDIAC MONITOR (LOOP RECORDER?)--IMPLANTED AND EXPLANTED    • US GUIDED LYMPH NODE BIOPSY  4/30/2021       Family History   Problem Relation Age of Onset   • Heart disease Mother    • No Known Problems Father        Social History     Socioeconomic History   • Marital status:      Spouse name: Not on file   • Number of children: Not on file   • Years of education: Not on file   • Highest education level: Not on file   Tobacco Use   • Smoking status: Former Smoker     Packs/day: 0.25     Years: 45.00     Pack years: 11.25     Types: Cigarettes   • Smokeless tobacco: Never Used   Substance and Sexual Activity   • Alcohol use: No   • Drug use: No   • Sexual activity: Defer     Partners: Male           Objective   Physical Exam  Vitals and nursing note reviewed.   Constitutional:        Appearance: Normal appearance. She is well-developed. She is ill-appearing. She is not toxic-appearing or diaphoretic.      Comments: Patient unkempt with dried stool on lower abdomen skin folds coming from ostomy bag   HENT:      Head: Normocephalic.      Mouth/Throat:      Mouth: Mucous membranes are dry.      Pharynx: Oropharynx is clear.   Eyes:      General: No scleral icterus.     Extraocular Movements: Extraocular movements intact.      Conjunctiva/sclera: Conjunctivae normal.      Pupils: Pupils are equal, round, and reactive to light.   Cardiovascular:      Rate and Rhythm: Normal rate and regular rhythm.   Pulmonary:      Effort: Pulmonary effort is normal.      Breath sounds: Normal breath sounds.   Abdominal:      General: Bowel sounds are normal.      Palpations: Abdomen is soft.          Comments: Ostomy site to the LLQ with the bag ripped off.  There is discharge and stool coming from the area with a cloth overlying it.  Skin inspection around ostomy sticker well-appearing with no erythema.  Slight tenderness to palpitation around this area with normal inspection to the remainder of the abdomen.   Musculoskeletal:         General: Normal range of motion.      Cervical back: Normal range of motion and neck supple.      Comments: Fentanyl patch to left scapula   Skin:     General: Skin is warm.      Coloration: Skin is pale. Skin is not jaundiced.      Findings: Rash present.             Comments: Healing rash consistent with shingles to right buttocks with no further dermatome involvements.  The areas are well-healed and crusting over with no weeping lesions or vesicles.  No further rashes or injuries noted to dermatological evaluation   Neurological:      Mental Status: She is alert and oriented to person, place, and time. She is confused.   Psychiatric:         Attention and Perception: Attention normal.         Mood and Affect: Affect is tearful.         Speech: Speech normal.         Behavior:  Behavior normal. Behavior is cooperative.         Procedures          ED Course                                           MDM  Number of Diagnoses or Management Options     Amount and/or Complexity of Data Reviewed  Clinical lab tests: reviewed and ordered  Tests in the radiology section of CPT®: reviewed and ordered  Tests in the medicine section of CPT®: reviewed and ordered  Decide to obtain previous medical records or to obtain history from someone other than the patient: yes  Obtain history from someone other than the patient: yes (Friend/caregiver)  Review and summarize past medical records: yes  Discuss the patient with other providers: yes (Dr. Hardik Rosenberg (attending)  Dr. Villa (hospitalist))    Patient Progress  Patient progress: stable      Patient is a 61-year-old female presenting to ED with confusion and ostomy bag problem.  PMH significant for colorectal cancer with completed chemo and radiation, colostomy status, history hepatitis C, COPD, hypertension, COPD.  CBC with white blood cell count 3.85, H&H 8.7/27.6.  ANC 1.62.  Lactic acid 1.1.  CBC with hypokalemia 3.3, hypocalcemia 8.4.  AST 53.  Urine studies with trace leukocytes, trace ketones, negative nitrites, 3-5 RBC, 3-5 WBC, no bacteria.  Covid testing negative.  Ammonia 11.  Procalcitonin 0.06.  Patient remained afebrile with no evidence of tachycardia.  Patient was given a fluid bolus. CT imaging of the abdomen and pelvis showed: Multiple inflamed small bowel loops in the pelvis, wall thickening inflammation of the rectum and sigmoid colon, findings most consistent with acute enterocolitis.  Urinary bladder wall thickening.  Enlarging right inguinal lymph node.  Patient continued to express great concern for going home as she felt too weak and unable to perform ADLs and care for herself.  Case was discussed with Dr. Villa, hospitalist, who will kindly accept patient for admission at this time for further evaluation and continued work  in nursing home placement.  Case discussed with Dr. Rosenberg is in agreement no further recommendations.      Final diagnoses:   Dehydration   Failure to thrive in adult   Enterocolitis       ED Disposition  ED Disposition     ED Disposition Condition Comment    Decision to Admit  Level of Care: Med/Surg [1]   Diagnosis: Colitis [[id number]]   Admitting Physician: NENA MCGARRY [[id number]]   Certification: I Certify That Inpatient Hospital Services Are Medically Necessary For Greater Than 2 Midnights            No follow-up provider specified.       Medication List      No changes were made to your prescriptions during this visit.          Portillo Zee PA-C  07/17/21 1453      Electronically signed by Catalino Rosenberg Jr., MD at 07/17/21 2017         Facility-Administered Medications as of 7/18/2021   Medication Dose Route Frequency Provider Last Rate Last Admin   • acetaminophen (TYLENOL) tablet 650 mg  650 mg Oral Q4H PRN May Vargas, APRN   650 mg at 07/18/21 1522    Or   • acetaminophen (TYLENOL) 160 MG/5ML solution 650 mg  650 mg Oral Q4H PRN May Vargas, APRN        Or   • acetaminophen (TYLENOL) suppository 650 mg  650 mg Rectal Q4H PRN May Vargas, APRN       • ALPRAZolam (XANAX) tablet 0.25 mg  0.25 mg Oral 4x Daily PRN May Vargas APRN   0.25 mg at 07/16/21 2105   • fentaNYL (DURAGESIC) 50 MCG/HR patch 1 patch  1 patch Transdermal Q72H May Vargas, APRN   1 patch at 07/16/21 2100   • FLUoxetine (PROzac) capsule 20 mg  20 mg Oral 4x Daily May Vargas, APRN   20 mg at 07/18/21 1214   • gabapentin (NEURONTIN) capsule 100 mg  100 mg Oral 4x Daily May Vargas, APRN   100 mg at 07/18/21 1214   • ibuprofen (ADVIL,MOTRIN) tablet 600 mg  600 mg Oral Q8H PRN May Vargas, APRN   600 mg at 07/17/21 1759   • [COMPLETED] iopamidol (ISOVUE-300) 61 % injection 100 mL  100 mL Intravenous Once in imaging Portillo Zee PA-C   100 mL at 07/16/21 1706   • [COMPLETED]  lidocaine (XYLOCAINE) 1 % injection 1 mL  1 mL Infiltration Once Raleigh Villa MD   1 mL at 07/17/21 1857   • [COMPLETED] metroNIDAZOLE (FLAGYL) 500 mg/100mL IVPB  500 mg Intravenous Once Portillo Zee PA-C   500 mg at 07/16/21 1841   • nystatin (MYCOSTATIN) 096110 UNIT/ML suspension 500,000 Units  5 mL Swish & Swallow 4x Daily May Vargas APRN   500,000 Units at 07/18/21 1214   • ondansetron (ZOFRAN) tablet 4 mg  4 mg Oral Q6H PRN May Vargas APRN        Or   • ondansetron (ZOFRAN) injection 4 mg  4 mg Intravenous Q6H PRN May Vargas APRN   4 mg at 07/17/21 1015   • Pharmacy Consult   Does not apply Continuous PRN Danielle Villagomez APRN       • [COMPLETED] potassium chloride (MICRO-K) CR capsule 40 mEq  40 mEq Oral Once May Vargas APRN   40 mEq at 07/16/21 2105   • [COMPLETED] sodium chloride 0.9 % bolus 1,000 mL  1,000 mL Intravenous Once Portillo Zee PA-C   Stopped at 07/16/21 1845   • sodium chloride 0.9 % flush 10 mL  10 mL Intravenous PRN May Vargas APRN       • sodium chloride 0.9 % flush 10 mL  10 mL Intravenous Q12H May Vargas APRN   10 mL at 07/18/21 0911   • sodium chloride 0.9 % flush 10 mL  10 mL Intravenous PRN May Vargas APRN       • sodium chloride 0.9 % with KCl 20 mEq/L infusion  100 mL/hr Intravenous Continuous Danielle Villagomez APRN 100 mL/hr at 07/18/21 1402 100 mL/hr at 07/18/21 1402   • vancomycin (VANCOCIN) 500 mg in sodium chloride (NS) 500 mL enema  500 mg Rectal Q6H Raleigh Villa MD   500 mg at 07/18/21 1214   • vancomycin oral solution reconstituted 125 mg  125 mg Oral Q6H Danielle Villagomez APRN   125 mg at 07/18/21 1214       Orders (last 48 hrs)      Start     Ordered    07/19/21 0600  CBC & Differential  Morning Draw      07/18/21 1419    07/18/21 1858  Auto Discontinue in 48 Hours if not Collected  ONCE CDIFF      07/16/21 1904    07/18/21 1857  Auto Discontinue GI Panel in 48 Hours if not Collected  ONCE GI  PANEL      21 1904    21 1200  DIET MESSAGE Patient would like Sprite zero with meals. Thanks!  Daily With Lunch & Dinner     Comments: Patient would like Sprite zero with meals. Thanks!    21 0925    21 0817  Diet Regular; [place]  Diet Effective Now      21 0816    21 0600  Basic Metabolic Panel  Daily      21 1626    21 0600  CBC & Differential  Morning Draw      21 1626    21 0600  CBC Auto Differential  PROCEDURE ONCE      21 2202    21 0523  Manual Differential  Once      21 0522    21 1815  lidocaine (XYLOCAINE) 1 % injection 1 mL  Once      21 1856    21 1802  Midline Consult  Once     Provider:  (Not yet assigned)    21 1801    21 1552  OT Plan of Care Cert / Re-Cert  Once     Comments: Occupational Therapy Plan of Care  Initial Certification  Certification Period: 2021 - 10/15/2021    Patient Name: Lenora Kathleen  : 1960    (E86.0) Dehydration  (primary encounter diagnosis)  (R62.7) Failure to thrive in adult  (K52.9) Enterocolitis  (Z74.09) Impaired mobility  (Z74.09,  Z78.9) Impaired mobility and ADLs                Assessment  OT Assessment  Rehab Potential (OT): good, to achieve stated therapy goals  Criteria for Skilled Therapeutic Interventions Met (OT): yes, skilled treatment is necessary        OT Rehab Goals     Row Name 21 0904             Dressing Goal 1 (OT)    Activity/Device (Dressing Goal 1, OT)  dressing skills, all  -MM      [place]/Cues Needed (Dressing Goal 1, OT)  independent  -MM      Time Frame (Dressing Goal 1, OT)  long term goal (LTG);by discharge  -MM        Progress/Outcome (Dressing Goal 1, OT)  goal ongoing  -MM         Toileting Goal 1 (OT)    Activity/Device (Toileting Goal 1, OT)  toileting skills, all  -MM      [place] Level/Cues Needed (Toileting Goal 1, OT)  independent  -MM        Time Frame (Toileting Goal 1, OT)  long term goal (LTG);by  discharge  -MM        Progress/Outcome (Toileting Goal 1, OT)  goal ongoing  -MM         Grooming Goal 1 (OT)    Activity/Device (Grooming Goal 1, OT)  grooming skills, all  -MM      [place] (Grooming Goal 1, OT)  independent  -MM      Time Frame (Grooming Goal 1, OT)  long term goal (LTG);by discharge  -MM        Strategies/Barriers (Grooming Goal 1, OT)  standing at sink level  -MM      Progress/Outcome (Grooming Goal 1, OT)  goal ongoing  -MM        User Key  (r) = Recorded By, (t) = Taken By, (c) = Cosigned By    Initials Name Provider Type Discipline    Nicolas Kwok, OTR/L Occupational Therapist OT        OT Goals     Row Name 07/17/21 0904          Time Calculation    OT Goal Re-Cert Due Date  07/27/21  -MM       User Key  (r) = Recorded By, (t) = Taken By, (c) = Cosigned By    Initials Name Provider Type    Nicolas Kwok, OTR/L Occupational Therapist          Plan    OT Plan  Therapy Frequency (OT): 5 times/wk  Predicted Duration of Therapy Intervention (OT): until d/c  Outcome Summary: OT eval completed. pt reports back pain 8/10. Pt reports fatigue. Pt was supervision with set up for perineal hygiene, but dependent for ostomy care. Pt was CGA for all functional mobility within room. Pt requires verbal cues and encouragement. Pt demo's mild confusion. Skilled OT recommended to address adls, functional mobility and endurance. Recommended d/c SNF.      BRUCE Camacho/L  7/17/2021        By cosigning this order, either electronically or physically, I certify that the therapy services are furnished while this patient is under my care, the services outline above are required by this patient, and will be reviewed every 90 days.        M.D.:__________________________________________ Date: ______________    07/17/21 1551    07/17/21 1007  PT Plan of Care Cert / Re-Cert  Once     Comments: Physical Therapy Plan of Care  Initial Certification  Certification Period: 7/17/2021 -  10/15/2021    Patient Name: Lenora Kathleen  : 1960    (E86.0) Dehydration  (primary encounter diagnosis)  (R62.7) Failure to thrive in adult  (K52.9) Enterocolitis  (Z74.09) Impaired mobility                  Assessment  PT Assessment  Rehab Potential (PT): good, to achieve stated therapy goals  Criteria for Skilled Interventions Met (PT): yes, meets criteria, skilled treatment is necessary        PT Rehab Goals     Row Name 21 0835             Bed Mobility Goal 1 (PT)    Activity/Assistive Device (Bed Mobility Goal 1, PT)  rolling to   left;rolling to right;sidelying to sit;sit to sidelying  -SB      [place] Level/Cues Needed (Bed Mobility Goal 1, PT)  supervision   required  -SB      Time Frame (Bed Mobility Goal 1, PT)  long term goal (LTG)  -SB      Progress/Outcomes (Bed Mobility Goal 1, PT)  goal ongoing  -SB         Transfer Goal 1 (PT)    Activity/Assistive Device (Transfer Goal 1, PT)    sit-to-stand/stand-to-sit;bed-to-chair/chair-to-bed  -SB      [place] Level/Cues Needed (Transfer Goal 1, PT)  supervision   required  -SB      Time Frame (Transfer Goal 1, PT)  long term goal (LTG)  -SB      Progress/Outcome (Transfer Goal 1, PT)  goal ongoing  -SB         Gait Training Goal 1 (PT)    Activity/Assistive Device (Gait Training Goal 1, PT)  gait (walking   locomotion);increase endurance/gait distance;increase energy   conservation;improve balance and speed;decrease fall risk;assistive device   use  -SB      [place] Level (Gait Training Goal 1, PT)  standby assist  -SB      Distance (Gait Training Goal 1, PT)  75  -SB      Time Frame (Gait Training Goal 1, PT)  long term goal (LTG)  -SB      Progress/Outcome (Gait Training Goal 1, PT)  goal ongoing  -SB        User Key  (r) = Recorded By, (t) = Taken By, (c) = Cosigned By    Initials Name Provider Type Discipline    Tiff Rodriguez, PT DPT Physical Therapist PT      PT OP Goals     Row Name 21 1005          Time Calculation     PT Goal Re-Cert Due Date  07/27/21  -TITI       User Key  (r) = Recorded By, (t) = Taken By, (c) = Cosigned By    Initials Name Provider Type    Tiff Rodriguez PT DPT Physical Therapist            Plan  PT Plan  Therapy Frequency (PT): 2 times/day  Predicted Duration of Therapy Intervention (PT): until d/c or goals achieved  Planned Therapy Interventions (PT): balance training, gait training, bed mobility training, patient/family education, strengthening, transfer training  Outcome Summary: PT eval completed. Pt lethargic and reqd cues for orientation, c/o pain in low back and generalized fatigue. Pt reqd inc time and VC for command following, but improved with time. Pt performed bed mob with CGA and use of bedrails, sits EOB with sup. Pt with generalized weakness in BLE. Pt performed sit to stand and gait within room with HHA, constantly reaching for objects with guarded posture due to fear of falling and dec balance. Pt with dec strength, balance, endurance and safety and will benefit from skilled PT. Recommend d/c SNF.        Tiff Stephens PT DPT  7/17/2021            By cosigning this order, either electronically or physically, I certify that the therapy services are furnished while this patient is under my care, the services outline above are required by this patient, and will be reviewed every 90 days.        MOLGA.:__________________________________________ Date: ______________    07/17/21 1006    07/17/21 0800  vancomycin (VANCOCIN) 500 mg in sodium chloride (NS) 500 mL enema  Every 6 Hours      07/17/21 0727    07/17/21 0715  sodium chloride 0.9 % with KCl 20 mEq/L infusion  Continuous      07/17/21 0617    07/17/21 0715  vancomycin oral solution reconstituted 125 mg  Every 6 Hours Scheduled      07/17/21 0618    07/17/21 0618  Patient Isolation Contact Spore  Continuous     Comments: Isolation Precautions Per Clostridium Difficile (CDI) Infection Control Policy / Process    07/17/21 0618    07/17/21 0617   Pharmacy Consult  Continuous PRN      07/17/21 0618    07/17/21 0600  Basic Metabolic Panel  Morning Draw      07/16/21 1904    07/17/21 0600  CBC (No Diff)  Morning Draw      07/16/21 1904    07/17/21 0200  metroNIDAZOLE (FLAGYL) 500 mg/100mL IVPB  Every 8 Hours,   Status:  Discontinued      07/16/21 1904    07/17/21 0000  Inpatient Case Management  Consult  Once     Provider:  (Not yet assigned)    07/16/21 1904 07/17/21 0000  OT Consult: Eval & Treat  Once      07/16/21 1904 07/17/21 0000  PT Consult: Eval & Treat Functional Mobility Below Baseline  Once      07/16/21 1904 07/16/21 2100  fentaNYL (DURAGESIC) 50 MCG/HR patch 1 patch  Every 72 Hours      07/16/21 1905 07/16/21 2100  FLUoxetine (PROzac) capsule 20 mg  4 Times Daily      07/16/21 1905 07/16/21 2100  gabapentin (NEURONTIN) capsule 100 mg  4 Times Daily      07/16/21 1905 07/16/21 2100  nystatin (MYCOSTATIN) 199960 UNIT/ML suspension 500,000 Units  4 Times Daily      07/16/21 1904 07/16/21 2100  sodium chloride 0.9 % flush 10 mL  Every 12 Hours Scheduled      07/16/21 1904 07/16/21 2000  potassium chloride (MICRO-K) CR capsule 40 mEq  Once      07/16/21 1904 07/16/21 2000  levoFLOXacin (LEVAQUIN) 500 mg/100 mL D5W (premix) (LEVAQUIN) 500 mg  Every 24 Hours,   Status:  Discontinued      07/16/21 1904 07/16/21 2000  Vital Signs  Every 4 Hours      07/16/21 1904 07/16/21 2000  Oral Care  Every 4 Hours      07/16/21 1904 07/16/21 2000  sodium chloride 0.9 % with KCl 20 mEq/L infusion  Continuous,   Status:  Discontinued      07/16/21 1904 07/16/21 1909  ibuprofen (ADVIL,MOTRIN) tablet 600 mg  Every 8 Hours PRN      07/16/21 1905    07/16/21 1905  ALPRAZolam (XANAX) tablet 0.25 mg  4 Times Daily PRN      07/16/21 1905 07/16/21 1903  ondansetron (ZOFRAN) tablet 4 mg  Every 6 Hours PRN      07/16/21 1904 07/16/21 1903  ondansetron (ZOFRAN) injection 4 mg  Every 6 Hours PRN      07/16/21 1904     07/16/21 1902  acetaminophen (TYLENOL) tablet 650 mg  Every 4 Hours PRN      07/16/21 1904    07/16/21 1902  acetaminophen (TYLENOL) 160 MG/5ML solution 650 mg  Every 4 Hours PRN      07/16/21 1904    07/16/21 1902  acetaminophen (TYLENOL) suppository 650 mg  Every 4 Hours PRN      07/16/21 1904    07/16/21 1902  Diet Full Liquid  Diet Effective Now,   Status:  Canceled      07/16/21 1904    07/16/21 1901  Apply External Urinary Catheter  Once     Comments: Continue    07/16/21 1904    07/16/21 1901  Fall Precautions  Continuous      07/16/21 1904    07/16/21 1900  Intake & Output  Every Shift      07/16/21 1904    07/16/21 1900  Weigh Patient  Once      07/16/21 1904    07/16/21 1900  Oxygen Therapy- Nasal Cannula; Titrate for SPO2: 90% - 95%  Continuous      07/16/21 1904    07/16/21 1900  Insert Peripheral IV  Once      07/16/21 1904    07/16/21 1900  Saline Lock & Maintain IV Access  Continuous      07/16/21 1904    07/16/21 1900  Code Status and Medical Interventions:  Continuous      07/16/21 1904    07/16/21 1900  Place Sequential Compression Device  Once      07/16/21 1904    07/16/21 1900  Maintain Sequential Compression Device  Continuous      07/16/21 1904    07/16/21 1900  Cardiac Monitoring  Continuous      07/16/21 1904    07/16/21 1900  Pulse Oximetry, Continuous  Continuous      07/16/21 1904    07/16/21 1900  Turn Patient  Now Then Every 2 Hours      07/16/21 1904    07/16/21 1859  sodium chloride 0.9 % flush 10 mL  As Needed      07/16/21 1904    07/16/21 1858  Clostridium Difficile Toxin - Stool, Per Stoma  Once      07/16/21 1904    07/16/21 1858  Patient Isolation Contact Spore  Continuous,   Status:  Canceled     Comments: Isolation Precautions Per Clostridium Difficile (CDI) Infection Control Policy / Process    07/16/21 1904    07/16/21 1858  Clostridium Difficile Toxin, PCR - Stool, Per Stoma  PROCEDURE ONCE      07/16/21 1904    07/16/21 1857  Gastrointestinal Panel, PCR - Stool, Per Stoma   Once      07/16/21 1904    07/16/21 1810  Inpatient Admission  Once      07/16/21 1810    07/16/21 1807  metroNIDAZOLE (FLAGYL) 500 mg/100mL IVPB  Once      07/16/21 1805    07/16/21 1702  iopamidol (ISOVUE-300) 61 % injection 100 mL  Once in Imaging      07/16/21 1700    07/16/21 1538  Urinalysis, Microscopic Only - Urine, Catheter  Once      07/16/21 1537    07/16/21 1535  Vital Signs Recheck  Per Hospital Policy      07/16/21 1534    07/16/21 1425  sodium chloride 0.9 % bolus 1,000 mL  Once      07/16/21 1423    07/16/21 1422  sodium chloride 0.9 % flush 10 mL  As Needed      07/16/21 1423    Unscheduled  Up With Assistance  As Needed      07/16/21 1904                 Physician Progress Notes (last 48 hours) (Notes from 07/16/21 1528 through 07/18/21 1528)      Danielle Villagomez, APRN at 07/18/21 1406              [facility] Medicine Services  INPATIENT PROGRESS NOTE    Length of Stay: 2  Date of Admission: 7/16/2021  Primary Care Physician: SAM Weaver MD    Subjective   Chief Complaint: Follow-up worsening diarrhea  HPI   To ER 7/6 with worsening diarrhea, abdominal pain dizziness unsteady gait.  History of colorectal cancer followed by Dr. Canseco and Dr. Guthrie and has completed radiation chemotherapy.  Patient unable to take care of herself.  Friend unable to assist.    Lying in bed.  She complains of feeling weak.  Denies palpitations or chest pain.  No oxygen in use.  Ostomy in place with semiformed brown stool.  Stool culture positive for C. difficile.  Continue oral vancomycin and vancomycin per ostomy.  Skilled nursing facility at discharge.  Blood pressure running lower today.  We will continue IV fluids.  Advanced to regular diet and she tolerated.  Physical therapy notes decreased strength, decreased balance and safety.  Recommend skilled facility.  Lost IV access yesterday but now has IV in place.    Review of Systems   Constitutional: Positive for activity  change, appetite change and fatigue. Negative for chills and fever.   HENT: Negative for congestion and trouble swallowing.    Eyes: Negative for photophobia and visual disturbance.   Respiratory: Negative for cough, shortness of breath and wheezing.    Cardiovascular: Negative for chest pain, palpitations and leg swelling.   Gastrointestinal: Positive for abdominal pain and diarrhea. Negative for nausea.   Ostomy   Endocrine: Negative for cold intolerance, heat intolerance and polyuria.   Genitourinary: Negative for dysuria, frequency and urgency.   Musculoskeletal: Positive for gait problem.   Skin: Negative for color change, pallor, rash and wound.   Neurological: Positive for weakness. Negative for light-headedness and headaches.   Hematological: Negative for adenopathy. Does not bruise/bleed easily.   Psychiatric/Behavioral: Negative for agitation, behavioral problems and confusion.     All pertinent negatives and positives are as above. All other systems have been reviewed and are negative unless otherwise stated.     Objective    Temp:  [97.6 °F (36.4 °C)-98.5 °F (36.9 °C)] 97.6 °F (36.4 °C)  Heart Rate:  [54-72] 72  Resp:  [16] 16  BP: ()/(45-58) 98/51  Physical Exam  Vitals and nursing note reviewed.   Constitutional:       Appearance: She is ill-appearing (Chronically).      Comments: Lying in bed.  No oxygen use.    HENT:      Head: Normocephalic and atraumatic.      Nose: No congestion.      Mouth/Throat:      Pharynx: Oropharynx is clear. No oropharyngeal exudate.   Eyes:      Extraocular Movements: Extraocular movements intact.      Pupils: Pupils are equal, round, and reactive to light.   Cardiovascular:      Rate and Rhythm: Normal rate and regular rhythm.      Heart sounds: No murmur heard.      Comments: Normal sinus rhythm 60-88 on telemetry  Pulmonary:      Breath sounds: No wheezing, rhonchi or rales.      Comments: No oxygen in use.  Abdominal:      Tenderness: There is abdominal  tenderness (Lower abdomen.).      Comments: Ostomy left side of abdomen with brown semiformed stool.  Genitourinary:     Comments: Voiding.  Musculoskeletal:         General: No swelling or tenderness.      Cervical back: Normal range of motion and neck supple.      Comments: SCDs   Skin:     General: Skin is warm and dry.   Neurological:      General: No focal deficit present.      Mental Status: She is alert and oriented to person, place, and time.   Psychiatric:         Mood and Affect: Mood normal.         Behavior: Behavior normal.         Thought Content: Thought content normal.         Judgment: Judgment normal.      Results Review:  I have reviewed the labs, radiology results, and diagnostic studies.    Laboratory Data:      Results from last 7 days   Lab Units 07/18/21  0512 07/17/21  0558 07/16/21  1457   WBC 10*3/mm3 2.51* 3.04* 3.85   HEMOGLOBIN g/dL 8.3* 9.3* 8.7*   HEMATOCRIT % 27.4* 29.7* 27.6*   PLATELETS 10*3/mm3 142 132* 167      Results from last 7 days   Lab Units 07/18/21  0512 07/17/21  0557 07/16/21  1637   SODIUM mmol/L 139 140 139   POTASSIUM mmol/L 3.9 3.6 3.3*   CHLORIDE mmol/L 110* 107 105   CO2 mmol/L 23.0 21.0* 26.0   BUN mg/dL 6* 6* 9   CREATININE mg/dL 0.39* 0.44* 0.51*   GLUCOSE mg/dL 93 78 88   CALCIUM mg/dL 8.0* 8.3* 8.4*   ALT (SGPT) U/L  --   --  28     Culture Data:      Blood Culture   Date Value Ref Range Status   07/16/2021 No growth at 24 hours  Preliminary   07/16/2021 No growth at 24 hours  Preliminary     Radiology Data:   Imaging Results (Last 7 Days)     Procedure Component Value Units Date/Time    CT Abdomen Pelvis With Contrast [[id number]] Collected: 07/16/21 1707     Updated: 07/16/21 1719    Narrative:      EXAM: CT ABDOMEN PELVIS W CONTRAST-     INDICATION: has open ostomy with no bag for multiple days. Possible  fever. confusion. Hx colorectal/anal cancer     COMPARISON: 2/8/2021     DOSE LENGTH PRODUCT: 554 mGy cm. Automated exposure control was also  utilized to  decrease patient radiation dose.     FINDINGS:     Included lung bases are clear. No suspicious liver lesion. Prior  cholecystectomy. Mild prominence of the biliary tree is stable and  likely reservoir phenomenon. Pancreas, spleen, and adrenal glands appear  normal. No solid renal mass. No urolithiasis or hydronephrosis. Mild  urinary bladder wall thickening.     During LEFT lower quadrant colostomy. Normal appendix. Wall thickening  of the ascending colon. Multiple small bowel loops in the pelvis  demonstrate wall thickening and mucosal hyperemia. A small amount of  reactive free pelvic fluid is present. Distal esophagus and stomach  appear unremarkable. No bowel obstruction.     Nonaneurysmal abdominal aorta with scattered atherosclerotic  calcification. No enlarged retroperitoneal, mesenteric, pelvic, or LEFT  inguinal lymph node. Enlarging RIGHT inguinal lymph node on image 79  measures 2.4 x 1.7 cm. No acute osseous finding.       Impression:         1.  Multiple inflamed small bowel loops in the pelvis. Wall thickening  and inflammation of the rectum and sigmoid colon. Findings are most  compatible with acute enterocolitis.  2.  Urinary bladder wall thickening, correlate for cystitis.  3.  Enlarging RIGHT inguinal lymph node.  This report was finalized on 07/16/2021 17:16 by Dr. Patrick Farrell MD.        Intake/Output    Intake/Output Summary (Last 24 hours) at 7/18/2021 1407  Last data filed at 7/18/2021 1402  Gross per 24 hour   Intake 4517.3 ml   Output 2650 ml   Net 1867.3 ml     Scheduled Meds  fentaNYL, 1 patch, Transdermal, Q72H  FLUoxetine, 20 mg, Oral, 4x Daily  gabapentin, 100 mg, Oral, 4x Daily  nystatin, 5 mL, Swish & Swallow, 4x Daily  sodium chloride, 10 mL, Intravenous, Q12H  vancomycin, 500 mg, Rectal, Q6H  vancomycin, 125 mg, Oral, Q6H      I have reviewed the patient current medications.     Assessment/Plan     Active Hospital Problems    Diagnosis    • **Clostridium difficile enterocolitis     • Metabolic encephalopathy    • Enterocolitis    • Dehydration    • Hypotension due to hypovolemia    • Encephalopathy    • Ataxia    • Self-care deficit    • Anemia of chronic disease    • Diarrhea    • Hypokalemia    • Primary squamous cell carcinoma of anal canal      Plan:  1.  To ER 7/16 with increasing diarrhea, confusion.  History of colorectal cancer followed by Dr. Canseco with Dr. Guthrie. Completed chemotherapy and radiation therapy.  Colostomy in place.  Increased diarrhea loose stool from ostomy and abdominal pain.  Patient reported falling after feeling dizzy.  Potassium 3.3, hemoglobin 8.7.  Normal saline fluid bolus Flagyl given in ER.  2.  CT scan of the abdomen multiple inflamed small bowel loops in the pelvis.  Wall thickening and inflammation of the rectum and sigmoid colon.  Findings compatible with acute enterocolitis.  3.  Clostridium difficile positive.  GI PCR panel negative.  Levaquin and Flagyl discontinued.  Vancomycin 125 mg orally every 6 hours x10 days started 7/17.  Vancomycin enema every 6 hours for 3 days started 7/17.  4.  Blood cultures no growth at 24 hours.  5.  Lost IV access but now has IV in place.  Continue IV fluids at 100 mL/h.  Blood pressure remains low 88/45, 98/51.  6.  Advance to regular diet.  7.  Potassium 3.3 on admission.  Replaced.  Potassium 3.9 today.  8.  Chronic anemia, chemotherapy-induced. Hemoglobin 8.3 today was 8.7 on admission. CBC tomorrow.  9.  Physical therapy consulted.  Generalized weakness, decreased strength, balance and endurance along with safety.  Recommend skilled facility.  Referral to [facility].  10.  Reviewed home medications.    CODE STATUS/Advanced Care Planning: Full code  The patient surrogate decision-maker is Idalia Glover, friend.     The above documentation resulted from a face-to-face encounter by [facility]    Discharge Planning: I expect patient to be discharged to [facility] when bed  offered.    Electronically signed by ADIN Joya, 7/18/2021, 14:07 CDT.        Electronically signed by Danielle Villagomez APRN at 07/18/21 1419     Danielle Villagomez APRN at 07/17/21 1602     Attestation signed by Raleigh Villa MD at 07/17/21 1901    I personally evaluated and examined the patient in conjunction with ADIN Godinez and agree with the assessment, treatment plan, and disposition of the patient as recorded by her. My history, exam, and further recommendations are:     Not a significant amount of diarrhea.  Disucssed with nurse using a cedeño to place vancomycin into the ostomy and letting it dwell.  3-5 minutes.      Patient lost IV and cannot get IVF tonight but output has improved and has been improving PO intake.      Will hold off on central venous catheter and let VAT team evaluate tomorrow.     Electronically signed by Raleigh Villa MD, 7/17/2021, 19:00 CDT.                      [facility] Medicine Services  INPATIENT PROGRESS NOTE    Length of Stay: 1  Date of Admission: 7/16/2021  Primary Care Physician: SAM Weaver MD    Subjective   Chief Complaint: Follow-up worsening diarrhea, confusion  HPI   To ER 7/16 with worsening diarrhea, abdominal pain, dizziness and unsteady gait.  Patient has history of colorectal cancer followed by Dr. Canseco and Dr. Guthrie.  He has completed radiation and chemotherapy.  Patient lives alone and unable to take care of herself.  Friend has been assisting patient.  Friend found ostomy bag off.  Potassium 3.3 with hemoglobin 8.7.    Lying in bed.  Tearful.  She tells me she hurts all over.  She plans to go to [place] as she is unable to take care of her self.  She complains of feeling weak.  She denies chest pain or palpitations.  No oxygen in use.  Ostomy in place with watery stool.  Stool culture positive for C. difficile.  Has been started on oral vancomycin and vancomycin per ostomy.   Social service assisting with discharge planning.  She denies fever or chills but reports abdominal pain.    Review of Systems   Constitutional: Positive for activity change, appetite change and fatigue. Negative for chills and fever.   HENT: Negative for congestion and trouble swallowing.    Eyes: Negative for photophobia and visual disturbance.   Respiratory: Negative for cough, shortness of breath and wheezing.    Cardiovascular: Negative for chest pain, palpitations and leg swelling.   Gastrointestinal: Positive for abdominal pain and diarrhea. Negative for nausea.        Ostomy with watery diarrhea.   Endocrine: Negative for cold intolerance, heat intolerance and polyuria.   Genitourinary: Negative for dysuria, frequency and urgency.   Musculoskeletal: Positive for gait problem.   Skin: Negative for color change, pallor, rash and wound.   Neurological: Positive for weakness. Negative for light-headedness and headaches.   Hematological: Negative for adenopathy. Does not bruise/bleed easily.   Psychiatric/Behavioral: Negative for agitation, behavioral problems and confusion.      All pertinent negatives and positives are as above. All other systems have been reviewed and are negative unless otherwise stated.     Objective    Temp:  [98.3 °F (36.8 °C)-98.5 °F (36.9 °C)] 98.4 °F (36.9 °C)  Heart Rate:  [62-86] 69  Resp:  [16-20] 16  BP: ()/(49-59) 105/59  Physical Exam  Vitals and nursing note reviewed.   Constitutional:       Appearance: She is ill-appearing (Chronically).      Comments: Lying in bed.  No oxygen use.  Tearful.   HENT:      Head: Normocephalic and atraumatic.      Nose: No congestion.      Mouth/Throat:      Pharynx: Oropharynx is clear. No oropharyngeal exudate.   Eyes:      Extraocular Movements: Extraocular movements intact.      Pupils: Pupils are equal, round, and reactive to light.   Cardiovascular:      Rate and Rhythm: Normal rate and regular rhythm.      Heart sounds: No murmur  heard.        Comments: Normal sinus rhythm 61-67 on telemetry  Pulmonary:      Breath sounds: No wheezing, rhonchi or rales.      Comments: No oxygen in use.  Abdominal:      Tenderness: There is abdominal tenderness (Lower abdomen.).      Comments: Ostomy left side of abdomen with liquid clear stool   Genitourinary:     Comments: Voiding.  Musculoskeletal:         General: No swelling or tenderness.      Cervical back: Normal range of motion and neck supple.      Comments: SCDs   Skin:     General: Skin is warm and dry.   Neurological:      General: No focal deficit present.      Mental Status: She is alert and oriented to person, place, and time.   Psychiatric:         Mood and Affect: Mood normal.         Behavior: Behavior normal.         Thought Content: Thought content normal.         Judgment: Judgment normal.       Results Review:  I have reviewed the labs, radiology results, and diagnostic studies.    Laboratory Data:      Results from last 7 days   Lab Units 07/17/21  0558 07/16/21  1457   WBC 10*3/mm3 3.04* 3.85   HEMOGLOBIN g/dL 9.3* 8.7*   HEMATOCRIT % 29.7* 27.6*   PLATELETS 10*3/mm3 132* 167        Results from last 7 days   Lab Units 07/17/21  0557 07/16/21  1637   SODIUM mmol/L 140 139   POTASSIUM mmol/L 3.6 3.3*   CHLORIDE mmol/L 107 105   CO2 mmol/L 21.0* 26.0   BUN mg/dL 6* 9   CREATININE mg/dL 0.44* 0.51*   GLUCOSE mg/dL 78 88   CALCIUM mg/dL 8.3* 8.4*   ALT (SGPT) U/L  --  28     Culture Data:      Blood Culture   Date Value Ref Range Status   07/16/2021 No growth at 24 hours  Preliminary   07/16/2021 No growth at 24 hours  Preliminary     t Component Value   Clostridium difficile (toxin A/B) PositiveAbnormal         Radiology Data:   Imaging Results (Last 7 Days)     Procedure Component Value Units Date/Time    CT Abdomen Pelvis With Contrast [[id number]] Collected: 07/16/21 1707     Updated: 07/16/21 1719    Narrative:      EXAM: CT ABDOMEN PELVIS W CONTRAST-     INDICATION: has open ostomy  with no bag for multiple days. Possible  fever. confusion. Hx colorectal/anal cancer     COMPARISON: 2/8/2021     DOSE LENGTH PRODUCT: 554 mGy cm. Automated exposure control was also  utilized to decrease patient radiation dose.     FINDINGS:     Included lung bases are clear. No suspicious liver lesion. Prior  cholecystectomy. Mild prominence of the biliary tree is stable and  likely reservoir phenomenon. Pancreas, spleen, and adrenal glands appear  normal. No solid renal mass. No urolithiasis or hydronephrosis. Mild  urinary bladder wall thickening.     During LEFT lower quadrant colostomy. Normal appendix. Wall thickening  of the ascending colon. Multiple small bowel loops in the pelvis  demonstrate wall thickening and mucosal hyperemia. A small amount of  reactive free pelvic fluid is present. Distal esophagus and stomach  appear unremarkable. No bowel obstruction.     Nonaneurysmal abdominal aorta with scattered atherosclerotic  calcification. No enlarged retroperitoneal, mesenteric, pelvic, or LEFT  inguinal lymph node. Enlarging RIGHT inguinal lymph node on image 79  measures 2.4 x 1.7 cm. No acute osseous finding.       Impression:         1.  Multiple inflamed small bowel loops in the pelvis. Wall thickening  and inflammation of the rectum and sigmoid colon. Findings are most  compatible with acute enterocolitis.  2.  Urinary bladder wall thickening, correlate for cystitis.  3.  Enlarging RIGHT inguinal lymph node.  This report was finalized on 07/16/2021 17:16 by Dr. Patrick Farrell MD.        Intake/Output    Intake/Output Summary (Last 24 hours) at 7/17/2021 1635  Last data filed at 7/17/2021 1605  Gross per 24 hour   Intake 1500 ml   Output 1100 ml   Net 400 ml     Scheduled Meds  fentaNYL, 1 patch, Transdermal, Q72H  FLUoxetine, 20 mg, Oral, 4x Daily  gabapentin, 100 mg, Oral, 4x Daily  nystatin, 5 mL, Swish & Swallow, 4x Daily  sodium chloride, 10 mL, Intravenous, Q12H  vancomycin, 500 mg, Rectal,  Q6H  vancomycin, 125 mg, Oral, Q6H      I have reviewed the patient current medications.     Assessment/Plan     Active Hospital Problems    Diagnosis    • **Clostridium difficile enterocolitis    • Metabolic encephalopathy    • Enterocolitis    • Dehydration    • Hypotension due to hypovolemia    • Encephalopathy    • Ataxia    • Self-care deficit    • Anemia of chronic disease    • Diarrhea    • Hypokalemia    • Primary squamous cell carcinoma of anal canal      Plan:  1.  To ER 7/16 with increasing diarrhea, confusion.  History of colorectal cancer followed by Dr. Canseco with Dr. Guthrie. Completed chemotherapy and radiation therapy.  Colostomy in place.  Increased diarrhea loose stool from ostomy and abdominal pain.  Patient reported falling after feeling dizzy.  Potassium 3.3, hemoglobin 8.7.  Normal saline fluid bolus Flagyl given in ER.  2.  CT scan of the abdomen multiple inflamed small bowel loops in the pelvis.  Wall thickening and inflammation of the rectum and sigmoid colon.  Findings compatible with acute enterocolitis.  3.  Levaquin and Flagyl started on admission.  Discontinued today.  4.  Clostridium difficile positive.  GI PCR panel negative. Vancomycin 125 mg orally every 6 hours x10 days started today.  Vancomycin enema every 6 hours for 3 days.  5.  Blood cultures no growth at 24 hours.  6.  Full liquid diet as tolerated.  7.  IV fluids normal saline with 20 mEq of potassium at 100 mL/h.  8.  Potassium 3.3.  Replaced.  Potassium 3.6 today.  BMP in a.m.  9.  Chronic anemia, chemotherapy-induced, hemoglobin 8.7 on admission, 9.3 today.  10.  Home medications reviewed.  11.  Social service consulted for discharge planning as patient unable to care for herself.  Referral to [facility].    CODE STATUS/Advanced Care Planning: Full code  The patient surrogate decision-maker is Idalia Glover, friend.    The above documentation resulted from a face-to-face encounter by sharon OAKLEY,  ACNP-BC.    Discharge Planning: I expect patient to be discharged to [facility] when bed offered and insurance approves.      Electronically signed by ADIN Joya, 7/17/2021, 16:35 CDT.        Electronically signed by Raleigh Villa MD at 07/17/21 1901       Consult Notes (last 48 hours) (Notes from 07/16/21 1528 through 07/18/21 1528)    No notes of this type exist for this encounter.

## 2021-07-18 NOTE — PROGRESS NOTES
[facility] Medicine Services  INPATIENT PROGRESS NOTE    Length of Stay: 2  Date of Admission: 7/16/2021  Primary Care Physician: SAM Weaver MD    Subjective   Chief Complaint: Follow-up worsening diarrhea  HPI   To ER 7/6 with worsening diarrhea, abdominal pain dizziness unsteady gait.  History of colorectal cancer followed by Dr. Canseco and Dr. Guthrie and has completed radiation chemotherapy.  Patient unable to take care of herself.  Friend unable to assist.    Lying in bed.  She complains of feeling weak.  Denies palpitations or chest pain.  No oxygen in use.  Ostomy in place with semiformed brown stool.  Stool culture positive for C. difficile.  Continue oral vancomycin and vancomycin per ostomy.  Skilled nursing facility at discharge.  Blood pressure running lower today.  We will continue IV fluids.  Advanced to regular diet and she tolerated.  Physical therapy notes decreased strength, decreased balance and safety.  Recommend skilled facility.  Lost IV access yesterday but now has IV in place.    Review of Systems   Constitutional: Positive for activity change, appetite change and fatigue. Negative for chills and fever.   HENT: Negative for congestion and trouble swallowing.    Eyes: Negative for photophobia and visual disturbance.   Respiratory: Negative for cough, shortness of breath and wheezing.    Cardiovascular: Negative for chest pain, palpitations and leg swelling.   Gastrointestinal: Positive for abdominal pain and diarrhea. Negative for nausea.   Ostomy   Endocrine: Negative for cold intolerance, heat intolerance and polyuria.   Genitourinary: Negative for dysuria, frequency and urgency.   Musculoskeletal: Positive for gait problem.   Skin: Negative for color change, pallor, rash and wound.   Neurological: Positive for weakness. Negative for light-headedness and headaches.   Hematological: Negative for adenopathy. Does not bruise/bleed easily.    Psychiatric/Behavioral: Negative for agitation, behavioral problems and confusion.     All pertinent negatives and positives are as above. All other systems have been reviewed and are negative unless otherwise stated.     Objective    Temp:  [97.6 °F (36.4 °C)-98.5 °F (36.9 °C)] 97.6 °F (36.4 °C)  Heart Rate:  [54-72] 72  Resp:  [16] 16  BP: ()/(45-58) 98/51  Physical Exam  Vitals and nursing note reviewed.   Constitutional:       Appearance: She is ill-appearing (Chronically).      Comments: Lying in bed.  No oxygen use.    HENT:      Head: Normocephalic and atraumatic.      Nose: No congestion.      Mouth/Throat:      Pharynx: Oropharynx is clear. No oropharyngeal exudate.   Eyes:      Extraocular Movements: Extraocular movements intact.      Pupils: Pupils are equal, round, and reactive to light.   Cardiovascular:      Rate and Rhythm: Normal rate and regular rhythm.      Heart sounds: No murmur heard.      Comments: Normal sinus rhythm 60-88 on telemetry  Pulmonary:      Breath sounds: No wheezing, rhonchi or rales.      Comments: No oxygen in use.  Abdominal:      Tenderness: There is abdominal tenderness (Lower abdomen.).      Comments: Ostomy left side of abdomen with brown semiformed stool.  Genitourinary:     Comments: Voiding.  Musculoskeletal:         General: No swelling or tenderness.      Cervical back: Normal range of motion and neck supple.      Comments: SCDs   Skin:     General: Skin is warm and dry.   Neurological:      General: No focal deficit present.      Mental Status: She is alert and oriented to person, place, and time.   Psychiatric:         Mood and Affect: Mood normal.         Behavior: Behavior normal.         Thought Content: Thought content normal.         Judgment: Judgment normal.      Results Review:  I have reviewed the labs, radiology results, and diagnostic studies.    Laboratory Data:      Results from last 7 days   Lab Units 07/18/21  0512 07/17/21  0558 07/16/21  1453    WBC 10*3/mm3 2.51* 3.04* 3.85   HEMOGLOBIN g/dL 8.3* 9.3* 8.7*   HEMATOCRIT % 27.4* 29.7* 27.6*   PLATELETS 10*3/mm3 142 132* 167      Results from last 7 days   Lab Units 07/18/21  0512 07/17/21  0557 07/16/21  1637   SODIUM mmol/L 139 140 139   POTASSIUM mmol/L 3.9 3.6 3.3*   CHLORIDE mmol/L 110* 107 105   CO2 mmol/L 23.0 21.0* 26.0   BUN mg/dL 6* 6* 9   CREATININE mg/dL 0.39* 0.44* 0.51*   GLUCOSE mg/dL 93 78 88   CALCIUM mg/dL 8.0* 8.3* 8.4*   ALT (SGPT) U/L  --   --  28     Culture Data:      Blood Culture   Date Value Ref Range Status   07/16/2021 No growth at 24 hours  Preliminary   07/16/2021 No growth at 24 hours  Preliminary     Radiology Data:   Imaging Results (Last 7 Days)     Procedure Component Value Units Date/Time    CT Abdomen Pelvis With Contrast [[id number]] Collected: 07/16/21 1707     Updated: 07/16/21 1719    Narrative:      EXAM: CT ABDOMEN PELVIS W CONTRAST-     INDICATION: has open ostomy with no bag for multiple days. Possible  fever. confusion. Hx colorectal/anal cancer     COMPARISON: 2/8/2021     DOSE LENGTH PRODUCT: 554 mGy cm. Automated exposure control was also  utilized to decrease patient radiation dose.     FINDINGS:     Included lung bases are clear. No suspicious liver lesion. Prior  cholecystectomy. Mild prominence of the biliary tree is stable and  likely reservoir phenomenon. Pancreas, spleen, and adrenal glands appear  normal. No solid renal mass. No urolithiasis or hydronephrosis. Mild  urinary bladder wall thickening.     During LEFT lower quadrant colostomy. Normal appendix. Wall thickening  of the ascending colon. Multiple small bowel loops in the pelvis  demonstrate wall thickening and mucosal hyperemia. A small amount of  reactive free pelvic fluid is present. Distal esophagus and stomach  appear unremarkable. No bowel obstruction.     Nonaneurysmal abdominal aorta with scattered atherosclerotic  calcification. No enlarged retroperitoneal, mesenteric, pelvic, or  LEFT  inguinal lymph node. Enlarging RIGHT inguinal lymph node on image 79  measures 2.4 x 1.7 cm. No acute osseous finding.       Impression:         1.  Multiple inflamed small bowel loops in the pelvis. Wall thickening  and inflammation of the rectum and sigmoid colon. Findings are most  compatible with acute enterocolitis.  2.  Urinary bladder wall thickening, correlate for cystitis.  3.  Enlarging RIGHT inguinal lymph node.  This report was finalized on 07/16/2021 17:16 by Dr. Patrick Farrell MD.        Intake/Output    Intake/Output Summary (Last 24 hours) at 7/18/2021 1407  Last data filed at 7/18/2021 1402  Gross per 24 hour   Intake 4517.3 ml   Output 2650 ml   Net 1867.3 ml     Scheduled Meds  fentaNYL, 1 patch, Transdermal, Q72H  FLUoxetine, 20 mg, Oral, 4x Daily  gabapentin, 100 mg, Oral, 4x Daily  nystatin, 5 mL, Swish & Swallow, 4x Daily  sodium chloride, 10 mL, Intravenous, Q12H  vancomycin, 500 mg, Rectal, Q6H  vancomycin, 125 mg, Oral, Q6H      I have reviewed the patient current medications.     Assessment/Plan     Active Hospital Problems    Diagnosis    • **Clostridium difficile enterocolitis    • Metabolic encephalopathy    • Enterocolitis    • Dehydration    • Hypotension due to hypovolemia    • Encephalopathy    • Ataxia    • Self-care deficit    • Anemia of chronic disease    • Diarrhea    • Hypokalemia    • Primary squamous cell carcinoma of anal canal      Plan:  1.  To ER 7/16 with increasing diarrhea, confusion.  History of colorectal cancer followed by Dr. Canseco with Dr. Guthrie. Completed chemotherapy and radiation therapy.  Colostomy in place.  Increased diarrhea loose stool from ostomy and abdominal pain.  Patient reported falling after feeling dizzy.  Potassium 3.3, hemoglobin 8.7.  Normal saline fluid bolus Flagyl given in ER.  2.  CT scan of the abdomen multiple inflamed small bowel loops in the pelvis.  Wall thickening and inflammation of the rectum and sigmoid colon.  Findings  compatible with acute enterocolitis.  3.  Clostridium difficile positive.  GI PCR panel negative.  Levaquin and Flagyl discontinued.  Vancomycin 125 mg orally every 6 hours x10 days started 7/17.  Vancomycin enema every 6 hours for 3 days started 7/17.  4.  Blood cultures no growth at 24 hours.  5.  Lost IV access but now has IV in place.  Continue IV fluids at 100 mL/h.  Blood pressure remains low 88/45, 98/51.  6.  Advance to regular diet.  7.  Potassium 3.3 on admission.  Replaced.  Potassium 3.9 today.  8.  Chronic anemia, chemotherapy-induced. Hemoglobin 8.3 today was 8.7 on admission. CBC tomorrow.  9.  Physical therapy consulted.  Generalized weakness, decreased strength, balance and endurance along with safety.  Recommend skilled facility.  Referral to [facility].  10.  Reviewed home medications.    CODE STATUS/Advanced Care Planning: Full code  The patient surrogate decision-maker is Idalia Glover, friend.     The above documentation resulted from a face-to-face encounter by me Danielle OAKLEY, [facility].    Discharge Planning: I expect patient to be discharged to [facility] when bed offered.    Electronically signed by ADIN Joya, 7/18/2021, 14:07 CDT.

## 2021-07-18 NOTE — THERAPY TREATMENT NOTE
Acute Care - Physical Therapy Treatment Note  [facility]     Patient Name: Lenora Kathleen  : 1960  MRN: [id number]  Today's Date: 2021           PT Assessment (last 12 hours)      PT Evaluation and Treatment     Row Name 21 1425          Physical Therapy Time and Intention    Subjective Information  complains of;pain  -AB     Document Type  therapy note (daily note)  -AB     Mode of Treatment  physical therapy  -AB     Comment  C. Diff, Ostomy  -AB     Row Name 21 142          General Information    Existing Precautions/Restrictions  fall  -AB     Row Name 21 1425          Bed Mobility    Supine-Sit [place] (Bed Mobility)  verbal cues;minimum assist (75% patient effort)  -AB     Sidelying-Sit [place] (Bed Mobility)  verbal cues;contact guard  -AB     Row Name 21 1425          Transfers    Sit-Stand [place] (Transfers)  contact guard  -AB     [place] Level (Toilet Transfer)  contact guard  -AB     Row Name 21 1425          Gait/Stairs (Locomotion)    [place] Level (Gait)  contact guard  -AB     Assistive Device (Gait)  -- pushed IV pole  -AB     Distance in Feet (Gait)  15' and then 75'  -AB     Row Name 21 1425          Positioning and Restraints    Pre-Treatment Position  in bed  -AB     Post Treatment Position  bed  -AB     In Bed  supine;call light within reach  -AB       User Key  (r) = Recorded By, (t) = Taken By, (c) = Cosigned By    Initials Name Provider Type    AB Sindi Paul, PTA Physical Therapy Assistant        Physical Therapy Education                 Title: PT OT SLP Therapies (In Progress)     Topic: Physical Therapy (In Progress)     Point: Mobility training (Done)     Learning Progress Summary           Patient Acceptance, E, VU,NR by SB at 2021 0912    Comment: pt edu on POC, benefits of act, d/c plans                   Point: Home exercise program (Not Started)     Learner Progress:  Not documented in this visit.           Point: Body mechanics (Done)     Learning Progress Summary           Patient Acceptance, E, VU,NR by SB at 7/17/2021 0912    Comment: pt edu on POC, benefits of act, d/c plans                   Point: Precautions (Done)     Learning Progress Summary           Patient Acceptance, E, VU,NR by SB at 7/17/2021 0912    Comment: pt edu on POC, benefits of act, d/c plans                               User Key     Initials Effective Dates Name Provider Type Discipline    SB 06/16/21 -  Tiff Stephens, PT DPT Physical Therapist PT              PT Recommendation and Plan             Time Calculation:   PT Charges     Row Name 07/18/21 1425             Time Calculation    Start Time  1425  -AB      Stop Time  1440  -AB      Time Calculation (min)  15 min  -AB      PT Received On  07/18/21  -AB         Time Calculation- PT    Total Timed Code Minutes- PT  15 minute(s)  -AB        User Key  (r) = Recorded By, (t) = Taken By, (c) = Cosigned By    Initials Name Provider Type    AB Sindi Paul PTA Physical Therapy Assistant        Therapy Charges for Today     Code Description Service Date Service Provider Modifiers Qty    74871055623 HC GAIT TRAINING EA 15 MIN 7/18/2021 Sindi Paul PTA GP 1          PT G-Codes  Outcome Measure Options: AM-PAC 6 Clicks Daily Activity (OT)  AM-PAC 6 Clicks Score (PT): 17  AM-PAC 6 Clicks Score (OT): 15    Sindi Paul PTA  7/18/2021

## 2021-07-18 NOTE — PLAN OF CARE
Goal Outcome Evaluation:  Plan of Care Reviewed With: patient        Progress: improving  Outcome Summary: Pt has been more alert and oriented this shift; up with assistance to BR, voiding; output in ostomy; Right midline IV with IVF continue; PO vanco and vanco enema continues; will continue to monitor.

## 2021-07-19 LAB
ANION GAP SERPL CALCULATED.3IONS-SCNC: 6 MMOL/L (ref 5–15)
BASOPHILS # BLD AUTO: 0.02 10*3/MM3 (ref 0–0.2)
BASOPHILS NFR BLD AUTO: 0.6 % (ref 0–1.5)
BUN SERPL-MCNC: 6 MG/DL (ref 8–23)
BUN/CREAT SERPL: 13 (ref 7–25)
CALCIUM SPEC-SCNC: 8.2 MG/DL (ref 8.6–10.5)
CHLORIDE SERPL-SCNC: 112 MMOL/L (ref 98–107)
CO2 SERPL-SCNC: 22 MMOL/L (ref 22–29)
CREAT SERPL-MCNC: 0.46 MG/DL (ref 0.57–1)
DEPRECATED RDW RBC AUTO: 68.4 FL (ref 37–54)
EOSINOPHIL # BLD AUTO: 0.04 10*3/MM3 (ref 0–0.4)
EOSINOPHIL NFR BLD AUTO: 1.2 % (ref 0.3–6.2)
ERYTHROCYTE [DISTWIDTH] IN BLOOD BY AUTOMATED COUNT: 21.1 % (ref 12.3–15.4)
GFR SERPL CREATININE-BSD FRML MDRD: 138 ML/MIN/1.73
GLUCOSE SERPL-MCNC: 94 MG/DL (ref 65–99)
HCT VFR BLD AUTO: 27.4 % (ref 34–46.6)
HGB BLD-MCNC: 8.7 G/DL (ref 12–15.9)
IMM GRANULOCYTES # BLD AUTO: 0.11 10*3/MM3 (ref 0–0.05)
IMM GRANULOCYTES NFR BLD AUTO: 3.4 % (ref 0–0.5)
LYMPHOCYTES # BLD AUTO: 1.19 10*3/MM3 (ref 0.7–3.1)
LYMPHOCYTES NFR BLD AUTO: 36.3 % (ref 19.6–45.3)
MCH RBC QN AUTO: 29.7 PG (ref 26.6–33)
MCHC RBC AUTO-ENTMCNC: 31.8 G/DL (ref 31.5–35.7)
MCV RBC AUTO: 93.5 FL (ref 79–97)
MONOCYTES # BLD AUTO: 0.34 10*3/MM3 (ref 0.1–0.9)
MONOCYTES NFR BLD AUTO: 10.4 % (ref 5–12)
NEUTROPHILS NFR BLD AUTO: 1.58 10*3/MM3 (ref 1.7–7)
NEUTROPHILS NFR BLD AUTO: 48.1 % (ref 42.7–76)
NRBC BLD AUTO-RTO: 0 /100 WBC (ref 0–0.2)
PLATELET # BLD AUTO: 151 10*3/MM3 (ref 140–450)
PMV BLD AUTO: 11.3 FL (ref 6–12)
POTASSIUM SERPL-SCNC: 4.4 MMOL/L (ref 3.5–5.2)
RBC # BLD AUTO: 2.93 10*6/MM3 (ref 3.77–5.28)
SODIUM SERPL-SCNC: 140 MMOL/L (ref 136–145)
WBC # BLD AUTO: 3.28 10*3/MM3 (ref 3.4–10.8)

## 2021-07-19 PROCEDURE — 25810000003 SODIUM CHLORIDE 0.9 % WITH KCL 20 MEQ 20-0.9 MEQ/L-% SOLUTION: Performed by: NURSE PRACTITIONER

## 2021-07-19 PROCEDURE — 80048 BASIC METABOLIC PNL TOTAL CA: CPT | Performed by: NURSE PRACTITIONER

## 2021-07-19 PROCEDURE — 97116 GAIT TRAINING THERAPY: CPT

## 2021-07-19 PROCEDURE — 25010000002 ONDANSETRON PER 1 MG: Performed by: NURSE PRACTITIONER

## 2021-07-19 PROCEDURE — 85025 COMPLETE CBC W/AUTO DIFF WBC: CPT | Performed by: NURSE PRACTITIONER

## 2021-07-19 RX ADMIN — FLUOXETINE HYDROCHLORIDE 20 MG: 20 CAPSULE ORAL at 22:12

## 2021-07-19 RX ADMIN — ALPRAZOLAM 0.25 MG: 0.25 TABLET ORAL at 03:17

## 2021-07-19 RX ADMIN — OXYCODONE HYDROCHLORIDE AND ACETAMINOPHEN 1 TABLET: 5; 325 TABLET ORAL at 18:51

## 2021-07-19 RX ADMIN — GABAPENTIN 100 MG: 100 CAPSULE ORAL at 17:53

## 2021-07-19 RX ADMIN — VANCOMYCIN HYDROCHLORIDE 125 MG: KIT at 12:15

## 2021-07-19 RX ADMIN — NYSTATIN 500000 UNITS: 100000 SUSPENSION ORAL at 08:45

## 2021-07-19 RX ADMIN — GABAPENTIN 100 MG: 100 CAPSULE ORAL at 12:15

## 2021-07-19 RX ADMIN — NYSTATIN 500000 UNITS: 100000 SUSPENSION ORAL at 22:11

## 2021-07-19 RX ADMIN — ONDANSETRON 4 MG: 2 INJECTION INTRAMUSCULAR; INTRAVENOUS at 08:45

## 2021-07-19 RX ADMIN — OXYCODONE HYDROCHLORIDE AND ACETAMINOPHEN 1 TABLET: 5; 325 TABLET ORAL at 12:43

## 2021-07-19 RX ADMIN — FLUOXETINE HYDROCHLORIDE 20 MG: 20 CAPSULE ORAL at 17:53

## 2021-07-19 RX ADMIN — FLUOXETINE HYDROCHLORIDE 20 MG: 20 CAPSULE ORAL at 08:45

## 2021-07-19 RX ADMIN — OXYCODONE HYDROCHLORIDE AND ACETAMINOPHEN 1 TABLET: 5; 325 TABLET ORAL at 06:12

## 2021-07-19 RX ADMIN — GABAPENTIN 100 MG: 100 CAPSULE ORAL at 22:12

## 2021-07-19 RX ADMIN — VANCOMYCIN HYDROCHLORIDE 125 MG: KIT at 17:53

## 2021-07-19 RX ADMIN — GABAPENTIN 100 MG: 100 CAPSULE ORAL at 08:45

## 2021-07-19 RX ADMIN — VANCOMYCIN HYDROCHLORIDE 125 MG: KIT at 06:19

## 2021-07-19 RX ADMIN — NYSTATIN 500000 UNITS: 100000 SUSPENSION ORAL at 17:53

## 2021-07-19 RX ADMIN — NYSTATIN 500000 UNITS: 100000 SUSPENSION ORAL at 12:15

## 2021-07-19 RX ADMIN — FENTANYL 1 PATCH: 50 PATCH, EXTENDED RELEASE TRANSDERMAL at 22:14

## 2021-07-19 RX ADMIN — FLUOXETINE HYDROCHLORIDE 20 MG: 20 CAPSULE ORAL at 12:15

## 2021-07-19 RX ADMIN — SODIUM CHLORIDE, PRESERVATIVE FREE 10 ML: 5 INJECTION INTRAVENOUS at 22:13

## 2021-07-19 RX ADMIN — ALPRAZOLAM 0.25 MG: 0.25 TABLET ORAL at 22:27

## 2021-07-19 RX ADMIN — POTASSIUM CHLORIDE AND SODIUM CHLORIDE 100 ML/HR: 900; 150 INJECTION, SOLUTION INTRAVENOUS at 02:53

## 2021-07-19 NOTE — PROGRESS NOTES
Continued Stay Note  [facility]     Patient Name: Lenora Kathleen  MRN: [id number]  Today's Date: 7/19/2021    Admit Date: 7/16/2021    Discharge Plan     Row Name 07/19/21 1209       Plan    Plan Comments  Xiomara Suarez is aware of referral and will eval shortly. Await answer from Daniela.        Discharge Codes    No documentation.             ROSSANA Jara

## 2021-07-19 NOTE — THERAPY TREATMENT NOTE
Acute Care - Physical Therapy Treatment Note  [facility]     Patient Name: Lenora Kathleen  : 1960  MRN: [id number]  Today's Date: 2021           PT Assessment (last 12 hours)      PT Evaluation and Treatment     Row Name 21 1402 21 0812       Physical Therapy Time and Intention    Subjective Information  complains of;pain  -RACIEL  --    Document Type  therapy note (daily note)  -RACIEL  --    Mode of Treatment  physical therapy  -RACIEL  physical therapy  -RACIEL    Session Not Performed  --  patient/family declined treatment  -RACIEL    Comment, Session Not Performed  --  pt refused, stated she was in too much pain to try to amb,  C/o falling prior to admit and has had increase back pain,   Will check on pt this afternoon   -RACIEL    Comment  c-diff, ostomy  -RACIEL  --    Row Name 21 1402          General Information    Existing Precautions/Restrictions  fall  -RACIEL     Barriers to Rehab  cognitive status  -RACIEL     Row Name 21 1402          Pain Scale: Numbers Pre/Post-Treatment    Pretreatment Pain Rating  8/10  -RACIEL     Posttreatment Pain Rating  8/10  -RACIEL     Pain Location  back  -RACIEL     Row Name 21 1402          Bed Mobility    Supine-Sit [place] (Bed Mobility)  verbal cues;contact guard  -RACIEL     Sidelying-Sit [place] (Bed Mobility)  verbal cues;contact guard  -RACIEL     Row Name 21 1402          Transfers    Sit-Stand [place] (Transfers)  verbal cues;contact guard  -RACIEL     [place] Level (Toilet Transfer)  verbal cues;contact guard  -RACIEL     Row Name 21 1402          Gait/Stairs (Locomotion)    [place] Level (Gait)  verbal cues;contact guard  -RACIEL     Distance in Feet (Gait)  75' X 2  -RACIEL     Deviations/Abnormal Patterns (Gait)  stride length decreased  -RACIEL     Bilateral Gait Deviations  forward flexed posture  -RACIEL     Row Name 21 1402          Positioning and Restraints    Pre-Treatment Position  in bed  -RACIEL     Post Treatment Position  bed  -RACIEL     In Bed   fowlers;call light within reach;encouraged to call for assist;exit alarm on;side rails up x2  -RACIEL       User Key  (r) = Recorded By, (t) = Taken By, (c) = Cosigned By    Initials Name Provider Type    RACIEL Richard Holloway, PTA Physical Therapy Assistant        Physical Therapy Education                 Title: PT OT SLP Therapies (In Progress)     Topic: Physical Therapy (In Progress)     Point: Mobility training (Done)     Learning Progress Summary           Patient Acceptance, E, VU,NR by SB at 7/17/2021 0912    Comment: pt edu on POC, benefits of act, d/c plans                   Point: Home exercise program (Not Started)     Learner Progress:  Not documented in this visit.          Point: Body mechanics (Done)     Learning Progress Summary           Patient Acceptance, E, VU,NR by SB at 7/17/2021 0912    Comment: pt edu on POC, benefits of act, d/c plans                   Point: Precautions (Done)     Learning Progress Summary           Patient Acceptance, E, VU,NR by SB at 7/17/2021 0912    Comment: pt edu on POC, benefits of act, d/c plans                               User Key     Initials Effective Dates Name Provider Type Discipline     06/16/21 -  Tiff Stephens, PT DPT Physical Therapist PT              PT Recommendation and Plan     Plan of Care Reviewed With: patient  Progress: improving  Outcome Summary: Pt performed bed mobility with CGA.  Transfered sit to stand with CGA.  Amb 75' X 2 with CGA, pt had decrease gait speed and would frequently try to reach for the rail or IV pole, cues to correct.  Pt would forget cues, and needed to be repeated multiple times for pt to follow.       Time Calculation:   PT Charges     Row Name 07/19/21 1402             Time Calculation    Start Time  1402  -RACIEL      Stop Time  1429  -RACIEL      Time Calculation (min)  27 min  -RACIEL      PT Received On  07/19/21  -RACIEL         Time Calculation- PT    Total Timed Code Minutes- PT  27 minute(s)  -RACIEL         Timed Charges    59324  - Gait Training Minutes   27  -RACIEL         Total Minutes    Timed Charges Total Minutes  27  -RACIEL       Total Minutes  27  -RACIEL        User Key  (r) = Recorded By, (t) = Taken By, (c) = Cosigned By    Initials Name Provider Type    Richard Huerta PTA Physical Therapy Assistant        Therapy Charges for Today     Code Description Service Date Service Provider Modifiers Qty    55308280734 HC GAIT TRAINING EA 15 MIN 7/19/2021 Richard Holloway PTA GP 2          PT G-Codes  Outcome Measure Options: AM-PAC 6 Clicks Daily Activity (OT)  AM-PAC 6 Clicks Score (PT): 17  AM-PAC 6 Clicks Score (OT): 15    Richard Holloway PTA  7/19/2021

## 2021-07-19 NOTE — PLAN OF CARE
Goal Outcome Evaluation:  Plan of Care Reviewed With: patient           Outcome Summary: pt c/o pain x2 see mar for interventions; c/o nausea x1 see mar; stool per ostomy soft; DC'd IVF; voids; VSS; safety maintained

## 2021-07-19 NOTE — PROGRESS NOTES
[facility] Medicine Services  INPATIENT PROGRESS NOTE    Length of Stay: 3  Date of Admission: 7/16/2021  Primary Care Physician: SAM Weaver MD    Subjective   Chief Complaint: Follow-up worsening diarrhea  HPI   Lying in bed.  She feels some better today after having regular food.  Ostomy with semiformed brown stool.  No watery diarrhea.  Denies chest pain, palpitations, shortness of breath.  No oxygen in use.  Blood pressure improved.  Will discontinue IV fluids.  Patient ambulated 75 feet x 2 with contact-guard with assistance of physical therapy.  Decreased gait speed and would reach for the rail or IV pole.  Required cues to correct.  Got cues multiple times.  Needs skilled facility.  Awaiting [facility] decision.    Review of Systems   Constitutional: Positive for activity change, appetite change and fatigue. Negative for chills and fever.   HENT: Negative for congestion and trouble swallowing.    Eyes: Negative for photophobia and visual disturbance.   Respiratory: Negative for cough, shortness of breath and wheezing.    Cardiovascular: Negative for chest pain, palpitations and leg swelling.   Gastrointestinal: Positive for abdominal pain and diarrhea. Negative for nausea.   Ostomy   Endocrine: Negative for cold intolerance, heat intolerance and polyuria.   Genitourinary: Negative for dysuria, frequency and urgency.   Musculoskeletal: Positive for gait problem.   Skin: Negative for color change, pallor, rash and wound.   Neurological: Positive for weakness. Negative for light-headedness and headaches.   Hematological: Negative for adenopathy. Does not bruise/bleed easily.   Psychiatric/Behavioral: Negative for agitation, behavioral problems and confusion.     All pertinent negatives and positives are as above. All other systems have been reviewed and are negative unless otherwise stated.     Objective    Temp:  [98.2 °F (36.8 °C)-98.9 °F (37.2 °C)] 98.9 °F (37.2  °C)  Heart Rate:  [62-83] 64  Resp:  [16] 16  BP: (109-120)/(55-67) 120/58  Physical Exam  Vitals and nursing note reviewed.   Constitutional:       Appearance: She is ill-appearing (Chronically).      Comments: Lying in bed.  No oxygen use.    HENT:      Head: Normocephalic and atraumatic.      Nose: No congestion.      Mouth/Throat:      Pharynx: Oropharynx is clear. No oropharyngeal exudate.   Eyes:      Extraocular Movements: Extraocular movements intact.      Pupils: Pupils are equal, round, and reactive to light.   Cardiovascular:      Rate and Rhythm: Normal rate and regular rhythm.      Heart sounds: No murmur heard.      Comments: Normal sinus rhythm  on telemetry  Pulmonary:      Breath sounds: No wheezing, rhonchi or rales.      Comments: No oxygen in use.  Abdominal:      Tenderness: There is abdominal tenderness (Lower abdomen.).      Comments: Ostomy left side of abdomen with brown semiformed stool.  Genitourinary:     Comments: Voiding.  Musculoskeletal:         General: No swelling or tenderness.      Cervical back: Normal range of motion and neck supple.      Comments: SCDs   Skin:     General: Skin is warm and dry.   Neurological:      General: No focal deficit present.      Mental Status: She is alert and oriented to person, place, and time.   Psychiatric:         Mood and Affect: Mood normal.         Behavior: Behavior normal.         Thought Content: Thought content normal.         Judgment: Judgment normal.      Results Review:  I have reviewed the labs, radiology results, and diagnostic studies.    Laboratory Data:      Results from last 7 days   Lab Units 07/19/21  0557 07/18/21  0512 07/17/21  0558 07/16/21  1457   WBC 10*3/mm3 3.28* 2.51* 3.04* 3.85   HEMOGLOBIN g/dL 8.7* 8.3* 9.3* 8.7*   HEMATOCRIT % 27.4* 27.4* 29.7* 27.6*   PLATELETS 10*3/mm3 151 142 132* 167      Results from last 7 days   Lab Units 07/19/21  0557 07/18/21  0512 07/17/21  0557 07/16/21  1637   SODIUM mmol/L 140 139 140  139   POTASSIUM mmol/L 4.4 3.9 3.6 3.3*   CHLORIDE mmol/L 112* 110* 107 105   CO2 mmol/L 22.0 23.0 21.0* 26.0   BUN mg/dL 6* 6* 6* 9   CREATININE mg/dL 0.46* 0.39* 0.44* 0.51*   GLUCOSE mg/dL 94 93 78 88   CALCIUM mg/dL 8.2* 8.0* 8.3* 8.4*   ALT (SGPT) U/L  --   --   --  28     Culture Data:      Blood Culture   Date Value Ref Range Status   07/16/2021 No growth at 24 hours  Preliminary   07/16/2021 No growth at 24 hours  Preliminary     Component Value   Clostridium difficile (toxin A/B) PositiveAbnormal      Radiology Data:   Imaging Results (Last 7 Days)     Procedure Component Value Units Date/Time    CT Abdomen Pelvis With Contrast [[id number]] Collected: 07/16/21 1707     Updated: 07/16/21 1719    Narrative:      EXAM: CT ABDOMEN PELVIS W CONTRAST-     INDICATION: has open ostomy with no bag for multiple days. Possible  fever. confusion. Hx colorectal/anal cancer     COMPARISON: 2/8/2021     DOSE LENGTH PRODUCT: 554 mGy cm. Automated exposure control was also  utilized to decrease patient radiation dose.     FINDINGS:     Included lung bases are clear. No suspicious liver lesion. Prior  cholecystectomy. Mild prominence of the biliary tree is stable and  likely reservoir phenomenon. Pancreas, spleen, and adrenal glands appear  normal. No solid renal mass. No urolithiasis or hydronephrosis. Mild  urinary bladder wall thickening.     During LEFT lower quadrant colostomy. Normal appendix. Wall thickening  of the ascending colon. Multiple small bowel loops in the pelvis  demonstrate wall thickening and mucosal hyperemia. A small amount of  reactive free pelvic fluid is present. Distal esophagus and stomach  appear unremarkable. No bowel obstruction.     Nonaneurysmal abdominal aorta with scattered atherosclerotic  calcification. No enlarged retroperitoneal, mesenteric, pelvic, or LEFT  inguinal lymph node. Enlarging RIGHT inguinal lymph node on image 79  measures 2.4 x 1.7 cm. No acute osseous finding.        Impression:         1.  Multiple inflamed small bowel loops in the pelvis. Wall thickening  and inflammation of the rectum and sigmoid colon. Findings are most  compatible with acute enterocolitis.  2.  Urinary bladder wall thickening, correlate for cystitis.  3.  Enlarging RIGHT inguinal lymph node.  This report was finalized on 07/16/2021 17:16 by Dr. Patrick Farrell MD.        Intake/Output    Intake/Output Summary (Last 24 hours) at 7/19/2021 1530  Last data filed at 7/19/2021 1527  Gross per 24 hour   Intake 120 ml   Output 1550 ml   Net -1430 ml     Scheduled Meds  fentaNYL, 1 patch, Transdermal, Q72H  FLUoxetine, 20 mg, Oral, 4x Daily  gabapentin, 100 mg, Oral, 4x Daily  nystatin, 5 mL, Swish & Swallow, 4x Daily  sodium chloride, 10 mL, Intravenous, Q12H  vancomycin, 125 mg, Oral, Q6H      I have reviewed the patient current medications.     Assessment/Plan     Active Hospital Problems    Diagnosis    • **Clostridium difficile enterocolitis    • Metabolic encephalopathy    • Enterocolitis    • Dehydration    • Hypotension due to hypovolemia    • Encephalopathy    • Ataxia    • Self-care deficit    • Anemia of chronic disease    • Diarrhea    • Hypokalemia    • Primary squamous cell carcinoma of anal canal      Plan:  1.  To ER 7/16 with increasing diarrhea, confusion.  History of colorectal cancer followed by Dr. Canseco and Dr. Guthrie. Completed chemotherapy and radiation therapy.  Colostomy in place.  Increased diarrhea loose stool from ostomy and abdominal pain.  Patient reported falling after feeling dizzy.  Potassium 3.3, hemoglobin 8.7.  Normal saline fluid bolus Flagyl given in ER.  2.  CT scan of the abdomen multiple inflamed small bowel loops in the pelvis.  Wall thickening and inflammation of the rectum and sigmoid colon.  Findings compatible with acute enterocolitis.  3.  Clostridium difficile positive.  GI PCR panel negative.  Levaquin and Flagyl discontinued.  Vancomycin 125 mg orally every 6  hours x10 days started 7/17.  Vancomycin enema every 6 hours for 3 days started 7/17.  Will complete vancomycin enemas today.  4.  Blood cultures no growth at 2 days  5.  Tolerating regular diet.  6.  Blood pressure improved 120/58, 109/55.  Discontinue IV fluids.  7.  Potassium 3.3 on admission.  Replaced.  Potassium 34.4 today.  8.  Chronic anemia, chemotherapy-induced. Hemoglobin 8.7 today was 8.7 on admission.   9.  Physical therapy consulted.  Generalized weakness, decreased strength, balance and endurance along with safety.  Walked 75 feet x 2 with contact-guard.  Required recurrent cues repeatedly as patient would reach for IV pole and rail. Referral to [facility] and awaiting decision.  10.  Home medications reviewed.    CODE STATUS/Advanced Care Planning: Full code  The patient surrogate decision-maker is Idalia Glover, friend.     The above documentation resulted from a face-to-face encounter by me Danielle OAKLEY, [facility].    Discharge Planning: I expect patient to be discharged to [facility] when bed offered.    Electronically signed by ADIN Joya, 7/19/2021, 15:30 CDT.

## 2021-07-19 NOTE — PLAN OF CARE
Goal Outcome Evaluation:  Plan of Care Reviewed With: patient        Progress: improving  Outcome Summary: Pt performed bed mobility with CGA.  Transfered sit to stand with CGA.  Amb 75' X 2 with CGA, pt had decrease gait speed and would frequently try to reach for the rail or IV pole, cues to correct.  Pt would forget cues, and needed to be repeated multiple times for pt to follow.

## 2021-07-19 NOTE — PLAN OF CARE
Goal Outcome Evaluation:              Outcome Summary: RD nutrition assessment completed.  Pt's po intake is 50% of 1 meal.  Will continue to monitor po intake, and follow for further d/c needs

## 2021-07-20 LAB
ANION GAP SERPL CALCULATED.3IONS-SCNC: 11 MMOL/L (ref 5–15)
BUN SERPL-MCNC: 4 MG/DL (ref 8–23)
BUN/CREAT SERPL: 10.3 (ref 7–25)
CALCIUM SPEC-SCNC: 8.5 MG/DL (ref 8.6–10.5)
CHLORIDE SERPL-SCNC: 108 MMOL/L (ref 98–107)
CO2 SERPL-SCNC: 20 MMOL/L (ref 22–29)
CREAT SERPL-MCNC: 0.39 MG/DL (ref 0.57–1)
DEPRECATED RDW RBC AUTO: 67.2 FL (ref 37–54)
ERYTHROCYTE [DISTWIDTH] IN BLOOD BY AUTOMATED COUNT: 21.1 % (ref 12.3–15.4)
GFR SERPL CREATININE-BSD FRML MDRD: >150 ML/MIN/1.73
GLUCOSE SERPL-MCNC: 87 MG/DL (ref 65–99)
HCT VFR BLD AUTO: 28.9 % (ref 34–46.6)
HGB BLD-MCNC: 9 G/DL (ref 12–15.9)
MCH RBC QN AUTO: 28.8 PG (ref 26.6–33)
MCHC RBC AUTO-ENTMCNC: 31.1 G/DL (ref 31.5–35.7)
MCV RBC AUTO: 92.3 FL (ref 79–97)
PLATELET # BLD AUTO: 148 10*3/MM3 (ref 140–450)
PMV BLD AUTO: 10.8 FL (ref 6–12)
POTASSIUM SERPL-SCNC: 4.3 MMOL/L (ref 3.5–5.2)
RBC # BLD AUTO: 3.13 10*6/MM3 (ref 3.77–5.28)
SODIUM SERPL-SCNC: 139 MMOL/L (ref 136–145)
WBC # BLD AUTO: 3.9 10*3/MM3 (ref 3.4–10.8)

## 2021-07-20 PROCEDURE — 85027 COMPLETE CBC AUTOMATED: CPT | Performed by: NURSE PRACTITIONER

## 2021-07-20 PROCEDURE — 97535 SELF CARE MNGMENT TRAINING: CPT

## 2021-07-20 PROCEDURE — 99222 1ST HOSP IP/OBS MODERATE 55: CPT | Performed by: NURSE PRACTITIONER

## 2021-07-20 PROCEDURE — 63710000001 ONDANSETRON PER 8 MG: Performed by: NURSE PRACTITIONER

## 2021-07-20 PROCEDURE — 80048 BASIC METABOLIC PNL TOTAL CA: CPT | Performed by: NURSE PRACTITIONER

## 2021-07-20 PROCEDURE — 97116 GAIT TRAINING THERAPY: CPT

## 2021-07-20 RX ADMIN — SODIUM CHLORIDE, PRESERVATIVE FREE 10 ML: 5 INJECTION INTRAVENOUS at 09:11

## 2021-07-20 RX ADMIN — ACETAMINOPHEN 650 MG: 325 TABLET, FILM COATED ORAL at 10:51

## 2021-07-20 RX ADMIN — NYSTATIN 500000 UNITS: 100000 SUSPENSION ORAL at 14:01

## 2021-07-20 RX ADMIN — OXYCODONE HYDROCHLORIDE AND ACETAMINOPHEN 1 TABLET: 5; 325 TABLET ORAL at 00:43

## 2021-07-20 RX ADMIN — GABAPENTIN 100 MG: 100 CAPSULE ORAL at 21:40

## 2021-07-20 RX ADMIN — NYSTATIN 500000 UNITS: 100000 SUSPENSION ORAL at 18:46

## 2021-07-20 RX ADMIN — FLUOXETINE HYDROCHLORIDE 20 MG: 20 CAPSULE ORAL at 18:46

## 2021-07-20 RX ADMIN — FLUOXETINE HYDROCHLORIDE 20 MG: 20 CAPSULE ORAL at 21:40

## 2021-07-20 RX ADMIN — VANCOMYCIN HYDROCHLORIDE 125 MG: KIT at 06:42

## 2021-07-20 RX ADMIN — FLUOXETINE HYDROCHLORIDE 20 MG: 20 CAPSULE ORAL at 09:11

## 2021-07-20 RX ADMIN — FLUOXETINE HYDROCHLORIDE 20 MG: 20 CAPSULE ORAL at 14:00

## 2021-07-20 RX ADMIN — GABAPENTIN 100 MG: 100 CAPSULE ORAL at 09:11

## 2021-07-20 RX ADMIN — NYSTATIN 500000 UNITS: 100000 SUSPENSION ORAL at 09:11

## 2021-07-20 RX ADMIN — OXYCODONE HYDROCHLORIDE AND ACETAMINOPHEN 1 TABLET: 5; 325 TABLET ORAL at 06:42

## 2021-07-20 RX ADMIN — OXYCODONE HYDROCHLORIDE AND ACETAMINOPHEN 1 TABLET: 5; 325 TABLET ORAL at 21:40

## 2021-07-20 RX ADMIN — VANCOMYCIN HYDROCHLORIDE 125 MG: KIT at 00:43

## 2021-07-20 RX ADMIN — SODIUM CHLORIDE, PRESERVATIVE FREE 10 ML: 5 INJECTION INTRAVENOUS at 21:41

## 2021-07-20 RX ADMIN — NYSTATIN 500000 UNITS: 100000 SUSPENSION ORAL at 21:40

## 2021-07-20 RX ADMIN — VANCOMYCIN HYDROCHLORIDE 125 MG: KIT at 18:46

## 2021-07-20 RX ADMIN — OXYCODONE HYDROCHLORIDE AND ACETAMINOPHEN 1 TABLET: 5; 325 TABLET ORAL at 15:59

## 2021-07-20 RX ADMIN — GABAPENTIN 100 MG: 100 CAPSULE ORAL at 18:46

## 2021-07-20 RX ADMIN — ALPRAZOLAM 0.25 MG: 0.25 TABLET ORAL at 21:40

## 2021-07-20 RX ADMIN — GABAPENTIN 100 MG: 100 CAPSULE ORAL at 14:00

## 2021-07-20 RX ADMIN — VANCOMYCIN HYDROCHLORIDE 125 MG: KIT at 14:01

## 2021-07-20 RX ADMIN — ONDANSETRON 4 MG: 4 TABLET, FILM COATED ORAL at 09:21

## 2021-07-20 NOTE — CASE MANAGEMENT/SOCIAL WORK
Continued Stay Note  [facility]     Patient Name: Lenora Kathleen  MRN: [id number]  Today's Date: 7/20/2021    Admit Date: 7/16/2021    Discharge Plan     Row Name 07/20/21 0904       Plan    Plan Comments  SW spoke with Xiomara at [facility]. They are looking into whether or not they have a private room for PT. If they do not, one of their other facilities may have one. They will call the family to verify a different building will be ok. A pre-cert is required adn will not be initiated until a private room is found and agreed upon by family.        Discharge Codes    No documentation.             CELINE Nguyen

## 2021-07-20 NOTE — THERAPY TREATMENT NOTE
Acute Care - Occupational Therapy Treatment Note  [facility]     Patient Name: Lenora Kathleen  : 1960  MRN: [id number]  Today's Date: 2021             Admit Date: 2021       ICD-10-CM ICD-9-CM   1. Dehydration  E86.0 276.51   2. Failure to thrive in adult  R62.7 783.7   3. Enterocolitis  K52.9 558.9   4. Impaired mobility  Z74.09 799.89   5. Impaired mobility and ADLs  Z74.09 V49.89    Z78.9      Patient Active Problem List   Diagnosis   • Primary squamous cell carcinoma of anal canal   • Current every day smoker   • Rectovaginal fistula   • Severe malnutrition (CMS/HCC)   • Prediabetes   • Anxiety   • Enterocolitis   • Dehydration   • Hypotension due to hypovolemia   • Encephalopathy   • Ataxia   • Self-care deficit   • Anemia of chronic disease   • Diarrhea   • Hypokalemia   • Clostridium difficile enterocolitis   • Metabolic encephalopathy     Past Medical History:   Diagnosis Date   • Anxiety    • Blood in urine    • Chest tightness    • Colon polyps    • COPD (chronic obstructive pulmonary disease) (CMS/HCC)    • Depression    • Emphysema/COPD (CMS/HCC)    • Hepatitis C    • Hypertension    • Injury of back    • Kidney stone    • Palpitation    • PONV (postoperative nausea and vomiting)    • Rectal cancer (CMS/HCC)      Past Surgical History:   Procedure Laterality Date   • CHOLECYSTECTOMY     • COLONOSCOPY  2015    normal   • COLOSTOMY N/A 2021    Procedure: LAPAROSCOPIC DIVERTING COLOSTOMY;  Surgeon: Paulette Villegas MD;  Location: [facility];  Service: General;  Laterality: N/A;   • FINGER SURGERY     • HYSTERECTOMY     • KIDNEY STONE SURGERY     • KIDNEY SURGERY     • OTHER SURGICAL HISTORY      POSSIBLE CARDIAC MONITOR (LOOP RECORDER?)--IMPLANTED AND EXPLANTED    • US GUIDED LYMPH NODE BIOPSY  2021            OT ASSESSMENT FLOWSHEET (last 12 hours)      OT Evaluation and Treatment     Row Name 21 0902                   OT Time and Intention    Subjective Information   complains of;weakness;fatigue;dizziness  -TS        Patient Effort  adequate  -TS        Comment  c diff, ostomy.   -TS           General Information    Existing Precautions/Restrictions  fall  -TS           Pain Scale: Numbers Pre/Post-Treatment    Pretreatment Pain Rating  0/10 - no pain  -TS        Posttreatment Pain Rating  0/10 - no pain  -TS           Bed Mobility    Sidelying-Sit [place] (Bed Mobility)  supervision  -TS           Functional Mobility    Functional Mobility- Ind. Level  supervision required  -TS        Functional Mobility- Comment  in room, in BR  -TS           Transfer Assessment/Treatment    Transfers  sit-stand transfer;stand-sit transfer;toilet transfer;shower transfer  -TS           Transfers    Sit-Stand [place] (Transfers)  supervision  -TS        Stand-Sit [place] (Transfers)  supervision  -TS        [place] Level (Toilet Transfer)  supervision  -TS        Assistive Device (Toilet Transfer)  commode  -TS        [place] Level (Shower Transfer)  supervision  -TS        Assistive Device (Shower Transfer)  tub bench  -TS           Toilet Transfer    Type (Toilet Transfer)  sit-stand;stand-sit  -TS           Shower Transfer    Type (Shower Transfer)  sit-stand;stand-sit  -TS           Activities of Daily Living    BADL Assessment/Intervention  upper body dressing;lower body dressing;bathing;toileting;grooming  -TS           Bathing Assessment/Intervention    [place] Level (Bathing)  upper body;minimum assist (75% patient effort);perineal area;standby assist  -TS        Assistive Devices (Bathing)  hand-held shower spray hose;shower chair  -TS        Position (Bathing)  unsupported sitting;unsupported standing  -TS           Upper Body Dressing Assessment/Training    [place] Level (Upper Body Dressing)  don;doff;pull-over garment;set up  -TS        Position (Upper Body Dressing)  edge of bed sitting  -TS           Lower Body Dressing Assessment/Training     [place] Level (Lower Body Dressing)  don;doff;socks;set up  -TS        Position (Lower Body Dressing)  unsupported sitting  -TS           Grooming Assessment/Training    [place] Level (Grooming)  grooming skills;hair care, combing/brushing;wash face, hands;set up  -TS        Position (Grooming)  unsupported sitting  -TS           Toileting Assessment/Training    [place] Level (Toileting)  maximum assist (25% patient effort)  -TS        Assistive Devices (Toileting)  commode  -TS        Position (Toileting)  unsupported sitting  -TS        Comment (Toileting)  (S) max A with ostomy  -TS           Plan of Care Review    Plan of Care Reviewed With  patient  -TS        Progress  improving  -TS        Outcome Summary  Pt c/o anxiety over lack of knowledge on ostomy back (current is different from the one she uses at home). Pt required max A for burping and closure of ostomy bag. Pt transfers and ambulates with S. Pt completed TB bathing with CGA while seated. Pt would benefit from continued rehab prior to discharge home. Continue OT POC  -TS           Positioning and Restraints    Pre-Treatment Position  bathroom  -TS        Post Treatment Position  bed  -TS        In Bed  fowlers;call light within reach;encouraged to call for assist;side rails up x2  -TS          User Key  (r) = Recorded By, (t) = Taken By, (c) = Cosigned By    Initials Name Effective Dates    TS Carmen Manjarrez, LOPEZ/BENEDICT 06/16/21 -            Occupational Therapy Education                 Title: PT OT SLP Therapies (In Progress)     Topic: Occupational Therapy (In Progress)     Point: ADL training (Done)     Description:   Instruct learner(s) on proper safety adaptation and remediation techniques during self care or transfers.   Instruct in proper use of assistive devices.              Learning Progress Summary           Patient Acceptance, E, VU by MM at 7/17/2021 8738    Comment: OT role, benefits, POC                   Point: Home  exercise program (Not Started)     Description:   Instruct learner(s) on appropriate technique for monitoring, assisting and/or progressing therapeutic exercises/activities.              Learner Progress:  Not documented in this visit.          Point: Precautions (Done)     Description:   Instruct learner(s) on prescribed precautions during self-care and functional transfers.              Learning Progress Summary           Patient Acceptance, E, VU by MM at 7/17/2021 1550    Comment: OT role, benefits, POC                   Point: Body mechanics (Done)     Description:   Instruct learner(s) on proper positioning and spine alignment during self-care, functional mobility activities and/or exercises.              Learning Progress Summary           Patient Acceptance, E, VU by MM at 7/17/2021 1550    Comment: OT role, benefits, POC                               User Key     Initials Effective Dates Name Provider Type Discipline     06/16/21 -  Nicolas Otero, OTR/L Occupational Therapist OT                  OT Recommendation and Plan     Plan of Care Review  Plan of Care Reviewed With: patient  Progress: improving  Outcome Summary: Pt c/o anxiety over lack of knowledge on ostomy back (current is different from the one she uses at home). Pt required max A for burping and closure of ostomy bag. Pt transfers and ambulates with S. Pt completed TB bathing with CGA while seated. Pt would benefit from continued rehab prior to discharge home. Continue OT POC  Plan of Care Reviewed With: patient  Outcome Summary: Pt c/o anxiety over lack of knowledge on ostomy back (current is different from the one she uses at home). Pt required max A for burping and closure of ostomy bag. Pt transfers and ambulates with S. Pt completed TB bathing with CGA while seated. Pt would benefit from continued rehab prior to discharge home. Continue OT POC    Outcome Measures     Row Name 07/20/21 1100             How much help from another is  currently needed...    Putting on and taking off regular lower body clothing?  3  -TS      Bathing (including washing, rinsing, and drying)  3  -TS      Toileting (which includes using toilet bed pan or urinal)  2  -TS      Putting on and taking off regular upper body clothing  3  -TS      Taking care of personal grooming (such as brushing teeth)  4  -TS      Eating meals  4  -TS      AM-PAC 6 Clicks Score (OT)  19  -TS        User Key  (r) = Recorded By, (t) = Taken By, (c) = Cosigned By    Initials Name Provider Type    TS Carmen Manjarrez COTA/L Occupational Therapy Assistant          Time Calculation:   Time Calculation- OT     Row Name 07/20/21 1151             Time Calculation- OT    OT Start Time  0945  -TS      OT Stop Time  1100  -TS      OT Time Calculation (min)  75 min  -TS      Total Timed Code Minutes- OT  75 minute(s)  -TS      OT Received On  07/20/21  -TS         Timed Charges    70068 - OT Self Care/Mgmt Minutes  75  -TS         Total Minutes    Timed Charges Total Minutes  75  -TS       Total Minutes  75  -TS        User Key  (r) = Recorded By, (t) = Taken By, (c) = Cosigned By    Initials Name Provider Type    TS Carmen Manjarrez COTA/L Occupational Therapy Assistant        Therapy Charges for Today     Code Description Service Date Service Provider Modifiers Qty    96635050349 HC OT SELF CARE/MGMT/TRAIN EA 15 MIN 7/20/2021 Carmen Manjarrez COTA/L GO 5               Carmen HUGHES. AKILA Manjarrez  7/20/2021

## 2021-07-20 NOTE — PLAN OF CARE
Goal Outcome Evaluation:  Plan of Care Reviewed With: patient        Progress: improving  Outcome Summary: Pt performed bed mobility with supervision.  Transfered sit to stand with CGA.  Amb 200' with CGA, improved balance and safety with amb.

## 2021-07-20 NOTE — THERAPY TREATMENT NOTE
Acute Care - Physical Therapy Treatment Note  [facility]     Patient Name: Lenora Kathleen  : 1960  MRN: [id number]  Today's Date: 2021      Visit Dx:     ICD-10-CM ICD-9-CM   1. Dehydration  E86.0 276.51   2. Failure to thrive in adult  R62.7 783.7   3. Enterocolitis  K52.9 558.9   4. Impaired mobility  Z74.09 799.89   5. Impaired mobility and ADLs  Z74.09 V49.89    Z78.9      Patient Active Problem List   Diagnosis   • Primary squamous cell carcinoma of anal canal   • Current every day smoker   • Rectovaginal fistula   • Severe malnutrition (CMS/HCC)   • Prediabetes   • Anxiety   • Enterocolitis   • Dehydration   • Hypotension due to hypovolemia   • Encephalopathy   • Ataxia   • Self-care deficit   • Anemia of chronic disease   • Diarrhea   • Hypokalemia   • Clostridium difficile enterocolitis   • Metabolic encephalopathy     Past Medical History:   Diagnosis Date   • Anxiety    • Blood in urine    • Chest tightness    • Colon polyps    • COPD (chronic obstructive pulmonary disease) (CMS/HCC)    • Depression    • Emphysema/COPD (CMS/HCC)    • Hepatitis C    • Hypertension    • Injury of back    • Kidney stone    • Palpitation    • PONV (postoperative nausea and vomiting)    • Rectal cancer (CMS/HCC)      Past Surgical History:   Procedure Laterality Date   • CHOLECYSTECTOMY     • COLONOSCOPY  2015    normal   • COLOSTOMY N/A 2021    Procedure: LAPAROSCOPIC DIVERTING COLOSTOMY;  Surgeon: Paulette Villegas MD;  Location: [facility];  Service: General;  Laterality: N/A;   • FINGER SURGERY     • HYSTERECTOMY     • KIDNEY STONE SURGERY     • KIDNEY SURGERY     • OTHER SURGICAL HISTORY      POSSIBLE CARDIAC MONITOR (LOOP RECORDER?)--IMPLANTED AND EXPLANTED    • US GUIDED LYMPH NODE BIOPSY  2021        PT Assessment (last 12 hours)      PT Evaluation and Treatment     Row Name 21 1359 21 1000       Physical Therapy Time and Intention    Subjective Information  complains of;pain  -RACIEL   --    Document Type  therapy note (daily note)  -RACIEL  --    Mode of Treatment  physical therapy  -RACIEL  --    Session Not Performed  --  patient unavailable for treatment  -RACIEL    Comment, Session Not Performed  --  working  with OT   -RACIEL    Row Name 07/20/21 1359          General Information    Existing Precautions/Restrictions  fall  -RACIEL     Barriers to Rehab  cognitive status  -RACIEL     Row Name 07/20/21 1353          Pain Scale: Numbers Pre/Post-Treatment    Pretreatment Pain Rating  4/10  -RACIEL     Posttreatment Pain Rating  4/10  -RACIEL     Pain Location  back  -RACIEL     Row Name 07/20/21 1359          Bed Mobility    Bed Mobility  sit-supine  -RACIEL     Supine-Sit [place] (Bed Mobility)  supervision  -RACIEL     Sit-Supine [place] (Bed Mobility)  supervision  -RACIEL     Row Name 07/20/21 1359          Transfers    Sit-Stand [place] (Transfers)  supervision  -RACIEL     Stand-Sit [place] (Transfers)  supervision  -RACIEL     Row Name 07/20/21 1359          Gait/Stairs (Locomotion)    [place] Level (Gait)  contact guard  -RACIEL     Distance in Feet (Gait)  200  -RACIEL     Row Name 07/20/21 1354          Positioning and Restraints    Pre-Treatment Position  in bed  -RACIEL     Post Treatment Position  bed  -RACIEL     In Bed  sitting EOB;call light within reach;encouraged to call for assist;side rails up x2  -RACIEL       User Key  (r) = Recorded By, (t) = Taken By, (c) = Cosigned By    Initials Name Provider Type    RACIEL Richard Holloway, PTA Physical Therapy Assistant        Physical Therapy Education                 Title: PT OT SLP Therapies (In Progress)     Topic: Physical Therapy (In Progress)     Point: Mobility training (Done)     Learning Progress Summary           Patient Acceptance, E, VU,NR by SB at 7/17/2021 0912    Comment: pt edu on POC, benefits of act, d/c plans                   Point: Home exercise program (Not Started)     Learner Progress:  Not documented in this visit.          Point: Body mechanics (Done)      Learning Progress Summary           Patient Acceptance, E, VU,NR by SB at 7/17/2021 0912    Comment: pt edu on POC, benefits of act, d/c plans                   Point: Precautions (Done)     Learning Progress Summary           Patient Acceptance, E, VU,NR by SB at 7/17/2021 0912    Comment: pt edu on POC, benefits of act, d/c plans                               User Key     Initials Effective Dates Name Provider Type Discipline     06/16/21 -  Tiff Stephens, PT DPT Physical Therapist PT              PT Recommendation and Plan     Plan of Care Reviewed With: patient  Progress: improving  Outcome Summary: Pt performed bed mobility with supervision.  Transfered sit to stand with CGA.  Amb 200' with CGA, improved balance and safety with amb.  Outcome Measures     Row Name 07/20/21 1100             How much help from another is currently needed...    Putting on and taking off regular lower body clothing?  3  -TS      Bathing (including washing, rinsing, and drying)  3  -TS      Toileting (which includes using toilet bed pan or urinal)  2  -TS      Putting on and taking off regular upper body clothing  3  -TS      Taking care of personal grooming (such as brushing teeth)  4  -TS      Eating meals  4  -TS      AM-PAC 6 Clicks Score (OT)  19  -TS        User Key  (r) = Recorded By, (t) = Taken By, (c) = Cosigned By    Initials Name Provider Type    TS Carmen Manjarrez, JOHN/L Occupational Therapy Assistant           Time Calculation:   PT Charges     Row Name 07/20/21 1359             Time Calculation    Start Time  1359  -RACIEL      Stop Time  1415  -RACIEL      Time Calculation (min)  16 min  -RACIEL      PT Received On  07/20/21  -RACIEL         Time Calculation- PT    Total Timed Code Minutes- PT  16 minute(s)  -RACIEL         Timed Charges    98106 - Gait Training Minutes   16  -RACIEL         Total Minutes    Timed Charges Total Minutes  16  -RACIEL       Total Minutes  16  -RACIEL        User Key  (r) = Recorded By, (t) = Taken By, (c) =  Cosigned By    Initials Name Provider Type    Richard Huerta PTA Physical Therapy Assistant        Therapy Charges for Today     Code Description Service Date Service Provider Modifiers Qty    24516248721 HC GAIT TRAINING EA 15 MIN 7/19/2021 Richard Holloway PTA GP 2    17564373267 HC GAIT TRAINING EA 15 MIN 7/20/2021 Richard Holloway PTA GP 1          PT G-Codes  Outcome Measure Options: AM-PAC 6 Clicks Daily Activity (OT)  AM-PAC 6 Clicks Score (PT): 17  AM-PAC 6 Clicks Score (OT): 19    Richard Holloway PTA  7/20/2021

## 2021-07-20 NOTE — PLAN OF CARE
Goal Outcome Evaluation:  Plan of Care Reviewed With: patient        Progress: improving  Outcome Summary: Pt c/o anxiety over lack of knowledge on ostomy back (current is different from the one she uses at home). Pt required max A for burping and closure of ostomy bag. Pt transfers and ambulates with S. Pt completed TB bathing with CGA while seated. Pt would benefit from continued rehab prior to discharge home. Continue OT POC

## 2021-07-20 NOTE — CONSULTS
"    [facility]  INPATIENT OSTOMY CONSULTATION    Today's Date: 07/20/21    Patient Name: Lenora Kathleen  MRN: [id number]  CSN: [id number]  PCP: SAM Weaver MD  Referring Provider: May Vargas APRN  Attending Provider: Ovidio Vargas MD  Length of Stay: 4    SUBJECTIVE   Chief Complaint: \"Ostomy needs to be replaced, ripped off\"    HPI: Lenora Kathleen, a 61 y.o.female, presents with a past medical history of COPD, hepatitis C, and rectal cancer which is resulted in a LLQ diverting loop colostomy.  Patient is well-known to inpatient/outpatient ostomy care.  Patient had ostomy placed on 4/23/2021 by Dr. Villegas.  She has completed her chemo and radiation for rectal cancer which she is followed by Dr. Canseco with oncology and Dr. Guthrie with radiation oncology.  Patient states she has been told by a physician she may have ostomy takedown around December.    Patient has been home and receiving help with care from friend.  Friend reported on admission that patient had become increasingly confused over the past few days.  She also states that patient pulls colostomy bag off and there is liquid stool throughout her house.  She has been having significant diarrhea with complaints of hurting all over.  Protective services have been working with patient for approximately a week trying to get her partner placement in rehab.  Noah Benitez is being investigated for placement.  Patient is admitted with C. difficile and receiving treatment for this.      Inpatient ostomy care was consulted on admission due to ostomy needing to be replaced.  Patient currently has a 2-1/4 inch 2 piece system with no leaks.  Patient is having good formed soft stool from stoma at this time.    Visit Dx:    ICD-10-CM ICD-9-CM   1. Dehydration  E86.0 276.51   2. Failure to thrive in adult  R62.7 783.7   3. Enterocolitis  K52.9 558.9   4. Impaired mobility  Z74.09 799.89   5. Impaired mobility and ADLs  Z74.09 V49.89    " Z78.9      Patient Active Problem List   Diagnosis   • Primary squamous cell carcinoma of anal canal   • Current every day smoker   • Rectovaginal fistula   • Severe malnutrition (CMS/HCC)   • Prediabetes   • Anxiety   • Enterocolitis   • Dehydration   • Hypotension due to hypovolemia   • Encephalopathy   • Ataxia   • Self-care deficit   • Anemia of chronic disease   • Diarrhea   • Hypokalemia   • Clostridium difficile enterocolitis   • Metabolic encephalopathy       History:   Past Medical History:   Diagnosis Date   • Anxiety    • Blood in urine    • Chest tightness    • Colon polyps    • COPD (chronic obstructive pulmonary disease) (CMS/HCC)    • Depression    • Emphysema/COPD (CMS/HCC)    • Hepatitis C    • Hypertension    • Injury of back    • Kidney stone    • Palpitation    • PONV (postoperative nausea and vomiting)    • Rectal cancer (CMS/HCC)      Past Surgical History:   Procedure Laterality Date   • CHOLECYSTECTOMY     • COLONOSCOPY  03/12/2015    normal   • COLOSTOMY N/A 4/23/2021    Procedure: LAPAROSCOPIC DIVERTING COLOSTOMY;  Surgeon: Paulette Villegas MD;  Location: [facility];  Service: General;  Laterality: N/A;   • FINGER SURGERY     • HYSTERECTOMY     • KIDNEY STONE SURGERY     • KIDNEY SURGERY     • OTHER SURGICAL HISTORY      POSSIBLE CARDIAC MONITOR (LOOP RECORDER?)--IMPLANTED AND EXPLANTED    • US GUIDED LYMPH NODE BIOPSY  4/30/2021     Social History     Socioeconomic History   • Marital status:      Spouse name: Not on file   • Number of children: Not on file   • Years of education: Not on file   • Highest education level: Not on file   Tobacco Use   • Smoking status: Former Smoker     Packs/day: 0.25     Years: 45.00     Pack years: 11.25     Types: Cigarettes   • Smokeless tobacco: Never Used   Substance and Sexual Activity   • Alcohol use: No   • Drug use: No   • Sexual activity: Defer     Partners: Male     Family History   Problem Relation Age of Onset   • Heart disease  Mother    • No Known Problems Father        Allergies:  Allergies   Allergen Reactions   • Morphine GI Intolerance and Nausea And Vomiting     Other reaction(s): Vomiting         Medications:    Current Facility-Administered Medications:   •  acetaminophen (TYLENOL) tablet 650 mg, 650 mg, Oral, Q4H PRN, 650 mg at 07/20/21 1051 **OR** acetaminophen (TYLENOL) 160 MG/5ML solution 650 mg, 650 mg, Oral, Q4H PRN **OR** acetaminophen (TYLENOL) suppository 650 mg, 650 mg, Rectal, Q4H PRN, May Vargas, APRN  •  ALPRAZolam (XANAX) tablet 0.25 mg, 0.25 mg, Oral, 4x Daily PRN, May Vargas, APRN, 0.25 mg at 07/19/21 2227  •  fentaNYL (DURAGESIC) 50 MCG/HR patch 1 patch, 1 patch, Transdermal, Q72H, May Vargas, APRN, 1 patch at 07/19/21 2214  •  FLUoxetine (PROzac) capsule 20 mg, 20 mg, Oral, 4x Daily, May Vargas, APRN, 20 mg at 07/20/21 1400  •  gabapentin (NEURONTIN) capsule 100 mg, 100 mg, Oral, 4x Daily, May Vargas, APRN, 100 mg at 07/20/21 1400  •  ibuprofen (ADVIL,MOTRIN) tablet 600 mg, 600 mg, Oral, Q8H PRN, May Vargas, APRN, 600 mg at 07/18/21 1748  •  nystatin (MYCOSTATIN) 941802 UNIT/ML suspension 500,000 Units, 5 mL, Swish & Swallow, 4x Daily, May Vargas, APRN, 500,000 Units at 07/20/21 1401  •  ondansetron (ZOFRAN) tablet 4 mg, 4 mg, Oral, Q6H PRN, 4 mg at 07/20/21 0921 **OR** ondansetron (ZOFRAN) injection 4 mg, 4 mg, Intravenous, Q6H PRN, May Vargas, APRN, 4 mg at 07/19/21 0845  •  oxyCODONE-acetaminophen (PERCOCET) 5-325 MG per tablet 1 tablet, 1 tablet, Oral, Q6H PRN, Raleigh Villa MD, 1 tablet at 07/20/21 0642  •  Pharmacy Consult, , Does not apply, Continuous PRN, Danielle Villagomez, ADIN  •  [COMPLETED] Insert peripheral IV, , , Once **AND** sodium chloride 0.9 % flush 10 mL, 10 mL, Intravenous, PRN, May Vargas, APRN  •  sodium chloride 0.9 % flush 10 mL, 10 mL, Intravenous, Q12H, May Vargas, APRN, 10 mL at 07/20/21 0911  •  sodium chloride  0.9 % flush 10 mL, 10 mL, Intravenous, PRN, May Vargas, ADIN  •  vancomycin oral solution reconstituted 125 mg, 125 mg, Oral, Q6H, Danielle Villagomez APRN, 125 mg at 07/20/21 1401    Review of Systems:  Review of Systems   Constitutional: Positive for fatigue. Negative for chills and fever.   HENT: Negative for rhinorrhea and sore throat.    Respiratory: Negative for cough and shortness of breath.    Cardiovascular: Negative for chest pain and palpitations.   Gastrointestinal: Negative for diarrhea, nausea and vomiting.   Genitourinary: Negative for frequency and urgency.   Musculoskeletal: Positive for myalgias. Negative for arthralgias.   Skin: Negative for color change and wound.   Neurological: Positive for dizziness and weakness. Negative for headaches.   Psychiatric/Behavioral: Negative for agitation, behavioral problems and confusion.         OBJECTIVE     Vitals:    07/20/21 1157   BP: 106/48   Pulse: 60   Resp: 16   Temp: 98.1 °F (36.7 °C)   SpO2: 97%       PHYSICAL EXAM  Physical Exam  Vitals and nursing note reviewed.   Constitutional:       General: She is awake.   HENT:      Head: Normocephalic and atraumatic.   Eyes:      General: Lids are normal. Gaze aligned appropriately.   Cardiovascular:      Rate and Rhythm: Normal rate and regular rhythm.   Pulmonary:      Effort: Pulmonary effort is normal. No respiratory distress.   Abdominal:      General: The ostomy site is clean. There is no distension.      Palpations: Abdomen is soft.      Comments: Diverting loop colostomy present in the LLQ.  2 piece appliance intact with no leaking.  Patient has no complaints of ostomy.  Patient states she has been getting her ostomy supplies delivered home.  Patient denies any needs for ostomy at this time.   Musculoskeletal:      Cervical back: Normal range of motion and neck supple.   Skin:     General: Skin is warm and dry.   Neurological:      Mental Status: She is alert and oriented to person, place, and  time.   Psychiatric:         Attention and Perception: Attention normal.         Mood and Affect: Affect is flat.         Speech: Speech normal.            Results Review:  Lab Results (last 48 hours)     Procedure Component Value Units Date/Time    CBC (No Diff) [[id number]]  (Abnormal) Collected: 07/20/21 0641    Specimen: Blood Updated: 07/20/21 0653     WBC 3.90 10*3/mm3      RBC 3.13 10*6/mm3      Hemoglobin 9.0 g/dL      Hematocrit 28.9 %      MCV 92.3 fL      MCH 28.8 pg      MCHC 31.1 g/dL      RDW 21.1 %      RDW-SD 67.2 fl      MPV 10.8 fL      Platelets 148 10*3/mm3     Basic Metabolic Panel [[id number]]  (Abnormal) Collected: 07/20/21 0550    Specimen: Blood Updated: 07/20/21 0637     Glucose 87 mg/dL      BUN 4 mg/dL      Creatinine 0.39 mg/dL      Sodium 139 mmol/L      Potassium 4.3 mmol/L      Chloride 108 mmol/L      CO2 20.0 mmol/L      Calcium 8.5 mg/dL      eGFR Non African Amer >150 mL/min/1.73      BUN/Creatinine Ratio 10.3     Anion Gap 11.0 mmol/L     Narrative:      GFR Normal >60  Chronic Kidney Disease <60  Kidney Failure <15      Blood Culture - Blood, Arm, Right [[id number]] Collected: 07/16/21 1434    Specimen: Blood from Arm, Right Updated: 07/19/21 1530     Blood Culture No growth at 3 days    Blood Culture - Blood, Arm, Left [[id number]] Collected: 07/16/21 1452    Specimen: Blood from Arm, Left Updated: 07/19/21 1530     Blood Culture No growth at 3 days    Basic Metabolic Panel [[id number]]  (Abnormal) Collected: 07/19/21 0557    Specimen: Blood Updated: 07/19/21 0639     Glucose 94 mg/dL      BUN 6 mg/dL      Creatinine 0.46 mg/dL      Sodium 140 mmol/L      Potassium 4.4 mmol/L      Chloride 112 mmol/L      CO2 22.0 mmol/L      Calcium 8.2 mg/dL      eGFR Non African Amer 138 mL/min/1.73      BUN/Creatinine Ratio 13.0     Anion Gap 6.0 mmol/L     Narrative:      GFR Normal >60  Chronic Kidney Disease <60  Kidney Failure <15      CBC & Differential [[id number]]  (Abnormal) Collected:  07/19/21 0557    Specimen: Blood Updated: 07/19/21 0607    Narrative:      The following orders were created for panel order CBC & Differential.  Procedure                               Abnormality         Status                     ---------                               -----------         ------                     CBC Auto Differential[[id number]]        Abnormal            Final result                 Please view results for these tests on the individual orders.    CBC Auto Differential [[id number]]  (Abnormal) Collected: 07/19/21 0557    Specimen: Blood Updated: 07/19/21 0607     WBC 3.28 10*3/mm3      RBC 2.93 10*6/mm3      Hemoglobin 8.7 g/dL      Hematocrit 27.4 %      MCV 93.5 fL      MCH 29.7 pg      MCHC 31.8 g/dL      RDW 21.1 %      RDW-SD 68.4 fl      MPV 11.3 fL      Platelets 151 10*3/mm3      Neutrophil % 48.1 %      Lymphocyte % 36.3 %      Monocyte % 10.4 %      Eosinophil % 1.2 %      Basophil % 0.6 %      Immature Grans % 3.4 %      Neutrophils, Absolute 1.58 10*3/mm3      Lymphocytes, Absolute 1.19 10*3/mm3      Monocytes, Absolute 0.34 10*3/mm3      Eosinophils, Absolute 0.04 10*3/mm3      Basophils, Absolute 0.02 10*3/mm3      Immature Grans, Absolute 0.11 10*3/mm3      nRBC 0.0 /100 WBC         Imaging Results (Last 72 Hours)     ** No results found for the last 72 hours. **             ASSESSMENT/PLAN       Examination and evaluation of wound(s) was performed.    DIAGNOSIS:   Colostomy present  C. difficile enterocolitis  Primary squamous cell carcinoma of anal canal  Dehydration  Self-care deficit    PLAN:     Start     Ordered    07/23/21 0000  Stoma Care - Change Appliance  Weekly      07/20/21 1533    07/20/21 1534  Empty Pouch When 1/3 Full  Per Order Details     Comments: When 1/3 Full    07/20/21 1533    07/20/21 1533  Assess Stoma  Every Shift      07/20/21 1533    Unscheduled  Up With Assistance  As Needed      07/16/21 1904    Unscheduled  Empty Pouch As Needed  As Needed       07/20/21 1533    Unscheduled  Change Pouch Immediately if Leaking  As Needed      07/20/21 1533                  Discussed findings and treatment plan including risks, benefits, and treatment options with patient in detail. Patient agreed with treatment plan.      This document has been electronically signed by ADIN Chao on 7/20/2021 15:05 CDT

## 2021-07-20 NOTE — PAYOR COMM NOTE
"7/19 CLINICAL UPDATE  UR  [id number]    Lenora Singh (61 y.o. Female)     Date of Birth Social Security Number Address Home Phone MRN    1960  [id number]  [address] [place] [id number] [phone] [id number]    Confucianism Marital Status          Buddhist        Admission Date Admission Type Admitting Provider Attending Provider Department, Room/Bed    7/16/21 Emergency Ovidio Vargas MD Thompson, Benjamin H, MD [facility] 3C, 385/1    Discharge Date Discharge Disposition Discharge Destination                       Attending Provider: Ovidio Vargas MD    Allergies: Morphine    Isolation: Spore   Infection: C.difficile (07/17/21)   Code Status: CPR    Ht: 172.7 cm (68\")   Wt: 88.6 kg (195 lb 6.4 oz)    Admission Cmt: None   Principal Problem: Clostridium difficile enterocolitis [A04.72]                 Active Insurance as of 7/16/2021     Primary Coverage     Payor Plan Insurance Group Employer/Plan Group    [facility] MEDICARE REPLACEMENT [facility] MEDICARE REPLACEMENT      Payor Plan Address Payor Plan Phone Number Payor Plan Fax Number Effective Dates    [address] [address] [phone]  4/1/2021 - None Entered    [facility] [id number]       Subscriber Name Subscriber Birth Date Member ID       LENORA SINGH 1960 [id number]                 Emergency Contacts      (Rel.) Home Phone Work Phone Mobile Phone    Rodolfo Pineda (Son) [phone] -- --    ETELVINA PINEDA (Relative) [phone] -- --    MACIE PURVIS (Other) -- -- [phone]            Vital Signs (last day)     Date/Time   Temp   Temp src   Pulse   Resp   BP   Patient Position   SpO2    07/19/21 1900   97.7 (36.5)   Oral   68   16   105/46   Lying   97    07/19/21 1140   98.9 (37.2)   Axillary   64   16   120/58   Lying   --    07/19/21 0815   98.2 (36.8)   Oral   62   16   109/55   Lying   96    07/19/21 0531   98.3 (36.8)   Oral   83   16   118/65   Lying   96        "       Oxygen Therapy (last day)     Date/Time   SpO2   Device (Oxygen Therapy)   Flow (L/min)   Oxygen Concentration (%)   ETCO2 (mmHg)    07/19/21 2200   --   room air   --   --   --    07/19/21 1900   97   room air   --   --   --    07/19/21 1140   --   room air   --   --   --    07/19/21 0850   --   room air   --   --   --    07/19/21 0815   96   room air   --   --   --    07/19/21 0531   96   room air   --   --   --              Intake & Output (last day)       07/19 0701 - 07/20 0700 07/20 0701 - 07/21 0700    P.O. 120     I.V. (mL/kg)      Irrigation      Total Intake(mL/kg) 120 (1.4)     Urine (mL/kg/hr) 1550 (0.7)     Stool 200     Total Output 1750     Net -1630               Lines, Drains & Airways    Active LDAs     Name:   Placement date:   Placement time:   Site:   Days:    Midline Catheter - Single Lumen 07/17/21 Right Brachial   07/17/21    1852     2    Colostomy LLQ   04/23/21    1442    LLQ   87                Current Facility-Administered Medications   Medication Dose Route Frequency Provider Last Rate Last Admin   • acetaminophen (TYLENOL) tablet 650 mg  650 mg Oral Q4H PRN May Vargas APRN   650 mg at 07/18/21 1522    Or   • acetaminophen (TYLENOL) 160 MG/5ML solution 650 mg  650 mg Oral Q4H PRN May Vargas APRN        Or   • acetaminophen (TYLENOL) suppository 650 mg  650 mg Rectal Q4H PRN May Vargas APRN       • ALPRAZolam (XANAX) tablet 0.25 mg  0.25 mg Oral 4x Daily PRN May Vargas APRN   0.25 mg at 07/19/21 2227   • fentaNYL (DURAGESIC) 50 MCG/HR patch 1 patch  1 patch Transdermal Q72H May Vargas APRN   1 patch at 07/19/21 2214   • FLUoxetine (PROzac) capsule 20 mg  20 mg Oral 4x Daily May Vargas APRN   20 mg at 07/19/21 2212   • gabapentin (NEURONTIN) capsule 100 mg  100 mg Oral 4x Daily May Vargas APRN   100 mg at 07/19/21 2212   • ibuprofen (ADVIL,MOTRIN) tablet 600 mg  600 mg Oral Q8H PRN May Vargas APRN   600 mg at 07/18/21  1748   • nystatin (MYCOSTATIN) 727834 UNIT/ML suspension 500,000 Units  5 mL Swish & Swallow 4x Daily May Vargas APRN   500,000 Units at 07/19/21 2211   • ondansetron (ZOFRAN) tablet 4 mg  4 mg Oral Q6H PRN May Vargas APRN        Or   • ondansetron (ZOFRAN) injection 4 mg  4 mg Intravenous Q6H PRN May Vargas APRN   4 mg at 07/19/21 0845   • oxyCODONE-acetaminophen (PERCOCET) 5-325 MG per tablet 1 tablet  1 tablet Oral Q6H PRN Raleigh Villa MD   1 tablet at 07/20/21 0642   • Pharmacy Consult   Does not apply Continuous PRN Danielle Villagomez APRN       • sodium chloride 0.9 % flush 10 mL  10 mL Intravenous PRN May Vargas APRN       • sodium chloride 0.9 % flush 10 mL  10 mL Intravenous Q12H May Vargas APRN   10 mL at 07/19/21 2213   • sodium chloride 0.9 % flush 10 mL  10 mL Intravenous PRN May Vargas APRN       • vancomycin oral solution reconstituted 125 mg  125 mg Oral Q6H Danielle Villagomez APRN   125 mg at 07/20/21 0642     Lab Results (last 24 hours)     Procedure Component Value Units Date/Time    CBC (No Diff) [[id number]]  (Abnormal) Collected: 07/20/21 0641    Specimen: Blood Updated: 07/20/21 0653     WBC 3.90 10*3/mm3      RBC 3.13 10*6/mm3      Hemoglobin 9.0 g/dL      Hematocrit 28.9 %      MCV 92.3 fL      MCH 28.8 pg      MCHC 31.1 g/dL      RDW 21.1 %      RDW-SD 67.2 fl      MPV 10.8 fL      Platelets 148 10*3/mm3     Basic Metabolic Panel [[id number]]  (Abnormal) Collected: 07/20/21 0550    Specimen: Blood Updated: 07/20/21 0637     Glucose 87 mg/dL      BUN 4 mg/dL      Creatinine 0.39 mg/dL      Sodium 139 mmol/L      Potassium 4.3 mmol/L      Chloride 108 mmol/L      CO2 20.0 mmol/L      Calcium 8.5 mg/dL      eGFR Non African Amer >150 mL/min/1.73      BUN/Creatinine Ratio 10.3     Anion Gap 11.0 mmol/L     Narrative:      GFR Normal >60  Chronic Kidney Disease <60  Kidney Failure <15      Blood Culture - Blood, Arm, Right [[id number]]  Collected: 07/16/21 1434    Specimen: Blood from Arm, Right Updated: 07/19/21 1530     Blood Culture No growth at 3 days    Blood Culture - Blood, Arm, Left [[id number]] Collected: 07/16/21 1452    Specimen: Blood from Arm, Left Updated: 07/19/21 1530     Blood Culture No growth at 3 days        Imaging Results (Last 24 Hours)     ** No results found for the last 24 hours. **        Orders (last 24 hrs)      Start     Ordered    07/20/21 0623  CBC (No Diff)  Once      07/20/21 0622    07/18/21 1535  oxyCODONE-acetaminophen (PERCOCET) 5-325 MG per tablet 1 tablet  Every 6 Hours PRN      07/18/21 1536    07/18/21 1200  DIET MESSAGE Patient would like Sprite zero with meals. Thanks!  Daily With Lunch & Dinner     Comments: Patient would like Sprite zero with meals. Thanks!    07/18/21 0925    07/18/21 0600  Basic Metabolic Panel  Daily      07/17/21 1626    07/17/21 0715  sodium chloride 0.9 % with KCl 20 mEq/L infusion  Continuous,   Status:  Discontinued      07/17/21 0617    07/17/21 0715  vancomycin oral solution reconstituted 125 mg  Every 6 Hours Scheduled      07/17/21 0618    07/17/21 0617  Pharmacy Consult  Continuous PRN      07/17/21 0618    07/16/21 2100  fentaNYL (DURAGESIC) 50 MCG/HR patch 1 patch  Every 72 Hours      07/16/21 1905 07/16/21 2100  FLUoxetine (PROzac) capsule 20 mg  4 Times Daily      07/16/21 1905 07/16/21 2100  gabapentin (NEURONTIN) capsule 100 mg  4 Times Daily      07/16/21 1905 07/16/21 2100  nystatin (MYCOSTATIN) 800312 UNIT/ML suspension 500,000 Units  4 Times Daily      07/16/21 1904 07/16/21 2100  sodium chloride 0.9 % flush 10 mL  Every 12 Hours Scheduled      07/16/21 1904 07/16/21 2000  Vital Signs  Every 4 Hours      07/16/21 1904 07/16/21 2000  Oral Care  Every 4 Hours      07/16/21 1904 07/16/21 1909  ibuprofen (ADVIL,MOTRIN) tablet 600 mg  Every 8 Hours PRN      07/16/21 1905 07/16/21 1905  ALPRAZolam (XANAX) tablet 0.25 mg  4 Times Daily PRN       07/16/21 1905    07/16/21 1903  ondansetron (ZOFRAN) tablet 4 mg  Every 6 Hours PRN      07/16/21 1904    07/16/21 1903  ondansetron (ZOFRAN) injection 4 mg  Every 6 Hours PRN      07/16/21 1904    07/16/21 1902  acetaminophen (TYLENOL) tablet 650 mg  Every 4 Hours PRN      07/16/21 1904    07/16/21 1902  acetaminophen (TYLENOL) 160 MG/5ML solution 650 mg  Every 4 Hours PRN      07/16/21 1904    07/16/21 1902  acetaminophen (TYLENOL) suppository 650 mg  Every 4 Hours PRN      07/16/21 1904    07/16/21 1900  Intake & Output  Every Shift      07/16/21 1904    07/16/21 1859  sodium chloride 0.9 % flush 10 mL  As Needed      07/16/21 1904    07/16/21 1422  sodium chloride 0.9 % flush 10 mL  As Needed      07/16/21 1423    Unscheduled  Up With Assistance  As Needed      07/16/21 1904                   Physician Progress Notes (last 24 hours) (Notes from 07/19/21 0706 through 07/20/21 0706)      Danielle Villagomez APRN at 07/19/21 1520     Attestation signed by Ovidio Vargas MD at 07/19/21 1703    I personally evaluated and examined the patient in conjunction with ADIN Godinez and agree with the assessment, treatment plan, and disposition of the patient as recorded by her. My history, exam, and further recommendations are: Patient states that she is feeling better this afternoon, but does get tearful and emotional when discussing the treatment she is receiving for colorectal cancer.  She is followed by Dr. Canseco and Dr. Guthrie on an outpatient basis.  She denies any current abdominal pain.  She is starting to get more formed stool from her ostomy.  She has been receiving vancomycin enema via her ostomy which will be completed today, and is also on oral vancomycin which will continue.  Continue physical therapy and Occupational Therapy.  Looking into placement at [facility].      Electronically signed by Ovidio Vragas MD, 7/19/2021, 17:01 CDT.                         [facility] Team  [facility] Medicine Services  INPATIENT PROGRESS NOTE    Length of Stay: 3  Date of Admission: 7/16/2021  Primary Care Physician: SAM Weaver MD    Subjective   Chief Complaint: Follow-up worsening diarrhea  HPI   Lying in bed.  She feels some better today after having regular food.  Ostomy with semiformed brown stool.  No watery diarrhea.  Denies chest pain, palpitations, shortness of breath.  No oxygen in use.  Blood pressure improved.  Will discontinue IV fluids.  Patient ambulated 75 feet x 2 with contact-guard with assistance of physical therapy.  Decreased gait speed and would reach for the rail or IV pole.  Required cues to correct.  Got cues multiple times.  Needs skilled facility.  Awaiting [facility] decision.    Review of Systems   Constitutional: Positive for activity change, appetite change and fatigue. Negative for chills and fever.   HENT: Negative for congestion and trouble swallowing.    Eyes: Negative for photophobia and visual disturbance.   Respiratory: Negative for cough, shortness of breath and wheezing.    Cardiovascular: Negative for chest pain, palpitations and leg swelling.   Gastrointestinal: Positive for abdominal pain and diarrhea. Negative for nausea.   Ostomy   Endocrine: Negative for cold intolerance, heat intolerance and polyuria.   Genitourinary: Negative for dysuria, frequency and urgency.   Musculoskeletal: Positive for gait problem.   Skin: Negative for color change, pallor, rash and wound.   Neurological: Positive for weakness. Negative for light-headedness and headaches.   Hematological: Negative for adenopathy. Does not bruise/bleed easily.   Psychiatric/Behavioral: Negative for agitation, behavioral problems and confusion.     All pertinent negatives and positives are as above. All other systems have been reviewed and are negative unless otherwise stated.     Objective    Temp:  [98.2 °F (36.8 °C)-98.9 °F (37.2 °C)] 98.9 °F (37.2 °C)  Heart Rate:  [62-83]  64  Resp:  [16] 16  BP: (109-120)/(55-67) 120/58  Physical Exam  Vitals and nursing note reviewed.   Constitutional:       Appearance: She is ill-appearing (Chronically).      Comments: Lying in bed.  No oxygen use.    HENT:      Head: Normocephalic and atraumatic.      Nose: No congestion.      Mouth/Throat:      Pharynx: Oropharynx is clear. No oropharyngeal exudate.   Eyes:      Extraocular Movements: Extraocular movements intact.      Pupils: Pupils are equal, round, and reactive to light.   Cardiovascular:      Rate and Rhythm: Normal rate and regular rhythm.      Heart sounds: No murmur heard.      Comments: Normal sinus rhythm  on telemetry  Pulmonary:      Breath sounds: No wheezing, rhonchi or rales.      Comments: No oxygen in use.  Abdominal:      Tenderness: There is abdominal tenderness (Lower abdomen.).      Comments: Ostomy left side of abdomen with brown semiformed stool.  Genitourinary:     Comments: Voiding.  Musculoskeletal:         General: No swelling or tenderness.      Cervical back: Normal range of motion and neck supple.      Comments: SCDs   Skin:     General: Skin is warm and dry.   Neurological:      General: No focal deficit present.      Mental Status: She is alert and oriented to person, place, and time.   Psychiatric:         Mood and Affect: Mood normal.         Behavior: Behavior normal.         Thought Content: Thought content normal.         Judgment: Judgment normal.      Results Review:  I have reviewed the labs, radiology results, and diagnostic studies.    Laboratory Data:      Results from last 7 days   Lab Units 07/19/21  0557 07/18/21  0512 07/17/21  0558 07/16/21  1457   WBC 10*3/mm3 3.28* 2.51* 3.04* 3.85   HEMOGLOBIN g/dL 8.7* 8.3* 9.3* 8.7*   HEMATOCRIT % 27.4* 27.4* 29.7* 27.6*   PLATELETS 10*3/mm3 151 142 132* 167      Results from last 7 days   Lab Units 07/19/21  0557 07/18/21  0512 07/17/21  0557 07/16/21  1637   SODIUM mmol/L 140 139 140 139   POTASSIUM mmol/L 4.4  3.9 3.6 3.3*   CHLORIDE mmol/L 112* 110* 107 105   CO2 mmol/L 22.0 23.0 21.0* 26.0   BUN mg/dL 6* 6* 6* 9   CREATININE mg/dL 0.46* 0.39* 0.44* 0.51*   GLUCOSE mg/dL 94 93 78 88   CALCIUM mg/dL 8.2* 8.0* 8.3* 8.4*   ALT (SGPT) U/L  --   --   --  28     Culture Data:      Blood Culture   Date Value Ref Range Status   07/16/2021 No growth at 24 hours  Preliminary   07/16/2021 No growth at 24 hours  Preliminary     Component Value   Clostridium difficile (toxin A/B) PositiveAbnormal      Radiology Data:   Imaging Results (Last 7 Days)     Procedure Component Value Units Date/Time    CT Abdomen Pelvis With Contrast [[id number]] Collected: 07/16/21 1707     Updated: 07/16/21 1719    Narrative:      EXAM: CT ABDOMEN PELVIS W CONTRAST-     INDICATION: has open ostomy with no bag for multiple days. Possible  fever. confusion. Hx colorectal/anal cancer     COMPARISON: 2/8/2021     DOSE LENGTH PRODUCT: 554 mGy cm. Automated exposure control was also  utilized to decrease patient radiation dose.     FINDINGS:     Included lung bases are clear. No suspicious liver lesion. Prior  cholecystectomy. Mild prominence of the biliary tree is stable and  likely reservoir phenomenon. Pancreas, spleen, and adrenal glands appear  normal. No solid renal mass. No urolithiasis or hydronephrosis. Mild  urinary bladder wall thickening.     During LEFT lower quadrant colostomy. Normal appendix. Wall thickening  of the ascending colon. Multiple small bowel loops in the pelvis  demonstrate wall thickening and mucosal hyperemia. A small amount of  reactive free pelvic fluid is present. Distal esophagus and stomach  appear unremarkable. No bowel obstruction.     Nonaneurysmal abdominal aorta with scattered atherosclerotic  calcification. No enlarged retroperitoneal, mesenteric, pelvic, or LEFT  inguinal lymph node. Enlarging RIGHT inguinal lymph node on image 79  measures 2.4 x 1.7 cm. No acute osseous finding.       Impression:         1.  Multiple  inflamed small bowel loops in the pelvis. Wall thickening  and inflammation of the rectum and sigmoid colon. Findings are most  compatible with acute enterocolitis.  2.  Urinary bladder wall thickening, correlate for cystitis.  3.  Enlarging RIGHT inguinal lymph node.  This report was finalized on 07/16/2021 17:16 by Dr. Patrick Farrell MD.        Intake/Output    Intake/Output Summary (Last 24 hours) at 7/19/2021 1530  Last data filed at 7/19/2021 1527  Gross per 24 hour   Intake 120 ml   Output 1550 ml   Net -1430 ml     Scheduled Meds  fentaNYL, 1 patch, Transdermal, Q72H  FLUoxetine, 20 mg, Oral, 4x Daily  gabapentin, 100 mg, Oral, 4x Daily  nystatin, 5 mL, Swish & Swallow, 4x Daily  sodium chloride, 10 mL, Intravenous, Q12H  vancomycin, 125 mg, Oral, Q6H      I have reviewed the patient current medications.     Assessment/Plan     Active Hospital Problems    Diagnosis    • **Clostridium difficile enterocolitis    • Metabolic encephalopathy    • Enterocolitis    • Dehydration    • Hypotension due to hypovolemia    • Encephalopathy    • Ataxia    • Self-care deficit    • Anemia of chronic disease    • Diarrhea    • Hypokalemia    • Primary squamous cell carcinoma of anal canal      Plan:  1.  To ER 7/16 with increasing diarrhea, confusion.  History of colorectal cancer followed by Dr. Canseco and Dr. Guthrie. Completed chemotherapy and radiation therapy.  Colostomy in place.  Increased diarrhea loose stool from ostomy and abdominal pain.  Patient reported falling after feeling dizzy.  Potassium 3.3, hemoglobin 8.7.  Normal saline fluid bolus Flagyl given in ER.  2.  CT scan of the abdomen multiple inflamed small bowel loops in the pelvis.  Wall thickening and inflammation of the rectum and sigmoid colon.  Findings compatible with acute enterocolitis.  3.  Clostridium difficile positive.  GI PCR panel negative.  Levaquin and Flagyl discontinued.  Vancomycin 125 mg orally every 6 hours x10 days started 7/17.   Vancomycin enema every 6 hours for 3 days started 7/17.  Will complete vancomycin enemas today.  4.  Blood cultures no growth at 2 days  5.  Tolerating regular diet.  6.  Blood pressure improved 120/58, 109/55.  Discontinue IV fluids.  7.  Potassium 3.3 on admission.  Replaced.  Potassium 34.4 today.  8.  Chronic anemia, chemotherapy-induced. Hemoglobin 8.7 today was 8.7 on admission.   9.  Physical therapy consulted.  Generalized weakness, decreased strength, balance and endurance along with safety.  Walked 75 feet x 2 with contact-guard.  Required recurrent cues repeatedly as patient would reach for IV pole and rail. Referral to [facility] and awaiting decision.  10.  Home medications reviewed.    CODE STATUS/Advanced Care Planning: Full code  The patient surrogate decision-maker is Idalia Glover, friend.     The above documentation resulted from a face-to-face encounter by me Danielle OAKLEY, [facility].    Discharge Planning: I expect patient to be discharged to [facility] when bed offered.    Electronically signed by ADIN Joya, 7/19/2021, 15:30 CDT.        Electronically signed by Ovidio Vargas MD at 07/19/21 1703       Consult Notes (last 24 hours) (Notes from 07/19/21 0706 through 07/20/21 0706)    No notes of this type exist for this encounter.

## 2021-07-20 NOTE — PLAN OF CARE
Goal Outcome Evaluation:  Plan of Care Reviewed With: patient        Progress: no change  Outcome Summary: Patient c/o pain this shift; PO pain medications given per orders. No stool per ostomy so far this shift. IID'd. Bed alarm set. Voiding. VSS. Safety maintained. No distress noted at this time.

## 2021-07-20 NOTE — PLAN OF CARE
Goal Outcome Evaluation:  Plan of Care Reviewed With: patient           Outcome Summary: pt c/o pain see mar; c/o nausea x1; ostomy output decreased; voiding; VSS; IID; safety maintaied

## 2021-07-21 VITALS
HEIGHT: 68 IN | BODY MASS INDEX: 29.61 KG/M2 | WEIGHT: 195.4 LBS | SYSTOLIC BLOOD PRESSURE: 120 MMHG | OXYGEN SATURATION: 97 % | DIASTOLIC BLOOD PRESSURE: 68 MMHG | TEMPERATURE: 98.7 F | RESPIRATION RATE: 16 BRPM | HEART RATE: 56 BPM

## 2021-07-21 LAB
ANION GAP SERPL CALCULATED.3IONS-SCNC: 6 MMOL/L (ref 5–15)
BACTERIA SPEC AEROBE CULT: NORMAL
BACTERIA SPEC AEROBE CULT: NORMAL
BUN SERPL-MCNC: 4 MG/DL (ref 8–23)
BUN/CREAT SERPL: 10.8 (ref 7–25)
CALCIUM SPEC-SCNC: 8.5 MG/DL (ref 8.6–10.5)
CHLORIDE SERPL-SCNC: 105 MMOL/L (ref 98–107)
CO2 SERPL-SCNC: 24 MMOL/L (ref 22–29)
CREAT SERPL-MCNC: 0.37 MG/DL (ref 0.57–1)
GFR SERPL CREATININE-BSD FRML MDRD: >150 ML/MIN/1.73
GLUCOSE SERPL-MCNC: 94 MG/DL (ref 65–99)
POTASSIUM SERPL-SCNC: 3.8 MMOL/L (ref 3.5–5.2)
SARS-COV-2 RNA PNL SPEC NAA+PROBE: NOT DETECTED
SODIUM SERPL-SCNC: 135 MMOL/L (ref 136–145)

## 2021-07-21 PROCEDURE — 87635 SARS-COV-2 COVID-19 AMP PRB: CPT | Performed by: NURSE PRACTITIONER

## 2021-07-21 PROCEDURE — 80048 BASIC METABOLIC PNL TOTAL CA: CPT | Performed by: NURSE PRACTITIONER

## 2021-07-21 RX ORDER — OXYCODONE HYDROCHLORIDE AND ACETAMINOPHEN 5; 325 MG/1; MG/1
1 TABLET ORAL EVERY 6 HOURS PRN
Qty: 12 TABLET | Refills: 0 | Status: SHIPPED | OUTPATIENT
Start: 2021-07-21 | End: 2021-07-25

## 2021-07-21 RX ORDER — IBUPROFEN 600 MG/1
600 TABLET ORAL EVERY 8 HOURS PRN
Start: 2021-07-21 | End: 2022-04-26

## 2021-07-21 RX ORDER — ACETAMINOPHEN 325 MG/1
650 TABLET ORAL EVERY 4 HOURS PRN
Start: 2021-07-21

## 2021-07-21 RX ORDER — ALPRAZOLAM 0.25 MG/1
0.25 TABLET ORAL 3 TIMES DAILY PRN
Qty: 9 TABLET | Refills: 0 | Status: SHIPPED | OUTPATIENT
Start: 2021-07-21 | End: 2021-10-18

## 2021-07-21 RX ORDER — FENTANYL 50 UG/H
1 PATCH TRANSDERMAL
Qty: 1 PATCH | Refills: 0 | Status: SHIPPED | OUTPATIENT
Start: 2021-07-21 | End: 2021-10-18

## 2021-07-21 RX ORDER — GABAPENTIN 100 MG/1
100 CAPSULE ORAL 4 TIMES DAILY
Qty: 12 CAPSULE | Refills: 0 | Status: SHIPPED | OUTPATIENT
Start: 2021-07-21 | End: 2021-12-06 | Stop reason: DRUGHIGH

## 2021-07-21 RX ADMIN — NYSTATIN 500000 UNITS: 100000 SUSPENSION ORAL at 11:40

## 2021-07-21 RX ADMIN — VANCOMYCIN HYDROCHLORIDE 125 MG: KIT at 11:43

## 2021-07-21 RX ADMIN — VANCOMYCIN HYDROCHLORIDE 125 MG: KIT at 05:14

## 2021-07-21 RX ADMIN — ALPRAZOLAM 0.25 MG: 0.25 TABLET ORAL at 05:13

## 2021-07-21 RX ADMIN — NYSTATIN 500000 UNITS: 100000 SUSPENSION ORAL at 08:31

## 2021-07-21 RX ADMIN — FLUOXETINE HYDROCHLORIDE 20 MG: 20 CAPSULE ORAL at 11:40

## 2021-07-21 RX ADMIN — VANCOMYCIN HYDROCHLORIDE 125 MG: KIT at 00:29

## 2021-07-21 RX ADMIN — GABAPENTIN 100 MG: 100 CAPSULE ORAL at 08:31

## 2021-07-21 RX ADMIN — GABAPENTIN 100 MG: 100 CAPSULE ORAL at 11:40

## 2021-07-21 RX ADMIN — SODIUM CHLORIDE, PRESERVATIVE FREE 10 ML: 5 INJECTION INTRAVENOUS at 08:31

## 2021-07-21 RX ADMIN — ALPRAZOLAM 0.25 MG: 0.25 TABLET ORAL at 11:40

## 2021-07-21 RX ADMIN — FLUOXETINE HYDROCHLORIDE 20 MG: 20 CAPSULE ORAL at 08:31

## 2021-07-21 RX ADMIN — OXYCODONE HYDROCHLORIDE AND ACETAMINOPHEN 1 TABLET: 5; 325 TABLET ORAL at 05:13

## 2021-07-21 NOTE — DISCHARGE INSTRUCTIONS
To [facility] for physical therapy and Occupational Therapy prior to discharge home.

## 2021-07-21 NOTE — PLAN OF CARE
Goal Outcome Evaluation:  Plan of Care Reviewed With: patient        Progress: improving  Outcome Summary: Patient c/o pain x 2 this shift. Voiding. IID'd. VSS. Safety maintained. Patient is expected to d/c to [facility] today for rehab. No distress noted at this time.

## 2021-07-21 NOTE — CASE MANAGEMENT/SOCIAL WORK
Continued Stay Note  [facility]     Patient Name: Lenora Kathleen  MRN: [id number]  Today's Date: 7/21/2021    Admit Date: 7/16/2021    Discharge Plan     Row Name 07/21/21 1104       Plan    Plan  [facility]    Patient/Family in Agreement with Plan  yes    Final Discharge Disposition Code  03 - skilled nursing facility (SNF)    Final Note  [facility] has been approved and pt will go to [facility] [phone] today. Faxed the rx to them at [phone]. Left a message for pt's son, Rodolfo [phone], to inform of d/c and family will need to transport.    Row Name 07/21/21 6736       Plan    Plan Comments  Xiom[facility] with [facility] called to advise that they have a private room for PT and started pre-cert on 7/20/21. Will await insurance approval or denial.        Discharge Codes    No documentation.       Expected Discharge Date and Time     Expected Discharge Date Expected Discharge Time    Jul 21, 2021             RIZWAN Ko

## 2021-07-21 NOTE — CASE MANAGEMENT/SOCIAL WORK
Continued Stay Note   [place]     Patient Name: Lenora Kathleen  MRN: [id number]  Today's Date: 7/21/2021    Admit Date: 7/16/2021    Discharge Plan     Row Name 07/21/21 0853       Plan    Plan Comments  [facility] called to advise that they have a private room for PT and started pre-cert on 7/20/21. Will await insurance approval or denial.        Discharge Codes    No documentation.             CELINE Nguyen

## 2021-07-21 NOTE — PAYOR COMM NOTE
"Lenora Singh (61 y.o. Female) [id number]  See below   Plan d/c today  But pt hasn't left yet  Going to [facility] and nursing Psychiatric phone   Fax       Date of Birth Social Security Number Address Home Phone MRN    1960  [id number]  [address]  [facility] [address] [phone] [id number]    Church Marital Status          [employer]        Admission Date Admission Type Admitting Provider Attending Provider Department, Room/Bed    7/16/21 Emergency Ovidio Vargas MD Thompson, Benjamin H, MD [facility] 3C, 385/1    Discharge Date Discharge Disposition Discharge Destination         Skilled Nursing Facility (DC - External)              Attending Provider: Ovidio Vargas MD    Allergies: Morphine    Isolation: Spore   Infection: C.difficile (07/17/21)   Code Status: CPR    Ht: 172.7 cm (68\")   Wt: 88.6 kg (195 lb 6.4 oz)    Admission Cmt: None   Principal Problem: Clostridium difficile enterocolitis [A04.72]                 Active Insurance as of 7/16/2021     Primary Coverage     Payor Plan Insurance Group Employer/Plan Group    [facility]IC[facility]PLAC[facility]      Payor Plan Address Payor Plan Phone Number Payor Plan Fax Number Effective Dates    [address] [address] [phone]  4/1/2021 - None Entered    [facility] [address]       Subscriber Name Subscriber Birth Date Member ID       LENORA SINGH 1960 [id number]                 Emergency Contacts      (Rel.) Home Phone Work Phone Mobile Phone    Rodolfo Pineda (Son) [phone] -- --    ETELVINA PINEDA (Relative) [phone] -- --    MACIE PURVIS (Other) -- -- [phone]               Discharge Summary      Danielle Villagomez, APRN at 07/21/21 0921              [facility] Medicine Services  DISCHARGE SUMMARY     Date of Admission: 7/16/2021  Date of " Discharge:  7/21/2021  Primary Care Physician: SAM Weaver MD    Presenting Problem/History of Present Illness:  Colitis [K52.9]     Discharge Diagnoses:  Active Hospital Problems    Diagnosis    • **Clostridium difficile enterocolitis    • Metabolic encephalopathy due to C. difficile colitis and hypotension.    • Enterocolitis    • Dehydration    • Hypotension due to hypovolemia    • Encephalopathy    • Ataxia    • Self-care deficit    • Anemia of chronic disease    • Diarrhea    • Hypokalemia    • Primary squamous cell carcinoma of anal canal      Chief Complaint on Day of Discharge: Feels some better each day.    History of Present Illness on Day of Discharge:   Lying in bed.  She feels some better today.  She is tolerating regular diet.  Ostomy in place with semiformed brown stool.  Denies chest pain or palpitations.  Denies nausea or vomiting.  She was able to ambulate 200 feet with physical therapy yesterday.  Still requires cues for safety.  To [facility] for physical therapy and Occupational Therapy prior to discharge home.    Consults: None    Procedures Performed: None    Pertinent Test Results:     Laboratory Data Last 7 Days:  Results from last 7 days   Lab Units 07/20/21  0641 07/19/21  0557 07/18/21  0512 07/17/21  0558 07/16/21  1457   WBC 10*3/mm3 3.90 3.28* 2.51* 3.04* 3.85   HEMOGLOBIN g/dL 9.0* 8.7* 8.3* 9.3* 8.7*   HEMATOCRIT % 28.9* 27.4* 27.4* 29.7* 27.6*   PLATELETS 10*3/mm3 148 151 142 132* 167     Results from last 7 days   Lab Units 07/21/21  0547 07/20/21  0550 07/20/21  0550 07/19/21  0557 07/19/21  0557 07/18/21  0512 07/18/21  0512 07/17/21  0557 07/17/21  0557 07/16/21  1637 07/16/21  1637   SODIUM mmol/L 135*  --  139  --  140  --  139  --  140  --  139   POTASSIUM mmol/L 3.8  --  4.3  --  4.4  --  3.9  --  3.6  --  3.3*   CHLORIDE mmol/L 105  --  108*  --  112*  --  110*  --  107  --  105   CO2 mmol/L 24.0  --  20.0*  --  22.0  --  23.0  --  21.0*  --  26.0   BUN mg/dL 4*  --   4*  --  6*  --  6*  --  6*  --  9   CREATININE mg/dL 0.37*  --  0.39*  --  0.46*  --  0.39*  --  0.44*  --  0.51*   GLUCOSE mg/dL 94   < > 87   < > 94   < > 93   < > 78   < > 88   CALCIUM mg/dL 8.5*  --  8.5*  --  8.2*  --  8.0*  --  8.3*  --  8.4*   ALT (SGPT) U/L  --   --   --   --   --   --   --   --   --   --  28    < > = values in this interval not displayed.     Laboratory Data Last 7 Days:    Lab Results (last 7 days)     Procedure Component Value Units Date/Time    Basic Metabolic Panel [[id number]]  (Abnormal) Collected: 07/21/21 0547    Specimen: Blood Updated: 07/21/21 0641     Glucose 94 mg/dL      BUN 4 mg/dL      Creatinine 0.37 mg/dL      Sodium 135 mmol/L      Potassium 3.8 mmol/L      Chloride 105 mmol/L      CO2 24.0 mmol/L      Calcium 8.5 mg/dL      eGFR Non African Amer >150 mL/min/1.73      BUN/Creatinine Ratio 10.8     Anion Gap 6.0 mmol/L     Narrative:      GFR Normal >60  Chronic Kidney Disease <60  Kidney Failure <15      Blood Culture - Blood, Arm, Right [[id number]] Collected: 07/16/21 1434    Specimen: Blood from Arm, Right Updated: 07/20/21 1530     Blood Culture No growth at 4 days    Blood Culture - Blood, Arm, Left [[id number]] Collected: 07/16/21 1452    Specimen: Blood from Arm, Left Updated: 07/20/21 1530     Blood Culture No growth at 4 days    CBC (No Diff) [[id number]]  (Abnormal) Collected: 07/20/21 0641    Specimen: Blood Updated: 07/20/21 0653     WBC 3.90 10*3/mm3      RBC 3.13 10*6/mm3      Hemoglobin 9.0 g/dL      Hematocrit 28.9 %      MCV 92.3 fL      MCH 28.8 pg      MCHC 31.1 g/dL      RDW 21.1 %      RDW-SD 67.2 fl      MPV 10.8 fL      Platelets 148 10*3/mm3     Basic Metabolic Panel [[id number]]  (Abnormal) Collected: 07/20/21 0550    Specimen: Blood Updated: 07/20/21 0637     Glucose 87 mg/dL      BUN 4 mg/dL      Creatinine 0.39 mg/dL      Sodium 139 mmol/L      Potassium 4.3 mmol/L      Chloride 108 mmol/L      CO2 20.0 mmol/L      Calcium 8.5 mg/dL      eGFR Non   Amer >150 mL/min/1.73      BUN/Creatinine Ratio 10.3     Anion Gap 11.0 mmol/L     Narrative:      GFR Normal >60  Chronic Kidney Disease <60  Kidney Failure <15      Basic Metabolic Panel [[id number]]  (Abnormal) Collected: 07/19/21 0557    Specimen: Blood Updated: 07/19/21 0639     Glucose 94 mg/dL      BUN 6 mg/dL      Creatinine 0.46 mg/dL      Sodium 140 mmol/L      Potassium 4.4 mmol/L      Chloride 112 mmol/L      CO2 22.0 mmol/L      Calcium 8.2 mg/dL      eGFR Non African Amer 138 mL/min/1.73      BUN/Creatinine Ratio 13.0     Anion Gap 6.0 mmol/L     Narrative:      GFR Normal >60  Chronic Kidney Disease <60  Kidney Failure <15      CBC & Differential [[id number]]  (Abnormal) Collected: 07/19/21 0557    Specimen: Blood Updated: 07/19/21 0607    Narrative:      The following orders were created for panel order CBC & Differential.  Procedure                               Abnormality         Status                     ---------                               -----------         ------                     CBC Auto Differential[[id number]]        Abnormal            Final result                 Please view results for these tests on the individual orders.    CBC Auto Differential [[id number]]  (Abnormal) Collected: 07/19/21 0557    Specimen: Blood Updated: 07/19/21 0607     WBC 3.28 10*3/mm3      RBC 2.93 10*6/mm3      Hemoglobin 8.7 g/dL      Hematocrit 27.4 %      MCV 93.5 fL      MCH 29.7 pg      MCHC 31.8 g/dL      RDW 21.1 %      RDW-SD 68.4 fl      MPV 11.3 fL      Platelets 151 10*3/mm3      Neutrophil % 48.1 %      Lymphocyte % 36.3 %      Monocyte % 10.4 %      Eosinophil % 1.2 %      Basophil % 0.6 %      Immature Grans % 3.4 %      Neutrophils, Absolute 1.58 10*3/mm3      Lymphocytes, Absolute 1.19 10*3/mm3      Monocytes, Absolute 0.34 10*3/mm3      Eosinophils, Absolute 0.04 10*3/mm3      Basophils, Absolute 0.02 10*3/mm3      Immature Grans, Absolute 0.11 10*3/mm3      nRBC 0.0 /100 WBC      Manual Differential [[id number]]  (Abnormal) Collected: 07/18/21 0512    Specimen: Blood Updated: 07/18/21 0619     Neutrophil % 55.1 %      Lymphocyte % 27.6 %      Monocyte % 10.2 %      Eosinophil % 1.0 %      Bands %  4.1 %      Myelocyte % 2.0 %      Neutrophils Absolute 1.49 10*3/mm3      Lymphocytes Absolute 0.69 10*3/mm3      Monocytes Absolute 0.26 10*3/mm3      Eosinophils Absolute 0.03 10*3/mm3      Anisocytosis Mod/2+     Poikilocytes Slight/1+     WBC Morphology Normal     Giant Platelets Slight/1+    CBC & Differential [[id number]]  (Abnormal) Collected: 07/18/21 0512    Specimen: Blood Updated: 07/18/21 0619    Narrative:      The following orders were created for panel order CBC & Differential.  Procedure                               Abnormality         Status                     ---------                               -----------         ------                     CBC Auto Differential[[id number]]        Abnormal            Final result                 Please view results for these tests on the individual orders.    CBC Auto Differential [[id number]]  (Abnormal) Collected: 07/18/21 0512    Specimen: Blood Updated: 07/18/21 0619     WBC 2.51 10*3/mm3      RBC 2.90 10*6/mm3      Hemoglobin 8.3 g/dL      Hematocrit 27.4 %      MCV 94.5 fL      MCH 28.6 pg      MCHC 30.3 g/dL      RDW 21.2 %      RDW-SD 68.7 fl      MPV 11.1 fL      Platelets 142 10*3/mm3     Narrative:      The previously reported component NRBC is no longer being reported. Previous result was 0.0 /100 WBC (Reference Range: 0.0-0.2 /100 WBC) on 7/18/2021 at 0613 [facility].    Basic Metabolic Panel [[id number]]  (Abnormal) Collected: 07/18/21 0512    Specimen: Blood Updated: 07/18/21 0558     Glucose 93 mg/dL      BUN 6 mg/dL      Creatinine 0.39 mg/dL      Sodium 139 mmol/L      Potassium 3.9 mmol/L      Chloride 110 mmol/L      CO2 23.0 mmol/L      Calcium 8.0 mg/dL      eGFR Non African Amer >150 mL/min/1.73      BUN/Creatinine Ratio 15.4      Anion Gap 6.0 mmol/L     Narrative:      GFR Normal >60  Chronic Kidney Disease <60  Kidney Failure <15      CBC (No Diff) [[id number]]  (Abnormal) Collected: 07/17/21 0558    Specimen: Blood Updated: 07/17/21 0711     WBC 3.04 10*3/mm3      RBC 3.19 10*6/mm3      Hemoglobin 9.3 g/dL      Hematocrit 29.7 %      MCV 93.1 fL      MCH 29.2 pg      MCHC 31.3 g/dL      RDW 21.2 %      RDW-SD 67.7 fl      MPV 11.9 fL      Platelets 132 10*3/mm3     Narrative:      Rare plt clumps on smear.    Basic Metabolic Panel [[id number]]  (Abnormal) Collected: 07/17/21 0557    Specimen: Blood Updated: 07/17/21 0646     Glucose 78 mg/dL      BUN 6 mg/dL      Creatinine 0.44 mg/dL      Sodium 140 mmol/L      Potassium 3.6 mmol/L      Chloride 107 mmol/L      CO2 21.0 mmol/L      Calcium 8.3 mg/dL      eGFR Non African Amer 145 mL/min/1.73      BUN/Creatinine Ratio 13.6     Anion Gap 12.0 mmol/L     Narrative:      GFR Normal >60  Chronic Kidney Disease <60  Kidney Failure <15      Gastrointestinal Panel, PCR - Stool, Per Stoma [[id number]]  (Normal) Collected: 07/17/21 0138    Specimen: Stool from Per Stoma Updated: 07/17/21 0303     Campylobacter Not Detected     Plesiomonas shigelloides Not Detected     Salmonella Not Detected     Vibrio Not Detected     Vibrio cholerae Not Detected     Yersinia enterocolitica Not Detected     Enteroaggregative E. coli (EAEC) Not Detected     Enteropathogenic E. coli (EPEC) Not Detected     Enterotoxigenic E. coli (ETEC) lt/st Not Detected     Shiga-like toxin-producing E. coli (STEC) stx1/stx2 Not Detected     Shigella/Enteroinvasive E. coli (EIEC) Not Detected     Cryptosporidium Not Detected     Cyclospora cayetanensis Not Detected     Entamoeba histolytica Not Detected     Giardia lamblia Not Detected     Adenovirus F40/41 Not Detected     Astrovirus Not Detected     Norovirus GI/GII Not Detected     Rotavirus A Not Detected     Sapovirus (I, II, IV or V) Not Detected    Narrative:      If  Aeromonas, Staphylococcus aureus or Bacillus cereus are suspected, please order [id number]: Stool Culture, Aeromonas, S aureus, B Cereus.    Clostridium Difficile Toxin - Stool, Per Stoma [[id number]]  (Abnormal) Collected: 07/17/21 0138    Specimen: Stool from Per Stoma Updated: 07/17/21 0243    Narrative:      The following orders were created for panel order Clostridium Difficile Toxin - Stool, Per Stoma.  Procedure                               Abnormality         Status                     ---------                               -----------         ------                     Clostridium Difficile To...[[id number]]  Abnormal            Final result                 Please view results for these tests on the individual orders.    Clostridium Difficile Toxin, PCR - Stool, Per Stoma [[id number]]  (Abnormal) Collected: 07/17/21 0138    Specimen: Stool from Per Stoma Updated: 07/17/21 0243     C. Difficile Toxins by PCR Positive    Narrative:      Performance characteristics of test not established for patients <2 years of age.    Procalcitonin [[id number]]  (Normal) Collected: 07/16/21 1637    Specimen: Blood Updated: 07/16/21 1659     Procalcitonin 0.06 ng/mL     Narrative:          Comprehensive Metabolic Panel [[id number]]  (Abnormal) Collected: 07/16/21 1637    Specimen: Blood Updated: 07/16/21 1654     Glucose 88 mg/dL      BUN 9 mg/dL      Creatinine 0.51 mg/dL      Sodium 139 mmol/L      Potassium 3.3 mmol/L      Chloride 105 mmol/L      CO2 26.0 mmol/L      Calcium 8.4 mg/dL      Total Protein 5.2 g/dL      Albumin 3.00 g/dL      ALT (SGPT) 28 U/L      AST (SGOT) 53 U/L      Alkaline Phosphatase 48 U/L      Total Bilirubin 0.2 mg/dL      eGFR Non African Amer 123 mL/min/1.73      Globulin 2.2 gm/dL      A/G Ratio 1.4 g/dL      BUN/Creatinine Ratio 17.6     Anion Gap 8.0 mmol/L     Narrative:      GFR Normal >60  Chronic Kidney Disease <60  Kidney Failure <15      Ammonia [[id number]]  (Normal) Collected: 07/16/21 1637     Specimen: Blood Updated: 07/16/21 1653     Ammonia 11 umol/L     COVID-19,[facility] IN-HOUSE,Nasal Swab No Transport Media 3-4 HR TAT - Swab, Nasal Cavity [[id number]]  (Normal) Collected: 07/16/21 1527    Specimen: Swab from Nasal Cavity Updated: 07/16/21 1607     COVID19 Not Detected    Narrative:      Fact sheet for providers: https://www.fda.gov/media/112477/download     Fact sheet for patients: https://www.fda.gov/media/345699/download    Test performed by PCR.    Consider negative results in combination with clinical observations, patient history, and epidemiological information.    Urinalysis, Microscopic Only - Urine, Catheter [[id number]]  (Abnormal) Collected: 07/16/21 1527    Specimen: Urine, Catheter Updated: 07/16/21 1539     RBC, UA 3-5 /HPF      WBC, UA 3-5 /HPF      Bacteria, UA None Seen /HPF      Squamous Epithelial Cells, UA 0-2 /HPF      Hyaline Casts, UA 0-2 /LPF      Methodology Automated Microscopy    Urinalysis With Culture If Indicated - Urine, Catheter [[id number]]  (Abnormal) Collected: 07/16/21 1527    Specimen: Urine, Catheter Updated: 07/16/21 1539     Color, UA Yellow     Appearance, UA Clear     pH, UA 7.0     Specific Gravity, UA 1.014     Glucose, UA Negative     Ketones, UA Trace     Bilirubin, UA Negative     Blood, UA Negative     Protein, UA Negative     Leuk Esterase, UA Trace     Nitrite, UA Negative     Urobilinogen, UA 0.2 E.U./dL    Lactic Acid, Plasma [[id number]]  (Normal) Collected: 07/16/21 1457    Specimen: Blood Updated: 07/16/21 1532     Lactate 1.1 mmol/L     CBC & Differential [[id number]]  (Abnormal) Collected: 07/16/21 1457    Specimen: Blood Updated: 07/16/21 1512    Narrative:      The following orders were created for panel order CBC & Differential.  Procedure                               Abnormality         Status                     ---------                               -----------         ------                     CBC Auto Differential[[id number]]         Abnormal            Final result                 Please view results for these tests on the individual orders.    CBC Auto Differential [[id number]]  (Abnormal) Collected: 07/16/21 1457    Specimen: Blood Updated: 07/16/21 1512     WBC 3.85 10*3/mm3      RBC 3.01 10*6/mm3      Hemoglobin 8.7 g/dL      Hematocrit 27.6 %      MCV 91.7 fL      MCH 28.9 pg      MCHC 31.5 g/dL      RDW 21.1 %      RDW-SD 64.8 fl      MPV 11.1 fL      Platelets 167 10*3/mm3      Neutrophil % 42.1 %      Lymphocyte % 35.1 %      Monocyte % 17.1 %      Eosinophil % 0.5 %      Basophil % 0.8 %      Immature Grans % 4.4 %      Neutrophils, Absolute 1.62 10*3/mm3      Lymphocytes, Absolute 1.35 10*3/mm3      Monocytes, Absolute 0.66 10*3/mm3      Eosinophils, Absolute 0.02 10*3/mm3      Basophils, Absolute 0.03 10*3/mm3      Immature Grans, Absolute 0.17 10*3/mm3      nRBC 0.0 /100 WBC         Culture Data:   Blood Culture   Date Value Ref Range Status   07/16/2021 No growth at 4 days  Preliminary   07/16/2021 No growth at 4 days  Preliminary    No results found for: URINECX No results found for: WOUNDCX No results found for: MRSACX No results found for: RESPCX No results found for: STOOLCX   Imaging Results (All)     Procedure Component Value Units Date/Time    CT Abdomen Pelvis With Contrast [[id number]] Collected: 07/16/21 1707     Updated: 07/16/21 1719    Narrative:      EXAM: CT ABDOMEN PELVIS W CONTRAST-     INDICATION: has open ostomy with no bag for multiple days. Possible  fever. confusion. Hx colorectal/anal cancer     COMPARISON: 2/8/2021     DOSE LENGTH PRODUCT: 554 mGy cm. Automated exposure control was also  utilized to decrease patient radiation dose.     FINDINGS:     Included lung bases are clear. No suspicious liver lesion. Prior  cholecystectomy. Mild prominence of the biliary tree is stable and  likely reservoir phenomenon. Pancreas, spleen, and adrenal glands appear  normal. No solid renal mass. No urolithiasis or  hydronephrosis. Mild  urinary bladder wall thickening.     During LEFT lower quadrant colostomy. Normal appendix. Wall thickening  of the ascending colon. Multiple small bowel loops in the pelvis  demonstrate wall thickening and mucosal hyperemia. A small amount of  reactive free pelvic fluid is present. Distal esophagus and stomach  appear unremarkable. No bowel obstruction.     Nonaneurysmal abdominal aorta with scattered atherosclerotic  calcification. No enlarged retroperitoneal, mesenteric, pelvic, or LEFT  inguinal lymph node. Enlarging RIGHT inguinal lymph node on image 79  measures 2.4 x 1.7 cm. No acute osseous finding.       Impression:         1.  Multiple inflamed small bowel loops in the pelvis. Wall thickening  and inflammation of the rectum and sigmoid colon. Findings are most  compatible with acute enterocolitis.  2.  Urinary bladder wall thickening, correlate for cystitis.  3.  Enlarging RIGHT inguinal lymph node.  This report was finalized on 07/16/2021 17:16 by Dr. Patrick Farrell MD.        Hospital Course  Patient is a 61 y.o. female presented to [facility] emergency room 7/16/2021 with increasing diarrhea and increasing confusion.  She has a history of colorectal cancer followed by Dr. Canseco and Dr. Guthrie.  Patient has a colostomy in place and has been unable to care for herself recently.  Patient reported she was going to need additional surgery for closure of fistula followed by Dr. Paulette Villegas.  Her friend was in attendance on admission and stated that patient had increasing confusion over the last few days and protective services were working with patient trying to get her in rehab at [facility].  Friend indicated that patient had removed her ostomy bag and there was liquid stool over the house.  Patient had been having diarrhea and complaining of abdominal pain.  In addition, patient felt dizzy and fell the day prior.  Friend had been assisting patient with medications  by filling weekly medication tray.  Friend concerned that patient not taking medications appropriately.  Patient lives alone and friend no longer able to assist with care at home.  Potassium 3.3, creatinine 0.51, hemoglobin 8.7, WBC 3.85.  CT scan abdomen reported multiple inflamed small bowel loops in the pelvis.  Wall thickening and inflammation of the rectum and sigmoid colon.  Findings compatible with acute enterocolitis.  Urinary bladder wall thickening correlate for cystitis.  Normal saline fluid bolus, Flagyl given in ER.    She was admitted to the medical floor with enterocolitis, dehydration, hypotension due to hypovolemia.  IV fluids at 100 mL/h, Flagyl and Levaquin initiated on admission.  Potassium replaced.  Home medications reviewed.  Antihypertensive medications held.  SCDs placed for deep vein thrombosis prophylaxis.  Stool positive for C. difficile.  GI PCR panel negative.  Levaquin and Flagyl discontinued.  Vancomycin 125 mg orally every 6 hours for 10 days started on 7/17/2021.  Discontinue vancomycin after last dose on 7/26/2021.  In addition, vancomycin enemas every 6 hours for 3 days started on 7/17/2021.  Patient completed vancomycin enemas during hospital stay.  She was allowed full liquid diet and advanced as tolerated.  Blood cultures monitored and remained no growth at 4 days.    Blood pressure remained relatively low 120/68, 112/62 99/67.  Lisinopril and metoprolol were not continued during hospital stay or at the time of discharge.  Patient reported feeling weak at home in combination with decreased oral intake, believe that she was dehydrated as she presented with low blood pressure on admission.    Chronic anemia chemotherapy induced.  Hemoglobin 8.7 on admission and 9.0 at discharge.    Potassium 3.3 on admission and replaced.  Potassium 3.8 at discharge.    Physical therapy consulted due to deconditioned state.  Patient was weak, decreased strength balance and endurance along with  "safety issues.  Throughout hospital stay patient eventually was able to ambulate 200 feet with contact-guard with assistance of physical therapy and required recurrent cues for safety issues.    Referral to [facility] rehab prior to discharge home.  Patient no longer able to care for herself and friend no longer able to assist with care.  Patient will require private room due to C. difficile.  Initially, no private room available at [facility] and this delayed discharge date.    On 7/21/2021 bed available at [facility] for ongoing physical therapy and Occupational Therapy.  In addition, patient will complete treatment for C. difficile with vancomycin 125 mg orally every 6 hours for total of 10 days.  Discontinue vancomycin after completes last dose on 7/26/2021.  Follow-up with Dr. Weaver after discharge from [facility].    Physical Exam on Discharge:  /68 (BP Location: Left arm, Patient Position: Lying)   Pulse 56   Temp 98.7 °F (37.1 °C) (Oral)   Resp 16   Ht 172.7 cm (68\")   Wt 88.6 kg (195 lb 6.4 oz)   SpO2 97%   BMI 29.71 kg/m²   Physical Exam  Vitals and nursing note reviewed.   Constitutional:       Comments: Sitting up in bed.  No oxygen in use.  No family in room.   HENT:      Head: Normocephalic and atraumatic.      Nose: No congestion.      Mouth/Throat:      Pharynx: Oropharynx is clear. No oropharyngeal exudate.   Eyes:      Extraocular Movements: Extraocular movements intact.      Pupils: Pupils are equal, round, and reactive to light.   Cardiovascular:      Rate and Rhythm: Normal rate and regular rhythm.      Heart sounds: No murmur heard.        Comments: Normal sinus rhythm 60-97 on telemetry.  Pulmonary:      Breath sounds: No wheezing, rhonchi or rales.      Comments: No oxygen in use.  Abdominal:      Tenderness: There is no abdominal tenderness.      Comments: Ostomy in place left side of abdomen with semiformed brown stool.   Genitourinary:     Comments: " Voiding.  Musculoskeletal:         General: No swelling or tenderness.      Cervical back: Normal range of motion.      Comments: SCDs bilateral lower extremities   Skin:     General: Skin is warm and dry.   Neurological:      General: No focal deficit present.      Mental Status: She is alert and oriented to person, place, and time.   Psychiatric:         Mood and Affect: Mood normal.         Behavior: Behavior normal.         Thought Content: Thought content normal.         Judgment: Judgment normal.       Condition on Discharge: Stable    Discharge Disposition: [place] [facility]    Discharge Diet:   Diet Instructions     Advance Diet As Tolerated        As tolerated    Activity at Discharge:   Activity Instructions     Activity as Tolerated      Up WIth Assist        Physical therapy and occupational therapy for progressive activity.    Discharge Care Plan / Instructions:   1.  For fall, weakness, worsening diarrhea seek medical attention.  2.  Vancomycin 125 mg orally every 6 hours for 10 days.  Start date 7/17/2021.  Discontinue after last dose on  7/26/2027.  3.  Physical therapy and Occupational Therapy twice daily.  4.  Follow-up with Dr. Weaver after discharged from [facility]    Discharge Medications:     Discharge Medications      New Medications      Instructions Start Date   oxyCODONE-acetaminophen 5-325 MG per tablet  Commonly known as: PERCOCET   1 tablet, Oral, Every 6 Hours PRN      vancomycin 50 MG/ML reconstituted solution oral solution reconstituted   125 mg, Oral, Every 6 Hours Scheduled for 10 days.  Start date 7/17/2021.  Discontinue after completes last dose on 7/26/2021.         Changes to Medications      Instructions Start Date   acetaminophen 325 MG tablet  Commonly known as: TYLENOL  What changed:   · how much to take  · when to take this  · reasons to take this  · additional instructions   650 mg, Oral, Every 4 Hours PRN      ALPRAZolam 0.25 MG tablet  Commonly known as: XANAX  What  changed:   · medication strength  · how much to take  · when to take this  · reasons to take this   0.25 mg, Oral, 3 Times Daily PRN      gabapentin 100 MG capsule  Commonly known as: NEURONTIN  What changed:   · medication strength  · how much to take   100 mg, Oral, 4 Times Daily      ibuprofen 600 MG tablet  Commonly known as: ADVIL,MOTRIN  What changed:   · medication strength  · when to take this  · reasons to take this  · additional instructions   600 mg, Oral, Every 8 Hours PRN         Continue These Medications      Instructions Start Date   albuterol sulfate  (90 Base) MCG/ACT inhaler  Commonly known as: PROVENTIL HFA;VENTOLIN HFA;PROAIR HFA   2 puffs, Inhalation, As Needed      capecitabine 500 MG chemo tablet  Commonly known as: XELODA   Oral, 3 500 mg tablets in the morning AND evening PLUS 150 mg mg Monday- FRIDAY      capecitabine 150 MG chemo tablet  Commonly known as: XELODA   No dose, route, or frequency recorded.      fentaNYL 50 MCG/HR patch  Commonly known as: DURAGESIC   1 patch, Transdermal, Every 72 Hours      megestrol 625 MG/5ML suspension  Commonly known as: MEGACE ES   625 mg, Oral, Daily      ondansetron 4 MG tablet  Commonly known as: Zofran   4 mg, Oral, Every 8 Hours PRN      PROzac 20 MG capsule  Generic drug: FLUoxetine   20 mg, Oral, 4 Times Daily      Vitamin B 12 500 MCG tablet   500 mcg, Oral, Daily      vitamin D 1.25 MG (57513 UT) capsule capsule  Commonly known as: ERGOCALCIFEROL   50,000 Units, Oral, Weekly         Stop These Medications    acyclovir 200 MG capsule  Commonly known as: ZOVIRAX     acyclovir 5 % ointment  Commonly known as: ZOVIRAX     lisinopril 10 MG tablet  Commonly known as: PRINIVIL,ZESTRIL     megestrol 40 MG/ML suspension  Commonly known as: MEGACE     metoprolol succinate XL 50 MG 24 hr tablet  Commonly known as: TOPROL-XL     oxyCODONE 15 MG immediate release tablet  Commonly known as: ROXICODONE     Scopolamine 1 MG/3DAYS patch           Follow-up Appointments:    Contact information for follow-up providers     SAM Weaver MD Follow up.    Specialty: Internal Medicine  Why: After discharged from [facility]  Contact information:  [address]  [address]  [facility] [address]  [phone]                   Contact information for after-discharge care     Destination     [facility] .    Service: Skilled Nursing  Contact information:  [address]  [facility] [address]  [phone]                           Future Appointments:  Future Appointments   Date Time Provider Department Center   8/11/2021  2:30 PM Jadiel Guthrie III, MD [facility] None   9/3/2021  1:00 PM SAM Weaver MD [facility]     Test Results Pending at Discharge: None    The above documentation resulted from a face-to-face encounter by me Danielle OAKLEY, [facility].    Electronically signed by ADIN Joya, 7/21/2021, 11:10 CDT.    Time: This discharge process required greater than 35 minutes for completion.    Plan discussed with Dr. Jhaveri and patient.    Time spent in face-to-face evaluation, chart review, planning and education greater than 35 minutes.          Electronically signed by Danielle Villagomez APRN at 07/21/21 1113       Discharge Order (From admission, onward)     Start     Ordered    07/21/21 0931  Discharge patient  Once     Expected Discharge Date: 07/21/21    Discharge Disposition: Skilled Nursing Facility (DC - External)    Physician of Record for Attribution - Please select from Treatment Team: BACILIO JHAVERI [[id number]]    Review needed by CMO to determine Physician of Record: No       Question Answer Comment   Physician of Record for Attribution - Please select from Treatment Team BACILIO JHAVERI    Review needed by CMO to determine Physician of Record No        07/21/21 0972

## 2021-07-21 NOTE — DISCHARGE SUMMARY
[facility]  DISCHARGE SUMMARY     Date of Admission: 7/16/2021  Date of Discharge:  7/21/2021  Primary Care Physician: SAM Weaver MD    Presenting Problem/History of Present Illness:  Colitis [K52.9]     Discharge Diagnoses:  Active Hospital Problems    Diagnosis    • **Clostridium difficile enterocolitis    • Metabolic encephalopathy due to C. difficile colitis and hypotension.    • Enterocolitis    • Dehydration    • Hypotension due to hypovolemia    • Encephalopathy    • Ataxia    • Self-care deficit    • Anemia of chronic disease    • Diarrhea    • Hypokalemia    • Primary squamous cell carcinoma of anal canal      Chief Complaint on Day of Discharge: Feels some better each day.    History of Present Illness on Day of Discharge:   Lying in bed.  She feels some better today.  She is tolerating regular diet.  Ostomy in place with semiformed brown stool.  Denies chest pain or palpitations.  Denies nausea or vomiting.  She was able to ambulate 200 feet with physical therapy yesterday.  Still requires cues for safety.  To [facility] for physical therapy and Occupational Therapy prior to discharge home.    Consults: None    Procedures Performed: None    Pertinent Test Results:     Laboratory Data Last 7 Days:  Results from last 7 days   Lab Units 07/20/21  0641 07/19/21  0557 07/18/21  0512 07/17/21  0558 07/16/21  1457   WBC 10*3/mm3 3.90 3.28* 2.51* 3.04* 3.85   HEMOGLOBIN g/dL 9.0* 8.7* 8.3* 9.3* 8.7*   HEMATOCRIT % 28.9* 27.4* 27.4* 29.7* 27.6*   PLATELETS 10*3/mm3 148 151 142 132* 167     Results from last 7 days   Lab Units 07/21/21  0547 07/20/21  0550 07/20/21  0550 07/19/21  0557 07/19/21  0557 07/18/21  0512 07/18/21  0512 07/17/21  0557 07/17/21  0557 07/16/21  1637 07/16/21  1637   SODIUM mmol/L 135*  --  139  --  140  --  139  --  140  --  139   POTASSIUM mmol/L 3.8  --  4.3  --  4.4  --  3.9  --  3.6  --  3.3*   CHLORIDE mmol/L 105  --  108*   --  112*  --  110*  --  107  --  105   CO2 mmol/L 24.0  --  20.0*  --  22.0  --  23.0  --  21.0*  --  26.0   BUN mg/dL 4*  --  4*  --  6*  --  6*  --  6*  --  9   CREATININE mg/dL 0.37*  --  0.39*  --  0.46*  --  0.39*  --  0.44*  --  0.51*   GLUCOSE mg/dL 94   < > 87   < > 94   < > 93   < > 78   < > 88   CALCIUM mg/dL 8.5*  --  8.5*  --  8.2*  --  8.0*  --  8.3*  --  8.4*   ALT (SGPT) U/L  --   --   --   --   --   --   --   --   --   --  28    < > = values in this interval not displayed.     Laboratory Data Last 7 Days:    Lab Results (last 7 days)     Procedure Component Value Units Date/Time    Basic Metabolic Panel [[id number]]  (Abnormal) Collected: 07/21/21 0547    Specimen: Blood Updated: 07/21/21 0641     Glucose 94 mg/dL      BUN 4 mg/dL      Creatinine 0.37 mg/dL      Sodium 135 mmol/L      Potassium 3.8 mmol/L      Chloride 105 mmol/L      CO2 24.0 mmol/L      Calcium 8.5 mg/dL      eGFR Non African Amer >150 mL/min/1.73      BUN/Creatinine Ratio 10.8     Anion Gap 6.0 mmol/L     Narrative:      GFR Normal >60  Chronic Kidney Disease <60  Kidney Failure <15      Blood Culture - Blood, Arm, Right [[id number]] Collected: 07/16/21 1434    Specimen: Blood from Arm, Right Updated: 07/20/21 1530     Blood Culture No growth at 4 days    Blood Culture - Blood, Arm, Left [[id number]] Collected: 07/16/21 1452    Specimen: Blood from Arm, Left Updated: 07/20/21 1530     Blood Culture No growth at 4 days    CBC (No Diff) [[id number]]  (Abnormal) Collected: 07/20/21 0641    Specimen: Blood Updated: 07/20/21 0653     WBC 3.90 10*3/mm3      RBC 3.13 10*6/mm3      Hemoglobin 9.0 g/dL      Hematocrit 28.9 %      MCV 92.3 fL      MCH 28.8 pg      MCHC 31.1 g/dL      RDW 21.1 %      RDW-SD 67.2 fl      MPV 10.8 fL      Platelets 148 10*3/mm3     Basic Metabolic Panel [[id number]]  (Abnormal) Collected: 07/20/21 0550    Specimen: Blood Updated: 07/20/21 0637     Glucose 87 mg/dL      BUN 4 mg/dL      Creatinine 0.39 mg/dL       Sodium 139 mmol/L      Potassium 4.3 mmol/L      Chloride 108 mmol/L      CO2 20.0 mmol/L      Calcium 8.5 mg/dL      eGFR Non African Amer >150 mL/min/1.73      BUN/Creatinine Ratio 10.3     Anion Gap 11.0 mmol/L     Narrative:      GFR Normal >60  Chronic Kidney Disease <60  Kidney Failure <15      Basic Metabolic Panel [[id number]]  (Abnormal) Collected: 07/19/21 0557    Specimen: Blood Updated: 07/19/21 0639     Glucose 94 mg/dL      BUN 6 mg/dL      Creatinine 0.46 mg/dL      Sodium 140 mmol/L      Potassium 4.4 mmol/L      Chloride 112 mmol/L      CO2 22.0 mmol/L      Calcium 8.2 mg/dL      eGFR Non African Amer 138 mL/min/1.73      BUN/Creatinine Ratio 13.0     Anion Gap 6.0 mmol/L     Narrative:      GFR Normal >60  Chronic Kidney Disease <60  Kidney Failure <15      CBC & Differential [[id number]]  (Abnormal) Collected: 07/19/21 0557    Specimen: Blood Updated: 07/19/21 0607    Narrative:      The following orders were created for panel order CBC & Differential.  Procedure                               Abnormality         Status                     ---------                               -----------         ------                     CBC Auto Differential[[id number]]        Abnormal            Final result                 Please view results for these tests on the individual orders.    CBC Auto Differential [[id number]]  (Abnormal) Collected: 07/19/21 0557    Specimen: Blood Updated: 07/19/21 0607     WBC 3.28 10*3/mm3      RBC 2.93 10*6/mm3      Hemoglobin 8.7 g/dL      Hematocrit 27.4 %      MCV 93.5 fL      MCH 29.7 pg      MCHC 31.8 g/dL      RDW 21.1 %      RDW-SD 68.4 fl      MPV 11.3 fL      Platelets 151 10*3/mm3      Neutrophil % 48.1 %      Lymphocyte % 36.3 %      Monocyte % 10.4 %      Eosinophil % 1.2 %      Basophil % 0.6 %      Immature Grans % 3.4 %      Neutrophils, Absolute 1.58 10*3/mm3      Lymphocytes, Absolute 1.19 10*3/mm3      Monocytes, Absolute 0.34 10*3/mm3      Eosinophils, Absolute  0.04 10*3/mm3      Basophils, Absolute 0.02 10*3/mm3      Immature Grans, Absolute 0.11 10*3/mm3      nRBC 0.0 /100 WBC     Manual Differential [[id number]]  (Abnormal) Collected: 07/18/21 0512    Specimen: Blood Updated: 07/18/21 0619     Neutrophil % 55.1 %      Lymphocyte % 27.6 %      Monocyte % 10.2 %      Eosinophil % 1.0 %      Bands %  4.1 %      Myelocyte % 2.0 %      Neutrophils Absolute 1.49 10*3/mm3      Lymphocytes Absolute 0.69 10*3/mm3      Monocytes Absolute 0.26 10*3/mm3      Eosinophils Absolute 0.03 10*3/mm3      Anisocytosis Mod/2+     Poikilocytes Slight/1+     WBC Morphology Normal     Giant Platelets Slight/1+    CBC & Differential [[id number]]  (Abnormal) Collected: 07/18/21 0512    Specimen: Blood Updated: 07/18/21 0619    Narrative:      The following orders were created for panel order CBC & Differential.  Procedure                               Abnormality         Status                     ---------                               -----------         ------                     CBC Auto Differential[[id number]]        Abnormal            Final result                 Please view results for these tests on the individual orders.    CBC Auto Differential [[id number]]  (Abnormal) Collected: 07/18/21 0512    Specimen: Blood Updated: 07/18/21 0619     WBC 2.51 10*3/mm3      RBC 2.90 10*6/mm3      Hemoglobin 8.3 g/dL      Hematocrit 27.4 %      MCV 94.5 fL      MCH 28.6 pg      MCHC 30.3 g/dL      RDW 21.2 %      RDW-SD 68.7 fl      MPV 11.1 fL      Platelets 142 10*3/mm3     Narrative:      The previously reported component NRBC is no longer being reported. Previous result was 0.0 /100 WBC (Reference Range: 0.0-0.2 /100 WBC) on 7/18/2021 at 0613 [facility].    Basic Metabolic Panel [[id number]]  (Abnormal) Collected: 07/18/21 0512    Specimen: Blood Updated: 07/18/21 0558     Glucose 93 mg/dL      BUN 6 mg/dL      Creatinine 0.39 mg/dL      Sodium 139 mmol/L      Potassium 3.9 mmol/L      Chloride 110 mmol/L       CO2 23.0 mmol/L      Calcium 8.0 mg/dL      eGFR Non African Amer >150 mL/min/1.73      BUN/Creatinine Ratio 15.4     Anion Gap 6.0 mmol/L     Narrative:      GFR Normal >60  Chronic Kidney Disease <60  Kidney Failure <15      CBC (No Diff) [[id number]]  (Abnormal) Collected: 07/17/21 0558    Specimen: Blood Updated: 07/17/21 0711     WBC 3.04 10*3/mm3      RBC 3.19 10*6/mm3      Hemoglobin 9.3 g/dL      Hematocrit 29.7 %      MCV 93.1 fL      MCH 29.2 pg      MCHC 31.3 g/dL      RDW 21.2 %      RDW-SD 67.7 fl      MPV 11.9 fL      Platelets 132 10*3/mm3     Narrative:      Rare plt clumps on smear.    Basic Metabolic Panel [[id number]]  (Abnormal) Collected: 07/17/21 0557    Specimen: Blood Updated: 07/17/21 0646     Glucose 78 mg/dL      BUN 6 mg/dL      Creatinine 0.44 mg/dL      Sodium 140 mmol/L      Potassium 3.6 mmol/L      Chloride 107 mmol/L      CO2 21.0 mmol/L      Calcium 8.3 mg/dL      eGFR Non African Amer 145 mL/min/1.73      BUN/Creatinine Ratio 13.6     Anion Gap 12.0 mmol/L     Narrative:      GFR Normal >60  Chronic Kidney Disease <60  Kidney Failure <15      Gastrointestinal Panel, PCR - Stool, Per [facility]oma [[id number]]  (Normal) Collected: 07/17/21 0138    Specimen: Stool from Per Stoma Updated: 07/17/21 0303     Campylobacter Not Detected     Plesiomonas shigelloides Not Detected     Salmonella Not Detected     Vibrio Not Detected     Vibrio cholerae Not Detected     Yersinia enterocolitica Not Detected     Enteroaggregative E. coli (EAEC) Not Detected     Enteropathogenic E. coli (EPEC) Not Detected     Enterotoxigenic E. coli (ETEC) lt/st Not Detected     Shiga-like toxin-producing E. coli (STEC) stx1/stx2 Not Detected     Shigella/Enteroinvasive E. coli (EIEC) Not Detected     Cryptosporidium Not Detected     Cyclospora cayetanensis Not Detected     Entamoeba histolytica Not Detected     Giardia lamblia Not Detected     Adenovirus F40/41 Not Detected     Astrovirus Not Detected     Norovirus  GI/GII Not Detected     Rotavirus A Not Detected     Sapovirus (I, II, IV or V) Not Detected    Narrative:      If Aeromonas, Staphylococcus aureus or Bacillus cereus are suspected, please order [id number]: Stool Culture, Aeromonas, S aureus, B Cereus.    Clostridium Difficile Toxin - Stool, Per Stoma [[id number]]  (Abnormal) Collected: 07/17/21 0138    Specimen: Stool from Per Stoma Updated: 07/17/21 0243    Narrative:      The following orders were created for panel order Clostridium Difficile Toxin - Stool, Per [facility]oma.  Procedure                               Abnormality         Status                     ---------                               -----------         ------                     Clostridium Difficile To...[[id number]]  Abnormal            Final result                 Please view results for these tests on the individual orders.    Clostridium Difficile Toxin, PCR - Stool, Per [facility]oma [[id number]]  (Abnormal) Collected: 07/17/21 0138    Specimen: Stool from Per Stoma Updated: 07/17/21 0243     C. Difficile Toxins by PCR Positive    Narrative:      Performance characteristics of test not established for patients <2 years of age.    Procalcitonin [[id number]]  (Normal) Collected: 07/16/21 1637    Specimen: Blood Updated: 07/16/21 1659     Procalcitonin 0.06 ng/mL     Narrative:          Comprehensive Metabolic Panel [[id number]]  (Abnormal) Collected: 07/16/21 1637    Specimen: Blood Updated: 07/16/21 1654     Glucose 88 mg/dL      BUN 9 mg/dL      Creatinine 0.51 mg/dL      Sodium 139 mmol/L      Potassium 3.3 mmol/L      Chloride 105 mmol/L      CO2 26.0 mmol/L      Calcium 8.4 mg/dL      Total Protein 5.2 g/dL      Albumin 3.00 g/dL      ALT (SGPT) 28 U/L      AST (SGOT) 53 U/L      Alkaline Phosphatase 48 U/L      Total Bilirubin 0.2 mg/dL      eGFR Non African Amer 123 mL/min/1.73      Globulin 2.2 gm/dL      A/G Ratio 1.4 g/dL      BUN/Creatinine Ratio 17.6     Anion Gap 8.0 mmol/L     Narrative:      GFR  Normal >60  Chronic Kidney Disease <60  Kidney Failure <15      Ammonia [[id number]]  (Normal) Collected: 07/16/21 1637    Specimen: Blood Updated: 07/16/21 1653     Ammonia 11 umol/L     COVID-19,[facility] IN-HOUSE,Nasal Swab No Transport Media 3-4 HR TAT - Swab, Nasal Cavity [[id number]]  (Normal) Collected: 07/16/21 1527    Specimen: Swab from Nasal Cavity Updated: 07/16/21 1607     COVID19 Not Detected    Narrative:      Fact sheet for providers: https://www.fda.gov/media/285550/download     Fact sheet for patients: https://www.fda.gov/media/844666/download    Test performed by PCR.    Consider negative results in combination with clinical observations, patient history, and epidemiological information.    Urinalysis, Microscopic Only - Urine, Catheter [[id number]]  (Abnormal) Collected: 07/16/21 1527    Specimen: Urine, Catheter Updated: 07/16/21 1539     RBC, UA 3-5 /HPF      WBC, UA 3-5 /HPF      Bacteria, UA None Seen /HPF      Squamous Epithelial Cells, UA 0-2 /HPF      Hyaline Casts, UA 0-2 /LPF      Methodology Automated Microscopy    Urinalysis With Culture If Indicated - Urine, Catheter [[id number]]  (Abnormal) Collected: 07/16/21 1527    Specimen: Urine, Catheter Updated: 07/16/21 1539     Color, UA Yellow     Appearance, UA Clear     pH, UA 7.0     Specific Gravity, UA 1.014     Glucose, UA Negative     Ketones, UA Trace     Bilirubin, UA Negative     Blood, UA Negative     Protein, UA Negative     Leuk Esterase, UA Trace     Nitrite, UA Negative     Urobilinogen, UA 0.2 E.U./dL    Lactic Acid, Plasma [[id number]]  (Normal) Collected: 07/16/21 1457    Specimen: Blood Updated: 07/16/21 1532     Lactate 1.1 mmol/L     CBC & Differential [[id number]]  (Abnormal) Collected: 07/16/21 1457    Specimen: Blood Updated: 07/16/21 1512    Narrative:      The following orders were created for panel order CBC & Differential.  Procedure                               Abnormality         Status                     ---------                                -----------         ------                     CBC Auto Differential[[id number]]        Abnormal            Final result                 Please view results for these tests on the individual orders.    CBC Auto Differential [[id number]]  (Abnormal) Collected: 07/16/21 1457    Specimen: Blood Updated: 07/16/21 1512     WBC 3.85 10*3/mm3      RBC 3.01 10*6/mm3      Hemoglobin 8.7 g/dL      Hematocrit 27.6 %      MCV 91.7 fL      MCH 28.9 pg      MCHC 31.5 g/dL      RDW 21.1 %      RDW-SD 64.8 fl      MPV 11.1 fL      Platelets 167 10*3/mm3      Neutrophil % 42.1 %      Lymphocyte % 35.1 %      Monocyte % 17.1 %      Eosinophil % 0.5 %      Basophil % 0.8 %      Immature Grans % 4.4 %      Neutrophils, Absolute 1.62 10*3/mm3      Lymphocytes, Absolute 1.35 10*3/mm3      Monocytes, Absolute 0.66 10*3/mm3      Eosinophils, Absolute 0.02 10*3/mm3      Basophils, Absolute 0.03 10*3/mm3      Immature Grans, Absolute 0.17 10*3/mm3      nRBC 0.0 /100 WBC         Culture Data:   Blood Culture   Date Value Ref Range Status   07/16/2021 No growth at 4 days  Preliminary   07/16/2021 No growth at 4 days  Preliminary    No results found for: URINECX No results found for: WOUNDCX No results found for: MRSACX No results found for: RESPCX No results found for: STOOLCX   Imaging Results (All)     Procedure Component Value Units Date/Time    CT Abdomen Pelvis With Contrast [[id number]] Collected: 07/16/21 1707     Updated: 07/16/21 1719    Narrative:      EXAM: CT ABDOMEN PELVIS W CONTRAST-     INDICATION: has open ostomy with no bag for multiple days. Possible  fever. confusion. Hx colorectal/anal cancer     COMPARISON: 2/8/2021     DOSE LENGTH PRODUCT: 554 mGy cm. Automated exposure control was also  utilized to decrease patient radiation dose.     FINDINGS:     Included lung bases are clear. No suspicious liver lesion. Prior  cholecystectomy. Mild prominence of the biliary tree is stable and  likely  reservoir phenomenon. Pancreas, spleen, and adrenal glands appear  normal. No solid renal mass. No urolithiasis or hydronephrosis. Mild  urinary bladder wall thickening.     During LEFT lower quadrant colostomy. Normal appendix. Wall thickening  of the ascending colon. Multiple small bowel loops in the pelvis  demonstrate wall thickening and mucosal hyperemia. A small amount of  reactive free pelvic fluid is present. Distal esophagus and stomach  appear unremarkable. No bowel obstruction.     Nonaneurysmal abdominal aorta with scattered atherosclerotic  calcification. No enlarged retroperitoneal, mesenteric, pelvic, or LEFT  inguinal lymph node. Enlarging RIGHT inguinal lymph node on image 79  measures 2.4 x 1.7 cm. No acute osseous finding.       Impression:         1.  Multiple inflamed small bowel loops in the pelvis. Wall thickening  and inflammation of the rectum and sigmoid colon. Findings are most  compatible with acute enterocolitis.  2.  Urinary bladder wall thickening, correlate for cystitis.  3.  Enlarging RIGHT inguinal lymph node.  This report was finalized on 07/16/2021 17:16 by Dr. Patrick Farrell MD.        Hospital Course  Patient is a 61 y.o. female presented to [facility] emergency room 7/16/2021 with increasing diarrhea and increasing confusion.  She has a history of colorectal cancer followed by Dr. Canseco and Dr. Guthrie.  Patient has a colostomy in place and has been unable to care for herself recently.  Patient reported she was going to need additional surgery for closure of fistula followed by Dr. Paulette Villegas.  Her friend was in attendance on admission and stated that patient had increasing confusion over the last few days and protective services were working with patient trying to get her in rehab at [facility].  Friend indicated that patient had removed her ostomy bag and there was liquid stool over the house.  Patient had been having diarrhea and complaining of abdominal  pain.  In addition, patient felt dizzy and fell the day prior.  Friend had been assisting patient with medications by filling weekly medication tray.  Friend concerned that patient not taking medications appropriately.  Patient lives alone and friend no longer able to assist with care at home.  Potassium 3.3, creatinine 0.51, hemoglobin 8.7, WBC 3.85.  CT scan abdomen reported multiple inflamed small bowel loops in the pelvis.  Wall thickening and inflammation of the rectum and sigmoid colon.  Findings compatible with acute enterocolitis.  Urinary bladder wall thickening correlate for cystitis.  Normal saline fluid bolus, Flagyl given in ER.    She was admitted to the medical floor with enterocolitis, dehydration, hypotension due to hypovolemia.  IV fluids at 100 mL/h, Flagyl and Levaquin initiated on admission.  Potassium replaced.  Home medications reviewed.  Antihypertensive medications held.  SCDs placed for deep vein thrombosis prophylaxis.  Stool positive for C. difficile.  GI PCR panel negative.  Levaquin and Flagyl discontinued.  Vancomycin 125 mg orally every 6 hours for 10 days started on 7/17/2021.  Discontinue vancomycin after last dose on 7/26/2021.  In addition, vancomycin enemas every 6 hours for 3 days started on 7/17/2021.  Patient completed vancomycin enemas during hospital stay.  She was allowed full liquid diet and advanced as tolerated.  Blood cultures monitored and remained no growth at 4 days.    Blood pressure remained relatively low 120/68, 112/62 99/67.  Lisinopril and metoprolol were not continued during hospital stay or at the time of discharge.  Patient reported feeling weak at home in combination with decreased oral intake, believe that she was dehydrated as she presented with low blood pressure on admission.    Chronic anemia chemotherapy induced.  Hemoglobin 8.7 on admission and 9.0 at discharge.    Potassium 3.3 on admission and replaced.  Potassium 3.8 at discharge.    Physical  "therapy consulted due to deconditioned state.  Patient was weak, decreased strength balance and endurance along with safety issues.  Throughout hospital stay patient eventually was able to ambulate 200 feet with contact-guard with assistance of physical therapy and required recurrent cues for safety issues.    Referral to [facility] rehab prior to discharge home.  Patient no longer able to care for herself and friend no longer able to assist with care.  Patient will require private room due to C. difficile.  Initially, no private room available at [facility] and this delayed discharge date.    On 7/21/2021 bed available at [facility] for ongoing physical therapy and Occupational Therapy.  In addition, patient will complete treatment for C. difficile with vancomycin 125 mg orally every 6 hours for total of 10 days.  Discontinue vancomycin after completes last dose on 7/26/2021.  Follow-up with Dr. Weaver after discharge from [facility].    Physical Exam on Discharge:  /68 (BP Location: Left arm, Patient Position: Lying)   Pulse 56   Temp 98.7 °F (37.1 °C) (Oral)   Resp 16   Ht 172.7 cm (68\")   Wt 88.6 kg (195 lb 6.4 oz)   SpO2 97%   BMI 29.71 kg/m²   Physical Exam  Vitals and nursing note reviewed.   Constitutional:       Comments: Sitting up in bed.  No oxygen in use.  No family in room.   HENT:      Head: Normocephalic and atraumatic.      Nose: No congestion.      Mouth/Throat:      Pharynx: Oropharynx is clear. No oropharyngeal exudate.   Eyes:      Extraocular Movements: Extraocular movements intact.      Pupils: Pupils are equal, round, and reactive to light.   Cardiovascular:      Rate and Rhythm: Normal rate and regular rhythm.      Heart sounds: No murmur heard.        Comments: Normal sinus rhythm 60-97 on telemetry.  Pulmonary:      Breath sounds: No wheezing, rhonchi or rales.      Comments: No oxygen in use.  Abdominal:      Tenderness: There is no abdominal tenderness.      Comments: " Ostomy in place left side of abdomen with semiformed brown stool.   Genitourinary:     Comments: Voiding.  Musculoskeletal:         General: No swelling or tenderness.      Cervical back: Normal range of motion.      Comments: SCDs bilateral lower extremities   Skin:     General: Skin is warm and dry.   Neurological:      General: No focal deficit present.      Mental Status: She is alert and oriented to person, place, and time.   Psychiatric:         Mood and Affect: Mood normal.         Behavior: Behavior normal.         Thought Content: Thought content normal.         Judgment: Judgment normal.       Condition on Discharge: Stable    Discharge Disposition: [facility]    Discharge Diet:   Diet Instructions     Advance Diet As Tolerated        As tolerated    Activity at Discharge:   Activity Instructions     Activity as Tolerated      Up WIth Assist        Physical therapy and occupational therapy for progressive activity.    Discharge Care Plan / Instructions:   1.  For fall, weakness, worsening diarrhea seek medical attention.  2.  Vancomycin 125 mg orally every 6 hours for 10 days.  Start date 7/17/2021.  Discontinue after last dose on  7/26/2027.  3.  Physical therapy and Occupational Therapy twice daily.  4.  Follow-up with Dr. Weaver after discharged from [facility]    Discharge Medications:     Discharge Medications      New Medications      Instructions Start Date   oxyCODONE-acetaminophen 5-325 MG per tablet  Commonly known as: PERCOCET   1 tablet, Oral, Every 6 Hours PRN      vancomycin 50 MG/ML reconstituted solution oral solution reconstituted   125 mg, Oral, Every 6 Hours Scheduled for 10 days.  Start date 7/17/2021.  Discontinue after completes last dose on 7/26/2021.         Changes to Medications      Instructions Start Date   acetaminophen 325 MG tablet  Commonly known as: TYLENOL  What changed:   · how much to take  · when to take this  · reasons to take this  · additional instructions   650  mg, Oral, Every 4 Hours PRN      ALPRAZolam 0.25 MG tablet  Commonly known as: XANAX  What changed:   · medication strength  · how much to take  · when to take this  · reasons to take this   0.25 mg, Oral, 3 Times Daily PRN      gabapentin 100 MG capsule  Commonly known as: NEURONTIN  What changed:   · medication strength  · how much to take   100 mg, Oral, 4 Times Daily      ibuprofen 600 MG tablet  Commonly known as: ADVIL,MOTRIN  What changed:   · medication strength  · when to take this  · reasons to take this  · additional instructions   600 mg, Oral, Every 8 Hours PRN         Continue These Medications      Instructions Start Date   albuterol sulfate  (90 Base) MCG/ACT inhaler  Commonly known as: PROVENTIL HFA;VENTOLIN HFA;PROAIR HFA   2 puffs, Inhalation, As Needed      capecitabine 500 MG chemo tablet  Commonly known as: XELODA   Oral, 3 500 mg tablets in the morning AND evening PLUS 150 mg mg Monday- FRIDAY      capecitabine 150 MG chemo tablet  Commonly known as: XELODA   No dose, route, or frequency recorded.      fentaNYL 50 MCG/HR patch  Commonly known as: DURAGESIC   1 patch, Transdermal, Every 72 Hours      megestrol 625 MG/5ML suspension  Commonly known as: MEGACE ES   625 mg, Oral, Daily      ondansetron 4 MG tablet  Commonly known as: Zofran   4 mg, Oral, Every 8 Hours PRN      PROzac 20 MG capsule  Generic drug: FLUoxetine   20 mg, Oral, 4 Times Daily      Vitamin B 12 500 MCG tablet   500 mcg, Oral, Daily      vitamin D 1.25 MG (92838 UT) capsule capsule  Commonly known as: ERGOCALCIFEROL   50,000 Units, Oral, Weekly         Stop These Medications    acyclovir 200 MG capsule  Commonly known as: ZOVIRAX     acyclovir 5 % ointment  Commonly known as: ZOVIRAX     lisinopril 10 MG tablet  Commonly known as: PRINIVIL,ZESTRIL     megestrol 40 MG/ML suspension  Commonly known as: MEGACE     metoprolol succinate XL 50 MG 24 hr tablet  Commonly known as: TOPROL-XL     oxyCODONE 15 MG immediate  release tablet  Commonly known as: ROXICODONE     Scopolamine 1 MG/3DAYS patch          Follow-up Appointments:    Contact information for follow-up providers     SAM Weaver MD Follow up.    Specialty: Internal Medicine  Why: After discharged from [facility]  Contact information:  [address]  [address]  [facility] [address]  [phone]                   Contact information for after-discharge care     Destination     [facility] .    Service: Skilled Nursing  Contact information:  [address]  [facility] [address]  [phone]                           Future Appointments:  Future Appointments   Date Time Provider Department Center   8/11/2021  2:30 PM Jadiel Guthrie III, MD [facility] None   9/3/2021  1:00 PM ASM Weaver MD [facility]     Test Results Pending at Discharge: None    The above documentation resulted from a face-to-face encounter by me Danielle OAKLEY, [facility].    Electronically signed by ADIN Joya, 7/21/2021, 11:10 CDT.    Time: This discharge process required greater than 35 minutes for completion.    Plan discussed with Dr. Vargas and patient.    Time spent in face-to-face evaluation, chart review, planning and education greater than 35 minutes.

## 2021-07-22 ENCOUNTER — HOME CARE VISIT (OUTPATIENT)
Dept: HOME HEALTH SERVICES | Facility: HOME HEALTHCARE | Age: 61
End: 2021-07-22

## 2021-07-22 NOTE — PAYOR COMM NOTE
"DC TO SNF 7-21-21  [id number]    Lenora Singh (61 y.o. Female)     Date of Birth Social Security Number Address Home Phone MRN    1960  [id number]  [address] [place] [id number] [phone] [id number]    Latter day Marital Status          Religious        Admission Date Admission Type Admitting Provider Attending Provider Department, Room/Bed    7/16/21 Emergency Ovidio Vargas MD  [facility] 3C, 385/1    Discharge Date Discharge Disposition Discharge Destination        7/21/2021 Skilled Nursing Facility (DC - External)              Attending Provider: (none)   Allergies: Morphine    Isolation: None   Infection: C.difficile (07/17/21)   Code Status: Prior    Ht: 172.7 cm (68\")   Wt: 88.6 kg (195 lb 6.4 oz)    Admission Cmt: None   Principal Problem: Clostridium difficile enterocolitis [A04.72]                 Active Insurance as of 7/16/2021     Primary Coverage     Payor Plan Insurance Group Employer/Plan Group    [facility] MEDICARE REPLACEMENT [facility] MEDICARE REPLACEMENT      Payor Plan Address Payor Plan Phone Number Payor Plan Fax Number Effective Dates    [address] [address] [phone]  4/1/2021 - None Entered    [facility] [id number]       Subscriber Name Subscriber Birth Date Member ID       LENORA SINGH 1960 [id number]                 Emergency Contacts      (Rel.) Home Phone Work Phone Mobile Phone    EdwinRodolfo (Son) [phone] -- --    ETELVINA WHITE (Relative) [phone] -- --    MACIE PURVIS (Other) -- -- [phone]               Discharge Summary      Danielle Villagomez, APRN at 07/21/21 0921     Attestation signed by Ovidio Vargas MD at 07/21/21 1714    I have reviewed this documentation and agree.    Electronically signed by Ovidio Vargas MD, 07/21/21, 17:14 CDT.                            [facility]  DISCHARGE SUMMARY     Date of Admission: 7/16/2021  Date " of Discharge:  7/21/2021  Primary Care Physician: SAM Weaver MD    Presenting Problem/History of Present Illness:  Colitis [K52.9]     Discharge Diagnoses:  Active Hospital Problems    Diagnosis    • **Clostridium difficile enterocolitis    • Metabolic encephalopathy due to C. difficile colitis and hypotension.    • Enterocolitis    • Dehydration    • Hypotension due to hypovolemia    • Encephalopathy    • Ataxia    • Self-care deficit    • Anemia of chronic disease    • Diarrhea    • Hypokalemia    • Primary squamous cell carcinoma of anal canal      Chief Complaint on Day of Discharge: Feels some better each day.    History of Present Illness on Day of Discharge:   Lying in bed.  She feels some better today.  She is tolerating regular diet.  Ostomy in place with semiformed brown stool.  Denies chest pain or palpitations.  Denies nausea or vomiting.  She was able to ambulate 200 feet with physical therapy yesterday.  Still requires cues for safety.  To [facility] for physical therapy and Occupational Therapy prior to discharge home.    Consults: None    Procedures Performed: None    Pertinent Test Results:     Laboratory Data Last 7 Days:  Results from last 7 days   Lab Units 07/20/21  0641 07/19/21  0557 07/18/21  0512 07/17/21  0558 07/16/21  1457   WBC 10*3/mm3 3.90 3.28* 2.51* 3.04* 3.85   HEMOGLOBIN g/dL 9.0* 8.7* 8.3* 9.3* 8.7*   HEMATOCRIT % 28.9* 27.4* 27.4* 29.7* 27.6*   PLATELETS 10*3/mm3 148 151 142 132* 167     Results from last 7 days   Lab Units 07/21/21  0547 07/20/21  0550 07/20/21  0550 07/19/21  0557 07/19/21  0557 07/18/21  0512 07/18/21  0512 07/17/21  0557 07/17/21  0557 07/16/21  1637 07/16/21  1637   SODIUM mmol/L 135*  --  139  --  140  --  139  --  140  --  139   POTASSIUM mmol/L 3.8  --  4.3  --  4.4  --  3.9  --  3.6  --  3.3*   CHLORIDE mmol/L 105  --  108*  --  112*  --  110*  --  107  --  105   CO2 mmol/L 24.0  --  20.0*  --  22.0  --  23.0  --  21.0*  --  26.0   BUN mg/dL 4*   --  4*  --  6*  --  6*  --  6*  --  9   CREATININE mg/dL 0.37*  --  0.39*  --  0.46*  --  0.39*  --  0.44*  --  0.51*   GLUCOSE mg/dL 94   < > 87   < > 94   < > 93   < > 78   < > 88   CALCIUM mg/dL 8.5*  --  8.5*  --  8.2*  --  8.0*  --  8.3*  --  8.4*   ALT (SGPT) U/L  --   --   --   --   --   --   --   --   --   --  28    < > = values in this interval not displayed.     Laboratory Data Last 7 Days:    Lab Results (last 7 days)     Procedure Component Value Units Date/Time    Basic Metabolic Panel [[id number]]  (Abnormal) Collected: 07/21/21 0547    Specimen: Blood Updated: 07/21/21 0641     Glucose 94 mg/dL      BUN 4 mg/dL      Creatinine 0.37 mg/dL      Sodium 135 mmol/L      Potassium 3.8 mmol/L      Chloride 105 mmol/L      CO2 24.0 mmol/L      Calcium 8.5 mg/dL      eGFR Non African Amer >150 mL/min/1.73      BUN/Creatinine Ratio 10.8     Anion Gap 6.0 mmol/L     Narrative:      GFR Normal >60  Chronic Kidney Disease <60  Kidney Failure <15      Blood Culture - Blood, Arm, Right [[id number]] Collected: 07/16/21 1434    Specimen: Blood from Arm, Right Updated: 07/20/21 1530     Blood Culture No growth at 4 days    Blood Culture - Blood, Arm, Left [[id number]] Collected: 07/16/21 1452    Specimen: Blood from Arm, Left Updated: 07/20/21 1530     Blood Culture No growth at 4 days    CBC (No Diff) [[id number]]  (Abnormal) Collected: 07/20/21 0641    Specimen: Blood Updated: 07/20/21 0653     WBC 3.90 10*3/mm3      RBC 3.13 10*6/mm3      Hemoglobin 9.0 g/dL      Hematocrit 28.9 %      MCV 92.3 fL      MCH 28.8 pg      MCHC 31.1 g/dL      RDW 21.1 %      RDW-SD 67.2 fl      MPV 10.8 fL      Platelets 148 10*3/mm3     Basic Metabolic Panel [[id number]]  (Abnormal) Collected: 07/20/21 0550    Specimen: Blood Updated: 07/20/21 0637     Glucose 87 mg/dL      BUN 4 mg/dL      Creatinine 0.39 mg/dL      Sodium 139 mmol/L      Potassium 4.3 mmol/L      Chloride 108 mmol/L      CO2 20.0 mmol/L      Calcium 8.5 mg/dL      eGFR  Non  Amer >150 mL/min/1.73      BUN/Creatinine Ratio 10.3     Anion Gap 11.0 mmol/L     Narrative:      GFR Normal >60  Chronic Kidney Disease <60  Kidney Failure <15      Basic Metabolic Panel [[id number]]  (Abnormal) Collected: 07/19/21 0557    Specimen: Blood Updated: 07/19/21 0639     Glucose 94 mg/dL      BUN 6 mg/dL      Creatinine 0.46 mg/dL      Sodium 140 mmol/L      Potassium 4.4 mmol/L      Chloride 112 mmol/L      CO2 22.0 mmol/L      Calcium 8.2 mg/dL      eGFR Non African Amer 138 mL/min/1.73      BUN/Creatinine Ratio 13.0     Anion Gap 6.0 mmol/L     Narrative:      GFR Normal >60  Chronic Kidney Disease <60  Kidney Failure <15      CBC & Differential [[id number]]  (Abnormal) Collected: 07/19/21 0557    Specimen: Blood Updated: 07/19/21 0607    Narrative:      The following orders were created for panel order CBC & Differential.  Procedure                               Abnormality         Status                     ---------                               -----------         ------                     CBC Auto Differential[[id number]]        Abnormal            Final result                 Please view results for these tests on the individual orders.    CBC Auto Differential [[id number]]  (Abnormal) Collected: 07/19/21 0557    Specimen: Blood Updated: 07/19/21 0607     WBC 3.28 10*3/mm3      RBC 2.93 10*6/mm3      Hemoglobin 8.7 g/dL      Hematocrit 27.4 %      MCV 93.5 fL      MCH 29.7 pg      MCHC 31.8 g/dL      RDW 21.1 %      RDW-SD 68.4 fl      MPV 11.3 fL      Platelets 151 10*3/mm3      Neutrophil % 48.1 %      Lymphocyte % 36.3 %      Monocyte % 10.4 %      Eosinophil % 1.2 %      Basophil % 0.6 %      Immature Grans % 3.4 %      Neutrophils, Absolute 1.58 10*3/mm3      Lymphocytes, Absolute 1.19 10*3/mm3      Monocytes, Absolute 0.34 10*3/mm3      Eosinophils, Absolute 0.04 10*3/mm3      Basophils, Absolute 0.02 10*3/mm3      Immature Grans, Absolute 0.11 10*3/mm3      nRBC 0.0 /100 WBC      Manual Differential [[id number]]  (Abnormal) Collected: 07/18/21 0512    Specimen: Blood Updated: 07/18/21 0619     Neutrophil % 55.1 %      Lymphocyte % 27.6 %      Monocyte % 10.2 %      Eosinophil % 1.0 %      Bands %  4.1 %      Myelocyte % 2.0 %      Neutrophils Absolute 1.49 10*3/mm3      Lymphocytes Absolute 0.69 10*3/mm3      Monocytes Absolute 0.26 10*3/mm3      Eosinophils Absolute 0.03 10*3/mm3      Anisocytosis Mod/2+     Poikilocytes Slight/1+     WBC Morphology Normal     Giant Platelets Slight/1+    CBC & Differential [[id number]]  (Abnormal) Collected: 07/18/21 0512    Specimen: Blood Updated: 07/18/21 0619    Narrative:      The following orders were created for panel order CBC & Differential.  Procedure                               Abnormality         Status                     ---------                               -----------         ------                     CBC Auto Differential[[id number]]        Abnormal            Final result                 Please view results for these tests on the individual orders.    CBC Auto Differential [[id number]]  (Abnormal) Collected: 07/18/21 0512    Specimen: Blood Updated: 07/18/21 0619     WBC 2.51 10*3/mm3      RBC 2.90 10*6/mm3      Hemoglobin 8.3 g/dL      Hematocrit 27.4 %      MCV 94.5 fL      MCH 28.6 pg      MCHC 30.3 g/dL      RDW 21.2 %      RDW-SD 68.7 fl      MPV 11.1 fL      Platelets 142 10*3/mm3     Narrative:      The previously reported component NRBC is no longer being reported. Previous result was 0.0 /100 WBC (Reference Range: 0.0-0.2 /100 WBC) on 7/18/2021 at 0613 [facility].    Basic Metabolic Panel [[id number]]  (Abnormal) Collected: 07/18/21 0512    Specimen: Blood Updated: 07/18/21 0558     Glucose 93 mg/dL      BUN 6 mg/dL      Creatinine 0.39 mg/dL      Sodium 139 mmol/L      Potassium 3.9 mmol/L      Chloride 110 mmol/L      CO2 23.0 mmol/L      Calcium 8.0 mg/dL      eGFR Non African Amer >150 mL/min/1.73      BUN/Creatinine Ratio 15.4      Anion Gap 6.0 mmol/L     Narrative:      GFR Normal >60  Chronic Kidney Disease <60  Kidney Failure <15      CBC (No Diff) [[id number]]  (Abnormal) Collected: 07/17/21 0558    Specimen: Blood Updated: 07/17/21 0711     WBC 3.04 10*3/mm3      RBC 3.19 10*6/mm3      Hemoglobin 9.3 g/dL      Hematocrit 29.7 %      MCV 93.1 fL      MCH 29.2 pg      MCHC 31.3 g/dL      RDW 21.2 %      RDW-SD 67.7 fl      MPV 11.9 fL      Platelets 132 10*3/mm3     Narrative:      Rare plt clumps on smear.    Basic Metabolic Panel [[id number]]  (Abnormal) Collected: 07/17/21 0557    Specimen: Blood Updated: 07/17/21 0646     Glucose 78 mg/dL      BUN 6 mg/dL      Creatinine 0.44 mg/dL      Sodium 140 mmol/L      Potassium 3.6 mmol/L      Chloride 107 mmol/L      CO2 21.0 mmol/L      Calcium 8.3 mg/dL      eGFR Non African Amer 145 mL/min/1.73      BUN/Creatinine Ratio 13.6     Anion Gap 12.0 mmol/L     Narrative:      GFR Normal >60  Chronic Kidney Disease <60  Kidney Failure <15      Gastrointestinal Panel, PCR - Stool, Per Stoma [[id number]]  (Normal) Collected: 07/17/21 0138    Specimen: Stool from Per Stoma Updated: 07/17/21 0303     Campylobacter Not Detected     Plesiomonas shigelloides Not Detected     Salmonella Not Detected     Vibrio Not Detected     Vibrio cholerae Not Detected     Yersinia enterocolitica Not Detected     Enteroaggregative E. coli (EAEC) Not Detected     Enteropathogenic E. coli (EPEC) Not Detected     Enterotoxigenic E. coli (ETEC) lt/st Not Detected     Shiga-like toxin-producing E. coli (STEC) stx1/stx2 Not Detected     Shigella/Enteroinvasive E. coli (EIEC) Not Detected     Cryptosporidium Not Detected     Cyclospora cayetanensis Not Detected     Entamoeba histolytica Not Detected     Giardia lamblia Not Detected     Adenovirus F40/41 Not Detected     Astrovirus Not Detected     Norovirus GI/GII Not Detected     Rotavirus A Not Detected     Sapovirus (I, II, IV or V) Not Detected    Narrative:      If  Aeromonas, Staphylococcus aureus or Bacillus cereus are suspected, please order [id number]: Stool Culture, Aeromonas, S aureus, B Cereus.    Clostridium Difficile Toxin - Stool, Per Stoma [[id number]]  (Abnormal) Collected: 07/17/21 0138    Specimen: Stool from Per Stoma Updated: 07/17/21 0243    Narrative:      The following orders were created for panel order Clostridium Difficile Toxin - Stool, Per Stoma.  Procedure                               Abnormality         Status                     ---------                               -----------         ------                     Clostridium Difficile To...[[id number]]  Abnormal            Final result                 Please view results for these tests on the individual orders.    Clostridium Difficile Toxin, PCR - Stool, Per Stoma [[id number]]  (Abnormal) Collected: 07/17/21 0138    Specimen: Stool from Per Stoma Updated: 07/17/21 0243     C. Difficile Toxins by PCR Positive    Narrative:      Performance characteristics of test not established for patients <2 years of age.    Procalcitonin [[id number]]  (Normal) Collected: 07/16/21 1637    Specimen: Blood Updated: 07/16/21 1659     Procalcitonin 0.06 ng/mL     Narrative:          Comprehensive Metabolic Panel [[id number]]  (Abnormal) Collected: 07/16/21 1637    Specimen: Blood Updated: 07/16/21 1654     Glucose 88 mg/dL      BUN 9 mg/dL      Creatinine 0.51 mg/dL      Sodium 139 mmol/L      Potassium 3.3 mmol/L      Chloride 105 mmol/L      CO2 26.0 mmol/L      Calcium 8.4 mg/dL      Total Protein 5.2 g/dL      Albumin 3.00 g/dL      ALT (SGPT) 28 U/L      AST (SGOT) 53 U/L      Alkaline Phosphatase 48 U/L      Total Bilirubin 0.2 mg/dL      eGFR Non African Amer 123 mL/min/1.73      Globulin 2.2 gm/dL      A/G Ratio 1.4 g/dL      BUN/Creatinine Ratio 17.6     Anion Gap 8.0 mmol/L     Narrative:      GFR Normal >60  Chronic Kidney Disease <60  Kidney Failure <15      Ammonia [[id number]]  (Normal) Collected: 07/16/21 1637     Specimen: Blood Updated: 07/16/21 1653     Ammonia 11 umol/L     COVID-19,[facility] IN-HOUSE,Nasal Swab No Transport Media 3-4 HR TAT - Swab, Nasal Cavity [[id number]]  (Normal) Collected: 07/16/21 1527    Specimen: Swab from Nasal Cavity Updated: 07/16/21 1607     COVID19 Not Detected    Narrative:      Fact sheet for providers: https://www.fda.gov/media/768354/download     Fact sheet for patients: https://www.fda.gov/media/662959/download    Test performed by PCR.    Consider negative results in combination with clinical observations, patient history, and epidemiological information.    Urinalysis, Microscopic Only - Urine, Catheter [[id number]]  (Abnormal) Collected: 07/16/21 1527    Specimen: Urine, Catheter Updated: 07/16/21 1539     RBC, UA 3-5 /HPF      WBC, UA 3-5 /HPF      Bacteria, UA None Seen /HPF      Squamous Epithelial Cells, UA 0-2 /HPF      Hyaline Casts, UA 0-2 /LPF      Methodology Automated Microscopy    Urinalysis With Culture If Indicated - Urine, Catheter [[id number]]  (Abnormal) Collected: 07/16/21 1527    Specimen: Urine, Catheter Updated: 07/16/21 1539     Color, UA Yellow     Appearance, UA Clear     pH, UA 7.0     Specific Gravity, UA 1.014     Glucose, UA Negative     Ketones, UA Trace     Bilirubin, UA Negative     Blood, UA Negative     Protein, UA Negative     Leuk Esterase, UA Trace     Nitrite, UA Negative     Urobilinogen, UA 0.2 E.U./dL    Lactic Acid, Plasma [[id number]]  (Normal) Collected: 07/16/21 1457    Specimen: Blood Updated: 07/16/21 1532     Lactate 1.1 mmol/L     CBC & Differential [[id number]]  (Abnormal) Collected: 07/16/21 1457    Specimen: Blood Updated: 07/16/21 1512    Narrative:      The following orders were created for panel order CBC & Differential.  Procedure                               Abnormality         Status                     ---------                               -----------         ------                     CBC Auto Differential[[id number]]         Abnormal            Final result                 Please view results for these tests on the individual orders.    CBC Auto Differential [[id number]]  (Abnormal) Collected: 07/16/21 1457    Specimen: Blood Updated: 07/16/21 1512     WBC 3.85 10*3/mm3      RBC 3.01 10*6/mm3      Hemoglobin 8.7 g/dL      Hematocrit 27.6 %      MCV 91.7 fL      MCH 28.9 pg      MCHC 31.5 g/dL      RDW 21.1 %      RDW-SD 64.8 fl      MPV 11.1 fL      Platelets 167 10*3/mm3      Neutrophil % 42.1 %      Lymphocyte % 35.1 %      Monocyte % 17.1 %      Eosinophil % 0.5 %      Basophil % 0.8 %      Immature Grans % 4.4 %      Neutrophils, Absolute 1.62 10*3/mm3      Lymphocytes, Absolute 1.35 10*3/mm3      Monocytes, Absolute 0.66 10*3/mm3      Eosinophils, Absolute 0.02 10*3/mm3      Basophils, Absolute 0.03 10*3/mm3      Immature Grans, Absolute 0.17 10*3/mm3      nRBC 0.0 /100 WBC         Culture Data:   Blood Culture   Date Value Ref Range Status   07/16/2021 No growth at 4 days  Preliminary   07/16/2021 No growth at 4 days  Preliminary    No results found for: URINECX No results found for: WOUNDCX No results found for: MRSACX No results found for: RESPCX No results found for: STOOLCX   Imaging Results (All)     Procedure Component Value Units Date/Time    CT Abdomen Pelvis With Contrast [[id number]] Collected: 07/16/21 1707     Updated: 07/16/21 1719    Narrative:      EXAM: CT ABDOMEN PELVIS W CONTRAST-     INDICATION: has open ostomy with no bag for multiple days. Possible  fever. confusion. Hx colorectal/anal cancer     COMPARISON: 2/8/2021     DOSE LENGTH PRODUCT: 554 mGy cm. Automated exposure control was also  utilized to decrease patient radiation dose.     FINDINGS:     Included lung bases are clear. No suspicious liver lesion. Prior  cholecystectomy. Mild prominence of the biliary tree is stable and  likely reservoir phenomenon. Pancreas, spleen, and adrenal glands appear  normal. No solid renal mass. No urolithiasis or  hydronephrosis. Mild  urinary bladder wall thickening.     During LEFT lower quadrant colostomy. Normal appendix. Wall thickening  of the ascending colon. Multiple small bowel loops in the pelvis  demonstrate wall thickening and mucosal hyperemia. A small amount of  reactive free pelvic fluid is present. Distal esophagus and stomach  appear unremarkable. No bowel obstruction.     Nonaneurysmal abdominal aorta with scattered atherosclerotic  calcification. No enlarged retroperitoneal, mesenteric, pelvic, or LEFT  inguinal lymph node. Enlarging RIGHT inguinal lymph node on image 79  measures 2.4 x 1.7 cm. No acute osseous finding.       Impression:         1.  Multiple inflamed small bowel loops in the pelvis. Wall thickening  and inflammation of the rectum and sigmoid colon. Findings are most  compatible with acute enterocolitis.  2.  Urinary bladder wall thickening, correlate for cystitis.  3.  Enlarging RIGHT inguinal lymph node.  This report was finalized on 07/16/2021 17:16 by Dr. Patrick Farrell MD.        Hospital Course  Patient is a 61 y.o. female presented to [facility] emergency room 7/16/2021 with increasing diarrhea and increasing confusion.  She has a history of colorectal cancer followed by Dr. Canseco and Dr. Guthrie.  Patient has a colostomy in place and has been unable to care for herself recently.  Patient reported she was going to need additional surgery for closure of fistula followed by Dr. Paulette Villegas.  Her friend was in attendance on admission and stated that patient had increasing confusion over the last few days and protective services were working with patient trying to get her in rehab at [facility].  Friend indicated that patient had removed her ostomy bag and there was liquid stool over the house.  Patient had been having diarrhea and complaining of abdominal pain.  In addition, patient felt dizzy and fell the day prior.  Friend had been assisting patient with medications  by filling weekly medication tray.  Friend concerned that patient not taking medications appropriately.  Patient lives alone and friend no longer able to assist with care at home.  Potassium 3.3, creatinine 0.51, hemoglobin 8.7, WBC 3.85.  CT scan abdomen reported multiple inflamed small bowel loops in the pelvis.  Wall thickening and inflammation of the rectum and sigmoid colon.  Findings compatible with acute enterocolitis.  Urinary bladder wall thickening correlate for cystitis.  Normal saline fluid bolus, Flagyl given in ER.    She was admitted to the medical floor with enterocolitis, dehydration, hypotension due to hypovolemia.  IV fluids at 100 mL/h, Flagyl and Levaquin initiated on admission.  Potassium replaced.  Home medications reviewed.  Antihypertensive medications held.  SCDs placed for deep vein thrombosis prophylaxis.  Stool positive for C. difficile.  GI PCR panel negative.  Levaquin and Flagyl discontinued.  Vancomycin 125 mg orally every 6 hours for 10 days started on 7/17/2021.  Discontinue vancomycin after last dose on 7/26/2021.  In addition, vancomycin enemas every 6 hours for 3 days started on 7/17/2021.  Patient completed vancomycin enemas during hospital stay.  She was allowed full liquid diet and advanced as tolerated.  Blood cultures monitored and remained no growth at 4 days.    Blood pressure remained relatively low 120/68, 112/62 99/67.  Lisinopril and metoprolol were not continued during hospital stay or at the time of discharge.  Patient reported feeling weak at home in combination with decreased oral intake, believe that she was dehydrated as she presented with low blood pressure on admission.    Chronic anemia chemotherapy induced.  Hemoglobin 8.7 on admission and 9.0 at discharge.    Potassium 3.3 on admission and replaced.  Potassium 3.8 at discharge.    Physical therapy consulted due to deconditioned state.  Patient was weak, decreased strength balance and endurance along with  "safety issues.  Throughout hospital stay patient eventually was able to ambulate 200 feet with contact-guard with assistance of physical therapy and required recurrent cues for safety issues.    Referral to [facility] rehab prior to discharge home.  Patient no longer able to care for herself and friend no longer able to assist with care.  Patient will require private room due to C. difficile.  Initially, no private room available at [facility] and this delayed discharge date.    On 7/21/2021 bed available at [facility] for ongoing physical therapy and Occupational Therapy.  In addition, patient will complete treatment for C. difficile with vancomycin 125 mg orally every 6 hours for total of 10 days.  Discontinue vancomycin after completes last dose on 7/26/2021.  Follow-up with Dr. Weaver after discharge from [facility].    Physical Exam on Discharge:  /68 (BP Location: Left arm, Patient Position: Lying)   Pulse 56   Temp 98.7 °F (37.1 °C) (Oral)   Resp 16   Ht 172.7 cm (68\")   Wt 88.6 kg (195 lb 6.4 oz)   SpO2 97%   BMI 29.71 kg/m²   Physical Exam  Vitals and nursing note reviewed.   Constitutional:       Comments: Sitting up in bed.  No oxygen in use.  No family in room.   HENT:      Head: Normocephalic and atraumatic.      Nose: No congestion.      Mouth/Throat:      Pharynx: Oropharynx is clear. No oropharyngeal exudate.   Eyes:      Extraocular Movements: Extraocular movements intact.      Pupils: Pupils are equal, round, and reactive to light.   Cardiovascular:      Rate and Rhythm: Normal rate and regular rhythm.      Heart sounds: No murmur heard.        Comments: Normal sinus rhythm 60-97 on telemetry.  Pulmonary:      Breath sounds: No wheezing, rhonchi or rales.      Comments: No oxygen in use.  Abdominal:      Tenderness: There is no abdominal tenderness.      Comments: Ostomy in place left side of abdomen with semiformed brown stool.   Genitourinary:     Comments: " Voiding.  Musculoskeletal:         General: No swelling or tenderness.      Cervical back: Normal range of motion.      Comments: SCDs bilateral lower extremities   Skin:     General: Skin is warm and dry.   Neurological:      General: No focal deficit present.      Mental Status: She is alert and oriented to person, place, and time.   Psychiatric:         Mood and Affect: Mood normal.         Behavior: Behavior normal.         Thought Content: Thought content normal.         Judgment: Judgment normal.       Condition on Discharge: Stable    Discharge Disposition: [facility]    Discharge Diet:   Diet Instructions     Advance Diet As Tolerated        As tolerated    Activity at Discharge:   Activity Instructions     Activity as Tolerated      Up WIth Assist        Physical therapy and occupational therapy for progressive activity.    Discharge Care Plan / Instructions:   1.  For fall, weakness, worsening diarrhea seek medical attention.  2.  Vancomycin 125 mg orally every 6 hours for 10 days.  Start date 7/17/2021.  Discontinue after last dose on  7/26/2027.  3.  Physical therapy and Occupational Therapy twice daily.  4.  Follow-up with Dr. Weaver after discharged from [facility]    Discharge Medications:     Discharge Medications      New Medications      Instructions Start Date   oxyCODONE-acetaminophen 5-325 MG per tablet  Commonly known as: PERCOCET   1 tablet, Oral, Every 6 Hours PRN      vancomycin 50 MG/ML reconstituted solution oral solution reconstituted   125 mg, Oral, Every 6 Hours Scheduled for 10 days.  Start date 7/17/2021.  Discontinue after completes last dose on 7/26/2021.         Changes to Medications      Instructions Start Date   acetaminophen 325 MG tablet  Commonly known as: TYLENOL  What changed:   · how much to take  · when to take this  · reasons to take this  · additional instructions   650 mg, Oral, Every 4 Hours PRN      ALPRAZolam 0.25 MG tablet  Commonly known as: XANAX  What  changed:   · medication strength  · how much to take  · when to take this  · reasons to take this   0.25 mg, Oral, 3 Times Daily PRN      gabapentin 100 MG capsule  Commonly known as: NEURONTIN  What changed:   · medication strength  · how much to take   100 mg, Oral, 4 Times Daily      ibuprofen 600 MG tablet  Commonly known as: ADVIL,MOTRIN  What changed:   · medication strength  · when to take this  · reasons to take this  · additional instructions   600 mg, Oral, Every 8 Hours PRN         Continue These Medications      Instructions Start Date   albuterol sulfate  (90 Base) MCG/ACT inhaler  Commonly known as: PROVENTIL HFA;VENTOLIN HFA;PROAIR HFA   2 puffs, Inhalation, As Needed      capecitabine 500 MG chemo tablet  Commonly known as: XELODA   Oral, 3 500 mg tablets in the morning AND evening PLUS 150 mg mg Monday- FRIDAY      capecitabine 150 MG chemo tablet  Commonly known as: XELODA   No dose, route, or frequency recorded.      fentaNYL 50 MCG/HR patch  Commonly known as: DURAGESIC   1 patch, Transdermal, Every 72 Hours      megestrol 625 MG/5ML suspension  Commonly known as: MEGACE ES   625 mg, Oral, Daily      ondansetron 4 MG tablet  Commonly known as: Zofran   4 mg, Oral, Every 8 Hours PRN      PROzac 20 MG capsule  Generic drug: FLUoxetine   20 mg, Oral, 4 Times Daily      Vitamin B 12 500 MCG tablet   500 mcg, Oral, Daily      vitamin D 1.25 MG (80628 UT) capsule capsule  Commonly known as: ERGOCALCIFEROL   50,000 Units, Oral, Weekly         Stop These Medications    acyclovir 200 MG capsule  Commonly known as: ZOVIRAX     acyclovir 5 % ointment  Commonly known as: ZOVIRAX     lisinopril 10 MG tablet  Commonly known as: PRINIVIL,ZESTRIL     megestrol 40 MG/ML suspension  Commonly known as: MEGACE     metoprolol succinate XL 50 MG 24 hr tablet  Commonly known as: TOPROL-XL     oxyCODONE 15 MG immediate release tablet  Commonly known as: ROXICODONE     Scopolamine 1 MG/3DAYS patch           Follow-up Appointments:    Contact information for follow-up providers     SAM Weaver MD Follow up.    Specialty: Internal Medicine  Why: After discharged from [facility]  Contact information:  [address]  [address]  [facility] [address]  [phone]                   Contact information for after-discharge care     Destination     [facility] .    Service: Skilled Nursing  Contact information:  [address]  [facility] [address]  [phone]                           Future Appointments:  Future Appointments   Date Time Provider Department Center   8/11/2021  2:30 PM Jadiel Guthrie III, MD [facility] None   9/3/2021  1:00 PM SAM Weaver MD [facility]     Test Results Pending at Discharge: None    The above documentation resulted from a face-to-face encounter by me Danielle OAKLEY, [facility].    Electronically signed by ADIN Joya, 7/21/2021, 11:10 CDT.    Time: This discharge process required greater than 35 minutes for completion.    Plan discussed with Dr. Vargas and patient.    Time spent in face-to-face evaluation, chart review, planning and education greater than 35 minutes.          Electronically signed by Ovidio Vargas MD at 07/21/21 7000

## 2021-07-22 NOTE — THERAPY DISCHARGE NOTE
Acute Care - Occupational Therapy Discharge Summary  [facility]     Patient Name: Lenora Kathleen  : 1960  MRN: [id number]    Today's Date: 2021                 Admit Date: 2021        OT Recommendation and Plan    Visit Dx:    ICD-10-CM ICD-9-CM   1. Dehydration  E86.0 276.51   2. Failure to thrive in adult  R62.7 783.7   3. Enterocolitis  K52.9 558.9   4. Impaired mobility  Z74.09 799.89   5. Impaired mobility and ADLs  Z74.09 V49.89    Z78.9                OT Rehab Goals     Row Name 21 1200             Dressing Goal 1 (OT)    Activity/Device (Dressing Goal 1, OT)  dressing skills, all  -AC      [place]/Cues Needed (Dressing Goal 1, OT)  independent  -AC      Time Frame (Dressing Goal 1, OT)  long term goal (LTG);by discharge  -AC      Progress/Outcome (Dressing Goal 1, OT)  goal not met  -AC         Toileting Goal 1 (OT)    Activity/Device (Toileting Goal 1, OT)  toileting skills, all  -AC      [place] Level/Cues Needed (Toileting Goal 1, OT)  independent  -AC      Time Frame (Toileting Goal 1, OT)  long term goal (LTG);by discharge  -AC      Progress/Outcome (Toileting Goal 1, OT)  goal not met  -AC         Grooming Goal 1 (OT)    Activity/Device (Grooming Goal 1, OT)  grooming skills, all  -AC      E[place]ott (Grooming Goal 1, OT)  independent  -AC      Time Frame (Grooming Goal 1, OT)  long term goal (LTG);by discharge  -AC      Strategies/Barriers (Grooming Goal 1, OT)  standing at sink level  -AC      Progress/Outcome (Grooming Goal 1, OT)  goal not met  -AC        User Key  (r) = Recorded By, (t) = Taken By, (c) = Cosigned By    Initials Name Provider Type Discipline    Carlos Andrews, OTR/L, CNT Occupational Therapist OT          Outcome Measures     Row Name 21 1100             How much help from another is currently needed...    Putting on and taking off regular lower body clothing?  3  -TS      Bathing (including washing, rinsing, and drying)  3  -TS       Toileting (which includes using toilet bed pan or urinal)  2  -TS      Putting on and taking off regular upper body clothing  3  -TS      Taking care of personal grooming (such as brushing teeth)  4  -TS      Eating meals  4  -TS      AM-PAC 6 Clicks Score (OT)  19  -TS        User Key  (r) = Recorded By, (t) = Taken By, (c) = Cosigned By    Initials Name Provider Type    Carmen Holman COTA/L Occupational Therapy Assistant          Timed Therapy Charges  Total Units: 5    Charges  Total Units: 5    Procedure Name Documented Minutes Units Code    HC OT SELF CARE/MGMT/TRAIN EA 15 MIN 75  5    19278 (CPT®)               Documented Minutes  Total Minutes: 75    Therapy Provided Minutes    35853 - OT Self Care/Mgmt Minutes 75                    OT Discharge Summary  Anticipated Discharge Disposition (OT): skilled nursing facility  Reason for Discharge: Discharge from facility  Outcomes Achieved: Refer to plan of care for updates on goals achieved  Discharge Destination: SNF      Carlos Olivera, OTR/L, CNT  7/22/2021

## 2021-07-22 NOTE — CASE COMMUNICATION
Patient was referred to Adult Protective Services on 7/16/21 and was then admitted to [facility]. She was followed by [facility] Ribera . Patient was to be admitted to [facility] on 7/21/21 upon discharge from hospital, but according to Catrina Glover, patient's past caregiver, she stated patient's son picked patient up from the hospital and took patient to her home not the facility. Catrina Glover informed MSW she wanted to be taken off of all patient's case as her caregiver. She stated patient is not safe at home and has no support. Contacted [facility] worker and she informed MSW she would follow-up with patient. Case Communication with Luis Felipe Marrero, Clinical Manager

## 2021-07-22 NOTE — THERAPY DISCHARGE NOTE
Acute Care - Physical Therapy Discharge Summary  [facility]       Patient Name: Lenora Kathleen  : 1960  MRN: [id number]    Today's Date: 2021                 Admit Date: 2021      PT Recommendation and Plan    Visit Dx:    ICD-10-CM ICD-9-CM   1. Dehydration  E86.0 276.51   2. Failure to thrive in adult  R62.7 783.7   3. Enterocolitis  K52.9 558.9   4. Impaired mobility  Z74.09 799.89   5. Impaired mobility and ADLs  Z74.09 V49.89    Z78.9        Outcome Measures     Row Name 21 1100             How much help from another is currently needed...    Putting on and taking off regular lower body clothing?  3  -TS      Bathing (including washing, rinsing, and drying)  3  -TS      Toileting (which includes using toilet bed pan or urinal)  2  -TS      Putting on and taking off regular upper body clothing  3  -TS      Taking care of personal grooming (such as brushing teeth)  4  -TS      Eating meals  4  -TS      AM-PAC 6 Clicks Score (OT)  19  -TS        User Key  (r) = Recorded By, (t) = Taken By, (c) = Cosigned By    Initials Name Provider Type    TS Carmen Manjarrez COTA/L Occupational Therapy Assistant              PT Rehab Goals     Row Name 21 1528             Bed Mobility Goal 1 (PT)    Activity/Assistive Device (Bed Mobility Goal 1, PT)  rolling to left;rolling to right;sidelying to sit;sit to sidelying  -RACIEL      [place] Level/Cues Needed (Bed Mobility Goal 1, PT)  supervision required  -RACIEL      Time Frame (Bed Mobility Goal 1, PT)  long term goal (LTG)  -RACIEL      Progress/Outcomes (Bed Mobility Goal 1, PT)  goal met  -RACIEL         Transfer Goal 1 (PT)    Activity/Assistive Device (Transfer Goal 1, PT)  sit-to-stand/stand-to-sit;bed-to-chair/chair-to-bed  -RACIEL      [place] Level/Cues Needed (Transfer Goal 1, PT)  supervision required  -RACIEL      Time Frame (Transfer Goal 1, PT)  long term goal (LTG)  -RACIEL      Progress/Outcome (Transfer Goal 1, PT)  goal ongoing;goal revised this  date;goal met  -RACIEL         Gait Training Goal 1 (PT)    Activity/Assistive Device (Gait Training Goal 1, PT)  gait (walking locomotion);increase endurance/gait distance;increase energy conservation;improve balance and speed;decrease fall risk;assistive device use  -RACIEL      [place] Level (Gait Training Goal 1, PT)  standby assist  -RACIEL      Distance (Gait Training Goal 1, PT)  75  -RACIEL      Time Frame (Gait Training Goal 1, PT)  long term goal (LTG)  -RACIEL      Progress/Outcome (Gait Training Goal 1, PT)  goal met  -RACIEL        User Key  (r) = Recorded By, (t) = Taken By, (c) = Cosigned By    Initials Name Provider Type Discipline    Richard Huerta, PTA Physical Therapy Assistant PT              PT Discharge Summary  Anticipated Discharge Disposition (PT): skilled nursing facility  Reason for Discharge: Discharge from facility  Outcomes Achieved: Refer to plan of care for updates on goals achieved  Discharge Destination: SNF      Richard Hloloway PTA   7/22/2021

## 2021-07-24 ENCOUNTER — HOSPITAL ENCOUNTER (EMERGENCY)
Facility: HOSPITAL | Age: 61
Discharge: HOME | End: 2021-07-24
Admitting: EMERGENCY MEDICINE

## 2021-07-24 VITALS
TEMPERATURE: 98.2 F | WEIGHT: 186 LBS | HEIGHT: 63 IN | RESPIRATION RATE: 18 BRPM | BODY MASS INDEX: 32.96 KG/M2 | OXYGEN SATURATION: 98 % | DIASTOLIC BLOOD PRESSURE: 78 MMHG | SYSTOLIC BLOOD PRESSURE: 128 MMHG | HEART RATE: 84 BPM

## 2021-07-24 DIAGNOSIS — Z71.89 ENCOUNTER FOR OSTOMY CARE EDUCATION: Primary | ICD-10-CM

## 2021-07-24 PROCEDURE — 99282 EMERGENCY DEPT VISIT SF MDM: CPT

## 2021-07-24 NOTE — ED PROVIDER NOTES
Subjective   History of Present Illness      Patient is a pleasant 61-year-old female who comes to the ED requesting ostomy care.  She describes that the patient has cancer and developed a fistula intra-abdominally.  She completed an ostomy placement with her last hospitalization.  She has undergoing treatment as coordinated with her radiation oncologist and oncologist.  She describes that she was offered placement in nursing home but declined.  She was discharged home and has support by her sister and home health.  She describes frustration because her sister speaks to home health privately on the ostomy care, but she is not personally involved in the management of this.  She presents to ED today hoping to get direct education on ostomy care.  She is concerned it could be infected.  She denies any fever.    Review of Systems   All other systems reviewed and are negative.      Past Medical History:   Diagnosis Date   • Anxiety    • Blood in urine    • Chest tightness    • Colon polyps    • COPD (chronic obstructive pulmonary disease) (CMS/HCC)    • Depression    • Emphysema/COPD (CMS/HCC)    • Hepatitis C    • Hypertension    • Injury of back    • Kidney stone    • Palpitation    • PONV (postoperative nausea and vomiting)    • Rectal cancer (CMS/HCC)        Allergies   Allergen Reactions   • Morphine GI Intolerance and Nausea And Vomiting     Other reaction(s): Vomiting         Past Surgical History:   Procedure Laterality Date   • CHOLECYSTECTOMY     • COLONOSCOPY  03/12/2015    normal   • COLOSTOMY N/A 4/23/2021    Procedure: LAPAROSCOPIC DIVERTING COLOSTOMY;  Surgeon: Paulette Villegas MD;  Location: [facility];  Service: General;  Laterality: N/A;   • FINGER SURGERY     • HYSTERECTOMY     • KIDNEY STONE SURGERY     • KIDNEY SURGERY     • OTHER SURGICAL HISTORY      POSSIBLE CARDIAC MONITOR (LOOP RECORDER?)--IMPLANTED AND EXPLANTED    • US GUIDED LYMPH NODE BIOPSY  4/30/2021       Family History   Problem  Relation Age of Onset   • Heart disease Mother    • No Known Problems Father        Social History     Socioeconomic History   • Marital status:      Spouse name: Not on file   • Number of children: Not on file   • Years of education: Not on file   • Highest education level: Not on file   Tobacco Use   • Smoking status: Former Smoker     Packs/day: 0.25     Years: 45.00     Pack years: 11.25     Types: Cigarettes   • Smokeless tobacco: Never Used   Substance and Sexual Activity   • Alcohol use: No   • Drug use: No   • Sexual activity: Defer     Partners: Male       Prior to Admission medications    Medication Sig Start Date End Date Taking? Authorizing Provider   acetaminophen (TYLENOL) 325 MG tablet Take 2 tablets by mouth Every 4 (Four) Hours As Needed for Mild Pain . 7/21/21   Danielle Villagomez APRN   albuterol sulfate  (90 Base) MCG/ACT inhaler Inhale 2 puffs As Needed. 9/3/19   Emergency, Nurse NAEL Brumfield   ALPRAZolam (XANAX) 0.25 MG tablet Take 1 tablet by mouth 3 (Three) Times a Day As Needed for Anxiety. 7/21/21   Danielle Villagomez APRN   capecitabine (XELODA) 150 MG chemo tablet  5/21/21   ProviderEloina MD   capecitabine (XELODA) 500 MG chemo tablet Take  by mouth. 3 500 mg tablets in the morning AND evening PLUS 150 mg mg Monday- FRIDAY 5/21/21   ProviderEloina MD   Cyanocobalamin (VITAMIN B 12) 500 MCG tablet Take 500 mcg by mouth Daily.    Emergency, Nurse NAEL Brumfield   fentaNYL (DURAGESIC) 50 MCG/HR patch Place 1 patch on the skin as directed by provider Every 72 (Seventy-Two) Hours. 7/21/21   Danielle Villagomez APRN   FLUoxetine (PROZAC) 20 MG capsule Take 20 mg by mouth 4 (Four) Times a Day. 1/19/18   Emergency, Nurse Epic, RN   gabapentin (NEURONTIN) 100 MG capsule Take 1 capsule by mouth 4 (Four) Times a Day. 7/21/21   Danielle Villagomez APRN   ibuprofen (ADVIL,MOTRIN) 600 MG tablet Take 1 tablet by mouth Every 8 (Eight) Hours As Needed for Moderate Pain  for up to 279  "days. 7/21/21 4/26/22  Danielle Villagomez APRN   megestrol (MEGACE ES) 625 MG/5ML suspension Take 5 mL by mouth Daily. 6/9/21   Jadiel Guthrie III, MD   ondansetron (Zofran) 4 MG tablet Take 1 tablet by mouth Every 8 (Eight) Hours As Needed for Nausea or Vomiting. 4/26/21 4/26/22  Paulette Villegas MD   oxyCODONE-acetaminophen (PERCOCET) 5-325 MG per tablet Take 1 tablet by mouth Every 6 (Six) Hours As Needed for Moderate Pain  for up to 4 days. 7/21/21 7/25/21  Danielle Villagomez APRN   vancomycin 50 MG/ML reconstituted solution oral solution reconstituted Take 2.5 mL by mouth Every 6 (Six) Hours for 23 doses. Indications: Clostridium Difficile Infection 7/21/21 7/27/21  Danielle Villagomez APRN   vitamin D (ERGOCALCIFEROL) 1.25 MG (79515 UT) capsule capsule Take 50,000 Units by mouth 1 (One) Time Per Week. 2/18/19   Emergency, Nurse Epic, RN       Medications - No data to display    /78 (BP Location: Left arm, Patient Position: Sitting)   Pulse 84   Temp 98.2 °F (36.8 °C) (Oral)   Resp 18   Ht 160 cm (63\")   Wt 84.4 kg (186 lb)   SpO2 98%   BMI 32.95 kg/m²       Objective   Physical Exam  Constitutional:       Appearance: Normal appearance.   Eyes:      Extraocular Movements: Extraocular movements intact.   Cardiovascular:      Rate and Rhythm: Normal rate and regular rhythm.      Pulses: Normal pulses.      Heart sounds: Normal heart sounds.   Abdominal:      Comments: Patient has a new ostomy bag placed to her left lower quadrant with a pink stoma.  No secondary signs of infection.   Musculoskeletal:      Cervical back: Normal range of motion and neck supple.   Neurological:      Mental Status: She is alert.         Procedures         Lab Results (last 24 hours)     ** No results found for the last 24 hours. **          CT Abdomen Pelvis With Contrast    Result Date: 7/16/2021  Narrative: EXAM: CT ABDOMEN PELVIS W CONTRAST-  INDICATION: has open ostomy with no bag for multiple days. Possible " fever. confusion. Hx colorectal/anal cancer  COMPARISON: 2/8/2021  DOSE LENGTH PRODUCT: 554 mGy cm. Automated exposure control was also utilized to decrease patient radiation dose.  FINDINGS:  Included lung bases are clear. No suspicious liver lesion. Prior cholecystectomy. Mild prominence of the biliary tree is stable and likely reservoir phenomenon. Pancreas, spleen, and adrenal glands appear normal. No solid renal mass. No urolithiasis or hydronephrosis. Mild urinary bladder wall thickening.  During LEFT lower quadrant colostomy. Normal appendix. Wall thickening of the ascending colon. Multiple small bowel loops in the pelvis demonstrate wall thickening and mucosal hyperemia. A small amount of reactive free pelvic fluid is present. Distal esophagus and stomach appear unremarkable. No bowel obstruction.  Nonaneurysmal abdominal aorta with scattered atherosclerotic calcification. No enlarged retroperitoneal, mesenteric, pelvic, or LEFT inguinal lymph node. Enlarging RIGHT inguinal lymph node on image 79 measures 2.4 x 1.7 cm. No acute osseous finding.      Impression:  1.  Multiple inflamed small bowel loops in the pelvis. Wall thickening and inflammation of the rectum and sigmoid colon. Findings are most compatible with acute enterocolitis. 2.  Urinary bladder wall thickening, correlate for cystitis. 3.  Enlarging RIGHT inguinal lymph node. This report was finalized on 07/16/2021 17:16 by Dr. Patrick Farrell MD.      ED Course  ED Course as of Jul 24 1421   Sat Jul 24, 2021   1420 Given that this is Saturday, we do not have a wound care nurse or ostomy nurse present to provide her education about this.  However, our nurses did provide the patient further ostomy care education while she was here and they replaced the bag.  I have informed the patient to follow-up with her primary care provider on Monday, which is in 2 days, for further discussion with a wound care nurse when they are back in office.  Our case  management did speak with the patient in regards to her continuation of care in conjunction with her primary care provider as well.    [TK]      ED Course User Index  [TK] Too Charlton PA          [facility]    Final diagnoses:   Encounter for ostomy care education          Too Charlton PA  07/24/21 1424

## 2021-07-24 NOTE — DISCHARGE INSTRUCTIONS
Please follow-up with Dr. Weaver on Monday to continue evaluation with home health as well as reaching out to the ostomy nurse on Monday for further education.

## 2021-07-25 ENCOUNTER — NURSE TRIAGE (OUTPATIENT)
Dept: CALL CENTER | Facility: HOSPITAL | Age: 61
End: 2021-07-25

## 2021-07-26 ENCOUNTER — HOSPITAL ENCOUNTER (EMERGENCY)
Facility: HOSPITAL | Age: 61
Discharge: HOME | End: 2021-07-26
Admitting: EMERGENCY MEDICINE

## 2021-07-26 ENCOUNTER — HOME CARE VISIT (OUTPATIENT)
Dept: HOME HEALTH SERVICES | Facility: HOME HEALTHCARE | Age: 61
End: 2021-07-26

## 2021-07-26 VITALS
RESPIRATION RATE: 18 BRPM | HEART RATE: 80 BPM | OXYGEN SATURATION: 97 % | WEIGHT: 195 LBS | DIASTOLIC BLOOD PRESSURE: 70 MMHG | BODY MASS INDEX: 34.55 KG/M2 | HEIGHT: 63 IN | TEMPERATURE: 97.8 F | SYSTOLIC BLOOD PRESSURE: 105 MMHG

## 2021-07-26 DIAGNOSIS — Z78.9 DECREASED ACTIVITIES OF DAILY LIVING (ADL): ICD-10-CM

## 2021-07-26 DIAGNOSIS — Z74.09 IMPAIRED MOBILITY: ICD-10-CM

## 2021-07-26 DIAGNOSIS — Z71.89 ENCOUNTER FOR OSTOMY CARE EDUCATION: Primary | ICD-10-CM

## 2021-07-26 DIAGNOSIS — Z01.89 PHYSICAL THERAPY EVALUATION, INITIAL: ICD-10-CM

## 2021-07-26 PROCEDURE — 99283 EMERGENCY DEPT VISIT LOW MDM: CPT

## 2021-07-26 PROCEDURE — 99222 1ST HOSP IP/OBS MODERATE 55: CPT | Performed by: NURSE PRACTITIONER

## 2021-07-26 PROCEDURE — 97161 PT EVAL LOW COMPLEX 20 MIN: CPT | Performed by: PHYSICAL THERAPIST

## 2021-07-26 PROCEDURE — 97165 OT EVAL LOW COMPLEX 30 MIN: CPT | Performed by: OCCUPATIONAL THERAPIST

## 2021-07-26 NOTE — THERAPY DISCHARGE NOTE
Acute Care - Occupational Therapy Discharge  [facility]    Patient Name: Lenora Kathleen  : 1960    MRN: [id number]                              Today's Date: 2021       Admit Date: 2021    Visit Dx:     ICD-10-CM ICD-9-CM   1. Decreased activities of daily living (ADL)  Z78.9 V49.89     Patient Active Problem List   Diagnosis   • Primary squamous cell carcinoma of anal canal   • Current every day smoker   • Rectovaginal fistula   • Severe malnutrition (CMS/HCC)   • Prediabetes   • Anxiety   • Enterocolitis   • Dehydration   • Hypotension due to hypovolemia   • Encephalopathy   • Ataxia   • Self-care deficit   • Anemia of chronic disease   • Diarrhea   • Hypokalemia   • Clostridium difficile enterocolitis   • Metabolic encephalopathy due to C. difficile colitis and hypotension.     Past Medical History:   Diagnosis Date   • Anxiety    • Blood in urine    • Chest tightness    • Colon polyps    • COPD (chronic obstructive pulmonary disease) (CMS/HCC)    • Depression    • Emphysema/COPD (CMS/HCC)    • Hepatitis C    • Hypertension    • Injury of back    • Kidney stone    • Palpitation    • PONV (postoperative nausea and vomiting)    • Rectal cancer (CMS/HCC)      Past Surgical History:   Procedure Laterality Date   • CHOLECYSTECTOMY     • COLONOSCOPY  2015    normal   • COLOSTOMY N/A 2021    Procedure: LAPAROSCOPIC DIVERTING COLOSTOMY;  Surgeon: Paulette Villegas MD;  Location: [facility];  Service: General;  Laterality: N/A;   • FINGER SURGERY     • HYSTERECTOMY     • KIDNEY STONE SURGERY     • KIDNEY SURGERY     • OTHER SURGICAL HISTORY      POSSIBLE CARDIAC MONITOR (LOOP RECORDER?)--IMPLANTED AND EXPLANTED    • US GUIDED LYMPH NODE BIOPSY  2021     General Information     Row Name 21 1455          OT Time and Intention    Document Type  evaluation  -JJ     Mode of Treatment  occupational therapy  -JJ     Row Name 21 1455          General Information    Patient Profile  Reviewed  yes  -     Prior Level of Function  independent:;all household mobility;transfer;bed mobility;ADL's per previous eval on 7/17  -     Existing Precautions/Restrictions  fall  -     Barriers to Rehab  medically complex;cognitive status  -     Row Name 07/26/21 1455          Occupational Profile    Environmental Supports and Barriers (Occupational Profile)  step over tub  -     Row Name 07/26/21 1455          Living Environment    Lives With  alone  -     Row Name 07/26/21 1455          Home Main Entrance    Number of Stairs, Main Entrance  none  -     Stair Railings, Main Entrance  none  -     Row Name 07/26/21 1455          Stairs Within Home, Primary    Number of Stairs, Within Home, Primary  none  -     Stair Railings, Within Home, Primary  none  -     Row Name 07/26/21 1455          Cognition    Orientation Status (Cognition)  oriented to;person  -     Row Name 07/26/21 1455          Safety Issues, Functional Mobility    Impairments Affecting Function (Mobility)  cognition  -       User Key  (r) = Recorded By, (t) = Taken By, (c) = Cosigned By    Initials Name Provider Type     Millicent Cortez, OTR/L Occupational Therapist        Mobility/ADL's     Row Name 07/26/21 1455          Bed Mobility    Bed Mobility  supine-sit;sit-supine  -     Supine-Sit [place] (Bed Mobility)  contact guard  -     Sit-Supine [place] (Bed Mobility)  contact guard  -     Assistive Device (Bed Mobility)  head of bed elevated  -     Row Name 07/26/21 1455          Transfers    Transfers  sit-stand transfer  -     Sit-Stand [place] (Transfers)  contact guard  -     Row Name 07/26/21 1455          Functional Mobility    Functional Mobility- Ind. Level  contact guard assist  -     Functional Mobility- Device  -- HHAx2  -     Functional Mobility- Comment  in room mobility  -     Row Name 07/26/21 1455          Activities of Daily Living    BADL Assessment/Intervention   lower body dressing  -     Row Name 07/26/21 1455          Lower Body Dressing Assessment/Training    [place] Level (Lower Body Dressing)  don;doff;socks;independent  -JJ     Position (Lower Body Dressing)  edge of bed sitting  -       User Key  (r) = Recorded By, (t) = Taken By, (c) = Cosigned By    Initials Name Provider Type    Millicent Dover OTR/L Occupational Therapist        Obj/Interventions     Row Name 07/26/21 1455          Sensory Assessment (Somatosensory)    Sensory Assessment (Somatosensory)  UE sensation intact  -JJ     Row Name 07/26/21 1455          Vision Assessment/Intervention    Visual Impairment/Limitations  WFL  -[facility] Name 07/26/21 1455          Range of Motion Comprehensive    General Range of Motion  bilateral upper extremity ROM WFL  -JJ     Row Name 07/26/21 1455          Strength Comprehensive (MMT)    Comment, General Manual Muscle Testing (MMT) Assessment  B UE strength grossly 4/5  -[facility] Name 07/26/21 1455          Balance    Balance Assessment  sitting static balance;standing static balance  -     Static Sitting Balance  WFL;unsupported;sitting, edge of bed  -JJ     Static Standing Balance  WFL;standing;supported  -       User Key  (r) = Recorded By, (t) = Taken By, (c) = Cosigned By    Initials Name Provider Type    Millicent Dover OTR/L Occupational Therapist        Goals/Plan    No documentation.       Clinical Impression     [facility] Name 07/26/21 1455          Pain Assessment    Additional Documentation  Pain Scale: Numbers Pre/Post-Treatment (Group)  -JJ     Row Name 07/26/21 1455          Pain Scale: Numbers Pre/Post-Treatment    Pretreatment Pain Rating  8/10  -JJ     Pain Location - Side  Bilateral  -JJ     Pain Location - Orientation  lower  -JJ     Pain Location  back  -JJ     Pain Intervention(s)  Medication (See MAR);Ambulation/increased activity;Repositioned  -     Row Name 07/26/21 1455          Plan of Care Review    Plan of Care  "Reviewed With  patient  -J     Outcome Summary  OT eval completed. Pt is alert and oriented to self. She was able to complete4 bed mobility with SBA. CGA for sit <> stand t/f and all in room mobility. Pt independently able to don/doff socks seated at EOB. Pt biggest concern is taking care of her colostomy bag, but states that she is able to take care of all other adls. When asked safety questions such as how would you get out of your house if there was a fire, pt stated she would \"climb out the window\". When asked if there was another way she was unable to produce another route, such as the front door. Pt would benefit from skilled OT services, but she is being d/c'd from facility. She would benefit from SNF placement for continued skilled services.  -     Row Name 07/26/21 1459          Therapy Assessment/Plan (OT)    Patient/Family Therapy Goal Statement (OT)  rehab for colostomy care  -     Criteria for Skilled Therapeutic Interventions Met (OT)  no;other (see comments) pt not admitted to hospital  -     Therapy Frequency (OT)  evaluation only  -     Row Name 07/26/21 1455          Therapy Plan Review/Discharge Plan (OT)    Anticipated Discharge Disposition (OT)  skilled nursing facility  -     Row Name 07/26/21 1455          Vital Signs    Pre Patient Position  Supine  -JJ     Intra Patient Position  Standing  -JJ     Post Patient Position  Supine  -JJ     Row Name 07/26/21 1455          Positioning and Restraints    Pre-Treatment Position  in bed  -JJ     Post Treatment Position  bed  -JJ     In Bed  notified nsg;fowlers;call light within reach;encouraged to call for assist;patient within staff view;side rails up x2  -       User Key  (r) = Recorded By, (t) = Taken By, (c) = Cosigned By    Initials Name Provider Type    Millicent Dover OTR/L Occupational Therapist        Outcome Measures     Row Name 07/26/21 4179          How much help from another is currently needed...    Putting on and " taking off regular lower body clothing?  4  -JJ     Bathing (including washing, rinsing, and drying)  3  -JJ     Toileting (which includes using toilet bed pan or urinal)  3  -JJ     Putting on and taking off regular upper body clothing  4  -JJ     Taking care of personal grooming (such as brushing teeth)  4  -JJ     Eating meals  4  -JJ     AM-PAC 6 Clicks Score (OT)  22  -JJ     Row Name 07/26/21 1457          Functional Assessment    Outcome Measure Options  AM-PAC 6 Clicks Daily Activity (OT)  -       User Key  (r) = Recorded By, (t) = Taken By, (c) = Cosigned By    Initials Name Provider Type    Millicent Dover OTR/L Occupational Therapist        Occupational Therapy Education                 Title: PT OT SLP Therapies (In Progress)     Topic: Occupational Therapy (In Progress)     Point: ADL training (Done)     Description:   Instruct learner(s) on proper safety adaptation and remediation techniques during self care or transfers.   Instruct in proper use of assistive devices.              Learning Progress Summary           Patient Acceptance, E, VU by SHELBY at 7/26/2021 1516                   Point: Home exercise program (Not Started)     Description:   Instruct learner(s) on appropriate technique for monitoring, assisting and/or progressing therapeutic exercises/activities.              Learner Progress:  Not documented in this visit.          Point: Precautions (Done)     Description:   Instruct learner(s) on prescribed precautions during self-care and functional transfers.              Learning Progress Summary           Patient Acceptance, E, VU by MARKELL at 7/26/2021 1516                   Point: Body mechanics (Not Started)     Description:   Instruct learner(s) on proper positioning and spine alignment during self-care, functional mobility activities and/or exercises.              Learner Progress:  Not documented in this visit.                      User Key     Initials Effective Dates Name Provider  "Type Discipline     06/16/21 -  Cortez Millicent, OTR/L Occupational Therapist OT              OT Recommendation and Plan  Retired Outcome Summary/Treatment Plan (OT)  Anticipated Discharge Disposition (OT): skilled nursing facility  Therapy Frequency (OT): evaluation only  Plan of Care Review  Plan of Care Reviewed With: patient  Outcome Summary: OT eval completed. Pt is alert and oriented to self. She was able to complete4 bed mobility with SBA. CGA for sit <> stand t/f and all in room mobility. Pt independently able to don/doff socks seated at EOB. Pt biggest concern is taking care of her colostomy bag, but states that she is able to take care of all other adls. When asked safety questions such as how would you get out of your house if there was a fire, pt stated she would \"climb out the window\". When asked if there was another way she was unable to produce another route, such as the front door. Pt would benefit from skilled OT services, but she is being d/c'd from facility. She would benefit from SNF placement for continued skilled services.  Plan of Care Reviewed With: patient  Outcome Summary: OT eval completed. Pt is alert and oriented to self. She was able to complete4 bed mobility with SBA. CGA for sit <> stand t/f and all in room mobility. Pt independently able to don/doff socks seated at EOB. Pt biggest concern is taking care of her colostomy bag, but states that she is able to take care of all other adls. When asked safety questions such as how would you get out of your house if there was a fire, pt stated she would \"climb out the window\". When asked if there was another way she was unable to produce another route, such as the front door. Pt would benefit from skilled OT services, but she is being d/c'd from facility. She would benefit from SNF placement for continued skilled services.     Time Calculation:   Time Calculation- OT     Row Name 07/26/21 1528             Time Calculation- OT    OT Start " Time  1455 add 10 minutes for chart review  -MARKELL      OT Stop Time  1526  -SHELBY      OT Time Calculation (min)  31 min  -SHELBY      OT Received On  07/26/21  -        User Key  (r) = Recorded By, (t) = Taken By, (c) = Cosigned By    Initials Name Provider Type    Millicent Dover OTR/L Occupational Therapist        Therapy Charges for Today     Code Description Service Date Service Provider Modifiers Qty    17899127301 HC OT EVAL LOW COMPLEXITY 3 7/26/2021 Millicent Cortez OTR/L GO 1               BRUCE Gamboa/BENEDICT  7/26/2021

## 2021-07-26 NOTE — PLAN OF CARE
Goal Outcome Evaluation:  Plan of Care Reviewed With: patient        Progress: no change  Outcome Summary: PT evaluation completed. The patient presents alert and oriented to self only. She has generalized weakness and confusion. She was able to walk a short distance in the room with HHA due to being unsteady. She was unable to answer simple safety questions for home. She lives home alone which is not safe with her unsteady gait and her confusion. She could not even point to where her colostomy is located. She kept grabbing her breast to protect her colostomy from the gait belt while I was placing it. Recommend discharge to SNF for therapy and for safety of medical care.

## 2021-07-26 NOTE — ED PROVIDER NOTES
Subjective   Pt is a 61 year old female who presents to the ER requesting ostomy care.  Pt was seen here 7/24/21 (two days prior) with same complaints, she states she had cancer and developed a fistula intra-abdominally.  She did receive an ostomy placement with her last hospitalization.  Pt states the same frustration that her sister speaks with home health without her and she is unsure how to care for her ostomy properly.  Pt states she was offered nursing home placement but declined and has been staying with her sister and being cared for by her sister, home health, and an ostomy nurse.  Pt is concerned there is bleeding from the ostomy site, she presents with no ostomy supplies covering the site.        History provided by:  Patient   used: No        Review of Systems   Constitutional: Negative.  Negative for activity change, appetite change, chills, diaphoresis, fatigue and fever.   HENT: Negative.  Negative for congestion, ear pain, facial swelling, sore throat and trouble swallowing.    Eyes: Negative.  Negative for pain, discharge, redness and itching.   Respiratory: Negative.  Negative for cough, chest tightness, shortness of breath, wheezing and stridor.    Cardiovascular: Negative.  Negative for chest pain, palpitations and leg swelling.   Gastrointestinal: Negative.  Negative for abdominal distention, abdominal pain, blood in stool, constipation, diarrhea, nausea and vomiting.   Endocrine: Negative.  Negative for cold intolerance and heat intolerance.   Genitourinary: Negative.  Negative for difficulty urinating, dysuria, flank pain, frequency, urgency and vaginal bleeding.   Musculoskeletal: Negative for arthralgias, back pain, gait problem, joint swelling and myalgias.   Skin: Negative.  Negative for color change, pallor and rash.        Open ostomy, no ostomy appliance covering area.     Allergic/Immunologic: Negative.    Neurological: Negative.  Negative for dizziness, tremors,  seizures, syncope, weakness, light-headedness, numbness and headaches.   Hematological: Negative.  Negative for adenopathy. Does not bruise/bleed easily.   Psychiatric/Behavioral: Negative.  Negative for agitation, confusion and suicidal ideas. The patient is not nervous/anxious.    All other systems reviewed and are negative.      Past Medical History:   Diagnosis Date   • Anxiety    • Blood in urine    • Chest tightness    • Colon polyps    • COPD (chronic obstructive pulmonary disease) (CMS/HCC)    • Depression    • Emphysema/COPD (CMS/HCC)    • Hepatitis C    • Hypertension    • Injury of back    • Kidney stone    • Palpitation    • PONV (postoperative nausea and vomiting)    • Rectal cancer (CMS/HCC)        Allergies   Allergen Reactions   • Morphine GI Intolerance and Nausea And Vomiting     Other reaction(s): Vomiting         Past Surgical History:   Procedure Laterality Date   • CHOLECYSTECTOMY     • COLONOSCOPY  03/12/2015    normal   • COLOSTOMY N/A 4/23/2021    Procedure: LAPAROSCOPIC DIVERTING COLOSTOMY;  Surgeon: Paulette Villegas MD;  Location: [facility];  Service: General;  Laterality: N/A;   • FINGER SURGERY     • HYSTERECTOMY     • KIDNEY STONE SURGERY     • KIDNEY SURGERY     • OTHER SURGICAL HISTORY      POSSIBLE CARDIAC MONITOR (LOOP RECORDER?)--IMPLANTED AND EXPLANTED    • US GUIDED LYMPH NODE BIOPSY  4/30/2021       Family History   Problem Relation Age of Onset   • Heart disease Mother    • No Known Problems Father        Social History     Socioeconomic History   • Marital status:      Spouse name: Not on file   • Number of children: Not on file   • Years of education: Not on file   • Highest education level: Not on file   Tobacco Use   • Smoking status: Former Smoker     Packs/day: 0.25     Years: 45.00     Pack years: 11.25     Types: Cigarettes   • Smokeless tobacco: Never Used   Substance and Sexual Activity   • Alcohol use: No   • Drug use: No   • Sexual activity: Defer      Partners: Male           Objective   Physical Exam  Vitals and nursing note reviewed.   Constitutional:       General: She is not in acute distress.     Appearance: Normal appearance. She is normal weight. She is not ill-appearing, toxic-appearing or diaphoretic.   HENT:      Head: Normocephalic and atraumatic.      Right Ear: Tympanic membrane, ear canal and external ear normal.      Left Ear: Tympanic membrane, ear canal and external ear normal.      Nose: Nose normal. No congestion or rhinorrhea.      Mouth/Throat:      Mouth: Mucous membranes are moist.      Pharynx: Oropharynx is clear.   Eyes:      Extraocular Movements: Extraocular movements intact.      Conjunctiva/sclera: Conjunctivae normal.      Pupils: Pupils are equal, round, and reactive to light.   Cardiovascular:      Rate and Rhythm: Normal rate and regular rhythm.      Pulses: Normal pulses.      Heart sounds: Normal heart sounds. No murmur heard.   No friction rub. No gallop.    Pulmonary:      Effort: Pulmonary effort is normal. No respiratory distress.      Breath sounds: Normal breath sounds. No wheezing, rhonchi or rales.   Chest:      Chest wall: No tenderness.   Abdominal:      General: Bowel sounds are normal. There is no distension.      Palpations: Abdomen is soft.      Tenderness: There is no abdominal tenderness.   Musculoskeletal:         General: No swelling, tenderness, deformity or signs of injury. Normal range of motion.      Cervical back: Normal range of motion and neck supple. No rigidity or tenderness.   Skin:     General: Skin is warm and dry.      Capillary Refill: Capillary refill takes less than 2 seconds.      Coloration: Skin is not pale.      Findings: No erythema or rash.          Neurological:      General: No focal deficit present.      Mental Status: She is alert and oriented to person, place, and time. Mental status is at baseline.      Cranial Nerves: No cranial nerve deficit.      Sensory: No sensory deficit.       Motor: No weakness.      Coordination: Coordination normal.      Gait: Gait normal.      Deep Tendon Reflexes: Reflexes normal.   Psychiatric:         Mood and Affect: Mood normal.         Behavior: Behavior normal.         Thought Content: Thought content normal.         Judgment: Judgment normal.         Procedures           ED Course  ED Course as of Jul 26 1604   Mon Jul 26, 2021   1130 Rod NAYAK states the ostomy wound care nurse has come to the bedside.  The ostomy nurse reports that she is familiar with patient and has multiple encounters with patient where she was educating patient about ostomy care.  Ostomy nurse applied a colostomy appliance to patient LLQ site.      [WS]   [id number] Cosmo,  states that patient is currently an APS and  case.  She was originally accepted at [facility] upon discharge from the hospital, her brother thought she was going to the discharge and patient decided to not show up at that time.  The patient went home where she was going to care for herself, later she was taken to her sisters to help with her care.  Patient had home health and ostomy care nurse coming to the house to help with her care.  Patient states that her and her sister have been arguing a lot lately and she doesn't think her sister is going to accept her back into her house.  Patient expresses at this time that she would like to consider going to the nursing home.  When she was seen here on Saturday she had refused nursing home consideration.      [WS]   2752 Cosmo,  states the patient will need PT and OT evaluation for discharge planning.  This will allow the nursing home to re-certify the patient.  Unsure that [facility] will accept patient back, if not APS reports they will find nursing home placement.  In the meantime the patient will be discharged home with family and will resume home health visits until re-certification is complete and patient is accepted into  nursing home care.      [WS]      ED Course User Index  [WS] Rohini Degroot APRN                                           MDM  Number of Diagnoses or Management Options  Encounter for ostomy care education  Physical therapy evaluation, initial  Diagnosis management comments: Medical records reviewed from [facility], ER visit 7/24/2021.     Do not plan to obtain a workup because patient states that she is only here for ostomy care.    Patient was then reassessed, appears well and non-toxic. Agreeable with discharge plan.        Amount and/or Complexity of Data Reviewed  Decide to obtain previous medical records or to obtain history from someone other than the patient: yes  Obtain history from someone other than the patient: yes  Review and summarize past medical records: yes  Discuss the patient with other providers: yes        Final diagnoses:   Encounter for ostomy care education   Physical therapy evaluation, initial       ED Disposition  ED Disposition     ED Disposition Condition Comment    Discharge Stable           SAM Weaver MD  [address] [facility] [address] [place] [id number]  [phone]    Schedule an appointment as soon as possible for a visit in 3 days  If symptoms worsen         Medication List      Stop    oxyCODONE-acetaminophen 5-325 MG per tablet  Commonly known as: PERCOCET             Rohini Degroot, ADIN  07/26/21 1601

## 2021-07-26 NOTE — CONSULTS
[facility]  INPATIENT OSTOMY CONSULTATION    Today's Date: 07/26/21    Patient Name: Lenora Kathleen  MRN: [id number]  CSN: [id number]  PCP: SAM Weaver MD  Referring Provider: RN request  Attending Provider: No att. providers found  Length of Stay: 0    SUBJECTIVE   Chief Complaint: Ostomy Complications and Education    HPI: Lenora Kathleen, a 61 y.o.female, that is well known to ostomy care services.  She has a past medical history of rectal cancer, COPD, and hepatitis C.  She also suffers from depression and anxiety.  Patient presented to the emergency department due to ostomy complications.  Patient does not have an appliance in place.  Patient states it came off throughout the night and she did not know how to put another appliance on.  Patient has some peristomal skin irritation that is now weeping.  Patient repeatedly states she does not know how to care for her ostomy despite being shown multiple times as an outpatient.  Patient states she has no clean clothes at her home because she is too weak to do her laundry.  She has had a referral to a nursing home/rehab facility in the past but did not want to go.  She states she feels like she is dying and afraid to go to nursing home because that is where her mother passed away.       Visit Dx:  No diagnosis found.  Patient Active Problem List   Diagnosis   • Primary squamous cell carcinoma of anal canal   • Current every day smoker   • Rectovaginal fistula   • Severe malnutrition (CMS/HCC)   • Prediabetes   • Anxiety   • Enterocolitis   • Dehydration   • Hypotension due to hypovolemia   • Encephalopathy   • Ataxia   • Self-care deficit   • Anemia of chronic disease   • Diarrhea   • Hypokalemia   • Clostridium difficile enterocolitis   • Metabolic encephalopathy due to C. difficile colitis and hypotension.       History:   Past Medical History:   Diagnosis Date   • Anxiety    • Blood in urine    • Chest tightness    • Colon polyps    • COPD  (chronic obstructive pulmonary disease) (CMS/HCC)    • Depression    • Emphysema/COPD (CMS/HCC)    • Hepatitis C    • Hypertension    • Injury of back    • Kidney stone    • Palpitation    • PONV (postoperative nausea and vomiting)    • Rectal cancer (CMS/HCC)      Past Surgical History:   Procedure Laterality Date   • CHOLECYSTECTOMY     • COLONOSCOPY  03/12/2015    normal   • COLOSTOMY N/A 4/23/2021    Procedure: LAPAROSCOPIC DIVERTING COLOSTOMY;  Surgeon: Paulette Villegas MD;  Location: [facility];  Service: General;  Laterality: N/A;   • FINGER SURGERY     • HYSTERECTOMY     • KIDNEY STONE SURGERY     • KIDNEY SURGERY     • OTHER SURGICAL HISTORY      POSSIBLE CARDIAC MONITOR (LOOP RECORDER?)--IMPLANTED AND EXPLANTED    • US GUIDED LYMPH NODE BIOPSY  4/30/2021     Social History     Socioeconomic History   • Marital status:      Spouse name: Not on file   • Number of children: Not on file   • Years of education: Not on file   • Highest education level: Not on file   Tobacco Use   • Smoking status: Former Smoker     Packs/day: 0.25     Years: 45.00     Pack years: 11.25     Types: Cigarettes   • Smokeless tobacco: Never Used   Substance and Sexual Activity   • Alcohol use: No   • Drug use: No   • Sexual activity: Defer     Partners: Male     Family History   Problem Relation Age of Onset   • Heart disease Mother    • No Known Problems Father      Allergies:  Allergies   Allergen Reactions   • Morphine GI Intolerance and Nausea And Vomiting     Other reaction(s): Vomiting         Medications:  No current facility-administered medications for this encounter.    Current Outpatient Medications:   •  acetaminophen (TYLENOL) 325 MG tablet, Take 2 tablets by mouth Every 4 (Four) Hours As Needed for Mild Pain ., Disp: , Rfl:   •  albuterol sulfate  (90 Base) MCG/ACT inhaler, Inhale 2 puffs As Needed., Disp: , Rfl:   •  ALPRAZolam (XANAX) 0.25 MG tablet, Take 1 tablet by mouth 3 (Three) Times a Day As  Needed for Anxiety., Disp: 9 tablet, Rfl: 0  •  capecitabine (XELODA) 150 MG chemo tablet, , Disp: , Rfl:   •  capecitabine (XELODA) 500 MG chemo tablet, Take  by mouth. 3 500 mg tablets in the morning AND evening PLUS 150 mg mg Monday- FRIDAY, Disp: , Rfl:   •  Cyanocobalamin (VITAMIN B 12) 500 MCG tablet, Take 500 mcg by mouth Daily., Disp: , Rfl:   •  fentaNYL (DURAGESIC) 50 MCG/HR patch, Place 1 patch on the skin as directed by provider Every 72 (Seventy-Two) Hours., Disp: 1 patch, Rfl: 0  •  FLUoxetine (PROZAC) 20 MG capsule, Take 20 mg by mouth 4 (Four) Times a Day., Disp: , Rfl:   •  gabapentin (NEURONTIN) 100 MG capsule, Take 1 capsule by mouth 4 (Four) Times a Day., Disp: 12 capsule, Rfl: 0  •  ibuprofen (ADVIL,MOTRIN) 600 MG tablet, Take 1 tablet by mouth Every 8 (Eight) Hours As Needed for Moderate Pain  for up to 279 days., Disp: , Rfl:   •  megestrol (MEGACE ES) 625 MG/5ML suspension, Take 5 mL by mouth Daily., Disp: 150 mL, Rfl: 0  •  ondansetron (Zofran) 4 MG tablet, Take 1 tablet by mouth Every 8 (Eight) Hours As Needed for Nausea or Vomiting., Disp: 20 tablet, Rfl: 1  •  vancomycin 50 MG/ML reconstituted solution oral solution reconstituted, Take 2.5 mL by mouth Every 6 (Six) Hours for 23 doses. Indications: Clostridium Difficile Infection, Disp: 57.5 mL, Rfl: 0  •  vitamin D (ERGOCALCIFEROL) 1.25 MG (43518 UT) capsule capsule, Take 50,000 Units by mouth 1 (One) Time Per Week., Disp: , Rfl:     Review of Systems:  Review of Systems   Constitutional: Positive for fatigue. Negative for chills and fever.   HENT: Negative for rhinorrhea and sore throat.    Respiratory: Negative for cough and shortness of breath.    Cardiovascular: Negative for chest pain and palpitations.   Gastrointestinal: Positive for abdominal pain and diarrhea. Negative for nausea and vomiting.   Genitourinary: Negative for frequency and urgency.   Musculoskeletal: Positive for arthralgias, back pain and myalgias.   Skin: Negative  "for color change and wound.   Neurological: Positive for weakness. Negative for dizziness.   Psychiatric/Behavioral: Positive for confusion and decreased concentration. Negative for agitation and behavioral problems.         OBJECTIVE     Vitals:    07/26/21 1145   BP:    Pulse: 81   Resp:    Temp:    SpO2: 96%       PHYSICAL EXAM  Physical Exam  Vitals and nursing note reviewed.   Constitutional:       General: She is awake.      Appearance: She is obese.      Comments: BMI: 34.54   HENT:      Head: Normocephalic and atraumatic.   Eyes:      General: Lids are normal. Gaze aligned appropriately.   Cardiovascular:      Rate and Rhythm: Normal rate and regular rhythm.   Pulmonary:      Effort: Pulmonary effort is normal. No respiratory distress.   Abdominal:      General: There is no distension.      Tenderness: There is generalized abdominal tenderness.      Comments: Ostomy site has dried stool.  No pouch in place.  Peristomal skin is intact with no erythema.  She has undergarments pulled up covering stoma.  Stoma is red, moist, and flush with skin.  There is no bleeding or noted complication with ostomy other than the lack of an appliance.     Musculoskeletal:      Cervical back: Normal range of motion and neck supple.   Skin:     General: Skin is warm and dry.   Neurological:      Mental Status: She is oriented to person, place, and time. She is confused.      Comments: She seems to get confused during conversation.  She states she is \"foggy\" due to chemo and radiation.     Psychiatric:         Mood and Affect: Affect is tearful.         Behavior: Behavior is cooperative.      Comments: Tearful at times              Results Review:  Lab Results (last 48 hours)     ** No results found for the last 48 hours. **        Imaging Results (Last 72 Hours)     ** No results found for the last 72 hours. **             ASSESSMENT/PLAN       Examination and evaluation of ostomy was performed.  Completed ostomy care and " "education with patient once again.  She was inattentive during teaching.  She was redirected many times during ostomy care.      DIAGNOSIS:   Peristomal skin breakdown  Moisture associated skin damage  Colostomy Care  Colostomy education  Impaired mobility and ADLs      PLAN:     Start     Ordered    07/26/21 1544  Assess Stoma  Every Shift      07/26/21 1546    07/26/21 1544  Empty Pouch When 1/3 Full  Per Order Details     Comments: When 1/3 Full    07/26/21 1546    07/26/21 1048  Stoma Care - Change Appliance  Weekly      07/26/21 1047    Unscheduled  Empty Pouch As Needed  As Needed      07/26/21 1546    Unscheduled  Change Pouch Immediately if Leaking  As Needed     Comments: Clean with water only.  Apply stoma powder and dust excess powder off.  Spray with Sureprep barrier film spray.  Cut to fit 2 1/4\" 2 piece system.  Apply stoma barrier ring to base and apply system to patient.    07/26/21 1546              Case Management - Placement to SNF - Discussed care with      Discussed findings and treatment plan including risks, benefits, and treatment options with patient  in detail. Patient agreed with treatment plan.      This document has been electronically signed by ADIN Chao on 7/26/2021 15:29 CDT         "

## 2021-07-26 NOTE — TELEPHONE ENCOUNTER
She has a new ostomy. States sister would not help her with ostomy or show her how to change it. She was seen in ER last night for ostomy care and to learn how to do it. States they didn't teach her how to care for it or replace it, they just did it.     Attempted to determine which products she has and how to provide her assistance. Unable to determine. States she can't wait until tomorrow. Her HH was cancelled when she left her sisters house and refused rehab stay.     Explained unable to determine how to help tonight and suggested may need to go to ER for assistance as it is the only option this late. She is going to call PCP/surgeon/Ostomy nurse tomorrow to see about further education, getting HH, etc.     Reason for Disposition  • [1] Caller has NON-URGENT question AND [2] triager unable to answer question    Additional Information  • Negative: Shock suspected (e.g., cold/pale/clammy skin, too weak to stand, low BP, rapid pulse)  • Negative: Sounds like a life-threatening emergency to the triager  • Negative: [1] Post-op AND [2] and general post-operative symptoms or questions, unrelated to ostomy  • Negative: [1] SEVERE pain (e.g., excruciating) AND [2] present > 1 hour  • Negative: Bloody, tarry, or black-colored stool (not dark green)  • Negative: [1] Bleeding from stoma tissue (stoma is moist, pink to red-colored tissue) AND [2] won't stop after 10 minutes of direct pressure (using correct technique)  • Negative: Stoma separates from the surrounding skin at the suture line (e.g. gaping wound next to stoma)  • Negative: Stoma turns purple or black  • Negative: [1] No stool or gas from ILEOSTOMY > 6 hours AND [2] abdominal pain or vomiting  • Negative: [1] No stool or gas from ILEOSTOMY > 6 hours AND [2] no improvement after using CARE ADVICE  • Negative: [1] No stool or gas from COLOSTOMY > 48 hours (2 days) AND [2] abdominal pain or vomiting  • Negative: [1] Drinking very little AND [2] dehydration  "suspected (e.g., no urine > 12 hours, very dry mouth, very lightheaded)  • Negative: Patient sounds very sick or weak to the triager  • Negative: [1] MILD-MODERATE pain AND [2] constant AND [3] present > 2 hours  • Negative: [1] Vomiting AND [2] abdomen looks much more swollen than usual  • Negative: [1] Vomiting AND [2] contains bile (green color)  • Negative: [1] Abdomen skin around stoma looks infected (spreading redness, pain) AND [2] fever  • Negative: No stool or gas from ILEOSTOMY > 6 hours (and has not tried CARE ADVICE)  • Negative: SEVERE diarrhea  (e.g., 1,500 ml or more a day; or twice as much as usual)  • Negative: [1] Caller has URGENT question AND [2] triager unable to answer question  • Negative: [1] Abdomen skin around stoma looks infected (spreading redness, pain) AND [2] large red area (> 2 in. or 5 cm)  • Negative: MODERATE diarrhea  (e.g., 1,200 ml or more a day; or 50% more than usual)  • Negative: [1] MILD diarrhea (e.g., 900 ml or more a day; \"more than usual\", or watery) AND [2] lasting more than 2 days    Answer Assessment - Initial Assessment Questions  1. TYPE: \"What type of ostomy do you have?\" (e.g., ileostomy, colostomy; temporary, long-term [permanent]).      See note  2. LOCATION: \"Where is the ostomy located?\"      n/a  3. DURATION: \"How long have you had the ostomy?\" (e.g., days, weeks, months, years).      n/a  4. MAIN CONCERN: \"What is your main concern or symptom today?\" (e.g., skin redness or irritation, bleeding, leaking, change in output or appearance of ostomy).      n/a  5. DAILY OSTOMY OUTPUT DIARY: \"Do you keep a daily diary of the output from your ostomy\"?       n/a  6. OSTOMY OUTPUT: \"How much stool output over a 24 hour period are you having?\" (e.g., n/a ml; consistency of water, milkshake, pudding)      n/a  7. OTHER SYMPTOMS: \"Are you having any other symptoms?\" (e.g., fever, vomiting, blood in stool, abdominal pain, dizziness)      N/a    Protocols used: OSTOMY " SYMPTOMS AND QUESTIONS-ADULT-

## 2021-07-26 NOTE — PLAN OF CARE
"Goal Outcome Evaluation:  Plan of Care Reviewed With: patient           Outcome Summary: OT eval completed. Pt is alert and oriented to self. She was able to complete4 bed mobility with SBA. CGA for sit <> stand t/f and all in room mobility. Pt independently able to don/doff socks seated at EOB. Pt biggest concern is taking care of her colostomy bag, but states that she is able to take care of all other adls. When asked safety questions such as how would you get out of your house if there was a fire, pt stated she would \"climb out the window\". When asked if there was another way she was unable to produce another route, such as the front door. Pt would benefit from skilled OT services, but she is being d/c'd from facility. She would benefit from SNF placement for continued skilled services.  "

## 2021-07-26 NOTE — CASE MANAGEMENT/SOCIAL WORK
Spoke to Marie BROWN in regards to plan.  Spoke to Rohini OAKLEY in ED and she is ordering PT/OP eval for assistance in near future NHP.  Was told the previous PT/OT eval while in house could not be used for future NHP.  My understanding is that [facility]'s Xiomara is attempting to see if they will be willing to offer her a bed again.  If not, [facility] will be notified by KEVIN and then [facility] will then continue with assistance for NHP from home.  Pt was here only to get her ostomy changed and assistance in that care.  Updated Rohini OAKLEY and Rod NAYAK. .13:51 CDT

## 2021-07-26 NOTE — CASE MANAGEMENT/SOCIAL WORK
KEVIN has received a call back from Xiomara with [facility] facilities. She is going to call PT to speak with her about which facilities she has available with a private room. If PT is agreeable, Xiomara will work on an expedited pre-cert to get PT into a facility. PT may be dc home after therapy evals and when/if she is medically ready. Xiomara will continue to work the referral with PT's PCP for Rx and such if PT is approved. KEVIN is calling [facility] KEVIN to notify.  CELINE Nguyen

## 2021-07-26 NOTE — THERAPY DISCHARGE NOTE
Patient Name: Lenora Kathleen  : 1960    MRN: [id number]                              Today's Date: 2021       Admit Date: 2021    Visit Dx:     ICD-10-CM ICD-9-CM   1. Decreased activities of daily living (ADL)  Z78.9 V49.89   2. Impaired mobility  Z74.09 799.89     Patient Active Problem List   Diagnosis   • Primary squamous cell carcinoma of anal canal   • Current every day smoker   • Rectovaginal fistula   • Severe malnutrition (CMS/HCC)   • Prediabetes   • Anxiety   • Enterocolitis   • Dehydration   • Hypotension due to hypovolemia   • Encephalopathy   • Ataxia   • Self-care deficit   • Anemia of chronic disease   • Diarrhea   • Hypokalemia   • Clostridium difficile enterocolitis   • Metabolic encephalopathy due to C. difficile colitis and hypotension.     Past Medical History:   Diagnosis Date   • Anxiety    • Blood in urine    • Chest tightness    • Colon polyps    • COPD (chronic obstructive pulmonary disease) (CMS/HCC)    • Depression    • Emphysema/COPD (CMS/HCC)    • Hepatitis C    • Hypertension    • Injury of back    • Kidney stone    • Palpitation    • PONV (postoperative nausea and vomiting)    • Rectal cancer (CMS/HCC)      Past Surgical History:   Procedure Laterality Date   • CHOLECYSTECTOMY     • COLONOSCOPY  2015    normal   • COLOSTOMY N/A 2021    Procedure: LAPAROSCOPIC DIVERTING COLOSTOMY;  Surgeon: Paulette Villegas MD;  Location: [facility];  Service: General;  Laterality: N/A;   • FINGER SURGERY     • HYSTERECTOMY     • KIDNEY STONE SURGERY     • KIDNEY SURGERY     • OTHER SURGICAL HISTORY      POSSIBLE CARDIAC MONITOR (LOOP RECORDER?)--IMPLANTED AND EXPLANTED    • US GUIDED LYMPH NODE BIOPSY  2021     General Information     Row Name 21 1450          Physical Therapy Time and Intention    Document Type  evaluation  -MS     Mode of Treatment  physical therapy;co-treatment  -MS     Row Name 21 1450          General Information    Patient Profile  Reviewed  yes  -MS     Prior Level of Function  independent:;all household mobility;transfer;bed mobility;ADL's  -MS     Existing Precautions/Restrictions  fall  -MS     Barriers to Rehab  medically complex;cognitive status  -MS     Row Name 07/26/21 1450          Living Environment    Lives With  alone  -MS     Row Name 07/26/21 1450          Home Main Entrance    Number of Stairs, Main Entrance  none  -MS     Stair Railings, Main Entrance  none  -MS     Row Name 07/26/21 1450          Cognition    Orientation Status (Cognition)  oriented to;person  -MS     Row Name 07/26/21 1450          Safety Issues, Functional Mobility    Impairments Affecting Function (Mobility)  cognition  -MS     Cognitive Impairments, Mobility Safety/Performance  insight into deficits/self-awareness;judgment;problem-solving/reasoning  -MS       User Key  (r) = Recorded By, (t) = Taken By, (c) = Cosigned By    Initials Name Provider Type    Rachna Valles, PT, DPT, NCS Physical Therapist        Mobility     Row Name 07/26/21 1450          Bed Mobility    Bed Mobility  supine-sit;sit-supine  -MS     Supine-Sit [place] (Bed Mobility)  contact guard  -MS     Sit-Supine [place] (Bed Mobility)  contact guard  -MS     Assistive Device (Bed Mobility)  head of bed elevated  -MS     Row Name 07/26/21 1450          Sit-Stand Transfer    Sit-Stand [place] (Transfers)  contact guard  -MS     Row Name 07/26/21 1450          Gait/Stairs (Locomotion)    [place] Level (Gait)  contact guard  -MS     Assistive Device (Gait)  -- HHA x2  -MS     Distance in Feet (Gait)  20ft in the room. The patient has a high fear of falling  -MS       User Key  (r) = Recorded By, (t) = Taken By, (c) = Cosigned By    Initials Name Provider Type    Rachna Valles, PT, DPT, NCS Physical Therapist        Obj/Interventions     Row Name 07/26/21 1450          Range of Motion Comprehensive    General Range of Motion  no range of motion deficits  identified  -MS     Row Name 07/26/21 1450          Strength Comprehensive (MMT)    Comment, General Manual Muscle Testing (MMT) Assessment  all extremities 4/5  -MS     Row Name 07/26/21 1450          Balance    Static Sitting Balance  WFL;unsupported;sitting in chair  -MS     Static Standing Balance  WFL;supported  -MS       User Key  (r) = Recorded By, (t) = Taken By, (c) = Cosigned By    Initials Name Provider Type    MS Patrick Rachna ANTHONY, PT, DPT, NCS Physical Therapist        Goals/Plan    No documentation.       Clinical Impression     Row Name 07/26/21 1450          Pain    Additional Documentation  Pain Scale: Numbers Pre/Post-Treatment (Group)  -MS     Row Name 07/26/21 1450          Pain Scale: Numbers Pre/Post-Treatment    Pretreatment Pain Rating  8/10  -MS     Posttreatment Pain Rating  8/10  -MS     Pain Location - Orientation  lower  -MS     Pain Location  back  -MS     Pain Intervention(s)  Medication (See MAR);Repositioned;Ambulation/increased activity  -MS     Row Name 07/26/21 1450          Plan of Care Review    Plan of Care Reviewed With  patient  -MS     Progress  no change  -MS     Outcome Summary  PT evaluation completed. The patient presents alert and oriented to self only. She has generalized weakness and confusion. She was able to walk a short distance in the room with HHA due to being unsteady. She was unable to answer simple safety questions for home. She lives home alone which is not safe with her unsteady gait and her confusion. She could not even point to where her colostomy is located. She kept grabbing her breast to protect her colostomy from the gait belt while I was placing it. Recommend discharge to SNF for therapy and for safety of medical care.  -MS     Row Name 07/26/21 1450          Therapy Assessment/Plan (PT)    Patient/Family Therapy Goals Statement (PT)  help with colostomy care  -MS     Criteria for Skilled Interventions Met (PT)  other (see comments) pt is being  discharged today  -MS     Row Name 07/26/21 1450          Positioning and Restraints    Post Treatment Position  bed  -MS     In Bed  fowlers;side rails up x2;call light within reach;encouraged to call for assist  -MS       User Key  (r) = Recorded By, (t) = Taken By, (c) = Cosigned By    Initials Name Provider Type    aRchna Valles, PT, DPT, NCS Physical Therapist        Outcome Measures     Row Name 07/26/21 1536          How much help from another person do you currently need...    Turning from your back to your side while in flat bed without using bedrails?  3  -MS     Moving from lying on back to sitting on the side of a flat bed without bedrails?  3  -MS     Moving to and from a bed to a chair (including a wheelchair)?  3  -MS     Standing up from a chair using your arms (e.g., wheelchair, bedside chair)?  3  -MS     Climbing 3-5 steps with a railing?  3  -MS     To walk in hospital room?  3  -MS     AM-PAC 6 Clicks Score (PT)  18  -MS     Row Name 07/26/21 1536 07/26/21 1455       Functional Assessment    Outcome Measure Options  AM-PAC 6 Clicks Basic Mobility (PT)  -MS  AM-PAC 6 Clicks Daily Activity (OT)  -J      User Key  (r) = Recorded By, (t) = Taken By, (c) = Cosigned By    Initials Name Provider Type    Rachna Valles R, PT, DPT, NCS Physical Therapist    Millicent Dover, OTR/L Occupational Therapist          PT Recommendation and Plan     Plan of Care Reviewed With: patient  Progress: no change  Outcome Summary: PT evaluation completed. The patient presents alert and oriented to self only. She has generalized weakness and confusion. She was able to walk a short distance in the room with HHA due to being unsteady. She was unable to answer simple safety questions for home. She lives home alone which is not safe with her unsteady gait and her confusion. She could not even point to where her colostomy is located. She kept grabbing her breast to protect her colostomy from the gait belt  while I was placing it. Recommend discharge to SNF for therapy and for safety of medical care.     Time Calculation:   PT Charges     Row Name 07/26/21 1536             Time Calculation    Start Time  1440  -MS      Stop Time  1520  -MS      Time Calculation (min)  40 min  -MS      PT Received On  07/26/21  -MS         Untimed Charges    PT Eval/Re-eval Minutes  40  -MS         Total Minutes    Untimed Charges Total Minutes  40  -MS       Total Minutes  40  -MS        User Key  (r) = Recorded By, (t) = Taken By, (c) = Cosigned By    Initials Name Provider Type    MS Rachna Nguyne, PT, DPT, NCS Physical Therapist        Therapy Charges for Today     Code Description Service Date Service Provider Modifiers Qty    [id number] HC PT EVAL LOW COMPLEXITY 3 7/26/2021 Rachna Nguyen PT, DPT, NCS GP 1          PT G-Codes  Outcome Measure Options: AM-PAC 6 Clicks Basic Mobility (PT)  AM-PAC 6 Clicks Score (PT): 18  AM-PAC 6 Clicks Score (OT): 22    PT Discharge Summary  Anticipated Discharge Disposition (PT): skilled nursing facility    Rachna Nguyen PT, DPT, NCS  7/26/2021

## 2021-07-26 NOTE — CASE MANAGEMENT/SOCIAL WORK
"PT had a private room and pre-cert to be dc to [facility] this past Friday. (PT requires a private room due to c-diff) PT was picked up from [facility] by her son and was taken home rather than to SNF. PT's sister was on her way to the facility to see PT and was advised that PT had not arrived. PT's sister learned that PT decided to go home because she \"had things to take care of\" and planned to go to SNF \"sometime over the weekend.\" PT's insurance is not set to allow for that and PT was to report immediately to SNF. The facility has since filled the private room. In order for PT to dc to SNF, she will require insurance approval and will also need a facility with a private room. PT's sister had also advised that PT has an active APS case.  has confirmed that PT has an active APS case with state SW, Shweta Olvera. KEVIN has placed a call to KEVIN Olvera and is awaiting a return call. KEVIN has also tried to reach PT via phone and has left a message requesting a return call. KEVIN will update as more information is available. Marie Silva, MARCIAW    "

## 2021-07-26 NOTE — TELEPHONE ENCOUNTER
Caller asking to speak with Ela from ER that she seen last night. Caller denies wanting to speak with triage nurse. Caller states she is needing to speak with her. Caller advised can call back anytime if wishes to speak with triage nurse at [facility].     Additional Information  • Negative: Nursing judgment  • Negative: Information only call; adult is not ill or injured  • Negative: Nursing judgment  • Negative: Nursing judgment  • Negative: Nursing judgment  • Negative: Nursing judgment  • Negative: Nursing judgment  • Negative: Nursing judgment  • Negative: Nursing judgment  • Negative: Nursing judgment  • Negative: Nursing judgment  • Negative: Nursing judgment  • Negative: Nursing judgment  • Negative: Nursing judgment  • Negative: Nursing judgment  • Negative: Nursing judgment    Protocols used: NO GUIDELINE OR REFERENCE AVAILABLE-ADULT-

## 2021-07-26 NOTE — DISCHARGE INSTRUCTIONS
Return to ER if symptoms worsen, persist, or are concerning in the next 24-48 hours.      It is very important to follow up with your primary care provider within the next few days to be further evaluated after being seen in the emergency room.      Ostomy wound nurse and home health will be by to continue with their care.  Please follow up with them prior to returning to the ER for ostomy care.      Physical therapy re-evaluated you today for nursing home placement, they will be following up with you at home for further placement.

## 2021-07-27 NOTE — CASE COMMUNICATION
MSW was informed by Staci Rowan LPN that someone other than patient contacted the RN on call this weekend stating she was gong to need ostomy supplies. They were informed patient was no longer a [facility] patient due to safety concerns. MSW contacted Shweta Jacinto, patient's APS worker and informed her of patient's needs and MSW concerns for patient. She stated she was unable to see patient on Friday as planned, but plans to see her today to address needs.

## 2021-07-28 NOTE — DISCHARGE PLACEMENT REQUEST
"Lenora Kathleen (61 y.o. Female)     Date of Birth Social Security Number Address Home Phone MRN    1960  [address]  [address] [place] [address] [phone] [id number]    Sikhism Marital Status          Gnosticist        Admission Date Admission Type Admitting Provider Attending Provider Department, Room/Bed    21 Emergency   [facility] Emergency Department,     Discharge Date Discharge Disposition Discharge Destination        2021 Home or Self Care              Attending Provider: (none)   Allergies: Morphine    Isolation: None   Infection: C.difficile (21)   Code Status: Prior    Ht: 160 cm (63\")   Wt: 88.5 kg (195 lb)    Admission Cmt: None   Principal Problem: None                Active Insurance as of 2021     Primary Coverage     Payor Plan Insurance Group Employer/Plan Group    [facility] [employer] MEDICARE REPLACEMENT      Payor Plan Address Payor Plan Phone Number Payor Plan Fax Number Effective Dates    [address] [address] [phone]  2021 - None Entered    [facility] [id number]       Subscriber Name Subscriber Birth Date Member ID       LENORA KATHLEEN 1960 [id number]                 Emergency Contacts      (Rel.) Home Phone Work Phone Mobile Phone    EdwinRodolfo (Son) [phone] -- --    ETELVINA WHITE (Relative) [phone] -- --               Physical Therapy Notes (most recent note)      Rachna Nguyen, PT, DPT, NCS at 21 1537  Version 1 of 1         Patient Name: Lenora Kathleen  : 1960    MRN: [id number]                              Today's Date: 2021       Admit Date: 2021    Visit Dx:     ICD-10-CM ICD-9-CM   1. Decreased activities of daily living (ADL)  Z78.9 V49.89   2. Impaired mobility  Z74.09 799.89     Patient Active Problem List   Diagnosis   • Primary squamous cell carcinoma of anal canal   • Current every day smoker   • Rectovaginal fistula   • Severe " malnutrition (CMS/HCC)   • Prediabetes   • Anxiety   • Enterocolitis   • Dehydration   • Hypotension due to hypovolemia   • Encephalopathy   • Ataxia   • Self-care deficit   • Anemia of chronic disease   • Diarrhea   • Hypokalemia   • Clostridium difficile enterocolitis   • Metabolic encephalopathy due to C. difficile colitis and hypotension.     Past Medical History:   Diagnosis Date   • Anxiety    • Blood in urine    • Chest tightness    • Colon polyps    • COPD (chronic obstructive pulmonary disease) (CMS/HCC)    • Depression    • Emphysema/COPD (CMS/HCC)    • Hepatitis C    • Hypertension    • Injury of back    • Kidney stone    • Palpitation    • PONV (postoperative nausea and vomiting)    • Rectal cancer (CMS/HCC)      Past Surgical History:   Procedure Laterality Date   • CHOLECYSTECTOMY     • COLONOSCOPY  03/12/2015    normal   • COLOSTOMY N/A 4/23/2021    Procedure: LAPAROSCOPIC DIVERTING COLOSTOMY;  Surgeon: Paulette Villegas MD;  Location: [facility];  Service: General;  Laterality: N/A;   • FINGER SURGERY     • HYSTERECTOMY     • KIDNEY STONE SURGERY     • KIDNEY SURGERY     • OTHER SURGICAL HISTORY      POSSIBLE CARDIAC MONITOR (LOOP RECORDER?)--IMPLANTED AND EXPLANTED    • US GUIDED LYMPH NODE BIOPSY  4/30/2021     General Information     Row Name 07/26/21 1450          Physical Therapy Time and Intention    Document Type  evaluation  -MS     Mode of Treatment  physical therapy;co-treatment  -MS     Row Name 07/26/21 1450          General Information    Patient Profile Reviewed  yes  -MS     Prior Level of Function  independent:;all household mobility;transfer;bed mobility;ADL's  -MS     Existing Precautions/Restrictions  fall  -MS     Barriers to Rehab  medically complex;cognitive status  -MS     Row Name 07/26/21 1450          Living Environment    Lives With  alone  -MS     Row Name 07/26/21 1450          Home Main Entrance    Number of Stairs, Main Entrance  none  -MS     Stair Railings, Main  Entrance  none  -MS     Row Name 07/26/21 1450          Cognition    Orientation Status (Cognition)  oriented to;person  -MS     Row Name 07/26/21 1450          Safety Issues, Functional Mobility    Impairments Affecting Function (Mobility)  cognition  -MS     Cognitive Impairments, Mobility Safety/Performance  insight into deficits/self-awareness;judgment;problem-solving/reasoning  -MS       User Key  (r) = Recorded By, (t) = Taken By, (c) = Cosigned By    Initials Name Provider Type    Rachna Valles, PT, DPT, NCS Physical Therapist        Mobility     Row Name 07/26/21 1450          Bed Mobility    Bed Mobility  supine-sit;sit-supine  -MS     Supine-Sit [place] (Bed Mobility)  contact guard  -MS     Sit-Supine [place] (Bed Mobility)  contact guard  -MS     Assistive Device (Bed Mobility)  head of bed elevated  -MS     Row Name 07/26/21 1450          Sit-Stand Transfer    Sit-Stand [place] (Transfers)  contact guard  -MS     Row Name 07/26/21 1450          Gait/Stairs (Locomotion)    [place] Level (Gait)  contact guard  -MS     Assistive Device (Gait)  -- HHA x2  -MS     Distance in Feet (Gait)  20ft in the room. The patient has a high fear of falling  -MS       User Key  (r) = Recorded By, (t) = Taken By, (c) = Cosigned By    Initials Name Provider Type    Rachna Valles, PT, DPT, NCS Physical Therapist        Obj/Interventions     Row Name 07/26/21 1450          Range of Motion Comprehensive    General Range of Motion  no range of motion deficits identified  -MS     Row Name 07/26/21 1450          Strength Comprehensive (MMT)    Comment, General Manual Muscle Testing (MMT) Assessment  all extremities 4/5  -MS     Row Name 07/26/21 1450          Balance    Static Sitting Balance  WFL;unsupported;sitting in chair  -MS     Static Standing Balance  WFL;supported  -MS       User Key  (r) = Recorded By, (t) = Taken By, (c) = Cosigned By    Initials Name Provider Type    Rachna Valles  R, PT, DPT, NCS Physical Therapist        Goals/Plan    No documentation.       Clinical Impression     Row Name 07/26/21 1450          Pain    Additional Documentation  Pain Scale: Numbers Pre/Post-Treatment (Group)  -MS     Row Name 07/26/21 1450          Pain Scale: Numbers Pre/Post-Treatment    Pretreatment Pain Rating  8/10  -MS     Posttreatment Pain Rating  8/10  -MS     Pain Location - Orientation  lower  -MS     Pain Location  back  -MS     Pain Intervention(s)  Medication (See MAR);Repositioned;Ambulation/increased activity  -MS     Row Name 07/26/21 5000          Plan of Care Review    Plan of Care Reviewed With  patient  -MS     Progress  no change  -MS     Outcome Summary  PT evaluation completed. The patient presents alert and oriented to self only. She has generalized weakness and confusion. She was able to walk a short distance in the room with HHA due to being unsteady. She was unable to answer simple safety questions for home. She lives home alone which is not safe with her unsteady gait and her confusion. She could not even point to where her colostomy is located. She kept grabbing her breast to protect her colostomy from the gait belt while I was placing it. Recommend discharge to SNF for therapy and for safety of medical care.  -MS     Row Name 07/26/21 3390          Therapy Assessment/Plan (PT)    Patient/Family Therapy Goals Statement (PT)  help with colostomy care  -MS     Criteria for Skilled Interventions Met (PT)  other (see comments) pt is being discharged today  -MS     Row Name 07/26/21 3230          Positioning and Restraints    Post Treatment Position  bed  -MS     In Bed  fowlers;side rails up x2;call light within reach;encouraged to call for assist  -MS       User Key  (r) = Recorded By, (t) = Taken By, (c) = Cosigned By    Initials Name Provider Type    Rachna Valles R, PT, DPT, NCS Physical Therapist        Outcome Measures     Row Name 07/26/21 2037          How much help  from another person do you currently need...    Turning from your back to your side while in flat bed without using bedrails?  3  -MS     Moving from lying on back to sitting on the side of a flat bed without bedrails?  3  -MS     Moving to and from a bed to a chair (including a wheelchair)?  3  -MS     Standing up from a chair using your arms (e.g., wheelchair, bedside chair)?  3  -MS     Climbing 3-5 steps with a railing?  3  -MS     To walk in hospital room?  3  -MS     AM-PAC 6 Clicks Score (PT)  18  -MS     Row Name 07/26/21 1536 07/26/21 1455       Functional Assessment    Outcome Measure Options  AM-PAC 6 Clicks Basic Mobility (PT)  -MS  AM-PAC 6 Clicks Daily Activity (OT)  -SHELBY      User Key  (r) = Recorded By, (t) = Taken By, (c) = Cosigned By    Initials Name Provider Type    MS Nguyen Rachna R, PT, DPT, NCS Physical Therapist    Millicent Dover, OTR/L Occupational Therapist          PT Recommendation and Plan     Plan of Care Reviewed With: patient  Progress: no change  Outcome Summary: PT evaluation completed. The patient presents alert and oriented to self only. She has generalized weakness and confusion. She was able to walk a short distance in the room with HHA due to being unsteady. She was unable to answer simple safety questions for home. She lives home alone which is not safe with her unsteady gait and her confusion. She could not even point to where her colostomy is located. She kept grabbing her breast to protect her colostomy from the gait belt while I was placing it. Recommend discharge to SNF for therapy and for safety of medical care.     Time Calculation:   PT Charges     Row Name 07/26/21 1536             Time Calculation    Start Time  1440  -MS      Stop Time  1520  -MS      Time Calculation (min)  40 min  -MS      PT Received On  07/26/21  -MS         Untimed Charges    PT Eval/Re-eval Minutes  40  -MS         Total Minutes    Untimed Charges Total Minutes  40  -MS       Total  Minutes  40  -MS        User Key  (r) = Recorded By, (t) = Taken By, (c) = Cosigned By    Initials Name Provider Type    MS Rachna Nguyen, PT, DPT, NCS Physical Therapist        Therapy Charges for Today     Code Description Service Date Service Provider Modifiers Qty    43904016771 HC PT EVAL LOW COMPLEXITY 3 2021 Rachna Nguyen PT, DPT, NCS GP 1          PT G-Codes  Outcome Measure Options: AM-PAC 6 Clicks Basic Mobility (PT)  AM-PAC 6 Clicks Score (PT): 18  AM-PAC 6 Clicks Score (OT): 22    PT Discharge Summary  Anticipated Discharge Disposition (PT): skilled nursing facility    Rachna Nguyen PT, DPT, SHANI  2021         Electronically signed by aRchna Nguyen PT, ELVIN, SHANI at 21 1537          Occupational Therapy Notes (most recent note)      Millicent Cortez OTR/L at 21 1530          Acute Care - Occupational Therapy Discharge  [facility]    Patient Name: Lenora Kathleen  : 1960    MRN: [id number]                              Today's Date: 2021       Admit Date: 2021    Visit Dx:     ICD-10-CM ICD-9-CM   1. Decreased activities of daily living (ADL)  Z78.9 V49.89     Patient Active Problem List   Diagnosis   • Primary squamous cell carcinoma of anal canal   • Current every day smoker   • Rectovaginal fistula   • Severe malnutrition (CMS/HCC)   • Prediabetes   • Anxiety   • Enterocolitis   • Dehydration   • Hypotension due to hypovolemia   • Encephalopathy   • Ataxia   • Self-care deficit   • Anemia of chronic disease   • Diarrhea   • Hypokalemia   • Clostridium difficile enterocolitis   • Metabolic encephalopathy due to C. difficile colitis and hypotension.     Past Medical History:   Diagnosis Date   • Anxiety    • Blood in urine    • Chest tightness    • Colon polyps    • COPD (chronic obstructive pulmonary disease) (CMS/HCC)    • Depression    • Emphysema/COPD (CMS/HCC)    • Hepatitis C    • Hypertension    • Injury of back    • Kidney stone    • Palpitation     • PONV (postoperative nausea and vomiting)    • Rectal cancer (CMS/HCC)      Past Surgical History:   Procedure Laterality Date   • CHOLECYSTECTOMY     • COLONOSCOPY  03/12/2015    normal   • COLOSTOMY N/A 4/23/2021    Procedure: LAPAROSCOPIC DIVERTING COLOSTOMY;  Surgeon: Paulette Villegas MD;  Location: [facility];  Service: General;  Laterality: N/A;   • FINGER SURGERY     • HYSTERECTOMY     • KIDNEY STONE SURGERY     • KIDNEY SURGERY     • OTHER SURGICAL HISTORY      POSSIBLE CARDIAC MONITOR (LOOP RECORDER?)--IMPLANTED AND EXPLANTED    • US GUIDED LYMPH NODE BIOPSY  4/30/2021     General Information     Row Name 07/26/21 1455          OT Time and Intention    Document Type  evaluation  -     Mode of Treatment  occupational therapy  -     Row Name 07/26/21 1455          General Information    Patient Profile Reviewed  yes  -     Prior Level of Function  independent:;all household mobility;transfer;bed mobility;ADL's per previous eval on 7/17  -     Existing Precautions/Restrictions  fall  -     Barriers to Rehab  medically complex;cognitive status  -     Row Name 07/26/21 1455          Occupational Profile    Environmental Supports and Barriers (Occupational Profile)  step over tub  -     Row Name 07/26/21 1455          Living Environment    Lives With  alone  -     Row Name 07/26/21 1455          Home Main Entrance    Number of Stairs, Main Entrance  none  -JJ     Stair Railings, Main Entrance  none  -JJ     Row Name 07/26/21 1455          Stairs Within Home, Primary    Number of Stairs, Within Home, Primary  none  -JJ     Stair Railings, Within Home, Primary  none  -JJ     Row Name 07/26/21 1455          Cognition    Orientation Status (Cognition)  oriented to;person  -     Row Name 07/26/21 1455          Safety Issues, Functional Mobility    Impairments Affecting Function (Mobility)  cognition  -J       User Key  (r) = Recorded By, (t) = Taken By, (c) = Cosigned By    Initials Name  Provider Type    Millicent Dover OTR/L Occupational Therapist        Mobility/ADL's     Row Name 07/26/21 1455          Bed Mobility    Bed Mobility  supine-sit;sit-supine  -     Supine-Sit [place] (Bed Mobility)  contact guard  -     Sit-Supine [place] (Bed Mobility)  contact guard  -     Assistive Device (Bed Mobility)  head of bed elevated  -     Row Name 07/26/21 1455          Transfers    Transfers  sit-stand transfer  -     Sit-Stand [place] (Transfers)  contact guard  -     Row Name 07/26/21 1455          Functional Mobility    Functional Mobility- Ind. Level  contact guard assist  -     Functional Mobility- Device  -- HHAx2  -     Functional Mobility- Comment  in room mobility  -     Row Name 07/26/21 1455          Activities of Daily Living    BADL Assessment/Intervention  lower body dressing  -[facility] Name 07/26/21 1455          Lower Body Dressing Assessment/Training    [place] Level (Lower Body Dressing)  don;doff;socks;independent  -     Position (Lower Body Dressing)  edge of bed sitting  -       User Key  (r) = Recorded By, (t) = Taken By, (c) = Cosigned By    Initials Name Provider Type    Millicent Dover OTR/L Occupational Therapist        Obj/Interventions     Row Name 07/26/21 1455          Sensory Assessment (Somatosensory)    Sensory Assessment (Somatosensory)  UE sensation intact  -JJ     Row Name 07/26/21 1455          Vision Assessment/Intervention    Visual Impairment/Limitations  WFL  [facility] Name 07/26/21 1455          Range of Motion Comprehensive    General Range of Motion  bilateral upper extremity ROM L  [facility] Name 07/26/21 1455          Strength Comprehensive (MMT)    Comment, General Manual Muscle Testing (MMT) Assessment  B UE strength grossly 4/5  -     Row Name 07/26/21 1455          Balance    Balance Assessment  sitting static balance;standing static balance  -     Static Sitting Balance   "WFL;unsupported;sitting, edge of bed  -JJ     Static Standing Balance  WFL;standing;supported  -JJ       User Key  (r) = Recorded By, (t) = Taken By, (c) = Cosigned By    Initials Name Provider Type    Millicent Dover, OTR/L Occupational Therapist        Goals/Plan    No documentation.       Clinical Impression     Row Name 07/26/21 1455          Pain Assessment    Additional Documentation  Pain Scale: Numbers Pre/Post-Treatment (Group)  -     Row Name 07/26/21 1454          Pain Scale: Numbers Pre/Post-Treatment    Pretreatment Pain Rating  8/10  -J     Pain Location - Side  Bilateral  -JJ     Pain Location - Orientation  lower  -JJ     Pain Location  back  -JJ     Pain Intervention(s)  Medication (See MAR);Ambulation/increased activity;Repositioned  -     Row Name 07/26/21 5762          Plan of Care Review    Plan of Care Reviewed With  patient  -     Outcome Summary  OT eval completed. Pt is alert and oriented to self. She was able to complete4 bed mobility with SBA. CGA for sit <> stand t/f and all in room mobility. Pt independently able to don/doff socks seated at EOB. Pt biggest concern is taking care of her colostomy bag, but states that she is able to take care of all other adls. When asked safety questions such as how would you get out of your house if there was a fire, pt stated she would \"climb out the window\". When asked if there was another way she was unable to produce another route, such as the front door. Pt would benefit from skilled OT services, but she is being d/c'd from facility. She would benefit from SNF placement for continued skilled services.  -     Row Name 07/26/21 1454          Therapy Assessment/Plan (OT)    Patient/Family Therapy Goal Statement (OT)  rehab for colostomy care  -     Criteria for Skilled Therapeutic Interventions Met (OT)  no;other (see comments) pt not admitted to hospital  -     Therapy Frequency (OT)  evaluation only  -     Row Name 07/26/21 145 "          Therapy Plan Review/Discharge Plan (OT)    Anticipated Discharge Disposition (OT)  [facility] nursing [facility]  -     Row Name 07/26/21 1455          Vital Signs    Pre Patient Position  Supine  -JJ     Intra Patient Position  Standing  -JJ     Post Patient Position  Supine  -JJ     Row Name 07/26/21 1455          Positioning and Restraints    Pre-Treatment Position  in bed  -JJ     Post Treatment Position  bed  -JJ     In Bed  notified nsg;fowlers;call light within reach;encouraged to call for assist;patient within staff view;side rails up x2  -       User Key  (r) = Recorded By, (t) = Taken By, (c) = Cosigned By    Initials Name Provider Type    Millicent Dover OTR/L Occupational Therapist        Outcome Measures     Row Name 07/26/21 1455          How much help from another is currently needed...    Putting on and taking off regular lower body clothing?  4  -JJ     Bathing (including washing, rinsing, and drying)  3  -JJ     Toileting (which includes using toilet bed pan or urinal)  3  -JJ     Putting on and taking off regular upper body clothing  4  -JJ     Taking care of personal grooming (such as brushing teeth)  4  -JJ     Eating meals  4  -J     AM-PAC 6 Clicks Score (OT)  22  -     Row Name 07/26/21 1455          Functional Assessment    Outcome Measure Options  AM-PAC 6 Clicks Daily Activity (OT)  -       User Key  (r) = Recorded By, (t) = Taken By, (c) = Cosigned By    Initials Name Provider Type    Millicent Dover OTR/L Occupational Therapist        Occupational Therapy Education                 Title: PT OT SLP Therapies (In Progress)     Topic: Occupational Therapy (In Progress)     Point: ADL training (Done)     Description:   Instruct learner(s) on proper safety adaptation and remediation techniques during self care or transfers.   Instruct in proper use of assistive devices.              Learning Progress Summary           Patient Acceptance, E, VU by MARKELL at 7/26/2021  "1516                   Point: Home exercise program (Not Started)     Description:   Instruct learner(s) on appropriate technique for monitoring, assisting and/or progressing therapeutic exercises/activities.              Learner Progress:  Not documented in this visit.          Point: Precautions (Done)     Description:   Instruct learner(s) on prescribed precautions during self-care and functional transfers.              Learning Progress Summary           Patient Acceptance, BRI, VU by MARKELL at 7/26/2021 1516                   Point: Body mechanics (Not Started)     Description:   Instruct learner(s) on proper positioning and spine alignment during self-care, functional mobility activities and/or exercises.              Learner Progress:  Not documented in this visit.                      User Key     Initials Effective Dates Name Provider Type Discipline    MARKELL 06/16/21 -  Millicent Cortez OTR/L Occupational Therapist OT              OT Recommendation and Plan  Retired Outcome Summary/Treatment Plan (OT)  Anticipated Discharge Disposition (OT): skilled nursing facility  Therapy Frequency (OT): evaluation only  Plan of Care Review  Plan of Care Reviewed With: patient  Outcome Summary: OT eval completed. Pt is alert and oriented to self. She was able to complete4 bed mobility with SBA. CGA for sit <> stand t/f and all in room mobility. Pt independently able to don/doff socks seated at EOB. Pt biggest concern is taking care of her colostomy bag, but states that she is able to take care of all other adls. When asked safety questions such as how would you get out of your house if there was a fire, pt stated she would \"climb out the window\". When asked if there was another way she was unable to produce another route, such as the front door. Pt would benefit from skilled OT services, but she is being d/c'd from facility. She would benefit from SNF placement for continued skilled services.  Plan of Care Reviewed With: " "patient  Outcome Summary: OT eval completed. Pt is alert and oriented to self. She was able to complete4 bed mobility with SBA. CGA for sit <> stand t/f and all in room mobility. Pt independently able to don/doff socks seated at EOB. Pt biggest concern is taking care of her colostomy bag, but states that she is able to take care of all other adls. When asked safety questions such as how would you get out of your house if there was a fire, pt stated she would \"climb out the window\". When asked if there was another way she was unable to produce another route, such as the front door. Pt would benefit from skilled OT services, but she is being d/c'd from facility. She would benefit from SNF placement for continued skilled services.     Time Calculation:   Time Calculation- OT     Row Name 07/26/21 1528             Time Calculation- OT    OT Start Time  1455 add 10 minutes for chart review  -      OT Stop Time  1526  -      OT Time Calculation (min)  31 min  -      OT Received On  07/26/21  -        User Key  (r) = Recorded By, (t) = Taken By, (c) = Cosigned By    Initials Name Provider Type    Millicent Dover OTR/L Occupational Therapist        Therapy Charges for Today     Code Description Service Date Service Provider Modifiers Qty    [id number]  OT EVAL LOW COMPLEXITY 3 7/26/2021 Millicent Cortez OTR/L GO 1               ANABELLA Gamboa  7/26/2021      Electronically signed by Millicent Cortez OTR/L at 07/26/21 1530       "

## 2021-07-29 ENCOUNTER — HOSPITAL ENCOUNTER (EMERGENCY)
Facility: HOSPITAL | Age: 61
Discharge: HOME | End: 2021-07-30
Attending: EMERGENCY MEDICINE | Admitting: EMERGENCY MEDICINE

## 2021-07-29 VITALS
HEART RATE: 111 BPM | RESPIRATION RATE: 20 BRPM | TEMPERATURE: 99 F | HEIGHT: 63 IN | DIASTOLIC BLOOD PRESSURE: 69 MMHG | SYSTOLIC BLOOD PRESSURE: 126 MMHG | OXYGEN SATURATION: 95 % | WEIGHT: 195 LBS | BODY MASS INDEX: 34.55 KG/M2

## 2021-07-29 DIAGNOSIS — Z71.89 ENCOUNTER FOR OSTOMY CARE EDUCATION: ICD-10-CM

## 2021-07-29 DIAGNOSIS — Z13.9 ENCOUNTER FOR MEDICAL SCREENING EXAMINATION: Primary | ICD-10-CM

## 2021-07-29 PROCEDURE — 99283 EMERGENCY DEPT VISIT LOW MDM: CPT

## 2021-07-30 RX ORDER — NYSTATIN 100000 U/G
OINTMENT TOPICAL EVERY 12 HOURS SCHEDULED
Status: DISCONTINUED | OUTPATIENT
Start: 2021-07-30 | End: 2021-07-30 | Stop reason: HOSPADM

## 2021-07-30 RX ADMIN — NYSTATIN: 100000 OINTMENT TOPICAL at 02:03

## 2021-08-04 ENCOUNTER — TELEPHONE (OUTPATIENT)
Dept: INTERNAL MEDICINE | Facility: CLINIC | Age: 61
End: 2021-08-04

## 2021-08-04 ENCOUNTER — DOCUMENTATION (OUTPATIENT)
Dept: RADIATION ONCOLOGY | Facility: HOSPITAL | Age: 61
End: 2021-08-04
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
## 2021-08-05 ENCOUNTER — TELEPHONE (OUTPATIENT)
Dept: RADIATION ONCOLOGY | Facility: HOSPITAL | Age: 61
End: 2021-08-05

## 2021-08-05 ENCOUNTER — HOSPITAL ENCOUNTER (OUTPATIENT)
Facility: HOSPITAL | Age: 61
Setting detail: OBSERVATION
Discharge: LEFT BEFORE DISCHARGE | End: 2021-08-06
Attending: EMERGENCY MEDICINE | Admitting: FAMILY MEDICINE

## 2021-08-05 ENCOUNTER — APPOINTMENT (OUTPATIENT)
Dept: CT IMAGING | Facility: HOSPITAL | Age: 61
End: 2021-08-05

## 2021-08-05 ENCOUNTER — APPOINTMENT (OUTPATIENT)
Dept: GENERAL RADIOLOGY | Facility: HOSPITAL | Age: 61
End: 2021-08-05

## 2021-08-05 DIAGNOSIS — R13.10 DYSPHAGIA, UNSPECIFIED TYPE: ICD-10-CM

## 2021-08-05 DIAGNOSIS — Z78.9 DECREASED ACTIVITIES OF DAILY LIVING (ADL): ICD-10-CM

## 2021-08-05 DIAGNOSIS — Z79.899 POLYPHARMACY: ICD-10-CM

## 2021-08-05 DIAGNOSIS — N39.0 ACUTE UTI (URINARY TRACT INFECTION): ICD-10-CM

## 2021-08-05 DIAGNOSIS — R68.89 ALTERATION IN SELF-CARE ABILITY: ICD-10-CM

## 2021-08-05 DIAGNOSIS — R41.82 ALTERED MENTAL STATUS, UNSPECIFIED ALTERED MENTAL STATUS TYPE: Primary | ICD-10-CM

## 2021-08-05 DIAGNOSIS — Z74.09 IMPAIRED FUNCTIONAL MOBILITY AND ACTIVITY TOLERANCE: ICD-10-CM

## 2021-08-05 PROBLEM — A49.9 UTI (URINARY TRACT INFECTION), BACTERIAL: Status: ACTIVE | Noted: 2021-08-05

## 2021-08-05 LAB
ALBUMIN SERPL-MCNC: 3.4 G/DL (ref 3.5–5.2)
ALBUMIN/GLOB SERPL: 1.4 G/DL
ALP SERPL-CCNC: 48 U/L (ref 39–117)
ALT SERPL W P-5'-P-CCNC: 10 U/L (ref 1–33)
AMORPH URATE CRY URNS QL MICRO: ABNORMAL /HPF
ANION GAP SERPL CALCULATED.3IONS-SCNC: 9 MMOL/L (ref 5–15)
AST SERPL-CCNC: 21 U/L (ref 1–32)
BACTERIA UR QL AUTO: ABNORMAL /HPF
BASOPHILS # BLD AUTO: 0.01 10*3/MM3 (ref 0–0.2)
BASOPHILS NFR BLD AUTO: 0.2 % (ref 0–1.5)
BILIRUB SERPL-MCNC: 0.2 MG/DL (ref 0–1.2)
BILIRUB UR QL STRIP: ABNORMAL
BUN SERPL-MCNC: 12 MG/DL (ref 8–23)
BUN/CREAT SERPL: 18.8 (ref 7–25)
CALCIUM SPEC-SCNC: 9.2 MG/DL (ref 8.6–10.5)
CHLORIDE SERPL-SCNC: 111 MMOL/L (ref 98–107)
CLARITY UR: ABNORMAL
CO2 SERPL-SCNC: 25 MMOL/L (ref 22–29)
COLOR UR: ABNORMAL
CREAT SERPL-MCNC: 0.64 MG/DL (ref 0.57–1)
CRP SERPL-MCNC: <0.3 MG/DL (ref 0–0.5)
D-LACTATE SERPL-SCNC: 1.1 MMOL/L (ref 0.5–2)
DEPRECATED RDW RBC AUTO: 62.2 FL (ref 37–54)
EOSINOPHIL # BLD AUTO: 0.05 10*3/MM3 (ref 0–0.4)
EOSINOPHIL NFR BLD AUTO: 0.9 % (ref 0.3–6.2)
ERYTHROCYTE [DISTWIDTH] IN BLOOD BY AUTOMATED COUNT: 18.5 % (ref 12.3–15.4)
ETHANOL UR QL: <0.01 %
GFR SERPL CREATININE-BSD FRML MDRD: 94 ML/MIN/1.73
GLOBULIN UR ELPH-MCNC: 2.5 GM/DL
GLUCOSE SERPL-MCNC: 107 MG/DL (ref 65–99)
GLUCOSE UR STRIP-MCNC: NEGATIVE MG/DL
HCT VFR BLD AUTO: 30.1 % (ref 34–46.6)
HGB BLD-MCNC: 9.5 G/DL (ref 12–15.9)
HGB UR QL STRIP.AUTO: NEGATIVE
HYALINE CASTS UR QL AUTO: ABNORMAL /LPF
IMM GRANULOCYTES # BLD AUTO: 0.04 10*3/MM3 (ref 0–0.05)
IMM GRANULOCYTES NFR BLD AUTO: 0.7 % (ref 0–0.5)
KETONES UR QL STRIP: ABNORMAL
LEUKOCYTE ESTERASE UR QL STRIP.AUTO: ABNORMAL
LYMPHOCYTES # BLD AUTO: 1.76 10*3/MM3 (ref 0.7–3.1)
LYMPHOCYTES NFR BLD AUTO: 32.4 % (ref 19.6–45.3)
MCH RBC QN AUTO: 29.7 PG (ref 26.6–33)
MCHC RBC AUTO-ENTMCNC: 31.6 G/DL (ref 31.5–35.7)
MCV RBC AUTO: 94.1 FL (ref 79–97)
MONOCYTES # BLD AUTO: 0.59 10*3/MM3 (ref 0.1–0.9)
MONOCYTES NFR BLD AUTO: 10.9 % (ref 5–12)
NEUTROPHILS NFR BLD AUTO: 2.98 10*3/MM3 (ref 1.7–7)
NEUTROPHILS NFR BLD AUTO: 54.9 % (ref 42.7–76)
NITRITE UR QL STRIP: NEGATIVE
NRBC BLD AUTO-RTO: 0 /100 WBC (ref 0–0.2)
PH UR STRIP.AUTO: 6 [PH] (ref 5–8)
PLATELET # BLD AUTO: 256 10*3/MM3 (ref 140–450)
PMV BLD AUTO: 11.6 FL (ref 6–12)
POTASSIUM SERPL-SCNC: 3.8 MMOL/L (ref 3.5–5.2)
PROCALCITONIN SERPL-MCNC: 0.02 NG/ML (ref 0–0.25)
PROT SERPL-MCNC: 5.9 G/DL (ref 6–8.5)
PROT UR QL STRIP: ABNORMAL
RBC # BLD AUTO: 3.2 10*6/MM3 (ref 3.77–5.28)
RBC # UR: ABNORMAL /HPF
REF LAB TEST METHOD: ABNORMAL
SODIUM SERPL-SCNC: 145 MMOL/L (ref 136–145)
SP GR UR STRIP: 1.02 (ref 1–1.03)
SQUAMOUS #/AREA URNS HPF: ABNORMAL /HPF
TROPONIN T SERPL-MCNC: <0.01 NG/ML (ref 0–0.03)
UROBILINOGEN UR QL STRIP: ABNORMAL
WBC # BLD AUTO: 5.43 10*3/MM3 (ref 3.4–10.8)
WBC UR QL AUTO: ABNORMAL /HPF

## 2021-08-05 PROCEDURE — 87077 CULTURE AEROBIC IDENTIFY: CPT | Performed by: EMERGENCY MEDICINE

## 2021-08-05 PROCEDURE — 87186 SC STD MICRODIL/AGAR DIL: CPT | Performed by: EMERGENCY MEDICINE

## 2021-08-05 PROCEDURE — 80053 COMPREHEN METABOLIC PANEL: CPT | Performed by: EMERGENCY MEDICINE

## 2021-08-05 PROCEDURE — 93010 ELECTROCARDIOGRAM REPORT: CPT | Performed by: INTERNAL MEDICINE

## 2021-08-05 PROCEDURE — P9612 CATHETERIZE FOR URINE SPEC: HCPCS

## 2021-08-05 PROCEDURE — 84145 PROCALCITONIN (PCT): CPT | Performed by: EMERGENCY MEDICINE

## 2021-08-05 PROCEDURE — 96365 THER/PROPH/DIAG IV INF INIT: CPT

## 2021-08-05 PROCEDURE — G0378 HOSPITAL OBSERVATION PER HR: HCPCS

## 2021-08-05 PROCEDURE — 87040 BLOOD CULTURE FOR BACTERIA: CPT | Performed by: EMERGENCY MEDICINE

## 2021-08-05 PROCEDURE — 93005 ELECTROCARDIOGRAM TRACING: CPT | Performed by: EMERGENCY MEDICINE

## 2021-08-05 PROCEDURE — 82077 ASSAY SPEC XCP UR&BREATH IA: CPT | Performed by: EMERGENCY MEDICINE

## 2021-08-05 PROCEDURE — 83605 ASSAY OF LACTIC ACID: CPT | Performed by: EMERGENCY MEDICINE

## 2021-08-05 PROCEDURE — 85025 COMPLETE CBC W/AUTO DIFF WBC: CPT | Performed by: EMERGENCY MEDICINE

## 2021-08-05 PROCEDURE — 81001 URINALYSIS AUTO W/SCOPE: CPT | Performed by: EMERGENCY MEDICINE

## 2021-08-05 PROCEDURE — 70450 CT HEAD/BRAIN W/O DYE: CPT

## 2021-08-05 PROCEDURE — 87086 URINE CULTURE/COLONY COUNT: CPT | Performed by: EMERGENCY MEDICINE

## 2021-08-05 PROCEDURE — 99284 EMERGENCY DEPT VISIT MOD MDM: CPT

## 2021-08-05 PROCEDURE — 84484 ASSAY OF TROPONIN QUANT: CPT | Performed by: EMERGENCY MEDICINE

## 2021-08-05 PROCEDURE — 86140 C-REACTIVE PROTEIN: CPT | Performed by: EMERGENCY MEDICINE

## 2021-08-05 PROCEDURE — 71045 X-RAY EXAM CHEST 1 VIEW: CPT

## 2021-08-05 PROCEDURE — 25010000002 CEFTRIAXONE PER 250 MG: Performed by: EMERGENCY MEDICINE

## 2021-08-05 RX ORDER — SODIUM CHLORIDE, SODIUM LACTATE, POTASSIUM CHLORIDE, CALCIUM CHLORIDE 600; 310; 30; 20 MG/100ML; MG/100ML; MG/100ML; MG/100ML
100 INJECTION, SOLUTION INTRAVENOUS CONTINUOUS
Status: DISCONTINUED | OUTPATIENT
Start: 2021-08-05 | End: 2021-08-07 | Stop reason: HOSPADM

## 2021-08-05 RX ORDER — SODIUM CHLORIDE 0.9 % (FLUSH) 0.9 %
10 SYRINGE (ML) INJECTION EVERY 12 HOURS SCHEDULED
Status: DISCONTINUED | OUTPATIENT
Start: 2021-08-05 | End: 2021-08-07 | Stop reason: HOSPADM

## 2021-08-05 RX ORDER — ALBUTEROL SULFATE 90 UG/1
2 AEROSOL, METERED RESPIRATORY (INHALATION) EVERY 4 HOURS PRN
Status: DISCONTINUED | OUTPATIENT
Start: 2021-08-05 | End: 2021-08-05 | Stop reason: SDUPTHER

## 2021-08-05 RX ORDER — FLUOXETINE HYDROCHLORIDE 20 MG/1
20 CAPSULE ORAL DAILY
Status: DISCONTINUED | OUTPATIENT
Start: 2021-08-06 | End: 2021-08-07 | Stop reason: HOSPADM

## 2021-08-05 RX ORDER — SODIUM CHLORIDE 0.9 % (FLUSH) 0.9 %
10 SYRINGE (ML) INJECTION AS NEEDED
Status: DISCONTINUED | OUTPATIENT
Start: 2021-08-05 | End: 2021-08-07 | Stop reason: HOSPADM

## 2021-08-05 RX ORDER — FAMOTIDINE 20 MG/1
20 TABLET, FILM COATED ORAL 2 TIMES DAILY
Status: DISCONTINUED | OUTPATIENT
Start: 2021-08-05 | End: 2021-08-07 | Stop reason: HOSPADM

## 2021-08-05 RX ORDER — MEGESTROL ACETATE 40 MG/ML
800 SUSPENSION ORAL DAILY
Status: DISCONTINUED | OUTPATIENT
Start: 2021-08-06 | End: 2021-08-07 | Stop reason: HOSPADM

## 2021-08-05 RX ORDER — ACETAMINOPHEN 325 MG/1
650 TABLET ORAL EVERY 4 HOURS PRN
Status: DISCONTINUED | OUTPATIENT
Start: 2021-08-05 | End: 2021-08-07 | Stop reason: HOSPADM

## 2021-08-05 RX ORDER — CYANOCOBALAMIN (VITAMIN B-12) 500 MCG
500 TABLET ORAL DAILY
Status: DISCONTINUED | OUTPATIENT
Start: 2021-08-06 | End: 2021-08-07 | Stop reason: HOSPADM

## 2021-08-05 RX ORDER — ONDANSETRON 2 MG/ML
4 INJECTION INTRAMUSCULAR; INTRAVENOUS EVERY 6 HOURS PRN
Status: DISCONTINUED | OUTPATIENT
Start: 2021-08-05 | End: 2021-08-07 | Stop reason: HOSPADM

## 2021-08-05 RX ORDER — ALBUTEROL SULFATE 2.5 MG/3ML
2.5 SOLUTION RESPIRATORY (INHALATION) EVERY 4 HOURS PRN
Status: DISCONTINUED | OUTPATIENT
Start: 2021-08-05 | End: 2021-08-07 | Stop reason: HOSPADM

## 2021-08-05 RX ADMIN — SODIUM CHLORIDE, POTASSIUM CHLORIDE, SODIUM LACTATE AND CALCIUM CHLORIDE 100 ML/HR: 600; 310; 30; 20 INJECTION, SOLUTION INTRAVENOUS at 23:59

## 2021-08-05 RX ADMIN — SODIUM CHLORIDE, PRESERVATIVE FREE 10 ML: 5 INJECTION INTRAVENOUS at 23:59

## 2021-08-05 RX ADMIN — CEFTRIAXONE 1 G: 1 INJECTION, POWDER, FOR SOLUTION INTRAMUSCULAR; INTRAVENOUS at 18:26
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
## 2021-08-05 NOTE — TELEPHONE ENCOUNTER
She will need a follow-up visit with Dr. Weaver for anything to be filled out, as we have not seen her since her discharge from the hospital or from [facility]; however, if she is acutely confused again, she needs to go to the ER for evaluation. Thank you!
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
## 2021-08-05 NOTE — TELEPHONE ENCOUNTER
ALON BULL HAS BEEN CALLED, GIVEN MESSAGE AND WILL CALL THE FAMILY TO HAVE THEM TO  GET APPOINTMENT SET UP.

## 2021-08-05 NOTE — ED PROVIDER NOTES
Subjective   Patient is 61 years old who came to the ER complaining of some ostomy issues but she appears to be altered and confused.      History limited by:  Mental status change  Altered Mental Status  Presenting symptoms: confusion and lethargy    Presenting symptoms: no behavior changes    Severity:  Moderate  Most recent episode:  Today  Episode history:  Single  Timing:  Constant  Progression:  Waxing and waning  Chronicity:  New  Context: recent illness    Context: not alcohol use, not dementia, not drug use, not head injury, not homeless, not nursing home resident and not recent change in medication    Associated symptoms: agitation, nausea and weakness    Associated symptoms: no abdominal pain, normal movement, no decreased appetite, no depression, no fever, no hallucinations, no headaches, no light-headedness, no suicidal behavior, no visual change and no vomiting        Review of Systems   Unable to perform ROS: Mental status change   Constitutional: Negative for decreased appetite and fever.   Gastrointestinal: Positive for nausea. Negative for abdominal pain and vomiting.   Neurological: Positive for weakness. Negative for light-headedness and headaches.   Psychiatric/Behavioral: Positive for agitation and confusion. Negative for hallucinations.       Past Medical History:   Diagnosis Date   • Anxiety    • Blood in urine    • Chest tightness    • Colon polyps    • COPD (chronic obstructive pulmonary disease) (CMS/HCC)    • Depression    • Emphysema/COPD (CMS/HCC)    • Hepatitis C    • Hypertension    • Injury of back    • Kidney stone    • Palpitation    • PONV (postoperative nausea and vomiting)    • Rectal cancer (CMS/HCC)        Allergies   Allergen Reactions   • Morphine GI Intolerance and Nausea And Vomiting     Other reaction(s): Vomiting         Past Surgical History:   Procedure Laterality Date   • CHOLECYSTECTOMY     • COLONOSCOPY  03/12/2015    normal   • COLOSTOMY N/A 4/23/2021    Procedure:  LAPAROSCOPIC DIVERTING COLOSTOMY;  Surgeon: Paulette Villegas MD;  Location: [facility] OR;  Service: General;  Laterality: N/A;   • FINGER SURGERY     • HYSTERECTOMY     • KIDNEY STONE SURGERY     • KIDNEY SURGERY     • OTHER SURGICAL HISTORY      POSSIBLE CARDIAC MONITOR (LOOP RECORDER?)--IMPLANTED AND EXPLANTED    • US GUIDED LYMPH NODE BIOPSY  4/30/2021       Family History   Problem Relation Age of Onset   • Heart disease Mother    • No Known Problems Father        Social History     Socioeconomic History   • Marital status:      Spouse name: Not on file   • Number of children: Not on file   • Years of education: Not on file   • Highest education level: Not on file   Tobacco Use   • Smoking status: Former Smoker     Packs/day: 0.25     Years: 45.00     Pack years: 11.25     Types: Cigarettes   • Smokeless tobacco: Never Used   Substance and Sexual Activity   • Alcohol use: No   • Drug use: No   • Sexual activity: Defer     Partners: Male           Objective   Physical Exam  Vitals and nursing note reviewed. Exam conducted with a chaperone present.   Constitutional:       General: She is awake.      Appearance: She is well-developed. She is ill-appearing.      Comments: Altered mental status   HENT:      Head: Normocephalic and atraumatic.      Comments: No nuchal rigidity  Eyes:      General: Lids are normal.      Conjunctiva/sclera: Conjunctivae normal.      Pupils: Pupils are equal, round, and reactive to light.   Cardiovascular:      Rate and Rhythm: Normal rate and regular rhythm.      Chest Wall: PMI is not displaced.      Pulses: Normal pulses.      Heart sounds: Normal heart sounds.   Pulmonary:      Effort: Pulmonary effort is normal.      Breath sounds: Normal breath sounds. No decreased breath sounds.   Abdominal:      General: Abdomen is flat. Bowel sounds are decreased.      Palpations: Abdomen is soft.      Tenderness: There is no abdominal tenderness.      Comments: Colostomy site appears  within normal limits there is no CVA tenderness abdomen is soft bowel sounds decreased   Musculoskeletal:         General: Normal range of motion.      Cervical back: Full passive range of motion without pain, normal range of motion and neck supple.   Skin:     General: Skin is warm and dry.      Capillary Refill: Capillary refill takes 2 to 3 seconds.   Neurological:      General: No focal deficit present.      Mental Status: She is disoriented and confused.      GCS: GCS eye subscore is 4. GCS verbal subscore is 5. GCS motor subscore is 6.      Cranial Nerves: Cranial nerves are intact.      Motor: Motor function is intact. No weakness or seizure activity.      Deep Tendon Reflexes: Reflexes are normal and symmetric.      Comments: No focal neurological deficits moving all 4 extremities following commands but appears confused.   Psychiatric:         Mood and Affect: Affect is flat.         Behavior: Behavior is agitated. Behavior is not combative. Behavior is cooperative.         Thought Content: Thought content does not include homicidal or suicidal ideation. Thought content does not include suicidal plan.         Procedures           ED Course  ED Course as of Aug 05 1739   Thu Aug 05, 2021   1551 Social service case management does inform me that APS is trying to get guardianship over this patient since she is acting inappropriately    [TS]   1648 Patient's colostomy site is leaking slightly    [TS]   1718 Patient with altered mental status and encephalopathy.  This could be medication related     [TS]   1720 This lady has history of colostomy possibly because of colon cancer.  She has been admitted in the past for not being able to care for herself encephalopathy.  On multiple medicines today she was sent to the ED for the same complaints and concerns.  She is confused.  There is no nuchal rigidity there is no focal lateralizing symptoms.  The patient is being evaluated by APS try to get guardianship over her  because she is not able to take care of herself.  Labs are pending.    [TS]   1730 Patient not able to take care of herself APS to try to get guardianship over her.  And will admit the patient to hospital for UTI encephalopathy which probably polypharmacy versus metabolic.    [TS]      ED Course User Index  [TS] Shahram Huff MD                                           MDM  Number of Diagnoses or Management Options  Diagnosis management comments: Differential Diagnosis:  I considered toxic-metabolic etiology, hypoglycemia, hyperglycemia, diabetic ketoacidosis, drug overdose, ethanol intoxication, thiamine deficiency, hypothermia, hyponatremia, hypernatremia, organ failure, liver failure, kidney failure, thyroid failure, adrenal failure, hypoxia, hypercarbia, ischemic stroke, intracranial bleed, subarachnoid hemorrhage, closed head injury, subdural hematoma, seizure activity, syncopal episode, infectious etiology, hypertensive encephalopathy, vasculitis, thrombotic thrombocytopenic purpura and disseminated intravascular coagulation as a possible cause of altered mental status in this patient. This is a partial list of diagnoses considered.              Amount and/or Complexity of Data Reviewed  Clinical lab tests: ordered and reviewed  Tests in the radiology section of CPT®: ordered and reviewed  Tests in the medicine section of CPT®: reviewed and ordered    Risk of Complications, Morbidity, and/or Mortality  Presenting problems: moderate  Diagnostic procedures: moderate  Management options: moderate        Final diagnoses:   Altered mental status, unspecified altered mental status type   Alteration in self-care ability   Acute UTI (urinary tract infection)   Polypharmacy       ED Disposition  ED Disposition     ED Disposition Condition Comment    Decision to Admit  Level of Care: Telemetry [5]   Diagnosis: Altered mental status, unspecified altered mental status type [[id number]]   Admitting Physician: GAIL FLOREZ  [[id number]]   Attending Physician: GAIL FLOREZ [[id number]]            No follow-up provider specified.       Medication List      No changes were made to your prescriptions during this visit.          Shahram Huff MD  08/05/21 1724       Shahram Huff MD  08/05/21 1725       Shahram Huff MD  08/05/21 1733
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
## 2021-08-05 NOTE — TELEPHONE ENCOUNTER
ATTEMPTED TO CALL ALON BULL WITH [employer] NUMBER WAS NOT REACHABLE.  [phone] ).  MESSAGE HAS BEEN LEFT  [phone]  FOR RETURN CALL FROM MRS BULL.

## 2021-08-06 VITALS
HEIGHT: 63 IN | DIASTOLIC BLOOD PRESSURE: 60 MMHG | BODY MASS INDEX: 34.55 KG/M2 | RESPIRATION RATE: 16 BRPM | WEIGHT: 195 LBS | TEMPERATURE: 98.3 F | OXYGEN SATURATION: 97 % | SYSTOLIC BLOOD PRESSURE: 126 MMHG | HEART RATE: 73 BPM

## 2021-08-06 LAB
ALBUMIN SERPL-MCNC: 3.1 G/DL (ref 3.5–5.2)
ALBUMIN/GLOB SERPL: 1.3 G/DL
ALP SERPL-CCNC: 44 U/L (ref 39–117)
ALT SERPL W P-5'-P-CCNC: 8 U/L (ref 1–33)
ANION GAP SERPL CALCULATED.3IONS-SCNC: 9 MMOL/L (ref 5–15)
AST SERPL-CCNC: 17 U/L (ref 1–32)
BASOPHILS # BLD AUTO: 0.01 10*3/MM3 (ref 0–0.2)
BASOPHILS NFR BLD AUTO: 0.3 % (ref 0–1.5)
BILIRUB SERPL-MCNC: 0.3 MG/DL (ref 0–1.2)
BUN SERPL-MCNC: 11 MG/DL (ref 8–23)
BUN/CREAT SERPL: 18.6 (ref 7–25)
CALCIUM SPEC-SCNC: 8.7 MG/DL (ref 8.6–10.5)
CHLORIDE SERPL-SCNC: 109 MMOL/L (ref 98–107)
CHOLEST SERPL-MCNC: 127 MG/DL (ref 0–200)
CO2 SERPL-SCNC: 25 MMOL/L (ref 22–29)
CREAT SERPL-MCNC: 0.59 MG/DL (ref 0.57–1)
DEPRECATED RDW RBC AUTO: 62.5 FL (ref 37–54)
EOSINOPHIL # BLD AUTO: 0.11 10*3/MM3 (ref 0–0.4)
EOSINOPHIL NFR BLD AUTO: 2.8 % (ref 0.3–6.2)
ERYTHROCYTE [DISTWIDTH] IN BLOOD BY AUTOMATED COUNT: 18.2 % (ref 12.3–15.4)
GFR SERPL CREATININE-BSD FRML MDRD: 104 ML/MIN/1.73
GLOBULIN UR ELPH-MCNC: 2.4 GM/DL
GLUCOSE SERPL-MCNC: 79 MG/DL (ref 65–99)
HBA1C MFR BLD: 5 % (ref 4.8–5.6)
HCT VFR BLD AUTO: 30.2 % (ref 34–46.6)
HDLC SERPL-MCNC: 41 MG/DL (ref 40–60)
HGB BLD-MCNC: 9.3 G/DL (ref 12–15.9)
IMM GRANULOCYTES # BLD AUTO: 0.03 10*3/MM3 (ref 0–0.05)
IMM GRANULOCYTES NFR BLD AUTO: 0.8 % (ref 0–0.5)
LDLC SERPL CALC-MCNC: 67 MG/DL (ref 0–100)
LDLC/HDLC SERPL: 1.6 {RATIO}
LYMPHOCYTES # BLD AUTO: 1.34 10*3/MM3 (ref 0.7–3.1)
LYMPHOCYTES NFR BLD AUTO: 34.4 % (ref 19.6–45.3)
MAGNESIUM SERPL-MCNC: 1.7 MG/DL (ref 1.6–2.4)
MCH RBC QN AUTO: 29.5 PG (ref 26.6–33)
MCHC RBC AUTO-ENTMCNC: 30.8 G/DL (ref 31.5–35.7)
MCV RBC AUTO: 95.9 FL (ref 79–97)
MONOCYTES # BLD AUTO: 0.52 10*3/MM3 (ref 0.1–0.9)
MONOCYTES NFR BLD AUTO: 13.4 % (ref 5–12)
NEUTROPHILS NFR BLD AUTO: 1.88 10*3/MM3 (ref 1.7–7)
NEUTROPHILS NFR BLD AUTO: 48.3 % (ref 42.7–76)
NRBC BLD AUTO-RTO: 0 /100 WBC (ref 0–0.2)
PLATELET # BLD AUTO: 208 10*3/MM3 (ref 140–450)
PMV BLD AUTO: 12.5 FL (ref 6–12)
POTASSIUM SERPL-SCNC: 3.3 MMOL/L (ref 3.5–5.2)
PROT SERPL-MCNC: 5.5 G/DL (ref 6–8.5)
RBC # BLD AUTO: 3.15 10*6/MM3 (ref 3.77–5.28)
SODIUM SERPL-SCNC: 143 MMOL/L (ref 136–145)
TRIGL SERPL-MCNC: 102 MG/DL (ref 0–150)
TSH SERPL DL<=0.05 MIU/L-ACNC: 2.25 UIU/ML (ref 0.27–4.2)
VLDLC SERPL-MCNC: 19 MG/DL (ref 5–40)
WBC # BLD AUTO: 3.89 10*3/MM3 (ref 3.4–10.8)

## 2021-08-06 PROCEDURE — 94799 UNLISTED PULMONARY SVC/PX: CPT

## 2021-08-06 PROCEDURE — 80061 LIPID PANEL: CPT | Performed by: FAMILY MEDICINE

## 2021-08-06 PROCEDURE — 83036 HEMOGLOBIN GLYCOSYLATED A1C: CPT | Performed by: FAMILY MEDICINE

## 2021-08-06 PROCEDURE — 97162 PT EVAL MOD COMPLEX 30 MIN: CPT

## 2021-08-06 PROCEDURE — 99214 OFFICE O/P EST MOD 30 MIN: CPT | Performed by: NURSE PRACTITIONER

## 2021-08-06 PROCEDURE — 25010000002 ONDANSETRON PER 1 MG: Performed by: FAMILY MEDICINE

## 2021-08-06 PROCEDURE — 97116 GAIT TRAINING THERAPY: CPT

## 2021-08-06 PROCEDURE — 84443 ASSAY THYROID STIM HORMONE: CPT | Performed by: FAMILY MEDICINE

## 2021-08-06 PROCEDURE — 85025 COMPLETE CBC W/AUTO DIFF WBC: CPT | Performed by: FAMILY MEDICINE

## 2021-08-06 PROCEDURE — 25010000002 CEFTRIAXONE PER 250 MG: Performed by: FAMILY MEDICINE

## 2021-08-06 PROCEDURE — 83735 ASSAY OF MAGNESIUM: CPT | Performed by: FAMILY MEDICINE

## 2021-08-06 PROCEDURE — G0378 HOSPITAL OBSERVATION PER HR: HCPCS

## 2021-08-06 PROCEDURE — 80053 COMPREHEN METABOLIC PANEL: CPT | Performed by: FAMILY MEDICINE

## 2021-08-06 PROCEDURE — 92610 EVALUATE SWALLOWING FUNCTION: CPT

## 2021-08-06 PROCEDURE — 97166 OT EVAL MOD COMPLEX 45 MIN: CPT

## 2021-08-06 PROCEDURE — 96375 TX/PRO/DX INJ NEW DRUG ADDON: CPT

## 2021-08-06 PROCEDURE — 96361 HYDRATE IV INFUSION ADD-ON: CPT

## 2021-08-06 RX ORDER — HYDROCODONE BITARTRATE AND ACETAMINOPHEN 5; 325 MG/1; MG/1
1 TABLET ORAL EVERY 6 HOURS PRN
Status: DISCONTINUED | OUTPATIENT
Start: 2021-08-06 | End: 2021-08-06

## 2021-08-06 RX ORDER — POTASSIUM CHLORIDE 750 MG/1
40 CAPSULE, EXTENDED RELEASE ORAL 2 TIMES DAILY WITH MEALS
Status: COMPLETED | OUTPATIENT
Start: 2021-08-06 | End: 2021-08-06

## 2021-08-06 RX ORDER — OXYCODONE HYDROCHLORIDE AND ACETAMINOPHEN 5; 325 MG/1; MG/1
1 TABLET ORAL EVERY 6 HOURS PRN
Status: DISCONTINUED | OUTPATIENT
Start: 2021-08-06 | End: 2021-08-07 | Stop reason: HOSPADM

## 2021-08-06 RX ORDER — FENTANYL 25 UG/H
1 PATCH TRANSDERMAL
Status: DISCONTINUED | OUTPATIENT
Start: 2021-08-06 | End: 2021-08-06

## 2021-08-06 RX ADMIN — FENTANYL 1 PATCH: 25 PATCH TRANSDERMAL at 12:11

## 2021-08-06 RX ADMIN — FLUOXETINE HYDROCHLORIDE 20 MG: 20 CAPSULE ORAL at 08:24

## 2021-08-06 RX ADMIN — FAMOTIDINE 20 MG: 20 TABLET, FILM COATED ORAL at 08:24

## 2021-08-06 RX ADMIN — HYDROCODONE BITARTRATE AND ACETAMINOPHEN 1 TABLET: 5; 325 TABLET ORAL at 11:19

## 2021-08-06 RX ADMIN — POTASSIUM CHLORIDE 40 MEQ: 750 CAPSULE, EXTENDED RELEASE ORAL at 17:00

## 2021-08-06 RX ADMIN — POTASSIUM CHLORIDE 40 MEQ: 750 CAPSULE, EXTENDED RELEASE ORAL at 08:24

## 2021-08-06 RX ADMIN — ONDANSETRON 4 MG: 2 INJECTION INTRAMUSCULAR; INTRAVENOUS at 00:08

## 2021-08-06 RX ADMIN — OXYCODONE HYDROCHLORIDE AND ACETAMINOPHEN 1 TABLET: 5; 325 TABLET ORAL at 18:44

## 2021-08-06 RX ADMIN — Medication 500 MCG: at 08:24

## 2021-08-06 RX ADMIN — CEFTRIAXONE 1 G: 1 INJECTION, POWDER, FOR SOLUTION INTRAMUSCULAR; INTRAVENOUS at 17:00

## 2021-08-06 RX ADMIN — SODIUM CHLORIDE, PRESERVATIVE FREE 10 ML: 5 INJECTION INTRAVENOUS at 08:27

## 2021-08-06 RX ADMIN — ACETAMINOPHEN 650 MG: 325 TABLET, FILM COATED ORAL at 04:52

## 2021-08-06 RX ADMIN — SODIUM CHLORIDE, POTASSIUM CHLORIDE, SODIUM LACTATE AND CALCIUM CHLORIDE 100 ML/HR: 600; 310; 30; 20 INJECTION, SOLUTION INTRAVENOUS at 11:19

## 2021-08-06 NOTE — ED NOTES
Report called to NAEL Vidal. Reviewed HPI and admitting dx. Reviewed all labs and diagnostic results. Reviewed IV access and meds given in the ED. Reviewed all VS- nurse made aware of BP 97/52; pt states that her BP normally runs low. Reviewed pertinent medical history and home meds. Pt to go to room 343 via wheelchair.       Paulette Fine RN  08/05/21 1955

## 2021-08-06 NOTE — CASE MANAGEMENT/SOCIAL WORK
Continued Stay Note   [facility]     Patient Name: Lenora Kathleen  MRN: [id number]  Today's Date: 8/6/2021    Admit Date: 8/5/2021    Discharge Plan     Row Name 08/06/21 1450       Plan    Plan  APS rep here to speak with patient. States that patient is now interested in SNF placement. YOANA spoke with patient who is requesting referral to [facility]. Xiomara notified with [facility]. Pending bed offer/ precert required. SW updated.        Discharge Codes    No documentation.             Lissa Jensen RN

## 2021-08-06 NOTE — PLAN OF CARE
Problem: Adult Inpatient Plan of Care  Goal: Plan of Care Review  Outcome: Ongoing, Progressing  Flowsheets (Taken 8/6/2021 0511)  Progress: no change  Outcome Summary: Pt admitted from ER this shift. A&Ox4, however seems confused at times and requires some reorienting and education. C/o abdominal pain, PRN Tylenol given and pt educated on MD orders to limit narcotic use for now. Colostomy site clean, dry, intact. VSS. Safety maintained.

## 2021-08-06 NOTE — PLAN OF CARE
Goal Outcome Evaluation:  Plan of Care Reviewed With: patient        Progress: no change  Outcome Summary: OT eval completed.  Pt alert and oriented x4.  Pt c/o chronic back pain 9/10 and weakness/fatigue.  She reports 2 recent falls, 1 d/t slipping on wet floor and other related to dizziness and posterior LOB into bathtub.  She recently had colon surgery and now has colostomy, recent C diff infection.  Pt is clearly and obviously depressed with the course of her life over the last 6 months.  She demo flat affect and was tearful at times during eval.  She was independent with donning shoes, SBA for toileting and grooming and demo good AROM in BUE.  She has generalized weakness and fatigues after about 5 min of activity.  Her balance was WFL during eval.  She had 1 mild LOB but self corrected.  OT will continue to work with pt to increase her safety and endurance for ADL and assist with developing a routine for her colostomy mgmt.  She would benefit from training from ostomy nurse as she reports difficulty maintaining seal around stoma with ostomy bag.  OT recommends home with HH at discharge.

## 2021-08-06 NOTE — PROGRESS NOTES
[facility] Medicine Services  INPATIENT PROGRESS NOTE    Length of Stay: 0  Date of Admission: 8/5/2021  Primary Care Physician: SAM Weaver MD    Subjective   Chief Complaint: Altered mental status.    HPI   Altered mental status resolved.  Discussed patient about chronic pain management.  Patient states she is on Percocet at home.  Patient denies any chest pain.  Patient not requiring oxygen.  Patient is oriented x3.    Review of Systems   Constitutional: Positive for activity change, appetite change and fatigue. Negative for chills and fever.   HENT: Negative for hearing loss, nosebleeds, tinnitus and trouble swallowing.    Eyes: Negative for visual disturbance.   Respiratory: Negative for cough, chest tightness, shortness of breath and wheezing.    Cardiovascular: Negative for chest pain, palpitations and leg swelling.   Gastrointestinal: Negative for abdominal distention, abdominal pain, blood in stool, constipation, diarrhea, nausea and vomiting.   Endocrine: Negative for cold intolerance, heat intolerance, polydipsia, polyphagia and polyuria.   Genitourinary: Negative for decreased urine volume, difficulty urinating, dysuria, flank pain, frequency and hematuria.   Musculoskeletal: Positive for arthralgias, gait problem and myalgias. Negative for joint swelling.   Skin: Negative for rash.   Allergic/Immunologic: Negative for immunocompromised state.   Neurological: Positive for weakness. Negative for dizziness, syncope, light-headedness and headaches.   Hematological: Negative for adenopathy. Does not bruise/bleed easily.   Psychiatric/Behavioral: Negative for confusion and sleep disturbance. The patient is not nervous/anxious.           All pertinent negatives and positives are as above. All other systems have been reviewed and are negative unless otherwise stated.     Objective    Temp:  [97.5 °F (36.4 °C)-98.7 °F (37.1 °C)] 98.7 °F (37.1 °C)  Heart Rate:  [68-81]  81  Resp:  [18] 18  BP: ()/(50-95) 118/68  No intake or output data in the 24 hours ending 08/06/21 1243  Physical Exam  Vitals and nursing note reviewed.   Constitutional:       Appearance: She is well-developed.   HENT:      Head: Normocephalic.   Eyes:      Conjunctiva/sclera: Conjunctivae normal.      Pupils: Pupils are equal, round, and reactive to light.   Neck:      Vascular: No JVD.   Cardiovascular:      Rate and Rhythm: Normal rate and regular rhythm.      Heart sounds: Normal heart sounds. No murmur heard.   No friction rub. No gallop.    Pulmonary:      Effort: Pulmonary effort is normal. No respiratory distress.      Breath sounds: Normal breath sounds. No wheezing or rales.   Chest:      Chest wall: No tenderness.   Abdominal:      General: Bowel sounds are normal. There is no distension.      Palpations: Abdomen is soft.      Tenderness: There is no abdominal tenderness. There is no guarding or rebound.      Comments: Obesity.  Colostomy bag in place left lower quadrant, no sign of leakage at this time.   Musculoskeletal:         General: No tenderness or deformity. Normal range of motion.      Cervical back: Neck supple.   Skin:     General: Skin is warm and dry.      Findings: No rash.   Neurological:      Mental Status: She is alert and oriented to person, place, and time.      Cranial Nerves: No cranial nerve deficit.      Motor: Weakness present. No abnormal muscle tone.      Coordination: Coordination abnormal.      Gait: Gait abnormal.      Deep Tendon Reflexes: Reflexes normal.   Psychiatric:         Behavior: Behavior normal.         Thought Content: Thought content normal.         Judgment: Judgment normal.      Results Review:  Lab Results (last 24 hours)     Procedure Component Value Units Date/Time    Urine Culture - Urine, Urine, Catheter In/Out [[id number]]  (Abnormal) Collected: 08/05/21 1704    Specimen: Urine, Catheter In/Out Updated: 08/06/21 1142     Urine Culture >100,000  CFU/mL Gram Negative Bacilli    Magnesium [[id number]]  (Normal) Collected: 08/06/21 0504    Specimen: Blood Updated: 08/06/21 0755     Magnesium 1.7 mg/dL     Hemoglobin A1c [[id number]]  (Normal) Collected: 08/06/21 0504    Specimen: Blood Updated: 08/06/21 0720     Hemoglobin A1C 5.00 %     Narrative:      Hemoglobin A1C Ranges:    Increased Risk for Diabetes  5.7% to 6.4%  Diabetes                     >= 6.5%  Diabetic Goal                < 7.0%    Comprehensive Metabolic Panel [[id number]]  (Abnormal) Collected: 08/06/21 0504    Specimen: Blood Updated: 08/06/21 0612     Glucose 79 mg/dL      BUN 11 mg/dL      Creatinine 0.59 mg/dL      Sodium 143 mmol/L      Potassium 3.3 mmol/L      Chloride 109 mmol/L      CO2 25.0 mmol/L      Calcium 8.7 mg/dL      Total Protein 5.5 g/dL      Albumin 3.10 g/dL      ALT (SGPT) 8 U/L      AST (SGOT) 17 U/L      Alkaline Phosphatase 44 U/L      Total Bilirubin 0.3 mg/dL      eGFR Non African Amer 104 mL/min/1.73      Globulin 2.4 gm/dL      A/G Ratio 1.3 g/dL      BUN/Creatinine Ratio 18.6     Anion Gap 9.0 mmol/L     Narrative:      GFR Normal >60  Chronic Kidney Disease <60  Kidney Failure <15      Lipid Panel [[id number]] Collected: 08/06/21 0504    Specimen: Blood Updated: 08/06/21 0612     Total Cholesterol 127 mg/dL      Triglycerides 102 mg/dL      HDL Cholesterol 41 mg/dL      LDL Cholesterol  67 mg/dL      VLDL Cholesterol 19 mg/dL      LDL/HDL Ratio 1.60    Narrative:      Cholesterol Reference Ranges  (U.S. Department of Health and Human Services ATP III Classifications)    Desirable          <200 mg/dL  Borderline High    200-239 mg/dL  High Risk          >240 mg/dL      Triglyceride Reference Ranges  (U.S. Department of Health and Human Services ATP III Classifications)    Normal           <150 mg/dL  Borderline High  150-199 mg/dL  High             200-499 mg/dL  Very High        >500 mg/dL    HDL Reference Ranges  (U.S. Department of Health and Human Services ATP  III Classifcations)    Low     <40 mg/dl (major risk factor for CHD)  High    >60 mg/dl ('negative' risk factor for CHD)        LDL Reference Ranges  (U.S. Department of Health and Human Services ATP III Classifcations)    Optimal          <100 mg/dL  Near Optimal     100-129 mg/dL  Borderline High  130-159 mg/dL  High             160-189 mg/dL  Very High        >189 mg/dL    TSH [[id number]]  (Normal) Collected: 08/06/21 0504    Specimen: Blood Updated: 08/06/21 0612     TSH 2.250 uIU/mL     CBC Auto Differential [[id number]]  (Abnormal) Collected: 08/06/21 0504    Specimen: Blood Updated: 08/06/21 0550     WBC 3.89 10*3/mm3      RBC 3.15 10*6/mm3      Hemoglobin 9.3 g/dL      Hematocrit 30.2 %      MCV 95.9 fL      MCH 29.5 pg      MCHC 30.8 g/dL      RDW 18.2 %      RDW-SD 62.5 fl      MPV 12.5 fL      Platelets 208 10*3/mm3      Neutrophil % 48.3 %      Lymphocyte % 34.4 %      Monocyte % 13.4 %      Eosinophil % 2.8 %      Basophil % 0.3 %      Immature Grans % 0.8 %      Neutrophils, Absolute 1.88 10*3/mm3      Lymphocytes, Absolute 1.34 10*3/mm3      Monocytes, Absolute 0.52 10*3/mm3      Eosinophils, Absolute 0.11 10*3/mm3      Basophils, Absolute 0.01 10*3/mm3      Immature Grans, Absolute 0.03 10*3/mm3      nRBC 0.0 /100 WBC     Blood Culture - Blood, Hand, Left [[id number]] Collected: 08/05/21 1639    Specimen: Blood from Hand, Left Updated: 08/05/21 1744           Cultures:  Urine Culture   Date Value Ref Range Status   08/05/2021 >100,000 CFU/mL Gram Negative Bacilli (A)  Preliminary       Radiology Data:    Imaging Results (Last 24 Hours)     ** No results found for the last 24 hours. **          Allergies   Allergen Reactions   • Morphine GI Intolerance and Nausea And Vomiting     Other reaction(s): Vomiting         Scheduled meds:   cefTRIAXone, 1 g, Intravenous, Q24H  famotidine, 20 mg, Oral, BID  fentaNYL, 1 patch, Transdermal, Q72H  FLUoxetine, 20 mg, Oral, Daily  megestrol, 800 mg, Oral,  Daily  potassium chloride, 40 mEq, Oral, BID With Meals  sodium chloride, 10 mL, Intravenous, Q12H  Vitamin B 12, 500 mcg, Oral, Daily        PRN meds:  •  acetaminophen  •  albuterol  •  HYDROcodone-acetaminophen  •  ondansetron  •  Pharmacy Consult - Pharmacy to dose  •  sodium chloride    Assessment/Plan       Primary squamous cell carcinoma of anal canal    Severe malnutrition (CMS/HCC)    AMS (altered mental status)    Polypharmacy    UTI (urinary tract infection), bacterial      Plan:  Altered mental status.  Improving. Probably polypharmacy.  Chest x-ray-No acute findings.  CT scan head-No acute intracranial process.    Hypokalemia . p.o. potassium.  Magnesium levels normal.     Anemia.    Hemoglobin stable.  No sign of acute bleed.     COPD.  Albuterol inhaler     UTI.  Rocephin antibiotics.     Depression/anxiety.  Prozac low-dose.     Chronic pain/neuropathy. Tylenol as needed.    Will put patient back on fentanyl patch at 25 mcg for now.  Add Norco as needed for now.  Hold Neurontin.  Hold Advil.     Anxiety.  Hold Xanax due to altered mental status.     Colostomy bag in place.  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     Nausea.  Pepcid.  Zofran as needed.     Nutrition.  Continue Megace.  Speech consult.     Deconditioning PT and OT consult.     Blood cultures pending.  Urine culture-gram-negative bacilli.  Covid-19-negative.    Discharge Plannin to 2 days.  Patient now refused rehab placement.  Patient will go home with home health.  Patient has been reported to APS.    Electronically signed by Tiago Bales MD, 21, 12:43 PM CDT.

## 2021-08-06 NOTE — PLAN OF CARE
Problem: Adult Inpatient Plan of Care  Goal: Plan of Care Review  Outcome: Ongoing, Progressing  Flowsheets (Taken 8/6/2021 1005)  Progress: (Eval) --  Plan of Care Reviewed With: (RN)  • patient  • other (see comments)  Outcome Summary:  SLP clinical bedside swallowing evaluation completed. For purposes of bedside swallowing evaluation this AM, oral [facility] exam revealed generalized weakness with minimal to mild fasciculations. Unable to tolerate fully upright posture during secondary to back pain. Pt was presented 1x pureed trial, 1x honey thick trial (limited given pt resistance to trials given dislike in taste), multiple thin liquid trials, and regular solid diet consistency trial. No noted concerns with oral prep, transit. Adequate mastication with regular solid without oral residue. No overt s/s of aspiration. Crying behaviors at end of eval given NPO status and difficulty with back pain. RN notified. RN to speak with MD to ensure pt is safe to resume PO diet.   RECS:   • Regular solid diet consistency with regular/thin liquid consistency  • meds whole with thin liquids  • general feeding/aspiration precautions  • RN to monitor for increased lung congestion. Continued SLP services are not warranted at this time. MD to reconsult if changes or new concerns arise. Thanks!

## 2021-08-06 NOTE — PLAN OF CARE
Goal Outcome Evaluation:  Plan of Care Reviewed With: patient           Outcome Summary: A&Ox4, VSS, patient complaints of pain, medicated per MAR, with relief. able to make needs known, bed in lowest position, call bell within reach, will continue to monitor.     Bedside report/neuro checks completed with NAEL Camacho. No changes noted.

## 2021-08-06 NOTE — CONSULTS
[facility]  INPATIENT OSTOMY CONSULTATION    Today's Date: 08/06/21    Patient Name: Lenora Kathleen  MRN: [id number]  [facility]: [id number]  PCP: SAM Weaver MD  Referring Provider: Tiago Bales MD  Attending Provider: Tiago Bales MD  Length of Stay: 0    SUBJECTIVE   Chief Complaint: Existing ostomy (Placed 04/23/2021 - Dr. Villegas, LLQ Diverting Loop Colostomy)    HPI: Lenora Kathleen, a 61 y.o.female, presents with a past medical history of COPD, hepatitis C and rectal cancer.  Patient presented to the ED with altered mental status.  There is concern for UTI or polypharmacy as cause of mental status.  Patient was admitted.  Inpatient ostomy services was consulted due to ostomy education and care.  Patient had diverting loop colostomy of the left lower quadrant placed on 04/23/2021 by Dr. Villegas.   She is well known to ostomy care due to multiple outpatient visits for education and care.  Patient also has APS case opened due to concern for patient to care for self.  Placement for nursing home for rehab care has been explored in the past as well.       Visit Dx:    ICD-10-CM ICD-9-CM   1. Altered mental status, unspecified altered mental status type  R41.82 780.97   2. Alteration in self-care ability  R68.89 799.3   3. Acute UTI (urinary tract infection)  N39.0 599.0   4. Polypharmacy  Z79.899 V58.69   5. Impaired functional mobility and activity tolerance  Z74.09 V49.89   6. Dysphagia, unspecified type  R13.10 787.20   7. Decreased activities of daily living (ADL)  Z78.9 V49.89     Patient Active Problem List   Diagnosis   • Primary squamous cell carcinoma of anal canal   • Current every day smoker   • Rectovaginal fistula   • Severe malnutrition (CMS/HCC)   • Prediabetes   • Anxiety   • Enterocolitis   • Dehydration   • Hypotension due to hypovolemia   • Encephalopathy   • Ataxia   • Self-care deficit   • Anemia of chronic disease   • Diarrhea   • Hypokalemia   • Clostridium difficile  enterocolitis   • Metabolic encephalopathy due to C. difficile colitis and hypotension.   • AMS (altered mental status)   • Polypharmacy   • UTI (urinary tract infection), bacterial       History:   Past Medical History:   Diagnosis Date   • Anxiety    • Blood in urine    • Chest tightness    • Colon polyps    • COPD (chronic obstructive pulmonary disease) (CMS/HCC)    • Depression    • Emphysema/COPD (CMS/HCC)    • Hepatitis C    • Hypertension    • Injury of back    • Kidney stone    • Palpitation    • PONV (postoperative nausea and vomiting)    • Rectal cancer (CMS/HCC)      Past Surgical History:   Procedure Laterality Date   • CHOLECYSTECTOMY     • COLONOSCOPY  03/12/2015    normal   • COLOSTOMY N/A 4/23/2021    Procedure: LAPAROSCOPIC DIVERTING COLOSTOMY;  Surgeon: Paulette Villegas MD;  Location: [facility];  Service: General;  Laterality: N/A;   • FINGER SURGERY     • HYSTERECTOMY     • KIDNEY STONE SURGERY     • KIDNEY SURGERY     • OTHER SURGICAL HISTORY      POSSIBLE CARDIAC MONITOR (LOOP RECORDER?)--IMPLANTED AND EXPLANTED    • US GUIDED LYMPH NODE BIOPSY  4/30/2021     Social History     Socioeconomic History   • Marital status:      Spouse name: Not on file   • Number of children: Not on file   • Years of education: Not on file   • Highest education level: Not on file   Tobacco Use   • Smoking status: Former Smoker     Packs/day: 0.25     Years: 45.00     Pack years: 11.25     Types: Cigarettes   • Smokeless tobacco: Never Used   Substance and Sexual Activity   • Alcohol use: No   • Drug use: No   • Sexual activity: Defer     Partners: Male     Family History   Problem Relation Age of Onset   • Heart disease Mother    • No Known Problems Father      Allergies:  Allergies   Allergen Reactions   • Morphine GI Intolerance and Nausea And Vomiting     Other reaction(s): Vomiting         Medications:    Current Facility-Administered Medications:   •  acetaminophen (TYLENOL) tablet 650 mg, 650 mg,  Oral, Q4H PRN, Tiago Bales MD, 650 mg at 08/06/21 0452  •  albuterol (PROVENTIL) nebulizer solution 0.083% 2.5 mg/3mL, 2.5 mg, Nebulization, Q4H PRN, Tiago Bales MD  •  cefTRIAXone (ROCEPHIN) 1 g/100 mL 0.9% NS (MBP), 1 g, Intravenous, Q24H, Tiago Bales MD  •  famotidine (PEPCID) tablet 20 mg, 20 mg, Oral, BID, Tiago Bales MD, 20 mg at 08/06/21 0824  •  fentaNYL (DURAGESIC) 25 MCG/HR patch 1 patch, 1 patch, Transdermal, Q72H, Tiago Bales MD, 1 patch at 08/06/21 1211  •  FLUoxetine (PROzac) capsule 20 mg, 20 mg, Oral, Daily, Tiago Bales MD, 20 mg at 08/06/21 0824  •  HYDROcodone-acetaminophen (NORCO) 5-325 MG per tablet 1 tablet, 1 tablet, Oral, Q6H PRN, Tiago Bales MD, 1 tablet at 08/06/21 1119  •  lactated ringers infusion, 100 mL/hr, Intravenous, Continuous, Tiago Bales MD, Last Rate: 100 mL/hr at 08/06/21 1119, 100 mL/hr at 08/06/21 1119  •  megestrol (MEGACE) 40 MG/ML suspension 800 mg, 800 mg, Oral, Daily, Tiago Bales MD  •  ondansetron (ZOFRAN) injection 4 mg, 4 mg, Intravenous, Q6H PRN, Tiago Bales MD, 4 mg at 08/06/21 0008  •  Pharmacy Consult - Pharmacy to dose, , Does not apply, Continuous PRN, Tiago Bales MD  •  potassium chloride (MICRO-K) CR capsule 40 mEq, 40 mEq, Oral, BID With Meals, Tiago Bales MD, 40 mEq at 08/06/21 0824  •  sodium chloride 0.9 % flush 10 mL, 10 mL, Intravenous, Q12H, Tiago Bales MD, 10 mL at 08/06/21 0827  •  sodium chloride 0.9 % flush 10 mL, 10 mL, Intravenous, PRN, Tiago Bales MD  •  Vitamin B 12 tablet tablet 500 mcg, 500 mcg, Oral, Daily, Tiago Bales MD, 500 mcg at 08/06/21 0824    Review of Systems:  Constitutional: Positive for fatigue. Negative for chills and fever.   HENT: Negative for rhinorrhea and sore throat.    Respiratory: Negative for cough and shortness of breath.    Cardiovascular: Negative for chest pain and palpitations.   Gastrointestinal: Positive for abdominal pain and diarrhea. Negative for nausea  "and vomiting.   Genitourinary: Negative for frequency and urgency.   Musculoskeletal: Positive for arthralgias, back pain and myalgias.   Skin: Negative for color change and wound.   Neurological: Positive for weakness. Negative for dizziness.   Psychiatric/Behavioral: Positive for confusion and decreased concentration. Negative for agitation and behavioral problems.          OBJECTIVE     Vitals:    08/06/21 0809   BP: 118/68   Pulse: 81   Resp: 18   Temp: 98.7 °F (37.1 °C)   SpO2: 96%       PHYSICAL EXAM  Physical Exam  Vitals and nursing note reviewed.   Constitutional:       General: She is awake.      Appearance: She is obese.      Comments: BMI: 34.54   HENT:      Head: Normocephalic and atraumatic.   Eyes:      General: Lids are normal. Gaze aligned appropriately.   Cardiovascular:      Rate and Rhythm: Normal rate and regular rhythm.   Pulmonary:      Effort: Pulmonary effort is normal. No respiratory distress.   Abdominal:      General: There is no distension.      Tenderness: There is generalized abdominal tenderness.      Comments: Ostomy site is clean dry and intact.  Patient has a 2 piece appliance in place.  Patient states she does not remember when it was changed last.       Musculoskeletal:      Cervical back: Normal range of motion and neck supple.   Skin:     General: Skin is warm and dry.   Neurological:      Mental Status: She is oriented to person, place, and time. She is confused.      Comments: She seems to get confused during conversation.  She states she is \"foggy\" due to chemo and radiation.     Psychiatric:         Mood and Affect: Affect is flat.      Behavior: Behavior is cooperative.      Comments: Patient is frustrated because of her lack of pain medication.         Results Review:  Lab Results (last 48 hours)     Procedure Component Value Units Date/Time    Urine Culture - Urine, Urine, Catheter In/Out [[id number]]  (Abnormal) Collected: 08/05/21 1704    Specimen: Urine, Catheter " In/Out Updated: 08/06/21 1142     Urine Culture >100,000 CFU/mL Gram Negative Bacilli    Magnesium [[id number]]  (Normal) Collected: 08/06/21 0504    Specimen: Blood Updated: 08/06/21 0755     Magnesium 1.7 mg/dL     Hemoglobin A1c [[id number]]  (Normal) Collected: 08/06/21 0504    Specimen: Blood Updated: 08/06/21 0720     Hemoglobin A1C 5.00 %     Narrative:      Hemoglobin A1C Ranges:    Increased Risk for Diabetes  5.7% to 6.4%  Diabetes                     >= 6.5%  Diabetic Goal                < 7.0%    Comprehensive Metabolic Panel [[id number]]  (Abnormal) Collected: 08/06/21 0504    Specimen: Blood Updated: 08/06/21 0612     Glucose 79 mg/dL      BUN 11 mg/dL      Creatinine 0.59 mg/dL      Sodium 143 mmol/L      Potassium 3.3 mmol/L      Chloride 109 mmol/L      CO2 25.0 mmol/L      Calcium 8.7 mg/dL      Total Protein 5.5 g/dL      Albumin 3.10 g/dL      ALT (SGPT) 8 U/L      AST (SGOT) 17 U/L      Alkaline Phosphatase 44 U/L      Total Bilirubin 0.3 mg/dL      eGFR Non African Amer 104 mL/min/1.73      Globulin 2.4 gm/dL      A/G Ratio 1.3 g/dL      BUN/Creatinine Ratio 18.6     Anion Gap 9.0 mmol/L     Narrative:      GFR Normal >60  Chronic Kidney Disease <60  Kidney Failure <15      Lipid Panel [[id number]] Collected: 08/06/21 0504    Specimen: Blood Updated: 08/06/21 0612     Total Cholesterol 127 mg/dL      Triglycerides 102 mg/dL      HDL Cholesterol 41 mg/dL      LDL Cholesterol  67 mg/dL      VLDL Cholesterol 19 mg/dL      LDL/HDL Ratio 1.60    Narrative:      Cholesterol Reference Ranges  (U.S. Department of Health and Human Services ATP III Classifications)    Desirable          <200 mg/dL  Borderline High    200-239 mg/dL  High Risk          >240 mg/dL      Triglyceride Reference Ranges  (U.S. Department of Health and Human Services ATP III Classifications)    Normal           <150 mg/dL  Borderline High  150-199 mg/dL  High             200-499 mg/dL  Very High        >500 mg/dL    HDL Reference  Ranges  (U.S. Department of Health and Human Services ATP III Classifcations)    Low     <40 mg/dl (major risk factor for CHD)  High    >60 mg/dl ('negative' risk factor for CHD)        LDL Reference Ranges  (U.S. Department of Health and Human Services ATP III Classifcations)    Optimal          <100 mg/dL  Near Optimal     100-129 mg/dL  Borderline High  130-159 mg/dL  High             160-189 mg/dL  Very High        >189 mg/dL    TSH [[id number]]  (Normal) Collected: 08/06/21 0504    Specimen: Blood Updated: 08/06/21 0612     TSH 2.250 uIU/mL     CBC Auto Differential [[id number]]  (Abnormal) Collected: 08/06/21 0504    Specimen: Blood Updated: 08/06/21 0550     WBC 3.89 10*3/mm3      RBC 3.15 10*6/mm3      Hemoglobin 9.3 g/dL      Hematocrit 30.2 %      MCV 95.9 fL      MCH 29.5 pg      MCHC 30.8 g/dL      RDW 18.2 %      RDW-SD 62.5 fl      MPV 12.5 fL      Platelets 208 10*3/mm3      Neutrophil % 48.3 %      Lymphocyte % 34.4 %      Monocyte % 13.4 %      Eosinophil % 2.8 %      Basophil % 0.3 %      Immature Grans % 0.8 %      Neutrophils, Absolute 1.88 10*3/mm3      Lymphocytes, Absolute 1.34 10*3/mm3      Monocytes, Absolute 0.52 10*3/mm3      Eosinophils, Absolute 0.11 10*3/mm3      Basophils, Absolute 0.01 10*3/mm3      Immature Grans, Absolute 0.03 10*3/mm3      nRBC 0.0 /100 WBC     Blood Culture - Blood, Hand, Left [[id number]] Collected: 08/05/21 1639    Specimen: Blood from Hand, Left Updated: 08/05/21 1744    Urinalysis, Microscopic Only - Urine, Catheter In/Out [[id number]]  (Abnormal) Collected: 08/05/21 1704    Specimen: Urine, Catheter In/Out Updated: 08/05/21 1729     RBC, UA None Seen /HPF      WBC, UA Too Numerous to Count /HPF      Bacteria, UA 3+ /HPF      Squamous Epithelial Cells, UA 3-6 /HPF      Hyaline Casts, UA 0-2 /LPF      Amorphous Crystals, UA Small/1+ /HPF      Methodology Manual Light Microscopy    Urinalysis With Culture If Indicated - Urine, Catheter In/Out [[id number]]  (Abnormal)  "Collected: 08/05/21 1704    Specimen: Urine, Catheter In/Out Updated: 08/05/21 1725     Color, UA Dark Yellow     Appearance, UA Cloudy     pH, UA 6.0     Specific Gravity, UA 1.019     Glucose, UA Negative     Ketones, UA Trace     Bilirubin, UA Small (1+)     Blood, UA Negative     Protein, UA 30 mg/dL (1+)     Leuk Esterase, UA Large (3+)     Nitrite, UA Negative     Urobilinogen, UA 1.0 E.U./dL    Procalcitonin [[id number]]  (Normal) Collected: 08/05/21 1639    Specimen: Blood Updated: 08/05/21 1723     Procalcitonin 0.02 ng/mL     Narrative:      As a Marker for Sepsis (Non-Neonates):     1. <0.5 ng/mL represents a low risk of severe sepsis and/or septic shock.  2. >2 ng/mL represents a high risk of severe sepsis and/or septic shock.    As a Marker for Lower Respiratory Tract Infections that require antibiotic therapy:  PCT on Admission     Antibiotic Therapy             6-12 Hrs later  >0.5                          Strongly Recommended            >0.25 - <0.5             Recommended  0.1 - 0.25                  Discouraged                       Remeasure/reassess PCT  <0.1                         Strongly Discouraged         Remeasure/reassess PCT      As 28 day mortality risk marker: \"Change in Procalcitonin Result\" (>80% or <=80%) if Day 0 (or Day 1) and Day 4 values are available. Refer to http://www.deskwolfs-pct-calculator.com/    Change in PCT <=80 %   A decrease of PCT levels below or equal to 80% defines a positive change in PCT test result representing a higher risk for 28-day all-cause mortality of patients diagnosed with severe sepsis or septic shock.    Change in PCT >80 %   A decrease of PCT levels of more than 80% defines a negative change in PCT result representing a lower risk for 28-day all-cause mortality of patients diagnosed with severe sepsis or septic shock.                Comprehensive Metabolic Panel [[id number]]  (Abnormal) Collected: 08/05/21 1639    Specimen: Blood Updated: 08/05/21 1722 "     Glucose 107 mg/dL      BUN 12 mg/dL      Creatinine 0.64 mg/dL      Sodium 145 mmol/L      Potassium 3.8 mmol/L      Chloride 111 mmol/L      CO2 25.0 mmol/L      Calcium 9.2 mg/dL      Total Protein 5.9 g/dL      Albumin 3.40 g/dL      ALT (SGPT) 10 U/L      AST (SGOT) 21 U/L      Alkaline Phosphatase 48 U/L      Total Bilirubin 0.2 mg/dL      eGFR Non African Amer 94 mL/min/1.73      Globulin 2.5 gm/dL      A/G Ratio 1.4 g/dL      BUN/Creatinine Ratio 18.8     Anion Gap 9.0 mmol/L     Narrative:      GFR Normal >60  Chronic Kidney Disease <60  Kidney Failure <15      C-reactive Protein [[id number]]  (Normal) Collected: 08/05/21 1639    Specimen: Blood Updated: 08/05/21 1719     C-Reactive Protein <0.30 mg/dL     Ethanol [[id number]] Collected: 08/05/21 1639    Specimen: Blood Updated: 08/05/21 1717     Ethanol % <0.010 %     Narrative:      Not for legal purposes. Chain of Custody not followed.     Troponin [[id number]]  (Normal) Collected: 08/05/21 1639    Specimen: Blood Updated: 08/05/21 1716     Troponin T <0.010 ng/mL     Narrative:      Troponin T Reference Range:  <= 0.03 ng/mL-   Negative for AMI  >0.03 ng/mL-     Abnormal for myocardial necrosis.  Clinicians would have to utilize clinical acumen, EKG, Troponin and serial changes to determine if it is an Acute Myocardial Infarction or myocardial injury due to an underlying chronic condition.       Results may be falsely decreased if patient taking Biotin.      Lactic Acid, Plasma [[id number]]  (Normal) Collected: 08/05/21 1603    Specimen: Blood Updated: 08/05/21 1635     Lactate 1.1 mmol/L     Blood Culture - Blood, Hand, Left [[id number]] Collected: 08/05/21 1600    Specimen: Blood from Hand, Left Updated: 08/05/21 1618    CBC & Differential [[id number]]  (Abnormal) Collected: 08/05/21 1603    Specimen: Blood Updated: 08/05/21 1617    Narrative:      The following orders were created for panel order CBC & Differential.  Procedure                                Abnormality         Status                     ---------                               -----------         ------                     CBC Auto Differential[[id number]]        Abnormal            Final result                 Please view results for these tests on the individual orders.    CBC Auto Differential [[id number]]  (Abnormal) Collected: 08/05/21 1603    Specimen: Blood Updated: 08/05/21 1617     WBC 5.43 10*3/mm3      RBC 3.20 10*6/mm3      Hemoglobin 9.5 g/dL      Hematocrit 30.1 %      MCV 94.1 fL      MCH 29.7 pg      MCHC 31.6 g/dL      RDW 18.5 %      RDW-SD 62.2 fl      MPV 11.6 fL      Platelets 256 10*3/mm3      Neutrophil % 54.9 %      Lymphocyte % 32.4 %      Monocyte % 10.9 %      Eosinophil % 0.9 %      Basophil % 0.2 %      Immature Grans % 0.7 %      Neutrophils, Absolute 2.98 10*3/mm3      Lymphocytes, Absolute 1.76 10*3/mm3      Monocytes, Absolute 0.59 10*3/mm3      Eosinophils, Absolute 0.05 10*3/mm3      Basophils, Absolute 0.01 10*3/mm3      Immature Grans, Absolute 0.04 10*3/mm3      nRBC 0.0 /100 WBC         Imaging Results (Last 72 Hours)     Procedure Component Value Units Date/Time    CT Head Without Contrast [[id number]] Collected: 08/05/21 1727     Updated: 08/05/21 1732    Narrative:      CT BRAIN without contrast dated 8/5/2021 5:22 PM CDT     HISTORY: Mental status changes of unknown cause.     COMPARISON: None      DOSE LENGTH PRODUCT: 660 mGy cm. Automated exposure control was utilized  to diminish patient radiation dose.     In order to have a CT radiation dose as low as reasonably achievable,  Automated Exposure Control was utilized for adjustment of the mA and/or  KV according to patient size.     TECHNIQUE: Serial axial tomographic images of the brain were obtained  without the use of intravenous contrast.      FINDINGS:   The midline structures are nondisplaced. The ventricles and basilar  cisterns are normal in size and configuration. There is no evidence  of  intracranial hemorrhage or mass-effect. The gray-white matter  differentiation is maintained. The sulci have a normal configuration,  and there are no abnormal extra-axial fluid collections. The structures  of the posterior fossa are unremarkable.      The included orbits and their contents are unremarkable. The visualized  paranasal sinuses, mastoid air cells and middle ear cavities are clear.  The visualized osseous structures and overlying soft tissues of the  skull and face are unremarkable.        Impression:      1. No acute intracranial process.        This report was finalized on 08/05/2021 17:29 by Dr. Isidro Jordan MD.    XR Chest 1 View [[id number]] Collected: 08/05/21 1618     Updated: 08/05/21 1621    Narrative:      EXAM: XR CHEST 1 VW-     INDICATION: Altered mental status     COMPARISON: None available.     FINDINGS:     Cardiac silhouette is normal size. No pleural effusion or pneumothorax.  No focal airspace opacity. No acute osseous finding.       Impression:         No acute findings.  This report was finalized on 08/05/2021 16:18 by Dr. Patrick Farrell MD.             ASSESSMENT/PLAN       Examination and evaluation of ostomy was performed.    DIAGNOSIS:   Colostomy Present  Ostomy care and education  Polypharmacy  Altered mental status    PLAN:     Start     Ordered    08/06/21 1402  Assess Stoma  Every Shift      08/06/21 1401    08/06/21 1402  Stoma Care - Change Appliance  Weekly      08/06/21 1401    08/06/21 1402  Empty Pouch When 1/3 Full  Per Order Details     Comments: When 1/3 Full    08/06/21 1401    Unscheduled  Empty Pouch As Needed  As Needed      08/06/21 1401    Unscheduled  Change Pouch Immediately if Leaking  As Needed      08/06/21 1401                  Discussed findings and treatment plan including risks, benefits, and treatment options with patient in detail. Patient agreed with treatment plan.      This document has been electronically signed by Yelitza Ashraf  APRN on 8/6/2021 13:58 CDT

## 2021-08-06 NOTE — THERAPY EVALUATION
Acute Care - Occupational Therapy Initial Evaluation  [facility]     Patient Name: Lenora Kathleen  : 1960  MRN: [id number]  Today's Date: 2021  Onset of Illness/Injury or Date of Surgery: 21  Date of Referral to OT: 21  Referring Physician: Dr. Bales    Admit Date: 2021       ICD-10-CM ICD-9-CM   1. Altered mental status, unspecified altered mental status type  R41.82 780.97   2. Alteration in self-care ability  R68.89 799.3   3. Acute UTI (urinary tract infection)  N39.0 599.0   4. Polypharmacy  Z79.899 V58.69   5. Impaired functional mobility and activity tolerance  Z74.09 V49.89   6. Dysphagia, unspecified type  R13.10 787.20   7. Decreased activities of daily living (ADL)  Z78.9 V49.89     Patient Active Problem List   Diagnosis   • Primary squamous cell carcinoma of anal canal   • Current every day smoker   • Rectovaginal fistula   • Severe malnutrition (CMS/HCC)   • Prediabetes   • Anxiety   • Enterocolitis   • Dehydration   • Hypotension due to hypovolemia   • Encephalopathy   • Ataxia   • Self-care deficit   • Anemia of chronic disease   • Diarrhea   • Hypokalemia   • Clostridium difficile enterocolitis   • Metabolic encephalopathy due to C. difficile colitis and hypotension.   • AMS (altered mental status)   • Polypharmacy   • UTI (urinary tract infection), bacterial     Past Medical History:   Diagnosis Date   • Anxiety    • Blood in urine    • Chest tightness    • Colon polyps    • COPD (chronic obstructive pulmonary disease) (CMS/HCC)    • Depression    • Emphysema/COPD (CMS/HCC)    • Hepatitis C    • Hypertension    • Injury of back    • Kidney stone    • Palpitation    • PONV (postoperative nausea and vomiting)    • Rectal cancer (CMS/HCC)      Past Surgical History:   Procedure Laterality Date   • CHOLECYSTECTOMY     • COLONOSCOPY  2015    normal   • COLOSTOMY N/A 2021    Procedure: LAPAROSCOPIC DIVERTING COLOSTOMY;  Surgeon: Paulette Villegas MD;  Location:   [facility] OR;  Service: General;  Laterality: N/A;   • FINGER SURGERY     • HYSTERECTOMY     • KIDNEY STONE SURGERY     • KIDNEY SURGERY     • OTHER SURGICAL HISTORY      POSSIBLE CARDIAC MONITOR (LOOP RECORDER?)--IMPLANTED AND EXPLANTED    • US GUIDED LYMPH NODE BIOPSY  4/30/2021            OT ASSESSMENT FLOWSHEET (last 12 hours)      OT Evaluation and Treatment     Row Name 08/06/21 0907                   OT Time and Intention    Subjective Information  complains of;pain;nausea/vomiting;weakness;fatigue  -        Document Type  evaluation  -AC        Mode of Treatment  occupational therapy  -           General Information    Patient Profile Reviewed  yes  -AC        Onset of Illness/Injury or Date of Surgery  08/05/21  -        Referring Physician  Dr. Bales  -        General Observations of Patient  sidelying R in bed, no apparent distress  -        Prior Level of Function  independent:;all household mobility;gait;transfer;bed mobility;ADL's;home management;cleaning;cooking;driving;shopping  -        Equipment Currently Used at Home  colostomy/ostomy supplies;shower chair  -AC        Pertinent History of Current Functional Problem  AMS, h/o colon ca, C diff in July 2021; Dx: AMS likely d/t polypharmacy   -AC        Existing Precautions/Restrictions  (S) fall c diff  -AC        Barriers to Rehab  physical barrier;ineffective coping  -AC           Living Environment    Current Living Arrangements  home/apartment/condo tub shower  -AC        Home Accessibility  wheelchair accessible  -AC        Lives With  alone  -AC           Sensory    Additional Documentation  Sensory Assessment (Somatosensory) (Group)  -AC           Cognition    Affect/Mental Status (Cognitive)  sad/depressed affect;flat/blunted affect  -AC        Orientation Status (Cognition)  oriented x 4 time with choices  -AC        Follows Commands (Cognition)  WFL  -AC        Personal Safety Interventions  fall prevention program maintained;gait  belt;muscle strengthening facilitated;nonskid shoes/slippers when out of bed;supervised activity  -           Pain Assessment    Additional Documentation  Pain Scale: Numbers Pre/Post-Treatment (Group)  -           Pain Scale: Numbers Pre/Post-Treatment    Pretreatment Pain Rating  9/10  -        Pain Location  back  -        Pre/Posttreatment Pain Comment  chronic  -        Pain Intervention(s)  Repositioned;Ambulation/increased activity  -           Range of Motion Comprehensive    Comment, General Range of Motion  WFL AROM BUE  -           Strength Comprehensive (MMT)    Comment, General Manual Muscle Testing (MMT) Assessment  functionally 4/5 BUE  -           Bed Mobility    Bed Mobility  sidelying-sit;sit-supine  -        Supine-Sit [place] (Bed Mobility)  standby assist  -        Sit-Supine [place] (Bed Mobility)  standby assist  -        Assistive Device (Bed Mobility)  bed rails  -           Functional Mobility    Functional Mobility- Ind. Level  contact guard assist  -        Functional Mobility- Comment  around room, BR, back to bed  -           Transfer Assessment/Treatment    Transfers  sit-stand transfer;stand-sit transfer;toilet transfer  -           Transfers    Sit-Stand [place] (Transfers)  standby assist  -        Stand-Sit [place] (Transfers)  standby assist  -        [place] Level (Toilet Transfer)  standby assist  -        Assistive Device (Toilet Transfer)  commode  -           Toilet Transfer    Type (Toilet Transfer)  sit-stand;stand-sit  -           Safety Issues, Functional Mobility    Impairments Affecting Function (Mobility)  balance;endurance/activity tolerance;strength  -           Balance    Balance Assessment  sitting static balance;sitting dynamic balance;standing static balance;standing dynamic balance  -        Static Sitting Balance  WFL;supported  -        Dynamic Sitting Balance  WFL  -        Static  Standing Balance  WFL;unsupported  -AC        Dynamic Standing Balance  WFL;supported  -AC        Comment, Balance  occasional LUE support while ambulating, 1 mild LOB while walking, pt self recovered  -AC           Activities of Daily Living    BADL Assessment/Intervention  lower body dressing;grooming;toileting  -AC           Lower Body Dressing Assessment/Training    [place] Level (Lower Body Dressing)  don;doff;shoes/slippers  -AC        Position (Lower Body Dressing)  edge of bed sitting  -AC           Grooming Assessment/Training    [place] Level (Grooming)  wash face, hands;standby assist  -AC        Position (Grooming)  sink side  -AC           Toileting Assessment/Training    [place] Level (Toileting)  toileting skills;adjust/manage clothing;change pad/brief;perform perineal hygiene;standby assist;set up  -AC        Assistive Devices (Toileting)  commode  -AC        Position (Toileting)  unsupported sitting;unsupported standing  -AC           BADL Safety/Performance    Impairments, BADL Safety/Performance  balance;endurance/activity tolerance;pain;strength  -AC           OT Goals    Bathing Goal Selection (OT)  bathing, OT goal 1  -AC        Strength Goal Selection (OT)  strength, OT goal 1  -AC        Balance Goal Selection (OT)  balance, OT goal 1  -AC           Bathing Goal 1 (OT)    Activity/Device (Bathing Goal 1, OT)  bathing skills, all;tub bench  -AC        [place] Level/Cues Needed (Bathing Goal 1, OT)  modified independence  -AC        Time Frame (Bathing Goal 1, OT)  long term goal (LTG);10 days  -AC        Progress/Outcomes (Bathing Goal 1, OT)  goal ongoing  -AC           Strength Goal 1 (OT)    Strength Goal 1 (OT)  Increase BUE strength to 4+/5 to increase independence with ADL  -AC        Time Frame (Strength Goal 1, OT)  long term goal (LTG);10 days  -AC        Progress/Outcome (Strength Goal 1, OT)  goal ongoing  -AC           Balance Goal 1 (OT)    Activity/Assistive  Device (Balance Goal 1, OT)  standing, dynamic  -AC        [place] Level/Cues Needed (Balance Goal 1, OT)  standby assist  -AC        Time Frame (Balance Goal 1, OT)  long term goal (LTG);10 days  -AC        Progress/Outcomes (Balance Goal 1, OT)  goal ongoing  -AC           Positioning and Restraints    Pre-Treatment Position  in bed  -AC        Post Treatment Position  bed  -AC        In Bed  fowlers;call light within reach;encouraged to call for assist;side rails up x2  -AC           Therapy Assessment/Plan (OT)    Date of Referral to OT  08/05/21  -AC        OT Diagnosis  decreased adl  -AC        Rehab Potential (OT)  good, to achieve stated therapy goals  -        Criteria for Skilled Therapeutic Interventions Met (OT)  yes;meets criteria;skilled treatment is necessary  -        Therapy Frequency (OT)  3 times/wk  -        Predicted Duration of Therapy Intervention (OT)  10 days  -        Planned Therapy Interventions (OT)  activity tolerance training;BADL retraining;functional balance retraining;occupation/activity based interventions;patient/caregiver education/training;strengthening exercise;transfer/mobility retraining  -           Therapy Plan Review/Discharge Plan (OT)    Anticipated Discharge Disposition (OT)  home with home health  -          User Key  (r) = Recorded By, (t) = Taken By, (c) = Cosigned By    Initials Name Effective Dates     Carlos Olivera, OTR/L, CNT 04/09/19 -            Occupational Therapy Education                 Title: PT OT SLP Therapies (Done)     Topic: Occupational Therapy (Done)     Point: ADL training (Done)     Description:   Instruct learner(s) on proper safety adaptation and remediation techniques during self care or transfers.   Instruct in proper use of assistive devices.              Learning Progress Summary           Patient Acceptance, E, VU by  at 8/6/2021 1110                   Point: Home exercise program (Done)     Description:   Instruct  learner(s) on appropriate technique for monitoring, assisting and/or progressing therapeutic exercises/activities.              Learning Progress Summary           Patient Acceptance, E, VU by  at 8/6/2021 1110                   Point: Precautions (Done)     Description:   Instruct learner(s) on prescribed precautions during self-care and functional transfers.              Learning Progress Summary           Patient Acceptance, E, VU by  at 8/6/2021 1110                   Point: Body mechanics (Done)     Description:   Instruct learner(s) on proper positioning and spine alignment during self-care, functional mobility activities and/or exercises.              Learning Progress Summary           Patient Acceptance, E, VU by  at 8/6/2021 1110                               User Key     Initials Effective Dates Name Provider Type Discipline     04/09/19 -  Carlos Olivera, OTR/L, CNT Occupational Therapist OT                  OT Recommendation and Plan  Planned Therapy Interventions (OT): activity tolerance training, BADL retraining, functional balance retraining, occupation/activity based interventions, patient/caregiver education/training, strengthening exercise, transfer/mobility retraining  Therapy Frequency (OT): 3 times/wk  Plan of Care Review  Plan of Care Reviewed With: patient  Progress: no change  Outcome Summary: OT eval completed.  Pt alert and oriented x4.  Pt c/o chronic back pain 9/10 and weakness/fatigue.  She reports 2 recent falls, 1 d/t slipping on wet floor and other related to dizziness and posterior LOB into bathtub.  She recently had colon surgery and now has colostomy, recent C diff infection.  Pt is clearly and obviously depressed with the course of her life over the last 6 months.  She demo flat affect and was tearful at times during eval.  She was independent with donning shoes, SBA for toileting and grooming and demo good AROM in BUE.  She has generalized weakness and fatigues after  about 5 min of activity.  Her balance was WFL during eval.  She had 1 mild LOB but self corrected.  OT will continue to work with pt to increase her safety and endurance for ADL and assist with developing a routine for her colostomy mgmt.  She would benefit from training from ostomy nurse as she reports difficulty maintaining seal around stoma with ostomy bag.  OT recommends home with HH at discharge.  Plan of Care Reviewed With: patient  Outcome Summary: OT eval completed.  Pt alert and oriented x4.  Pt c/o chronic back pain 9/10 and weakness/fatigue.  She reports 2 recent falls, 1 d/t slipping on wet floor and other related to dizziness and posterior LOB into bathtub.  She recently had colon surgery and now has colostomy, recent C diff infection.  Pt is clearly and obviously depressed with the course of her life over the last 6 months.  She demo flat affect and was tearful at times during eval.  She was independent with donning shoes, SBA for toileting and grooming and demo good AROM in BUE.  She has generalized weakness and fatigues after about 5 min of activity.  Her balance was WFL during eval.  She had 1 mild LOB but self corrected.  OT will continue to work with pt to increase her safety and endurance for ADL and assist with developing a routine for her colostomy mgmt.  She would benefit from training from ostomy nurse as she reports difficulty maintaining seal around stoma with ostomy bag.  OT recommends home with HH at discharge.    Outcome Measures     Row Name 08/06/21 0907             How much help from another is currently needed...    Putting on and taking off regular lower body clothing?  3  -AC      Bathing (including washing, rinsing, and drying)  3  -AC      Toileting (which includes using toilet bed pan or urinal)  3  -AC      Putting on and taking off regular upper body clothing  4  -AC      Taking care of personal grooming (such as brushing teeth)  4  -AC      Eating meals  4  -AC      AM-PAC 6  Clicks Score (OT)  21  -         Functional Assessment    Outcome Measure Options  AM-PAC 6 Clicks Daily Activity (OT)  -        User Key  (r) = Recorded By, (t) = Taken By, (c) = Cosigned By    Initials Name Provider Type    Carlos Andrews OTR/L, [place] Occupational Therapist          Time Calculation:   Time Calculation- OT     Row Name 08/06/21 1110             Time Calculation- OT    OT Start Time  0907  -      OT Stop Time  1000  -      OT Time Calculation (min)  53 min  -      OT Received On  08/06/21  -      OT Goal Re-Cert Due Date  08/16/21  -        User Key  (r) = Recorded By, (t) = Taken By, (c) = Cosigned By    Initials Name Provider Type    Carlos Andrews OTR/L, [place] Occupational Therapist        Therapy Charges for Today     Code Description Service Date Service Provider Modifiers Qty    [id number] HC OT EVAL MOD COMPLEXITY 4 8/6/2021 Carlos Olivera OTR/L, [place] GO 1               BRUCE Leyva/L, CNT  8/6/2021

## 2021-08-06 NOTE — PLAN OF CARE
Goal Outcome Evaluation:  Plan of Care Reviewed With: patient        Progress: no change  Outcome Summary: PT eval completed.  Pt agreeable to therapy, however has a flat affect and tearful at times during the eval.  Pt admittingly overwhelmed w/ the management of her colostomy and really reports no one to assist her at home.  Pt performs bed mobility w/ SBA to I, tfers w/ SBA on/off the bed and w/ CGA on/off toilet, and performs gait w/ CGA.  Noted 2 mild LOB w/ self corrections, however unsteady slowed gait w/ decrease foot clearance, step length and heel strike.  Pt education for use of rwx and provided w/ rwx to improve stability.  Pt did ambulate a short distance in the room w/ it, but pushed it to the side and reported she was not going to use it.  Pt w/ decrease activity tolerance w/ noted fatigue w/ very little activity, decrease balance and increase fall risk limiting I and safety.  Pt w/ 2 recent reported falls at home, 1 due to a slip on a wet surface and 1 due to dizziness and posterior LOB.  Feel pt will benefit from continued PT services to improve overall strength, activity tolerance, balance, safety awareness and I w/ functional mobility reducing fall risk.  Recommend home w/ assist and HH.  Will follow for additional needs.

## 2021-08-06 NOTE — PLAN OF CARE
Goal Outcome Evaluation:  Plan of Care Reviewed With: other (see comments)        Progress: no change  Outcome Summary: Ntn assessment. Diet advanced today to Regular diet. Pt with decreased oral intake on admission. Boost Plus added BID. Con to follow.

## 2021-08-06 NOTE — THERAPY DISCHARGE NOTE
Acute Care - Speech Language Pathology   Swallow Initial Evaluation/Discharge [facility]     Patient Name: Lenora Kathleen  : 1960  MRN: [id number]  Today's Date: 2021  Onset of Illness/Injury or Date of Surgery: 21     Referring Physician: Dr. Bales      Admit Date: 2021     SLP clinical bedside swallowing evaluation completed. For purposes of bedside swallowing evaluation this AM, oral [facility] exam revealed generalized weakness with minimal to mild fasciculations. Unable to tolerate fully upright posture during secondary to back pain. Pt was presented 1x pureed trial, 1x honey thick trial (limited given pt resistance to trials given dislike in taste), multiple thin liquid trials, and regular solid diet consistency trial. No noted concerns with oral prep, transit. Adequate mastication with regular solid without oral residue. No overt s/s of aspiration. Crying behaviors at end of eval given NPO status and difficulty with back pain. RN notified. RN to speak with MD to ensure pt is safe to resume PO diet.   RECS:   • Regular solid diet consistency with regular/thin liquid consistency  • meds whole with thin liquids  • general feeding/aspiration precautions  • RN to monitor for increased lung congestion. Continued SLP services are not warranted at this time. MD to reconsult if changes or new concerns arise. Thanks!  Silvina Vásquez, CCC-SLP 2021 10:54 CDT    Visit Dx:    ICD-10-CM ICD-9-CM   1. Altered mental status, unspecified altered mental status type  R41.82 780.97   2. Alteration in self-care ability  R68.89 799.3   3. Acute UTI (urinary tract infection)  N39.0 599.0   4. Polypharmacy  Z79.899 V58.69   5. Impaired functional mobility and activity tolerance  Z74.09 V49.89   6. Dysphagia, unspecified type  R13.10 787.20     Patient Active Problem List   Diagnosis   • Primary squamous cell carcinoma of anal canal   • Current every day smoker   • Rectovaginal fistula   • Severe malnutrition  (CMS/HCC)   • Prediabetes   • Anxiety   • Enterocolitis   • Dehydration   • Hypotension due to hypovolemia   • Encephalopathy   • Ataxia   • Self-care deficit   • Anemia of chronic disease   • Diarrhea   • Hypokalemia   • Clostridium difficile enterocolitis   • Metabolic encephalopathy due to C. difficile colitis and hypotension.   • AMS (altered mental status)   • Polypharmacy   • UTI (urinary tract infection), bacterial     Past Medical History:   Diagnosis Date   • Anxiety    • Blood in urine    • Chest tightness    • Colon polyps    • COPD (chronic obstructive pulmonary disease) (CMS/HCC)    • Depression    • Emphysema/COPD (CMS/HCC)    • Hepatitis C    • Hypertension    • Injury of back    • Kidney stone    • Palpitation    • PONV (postoperative nausea and vomiting)    • Rectal cancer (CMS/HCC)      Past Surgical History:   Procedure Laterality Date   • CHOLECYSTECTOMY     • COLONOSCOPY  03/12/2015    normal   • COLOSTOMY N/A 4/23/2021    Procedure: LAPAROSCOPIC DIVERTING COLOSTOMY;  Surgeon: Paulette Villegas MD;  Location: [facility];  Service: General;  Laterality: N/A;   • FINGER SURGERY     • HYSTERECTOMY     • KIDNEY STONE SURGERY     • KIDNEY SURGERY     • OTHER SURGICAL HISTORY      POSSIBLE CARDIAC MONITOR (LOOP RECORDER?)--IMPLANTED AND EXPLANTED    • US GUIDED LYMPH NODE BIOPSY  4/30/2021       SLP Recommendation and Plan  SLP Swallowing Diagnosis: functional oral phase, functional pharyngeal phase  SLP Diet Recommendation: regular textures, thin liquids     Monitor for Signs of Aspiration: yes, notify SLP if any concerns     Swallow Criteria for Skilled Therapeutic Interventions Met: baseline status  Anticipated Discharge Disposition (SLP): home with assist, home with home health     Therapy Frequency (Swallow): evaluation only              Anticipated Discharge Disposition (SLP): home with assist, home with home health        Reason for Discharge: other (see comments) (Eval only)    Plan of Care  Reviewed With: patient, other (see comments) (RN)  Progress:  (Eval)  Outcome Summary: SLP clinical bedside swallowing evaluation completed. Smartform indicates that pt passed swallow screen, diet order reflect regular solid diet consistency with regular/thin liquid consistency; however, RN under impression that pt was NPO. For purposes of bedside swallowing evaluation this AM, oral [facility] exam revealed generalized weakness with minimal to mild fasciculations. Unable to tolerate fully upright posture during secondary to back pain. Pt was presented 1x pureed trial, 1x honey thick trial (limited given pt resistance to trials given dislike in taste), multiple thin liquid trials, and regular solid diet consistency trial. No noted concerns with oral prep, transit. Adequate mastication with regular solid without oral residue. No overt s/s of aspiration. Crying behaviors at end of eval given NPO status and difficulty with back pain. RN notified. RN to speak with MD to ensure pt is safe to resume PO diet. RECS: Regular solid diet consistency with regular/thin liquid consistency; meds whole with thin liquids; general feeding/aspiration precautions; RN to monitor for increased lung congestion. Continued SLP services are not warranted at this time. MD to reconsult if changes or new concerns arise. Thanks!       SWALLOW EVALUATION (last 72 hours)      SLP Adult Swallow Evaluation     Row Name 08/06/21 1005                   Rehab Evaluation    Document Type  evaluation  -TM        Subjective Information  complains of;pain  -TM        Patient Observations  alert;cooperative  -        Patient/Family/Caregiver Comments/Observations  No family present.  -        Care Plan Review  evaluation/treatment results reviewed;care plan/treatment goals reviewed;risks/benefits reviewed;patient/other agree to care plan  -        Care Plan Review, Other Participant(s)  other (see comments) Flor NAYAK  -        Patient Effort  adequate  -TM            General Information    Patient Profile Reviewed  yes  -TM        Pertinent History Of Current Problem  Acute mental status change, poly pharmacy vs UTI, falls. Hx of chronic ostomy bag secondary to anal CA, anxiety, pain, Hep C, COPD, colon polyps, HTN, back injury. CXR 08/05/21 showed no active disease.  -TM        Current Method of Nutrition  NPO;other (see comments) per pt and RN, though diet order reflects reg/thin  -TM        Precautions/Limitations, Vision  WFL  -TM        Precautions/Limitations, Hearing  WFL  -TM        Prior Level of Function-Communication  WFL  -TM        Prior Level of Function-Swallowing  no diet consistency restrictions  -TM        Plans/Goals Discussed with  patient;other (see comments) RN, Flor  -TM        Barriers to Rehab  medically complex Uncontrolled pain  -TM        Patient's Goals for Discharge  return home Receive ostomy management assistance   -TM           Pain    Additional Documentation  Pain Scale: Numbers Pre/Post-Treatment (Group)  -TM           Pain Scale: Numbers Pre/Post-Treatment    Posttreatment Pain Rating  9/10  -TM        Pain Location  back  -TM        Pre/Posttreatment Pain Comment  chronic  -TM        Pain Intervention(s)  Repositioned;Other (Comment) RN notify of pt request for medication to address pain   -TM           Oral Motor Structure and Function    Dentition Assessment  missing teeth;other (see comments) Stated she wear an upper and lower partial at home.  -TM        Secretion Management  WNL/WFL  -TM        Mucosal Quality  moist, healthy  -TM        Volitional Swallow  WFL  -TM        Volitional Cough  WFL  -TM           Oral Musculature and Cranial Nerve Assessment    Oral Motor General Assessment  generalized oral motor weakness;vocal impairment  -TM        Vocal Impairment, Detail. Cranial Nerve X (Vagus)  vocal quality abnormality (see comments);other (see comments) Hoarse vocal quality  -TM           General Eating/Swallowing  Observations    Respiratory Support Currently in Use  room air  -TM        Eating/Swallowing Skills  fed by SLP;self-fed  -TM        Positioning During Eating  semi-reclined;other (see comments) Unable to tolerate fully upright given back pain   -TM        Utensils Used  spoon;straw  -TM        Consistencies Trialed  pudding thick;honey-thick liquids;thin liquids;regular textures  -TM           Respiratory    Respiratory Status  WFL  -TM           Clinical Swallow Eval    Oral Prep Phase  WFL  -TM        Oral Transit  WFL  -TM        Oral Residue  WFL  -TM        Pharyngeal Phase  WFL  -TM        Esophageal Phase  unremarkable  -TM        Clinical Swallow Evaluation Summary  See note.  -TM           Clinical Impression    SLP Swallowing Diagnosis  functional oral phase;functional pharyngeal phase  -TM        Functional Impact  no impact on function  -TM        Swallow Criteria for Skilled Therapeutic Interventions Met  baseline status  -TM           Recommendations    Therapy Frequency (Swallow)  evaluation only  -TM        SLP Diet Recommendation  regular textures;thin liquids  -TM        Recommended Precautions and Strategies  upright posture during/after eating;small bites of food and sips of liquid  -TM        Oral Care Recommendations  Oral Care BID/PRN  -TM        SLP Rec. for Method of Medication Administration  meds whole;with thin liquids  -TM        Monitor for Signs of Aspiration  yes;notify SLP if any concerns  -TM        Anticipated Discharge Disposition (SLP)  home with assist;home with home health  -          User Key  (r) = Recorded By, (t) = Taken By, (c) = Cosigned By    Initials Name Effective Dates    TM Silvina Vásquez CCC-SLP 06/16/21 -           EDUCATION  The patient has been educated in the following areas:   Dysphagia (Swallowing Impairment).                 Time Calculation:   Time Calculation- SLP     Row Name 08/06/21 1053             Time Calculation- SLP    SLP Start Time  1005  -       SLP Stop Time  1058  -TM      SLP Time Calculation (min)  53 min  -TM      SLP Received On  08/06/21  -TM         Untimed Charges    SLP Eval/Re-eval   ST Eval Oral Pharyng Swallow - 23922  -TM      [id number]-SI Eval Oral Pharyng Swallow Minutes  53  -TM         Total Minutes    Untimed Charges Total Minutes  53  -TM       Total Minutes  53  -TM        User Key  (r) = Recorded By, (t) = Taken By, (c) = Cosigned By    Initials Name Provider Type    TM Silvina Vásquez CCC-SLP Speech and Language Pathologist          Therapy Charges for Today     Code Description Service Date Service Provider Modifiers Qty    [id number]  ST EVAL ORAL PHARYNG SWALLOW 4 8/6/2021 Silvina Vásquez CCC-SLP GN 1               SLP Discharge Summary  Anticipated Discharge Disposition (SLP): home with assist, home with home health  Reason for Discharge: other (see comments) (Eval only)  Progress Toward Achieving Short/long Term Goals: other (see comments) (Baseline status)  Discharge Destination: home w/ assist, home w/ home health    CHANG Laureano  8/6/2021

## 2021-08-06 NOTE — CASE MANAGEMENT/SOCIAL WORK
Discharge Planning Assessment  [facility]     Patient Name: Lenora Kathleen  MRN: [id number]  Today's Date: 8/6/2021    Admit Date: 8/5/2021    Discharge Needs Assessment     Row Name 08/06/21 1106       Living Environment    Lives With  alone  (Pended)     Current Living Arrangements  home/apartment/condo  (Pended)     Primary Care Provided by  self  (Pended)     Provides Primary Care For  no one  (Pended)     Family Caregiver if Needed  other (see comments)  (Pended)     Quality of Family Relationships  unable to assess  (Pended)     Able to Return to Prior Arrangements  yes  (Pended)        Transition Planning    Patient/Family Anticipates Transition to  home  (Pended)     Patient/Family Anticipated Services at Transition  home health care  (Pended)        Discharge Needs Assessment    Readmission Within the Last 30 Days  no previous admission in last 30 days  (Pended)     Equipment Currently Used at Home  shower chair  (Pended)     Current Discharge Risk  lives alone  (Pended)         Discharge Plan     Row Name 08/06/21 1107       Plan    Plan  HH  (Pended)     Provided Post Acute Provider List?  Yes  (Pended)     Post Acute Provider List  Home Health  (Pended)     Provided Post Acute Provider Quality & Resource List?  Yes  (Pended)     Post Acute Provider Quality and Resource List  Home Health  (Pended)     Delivered To  Patient  (Pended)     Method of Delivery  In person  (Pended)     Patient/Family in Agreement with Plan  yes  (Pended)     Plan Comments  Pt states she lived independently prior to hospitalization and plans same for d/c. Pt states she has a neighbor named Miladis that helps her. Pt states she would like [facility]. Pt has PCP/RX coverage. Will follow as needs arise.  (Pended)         Continued Care and Services - Admitted Since 8/5/2021    Coordination has not been started for this encounter.     Selected Continued Care - Prior Encounters Includes selections from prior encounters from 5/7/2021 to  8/6/2021    Discharged on 7/21/2021 Admission date: 7/16/2021 - Discharge disposition: Skilled Nursing Facility (DC - External)    Destination     Service Provider Selected Services Address Phone Fax Patient Preferred    [facility] [address] [facility] [address] [phone] --                      Demographic Summary    No documentation.       Functional Status    No documentation.       Psychosocial    No documentation.       Abuse/Neglect    No documentation.       Legal    No documentation.       Substance Abuse    No documentation.       Patient Forms    No documentation.           Tj Cordon

## 2021-08-06 NOTE — THERAPY EVALUATION
Patient Name: Lenora Kathleen  : 1960    MRN: [id number]                              Today's Date: 2021       Admit Date: 2021    Visit Dx:     ICD-10-CM ICD-9-CM   1. Altered mental status, unspecified altered mental status type  R41.82 780.97   2. Alteration in self-care ability  R68.89 799.3   3. Acute UTI (urinary tract infection)  N39.0 599.0   4. Polypharmacy  Z79.899 V58.69   5. Impaired functional mobility and activity tolerance  Z74.09 V49.89   6. Dysphagia, unspecified type  R13.10 787.20   7. Decreased activities of daily living (ADL)  Z78.9 V49.89     Patient Active Problem List   Diagnosis   • Primary squamous cell carcinoma of anal canal   • Current every day smoker   • Rectovaginal fistula   • Severe malnutrition (CMS/HCC)   • Prediabetes   • Anxiety   • Enterocolitis   • Dehydration   • Hypotension due to hypovolemia   • Encephalopathy   • Ataxia   • Self-care deficit   • Anemia of chronic disease   • Diarrhea   • Hypokalemia   • Clostridium difficile enterocolitis   • Metabolic encephalopathy due to C. difficile colitis and hypotension.   • AMS (altered mental status)   • Polypharmacy   • UTI (urinary tract infection), bacterial     Past Medical History:   Diagnosis Date   • Anxiety    • Blood in urine    • Chest tightness    • Colon polyps    • COPD (chronic obstructive pulmonary disease) (CMS/HCC)    • Depression    • Emphysema/COPD (CMS/HCC)    • Hepatitis C    • Hypertension    • Injury of back    • Kidney stone    • Palpitation    • PONV (postoperative nausea and vomiting)    • Rectal cancer (CMS/HCC)      Past Surgical History:   Procedure Laterality Date   • CHOLECYSTECTOMY     • COLONOSCOPY  2015    normal   • COLOSTOMY N/A 2021    Procedure: LAPAROSCOPIC DIVERTING COLOSTOMY;  Surgeon: Paulette Villegas MD;  Location: [facility];  Service: General;  Laterality: N/A;   • FINGER SURGERY     • HYSTERECTOMY     • KIDNEY STONE SURGERY     • KIDNEY SURGERY     • OTHER  SURGICAL HISTORY      POSSIBLE CARDIAC MONITOR (LOOP RECORDER?)--IMPLANTED AND EXPLANTED    • US GUIDED LYMPH NODE BIOPSY  4/30/2021     General Information     Row Name 08/06/21 0904          Physical Therapy Time and Intention    Document Type  evaluation;other (see comments) see MAR  -ITZ     Mode of Treatment  physical therapy  -     Row Name 08/06/21 0904          General Information    Patient Profile Reviewed  yes  -JE     Prior Level of Function  independent:;all household mobility;gait;transfer;bed mobility;ADL's;home management;driving;shopping  -     Existing Precautions/Restrictions  (S) fall colostomy L lower quadrant, cdiff  -     Barriers to Rehab  ineffective coping  -     Row Name 08/06/21 0904          Living Environment    Lives With  alone  -     Row Name 08/06/21 0904          Cognition    Orientation Status (Cognition)  oriented x 4;verbal cues/prompts needed for orientation  -     Row Name 08/06/21 0904          Safety Issues, Functional Mobility    Safety Issues Affecting Function (Mobility)  awareness of need for assistance;insight into deficits/self-awareness;safety precaution awareness  -     Impairments Affecting Function (Mobility)  balance;endurance/activity tolerance;pain  -       User Key  (r) = Recorded By, (t) = Taken By, (c) = Cosigned By    Initials Name Provider Type    Elizabeth Zazueta, PT Physical Therapist        Mobility     Row Name 08/06/21 0904          Bed Mobility    Bed Mobility  sidelying-sit;sit-supine  -ITZ     Sit-Supine Major (Bed Mobility)  standby assist  -     Sidelying-Sit Major (Bed Mobility)  standby assist  -     Assistive Device (Bed Mobility)  bed rails  -     Comment (Bed Mobility)  increase time and effort  -     Row Name 08/06/21 0904          Transfers    Comment (Transfers)  on/off toilet w/ CGA  -ITZ     Row Name 08/06/21 0904          Sit-Stand Transfer    Sit-Stand Major (Transfers)  standby assist  -      Row Name 08/06/21 0904          Gait/Stairs (Locomotion)    [place] Level (Gait)  contact guard  -     Distance in Feet (Gait)  60 ft and to/from bathroom  -     Deviations/Abnormal Patterns (Gait)  gait speed decreased;stride length decreased  -     Bilateral Gait Deviations  heel strike decreased  -     Comment (Gait/Stairs)  gait speed slows even more when pt is talking  -       User Key  (r) = Recorded By, (t) = Taken By, (c) = Cosigned By    Initials Name Provider Type    Elizabeth Zazueta, PT Physical Therapist        Obj/Interventions     Row Name 08/06/21 0904          Range of Motion Comprehensive    General Range of Motion  no range of motion deficits identified  -JE     Row Name 08/06/21 0904          Strength Comprehensive (MMT)    Comment, General Manual Muscle Testing (MMT) Assessment  no formal assessment of UEs, however pt able to move B UEs against gravity w/out difficulty; B LEs grossly 4+/5  -JE     Row Name 08/06/21 0904          Balance    Balance Assessment  sitting static balance;sitting dynamic balance;standing static balance;standing dynamic balance  -     Static Sitting Balance  WFL;unsupported;supported;sitting, edge of bed  -     Dynamic Sitting Balance  WFL;unsupported;sitting, edge of bed  -     Static Standing Balance  WFL;unsupported;standing  -     Dynamic Standing Balance  mild impairment;unsupported;standing  -     Comment, Balance  noted mild LOB X 2 w/ unsteady gait, however pt does self correct; provided pt w/ rwx to improve stability, however pt reported she was not going to use a walker  -JE     Row Name 08/06/21 0904          Sensory Assessment (Somatosensory)    Sensory Assessment (Somatosensory)  sensation intact  -       User Key  (r) = Recorded By, (t) = Taken By, (c) = Cosigned By    Initials Name Provider Type    Elizabeth Zazueta, PT Physical Therapist        Goals/Plan     Row Name 08/06/21 0904          Bed Mobility Goal 1 (PT)     Activity/Assistive Device (Bed Mobility Goal 1, PT)  bed mobility activities, all  -JE     [place] Level/Cues Needed (Bed Mobility Goal 1, PT)  independent  -JE     Time Frame (Bed Mobility Goal 1, PT)  long term goal (LTG);10 days  -JE     Progress/Outcomes (Bed Mobility Goal 1, PT)  goal ongoing  -[facility] Name 08/06/21 0904          Transfer Goal 1 (PT)    Activity/Assistive Device (Transfer Goal 1, PT)  sit-to-stand/stand-to-sit;bed-to-chair/chair-to-bed  -JE     [place] Level/Cues Needed (Transfer Goal 1, PT)  independent  -JE     Time Frame (Transfer Goal 1, PT)  long term goal (LTG);10 days  -JE     Progress/Outcome (Transfer Goal 1, PT)  goal ongoing  -[facility] Name 08/06/21 0904          Gait Training Goal 1 (PT)    Activity/Assistive Device (Gait Training Goal 1, PT)  gait (walking locomotion);decrease fall risk;improve balance and speed;increase endurance/gait distance  -     [place] Level (Gait Training Goal 1, PT)  standby assist  -JE     Distance (Gait Training Goal 1, PT)  100 ft  -JE     Time Frame (Gait Training Goal 1, PT)  long term goal (LTG);10 days  -JE     Progress/Outcome (Gait Training Goal 1, PT)  goal ongoing  -       User Key  (r) = Recorded By, (t) = Taken By, (c) = Cosigned By    Initials Name Provider Type    Elizabeth Zazueta, PT Physical Therapist        Clinical Impression     Row Name 08/06/21 0904          Pain    Additional Documentation  Pain Scale: Numbers Pre/Post-Treatment (Group)  -[facility] Name 08/06/21 0904          Pain Scale: Numbers Pre/Post-Treatment    Pretreatment Pain Rating  9/10  -     Posttreatment Pain Rating  9/10  -     Pain Location  back  -     Pre/Posttreatment Pain Comment  reports pain is chronic due to prior [facility]  -     Pain Intervention(s)  (S) Ambulation/increased activity;Repositioned;Rest notified RN of c/os pain and request for meds  -[facility] Name 08/06/21 0904          Plan of Care Review    Plan of Care  Reviewed With  patient  -     Progress  no change  -     Outcome Summary  PT eval completed.  Pt agreeable to therapy, however has a flat affect and tearful at times during the eval.  Pt admittingly overwhelmed w/ the management of her colostomy and really reports no one to assist her at home.  Pt performs bed mobility w/ SBA to I, tfers w/ SBA on/off the bed and w/ CGA on/off toilet, and performs gait w/ CGA.  Noted 2 mild LOB w/ self corrections, however unsteady slowed gait w/ decrease foot clearance, step length and heel strike.  Pt education for use of rwx and provided w/ rwx to improve stability.  Pt did ambulate a short distance in the room w/ it, but pushed it to the side and reported she was not going to use it.  Pt w/ decrease activity tolerance w/ noted fatigue w/ very little activity, decrease balance and increase fall risk limiting I and safety.  Pt w/ 2 recent reported falls at home, 1 due to a slip on a wet surface and 1 due to dizziness and posterior LOB.  Feel pt will benefit from continued PT services to improve overall strength, activity tolerance, balance, safety awareness and I w/ functional mobility reducing fall risk.  Recommend home w/ assist and HH.  Will follow for additional needs.  -     Row Name 08/06/21 0904          Therapy Assessment/Plan (PT)    Patient/Family Therapy Goals Statement (PT)  return home, but w/ better I in mgmt of ostomy  -     Rehab Potential (PT)  good, to achieve stated therapy goals  -     Criteria for Skilled Interventions Met (PT)  yes;meets criteria;skilled treatment is necessary  -     Predicted Duration of Therapy Intervention (PT)  until discharge or goals achieved  -     Row Name 08/06/21 0904          Vital Signs    O2 Delivery Pre Treatment  room air  -JE     O2 Delivery Intra Treatment  room air  -JE     O2 Delivery Post Treatment  room air  -JE     Pre Patient Position  Supine  -JE     Intra Patient Position  Standing  -JE     Post Patient  Position  Supine  -     Row Name 08/06/21 0904          Positioning and Restraints    Pre-Treatment Position  in bed  -JE     Post Treatment Position  bed  -JE     In Bed  fowlers;call light within reach;encouraged to call for assist;side rails up x2  -       User Key  (r) = Recorded By, (t) = Taken By, (c) = Cosigned By    Initials Name Provider Type    Elizabeth Zazueta, PT Physical Therapist        Outcome Measures     Row Name 08/06/21 0904          How much help from another person do you currently need...    Turning from your back to your side while in flat bed without using bedrails?  4  -JE     Moving from lying on back to sitting on the side of a flat bed without bedrails?  4  -JE     Moving to and from a bed to a chair (including a wheelchair)?  3  -JE     Standing up from a chair using your arms (e.g., wheelchair, bedside chair)?  3  -JE     Climbing 3-5 steps with a railing?  3  -JE     To walk in hospital room?  3  -     AM-PAC 6 Clicks Score (PT)  20  -     Row Name 08/06/21 0907 08/06/21 0904       Functional Assessment    Outcome Measure Options  AM-PAC 6 Clicks Daily Activity (OT)  -AC  AM-PAC 6 Clicks Basic Mobility (PT)  -      User Key  (r) = Recorded By, (t) = Taken By, (c) = Cosigned By    Initials Name Provider Type    Carlos Andrews, OTR/L, CNT Occupational Therapist    Elizabeth Zazueta, PT Physical Therapist                       Physical Therapy Education                 Title: PT OT SLP Therapies (In Progress)     Topic: Physical Therapy (In Progress)     Point: Mobility training (Done)     Learning Progress Summary           Patient Acceptance, E,TB,D, VU,DU,NR by  at 8/6/2021 1223    Comment: Education re: purpose of PT/importance of activity, education for safety/falls prevention w/ functional mobility - purpose of using rwx                   Point: Home exercise program (Not Started)     Learner Progress:  Not documented in this visit.          Point: Precautions  (Done)     Learning Progress Summary           Patient Acceptance, E,TB,D, VU,DU,NR by ITZ at 8/6/2021 1223    Comment: Education re: purpose of PT/importance of activity, education for safety/falls prevention w/ functional mobility - purpose of using rwx                               User Key     Initials Effective Dates Name Provider Type Discipline    ITZ 08/02/18 -  Elizabeth Burkett, PT Physical Therapist PT              PT Recommendation and Plan  Planned Therapy Interventions (PT): balance training, bed mobility training, gait training, patient/family education, transfer training, other (see comments) (safety/falls prevention)  Plan of Care Reviewed With: patient  Progress: no change  Outcome Summary: PT eval completed.  Pt agreeable to therapy, however has a flat affect and tearful at times during the eval.  Pt admittingly overwhelmed w/ the management of her colostomy and really reports no one to assist her at home.  Pt performs bed mobility w/ SBA to I, tfers w/ SBA on/off the bed and w/ CGA on/off toilet, and performs gait w/ CGA.  Noted 2 mild LOB w/ self corrections, however unsteady slowed gait w/ decrease foot clearance, step length and heel strike.  Pt education for use of rwx and provided w/ rwx to improve stability.  Pt did ambulate a short distance in the room w/ it, but pushed it to the side and reported she was not going to use it.  Pt w/ decrease activity tolerance w/ noted fatigue w/ very little activity, decrease balance and increase fall risk limiting I and safety.  Pt w/ 2 recent reported falls at home, 1 due to a slip on a wet surface and 1 due to dizziness and posterior LOB.  Feel pt will benefit from continued PT services to improve overall strength, activity tolerance, balance, safety awareness and I w/ functional mobility reducing fall risk.  Recommend home w/ assist and HH.  Will follow for additional needs.     Time Calculation:   PT Charges     Row Name 08/06/21 1014             Time  Calculation    Start Time  0904  -      Stop Time  1013  -      Time Calculation (min)  69 min  -      PT Received On  08/06/21  -      PT Goal Re-Cert Due Date  08/16/21  -        User Key  (r) = Recorded By, (t) = Taken By, (c) = Cosigned By    Initials Name Provider Type    Elizabeth Zazueta, PT Physical Therapist        Therapy Charges for Today     Code Description Service Date Service Provider Modifiers Qty    97915080389 HC PT EVAL MOD COMPLEXITY 4 8/6/2021 Elizabeth Burkett, PT GP 1    [id number] HC GAIT TRAINING EA 15 MIN 8/6/2021 Elizabeth Burkett, PT GP 1          PT G-Codes  Outcome Measure Options: AM-PAC 6 Clicks Daily Activity (OT)  AM-PAC 6 Clicks Score (PT): 20  AM-PAC 6 Clicks Score (OT): 21    Elizabeth Burkett PT  8/6/2021

## 2021-08-07 LAB
BACTERIA SPEC AEROBE CULT: ABNORMAL
QT INTERVAL: 454 MS
QTC INTERVAL: 490 MS

## 2021-08-07 NOTE — NURSING NOTE
"Pt is A&Ox4. Pt requesting to leave AMA. House sup. Notified.  Informed pt that if she leaves she will not get placement at SNF.  Pt stated \"I don't need no damn placement, I live in a townhouse\"   "

## 2021-08-07 NOTE — NURSING NOTE
"Pt called out for pain medicine. Pt was informed that it was not time for any more medication right now and patient stated that was \"Bullshit\". She stated she would be filing a lawsuit against the hospital for allowing her to lay here in pain. She refused her night time meds. She said she handed in a big bag narcotics and she was not obligated to do so. She also stated she \" I hope every nurse and doctor in this place gets a disease that makes them hurt like me\"  "

## 2021-08-07 NOTE — THERAPY DISCHARGE NOTE
Acute Care - Physical Therapy Discharge Summary  [facility]       Patient Name: Lenora Kathleen  : 1960  MRN: [id number]    Today's Date: 2021  Onset of Illness/Injury or Date of Surgery: 21       Referring Physician: Dr. Bales      Admit Date: 2021      PT Recommendation and Plan    Visit Dx:    ICD-10-CM ICD-9-CM   1. Altered mental status, unspecified altered mental status type  R41.82 780.97   2. Alteration in self-care ability  R68.89 799.3   3. Acute UTI (urinary tract infection)  N39.0 599.0   4. Polypharmacy  Z79.899 V58.69   5. Impaired functional mobility and activity tolerance  Z74.09 V49.89   6. Dysphagia, unspecified type  R13.10 787.20   7. Decreased activities of daily living (ADL)  Z78.9 V49.89       Outcome Measures     Row Name 21 0907             How much help from another is currently needed...    Putting on and taking off regular lower body clothing?  3  -AC      Bathing (including washing, rinsing, and drying)  3  -AC      Toileting (which includes using toilet bed pan or urinal)  3  -AC      Putting on and taking off regular upper body clothing  4  -AC      Taking care of personal grooming (such as brushing teeth)  4  -AC      Eating meals  4  -AC      AM-PAC 6 Clicks Score (OT)  21  -AC         Functional Assessment    Outcome Measure Options  AM-PAC 6 Clicks Daily Activity (OT)  -AC        User Key  (r) = Recorded By, (t) = Taken By, (c) = Cosigned By    Initials Name Provider Type    AC Carlos Olivera, OTR/L, CNT Occupational Therapist              PT Rehab Goals     Row Name 21 0707             Bed Mobility Goal 1 (PT)    Activity/Assistive Device (Bed Mobility Goal 1, PT)  bed mobility activities, all  -AB      [place] Level/Cues Needed (Bed Mobility Goal 1, PT)  independent  -AB      Time Frame (Bed Mobility Goal 1, PT)  long term goal (LTG);10 days  -AB      Progress/Outcomes (Bed Mobility Goal 1, PT)  goal not met  -AB         Transfer Goal 1 (PT)     Activity/Assistive Device (Transfer Goal 1, PT)  sit-to-stand/stand-to-sit;bed-to-chair/chair-to-bed  -AB      [place] Level/Cues Needed (Transfer Goal 1, PT)  independent  -AB      Time Frame (Transfer Goal 1, PT)  long term goal (LTG);10 days  -AB      Progress/Outcome (Transfer Goal 1, PT)  goal not met  -AB         Gait Training Goal 1 (PT)    Activity/Assistive Device (Gait Training Goal 1, PT)  gait (walking locomotion);decrease fall risk;improve balance and speed;increase endurance/gait distance  -AB      [place] Level (Gait Training Goal 1, PT)  standby assist  -AB      Distance (Gait Training Goal 1, PT)  100 ft  -AB      Time Frame (Gait Training Goal 1, PT)  long term goal (LTG);10 days  -AB      Progress/Outcome (Gait Training Goal 1, PT)  goal not met  -AB        User Key  (r) = Recorded By, (t) = Taken By, (c) = Cosigned By    Initials Name Provider Type Discipline    Sindi Castrejon PTA Physical Therapy Assistant PT              PT Discharge Summary  Anticipated Discharge Disposition (PT): home  Reason for Discharge: Discharge from facility  Outcomes Achieved: Refer to plan of care for updates on goals achieved  Discharge Destination: other (comment) (Left AMA)      Sindi Paul PTA   8/7/2021

## 2021-08-07 NOTE — CASE MANAGEMENT/SOCIAL WORK
On call  (Juliann) called and notified pt leaving AMA and that chart notes APS being involved in the case.  Juliann states she will notify APS in am of her leaving.  Talked with patient and she is aware of decision, AAOx3, states she will call a cab to come pick her up.  Home meds retrieved from pharmacy and given to pt................... GEORGETTE Farrell RN House Supervisor

## 2021-08-08 NOTE — THERAPY DISCHARGE NOTE
Acute Care - Occupational Therapy Discharge Summary  [facility]     Patient Name: Lenora Kathleen  : 1960  MRN: [id number]    Today's Date: 2021  Onset of Illness/Injury or Date of Surgery: 21    Date of Referral to OT: 21  Referring Physician: Dr. Bales      Admit Date: 2021        OT Recommendation and Plan    Visit Dx:    ICD-10-CM ICD-9-CM   1. Altered mental status, unspecified altered mental status type  R41.82 780.97   2. Alteration in self-care ability  R68.89 799.3   3. Acute UTI (urinary tract infection)  N39.0 599.0   4. Polypharmacy  Z79.899 V58.69   5. Impaired functional mobility and activity tolerance  Z74.09 V49.89   6. Dysphagia, unspecified type  R13.10 787.20   7. Decreased activities of daily living (ADL)  Z78.9 V49.89               OT Rehab Goals     Row Name 21 1500             Bathing Goal 1 (OT)    Activity/Device (Bathing Goal 1, OT)  bathing skills, all;tub bench  -CH      [place] Level/Cues Needed (Bathing Goal 1, OT)  modified independence  -CH      Time Frame (Bathing Goal 1, OT)  long term goal (LTG);10 days  -CH      Progress/Outcomes (Bathing Goal 1, OT)  goal not met  -CH         Strength Goal 1 (OT)    Strength Goal 1 (OT)  Increase BUE strength to 4+/5 to increase independence with ADL  -CH      Time Frame (Strength Goal 1, OT)  long term goal (LTG);10 days  -CH      Progress/Outcome (Strength Goal 1, OT)  goal not met  -CH         Balance Goal 1 (OT)    Activity/Assistive Device (Balance Goal 1, OT)  standing, dynamic  -CH      [place] Level/Cues Needed (Balance Goal 1, OT)  standby assist  -CH      Time Frame (Balance Goal 1, OT)  long term goal (LTG);10 days  -CH      Progress/Outcomes (Balance Goal 1, OT)  goal not met  -CH        User Key  (r) = Recorded By, (t) = Taken By, (c) = Cosigned By    Initials Name Provider Type Discipline     Rosita Coy, OTR/L Occupational Therapist OT          Outcome Measures     Row Name 21  0907             How much help from another is currently needed...    Putting on and taking off regular lower body clothing?  3  -AC      Bathing (including washing, rinsing, and drying)  3  -AC      Toileting (which includes using toilet bed pan or urinal)  3  -AC      Putting on and taking off regular upper body clothing  4  -AC      Taking care of personal grooming (such as brushing teeth)  4  -AC      Eating meals  4  -AC      AM-PAC 6 Clicks Score (OT)  21  -AC         Functional Assessment    Outcome Measure Options  AM-PAC 6 Clicks Daily Activity (OT)  -AC        User Key  (r) = Recorded By, (t) = Taken By, (c) = Cosigned By    Initials Name Provider Type    AC Carlos Olivera, OTR/L, CNT Occupational Therapist                  OT Discharge Summary  Anticipated Discharge Disposition (OT): home with home health  Reason for Discharge: Patient/Caregiver request  Outcomes Achieved: Refer to plan of care for updates on goals achieved  Discharge Destination: Home      Rosita Coy OTR/L  8/8/2021

## 2021-08-09 NOTE — PROGRESS NOTES
Continued Stay Note  [facility]     Patient Name: Lenora Kathleen  MRN: [id number]  Today's Date: 8/9/2021    Admit Date: 8/5/2021    Discharge Plan     Row Name 08/09/21 1012       Plan    Final Discharge Disposition Code  07 - left AMA    Final Note  Pt left AMA on Friday (8-6-21). Notified APS worker (Shweta Olvera)  [phone] that pt did leave AMA. Also notified Xiomara at [facility].        Discharge Codes    No documentation.             ROSSANA Jara

## 2021-08-10 PROBLEM — Z92.3 HISTORY OF RADIATION THERAPY: Status: ACTIVE | Noted: 2021-08-10

## 2021-08-10 LAB
BACTERIA SPEC AEROBE CULT: NORMAL
BACTERIA SPEC AEROBE CULT: NORMAL

## 2021-08-10 NOTE — DISCHARGE SUMMARY
Pt left AMA on Friday (8-6-21). Notified APS worker (Shweta Olvera)  [phone] that pt did leave AMA. Also notified Xiomara at [facility].     KT

## 2021-08-11 ENCOUNTER — TELEPHONE (OUTPATIENT)
Dept: RADIATION ONCOLOGY | Facility: HOSPITAL | Age: 61
End: 2021-08-11

## 2021-08-11 ENCOUNTER — HOSPITAL ENCOUNTER (OUTPATIENT)
Dept: RADIATION ONCOLOGY | Facility: HOSPITAL | Age: 61
Setting detail: RADIATION/ONCOLOGY SERIES
End: 2021-08-11

## 2021-08-12 ENCOUNTER — HOSPITAL ENCOUNTER (OUTPATIENT)
Dept: INFUSION THERAPY | Age: 61
End: 2021-08-12

## 2021-08-19 ENCOUNTER — HOSPITAL ENCOUNTER (INPATIENT)
Age: 61
LOS: 4 days | Discharge: HOME | DRG: 885 | End: 2021-08-24
Attending: EMERGENCY MEDICINE | Admitting: PSYCHIATRY & NEUROLOGY
Payer: MEDICARE

## 2021-08-19 ENCOUNTER — HOSPITAL ENCOUNTER (EMERGENCY)
Facility: HOSPITAL | Age: 61
Discharge: HOME | End: 2021-08-19
Attending: EMERGENCY MEDICINE | Admitting: EMERGENCY MEDICINE

## 2021-08-19 ENCOUNTER — APPOINTMENT (OUTPATIENT)
Dept: CT IMAGING | Age: 61
DRG: 885 | End: 2021-08-19
Payer: MEDICARE

## 2021-08-19 VITALS
OXYGEN SATURATION: 98 % | BODY MASS INDEX: 29.07 KG/M2 | DIASTOLIC BLOOD PRESSURE: 74 MMHG | HEART RATE: 80 BPM | HEIGHT: 66 IN | SYSTOLIC BLOOD PRESSURE: 132 MMHG | RESPIRATION RATE: 16 BRPM | WEIGHT: 180.9 LBS | TEMPERATURE: 98 F

## 2021-08-19 DIAGNOSIS — R40.4 TRANSIENT ALTERATION OF AWARENESS: Primary | ICD-10-CM

## 2021-08-19 DIAGNOSIS — E87.6 HYPOKALEMIA: ICD-10-CM

## 2021-08-19 DIAGNOSIS — G89.29 OTHER CHRONIC PAIN: ICD-10-CM

## 2021-08-19 DIAGNOSIS — R10.84 GENERALIZED ABDOMINAL PAIN: Primary | ICD-10-CM

## 2021-08-19 DIAGNOSIS — F32.2 SEVERE DEPRESSION (HCC): ICD-10-CM

## 2021-08-19 DIAGNOSIS — R82.81 PYURIA: ICD-10-CM

## 2021-08-19 DIAGNOSIS — Z79.899 POLYPHARMACY: ICD-10-CM

## 2021-08-19 LAB
ACETAMINOPHEN LEVEL: <15 UG/ML
ALBUMIN SERPL-MCNC: 3.8 G/DL (ref 3.5–5.2)
ALBUMIN SERPL-MCNC: 4.3 G/DL (ref 3.5–5.2)
ALBUMIN/GLOB SERPL: 1.5 G/DL
ALP BLD-CCNC: 59 U/L (ref 35–104)
ALP SERPL-CCNC: 63 U/L (ref 39–117)
ALT SERPL W P-5'-P-CCNC: 18 U/L (ref 1–33)
ALT SERPL-CCNC: 20 U/L (ref 5–33)
AMMONIA BLD-SCNC: <10 UMOL/L (ref 11–51)
AMPHETAMINE SCREEN, URINE: NEGATIVE
ANION GAP SERPL CALCULATED.3IONS-SCNC: 17 MMOL/L (ref 5–15)
ANION GAP SERPL CALCULATED.3IONS-SCNC: 17 MMOL/L (ref 7–19)
AST SERPL-CCNC: 30 U/L (ref 1–32)
AST SERPL-CCNC: 32 U/L (ref 5–32)
BACTERIA: NEGATIVE /HPF
BARBITURATE SCREEN URINE: NEGATIVE
BASOPHILS # BLD AUTO: 0.02 10*3/MM3 (ref 0–0.2)
BASOPHILS ABSOLUTE: 0 K/UL (ref 0–0.2)
BASOPHILS NFR BLD AUTO: 0.3 % (ref 0–1.5)
BASOPHILS RELATIVE PERCENT: 0.3 % (ref 0–1)
BENZODIAZEPINE SCREEN, URINE: NEGATIVE
BILIRUB SERPL-MCNC: 0.3 MG/DL (ref 0–1.2)
BILIRUB SERPL-MCNC: 0.4 MG/DL (ref 0.2–1.2)
BILIRUBIN URINE: ABNORMAL
BLOOD, URINE: ABNORMAL
BUN BLDV-MCNC: 23 MG/DL (ref 8–23)
BUN SERPL-MCNC: 21 MG/DL (ref 8–23)
BUN/CREAT SERPL: 35.6 (ref 7–25)
CALCIUM SERPL-MCNC: 10 MG/DL (ref 8.8–10.2)
CALCIUM SPEC-SCNC: 9.6 MG/DL (ref 8.6–10.5)
CANNABINOID SCREEN URINE: NEGATIVE
CHLORIDE BLD-SCNC: 98 MMOL/L (ref 98–111)
CHLORIDE SERPL-SCNC: 98 MMOL/L (ref 98–107)
CLARITY: ABNORMAL
CO2 SERPL-SCNC: 23 MMOL/L (ref 22–29)
CO2: 19 MMOL/L (ref 22–29)
COCAINE METABOLITE SCREEN URINE: NEGATIVE
COLOR: ABNORMAL
CREAT SERPL-MCNC: 0.5 MG/DL (ref 0.5–0.9)
CREAT SERPL-MCNC: 0.59 MG/DL (ref 0.57–1)
CRYSTALS, UA: ABNORMAL /HPF
DEPRECATED RDW RBC AUTO: 49.1 FL (ref 37–54)
EOSINOPHIL # BLD AUTO: 0.06 10*3/MM3 (ref 0–0.4)
EOSINOPHIL NFR BLD AUTO: 0.9 % (ref 0.3–6.2)
EOSINOPHILS ABSOLUTE: 0.1 K/UL (ref 0–0.6)
EOSINOPHILS RELATIVE PERCENT: 0.8 % (ref 0–5)
EPITHELIAL CELLS, UA: 1 /HPF (ref 0–5)
ERYTHROCYTE [DISTWIDTH] IN BLOOD BY AUTOMATED COUNT: 14.4 % (ref 12.3–15.4)
ETHANOL: <10 MG/DL (ref 0–0.08)
GFR AFRICAN AMERICAN: >59
GFR NON-AFRICAN AMERICAN: >60
GFR SERPL CREATININE-BSD FRML MDRD: 104 ML/MIN/1.73
GLOBULIN UR ELPH-MCNC: 2.9 GM/DL
GLUCOSE BLD-MCNC: 84 MG/DL (ref 74–109)
GLUCOSE SERPL-MCNC: 107 MG/DL (ref 65–99)
GLUCOSE URINE: NEGATIVE MG/DL
HCT VFR BLD AUTO: 36.5 % (ref 34–46.6)
HCT VFR BLD CALC: 38 % (ref 37–47)
HEMOGLOBIN: 11.9 G/DL (ref 12–16)
HGB BLD-MCNC: 11.8 G/DL (ref 12–15.9)
HYALINE CASTS: 5 /HPF (ref 0–8)
IMM GRANULOCYTES # BLD AUTO: 0.02 10*3/MM3 (ref 0–0.05)
IMM GRANULOCYTES NFR BLD AUTO: 0.3 % (ref 0–0.5)
IMMATURE GRANULOCYTES #: 0 K/UL
KETONES, URINE: =>160 MG/DL
LEUKOCYTE ESTERASE, URINE: ABNORMAL
LYMPHOCYTES # BLD AUTO: 1.56 10*3/MM3 (ref 0.7–3.1)
LYMPHOCYTES ABSOLUTE: 1.5 K/UL (ref 1.1–4.5)
LYMPHOCYTES NFR BLD AUTO: 22.7 % (ref 19.6–45.3)
LYMPHOCYTES RELATIVE PERCENT: 21.1 % (ref 20–40)
Lab: NORMAL
MCH RBC QN AUTO: 29.6 PG (ref 27–31)
MCH RBC QN AUTO: 30.1 PG (ref 26.6–33)
MCHC RBC AUTO-ENTMCNC: 31.3 G/DL (ref 33–37)
MCHC RBC AUTO-ENTMCNC: 32.3 G/DL (ref 31.5–35.7)
MCV RBC AUTO: 93.1 FL (ref 79–97)
MCV RBC AUTO: 94.5 FL (ref 81–99)
MONOCYTES # BLD AUTO: 0.62 10*3/MM3 (ref 0.1–0.9)
MONOCYTES ABSOLUTE: 0.7 K/UL (ref 0–0.9)
MONOCYTES NFR BLD AUTO: 9 % (ref 5–12)
MONOCYTES RELATIVE PERCENT: 9.6 % (ref 0–10)
NEUTROPHILS ABSOLUTE: 4.9 K/UL (ref 1.5–7.5)
NEUTROPHILS NFR BLD AUTO: 4.59 10*3/MM3 (ref 1.7–7)
NEUTROPHILS NFR BLD AUTO: 66.8 % (ref 42.7–76)
NEUTROPHILS RELATIVE PERCENT: 67.8 % (ref 50–65)
NITRITE, URINE: NEGATIVE
NRBC BLD AUTO-RTO: 0 /100 WBC (ref 0–0.2)
OPIATE SCREEN URINE: NEGATIVE
PDW BLD-RTO: 13.9 % (ref 11.5–14.5)
PH UA: 6 (ref 5–8)
PLATELET # BLD AUTO: 239 10*3/MM3 (ref 140–450)
PLATELET # BLD: 240 K/UL (ref 130–400)
PMV BLD AUTO: 11.9 FL (ref 6–12)
PMV BLD AUTO: 12 FL (ref 9.4–12.3)
POTASSIUM SERPL-SCNC: 3.4 MMOL/L (ref 3.5–5)
POTASSIUM SERPL-SCNC: 3.7 MMOL/L (ref 3.5–5.2)
PROT SERPL-MCNC: 7.2 G/DL (ref 6–8.5)
PROTEIN UA: 100 MG/DL
RBC # BLD AUTO: 3.92 10*6/MM3 (ref 3.77–5.28)
RBC # BLD: 4.02 M/UL (ref 4.2–5.4)
RBC UA: 7 /HPF (ref 0–4)
SALICYLATE, SERUM: <3 MG/DL (ref 3–10)
SODIUM BLD-SCNC: 134 MMOL/L (ref 136–145)
SODIUM SERPL-SCNC: 138 MMOL/L (ref 136–145)
SPECIFIC GRAVITY UA: 1.03 (ref 1–1.03)
TOTAL PROTEIN: 7 G/DL (ref 6.6–8.7)
UROBILINOGEN, URINE: 1 E.U./DL
WBC # BLD AUTO: 6.87 10*3/MM3 (ref 3.4–10.8)
WBC # BLD: 7.2 K/UL (ref 4.8–10.8)
WBC UA: 233 /HPF (ref 0–5)

## 2021-08-19 PROCEDURE — 80143 DRUG ASSAY ACETAMINOPHEN: CPT

## 2021-08-19 PROCEDURE — 80053 COMPREHEN METABOLIC PANEL: CPT | Performed by: EMERGENCY MEDICINE

## 2021-08-19 PROCEDURE — 6370000000 HC RX 637 (ALT 250 FOR IP): Performed by: EMERGENCY MEDICINE

## 2021-08-19 PROCEDURE — 85025 COMPLETE CBC W/AUTO DIFF WBC: CPT | Performed by: EMERGENCY MEDICINE

## 2021-08-19 PROCEDURE — 80053 COMPREHEN METABOLIC PANEL: CPT

## 2021-08-19 PROCEDURE — 70450 CT HEAD/BRAIN W/O DYE: CPT

## 2021-08-19 PROCEDURE — 85025 COMPLETE CBC W/AUTO DIFF WBC: CPT

## 2021-08-19 PROCEDURE — 36415 COLL VENOUS BLD VENIPUNCTURE: CPT

## 2021-08-19 PROCEDURE — 80307 DRUG TEST PRSMV CHEM ANLYZR: CPT

## 2021-08-19 PROCEDURE — 99284 EMERGENCY DEPT VISIT MOD MDM: CPT

## 2021-08-19 PROCEDURE — 82077 ASSAY SPEC XCP UR&BREATH IA: CPT

## 2021-08-19 PROCEDURE — 99283 EMERGENCY DEPT VISIT LOW MDM: CPT

## 2021-08-19 PROCEDURE — 87086 URINE CULTURE/COLONY COUNT: CPT

## 2021-08-19 PROCEDURE — 74176 CT ABD & PELVIS W/O CONTRAST: CPT

## 2021-08-19 PROCEDURE — 80179 DRUG ASSAY SALICYLATE: CPT

## 2021-08-19 PROCEDURE — 81001 URINALYSIS AUTO W/SCOPE: CPT

## 2021-08-19 PROCEDURE — 82140 ASSAY OF AMMONIA: CPT | Performed by: EMERGENCY MEDICINE

## 2021-08-19 RX ORDER — CEPHALEXIN 250 MG/1
500 CAPSULE ORAL EVERY 12 HOURS SCHEDULED
Status: DISCONTINUED | OUTPATIENT
Start: 2021-08-19 | End: 2021-08-23

## 2021-08-19 RX ORDER — CEPHALEXIN 500 MG/1
500 CAPSULE ORAL 4 TIMES DAILY
Qty: 28 CAPSULE | Refills: 0 | Status: SHIPPED
Start: 2021-08-19 | End: 2021-08-19 | Stop reason: ALTCHOICE

## 2021-08-19 RX ADMIN — POTASSIUM BICARBONATE 40 MEQ: 782 TABLET, EFFERVESCENT ORAL at 22:01

## 2021-08-19 NOTE — ED NOTES
Bed: 16  Expected date:   Expected time:   Means of arrival:   Comments:  EMS: Karin Tripathi RN  08/19/21 5170

## 2021-08-19 NOTE — ED PROVIDER NOTES
Subjective   Patient is very difficult to get a history from.  I awaken her and all she talks is a very soft voice and tells me she just hurts all over.  When I press her about the pain I like to go and all she tells me is her abdomen but does not tell me where or when.  She says she just generally feels bad just wants to die but it hurts so bad.  She does have a history of coronary chart of anal cancer and had had radiation and chemotherapy.  As far as I can tell her last chemo was June 30.  She has been several times to the emergency room in July and August with what she calls brain fog but essentially mostly a problem with taking care of her ostomy site from her surgery for her cancer.  Her son is called and told the nursing staff that she has been acting strange to the rest the family and not communicating with them.      History provided by:  Patient   used: No    Other  Location:  General  Quality:  Diffuse pains  Severity:  Severe  Onset quality:  Gradual  Timing:  Constant  Progression:  Unchanged  Chronicity:  Chronic  Associated symptoms: abdominal pain    Associated symptoms: no chest pain, no congestion, no cough, no diarrhea, no fatigue, no headaches, no myalgias, no rhinorrhea, no shortness of breath and no vomiting        Review of Systems   Constitutional: Negative.  Negative for fatigue.   HENT: Negative.  Negative for congestion and rhinorrhea.    Respiratory: Negative.  Negative for cough and shortness of breath.    Cardiovascular: Negative.  Negative for chest pain.   Gastrointestinal: Positive for abdominal pain. Negative for diarrhea and vomiting.   Genitourinary: Negative.    Musculoskeletal: Negative.  Negative for myalgias.   Skin: Negative.    Neurological: Negative.  Negative for headaches.   Psychiatric/Behavioral: Negative.    All other systems reviewed and are negative.      Past Medical History:   Diagnosis Date   • Anxiety    • Blood in urine    • Chest tightness     • Colon polyps    • COPD (chronic obstructive pulmonary disease) (CMS/HCC)    • Depression    • Emphysema/COPD (CMS/HCC)    • Hepatitis C    • Hypertension    • Injury of back    • Kidney stone    • Palpitation    • PONV (postoperative nausea and vomiting)    • Rectal cancer (CMS/HCC)        Allergies   Allergen Reactions   • Morphine GI Intolerance and Nausea And Vomiting     Other reaction(s): Vomiting         Past Surgical History:   Procedure Laterality Date   • CHOLECYSTECTOMY     • COLONOSCOPY  03/12/2015    normal   • COLOSTOMY N/A 4/23/2021    Procedure: LAPAROSCOPIC DIVERTING COLOSTOMY;  Surgeon: Paulette Villegas MD;  Location: [facility];  Service: General;  Laterality: N/A;   • FINGER SURGERY     • HYSTERECTOMY     • KIDNEY STONE SURGERY     • KIDNEY SURGERY     • OTHER SURGICAL HISTORY      POSSIBLE CARDIAC MONITOR (LOOP RECORDER?)--IMPLANTED AND EXPLANTED    • US GUIDED LYMPH NODE BIOPSY  4/30/2021       Family History   Problem Relation Age of Onset   • Heart disease Mother    • No Known Problems Father        Social History     Socioeconomic History   • Marital status:      Spouse name: Not on file   • Number of children: Not on file   • Years of education: Not on file   • Highest education level: Not on file   Tobacco Use   • Smoking status: Former Smoker     Packs/day: 0.25     Years: 45.00     Pack years: 11.25     Types: Cigarettes   • Smokeless tobacco: Never Used   Substance and Sexual Activity   • Alcohol use: No   • Drug use: No   • Sexual activity: Defer     Partners: Male       Prior to Admission medications    Medication Sig Start Date End Date Taking? Authorizing Provider   acetaminophen (TYLENOL) 325 MG tablet Take 2 tablets by mouth Every 4 (Four) Hours As Needed for Mild Pain . 7/21/21   Danielle Villagomez, APRN   albuterol sulfate  (90 Base) MCG/ACT inhaler Inhale 2 puffs As Needed. 9/3/19   Emergency, Nurse Epic, RN   ALPRAZolam (XANAX) 0.25 MG tablet Take 1 tablet  by mouth 3 (Three) Times a Day As Needed for Anxiety. 7/21/21   Danielle Villagomez APRN   capecitabine (XELODA) 150 MG chemo tablet  5/21/21   Eloina Price MD   capecitabine (XELODA) 500 MG chemo tablet Take  by mouth. 3 500 mg tablets in the morning AND evening PLUS 150 mg mg Monday- FRIDAY 5/21/21   ProviderEloina MD   Cyanocobalamin (VITAMIN B 12) 500 MCG tablet Take 500 mcg by mouth Daily.    Emergency, Nurse NAEL Brumfield   fentaNYL (DURAGESIC) 50 MCG/HR patch Place 1 patch on the skin as directed by provider Every 72 (Seventy-Two) Hours. 7/21/21   Danielle Villagomez APRN   FLUoxetine (PROZAC) 20 MG capsule Take 20 mg by mouth 4 (Four) Times a Day. 1/19/18   Emergency, Nurse Epic, RN   gabapentin (NEURONTIN) 100 MG capsule Take 1 capsule by mouth 4 (Four) Times a Day. 7/21/21   Danielle Villagomez APRN   ibuprofen (ADVIL,MOTRIN) 600 MG tablet Take 1 tablet by mouth Every 8 (Eight) Hours As Needed for Moderate Pain  for up to 279 days. 7/21/21 4/26/22  Danielle Villagomez APRN   megestrol (MEGACE ES) 625 MG/5ML suspension Take 5 mL by mouth Daily. 6/9/21   Jadiel Guthrie III, MD   ondansetron (Zofran) 4 MG tablet Take 1 tablet by mouth Every 8 (Eight) Hours As Needed for Nausea or Vomiting. 4/26/21 4/26/22  Paulette Villegas MD   vitamin D (ERGOCALCIFEROL) 1.25 MG (07511 UT) capsule capsule Take 50,000 Units by mouth 1 (One) Time Per Week. 2/18/19   Emergency, Nurse Epic, RN       Medications - No data to display    Vitals:    08/19/21 1316   BP: 112/73   Pulse: 77   Resp:    SpO2: 100%         Objective   Physical Exam  Vitals and nursing note reviewed.   Constitutional:       Comments: Somnolent but arousable.   HENT:      Head: Normocephalic and atraumatic.   Cardiovascular:      Rate and Rhythm: Normal rate and regular rhythm.   Pulmonary:      Effort: Pulmonary effort is normal.      Breath sounds: Normal breath sounds.   Abdominal:      General: Bowel sounds are normal.      Palpations:  Abdomen is soft.      Tenderness: There is abdominal tenderness.      Comments: Patient is tender to palpation where I touch her.  It also applies to other parts of her body besides the abdomen.   Musculoskeletal:         General: Normal range of motion.      Cervical back: Normal range of motion and neck supple.   Skin:     General: Skin is warm and dry.   Neurological:      General: No focal deficit present.      Comments: The patient answers questions it seems to be appropriately oriented but she does not answer many questions.  She does move all extremities equally with no signs of focal deficits.   Psychiatric:      Comments: A markedly depressed affect.         Procedures         Lab Results (last 24 hours)     Procedure Component Value Units Date/Time    CBC & Differential [[id number]]  (Abnormal) Collected: 08/19/21 1315    Specimen: Blood Updated: 08/19/21 1350    Narrative:      The following orders were created for panel order CBC & Differential.  Procedure                               Abnormality         Status                     ---------                               -----------         ------                     CBC Auto Differential[[id number]]        Abnormal            Final result                 Please view results for these tests on the individual orders.    Comprehensive Metabolic Panel [[id number]]  (Abnormal) Collected: 08/19/21 1315    Specimen: Blood Updated: 08/19/21 1400     Glucose 107 mg/dL      BUN 21 mg/dL      Creatinine 0.59 mg/dL      Sodium 138 mmol/L      Potassium 3.7 mmol/L      Chloride 98 mmol/L      CO2 23.0 mmol/L      Calcium 9.6 mg/dL      Total Protein 7.2 g/dL      Albumin 4.30 g/dL      ALT (SGPT) 18 U/L      AST (SGOT) 30 U/L      Alkaline Phosphatase 63 U/L      Total Bilirubin 0.3 mg/dL      eGFR Non African Amer 104 mL/min/1.73      Globulin 2.9 gm/dL      A/G Ratio 1.5 g/dL      BUN/Creatinine Ratio 35.6     Anion Gap 17.0 mmol/L     Narrative:      GFR Normal  >60  Chronic Kidney Disease <60  Kidney Failure <15      Ammonia [[id number]]  (Abnormal) Collected: 08/19/21 1315    Specimen: Blood Updated: 08/19/21 1358     Ammonia <10 umol/L     CBC Auto Differential [[id number]]  (Abnormal) Collected: 08/19/21 1315    Specimen: Blood Updated: 08/19/21 1350     WBC 6.87 10*3/mm3      RBC 3.92 10*6/mm3      Hemoglobin 11.8 g/dL      Hematocrit 36.5 %      MCV 93.1 fL      MCH 30.1 pg      MCHC 32.3 g/dL      RDW 14.4 %      RDW-SD 49.1 fl      MPV 11.9 fL      Platelets 239 10*3/mm3      Neutrophil % 66.8 %      Lymphocyte % 22.7 %      Monocyte % 9.0 %      Eosinophil % 0.9 %      Basophil % 0.3 %      Immature Grans % 0.3 %      Neutrophils, Absolute 4.59 10*3/mm3      Lymphocytes, Absolute 1.56 10*3/mm3      Monocytes, Absolute 0.62 10*3/mm3      Eosinophils, Absolute 0.06 10*3/mm3      Basophils, Absolute 0.02 10*3/mm3      Immature Grans, Absolute 0.02 10*3/mm3      nRBC 0.0 /100 WBC           No orders to display       ED Course  ED Course as of Aug 19 1406   Thu Aug 19, 2021   1404 I went to talk to the patient and she is alert and appropriately responsive.  She is not really sure why she called them is or why she is here.  I told all of her testing that we did do looks okay.  She is now stable for discharge.  I think this may have just been a depressive episode.  The nurses notes that she complained of loss of vision but she never did to me and apparently was seeing perfectly normally now.  Earlier she just would not open her eyes.  I feel like this is mostly just a stress related event.    [TR]      ED Course User Index  [TR] Catalino Rosenberg Jr., MD          MDM  Number of Diagnoses or Management Options  Generalized abdominal pain: new and requires workup     Amount and/or Complexity of Data Reviewed  Clinical lab tests: ordered and reviewed    Risk of Complications, Morbidity, and/or Mortality  Presenting problems: moderate  Diagnostic procedures:  moderate  Management options: moderate    Patient Progress  Patient progress: stable      Final diagnoses:   Generalized abdominal pain          Catalino Rosenberg Jr., MD  08/19/21 [id number]

## 2021-08-19 NOTE — ED NOTES
"Pt to er via ems.   Ems reports pt walked to office stating \"cant see & hurting all over.\"  Unknown time of onset or lkw.  Once placed on stretcher, pt would not answer any questions.  Upon arrive to er pt does not answer any questions,  When asked her birthday, pt states \"[birth year].\" no facial droop noted.  Pt will not follow commands.     Argenis Hyman RN  08/19/21 1134              Argenis Hyman RN  08/19/21 1140    "

## 2021-08-19 NOTE — ED NOTES
"Spoke to pt.  Charge nurse had said that family cannot pick her up until after 6pm due to work duties.  I LM for  to see what was going on with guardianship.  LM for son.  Spoke to daughter in law Vicki.  She said pt keeps dodging the  and her and her  decided not to be guardians for her because of her manipulation and lies.  I spoke to pt to see if we could send by cab but she is not oriented to year.  \"2020\" and said she did not know the month.  She says she does not know why her family won't take care of her. Vicki says her sister and pt had it out and sister does not contact her anymore.  Pt says she has been trying to get in touch with sister but does not know why she cannot reach her.  I told Vicki that we could not send in cab due to her possibly being not oriented.  Vicki took my number and will attempt to get someone to come get her.   Said her neighbor is mad at her too.  I did tell Vicki that pt staying here makes her more susceptible to the Covid. .15:17 CDT     Vicki called me back and said she is trying very hard to come between clients if possible to pick her up.  If not possible will be here after work around 6pm. .15:55 CDT      "
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
## 2021-08-20 PROBLEM — F33.2 MAJOR DEPRESSIVE DISORDER, RECURRENT SEVERE WITHOUT PSYCHOTIC FEATURES (HCC): Status: ACTIVE | Noted: 2021-08-20

## 2021-08-20 PROBLEM — F32.A DEPRESSION, ACUTE: Status: ACTIVE | Noted: 2021-08-20

## 2021-08-20 LAB
REASON FOR REJECTION: NORMAL
REJECTED TEST: NORMAL
SARS-COV-2, NAAT: NOT DETECTED

## 2021-08-20 PROCEDURE — 6370000000 HC RX 637 (ALT 250 FOR IP): Performed by: PSYCHIATRY & NEUROLOGY

## 2021-08-20 PROCEDURE — 6370000000 HC RX 637 (ALT 250 FOR IP): Performed by: NURSE PRACTITIONER

## 2021-08-20 PROCEDURE — 1240000000 HC EMOTIONAL WELLNESS R&B

## 2021-08-20 PROCEDURE — 99222 1ST HOSP IP/OBS MODERATE 55: CPT | Performed by: PSYCHIATRY & NEUROLOGY

## 2021-08-20 PROCEDURE — 87635 SARS-COV-2 COVID-19 AMP PRB: CPT

## 2021-08-20 PROCEDURE — 6360000002 HC RX W HCPCS: Performed by: PSYCHIATRY & NEUROLOGY

## 2021-08-20 RX ORDER — FLUOXETINE HYDROCHLORIDE 20 MG/1
40 CAPSULE ORAL 2 TIMES DAILY
Status: DISCONTINUED | OUTPATIENT
Start: 2021-08-20 | End: 2021-08-20

## 2021-08-20 RX ORDER — PROMETHAZINE HYDROCHLORIDE 25 MG/ML
12.5 INJECTION, SOLUTION INTRAMUSCULAR; INTRAVENOUS EVERY 8 HOURS PRN
Status: DISCONTINUED | OUTPATIENT
Start: 2021-08-20 | End: 2021-08-24 | Stop reason: HOSPADM

## 2021-08-20 RX ORDER — TRAZODONE HYDROCHLORIDE 50 MG/1
50 TABLET ORAL NIGHTLY
Status: DISCONTINUED | OUTPATIENT
Start: 2021-08-20 | End: 2021-08-20

## 2021-08-20 RX ORDER — GABAPENTIN 300 MG/1
300 CAPSULE ORAL 4 TIMES DAILY
Status: DISCONTINUED | OUTPATIENT
Start: 2021-08-21 | End: 2021-08-20

## 2021-08-20 RX ORDER — HYDROXYZINE HYDROCHLORIDE 25 MG/1
25 TABLET, FILM COATED ORAL 3 TIMES DAILY PRN
Status: DISCONTINUED | OUTPATIENT
Start: 2021-08-20 | End: 2021-08-24 | Stop reason: HOSPADM

## 2021-08-20 RX ORDER — MIRTAZAPINE 7.5 MG/1
3.75 TABLET, FILM COATED ORAL NIGHTLY
Status: DISCONTINUED | OUTPATIENT
Start: 2021-08-20 | End: 2021-08-24 | Stop reason: HOSPADM

## 2021-08-20 RX ORDER — OXYCODONE AND ACETAMINOPHEN 10; 325 MG/1; MG/1
1 TABLET ORAL 3 TIMES DAILY PRN
Status: DISCONTINUED | OUTPATIENT
Start: 2021-08-20 | End: 2021-08-20

## 2021-08-20 RX ORDER — FLUOXETINE HYDROCHLORIDE 20 MG/1
40 CAPSULE ORAL 2 TIMES DAILY
Status: DISCONTINUED | OUTPATIENT
Start: 2021-08-21 | End: 2021-08-20

## 2021-08-20 RX ORDER — TRAZODONE HYDROCHLORIDE 50 MG/1
50 TABLET ORAL NIGHTLY
Status: DISCONTINUED | OUTPATIENT
Start: 2021-08-20 | End: 2021-08-21

## 2021-08-20 RX ORDER — POTASSIUM CITRATE 15 MEQ/1
15 TABLET, EXTENDED RELEASE ORAL 3 TIMES DAILY
Status: DISCONTINUED | OUTPATIENT
Start: 2021-08-20 | End: 2021-08-21 | Stop reason: DRUGHIGH

## 2021-08-20 RX ORDER — OXYCODONE HYDROCHLORIDE AND ACETAMINOPHEN 5; 325 MG/1; MG/1
1 TABLET ORAL ONCE
Status: COMPLETED | OUTPATIENT
Start: 2021-08-20 | End: 2021-08-20

## 2021-08-20 RX ORDER — GABAPENTIN 300 MG/1
300 CAPSULE ORAL ONCE
Status: COMPLETED | OUTPATIENT
Start: 2021-08-20 | End: 2021-08-20

## 2021-08-20 RX ORDER — FLUOXETINE HYDROCHLORIDE 20 MG/1
40 CAPSULE ORAL DAILY
Status: DISCONTINUED | OUTPATIENT
Start: 2021-08-21 | End: 2021-08-23

## 2021-08-20 RX ORDER — LISINOPRIL 10 MG/1
10 TABLET ORAL DAILY
Status: DISCONTINUED | OUTPATIENT
Start: 2021-08-20 | End: 2021-08-21 | Stop reason: DRUGHIGH

## 2021-08-20 RX ORDER — ACETAMINOPHEN 325 MG/1
975 TABLET ORAL EVERY 8 HOURS PRN
Status: DISCONTINUED | OUTPATIENT
Start: 2021-08-20 | End: 2021-08-24 | Stop reason: HOSPADM

## 2021-08-20 RX ORDER — ALBUTEROL SULFATE 90 UG/1
2 AEROSOL, METERED RESPIRATORY (INHALATION) EVERY 6 HOURS PRN
Status: DISCONTINUED | OUTPATIENT
Start: 2021-08-20 | End: 2021-08-20 | Stop reason: CLARIF

## 2021-08-20 RX ORDER — POLYETHYLENE GLYCOL 3350 17 G/17G
17 POWDER, FOR SOLUTION ORAL DAILY PRN
Status: DISCONTINUED | OUTPATIENT
Start: 2021-08-20 | End: 2021-08-24 | Stop reason: HOSPADM

## 2021-08-20 RX ORDER — METOPROLOL SUCCINATE 25 MG/1
50 TABLET, EXTENDED RELEASE ORAL DAILY
Status: DISCONTINUED | OUTPATIENT
Start: 2021-08-20 | End: 2021-08-21 | Stop reason: DRUGHIGH

## 2021-08-20 RX ORDER — ASCORBIC ACID 100 MG
100 TABLET,CHEWABLE ORAL DAILY
Status: DISCONTINUED | OUTPATIENT
Start: 2021-08-20 | End: 2021-08-24 | Stop reason: HOSPADM

## 2021-08-20 RX ORDER — GABAPENTIN 300 MG/1
300 CAPSULE ORAL 2 TIMES DAILY
Status: DISCONTINUED | OUTPATIENT
Start: 2021-08-20 | End: 2021-08-24 | Stop reason: HOSPADM

## 2021-08-20 RX ORDER — GABAPENTIN 300 MG/1
300 CAPSULE ORAL 4 TIMES DAILY
Status: DISCONTINUED | OUTPATIENT
Start: 2021-08-20 | End: 2021-08-20 | Stop reason: SDUPTHER

## 2021-08-20 RX ORDER — GABAPENTIN 300 MG/1
600 CAPSULE ORAL NIGHTLY
Status: DISCONTINUED | OUTPATIENT
Start: 2021-08-20 | End: 2021-08-23

## 2021-08-20 RX ORDER — SUCRALFATE 1 G/1
1 TABLET ORAL 2 TIMES DAILY
Status: DISCONTINUED | OUTPATIENT
Start: 2021-08-20 | End: 2021-08-24 | Stop reason: HOSPADM

## 2021-08-20 RX ORDER — DICYCLOMINE HCL 20 MG
20 TABLET ORAL
Status: DISCONTINUED | OUTPATIENT
Start: 2021-08-20 | End: 2021-08-23

## 2021-08-20 RX ORDER — DICYCLOMINE HCL 20 MG
20 TABLET ORAL
Status: DISCONTINUED | OUTPATIENT
Start: 2021-08-21 | End: 2021-08-20

## 2021-08-20 RX ORDER — ZINC SULFATE 50(220)MG
50 CAPSULE ORAL DAILY
Status: DISCONTINUED | OUTPATIENT
Start: 2021-08-20 | End: 2021-08-20

## 2021-08-20 RX ORDER — MECOBALAMIN 5000 MCG
5 TABLET,DISINTEGRATING ORAL NIGHTLY
Status: DISCONTINUED | OUTPATIENT
Start: 2021-08-20 | End: 2021-08-20

## 2021-08-20 RX ORDER — IBUPROFEN 400 MG/1
600 TABLET ORAL EVERY 8 HOURS PRN
Status: DISCONTINUED | OUTPATIENT
Start: 2021-08-20 | End: 2021-08-24 | Stop reason: HOSPADM

## 2021-08-20 RX ORDER — CHOLECALCIFEROL (VITAMIN D3) 125 MCG
500 CAPSULE ORAL DAILY
Status: DISCONTINUED | OUTPATIENT
Start: 2021-08-20 | End: 2021-08-24 | Stop reason: HOSPADM

## 2021-08-20 RX ORDER — ONDANSETRON 4 MG/1
4 TABLET, FILM COATED ORAL EVERY 8 HOURS PRN
Status: DISCONTINUED | OUTPATIENT
Start: 2021-08-20 | End: 2021-08-24 | Stop reason: HOSPADM

## 2021-08-20 RX ORDER — OXYCODONE AND ACETAMINOPHEN 10; 325 MG/1; MG/1
1 TABLET ORAL 3 TIMES DAILY PRN
Status: DISCONTINUED | OUTPATIENT
Start: 2021-08-20 | End: 2021-08-21 | Stop reason: DRUGHIGH

## 2021-08-20 RX ORDER — ALBUTEROL SULFATE 2.5 MG/3ML
2.5 SOLUTION RESPIRATORY (INHALATION) EVERY 6 HOURS PRN
Status: DISCONTINUED | OUTPATIENT
Start: 2021-08-20 | End: 2021-08-24 | Stop reason: HOSPADM

## 2021-08-20 RX ORDER — MECOBALAMIN 5000 MCG
5 TABLET,DISINTEGRATING ORAL NIGHTLY
Status: DISCONTINUED | OUTPATIENT
Start: 2021-08-20 | End: 2021-08-24 | Stop reason: HOSPADM

## 2021-08-20 RX ADMIN — METOPROLOL SUCCINATE 50 MG: 25 TABLET, EXTENDED RELEASE ORAL at 08:50

## 2021-08-20 RX ADMIN — CEPHALEXIN 500 MG: 250 CAPSULE ORAL at 00:04

## 2021-08-20 RX ADMIN — DICYCLOMINE HYDROCHLORIDE 20 MG: 20 TABLET ORAL at 11:32

## 2021-08-20 RX ADMIN — CEPHALEXIN 500 MG: 250 CAPSULE ORAL at 08:50

## 2021-08-20 RX ADMIN — IBUPROFEN 600 MG: 400 TABLET ORAL at 13:02

## 2021-08-20 RX ADMIN — CEPHALEXIN 500 MG: 250 CAPSULE ORAL at 21:32

## 2021-08-20 RX ADMIN — FLUOXETINE HYDROCHLORIDE 40 MG: 20 CAPSULE ORAL at 08:49

## 2021-08-20 RX ADMIN — ONDANSETRON HYDROCHLORIDE 4 MG: 4 TABLET, FILM COATED ORAL at 15:19

## 2021-08-20 RX ADMIN — SUCRALFATE 1 G: 1 TABLET ORAL at 08:49

## 2021-08-20 RX ADMIN — Medication 5 MG: at 02:31

## 2021-08-20 RX ADMIN — LISINOPRIL 10 MG: 10 TABLET ORAL at 08:50

## 2021-08-20 RX ADMIN — SUCRALFATE 1 G: 1 TABLET ORAL at 17:53

## 2021-08-20 RX ADMIN — HYDROXYZINE HYDROCHLORIDE 25 MG: 25 TABLET, FILM COATED ORAL at 17:53

## 2021-08-20 RX ADMIN — OXYCODONE HYDROCHLORIDE AND ACETAMINOPHEN 1 TABLET: 5; 325 TABLET ORAL at 22:47

## 2021-08-20 RX ADMIN — TRAZODONE HYDROCHLORIDE 50 MG: 50 TABLET ORAL at 02:31

## 2021-08-20 RX ADMIN — OXYCODONE AND ACETAMINOPHEN 1 TABLET: 10; 325 TABLET ORAL at 03:14

## 2021-08-20 RX ADMIN — PROMETHAZINE HYDROCHLORIDE 12.5 MG: 25 INJECTION INTRAMUSCULAR; INTRAVENOUS at 17:53

## 2021-08-20 RX ADMIN — MIRTAZAPINE 3.75 MG: 7.5 TABLET ORAL at 21:32

## 2021-08-20 RX ADMIN — GABAPENTIN 300 MG: 300 CAPSULE ORAL at 21:33

## 2021-08-20 RX ADMIN — GABAPENTIN 300 MG: 300 CAPSULE ORAL at 11:32

## 2021-08-20 RX ADMIN — CYANOCOBALAMIN TAB 500 MCG 500 MCG: 500 TAB at 08:50

## 2021-08-20 RX ADMIN — ACETAMINOPHEN 975 MG: 325 TABLET ORAL at 15:19

## 2021-08-20 RX ADMIN — DICYCLOMINE HYDROCHLORIDE 20 MG: 20 TABLET ORAL at 16:59

## 2021-08-20 RX ADMIN — OXYCODONE AND ACETAMINOPHEN 1 TABLET: 10; 325 TABLET ORAL at 11:32

## 2021-08-20 RX ADMIN — GABAPENTIN 300 MG: 300 CAPSULE ORAL at 08:50

## 2021-08-20 RX ADMIN — DICYCLOMINE HYDROCHLORIDE 20 MG: 20 TABLET ORAL at 21:33

## 2021-08-20 ASSESSMENT — PAIN - FUNCTIONAL ASSESSMENT
PAIN_FUNCTIONAL_ASSESSMENT: PREVENTS OR INTERFERES SOME ACTIVE ACTIVITIES AND ADLS
PAIN_FUNCTIONAL_ASSESSMENT: PREVENTS OR INTERFERES WITH MANY ACTIVE NOT PASSIVE ACTIVITIES
PAIN_FUNCTIONAL_ASSESSMENT: PREVENTS OR INTERFERES SOME ACTIVE ACTIVITIES AND ADLS
PAIN_FUNCTIONAL_ASSESSMENT: PREVENTS OR INTERFERES SOME ACTIVE ACTIVITIES AND ADLS
PAIN_FUNCTIONAL_ASSESSMENT: PREVENTS OR INTERFERES WITH MANY ACTIVE NOT PASSIVE ACTIVITIES
PAIN_FUNCTIONAL_ASSESSMENT: PREVENTS OR INTERFERES SOME ACTIVE ACTIVITIES AND ADLS

## 2021-08-20 ASSESSMENT — PAIN DESCRIPTION - ORIENTATION
ORIENTATION: RIGHT;LOWER
ORIENTATION: RIGHT;LEFT
ORIENTATION: RIGHT;LEFT;MID
ORIENTATION: POSTERIOR
ORIENTATION: RIGHT;LEFT
ORIENTATION: RIGHT;LEFT

## 2021-08-20 ASSESSMENT — PAIN DESCRIPTION - ONSET
ONSET: PROGRESSIVE
ONSET: PROGRESSIVE
ONSET: ON-GOING

## 2021-08-20 ASSESSMENT — PAIN DESCRIPTION - PROGRESSION
CLINICAL_PROGRESSION: GRADUALLY WORSENING
CLINICAL_PROGRESSION: NOT CHANGED
CLINICAL_PROGRESSION: RAPIDLY WORSENING
CLINICAL_PROGRESSION: NOT CHANGED

## 2021-08-20 ASSESSMENT — PAIN DESCRIPTION - FREQUENCY
FREQUENCY: CONTINUOUS

## 2021-08-20 ASSESSMENT — PATIENT HEALTH QUESTIONNAIRE - PHQ9: SUM OF ALL RESPONSES TO PHQ QUESTIONS 1-9: 25

## 2021-08-20 ASSESSMENT — PAIN DESCRIPTION - DESCRIPTORS
DESCRIPTORS: ACHING;CONSTANT;DISCOMFORT
DESCRIPTORS: OTHER (COMMENT)
DESCRIPTORS: PATIENT UNABLE TO DESCRIBE
DESCRIPTORS: ACHING;CONSTANT
DESCRIPTORS: ACHING

## 2021-08-20 ASSESSMENT — PAIN DESCRIPTION - LOCATION
LOCATION: BACK;LEG
LOCATION: BACK;LEG
LOCATION: BACK
LOCATION: LEG
LOCATION: BACK;LEG
LOCATION: BACK;LEG

## 2021-08-20 ASSESSMENT — SLEEP AND FATIGUE QUESTIONNAIRES
DO YOU USE A SLEEP AID: NO
DO YOU HAVE DIFFICULTY SLEEPING: YES
DO YOU USE A SLEEP AID: NO
RESTFUL SLEEP: NO
DIFFICULTY FALLING ASLEEP: YES
AVERAGE NUMBER OF SLEEP HOURS: 3
DO YOU HAVE DIFFICULTY SLEEPING: NO
SLEEP PATTERN: DIFFICULTY FALLING ASLEEP;EARLY AWAKENING;RESTLESSNESS
DIFFICULTY STAYING ASLEEP: YES
DIFFICULTY ARISING: NO

## 2021-08-20 ASSESSMENT — PAIN SCALES - GENERAL
PAINLEVEL_OUTOF10: 2
PAINLEVEL_OUTOF10: 10
PAINLEVEL_OUTOF10: 6
PAINLEVEL_OUTOF10: 7
PAINLEVEL_OUTOF10: 7
PAINLEVEL_OUTOF10: 10
PAINLEVEL_OUTOF10: 8
PAINLEVEL_OUTOF10: 10
PAINLEVEL_OUTOF10: 7
PAINLEVEL_OUTOF10: 10

## 2021-08-20 ASSESSMENT — PAIN DESCRIPTION - PAIN TYPE
TYPE: ACUTE PAIN;CHRONIC PAIN
TYPE: CHRONIC PAIN
TYPE: CHRONIC PAIN
TYPE: ACUTE PAIN;CHRONIC PAIN;OTHER (COMMENT)
TYPE: CHRONIC PAIN

## 2021-08-20 ASSESSMENT — LIFESTYLE VARIABLES: HISTORY_ALCOHOL_USE: NO

## 2021-08-20 NOTE — PROGRESS NOTES
Admission Note      Reason for admission/Target Symptom: Patient admitted to [facility] due to depression and possibly stress according to progress note. Diagnoses: Depression NOS  UDS: Negative  BAL:  Negative    SW met with treatment team to discuss patient's treatment including care planning, discharge planning, and follow-up needs. Pt has been admitted to [facility]. Treatment team has identified patient's discharge needs as medication management and outpatient therapy/counseling. Pt confirmed  the need for ongoing treatment post inpatient stay. Pt was also provided a handout of contact information for drug and alcohol treatment centers and other community support service such as [facility], AA, and [facility].     Electronically signed by Yessi Moreno [facility] on 8/20/2021 at 8:22 AM

## 2021-08-20 NOTE — PROGRESS NOTES
Changed ostomy bag at this time. No stool in bag. Reports it needs to be changed about every 5 days and she is not sure how long it has been. Wafer is CDI, appears to have been changed recently.

## 2021-08-20 NOTE — PROGRESS NOTES
SW met with treatment team to discuss pt's progress and setbacks. SW 2 was present. Pt was admitted, voluntary, for depression, has hx of colorectal cancer, hx of hepatitis C, recently completed chemo and radiation, family reports pt has been [facility] strange and not communicating with the family\", pt lives alone, reports poor functioning at home,identifies current stressors as family and health issues, denies SI/HI/AVH. Since admission, pt reportedly was cooperative with admission process, confused, oriented to person only, difficult to assess, slept last night, with medications, denies SI/HI/AVH, continue current treatment plan.

## 2021-08-20 NOTE — PROGRESS NOTES
Pt has had poor sleep prior to admission for days per patient. Pt is resting and sleeping, held am medication for rest at this time. Informed nursing staff to give medication before patient eats breakfast this morning. Pt sleep and resting at this time.
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
## 2021-08-20 NOTE — ED NOTES
This RN attempted 3 IV starts, unsuccessful at this time. Vincent Kwon also attempted IV start and was unsuccessful. Lab called to try to straight stick.      Pietro Duarte RN  08/19/21 2042

## 2021-08-20 NOTE — PLAN OF CARE
Group Therapy Note    Date: 8/20/2021  Start Time: 1030  End Time:  1100  Number of Participants: 4    Type of Group: Cognitive Skills    Wellness Binder Information  Module Name:  Women's Issues  Session Number:  4    Group Goal for Pt: To raise awareness of how thoughts influence feelings    Notes:  Pt did not attend group discussion. Pt was invited/encouraged. Status After Intervention:      Participation Level:     Participation Quality:       Speech:         Thought Process/Content:       Affective Functioning:       Mood:       Level of consciousness:        Response to Learning:       Endings:     Modes of Intervention:       Discipline Responsible:       Signature:  Elsie Holland

## 2021-08-20 NOTE — ED NOTES
Patient refused CT due to her ostomy bag \"feeling like it was about to bust\".  Ostomy bag emptied and there was an output of 50 ml     Keo Rosa RN  08/19/21 [id number]

## 2021-08-20 NOTE — ED NOTES
Patient ambulated to the bedside commode and back to bed independently      Umair Geller RN  08/19/21 [id number]

## 2021-08-20 NOTE — PROGRESS NOTES
Attempted to call pt's sister Chi Casey (authorization for obtaining social information signed) to obtain collateral information. However received voicemail and left contact information requesting call back.      Electronically signed by Joaquin Del Valle, [address] on 8/20/2021 at 10:00 AM

## 2021-08-20 NOTE — PROGRESS NOTES
Collateral obtained from: pt's sister Katina Mireles [phone]    Immediate Stressors & Time Episode Began: pt's sister reported last year pt had a lot of medical problems and was diagnosed with rectal cancer by a dr in [place]. Sister reported pt moved in with her during her cancer treatment and she noticed pt was taking too many pain meds. Sister reported she called pt's therapist and locked up the meds. Sister reported she began giving pt her medications so she would get the right dose. Sister reported pt did not like this and told her that she was not a baby. Sister reported pt eventually agreed to this but then started getting into the pill box and taking them herself. Sister reported pt went to the dr and the dr told pt that pt could go back home. Sister reported she was concerned about this due to pt taking too many meds and brought this up with pt. Sister reported pt was supposed to follow her home from the drs office to  her belongings but never did. Sister reported pt called her and said she did not know the way to her house. Sister reported she called back and told her that she was coming to her house and was not coming alone. Sister reported pt called and told deputy reyes that she would not give pt her belongings back. Sister reported this was not true at all. Sister reported when pt got to her house all pt was worried about was if she went through and messed with her meds. Sister reported later she got a text from pt saying she was taken off all of the dr's emergency contacts and if she talked to any dr's then pt would file a lawsuit. Sister reported that is the last she heard from her. Sister reported pt's son thinks pt is a hypochondriac and will not help pt. Sister reported \"if you don't do what she wants then she will just cut ties with you\". Sister reported she tried to take care of pt and gave up a lot to care for her but pt would not allow it.  Sister reported she knows that pt went to [facility] and now [facility] so she \"knows something is up\". This writer did explain that pt signed for this writer to get information but not given. No information was given to pt's sister. Sister voiced understanding and agreement. Diagnosis/Hx of compliance with meds: Sister reported depression and anxiety. Sister reported pt takes too many meds. Tx Hx/Past hospitalizations: Sister reported pt currently see's Dr Donna Wiley. Family hx of psychiatric issues: Sister reported their mom also took too much meds. Sister reported their aunt had schizophrenia and manic depressive. Sister reported their dad committed suicide in 2007 and pt has went down since then. Sister reported their dad and uncle had Friedreich's Ataxia syndrome. Substance Abuse: Sister reported pt overuses pain meds. Sister reported twenty years ago pt was an addict. Pending Legal: Sister reported none. Safety Issues (Weapons? Hx of attempts): Sister reported no weapons in home and no hx of attempts. Support system/Medication Managed by: The importance of medication management and locking extra medication in a secured location was explained and reccommended to collateral. Sister reported pt manages own meds. Who does the pt live with: Sister reported pt lives alone. Additional Info: Sister reported her youngest daughter is pt's POA and over her medical unless it has been changed. Youngest daughter's name is Jl Cotton [phone]. Asked pt's sister if she would fax a copy of POA paperwork. Pt's sister voiced agreement.      Electronically signed by [facility] on 8/20/2021 at 12:08 PM
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
## 2021-08-20 NOTE — PLAN OF CARE
Group Therapy Note    Date: 8/20/2021  Start Time: 1000  End Time:  1030  Number of Participants: 4    Type of Group: Psychoeducation    Wellness Binder Information  Module Name:  Relapse Prevention  Session Number:  5    Group Goal for Pt: To improve knowledge of relapse prevention strategies    Notes:  Pt did not attend group discussion. Pt was invited/encouraged. Status After Intervention:      Participation Level:     Participation Quality:       Speech:         Thought Process/Content:       Affective Functioning:       Mood:       Level of consciousness:        Response to Learning:       Endings:     Modes of Intervention:       Discipline Responsible:       Signature:  Sveta Felipe

## 2021-08-20 NOTE — ED PROVIDER NOTES
CT HEAD WO CONTRAST (Edited Result - FINAL)  Result time 08/19/21 22:51:22  Addendum 1 of 1 by Kei Donovan MD (08/19/21 22:51:22)    Addendum:  CT HEAD WO CONTRAST   8/19/2021 11:50 PM  Voice recognition dictation error. Axial, sagittal, and coronal noncontrast CT imaging of the head. The visualized paranasal sinuses are clear and normally aerated. The mastoid air cells are clear. The brain and ventricles have an age-appropriate appearance. There is no hemorrhage or mass-effect. No acute infarct is seen. No calvarial abnormality. Final result by Kei Donovan MD (08/19/21 21:55:47)                Narrative:    CT HEAD WO CONTRAST   8/19/2021 10:55 PM   History: Confusion. Change in behavior. In order to have a CT radiation dose as low as reasonably achievable   Automated Exposure Control was utilized for adjustment of the mA   and/or KV according to patient size. DLP in mGycm= 795. Noncontrast head CT. Comparison is made with November 26, 2012. Severe or persistent symptoms should warrant CT correlation.       Signed by Dr Chavo Galan has been evaluated by mental health professional Jared Gardner in conjunction with Dr. Cristiane Mistry who will admit patient voluntary to geriatric behavioral health unit.        Nano Dubon, APRN  08/19/21 8882 Anesthesia Volume In Cc (Will Not Render If 0): 1
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
## 2021-08-20 NOTE — PROGRESS NOTES
RT leisure assessment is incomplete. Pt refused to continue with leisure assessment. Pt will be encouraged to attend scheduled group activities to address leisure and social deficits.

## 2021-08-20 NOTE — PLAN OF CARE
Problem: Pain:  Goal: Pain level will decrease  Description: Pain level will decrease  Outcome: Ongoing  Goal: Control of acute pain  Description: Control of acute pain  Outcome: Ongoing  Goal: Control of chronic pain  Description: Control of chronic pain  Outcome: Ongoing     Problem: Falls - Risk of:  Goal: Will remain free from falls  Description: Will remain free from falls  Outcome: Ongoing  Goal: Absence of physical injury  Description: Absence of physical injury  Outcome: Ongoing     Problem: Altered Mood, Depressive Behavior:  Goal: Able to verbalize acceptance of life and situations over which he or she has no control  Description: Able to verbalize acceptance of life and situations over which he or she has no control  Outcome: Ongoing  Goal: Able to verbalize and/or display a decrease in depressive symptoms  Description: Able to verbalize and/or display a decrease in depressive symptoms  Outcome: Ongoing  Goal: Ability to disclose and discuss suicidal ideas will improve  Description: Ability to disclose and discuss suicidal ideas will improve  Outcome: Ongoing  Goal: Able to verbalize support systems  Description: Able to verbalize support systems  Outcome: Ongoing  Goal: Absence of self-harm  Description: Absence of self-harm  Outcome: Ongoing  Goal: Patient specific goal  Description: Patient specific goal  Outcome: Ongoing  Goal: Participates in care planning  Description: Participates in care planning  Outcome: Ongoing     Problem: Altered Mood, Deterioration in Function:  Goal: Ability to perform activities of daily living will improve  Description: Ability to perform activities of daily living will improve  Outcome: Ongoing  Goal: Able to verbalize reality based thinking  Description: Able to verbalize reality based thinking  Outcome: Ongoing  Goal: Skin appearance normal  Description: Skin appearance normal  Outcome: Ongoing  Goal: Maintenance of adequate nutrition will improve  Description: Maintenance of adequate nutrition will improve  Outcome: Ongoing  Goal: Ability to tolerate increased activity will improve  Description: Ability to tolerate increased activity will improve  Outcome: Ongoing  Goal: Participates in care planning  Description: Participates in care planning  Outcome: Ongoing  Goal: Patient specific goal  Description: Patient specific goal  Outcome: Ongoing     Problem: Risk of Harm:  Goal: Ability to remain free from injury will improve  Description: Ability to remain free from injury will improve  Outcome: Ongoing

## 2021-08-20 NOTE — PLAN OF CARE
Problem: Pain:  Description: Pain management should include both nonpharmacologic and pharmacologic interventions.   Goal: Pain level will decrease  Description: Pain level will decrease  8/20/2021 1844 by Nila Moore RN  Outcome: Ongoing  8/20/2021 0507 by Conner Sanchez RN  Outcome: Ongoing  Goal: Control of acute pain  Description: Control of acute pain  8/20/2021 1844 by Nlia Moore RN  Outcome: Ongoing  8/20/2021 0507 by Conner Sanchez RN  Outcome: Ongoing  Goal: Control of chronic pain  Description: Control of chronic pain  8/20/2021 1844 by Nila Moore RN  Outcome: Ongoing  8/20/2021 0507 by Conner Sanchez RN  Outcome: Ongoing     Problem: Falls - Risk of:  Goal: Will remain free from falls  Description: Will remain free from falls  8/20/2021 1844 by Nila Moore RN  Outcome: Ongoing  8/20/2021 0507 by Conner Sanchez RN  Outcome: Ongoing  Goal: Absence of physical injury  Description: Absence of physical injury  8/20/2021 1844 by Nila Moore RN  Outcome: Ongoing  8/20/2021 0507 by Conner Sanchez RN  Outcome: Ongoing     Problem: Altered Mood, Depressive Behavior:  Goal: Able to verbalize acceptance of life and situations over which he or she has no control  Description: Able to verbalize acceptance of life and situations over which he or she has no control  8/20/2021 1844 by Nila Moore RN  Outcome: Ongoing  8/20/2021 0507 by Conner Sanchez RN  Outcome: Ongoing  Goal: Able to verbalize and/or display a decrease in depressive symptoms  Description: Able to verbalize and/or display a decrease in depressive symptoms  8/20/2021 1844 by Nila Moore RN  Outcome: Ongoing  8/20/2021 0507 by Conner Sanchez RN  Outcome: Ongoing  Goal: Ability to disclose and discuss suicidal ideas will improve  Description: Ability to disclose and discuss suicidal ideas will improve  8/20/2021 1844 by Nila Moore RN  Outcome: Ongoing  8/20/2021 0507 by Conner Sanchez RN  Outcome: Ongoing  Goal: Able to verbalize support systems  Description: Able to verbalize support systems  8/20/2021 1844 by Stanton Donato RN  Outcome: Ongoing  8/20/2021 0507 by Ronald Preston RN  Outcome: Ongoing  Goal: Absence of self-harm  Description: Absence of self-harm  8/20/2021 1844 by Stanton Donato RN  Outcome: Ongoing  8/20/2021 0507 by Ronald Preston RN  Outcome: Ongoing  Goal: Patient specific goal  Description: Patient specific goal  8/20/2021 1844 by Stanton Donato RN  Outcome: Ongoing  8/20/2021 0507 by Ronald Preston RN  Outcome: Ongoing  Goal: Participates in care planning  Description: Participates in care planning  8/20/2021 1844 by Stanton Donato RN  Outcome: Ongoing  8/20/2021 0507 by Ronald Preston RN  Outcome: Ongoing     Problem: Altered Mood, Deterioration in Function:  Goal: Ability to perform activities of daily living will improve  Description: Ability to perform activities of daily living will improve  8/20/2021 1844 by Stanton Donato RN  Outcome: Ongoing  8/20/2021 0507 by Ronald Preston RN  Outcome: Ongoing  Goal: Able to verbalize reality based thinking  Description: Able to verbalize reality based thinking  8/20/2021 1844 by Stanton Donato RN  Outcome: Ongoing  8/20/2021 0507 by Ronald Preston RN  Outcome: Ongoing  Goal: Skin appearance normal  Description: Skin appearance normal  8/20/2021 1844 by Stanton Donato RN  Outcome: Ongoing  8/20/2021 0507 by Ronald Preston RN  Outcome: Ongoing  Goal: Maintenance of adequate nutrition will improve  Description: Maintenance of adequate nutrition will improve  8/20/2021 1844 by Stanton Donato RN  Outcome: Ongoing  8/20/2021 0507 by Ronald Preston RN  Outcome: Ongoing  Goal: Ability to tolerate increased activity will improve  Description: Ability to tolerate increased activity will improve  8/20/2021 1844 by Stanton Donato RN  Outcome: Ongoing  8/20/2021 0507 by Ronald Preston RN  Outcome: Ongoing  Goal: Participates in care planning  Description: Participates in care planning  8/20/2021 1844 by Hollie Nance RN  Outcome: Ongoing  8/20/2021 0507 by Michelle Daigle RN  Outcome: Ongoing  Goal: Patient specific goal  Description: Patient specific goal  8/20/2021 1844 by Hollie Nance RN  Outcome: Ongoing  8/20/2021 0507 by Michelle Daigle RN  Outcome: Ongoing     Problem: Risk of Harm:  Goal: Ability to remain free from injury will improve  Description: Ability to remain free from injury will improve  8/20/2021 1844 by Hollie Nance RN  Outcome: Ongoing  8/20/2021 0507 by Michelle Daigle RN  Outcome: Ongoing     Problem: Health Behavior:  Goal: Identification of resources available to assist in meeting health care needs will improve  Description: Identification of resources available to assist in meeting health care needs will improve  Outcome: Ongoing     Problem: Self-Care:  Goal: Ability to participate in self-care as condition permits will improve  Description: Ability to participate in self-care as condition permits will improve  Outcome: Ongoing     Problem: Bowel/Gastric:  Description: [TRUNCATED] Module scope: This module is for use by staff nurses caring for ostomates, after the postoperative period, hospitalized for other conditions. This module is not intended to be all-inclusive and has been created for use by a wide variety o . ..   Goal: Complications related to the disease process, condition or treatment will be avoided or minimized  Outcome: Ongoing  Goal: Will be independent with ostomy care  Outcome: Ongoing     Problem: Skin Integrity:  Goal: Will show no infection signs and symptoms  Description: Will show no infection signs and symptoms  Outcome: Ongoing  Goal: Absence of new skin breakdown  Description: Absence of new skin breakdown  Outcome: Ongoing

## 2021-08-21 LAB
CHOLESTEROL, TOTAL: 153 MG/DL (ref 160–199)
HBA1C MFR BLD: 4.8 % (ref 4–6)
HDLC SERPL-MCNC: 43 MG/DL (ref 65–121)
LDL CHOLESTEROL CALCULATED: 89 MG/DL
TRIGL SERPL-MCNC: 103 MG/DL (ref 0–149)
TSH REFLEX FT4: 1.69 UIU/ML (ref 0.35–5.5)
VITAMIN B-12: 768 PG/ML (ref 211–946)
VITAMIN D 25-HYDROXY: 44.4 NG/ML

## 2021-08-21 PROCEDURE — 83036 HEMOGLOBIN GLYCOSYLATED A1C: CPT

## 2021-08-21 PROCEDURE — 1240000000 HC EMOTIONAL WELLNESS R&B

## 2021-08-21 PROCEDURE — 6370000000 HC RX 637 (ALT 250 FOR IP): Performed by: PSYCHIATRY & NEUROLOGY

## 2021-08-21 PROCEDURE — 36415 COLL VENOUS BLD VENIPUNCTURE: CPT

## 2021-08-21 PROCEDURE — 82306 VITAMIN D 25 HYDROXY: CPT

## 2021-08-21 PROCEDURE — 80061 LIPID PANEL: CPT

## 2021-08-21 PROCEDURE — 82607 VITAMIN B-12: CPT

## 2021-08-21 PROCEDURE — 99231 SBSQ HOSP IP/OBS SF/LOW 25: CPT | Performed by: PSYCHIATRY & NEUROLOGY

## 2021-08-21 PROCEDURE — 84443 ASSAY THYROID STIM HORMONE: CPT

## 2021-08-21 PROCEDURE — 6370000000 HC RX 637 (ALT 250 FOR IP): Performed by: NURSE PRACTITIONER

## 2021-08-21 RX ORDER — OXYCODONE HYDROCHLORIDE AND ACETAMINOPHEN 5; 325 MG/1; MG/1
1 TABLET ORAL EVERY 8 HOURS PRN
Status: DISCONTINUED | OUTPATIENT
Start: 2021-08-21 | End: 2021-08-23

## 2021-08-21 RX ADMIN — DICYCLOMINE HYDROCHLORIDE 20 MG: 20 TABLET ORAL at 08:18

## 2021-08-21 RX ADMIN — SUCRALFATE 1 G: 1 TABLET ORAL at 16:43

## 2021-08-21 RX ADMIN — DICYCLOMINE HYDROCHLORIDE 20 MG: 20 TABLET ORAL at 16:43

## 2021-08-21 RX ADMIN — CEPHALEXIN 500 MG: 250 CAPSULE ORAL at 08:18

## 2021-08-21 RX ADMIN — DICYCLOMINE HYDROCHLORIDE 20 MG: 20 TABLET ORAL at 11:54

## 2021-08-21 RX ADMIN — DICYCLOMINE HYDROCHLORIDE 20 MG: 20 TABLET ORAL at 20:41

## 2021-08-21 RX ADMIN — GABAPENTIN 300 MG: 300 CAPSULE ORAL at 11:54

## 2021-08-21 RX ADMIN — IBUPROFEN 600 MG: 400 TABLET ORAL at 13:21

## 2021-08-21 RX ADMIN — CEPHALEXIN 500 MG: 250 CAPSULE ORAL at 20:39

## 2021-08-21 RX ADMIN — ONDANSETRON HYDROCHLORIDE 4 MG: 4 TABLET, FILM COATED ORAL at 05:36

## 2021-08-21 RX ADMIN — FLUOXETINE HYDROCHLORIDE 40 MG: 20 CAPSULE ORAL at 08:19

## 2021-08-21 RX ADMIN — Medication 5 MG: at 20:41

## 2021-08-21 RX ADMIN — MIRTAZAPINE 3.75 MG: 7.5 TABLET ORAL at 20:41

## 2021-08-21 RX ADMIN — IBUPROFEN 600 MG: 400 TABLET ORAL at 20:39

## 2021-08-21 RX ADMIN — IBUPROFEN 600 MG: 400 TABLET ORAL at 05:33

## 2021-08-21 RX ADMIN — GABAPENTIN 300 MG: 300 CAPSULE ORAL at 08:18

## 2021-08-21 RX ADMIN — OXYCODONE HYDROCHLORIDE AND ACETAMINOPHEN 1 TABLET: 5; 325 TABLET ORAL at 17:23

## 2021-08-21 RX ADMIN — CYANOCOBALAMIN TAB 500 MCG 500 MCG: 500 TAB at 08:19

## 2021-08-21 RX ADMIN — SUCRALFATE 1 G: 1 TABLET ORAL at 05:36

## 2021-08-21 ASSESSMENT — PAIN DESCRIPTION - DESCRIPTORS
DESCRIPTORS: ACHING;DISCOMFORT
DESCRIPTORS: ACHING;CONSTANT;DISCOMFORT;DULL
DESCRIPTORS: ACHING;CONSTANT;DISCOMFORT

## 2021-08-21 ASSESSMENT — PAIN SCALES - GENERAL
PAINLEVEL_OUTOF10: 8
PAINLEVEL_OUTOF10: 10
PAINLEVEL_OUTOF10: 3
PAINLEVEL_OUTOF10: 9
PAINLEVEL_OUTOF10: 8
PAINLEVEL_OUTOF10: 7

## 2021-08-21 ASSESSMENT — PAIN DESCRIPTION - PROGRESSION
CLINICAL_PROGRESSION: NOT CHANGED
CLINICAL_PROGRESSION: RAPIDLY WORSENING
CLINICAL_PROGRESSION: NOT CHANGED

## 2021-08-21 ASSESSMENT — PAIN DESCRIPTION - LOCATION
LOCATION: ABDOMEN;BACK
LOCATION: BACK;LEG
LOCATION: BACK;LEG

## 2021-08-21 ASSESSMENT — PAIN DESCRIPTION - FREQUENCY
FREQUENCY: CONTINUOUS
FREQUENCY: CONTINUOUS
FREQUENCY: INTERMITTENT

## 2021-08-21 ASSESSMENT — PAIN DESCRIPTION - ONSET
ONSET: SUDDEN
ONSET: PROGRESSIVE
ONSET: ON-GOING

## 2021-08-21 ASSESSMENT — PAIN DESCRIPTION - PAIN TYPE
TYPE: ACUTE PAIN
TYPE: CHRONIC PAIN
TYPE: CHRONIC PAIN

## 2021-08-21 ASSESSMENT — PAIN DESCRIPTION - ORIENTATION: ORIENTATION: MID

## 2021-08-21 ASSESSMENT — PAIN - FUNCTIONAL ASSESSMENT
PAIN_FUNCTIONAL_ASSESSMENT: PREVENTS OR INTERFERES WITH MANY ACTIVE NOT PASSIVE ACTIVITIES
PAIN_FUNCTIONAL_ASSESSMENT: PREVENTS OR INTERFERES WITH ALL ACTIVE AND SOME PASSIVE ACTIVITIES
PAIN_FUNCTIONAL_ASSESSMENT: PREVENTS OR INTERFERES SOME ACTIVE ACTIVITIES AND ADLS

## 2021-08-21 NOTE — PLAN OF CARE
Problem: Pain:  Goal: Pain level will decrease  Description: Pain level will decrease  8/21/2021 0253 by Philip Fuentes RN  Outcome: Ongoing  8/20/2021 1844 by Jacob Tellez RN  Outcome: Ongoing  Goal: Control of acute pain  Description: Control of acute pain  8/21/2021 0253 by Philip Fuentes RN  Outcome: Ongoing  8/20/2021 1844 by Jacob Tellez RN  Outcome: Ongoing  Goal: Control of chronic pain  Description: Control of chronic pain  8/21/2021 0253 by Philip Fuentes RN  Outcome: Ongoing  8/20/2021 1844 by Jacob Tellez RN  Outcome: Ongoing     Problem: Falls - Risk of:  Goal: Will remain free from falls  Description: Will remain free from falls  8/21/2021 0253 by Philip Fuentes RN  Outcome: Met This Shift  8/20/2021 1844 by Jacob Tellez RN  Outcome: Ongoing  Goal: Absence of physical injury  Description: Absence of physical injury  8/21/2021 0253 by Philip Fuentes RN  Outcome: Met This Shift  8/20/2021 1844 by Jacob Tellez RN  Outcome: Ongoing     Problem: Altered Mood, Depressive Behavior:  Goal: Able to verbalize acceptance of life and situations over which he or she has no control  Description: Able to verbalize acceptance of life and situations over which he or she has no control  8/21/2021 0253 by Philip Fuentes RN  Outcome: Ongoing  8/20/2021 1844 by Jacob Tellez RN  Outcome: Ongoing  Goal: Able to verbalize and/or display a decrease in depressive symptoms  Description: Able to verbalize and/or display a decrease in depressive symptoms  8/21/2021 0253 by Philip Fuentes RN  Outcome: Ongoing  8/20/2021 1844 by Jacob Tellez RN  Outcome: Ongoing  Goal: Ability to disclose and discuss suicidal ideas will improve  Description: Ability to disclose and discuss suicidal ideas will improve  8/21/2021 0253 by Philip Fuentes RN  Outcome: Ongoing  8/20/2021 1844 by Jacob Tellez RN  Outcome: Ongoing  Goal: Able to verbalize support systems  Description: Able to verbalize support systems  8/21/2021 0253 by Keysha Moore RN  Outcome: Ongoing  8/20/2021 1844 by Bon Gibbs RN  Outcome: Ongoing  Goal: Absence of self-harm  Description: Absence of self-harm  8/21/2021 0253 by Keysha Moore RN  Outcome: Met This Shift  8/20/2021 1844 by Bon Gibbs RN  Outcome: Ongoing  Goal: Patient specific goal  Description: Patient specific goal  8/21/2021 0253 by Keysha Moore RN  Outcome: Ongoing  8/20/2021 1844 by Bon Gibbs RN  Outcome: Ongoing  Goal: Participates in care planning  Description: Participates in care planning  8/21/2021 0253 by Keysha Moore RN  Outcome: Ongoing  8/20/2021 1844 by Bon Gibbs RN  Outcome: Ongoing     Problem: Altered Mood, Deterioration in Function:  Goal: Ability to perform activities of daily living will improve  Description: Ability to perform activities of daily living will improve  8/21/2021 0253 by Keysha Moore RN  Outcome: Ongoing  8/20/2021 1844 by Bon Gibbs RN  Outcome: Ongoing  Goal: Able to verbalize reality based thinking  Description: Able to verbalize reality based thinking  8/21/2021 0253 by Keysha Moore RN  Outcome: Ongoing  8/20/2021 1844 by Bon Gibbs RN  Outcome: Ongoing  Goal: Skin appearance normal  Description: Skin appearance normal  8/21/2021 0253 by Keysha Moore RN  Outcome: Ongoing  8/20/2021 1844 by Bon Gibbs RN  Outcome: Ongoing  Goal: Maintenance of adequate nutrition will improve  Description: Maintenance of adequate nutrition will improve  8/21/2021 0253 by Keysha Moore RN  Outcome: Ongoing  8/20/2021 1844 by Bon Gibbs RN  Outcome: Ongoing  Goal: Ability to tolerate increased activity will improve  Description: Ability to tolerate increased activity will improve  8/21/2021 0253 by Keysha Moore RN  Outcome: Ongoing  8/20/2021 1844 by Bon Gibbs RN  Outcome: Ongoing  Goal: Participates in care planning  Description: Participates in care planning  8/21/2021 [id number] [facility] by Keysha Moore RN  Outcome: Ongoing  8/20/2021 1844 by Sadia Beatty RN  Outcome: Ongoing  Goal: Patient specific goal  Description: Patient specific goal  8/21/2021 0253 by Reyes Lower, RN  Outcome: Ongoing  8/20/2021 1844 by Sadia Beatty RN  Outcome: Ongoing     Problem: Risk of Harm:  Goal: Ability to remain free from injury will improve  Description: Ability to remain free from injury will improve  8/21/2021 0253 by Reyes Lower, RN  Outcome: Met This Shift  8/20/2021 1844 by Sadia Beatty RN  Outcome: Ongoing     Problem: Self-Care:  Goal: Ability to participate in self-care as condition permits will improve  Description: Ability to participate in self-care as condition permits will improve  8/21/2021 0253 by Reyes Lower, RN  Outcome: Ongoing  8/20/2021 1844 by Sadia Beatty RN  Outcome: Ongoing     Problem:  Bowel/Gastric:  Goal: Complications related to the disease process, condition or treatment will be avoided or minimized  8/21/2021 0253 by Reyes Lower, RN  Outcome: Ongoing  8/20/2021 1844 by Sadia Beatty RN  Outcome: Ongoing  Goal: Will be independent with ostomy care  8/21/2021 0253 by Reyes Lower, RN  Outcome: Ongoing  8/20/2021 1844 by Sadia Beatty RN  Outcome: Ongoing     Problem: Skin Integrity:  Goal: Will show no infection signs and symptoms  Description: Will show no infection signs and symptoms  8/21/2021 0253 by Reyes Lower, RN  Outcome: Ongoing  8/20/2021 1844 by Sadia Beatty RN  Outcome: Ongoing  Goal: Absence of new skin breakdown  Description: Absence of new skin breakdown  8/21/2021 0253 by Reyes Lower, RN  Outcome: Ongoing  8/20/2021 1844 by Sadia Beatty RN  Outcome: Ongoing

## 2021-08-21 NOTE — SUICIDE SAFETY PLAN
SAFETY PLAN    A suicide Safety Plan is a document that supports someone when they are having thoughts of suicide. Warning Signs that indicate a suicidal crisis may be developing: What (situations, thoughts, feelings, body sensations, behaviors, etc.) do you experience that lets you know you are beginning to think about suicide? 1. Fear  2. Anxiety  3. Feel sick    Internal Coping Strategies:  What things can I do (relaxation techniques, hobbies, physical activities, etc.) to take my mind off my problems without contacting another person? 1.   2.  3.     People and social settings that provide distraction: Who can I call or where can I go to distract me? 1. Name: Mayra Harmon Phone: [phone]   2. Name: Eun Arango Phone:    3. Place:             4. Place:     People whom I can ask for help: Who can I call when I need help - for example, friends, family, clergy, someone else? 1. Name: Mayra Harmon           Phone: [phone]  2. Name: Eun Arango  Phone:   3. Name: [place]  Phone: [phone]    Professionals or [address] [place], [place] [address]vd I can contact during a crisis: Who can I call for help - for example, my doctor, my psychiatrist, my psychologist, a mental health provider, a suicide hotline? 1. Clinician Name: [id number]fin   Phone: [phone]      Clinician Pager or Emergency Contact #: [phone]    2. Clinician Name: [id number]   Phone: [phone]      Clinician Pager or Emergency Contact #: [phone]    3. Suicide Prevention Lifeline: 7-408-714-TALK (6430)    4. [address] [place] [address] Emergency Services -  for example, [facility], [facility] suicide hotline, [facility] Hotline: [facility] Emergency Department      Emergency Services Address: [address]. [place], [address] [facility]      Emergency Services Phone: [phone]    Making the environment safe:  How can I make my environment (house/apartment/living space) safer? For example, can I remove guns, medications, and other items? 1. Remove weapons from the home  2. Remove extra medications from the home.

## 2021-08-21 NOTE — PLAN OF CARE
Problem: Pain:  Goal: Pain level will decrease  Description: Pain level will decrease  8/21/2021 0857 by Josue Heck RN  Outcome: Ongoing  8/21/2021 0856 by Josue Heck RN  Outcome: Ongoing  8/21/2021 0253 by Jeferson Dupree RN  Outcome: Ongoing  Goal: Control of acute pain  Description: Control of acute pain  8/21/2021 0857 by Josue Heck RN  Outcome: Ongoing  8/21/2021 0856 by Jouse Heck RN  Outcome: Ongoing  8/21/2021 0253 by Jeferson Dupree RN  Outcome: Ongoing  Goal: Control of chronic pain  Description: Control of chronic pain  8/21/2021 0857 by Josue Heck RN  Outcome: Ongoing  8/21/2021 0856 by Josue Heck RN  Outcome: Ongoing  8/21/2021 0253 by Jeferson Dupree RN  Outcome: Ongoing     Problem: Falls - Risk of:  Goal: Will remain free from falls  Description: Will remain free from falls  8/21/2021 0857 by Josue Heck RN  Outcome: Ongoing  8/21/2021 0856 by Josue Heck RN  Outcome: Ongoing  8/21/2021 0253 by Jeferson Dupree RN  Outcome: Met This Shift  Goal: Absence of physical injury  Description: Absence of physical injury  8/21/2021 0857 by Josue Heck RN  Outcome: Ongoing  8/21/2021 0856 by Josue Heck RN  Outcome: Ongoing  8/21/2021 0253 by Jeferson Dupree RN  Outcome: Met This Shift     Problem: Altered Mood, Depressive Behavior:  Goal: Able to verbalize acceptance of life and situations over which he or she has no control  Description: Able to verbalize acceptance of life and situations over which he or she has no control  8/21/2021 0857 by Josue Heck RN  Outcome: Ongoing  8/21/2021 0856 by Josue Heck RN  Outcome: Ongoing  8/21/2021 0253 by Jeferson Dupree RN  Outcome: Ongoing  Goal: Able to verbalize and/or display a decrease in depressive symptoms  Description: Able to verbalize and/or display a decrease in depressive symptoms  8/21/2021 0857 by Josue Heck RN  Outcome: Ongoing  8/21/2021 0856 by Josue Heck RN  Outcome: Ongoing  8/21/2021 0253 by Josi Suazo RN  Outcome: Ongoing  Goal: Ability to disclose and discuss suicidal ideas will improve  Description: Ability to disclose and discuss suicidal ideas will improve  8/21/2021 0857 by Dillon Ellis RN  Outcome: Ongoing  8/21/2021 0856 by Dillon Ellis RN  Outcome: Ongoing  8/21/2021 0253 by Josi Suazo RN  Outcome: Ongoing  Goal: Able to verbalize support systems  Description: Able to verbalize support systems  8/21/2021 0857 by Dillon Ellis RN  Outcome: Ongoing  8/21/2021 0856 by Dillon Ellis RN  Outcome: Ongoing  8/21/2021 0253 by Josi Suazo RN  Outcome: Ongoing  Goal: Absence of self-harm  Description: Absence of self-harm  8/21/2021 0857 by Dillon Ellis RN  Outcome: Ongoing  8/21/2021 0856 by Dillon Ellis RN  Outcome: Ongoing  8/21/2021 0253 by Josi Suazo RN  Outcome: Met This Shift  Goal: Patient specific goal  Description: Patient specific goal  8/21/2021 0857 by Dillon Ellis RN  Outcome: Ongoing  8/21/2021 0856 by Dillon Ellis RN  Outcome: Ongoing  8/21/2021 0253 by Josi Suazo RN  Outcome: Ongoing  Goal: Participates in care planning  Description: Participates in care planning  8/21/2021 0857 by Dillon Ellis RN  Outcome: Ongoing  8/21/2021 0856 by Dillon Ellis RN  Outcome: Ongoing  8/21/2021 0253 by Josi Suazo RN  Outcome: Ongoing     Problem: Altered Mood, Deterioration in Function:  Goal: Ability to perform activities of daily living will improve  Description: Ability to perform activities of daily living will improve  8/21/2021 0857 by Dillon Ellis RN  Outcome: Ongoing  8/21/2021 0856 by Dillon Ellis RN  Outcome: Ongoing  8/21/2021 0253 by Josi Suazo RN  Outcome: Ongoing  Goal: Able to verbalize reality based thinking  Description: Able to verbalize reality based thinking  8/21/2021 0857 by Dillon Ellis RN  Outcome: Ongoing  8/21/2021 0856 by Dillon Ellis RN  Outcome: Ongoing  8/21/2021 0253 by Josi Suazo RN  Outcome: Ongoing  Goal: Skin appearance normal  Description: Skin appearance normal  8/21/2021 0857 by Jean-Paul Houston RN  Outcome: Ongoing  8/21/2021 0856 by Jean-Paul Houston RN  Outcome: Ongoing  8/21/2021 0253 by Armani Costa RN  Outcome: Ongoing  Goal: Maintenance of adequate nutrition will improve  Description: Maintenance of adequate nutrition will improve  8/21/2021 0857 by Jean-Paul Houston RN  Outcome: Ongoing  8/21/2021 0856 by Jean-Paul Houston RN  Outcome: Ongoing  8/21/2021 0253 by Armani Costa RN  Outcome: Ongoing  Goal: Ability to tolerate increased activity will improve  Description: Ability to tolerate increased activity will improve  8/21/2021 0857 by Jean-Paul Houston RN  Outcome: Ongoing  8/21/2021 0856 by Jean-Paul Houston RN  Outcome: Ongoing  8/21/2021 0253 by Armani Costa RN  Outcome: Ongoing  Goal: Participates in care planning  Description: Participates in care planning  8/21/2021 0857 by Jean-Paul Houston RN  Outcome: Ongoing  8/21/2021 0856 by Jean-Paul Houston RN  Outcome: Ongoing  8/21/2021 0253 by Armani Costa RN  Outcome: Ongoing  Goal: Patient specific goal  Description: Patient specific goal  8/21/2021 0857 by Jean-Paul Houston RN  Outcome: Ongoing  8/21/2021 0856 by Jean-Paul Houston RN  Outcome: Ongoing  8/21/2021 0253 by Armani Costa RN  Outcome: Ongoing     Problem: Risk of Harm:  Goal: Ability to remain free from injury will improve  Description: Ability to remain free from injury will improve  8/21/2021 0857 by Jean-Paul Houston RN  Outcome: Ongoing  8/21/2021 0856 by Jean-Paul Houston RN  Outcome: Ongoing  8/21/2021 0253 by Armani Costa RN  Outcome: Met This Shift     Problem: Health Behavior:  Goal: Identification of resources available to assist in meeting health care needs will improve  Description: Identification of resources available to assist in meeting health care needs will improve  8/21/2021 0857 by Jean-Paul Houston RN  Outcome: Ongoing  8/21/2021 0856 by Jean-Paul Houston RN  Outcome: Ongoing  8/21/2021 0253 by Deann Valerio RN  Outcome: Ongoing     Problem: Self-Care:  Goal: Ability to participate in self-care as condition permits will improve  Description: Ability to participate in self-care as condition permits will improve  8/21/2021 0857 by Andreia De Souza RN  Outcome: Ongoing  8/21/2021 0856 by Andreia De Souza RN  Outcome: Ongoing  8/21/2021 0253 by Deann Valerio RN  Outcome: Ongoing     Problem:  Bowel/Gastric:  Goal: Complications related to the disease process, condition or treatment will be avoided or minimized  8/21/2021 0857 by Andreia De Souza RN  Outcome: Ongoing  8/21/2021 0856 by Andreia De Souza RN  Outcome: Ongoing  8/21/2021 0253 by Deann Valerio RN  Outcome: Ongoing  Goal: Will be independent with ostomy care  8/21/2021 0857 by Andreia De Souza RN  Outcome: Ongoing  8/21/2021 0856 by Andreia De Souza RN  Outcome: Ongoing  8/21/2021 0253 by Deann Valerio RN  Outcome: Ongoing     Problem: Skin Integrity:  Goal: Will show no infection signs and symptoms  Description: Will show no infection signs and symptoms  8/21/2021 0857 by Andreia De Souza RN  Outcome: Ongoing  8/21/2021 0856 by Andreia De Souza RN  Outcome: Ongoing  8/21/2021 0253 by Deann Valerio RN  Outcome: Ongoing  Goal: Absence of new skin breakdown  Description: Absence of new skin breakdown  8/21/2021 0857 by Andreia De Souza RN  Outcome: Ongoing  8/21/2021 0856 by Andreia De Souza RN  Outcome: Ongoing  8/21/2021 0253 by Deann Valerio RN  Outcome: Ongoing

## 2021-08-21 NOTE — FLOWSHEET NOTE
08/20/21 2111   Provider Notification   Reason for Communication Evaluate;New orders; Review case   Provider Name Dr Mehnaz Peterson   Provider Notification Physician   Method of Communication Call   Response See orders   Notification Time 2055   Shift Event Other (comment)  (condition changes, BP update, medication update and changes)   called DR Mehnaz Peterson, regarding BP at 76/54, 87/70 after walking and drinking fluids, 89/51 recheck fter drinking, and eating, then recheck again at 2110 in day area after drinking more fluids, 86/64 manual results, left arm, sitting. Hr 62, 98% RA and 96.7, temp, RR 16. Dr Mehnaz Peterson said to HOLD Gabapentin 600 mg, and one time dose of Gabapentin 300 mg, HOLD Trazodone 50 mg prn for tonight and do not give, HOLD Lisinipril and Metropolol for 8/21/2021 AM dose for 900am for low BP and to HOLD Percocet  mg for tonight and One time dose of Percocet 5-325 mg for pain tonight. At 2145 patient was assisted and ambulated back to room, pt urinated independently and was assisted back to bed by nursing staff. Pt complaint of weakness and pain at this time. Pt was med complaint with medications and awaiting on Percocet 5-352 mg from pharmacy and Potassium from pharmacy. Will continue to monitor closely for medication side affects, safety and comfort.

## 2021-08-21 NOTE — PROGRESS NOTES
WRAP UP GROUP NOTE:     Patient's Goal:  Providing feedback as to their own progress in the care-plan provided. Pt's have an opportunity to explore self-reflective skills and share any additional cares and concerns not yet addressed. Pt effectively participated.      Energy level:LOW  Appetite:IMPROVING  Concentration: POOR  Hallucinations:NONE  Depression:WORSE \"I FELL LEFT BEHIND\"  Anxiety:CONSTANT  \"FEEL SHAKY\"  How I worked today:BLANK  What helps me sleep:TAKE MEDICATIONS  Any questions/complaints/comments:NO

## 2021-08-21 NOTE — PROGRESS NOTES
Notified pharmacy by noted message that Potassium Citrate ER TBCR 15 mcg was not available. Awaited response and was noted that available dose was not available and to do alternative dosage that is available per conversation evening prior. Called pharmacy, no response and re-messaged pharmacy and documented: (Pt  doses earlier today, please give work around if not available so that I can inform physician.)   Potassium not available to give at 2100. Inputted at [address]  At, 26 pharmacy called pertaining to Potassium dosage. Charge nurse notified pharmacist that nurses and physician will be notified in early AM and to see if further testing is needed to evaluate need for correct dosage of Potassium. Will continue to forward documentation to morning nurse for further results.

## 2021-08-21 NOTE — PROGRESS NOTES
Pt awoken by lab and at 0515 pt stated that her stomach hurt, nauseated, feels shakey, pain in back and neck and \"I just don't feel good\", pt stated \"I want my home medications\" VS 92/62 BP, 64hr, 16rr, 96%02sat. Pt 90 degrees in bed for VS. Pt administered Zofran 4mg for nause, scheduled Carafate, and Ibuprofen 600mg for pain rate of 10. Pt stated that she wanted her her home medication of pain medications. Nurse reassured pt that her pain is being address and stomach issues are being address and that she has scheduled medications that will be administered in morning. Nurse applied heated blanket to back and neck area for comfort measure. Will continue to monitor closely for medication affects, safety and comfort. Pt resting at 0600 and 0615 for pain reassessment.

## 2021-08-21 NOTE — H&P
[place] COLONOSCOPY  09/18/2012    Dr Aishwarya Claire colonic polyp & diarrhea    COLONOSCOPY  03/12/2015    Dr Louie Hernandez Left 10/05/2016    PLACEMENT OF STENT  performed by Lidia Valenzuela MD at [facility] 02/20/2019    CYSTOSCOPY LEFT URETEROSCOPY  LASER LITHOTRIPSY BALLOON DIALATION OF URETERAL STRICTURE performed by Lidia Valenzuela MD at [facility] 02/20/2019    PLACEMENT OF LEFT DOUBLE J STENT performed by Lidia Valenzuela MD at [facility], COLON, DIAGNOSTIC      FINGER SURGERY      FOOT SURGERY  10/2011    Dr Andrew Larios SURGERY  09/2012    multiple    KIDNEY SURGERY      LITHOTRIPSY      LITHOTRIPSY Left 10/05/2016    LITHOTRIPSY LASER performed by Lidia Valenzuela MD at [facility]  08/13/2008    Dr Aishwarya Claire, Grade 2, stage 1 liver biopsy ([facility]), Mild piecemeal necrosis, Mild lobular inflam, Portal fibrosis.    .    LUMBAR LAMINECTOMY  06/24/2011    MA CYSTO/URETERO/PYELOSCOPY W/LITHOTRIPSY Right 06/21/2018    CYSTOSCOPY; RIGHT RETROGRADE PYELOGRAM; RIGHT URETERAL DILATATION; RIGHT URETEROSCPY WITH LASER LITHOTRIPSY performed by Lidia Valenzuela MD at [address] [facility] Right 06/21/2018    INSERTION OF RIGHT URETERAL STENT performed by Lidia Valenzuela MD at [address]      right great toe    TONSILLECTOMY      UPPER GASTROINTESTINAL ENDOS[facility]  08[facility]29/2008    [facility]  [facility]    Dr Aishwarya Claire, Celiac, Lymphoid aggregates, Reflux    UPPER GASTROINTESTINAL ENDOSCOPY  10/24/2013    Dr Eliza Dillon Left 10/05/2016    URETEROSCOPY performed by Lidia Valenzuela MD at [address] [facility] OR         Medications Prior to Admission:    Medications Prior to Admission: capecitabine (XELODA) 150 MG chemo tablet, Take 1 tablet 2 times a day along with 500 mg to equal 1650 mg to finish up eith radiation  capecitabine (XELODA) 500 MG chemo tablet, Take 3 tablets 2 times a day along with 150 mg to equal 1650 mg to finish up raditation  sodium chloride, PF, 0.9 % injection, Flush PICC line daily with 10cc NS followed by 500U heparin sodium per protocol  heparin flush 100 UNIT/ML injection, Flush PICC line with 500U daily per protocol  acetaminophen (TYLENOL) 325 MG tablet, Take 975 mg by mouth every 8 hours  fentaNYL (DURAGESIC) 25 MCG/HR,   ibuprofen (ADVIL;MOTRIN) 600 MG tablet, Take 1 tablet by mouth every 8 hours as needed  ondansetron (ZOFRAN) 4 MG tablet,   oxyCODONE HCl (OXY-IR) 10 MG immediate release tablet,   Ascorbic Acid 100 MG CHEW,   Zinc Gluconate 10 MG LOZG,   dicyclomine (BENTYL) 20 MG tablet,   albuterol sulfate  (90 Base) MCG/ACT inhaler, INHALE 2 PUFFS INTO THE LUNGS EVERY 6 HOURS AS NEEDED FOR WHEEZING  acyclovir (ZOVIRAX) 200 MG capsule, TAKE 1 CAPSULE BY MOUTH 5 TIMES DAILY. metoprolol succinate (TOPROL XL) 50 MG extended release tablet, TAKE 1 TABLET BY MOUTH ONCE DAILY. lisinopril (PRINIVIL;ZESTRIL) 10 MG tablet, TAKE 1 TABLET BY MOUTH ONCE DAILY. vitamin D (ERGOCALCIFEROL) 1.25 MG (13745 UT) CAPS capsule, Take 1 capsule by mouth every 30 days  ondansetron (ZOFRAN-ODT) 8 MG TBDP disintegrating tablet, DISSOLVE 1 TABLET UNDER TONGUE EVERY 8 HOURS AS NEEDED FOR NAUSEA OR VOMITING  chlorthalidone (HYGROTEN) 50 MG tablet, Take 1 tablet by mouth daily  Potassium Citrate ER 15 MEQ (1620 MG) TBCR, TAKE 1 TABLET BY MOUTH THREE TIMES DAILY. oxyCODONE-acetaminophen (PERCOCET)  MG per tablet, Take 1 tablet by mouth 3 times daily as needed for Pain.   sucralfate (CARAFATE) 1 GM tablet, Take 1 g by mouth 2 times daily as needed   FLUoxetine (PROZAC) 20 MG capsule, Take 40 mg by mouth 2 times daily   gabapentin (NEURONTIN) 300 MG capsule, Take 1 capsule by mouth 4 times daily Indications: Backache, 1 in am, 1 at noon, 2 in the pm. (Patient taking differently: Take 300 mg by mouth 4 times daily. Pain Management.)  ALPRAZolam (XANAX) 2 MG tablet, Take 2 mg by mouth nightly as needed for Sleep. Dr. Mickey Paez. vitamin B-12 (CYANOCOBALAMIN) 500 MCG tablet, Take 500 mcg by mouth daily. Allergies:  Morphine    Social History:   TOBACCO:   reports that she has been smoking. She has a 21.00 pack-year smoking history. She has never used smokeless tobacco.  ETOH:   reports no history of alcohol use. DRUGS:   reports current drug use. Drug: Cocaine. MARITAL STATUS:  Not   OCCUPATION:  Not working  Patient currently lives alone      Family History:       Problem Relation Age of Onset    Other Father         committed suicide 2 years ago.  Heart Defect Mother         dysrythmia    Heart Disease Mother     Other Mother         h pylori/esoph. issues    Stroke Maternal Grandmother     Heart Attack Maternal Grandmother     Diabetes Maternal Grandmother     Coronary Art Dis Maternal Grandmother     Heart Defect Maternal Grandmother     Urolithiasis Other     Tuberculosis Other         pt states unknown    Ulcerative Colitis Other         pt states unknown    Seizures Son     Diabetes Paternal Grandmother      REVIEW OF SYSTEMS:  Constitutional:weakness  CV: neg  Pulmonary: neg  GI: poor appetite  : neg  Psych: depression, SI  Neuro: neg  Skin: neg  MusculoSkeletal: neg  HEENT: neg  Joints: neg    Vitals:  BP 86/64   Pulse 62   Temp 96.7 °F (35.9 °C) (Temporal)   Resp 16   Ht 5' 4\" (1.626 m)   Wt 162 lb 6.4 oz (73.7 kg)   SpO2 98%   BMI 27.88 kg/m²     PHYSICAL EXAM:  Gen: NAD, alert  HEENT: WNL  Lymph: no LAD  Neck: no JVD or masses  Chest: CTA bilat  CV: RRR  Abdomen: NT/ND  Extrem: no C/C/E  Neuro: non focal  Skin: no rashes  Joints: no redness    DATA:  I have reviewed the admission labs and imaging tests.     ASSESSMENT AND PLAN:      Active Problems:    Major depressive disorder, recurrent severe without psychotic features, SI--follow with Psych    HTN--follow with BP    COPD---no issues    H/O Rectal CA    Chronic Pain          Marquis Walsh MD  10:00 PM 8/20/2021

## 2021-08-21 NOTE — PROGRESS NOTES
Department of Psychiatry  Attending Progress Note     Chief complaint: \"I'm not doing good\"    SUBJECTIVE:   Chart reviewed, discussed with the team. Patient does not get out of bed. Hypotensive. Patient seen eating lunch this afternoon. She presents with dysphoric affect. She denies suicidal ideation but states she feels depressed. Slept poorly last night. States she would like to take a shower. Complains of pain. No other issues. OBJECTIVE    Physical  Wt Readings from Last 3 Encounters:   08/20/21 162 lb 6.4 oz (73.7 kg)   06/30/21 195 lb 3.2 oz (88.5 kg)   06/08/21 192 lb 8 oz (87.3 kg)     Temp Readings from Last 3 Encounters:   08/21/21 97.4 °F (36.3 °C) (Temporal)   06/30/21 98.1 °F (36.7 °C)     BP Readings from Last 3 Encounters:   08/21/21 (!) 88/52   06/30/21 98/60   06/08/21 112/73     Pulse Readings from Last 3 Encounters:   08/21/21 82   06/30/21 84   06/08/21 66        Review of Systems: 14-point review of systems negative except as described above    Mental Status Examination:   Appearance:  Stated age. Gait stable with assistance. No abnormal movements or tremor. Behavior: Calm, cooperative  Speech: Slow  Mood: \"Depressed \"   Affect: Mood congruent. Thought Process: Appears linear. Thought Content:  Denies SI/HI. No overt delusions or paranoia appreciated. Perceptions: Denies auditory or visual hallucinations at present time. Not responding to internal stimuli. Concentration: Intact. Orientation: to person, place, date, and situation. Language: Intact. Fund of information: Intact. Memory: Recent and remote appear intact. Neurovegitative: Poor appetite and  sleep. Insight: Limited. Judgment: Limited.     Data  Lab Results   Component Value Date    WBC 7.2 08/19/2021    HGB 11.9 (L) 08/19/2021    HCT 38.0 08/19/2021    MCV 94.5 08/19/2021     08/19/2021      Lab Results   Component Value Date     (L) 08/19/2021    K 3.4 (L) 08/19/2021    CL 98 08/19/2021 CO2 19 (L) 08/19/2021    BUN 23 08/19/2021    CREATININE 0.5 08/19/2021    GLUCOSE 84 08/19/2021    CALCIUM 10.0 08/19/2021    PROT 7.0 08/19/2021    LABALBU 3.8 08/19/2021    BILITOT 0.4 08/19/2021    ALKPHOS 59 08/19/2021    AST 32 08/19/2021    ALT 20 08/19/2021    LABGLOM >60 08/19/2021    GFRAA >59 08/19/2021    GLOB 2.5 06/30/2021       Medications    Current Facility-Administered Medications:     acetaminophen (TYLENOL) tablet 975 mg, 975 mg, Oral, Q8H PRN, Alexey Nam MD, 975 mg at 08/20/21 1519    ibuprofen (ADVIL;MOTRIN) tablet 600 mg, 600 mg, Oral, Q8H PRN, Alexey Nam MD, 600 mg at 08/21/21 0533    lisinopril (PRINIVIL;ZESTRIL) tablet 10 mg, 10 mg, Oral, Daily, Alexey Nam MD, 10 mg at 08/20/21 0850    metoprolol succinate (TOPROL XL) extended release tablet 50 mg, 50 mg, Oral, Daily, Alexey Nam MD, 50 mg at 08/20/21 0850    ondansetron (ZOFRAN) tablet 4 mg, 4 mg, Oral, Q8H PRN, Alexey Nam MD, 4 mg at 08/21/21 0536    sucralfate (CARAFATE) tablet 1 g, 1 g, Oral, BID, Alexey Nam MD, 1 g at 08/21/21 0536    dicyclomine (BENTYL) tablet 20 mg, 20 mg, Oral, 4x Daily AC & HS, Alexey Nam MD, 20 mg at 08/21/21 1154    polyethylene glycol (GLYCOLAX) packet 17 g, 17 g, Oral, Daily PRN, Alexey Nam MD    traZODone (DESYREL) tablet 50 mg, 50 mg, Oral, Nightly, Alexey Nam MD, 50 mg at 08/20/21 0231    melatonin disintegrating tablet 5 mg, 5 mg, Oral, Nightly, Alexey Nam MD, 5 mg at 08/20/21 0231    albuterol (PROVENTIL) nebulizer solution 2.5 mg, 2.5 mg, Nebulization, Q6H PRN, Alexey Nam MD    Potassium Citrate ER TBCR 15 mEq, 15 mEq, Oral, TID, MD Katharina Fernandes  Vitamin C CHEW 100 mg, 100 mg, Oral, Daily, Alexey Nam MD    vitamin B-12 (CYANOCOBALAMIN) tablet 500 mcg, 500 mcg, Oral, Daily, Alexey Nam MD, 500 mcg at 08/21/21 4575    Zinc Gluconate LOZG 10 mg, 10 mg, Mouth/Throat, Daily, Alexey Nam MD    gabapentin (NEURONTIN) capsule 300 mg, 300

## 2021-08-22 LAB — URINE CULTURE, ROUTINE: NORMAL

## 2021-08-22 PROCEDURE — 6370000000 HC RX 637 (ALT 250 FOR IP): Performed by: NURSE PRACTITIONER

## 2021-08-22 PROCEDURE — 1240000000 HC EMOTIONAL WELLNESS R&B

## 2021-08-22 PROCEDURE — 6370000000 HC RX 637 (ALT 250 FOR IP): Performed by: PSYCHIATRY & NEUROLOGY

## 2021-08-22 PROCEDURE — 6360000002 HC RX W HCPCS: Performed by: PSYCHIATRY & NEUROLOGY

## 2021-08-22 RX ADMIN — MIRTAZAPINE 3.75 MG: 7.5 TABLET ORAL at 20:53

## 2021-08-22 RX ADMIN — GABAPENTIN 300 MG: 300 CAPSULE ORAL at 06:34

## 2021-08-22 RX ADMIN — SUCRALFATE 1 G: 1 TABLET ORAL at 17:41

## 2021-08-22 RX ADMIN — GABAPENTIN 600 MG: 300 CAPSULE ORAL at 20:54

## 2021-08-22 RX ADMIN — SUCRALFATE 1 G: 1 TABLET ORAL at 06:34

## 2021-08-22 RX ADMIN — DICYCLOMINE HYDROCHLORIDE 20 MG: 20 TABLET ORAL at 20:56

## 2021-08-22 RX ADMIN — DICYCLOMINE HYDROCHLORIDE 20 MG: 20 TABLET ORAL at 12:56

## 2021-08-22 RX ADMIN — HYDROXYZINE HYDROCHLORIDE 25 MG: 25 TABLET, FILM COATED ORAL at 15:59

## 2021-08-22 RX ADMIN — FLUOXETINE HYDROCHLORIDE 40 MG: 20 CAPSULE ORAL at 08:43

## 2021-08-22 RX ADMIN — CEPHALEXIN 500 MG: 250 CAPSULE ORAL at 20:56

## 2021-08-22 RX ADMIN — DICYCLOMINE HYDROCHLORIDE 20 MG: 20 TABLET ORAL at 06:34

## 2021-08-22 RX ADMIN — IBUPROFEN 600 MG: 400 TABLET ORAL at 20:56

## 2021-08-22 RX ADMIN — OXYCODONE HYDROCHLORIDE AND ACETAMINOPHEN 1 TABLET: 5; 325 TABLET ORAL at 15:58

## 2021-08-22 RX ADMIN — CEPHALEXIN 500 MG: 250 CAPSULE ORAL at 08:43

## 2021-08-22 RX ADMIN — GABAPENTIN 300 MG: 300 CAPSULE ORAL at 12:56

## 2021-08-22 RX ADMIN — CYANOCOBALAMIN TAB 500 MCG 500 MCG: 500 TAB at 08:43

## 2021-08-22 RX ADMIN — Medication 5 MG: at 20:57

## 2021-08-22 RX ADMIN — DICYCLOMINE HYDROCHLORIDE 20 MG: 20 TABLET ORAL at 15:59

## 2021-08-22 RX ADMIN — ENOXAPARIN SODIUM 40 MG: 40 INJECTION SUBCUTANEOUS at 08:43

## 2021-08-22 ASSESSMENT — PAIN DESCRIPTION - PAIN TYPE
TYPE: CHRONIC PAIN
TYPE: CHRONIC PAIN

## 2021-08-22 ASSESSMENT — PAIN - FUNCTIONAL ASSESSMENT
PAIN_FUNCTIONAL_ASSESSMENT: ACTIVITIES ARE NOT PREVENTED
PAIN_FUNCTIONAL_ASSESSMENT: PREVENTS OR INTERFERES SOME ACTIVE ACTIVITIES AND ADLS

## 2021-08-22 ASSESSMENT — PAIN DESCRIPTION - PROGRESSION
CLINICAL_PROGRESSION: NOT CHANGED
CLINICAL_PROGRESSION: GRADUALLY WORSENING

## 2021-08-22 ASSESSMENT — PAIN DESCRIPTION - FREQUENCY
FREQUENCY: CONTINUOUS
FREQUENCY: CONTINUOUS

## 2021-08-22 ASSESSMENT — PAIN SCALES - GENERAL
PAINLEVEL_OUTOF10: 9
PAINLEVEL_OUTOF10: 10
PAINLEVEL_OUTOF10: 4
PAINLEVEL_OUTOF10: 9

## 2021-08-22 ASSESSMENT — PAIN DESCRIPTION - LOCATION
LOCATION: BACK
LOCATION: LEG

## 2021-08-22 ASSESSMENT — PAIN DESCRIPTION - ONSET
ONSET: ON-GOING
ONSET: ON-GOING

## 2021-08-22 ASSESSMENT — PAIN DESCRIPTION - ORIENTATION
ORIENTATION: LOWER
ORIENTATION: RIGHT

## 2021-08-22 ASSESSMENT — PAIN DESCRIPTION - DESCRIPTORS
DESCRIPTORS: ACHING
DESCRIPTORS: ACHING

## 2021-08-22 NOTE — PLAN OF CARE
Problem: Pain:  Goal: Pain level will decrease  Description: Pain level will decrease  8/21/2021 2113 by Venkatesh Mijares RN  Outcome: Ongoing     Problem: Pain:  Goal: Control of acute pain  Description: Control of acute pain  8/21/2021 2113 by Venkatesh Mijares RN  Outcome: Ongoing     Problem: Pain:  Goal: Control of chronic pain  Description: Control of chronic pain  8/21/2021 2113 by Venkatesh Mijares RN  Outcome: Ongoing     Problem: Falls - Risk of:  Goal: Will remain free from falls  Description: Will remain free from falls  8/21/2021 2113 by Venkatesh Mijares RN  Outcome: Ongoing     Problem: Falls - Risk of:  Goal: Absence of physical injury  Description: Absence of physical injury  8/21/2021 2113 by Venkatesh Mijares RN  Outcome: Ongoing     Problem: Altered Mood, Depressive Behavior:  Goal: Able to verbalize acceptance of life and situations over which he or she has no control  Description: Able to verbalize acceptance of life and situations over which he or she has no control  8/21/2021 2113 by Venkatesh Mijares RN  Outcome: Ongoing     Problem: Altered Mood, Depressive Behavior:  Goal: Able to verbalize and/or display a decrease in depressive symptoms  Description: Able to verbalize and/or display a decrease in depressive symptoms  8/21/2021 2113 by Venkatesh Mijares RN  Outcome: Ongoing     Problem: Altered Mood, Depressive Behavior:  Goal: Able to verbalize support systems  Description: Able to verbalize support systems  8/21/2021 2113 by Venkatesh Mijares RN  Outcome: Ongoing     Problem: Altered Mood, Depressive Behavior:  Goal: Absence of self-harm  Description: Absence of self-harm  8/21/2021 2113 by Venkatesh Mijares RN  Outcome: Ongoing     Problem: Altered Mood, Depressive Behavior:  Goal: Patient specific goal  Description: Patient specific goal  8/21/2021 2113 by Venkatesh Mijares RN  Outcome: Ongoing     Problem: Altered Mood, Depressive Behavior:  Goal: Participates in care planning  Description: Participates in care planning  8/21/2021 2113 by Prince Snyder RN  Outcome: Ongoing     Problem: Altered Mood, Deterioration in Function:  Goal: Ability to perform activities of daily living will improve  Description: Ability to perform activities of daily living will improve  8/21/2021 2113 by Prince Snyder RN  Outcome: Ongoing     Problem: Altered Mood, Deterioration in Function:  Goal: Able to verbalize reality based thinking  Description: Able to verbalize reality based thinking  8/21/2021 2113 by Prince Snyder RN  Outcome: Ongoing     Problem: Altered Mood, Deterioration in Function:  Goal: Skin appearance normal  Description: Skin appearance normal  8/21/2021 2113 by Prince Snyder RN  Outcome: Ongoing     Problem: Altered Mood, Deterioration in Function:  Goal: Maintenance of adequate nutrition will improve  Description: Maintenance of adequate nutrition will improve  8/21/2021 2113 by Prince Snyder RN  Outcome: Ongoing     Problem: Altered Mood, Deterioration in Function:  Goal: Ability to tolerate increased activity will improve  Description: Ability to tolerate increased activity will improve  8/21/2021 2113 by Prince Snyder RN  Outcome: Ongoing     Problem: Altered Mood, Deterioration in Function:  Goal: Participates in care planning  Description: Participates in care planning  8/21/2021 2113 by Prince Snyder RN  Outcome: Ongoing     Problem: Altered Mood, Deterioration in Function:  Goal: Patient specific goal  Description: Patient specific goal  8/21/2021 2113 by Prince Snyder RN  Outcome: Ongoing     Problem: Risk of Harm:  Goal: Ability to remain free from injury will improve  Description: Ability to remain free from injury will improve  8/21/2021 2113 by Prince Snyder RN  Outcome: Ongoing     Problem: Health Behavior:  Goal: Identification of resources available to assist in meeting health care needs will improve  Description: Identification of resources available to assist in meeting health care needs will improve  8/21/2021 2113 by Belgica Zimmer RN  Outcome: Ongoing     Problem: Self-Care:  Goal: Ability to participate in self-care as condition permits will improve  Description: Ability to participate in self-care as condition permits will improve  8/21/2021 2113 by Belgica Zimmer RN  Outcome: Ongoing     Problem: Bowel/Gastric:  Goal: Complications related to the disease process, condition or treatment will be avoided or minimized  8/21/2021 2113 by Belgica Zimmer RN  Outcome: Ongoing     Problem:  Bowel/Gastric:  Goal: Will be independent with ostomy care  8/21/2021 2113 by Belgica Zimmer RN  Outcome: Ongoing     Problem: Skin Integrity:  Goal: Will show no infection signs and symptoms  Description: Will show no infection signs and symptoms  8/21/2021 2113 by Belgica Zimmer RN  Outcome: Ongoing     Problem: Skin Integrity:  Goal: Absence of new skin breakdown  Description: Absence of new skin breakdown  8/21/2021 2113 by Belgica Zimmer RN  Outcome: Ongoing

## 2021-08-22 NOTE — PLAN OF CARE
Problem: Pain:  Goal: Pain level will decrease  Description: Pain level will decrease  8/22/2021 1039 by Ninfa Stephens RN  Outcome: Ongoing  8/21/2021 2113 by Cherelle Jesus RN  Outcome: Ongoing  Goal: Control of acute pain  Description: Control of acute pain  8/22/2021 1039 by Ninfa Stephens RN  Outcome: Ongoing  8/21/2021 2113 by Cherelle Jesus RN  Outcome: Ongoing  Goal: Control of chronic pain  Description: Control of chronic pain  8/22/2021 1039 by Ninfa Stephens RN  Outcome: Ongoing  8/21/2021 2113 by Cherelle Jesus RN  Outcome: Ongoing     Problem: Falls - Risk of:  Goal: Will remain free from falls  Description: Will remain free from falls  8/22/2021 1039 by Ninfa Stephens RN  Outcome: Ongoing  8/21/2021 2113 by Cherelle Jesus RN  Outcome: Ongoing  Goal: Absence of physical injury  Description: Absence of physical injury  8/22/2021 1039 by Ninfa Stephens RN  Outcome: Ongoing  8/21/2021 2113 by Cherelle Jesus RN  Outcome: Ongoing     Problem: Altered Mood, Depressive Behavior:  Goal: Able to verbalize acceptance of life and situations over which he or she has no control  Description: Able to verbalize acceptance of life and situations over which he or she has no control  8/22/2021 1039 by Ninfa Stephens RN  Outcome: Ongoing  8/21/2021 2113 by Cherelle Jesus RN  Outcome: Ongoing  Goal: Able to verbalize and/or display a decrease in depressive symptoms  Description: Able to verbalize and/or display a decrease in depressive symptoms  8/22/2021 1039 by Ninfa Stephens RN  Outcome: Ongoing  8/21/2021 2113 by Cherelle Jesus RN  Outcome: Ongoing  Goal: Ability to disclose and discuss suicidal ideas will improve  Description: Ability to disclose and discuss suicidal ideas will improve  Outcome: Ongoing  Goal: Able to verbalize support systems  Description: Able to verbalize support systems  8/22/2021 1039 by Ninfa Stephens RN  Outcome: Ongoing  8/21/2021 2113 by Tammy Gifford RN  Outcome: Ongoing  Goal: Absence of self-harm  Description: Absence of self-harm  8/22/2021 1039 by Celestino Mariano RN  Outcome: Ongoing  8/21/2021 2113 by Tammy Gifford RN  Outcome: Ongoing  Goal: Patient specific goal  Description: Patient specific goal  8/22/2021 1039 by Celestino Mariano RN  Outcome: Ongoing  8/21/2021 2113 by Tammy Gifford RN  Outcome: Ongoing  Goal: Participates in care planning  Description: Participates in care planning  8/22/2021 1039 by Celestino Mariano RN  Outcome: Ongoing  8/21/2021 2113 by Tammy Gifford RN  Outcome: Ongoing     Problem: Altered Mood, Deterioration in Function:  Goal: Ability to perform activities of daily living will improve  Description: Ability to perform activities of daily living will improve  8/22/2021 1039 by Celestino Mariano RN  Outcome: Ongoing  8/21/2021 2113 by Tammy Gifford RN  Outcome: Ongoing  Goal: Able to verbalize reality based thinking  Description: Able to verbalize reality based thinking  8/22/2021 1039 by Celestino Mariano RN  Outcome: Ongoing  8/21/2021 2113 by Tammy Gifford RN  Outcome: Ongoing  Goal: Skin appearance normal  Description: Skin appearance normal  8/22/2021 1039 by Celestino Mariano RN  Outcome: Ongoing  8/21/2021 2113 by Tammy Gifford RN  Outcome: Ongoing  Goal: Maintenance of adequate nutrition will improve  Description: Maintenance of adequate nutrition will improve  8/22/2021 1039 by Celestino Mariano RN  Outcome: Ongoing  8/21/2021 2113 by Tammy Gifford RN  Outcome: Ongoing  Goal: Ability to tolerate increased activity will improve  Description: Ability to tolerate increased activity will improve  8/22/2021 1039 by Celestino Mariano RN  Outcome: Ongoing  8/21/2021 2113 by Tammy Gifford RN  Outcome: Ongoing  Goal: Participates in care planning  Description: Participates in care planning  8/22/2021 1039 by Celestino Mariano RN  Outcome: Ongoing  8/21/2021 2113 by Tammy Gifford RN  Outcome: Ongoing  Goal: Patient specific goal  Description: Patient specific goal  8/22/2021 1039 by Celestino Mariano RN  Outcome: Ongoing  8/21/2021 2113 by Tammy Gifford RN  Outcome: Ongoing     Problem: Risk of Harm:  Goal: Ability to remain free from injury will improve  Description: Ability to remain free from injury will improve  8/22/2021 1039 by Celestino Mariano RN  Outcome: Ongoing  8/21/2021 2113 by Tammy Gifford RN  Outcome: Ongoing     Problem: Health Behavior:  Goal: Identification of resources available to assist in meeting health care needs will improve  Description: Identification of resources available to assist in meeting health care needs will improve  8/22/2021 1039 by Celestino Mariano RN  Outcome: Ongoing  8/21/2021 2113 by Tammy Gifford RN  Outcome: Ongoing     Problem: Self-Care:  Goal: Ability to participate in self-care as condition permits will improve  Description: Ability to participate in self-care as condition permits will improve  8/22/2021 1039 by Celestino Mariano RN  Outcome: Ongoing  8/21/2021 2113 by Tammy Gifford RN  Outcome: Ongoing     Problem:  Bowel/Gastric:  Goal: Complications related to the disease process, condition or treatment will be avoided or minimized  8/22/2021 1039 by Celestino Mariano RN  Outcome: Ongoing  8/21/2021 2113 by Tammy Gifford RN  Outcome: Ongoing  Goal: Will be independent with ostomy care  8/22/2021 1039 by Celestino Mariano RN  Outcome: Ongoing  8/21/2021 2113 by Tammy Gifford RN  Outcome: Ongoing     Problem: Skin Integrity:  Goal: Will show no infection signs and symptoms  Description: Will show no infection signs and symptoms  8/22/2021 1039 by Celestino Mariano RN  Outcome: Ongoing  8/21/2021 2113 by Tammy Gifford RN  Outcome: Ongoing  Goal: Absence of new skin breakdown  Description: Absence of new skin breakdown  8/22/2021 1039 by Celestino Mariano RN  Outcome: Ongoing  8/21/2021 2113 by Maame Roblero RN  Outcome: Ongoing

## 2021-08-22 NOTE — PROGRESS NOTES
I Daily Shift Assessment-Geriatric Unit  Nursing Progress Note          Room: [address] [place] [address]   Name: Neelima Terry   Age: 64 y.o. Gender: female   Dx: <principal problem not specified>  Precautions: suicide risk and fall risk  Inpatient Status: voluntary     SLEEP:    Sleep: No,   Sleep Quality Poor   Hours Slept:    Sleep Medications: Yes  PRN Sleep Meds: No       MEDICAL:      Other PRN Meds: Yes   Med Compliant: Yes   Accu-Chek: No   Oxygen/CPAP/BiPAP: No  CIWA/CINA: No   PAIN Assessment: location, back, leg  Side Effects from medication: No    Is Patient experiencing any respiratory symptoms (headache, fever, body aches, cough. Rogelio Confer ): no  Patient educated by nursing to practice social distancing, wear masks, wash hands frequently: yes      Metabolic Screening:    Lab Results   Component Value Date    LABA1C 4.8 08/21/2021       Lab Results   Component Value Date    CHOL 153 (L) 08/21/2021    CHOL 196 09/22/2020    CHOL 162 09/02/2020    CHOL 181 02/28/2020    CHOL 138 (L) 08/28/2019    CHOL 201 (H) 02/11/2019    CHOL 169 10/01/2018    CHOL 159 (L) 07/05/2017     Lab Results   Component Value Date    TRIG 103 08/21/2021    TRIG 94 09/22/2020    TRIG 104 09/02/2020    TRIG 68 02/28/2020    TRIG 86 08/28/2019    TRIG 72 02/11/2019    TRIG 162 (H) 10/01/2018    TRIG 78 (L) 07/05/2017     Lab Results   Component Value Date    HDL 43 (L) 08/21/2021    HDL 66 09/22/2020    HDL 47 (L) 09/02/2020    HDL 57 (L) 02/28/2020    HDL 44 (L) 08/28/2019    HDL 55 (L) 02/11/2019    HDL 41 (L) 10/01/2018    HDL 47 (L) 07/05/2017     No components found for: LDLCAL  No results found for: LABVLDL      Body mass index is 27.88 kg/m².     BP Readings from Last 2 Encounters:   08/21/21 88/67   06/30/21 98/60         PSYCH:     SI denies suicidal ideation    HI Negative for homicidal ideation        AVH:Absent      Depression: 8 Anxiety: 8       GENERAL:      Appetite: increased  Social: No Speech: normal   Appearance:appropriately dressed  Assistive Devices: wheelchairLevel of Assist: Minimal Assist      GROUP:    Group Participation: Yes  Participation LevelActive Listener    Participation QualityAppropriate    Notes:     Patient spent this evening in her room in bed. She was in the tv room and asked for assistance to her room due to feeling weak. She was assisted X1 to her room where she remained. MD was notified regarding low blood pressure reading of 88/67 HR of 84. Received new orders to DC trazodone, hold gabapentin tonight, and put in new order for Lovonox 40 mg subq related to pt having less mobility. Patient was complaining of pain in her back and legs, rate of 9; prn ibuprofen given. Patient stated that she believes that her appetite may be improving somewhat. She asked for a turkey sandwich and was able to eat most of it this evening. Depression rated an 8/9 and anxiety 8/9 at this time. No SI, HI, AVH.        Electronically signed by Venkatesh Mijares RN on 8/21/21 at 9:40 PM CDT

## 2021-08-23 ENCOUNTER — TELEPHONE (OUTPATIENT)
Dept: HEMATOLOGY | Age: 61
End: 2021-08-23

## 2021-08-23 PROBLEM — F32.A DEPRESSION, ACUTE: Status: RESOLVED | Noted: 2021-08-20 | Resolved: 2021-08-23

## 2021-08-23 PROBLEM — H92.01 DISCOMFORT OF RIGHT EAR: Status: RESOLVED | Noted: 2019-09-03 | Resolved: 2021-08-23

## 2021-08-23 PROBLEM — R42 VERTIGO: Status: RESOLVED | Noted: 2019-10-15 | Resolved: 2021-08-23

## 2021-08-23 PROBLEM — M25.519 ACUTE SHOULDER PAIN: Status: RESOLVED | Noted: 2020-02-06 | Resolved: 2021-08-23

## 2021-08-23 PROCEDURE — 1240000000 HC EMOTIONAL WELLNESS R&B

## 2021-08-23 PROCEDURE — 6370000000 HC RX 637 (ALT 250 FOR IP): Performed by: PSYCHIATRY & NEUROLOGY

## 2021-08-23 PROCEDURE — 99239 HOSP IP/OBS DSCHRG MGMT >30: CPT | Performed by: PSYCHIATRY & NEUROLOGY

## 2021-08-23 PROCEDURE — 99223 1ST HOSP IP/OBS HIGH 75: CPT | Performed by: INTERNAL MEDICINE

## 2021-08-23 PROCEDURE — 6360000002 HC RX W HCPCS: Performed by: PSYCHIATRY & NEUROLOGY

## 2021-08-23 RX ORDER — OXYCODONE HYDROCHLORIDE AND ACETAMINOPHEN 5; 325 MG/1; MG/1
1 TABLET ORAL EVERY 8 HOURS PRN
Status: DISCONTINUED | OUTPATIENT
Start: 2021-08-23 | End: 2021-08-24 | Stop reason: HOSPADM

## 2021-08-23 RX ORDER — FLUOXETINE HYDROCHLORIDE 20 MG/1
20 CAPSULE ORAL DAILY
Status: DISCONTINUED | OUTPATIENT
Start: 2021-08-23 | End: 2021-08-23

## 2021-08-23 RX ORDER — MECOBALAMIN 5000 MCG
5 TABLET,DISINTEGRATING ORAL NIGHTLY
Qty: 30 TABLET | Refills: 1 | Status: ON HOLD | OUTPATIENT
Start: 2021-08-23 | End: 2022-09-29 | Stop reason: HOSPADM

## 2021-08-23 RX ORDER — MIRTAZAPINE 7.5 MG/1
3.75 TABLET, FILM COATED ORAL NIGHTLY
Qty: 15 TABLET | Refills: 1 | Status: ON HOLD | OUTPATIENT
Start: 2021-08-23 | End: 2022-09-29 | Stop reason: HOSPADM

## 2021-08-23 RX ORDER — DICYCLOMINE HCL 20 MG
20 TABLET ORAL 4 TIMES DAILY PRN
Status: DISCONTINUED | OUTPATIENT
Start: 2021-08-23 | End: 2021-08-24 | Stop reason: HOSPADM

## 2021-08-23 RX ADMIN — CYANOCOBALAMIN TAB 500 MCG 500 MCG: 500 TAB at 08:19

## 2021-08-23 RX ADMIN — GABAPENTIN 300 MG: 300 CAPSULE ORAL at 10:51

## 2021-08-23 RX ADMIN — DICYCLOMINE HYDROCHLORIDE 20 MG: 20 TABLET ORAL at 10:51

## 2021-08-23 RX ADMIN — MIRTAZAPINE 3.75 MG: 7.5 TABLET ORAL at 20:39

## 2021-08-23 RX ADMIN — Medication 5 MG: at 20:40

## 2021-08-23 RX ADMIN — IBUPROFEN 600 MG: 400 TABLET ORAL at 20:40

## 2021-08-23 RX ADMIN — FLUOXETINE HYDROCHLORIDE 20 MG: 20 CAPSULE ORAL at 08:19

## 2021-08-23 RX ADMIN — OXYCODONE HYDROCHLORIDE AND ACETAMINOPHEN 1 TABLET: 5; 325 TABLET ORAL at 10:10

## 2021-08-23 RX ADMIN — SUCRALFATE 1 G: 1 TABLET ORAL at 17:35

## 2021-08-23 RX ADMIN — HYDROXYZINE HYDROCHLORIDE 25 MG: 25 TABLET, FILM COATED ORAL at 20:41

## 2021-08-23 RX ADMIN — SUCRALFATE 1 G: 1 TABLET ORAL at 06:14

## 2021-08-23 RX ADMIN — DICYCLOMINE HYDROCHLORIDE 20 MG: 20 TABLET ORAL at 06:15

## 2021-08-23 RX ADMIN — ENOXAPARIN SODIUM 40 MG: 40 INJECTION SUBCUTANEOUS at 08:20

## 2021-08-23 RX ADMIN — DICYCLOMINE HYDROCHLORIDE 20 MG: 20 TABLET ORAL at 20:40

## 2021-08-23 RX ADMIN — GABAPENTIN 300 MG: 300 CAPSULE ORAL at 06:14

## 2021-08-23 RX ADMIN — OXYCODONE HYDROCHLORIDE AND ACETAMINOPHEN 1 TABLET: 5; 325 TABLET ORAL at 18:59

## 2021-08-23 ASSESSMENT — PAIN DESCRIPTION - FREQUENCY: FREQUENCY: INTERMITTENT

## 2021-08-23 ASSESSMENT — PAIN SCALES - GENERAL
PAINLEVEL_OUTOF10: 7
PAINLEVEL_OUTOF10: 0
PAINLEVEL_OUTOF10: 7
PAINLEVEL_OUTOF10: 5
PAINLEVEL_OUTOF10: 8
PAINLEVEL_OUTOF10: 8

## 2021-08-23 ASSESSMENT — PAIN DESCRIPTION - LOCATION: LOCATION: GENERALIZED;LEG

## 2021-08-23 ASSESSMENT — PAIN - FUNCTIONAL ASSESSMENT: PAIN_FUNCTIONAL_ASSESSMENT: ACTIVITIES ARE NOT PREVENTED

## 2021-08-23 ASSESSMENT — PAIN DESCRIPTION - DESCRIPTORS: DESCRIPTORS: DISCOMFORT

## 2021-08-23 ASSESSMENT — PAIN DESCRIPTION - ONSET: ONSET: ON-GOING

## 2021-08-23 ASSESSMENT — PAIN DESCRIPTION - PROGRESSION: CLINICAL_PROGRESSION: NOT CHANGED

## 2021-08-23 ASSESSMENT — PAIN DESCRIPTION - PAIN TYPE: TYPE: CHRONIC PAIN

## 2021-08-23 NOTE — PROGRESS NOTES
[facility] Daily Shift Assessment-Geriatric Unit  Nursing Progress Note        2  Room: [address] [place] [address]   Name: Garen Gilford   Age: 64 y.o. Gender: female   Dx: Depression acute. CA  Precautions: suicide risk and fall risk  Inpatient Status: voluntary     SLEEP:    Sleep: Yes,   Sleep Quality Good   Hours Slept: 7   Sleep Medications: Yes  PRN Sleep Meds: Yes       MEDICAL:      Other PRN Meds: Yes   Med Compliant: Yes   Accu-Chek: No   Oxygen/CPAP/BiPAP: No  CIWA/CINA: No   PAIN Assessment: location, lower abdomen, receiving treatment or level of pain (1-10,  Pt. Holding abdomen stating \"Oh, it hurts,  Oh, it hurts\" request to return to bed. Assisted pt. To room and to lay down in bed. Side Effects from medication: No    Is Patient experiencing any respiratory symptoms (headache, fever, body aches, cough. Devonte Benton ): no  Patient educated by nursing to practice social distancing, wear masks, wash hands frequently: yes      Metabolic Screening:    Lab Results   Component Value Date    LABA1C 4.8 08/21/2021       Lab Results   Component Value Date    CHOL 153 (L) 08/21/2021    CHOL 196 09/22/2020    CHOL 162 09/02/2020    CHOL 181 02/28/2020    CHOL 138 (L) 08/28/2019    CHOL 201 (H) 02/11/2019    CHOL 169 10/01/2018    CHOL 159 (L) 07/05/2017     Lab Results   Component Value Date    TRIG 103 08/21/2021    TRIG 94 09/22/2020    TRIG 104 09/02/2020    TRIG 68 02/28/2020    TRIG 86 08/28/2019    TRIG 72 02/11/2019    TRIG 162 (H) 10/01/2018    TRIG 78 (L) 07/05/2017     Lab Results   Component Value Date    HDL 43 (L) 08/21/2021    HDL 66 09/22/2020    HDL 47 (L) 09/02/2020    HDL 57 (L) 02/28/2020    HDL 44 (L) 08/28/2019    HDL 55 (L) 02/11/2019    HDL 41 (L) 10/01/2018    HDL 47 (L) 07/05/2017     No components found for: LDLCAL  No results found for: LABVLDL      Body mass index is 27.88 kg/m².     BP Readings from Last 2 Encounters:   08/23/21 98/69   06/30/21 98/60         PSYCH:     SI denies suicidal ideation    HI Negative for
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
## 2021-08-23 NOTE — PLAN OF CARE
Problem: Falls - Risk of:  Goal: Will remain free from falls  Description: Will remain free from falls  8/22/2021 2230 by Anika Ocampo RN  Outcome: Met This Shift  8/22/2021 1039 by Judith Bell RN  Outcome: Ongoing  Goal: Absence of physical injury  Description: Absence of physical injury  8/22/2021 2230 by Anika Ocampo RN  Outcome: Met This Shift  8/22/2021 1039 by Judith Bell RN  Outcome: Ongoing     Problem: Altered Mood, Depressive Behavior:  Goal: Absence of self-harm  Description: Absence of self-harm  8/22/2021 2230 by Anika Ocampo RN  Outcome: Met This Shift  8/22/2021 1039 by Judith eBll RN  Outcome: Ongoing     Problem: Pain:  Goal: Pain level will decrease  Description: Pain level will decrease  8/22/2021 2230 by Anika Ocampo RN  Outcome: Ongoing  8/22/2021 1039 by Judith Bell RN  Outcome: Ongoing  Goal: Control of acute pain  Description: Control of acute pain  8/22/2021 2230 by Anika Ocampo RN  Outcome: Ongoing  8/22/2021 1039 by Judith Bell RN  Outcome: Ongoing  Goal: Control of chronic pain  Description: Control of chronic pain  8/22/2021 2230 by Anika Ocampo RN  Outcome: Ongoing  8/22/2021 1039 by Judith Bell RN  Outcome: Ongoing     Problem: Altered Mood, Depressive Behavior:  Goal: Able to verbalize acceptance of life and situations over which he or she has no control  Description: Able to verbalize acceptance of life and situations over which he or she has no control  8/22/2021 2230 by Anika Ocampo RN  Outcome: Ongoing  8/22/2021 1039 by Judith eBll RN  Outcome: Ongoing  Goal: Able to verbalize and/or display a decrease in depressive symptoms  Description: Able to verbalize and/or display a decrease in depressive symptoms  8/22/2021 2230 by Anika Ocampo RN  Outcome: Ongoing  8/22/2021 1039 by Judith Bell RN  Outcome: Ongoing  Goal: Ability to disclose and discuss suicidal ideas will improve  Description: Ability to disclose and discuss suicidal ideas will improve  8/22/2021 2230 by Christina Juarez RN  Outcome: Ongoing  8/22/2021 1039 by Stephania Sanches RN  Outcome: Ongoing  Goal: Able to verbalize support systems  Description: Able to verbalize support systems  8/22/2021 2230 by Christina Juarez RN  Outcome: Ongoing  8/22/2021 1039 by Stephania Sanches RN  Outcome: Ongoing  Goal: Patient specific goal  Description: Patient specific goal  8/22/2021 2230 by Christina Juarez RN  Outcome: Ongoing  8/22/2021 1039 by Stephania Sanches RN  Outcome: Ongoing  Goal: Participates in care planning  Description: Participates in care planning  8/22/2021 2230 by Christina Juarez RN  Outcome: Ongoing  8/22/2021 1039 by Stephania Sanches RN  Outcome: Ongoing     Problem: Altered Mood, Deterioration in Function:  Goal: Ability to perform activities of daily living will improve  Description: Ability to perform activities of daily living will improve  8/22/2021 2230 by Christina Juarez RN  Outcome: Ongoing  8/22/2021 1039 by Stephania Sanches RN  Outcome: Ongoing  Goal: Able to verbalize reality based thinking  Description: Able to verbalize reality based thinking  8/22/2021 2230 by Christina Juarez RN  Outcome: Ongoing  8/22/2021 1039 by Stephania Sanches RN  Outcome: Ongoing  Goal: Skin appearance normal  Description: Skin appearance normal  8/22/2021 2230 by Chrsitina Juarez RN  Outcome: Ongoing  8/22/2021 1039 by Stephania Sanches RN  Outcome: Ongoing  Goal: Maintenance of adequate nutrition will improve  Description: Maintenance of adequate nutrition will improve  8/22/2021 2230 by Christina Juarez RN  Outcome: Ongoing  8/22/2021 1039 by Stephania Sanches RN  Outcome: Ongoing  Goal: Ability to tolerate increased activity will improve  Description: Ability to tolerate increased activity will improve  8/22/2021 2230 by Christina Juarez RN  Outcome: Ongoing  8/22/2021 1039 by Stephania Sanches RN  Outcome: Ongoing  Goal: Participates in care planning  Description: Participates in care planning  8/22/2021 2230 by Christina Juarez RN  Outcome: Ongoing  8/22/2021 1039 by Guadalupe Luna RN  Outcome: Ongoing  Goal: Patient specific goal  Description: Patient specific goal  8/22/2021 2230 by Jacob Hernandez RN  Outcome: Ongoing  8/22/2021 1039 by Guadalupe Luna RN  Outcome: Ongoing     Problem: Risk of Harm:  Goal: Ability to remain free from injury will improve  Description: Ability to remain free from injury will improve  8/22/2021 2230 by Jacob Hernandez RN  Outcome: Ongoing  8/22/2021 1039 by Guadalupe Luna RN  Outcome: Ongoing     Problem: Health Behavior:  Goal: Identification of resources available to assist in meeting health care needs will improve  Description: Identification of resources available to assist in meeting health care needs will improve  8/22/2021 2230 by Jacob Hernandez RN  Outcome: Ongoing  8/22/2021 1039 by Guadalupe Luna RN  Outcome: Ongoing     Problem: Self-Care:  Goal: Ability to participate in self-care as condition permits will improve  Description: Ability to participate in self-care as condition permits will improve  8/22/2021 2230 by Jacob Hernandez RN  Outcome: Ongoing  8/22/2021 1039 by Guadalupe Luna RN  Outcome: Ongoing     Problem:  Bowel/Gastric:  Goal: Complications related to the disease process, condition or treatment will be avoided or minimized  8/22/2021 2230 by Jacob Hernandez RN  Outcome: Ongoing  8/22/2021 1039 by Guadalupe Luna RN  Outcome: Ongoing  Goal: Will be independent with ostomy care  8/22/2021 2230 by Jacob Hernandez RN  Outcome: Ongoing  8/22/2021 1039 by Guadalupe Luna RN  Outcome: Ongoing     Problem: Skin Integrity:  Goal: Will show no infection signs and symptoms  Description: Will show no infection signs and symptoms  8/22/2021 2230 by Jacob Hernandez RN  Outcome: Ongoing  8/22/2021 1039 by Guadalupe Luna RN  Outcome: Ongoing  Goal: Absence of new skin breakdown  Description: Absence of new skin breakdown  8/22/2021 2230 by Jacob Hernandez RN  Outcome: Ongoing  8/22/2021 1039 by Guadalupe Luna RN  Outcome: Ongoing

## 2021-08-23 NOTE — PROGRESS NOTES
Consult received. Spoke to the pts nurse Mumtaz May. She states the pt has colon CA. She also has  a colostomy the nurse is putting a consult for oncology. Hospice will follow up after the oncology consult is complete to determine terminal DX.

## 2021-08-23 NOTE — PLAN OF CARE
Problem: Falls - Risk of:  Goal: Will remain free from falls  8/22/2021 2230 by Maria Luisa Gandhi RN  Outcome: Met This Shift     Problem: Falls - Risk of:  Goal: Absence of physical injury  8/22/2021 2230 by Maria Luisa Gandhi RN  Outcome: Met This Shift     Problem: Altered Mood, Depressive Behavior:  Goal: Absence of self-harm  8/22/2021 2230 by Maria Luisa Gandhi RN  Outcome: Met This Shift     Problem: Altered Mood, Deterioration in Function:  Goal: Ability to perform activities of daily living will improve  8/22/2021 2230 by Maria Luisa Gandhi RN  Outcome: Ongoing

## 2021-08-23 NOTE — PROGRESS NOTES
SW met with treatment team to discuss pt's progress and setbacks. SW 2 was present. Pt reportedly slept fair last night, with medications, appetite is improved, minimal participation in scheduled group activities, isolative to self/room, requires encouragement to increase activity level, appearance is disheveled, compliant with medications, reports severe depression/anxiety, denies SI/HI/AVH, possible discharge today, recommend Hospice referral, follow-up appointments will be scheduled.

## 2021-08-23 NOTE — PLAN OF CARE
Group Therapy Note    Date: 8/23/2021  Start Time: 1030  End Time:  1100  Number of Participants: 3    Type of Group: Cognitive Skills    Wellness Binder Information  Module Name:  Men's Issues  Session Number:  1    Group Goal for Pt: To improve knowledge of effective stress management techniques    Notes:  Pt did not attend group discussion. Pt was invited/encouraged. Status After Intervention:      Participation Level:     Participation Quality:       Speech:         Thought Process/Content:       Affective Functioning:       Mood:       Level of consciousness:        Response to Learning:       Endings:     Modes of Intervention:       Discipline Responsible:       Signature:  Yani Marino

## 2021-08-23 NOTE — PLAN OF CARE
Group Therapy Note    Date: 8/23/2021  Start Time: 1000  End Time:  1184  Number of Participants: 3    Type of Group: Psychoeducation    Wellness Binder Information  Module Name:  Relapse Prevention  Session Number:  5    Group Goal for Pt: To improve knowledge of relapse prevention strategies    Notes:  Pt did not attend group discussion. Pt was invited/encouraged. Status After Intervention:      Participation Level:     Participation Quality:       Speech:         Thought Process/Content:       Affective Functioning:       Mood:       Level of consciousness:        Response to Learning:       Endings:     Modes of Intervention:       Discipline Responsible:       Signature:  Kelly Sierra

## 2021-08-23 NOTE — PROGRESS NOTES
Department of Psychiatry  Attending Progress Note     Chief complaint: \"I'm in pain\"    SUBJECTIVE:   Chart reviewed, discussed with the team. No major issues overnight. Hypotensive. Patient seen resting in bed this am.  She presents with dysphoric affect. States she is in pain and unable to get out of bed. Slept poorly last night. States she does not want to be home alone. Tearful. Niece agreed to  patient but can only stay tonight. Sister has just had surgery and is unable to take care of pt. OBJECTIVE    Physical  Wt Readings from Last 3 Encounters:   08/20/21 162 lb 6.4 oz (73.7 kg)   06/30/21 195 lb 3.2 oz (88.5 kg)   06/08/21 192 lb 8 oz (87.3 kg)     Temp Readings from Last 3 Encounters:   08/23/21 96.9 °F (36.1 °C) (Temporal)   06/30/21 98.1 °F (36.7 °C)     BP Readings from Last 3 Encounters:   08/23/21 98/69   06/30/21 98/60   06/08/21 112/73     Pulse Readings from Last 3 Encounters:   08/23/21 80   06/30/21 84   06/08/21 66        Review of Systems: 14-point review of systems negative except as described above    Mental Status Examination:   Appearance:  Stated age. Gait stable with assistance. No abnormal movements or tremor. Behavior: Calm, cooperative  Speech: Slow  Mood: \"I don't want to be alone\"   Affect: Mood congruent. Thought Process: Appears linear. Thought Content:  Denies SI/HI. No overt delusions or paranoia appreciated. Perceptions: Denies auditory or visual hallucinations at present time. Not responding to internal stimuli. Concentration: Intact. Orientation: to person, place, date, and situation. Language: Intact. Fund of information: Intact. Memory: Recent and remote appear intact. Neurovegitative: Poor appetite and  sleep. Insight: Some. Judgment: Poor - baseline.     Data  Lab Results   Component Value Date    WBC 7.2 08/19/2021    HGB 11.9 (L) 08/19/2021    HCT 38.0 08/19/2021    MCV 94.5 08/19/2021     08/19/2021      Lab Results Component Value Date     (L) 08/19/2021    K 3.4 (L) 08/19/2021    CL 98 08/19/2021    CO2 19 (L) 08/19/2021    BUN 23 08/19/2021    CREATININE 0.5 08/19/2021    GLUCOSE 84 08/19/2021    CALCIUM 10.0 08/19/2021    PROT 7.0 08/19/2021    LABALBU 3.8 08/19/2021    BILITOT 0.4 08/19/2021    ALKPHOS 59 08/19/2021    AST 32 08/19/2021    ALT 20 08/19/2021    LABGLOM >60 08/19/2021    GFRAA >59 08/19/2021    GLOB 2.5 06/30/2021       Medications    Current Facility-Administered Medications:     oxyCODONE-acetaminophen (PERCOCET) 5-325 MG per tablet 1 tablet, 1 tablet, Oral, Q8H PRN, Chaya Reeder MD, 1 tablet at 08/23/21 1010    enoxaparin (LOVENOX) injection 40 mg, 40 mg, Subcutaneous, Daily, Chaya Reeder MD, 40 mg at 08/23/21 0820    acetaminophen (TYLENOL) tablet 975 mg, 975 mg, Oral, Q8H PRN, Amor Ceron MD, 975 mg at 08/20/21 1519    ibuprofen (ADVIL;MOTRIN) tablet 600 mg, 600 mg, Oral, Q8H PRN, Amor Ceron MD, 600 mg at 08/22/21 2056    ondansetron (ZOFRAN) tablet 4 mg, 4 mg, Oral, Q8H PRN, Amor Ceron MD, 4 mg at 08/21/21 0536    sucralfate (CARAFATE) tablet 1 g, 1 g, Oral, BID, Amor Ceron MD, 1 g at 08/23/21 5357    dicyclomine (BENTYL) tablet 20 mg, 20 mg, Oral, 4x Daily AC & HS, Amor Ceron MD, 20 mg at 08/23/21 1051    polyethylene glycol (GLYCOLAX) packet 17 g, 17 g, Oral, Daily PRN, Amor Ceron MD    melatonin disintegrating tablet 5 mg, 5 mg, Oral, Nightly, Amor Ceron MD, 5 mg at 08/22/21 2057    albuterol (PROVENTIL) nebulizer solution 2.5 mg, 2.5 mg, Nebulization, Q6H PRN, Amor Ceron MD  [facility]  Vitamin C CHEW 100 mg, 100 mg, Oral, Daily, Amor Ceron MD    vitamin B-12 (CYANOCOBALAMIN) tablet 500 mcg, 500 mcg, Oral, Daily, Amor Ceron MD, 500 mcg at 08/23/21 2082    Zinc Gluconate LOZG 10 mg, 10 mg, Mouth/Throat, Daily, Amor Ceron MD    gabapentin (NEURONTIN) capsule 300 mg, 300 mg, Oral, BID, 300 mg at 08/23/21 1051 **AND** [DISCONTINUED] gabapentin (NEURONTIN) capsule 600 mg, 600 mg, Oral, Nightly, Christie Castillo MD, 600 mg at 08/22/21 2054    promethazine (PHENERGAN) injection 12.5 mg, 12.5 mg, Intramuscular, Q8H PRN, Yumiko Schaefer MD, 12.5 mg at 08/20/21 1753    hydrOXYzine (ATARAX) tablet 25 mg, 25 mg, Oral, TID PRN, Yumiko Schaefer MD, 25 mg at 08/22/21 1559    mirtazapine (REMERON) tablet 3.75 mg, 3.75 mg, Oral, Nightly, Yumiko Schaefer MD, 3.75 mg at 08/22/21 2053    ASSESSMENT AND PLAN  DSM 5 DIAGNOSIS  Impression  Major depressive disorder, recurrent, severe, without psychotic features  Anxiety unspecified  Chronic pain  H/o cancer  H/o Hep C  Anemia    Continue to observe. Hospice consult. Contact [facility]. Plan:   1. Psychiatric Medications:   Continue current psychotropic medications as recommended. Monitor for side effects. The risks, benefits, side effects, indications, contraindications, alternatives and adverse effects of the medications have been discussed with patient. 2. Continue to provide supportive psychotherapy. Encourage socialization and participation in recreational activities. Work on coping skills. 3. Medical Issues:    Continue medical monitoring by Dr. Arnol Whitney and associates. Pain control. 4. Disposition:     to provide outpatient resources and facilitate disposition.      Amount of time spent with patient:      15 minutes with greater than 50 % of the time spent in counseling and collaboration of care

## 2021-08-23 NOTE — TELEPHONE ENCOUNTER
SHIRAZ WITH [facility] CALLED CONSULT FOR PT. DR Franck Orta, ROOM 616. PHONE [phone]. NEEDING TO SPEAK ABOUT POSSIBLY DISCHARGING.

## 2021-08-23 NOTE — PROGRESS NOTES
WRAP UP GROUP NOTE:     Patient's Goal:  Providing feedback as to their own progress in the care-plan provided. Pt's have an opportunity to explore self-reflective skills and share any additional cares and concerns not yet addressed. Pt effectively participated.      Energy level:  Low   Appetite:  Improving   Concentration:   Poor   Hallucinations:  Blank   Depression:  Same   Anxiety:  On/off   How I worked today:  Did not try   What helps me sleep:  meds   Any questions/complaints/comments:  No

## 2021-08-23 NOTE — DISCHARGE SUMMARY
Discharge Summary     Patient ID:  Olivia Knight  [id number]  72 y.o.  1960    Admit date: 8/19/2021  Discharge date: 8/24/2021    Admitting Physician: Mayra Cruz MD   Attending Physician: Luann Hooper MD  Discharge Provider: Luann Hooper MD     Admission Diagnoses:  Major depressive disorder, recurrent, severe, without psychotic features  Anxiety unspecified  Chronic pain  H/o anal cancer  H/o Hep C  Anemia    Discharge Diagnoses:   Major depressive disorder, recurrent, severe, without psychotic features  Anxiety unspecified  Chronic pain  H/o anal cancer  H/o Hep C  Anemia    Admission Condition: poor     Discharged Condition: stable    Indication for Admission: concern for safety    CHIEF COMPLAINT:  \"Doing terrible! \"     History obtained from: patient, chart     HISTORY OF PRESENT ILLNESS:    64 y.o. female with h/o Hep C and anal cancer, colectomy, who presented to the emergency department for psychiatric evaluation.  Per ER notes, \"The patient was seen at [facility] earlier yeaterday and told them she hurts all over and possibly more in her abdomen.  Her son called [facility] and told nursing staff that she has been acting strange to the rest of the family and not communicating with him. Gely Nava had a normal CBC and CMP as well as a ammonia level.  At time of discharge from their facility she was alert and appropriately responsive and she was unsure why she was there.  [place] overall be possibly stress related event or depression. Lovette Ores she speaks softly and will tell me her name and moans when I touch her most anywhere on her body but does not really able to provide any meaningful history. \"  Patient has been through chemotherapy. She has a colostomy. She is on GBP, narcotics and Xanax. UDS negative. CT head and abdomen negative.     Patient seen resting in bed this morning. She presents with dysphoric affect. States she has not slept due to severe pain. Asking for her pain medication.   States she has not been taking Xanax at home. She reports depression and anxiety in the setting of her stressors. Reports a poor relationship with family who do not come to visit her. Her father committed suicide several years ago. She denies SI. States she is sick and would like something for nausea. Unable to continue the interview.      Per collateral from sister, patient has been abusing her meds which has been causing conflict with the family. Her mother also abused meds.      PSYCHIATRIC HISTORY:    Diagnoses: Depression, anxiety  Suicide attempts/gestures: Denies   Prior hospitalizations: Denies   Medication trials: Prozac, Xanax, GBP  Mental health contact: Dr. Franco [facility]  Head trauma: Denies     SUBSTANCE USE HISTORY:  Denies alcohol and illicit drug use. Quit smoking 2 mo ago. Hospital Course:  Patient was admitted to the behavioral health floor and was acclimated to the unit. She was placed on suicide, fall and seizure precautions. Labs were reviewed and physical exam was completed by Dr. Chelsea Goode and associates. Home medications were reconciled. LAURA was obtained and reviewed. Patient was given Remeron for depression and insomnia. Xanax was discontinued. Fentanyl was stopped. Pain control was ensured. Patient tolerated her medications well. She was seen by Oncology and outpatient follow up was set up. Patient isolated to her room most of the time. All interactions with the peers and staff members were appropriate. Behaviorally, she was not a problem. She performed ADLs. Sleep and appetite improved. There was no evidence of suicidality. With the above-mentioned medications changes as well as psychotherapeutic interventions, patient reported considerable improvement in her condition and requested discharge home. It was felt that patient was at her baseline. Patient has no access to guns at home. It was strongly recommended to cut down on narcotics at home.      On 8/24/2021 it was therefore decided to discharge the patient, as it was felt that she received maximal benefit from her hospitalization. This patient is not suicidal, homicidal or psychotic at discharge. She does not present danger to self or others.       Number of antipsychotic medication prescribed at discharge: 0  IF MORE THAN ONE IS USED: NA    History of greater than 3 failed multiple monotherapy trials: NA  Monotherapy taper plan/ cross taper in progress: NA  Augmentation of Clozapine: NA    Referral to addiction treatment: patient refused    Prescription for Alcohol or Drug Disorder Medication: patient refused    Prescription for Tobacco Cessation medication: none    If no prescriptions for Tobacco Cessation must document why: nonsmoker    Consults: Internal medicine, Oncology    Significant Diagnostic Studies:   Lab Results   Component Value Date    WBC 7.2 08/19/2021    HGB 11.9 (L) 08/19/2021    HCT 38.0 08/19/2021    MCV 94.5 08/19/2021     08/19/2021     Lab Results   Component Value Date     (L) 08/19/2021    K 3.4 (L) 08/19/2021    CL 98 08/19/2021    CO2 19 (L) 08/19/2021    BUN 23 08/19/2021    CREATININE 0.5 08/19/2021    GLUCOSE 84 08/19/2021    CALCIUM 10.0 08/19/2021    PROT 7.0 08/19/2021    LABALBU 3.8 08/19/2021    BILITOT 0.4 08/19/2021    ALKPHOS 59 08/19/2021    AST 32 08/19/2021    ALT 20 08/19/2021    LABGLOM >60 08/19/2021    GFRAA >59 08/19/2021    GLOB 2.5 06/30/2021         Lab Results   Component Value Date    TUXWLDYY10 768 08/21/2021     Lab Results   Component Value Date    VITD25 44.4 08/21/2021     Lab Results   Component Value Date    CHOL 153 (L) 08/21/2021    CHOL 196 09/22/2020    CHOL 162 09/02/2020     Lab Results   Component Value Date    TRIG 103 08/21/2021    TRIG 94 09/22/2020    TRIG 104 09/02/2020     Lab Results   Component Value Date    HDL 43 (L) 08/21/2021    HDL 66 09/22/2020    HDL 47 (L) 09/02/2020     Lab Results   Component Value Date    LDLCALC 89 08/21/2021    LDLCALC 111 09/22/2020    LDLCALC 94 09/02/2020     No results found for: LABVLDL, VLDL  No results found for: CHOLHDLRATIO  Lab Results   Component Value Date    LABA1C 4.8 08/21/2021     No results found for: EAG  Lab Results   Component Value Date    TSHFT4 1.69 08/21/2021    TSH 0.388 08/28/2019       Treatments: RN and SW    Mental status examination at the time of discharge:  Alert, Oriented X 4  Appearance:  Improved Hygiene  Speech with Regular Rate and Rhythm  Eye Contact:  Good  Psychomotor Retardation Noted  Attitude:  Cooperative  Mood:  \"Better\"  Affective: Congruent, appropriate to the situation, with a normal range and intensity  Thought Processes:  Coherently communicated, logical and goal oriented  Thought Content:  No Suicidal Ideation, No Homicidal Ideation, No Auditory or Visual Hallucinations, NO Overt Delusions  Insight: Improved  Judgement: Improved  Memory is intact for both remote and recent  Intellectual Functioning:  Within the [address] of Knowledge:  Adequate  Attention and Concentration:  Adequate    Discharge Exam:  Please, see medical note    Disposition: home    Patient Instructions:   Current Discharge Medication List      START taking these medications    Details   melatonin 5 MG TBDP disintegrating tablet Take 1 tablet by mouth nightly  Qty: 30 tablet, Refills: 1      mirtazapine (REMERON) 7.5 MG tablet Take 0.5 tablets by mouth nightly  Qty: 15 tablet, Refills: 1         CONTINUE these medications which have NOT CHANGED    Details   acetaminophen (TYLENOL) 325 MG tablet Take 975 mg by mouth every 8 hours      ibuprofen (ADVIL;MOTRIN) 600 MG tablet Take 1 tablet by mouth every 8 hours as needed      Ascorbic Acid 100 MG CHEW       Zinc Gluconate 10 MG LOZG       dicyclomine (BENTYL) 20 MG tablet       albuterol sulfate  (90 Base) MCG/ACT inhaler INHALE 2 PUFFS INTO THE LUNGS EVERY 6 HOURS AS NEEDED FOR WHEEZING  Qty: 18 g, Refills: 0      vitamin D (ERGOCALCIFEROL) 1.25 MG (21773 UT) CAPS capsule Take 1 capsule by mouth every 30 days  Qty: 3 capsule, Refills: 3      ondansetron (ZOFRAN-ODT) 8 MG TBDP disintegrating tablet DISSOLVE 1 TABLET UNDER TONGUE EVERY 8 HOURS AS NEEDED FOR NAUSEA OR VOMITING  Qty: 12 tablet, Refills: 0      Potassium Citrate ER 15 MEQ (1620 MG) TBCR TAKE 1 TABLET BY MOUTH THREE TIMES DAILY. Qty: 90 tablet, Refills: 11    Comments: $  Associated Diagnoses: Kidney stone      oxyCODONE-acetaminophen (PERCOCET)  MG per tablet Take 1 tablet by mouth 3 times daily as needed for Pain. sucralfate (CARAFATE) 1 GM tablet Take 1 g by mouth 2 times daily as needed       gabapentin (NEURONTIN) 300 MG capsule Take 1 capsule by mouth 4 times daily Indications: Backache, 1 in am, 1 at noon, 2 in the pm.  Qty: 120 capsule, Refills: 0      vitamin B-12 (CYANOCOBALAMIN) 500 MCG tablet Take 500 mcg by mouth daily.          STOP taking these medications       capecitabine (XELODA) 150 MG chemo tablet Comments:   Reason for Stopping:         capecitabine (XELODA) 500 MG chemo tablet Comments:   Reason for Stopping:         sodium chloride, PF, 0.9 % injection Comments:   Reason for Stopping:         heparin flush 100 UNIT/ML injection Comments:   Reason for Stopping:         fentaNYL (DURAGESIC) 25 MCG/HR Comments:   Reason for Stopping:         ondansetron (ZOFRAN) 4 MG tablet Comments:   Reason for Stopping:         oxyCODONE HCl (OXY-IR) 10 MG immediate release tablet Comments:   Reason for Stopping:         acyclovir (ZOVIRAX) 200 MG capsule Comments:   Reason for Stopping:         metoprolol succinate (TOPROL XL) 50 MG extended release tablet Comments:   Reason for Stopping:         lisinopril (PRINIVIL;ZESTRIL) 10 MG tablet Comments:   Reason for Stopping:         chlorthalidone (HYGROTEN) 50 MG tablet Comments:   Reason for Stopping:         FLUoxetine (PROZAC) 20 MG capsule Comments:   Reason for Stopping:         ALPRAZolam (XANAX) 2 MG tablet Comments:   Reason for Stopping:               Activity: as tolerated  Diet: regular diet  Wound Care: none needed    Follow-up:    Call Alisson Jackson MD in 1 day(s)  on follow up with PCP, please review labs/imaging done during this Hospital stay, and discuss any additional/repeat testing or treatment needed with your  [facility]   Aug24 Follow Up Appointment with Liza Dugan MD  Tuesday Aug 24, 2021 11:15 AM  Please arrive 15 minutes prior to appointment time, bring insurance card and photo ID. [facility] Hematology Oncology  [facility], [address]   [address] [facility] [place] [address]  [phone]          Lab  Tuesday Aug 24, 2021 11:35  [facility]  [address] [facility]  [phone]          Go to [address] [place] [address],[facility]  Tuesday Aug 24, 2021  Intake/therapy appointment at [address] [facility] [address] with Daniella Shea. Please be there at 10am to fill out paperwork. Please bring photo ID, social security card, insurance card, and an udpated med list.   [facility]   [address] [facility], [address]   Phone [phone]   Fax [phone]   CRISIS LINE: [phone]     Sep2 Go to [address] [place] [address],[facility]  Thursday Sep 2, 2021  Med management appointment at 8:30am with Adeline Frost.  Please bring an updated med list.  [facility]   [address] [facility], [address]   Phone [phone]   Fax [phone]   CRISIS LINE: [phone]          Time worked: 34 min    Participation: good    Electronically signed by Jules Sheikh MD on 8/23/2021 at 10:26 AM

## 2021-08-24 ENCOUNTER — HOSPITAL ENCOUNTER (OUTPATIENT)
Dept: INFUSION THERAPY | Age: 61
End: 2021-08-24
Payer: MEDICARE

## 2021-08-24 ENCOUNTER — TELEPHONE (OUTPATIENT)
Dept: INTERNAL MEDICINE | Facility: CLINIC | Age: 61
End: 2021-08-24

## 2021-08-24 ENCOUNTER — READMISSION MANAGEMENT (OUTPATIENT)
Dept: CALL CENTER | Facility: HOSPITAL | Age: 61
End: 2021-08-24

## 2021-08-24 ENCOUNTER — HOSPITAL ENCOUNTER (INPATIENT)
Age: 61
LOS: 8 days | Discharge: NURSING, REHAB OR LONG-TERM CARE | DRG: 092 | End: 2021-09-02
Attending: EMERGENCY MEDICINE | Admitting: INTERNAL MEDICINE
Payer: MEDICARE

## 2021-08-24 VITALS
WEIGHT: 162.4 LBS | DIASTOLIC BLOOD PRESSURE: 74 MMHG | SYSTOLIC BLOOD PRESSURE: 99 MMHG | BODY MASS INDEX: 27.72 KG/M2 | TEMPERATURE: 96.6 F | HEIGHT: 64 IN | HEART RATE: 82 BPM | RESPIRATION RATE: 18 BRPM | OXYGEN SATURATION: 100 %

## 2021-08-24 DIAGNOSIS — M48.061 SPINAL STENOSIS, LUMBAR REGION, WITHOUT NEUROGENIC CLAUDICATION: ICD-10-CM

## 2021-08-24 DIAGNOSIS — M54.50 ACUTE RIGHT-SIDED LOW BACK PAIN WITHOUT SCIATICA: ICD-10-CM

## 2021-08-24 DIAGNOSIS — G89.4 CHRONIC PAIN SYNDROME: ICD-10-CM

## 2021-08-24 DIAGNOSIS — F32.A DEPRESSION, UNSPECIFIED DEPRESSION TYPE: Primary | ICD-10-CM

## 2021-08-24 LAB
ALBUMIN SERPL-MCNC: 3.6 G/DL (ref 3.5–5.2)
ALP BLD-CCNC: 54 U/L (ref 35–104)
ALT SERPL-CCNC: 15 U/L (ref 5–33)
AMPHETAMINE SCREEN, URINE: NEGATIVE
ANION GAP SERPL CALCULATED.3IONS-SCNC: 13 MMOL/L (ref 7–19)
AST SERPL-CCNC: 24 U/L (ref 5–32)
BACTERIA: NEGATIVE /HPF
BARBITURATE SCREEN URINE: NEGATIVE
BASOPHILS ABSOLUTE: 0 K/UL (ref 0–0.2)
BASOPHILS RELATIVE PERCENT: 0.3 % (ref 0–1)
BENZODIAZEPINE SCREEN, URINE: NEGATIVE
BILIRUB SERPL-MCNC: <0.2 MG/DL (ref 0.2–1.2)
BILIRUBIN URINE: NEGATIVE
BLOOD, URINE: NEGATIVE
BUN BLDV-MCNC: 23 MG/DL (ref 8–23)
CALCIUM SERPL-MCNC: 9.2 MG/DL (ref 8.8–10.2)
CANNABINOID SCREEN URINE: NEGATIVE
CHLORIDE BLD-SCNC: 104 MMOL/L (ref 98–111)
CLARITY: CLEAR
CO2: 21 MMOL/L (ref 22–29)
COCAINE METABOLITE SCREEN URINE: NEGATIVE
COLOR: YELLOW
CREAT SERPL-MCNC: 0.6 MG/DL (ref 0.5–0.9)
CRYSTALS, UA: ABNORMAL /HPF
EOSINOPHILS ABSOLUTE: 0.2 K/UL (ref 0–0.6)
EOSINOPHILS RELATIVE PERCENT: 3.4 % (ref 0–5)
EPITHELIAL CELLS, UA: 1 /HPF (ref 0–5)
GFR AFRICAN AMERICAN: >59
GFR NON-AFRICAN AMERICAN: >60
GLUCOSE BLD-MCNC: 96 MG/DL (ref 74–109)
GLUCOSE URINE: NEGATIVE MG/DL
HCT VFR BLD CALC: 34.7 % (ref 37–47)
HEMOGLOBIN: 10.8 G/DL (ref 12–16)
HYALINE CASTS: 6 /HPF (ref 0–8)
IMMATURE GRANULOCYTES #: 0 K/UL
KETONES, URINE: NEGATIVE MG/DL
LEUKOCYTE ESTERASE, URINE: ABNORMAL
LYMPHOCYTES ABSOLUTE: 1.9 K/UL (ref 1.1–4.5)
LYMPHOCYTES RELATIVE PERCENT: 32.5 % (ref 20–40)
Lab: NORMAL
MCH RBC QN AUTO: 30 PG (ref 27–31)
MCHC RBC AUTO-ENTMCNC: 31.1 G/DL (ref 33–37)
MCV RBC AUTO: 96.4 FL (ref 81–99)
MONOCYTES ABSOLUTE: 0.6 K/UL (ref 0–0.9)
MONOCYTES RELATIVE PERCENT: 9.7 % (ref 0–10)
NEUTROPHILS ABSOLUTE: 3.2 K/UL (ref 1.5–7.5)
NEUTROPHILS RELATIVE PERCENT: 53.9 % (ref 50–65)
NITRITE, URINE: NEGATIVE
OPIATE SCREEN URINE: NEGATIVE
PDW BLD-RTO: 13.6 % (ref 11.5–14.5)
PH UA: 6 (ref 5–8)
PLATELET # BLD: 192 K/UL (ref 130–400)
PMV BLD AUTO: 13.3 FL (ref 9.4–12.3)
POTASSIUM REFLEX MAGNESIUM: 4.4 MMOL/L (ref 3.5–5)
PROTEIN UA: ABNORMAL MG/DL
RBC # BLD: 3.6 M/UL (ref 4.2–5.4)
RBC UA: 2 /HPF (ref 0–4)
SODIUM BLD-SCNC: 138 MMOL/L (ref 136–145)
SPECIFIC GRAVITY UA: 1.02 (ref 1–1.03)
TOTAL PROTEIN: 6.5 G/DL (ref 6.6–8.7)
UROBILINOGEN, URINE: 1 E.U./DL
WBC # BLD: 6 K/UL (ref 4.8–10.8)
WBC UA: 44 /HPF (ref 0–5)

## 2021-08-24 PROCEDURE — 87086 URINE CULTURE/COLONY COUNT: CPT

## 2021-08-24 PROCEDURE — 80307 DRUG TEST PRSMV CHEM ANLYZR: CPT

## 2021-08-24 PROCEDURE — 85025 COMPLETE CBC W/AUTO DIFF WBC: CPT

## 2021-08-24 PROCEDURE — 93005 ELECTROCARDIOGRAM TRACING: CPT | Performed by: EMERGENCY MEDICINE

## 2021-08-24 PROCEDURE — 6370000000 HC RX 637 (ALT 250 FOR IP): Performed by: EMERGENCY MEDICINE

## 2021-08-24 PROCEDURE — 80053 COMPREHEN METABOLIC PANEL: CPT

## 2021-08-24 PROCEDURE — 5130000000 HC BRIDGE APPOINTMENT

## 2021-08-24 PROCEDURE — 99284 EMERGENCY DEPT VISIT MOD MDM: CPT

## 2021-08-24 PROCEDURE — 6370000000 HC RX 637 (ALT 250 FOR IP): Performed by: PSYCHIATRY & NEUROLOGY

## 2021-08-24 PROCEDURE — 6360000002 HC RX W HCPCS: Performed by: EMERGENCY MEDICINE

## 2021-08-24 PROCEDURE — 2580000003 HC RX 258: Performed by: EMERGENCY MEDICINE

## 2021-08-24 PROCEDURE — 81001 URINALYSIS AUTO W/SCOPE: CPT

## 2021-08-24 PROCEDURE — 99232 SBSQ HOSP IP/OBS MODERATE 35: CPT | Performed by: INTERNAL MEDICINE

## 2021-08-24 PROCEDURE — 96374 THER/PROPH/DIAG INJ IV PUSH: CPT

## 2021-08-24 PROCEDURE — 96365 THER/PROPH/DIAG IV INF INIT: CPT

## 2021-08-24 PROCEDURE — 6360000002 HC RX W HCPCS: Performed by: PSYCHIATRY & NEUROLOGY

## 2021-08-24 PROCEDURE — 36415 COLL VENOUS BLD VENIPUNCTURE: CPT

## 2021-08-24 RX ORDER — OXYCODONE AND ACETAMINOPHEN 10; 325 MG/1; MG/1
1 TABLET ORAL ONCE
Status: COMPLETED | OUTPATIENT
Start: 2021-08-24 | End: 2021-08-24

## 2021-08-24 RX ORDER — OXYCODONE HYDROCHLORIDE AND ACETAMINOPHEN 5; 325 MG/1; MG/1
1 TABLET ORAL EVERY 8 HOURS PRN
Qty: 60 TABLET | Refills: 0 | Status: ON HOLD
Start: 2021-08-24 | End: 2021-09-02 | Stop reason: HOSPADM

## 2021-08-24 RX ORDER — CEPHALEXIN 500 MG/1
500 CAPSULE ORAL 4 TIMES DAILY
Qty: 28 CAPSULE | Refills: 0 | Status: SHIPPED
Start: 2021-08-24 | End: 2021-09-02 | Stop reason: HOSPADM

## 2021-08-24 RX ADMIN — OXYCODONE AND ACETAMINOPHEN 1 TABLET: 10; 325 TABLET ORAL at 22:01

## 2021-08-24 RX ADMIN — CYANOCOBALAMIN TAB 500 MCG 500 MCG: 500 TAB at 08:09

## 2021-08-24 RX ADMIN — GABAPENTIN 300 MG: 300 CAPSULE ORAL at 06:24

## 2021-08-24 RX ADMIN — CEFTRIAXONE 1000 MG: 1 INJECTION, POWDER, FOR SOLUTION INTRAMUSCULAR; INTRAVENOUS at 22:01

## 2021-08-24 RX ADMIN — ENOXAPARIN SODIUM 40 MG: 40 INJECTION SUBCUTANEOUS at 08:09

## 2021-08-24 RX ADMIN — OXYCODONE HYDROCHLORIDE AND ACETAMINOPHEN 1 TABLET: 5; 325 TABLET ORAL at 08:09

## 2021-08-24 RX ADMIN — SUCRALFATE 1 G: 1 TABLET ORAL at 06:23

## 2021-08-24 ASSESSMENT — ENCOUNTER SYMPTOMS
EYES NEGATIVE: 1
GASTROINTESTINAL NEGATIVE: 1
RESPIRATORY NEGATIVE: 1

## 2021-08-24 ASSESSMENT — PAIN SCALES - GENERAL
PAINLEVEL_OUTOF10: 10
PAINLEVEL_OUTOF10: 7
PAINLEVEL_OUTOF10: 10
PAINLEVEL_OUTOF10: 5

## 2021-08-24 NOTE — BH NOTE
[facility]  Discharge Note  Bridge Appointment completed: Reviewed Discharge Instructions with patient. Patient verbalizes understanding and agreement with the discharge plan using the teachback method. Referral for Outpatient Tobacco Cessation Counseling, upon discharge (heber X if applicable and completed):    ( )  Hospital staff assisted patient to call Quit Line or faxed referral                                   during hospitalization                  ( )  Recognizing danger situations (included triggers and roadblocks), if not completed on admission                    (x )  Coping skills (new ways to manage stress, exercise, relaxation techniques, changing routine, distraction), if not completed on admission                                                           ( )  Basic information about quitting (benefits of quitting, techniques in how to quit, available resources, if not completed on admission  ( ) Referral for counseling faxed to [facility]   (x ) Patient refused referral  (x ) Patient refused counseling  (x ) Patient refused smoking cessation medication upon discharge    Vaccinations (heber X if applicable and completed):  ( ) Patient states already received influenza vaccine elsewhere  ( ) Patient received influenza vaccine during this hospitalization  ( x) Patient refused influenza vaccine at this time      Pt discharged with followings belongings:   Dentures: None  Vision - Corrective Lenses: Glasses  Hearing Aid: None  Jewelry: Other (Comment) (see belongings document)  Body Piercings Removed: N/A  Clothing: Other (Comment) (see belongings document)  Were All Patient Medications Collected?: Not Applicable  Other Valuables: Other (Comment) (see belongings document)   Valuables sent home with patient. Valuables retrieved from safe and returned to patient. Patient left department with   via  , discharged to  .  Patient education on aftercare instructions: given and reviewed with pt. And with patient's son on phone. Patient verbalize understanding of AVS:  Yes but asks questions again. .  Suicidal Ideations? No AVH? Denies. Does not appear to be responding to internal stimuli. HI?  Negative for homicidal ideation       Status EXAM upon discharge:  Status and Exam  Normal: No  Facial Expression: Avoids Gaze, Sad, Worried  Affect: Congruent  Level of Consciousness: Alert  Mood:Normal: No  Mood: Depressed, Anxious, Sad  Motor Activity:Normal: No  Motor Activity: Decreased, Tremors  Interview Behavior: Evasive  Preception: [place] to Person, [place] to Time, [place] to Place  Attention:Normal: No  Attention: Unable to Concentrate, Distractible  Thought Processes: Blocking  Thought Content:Normal: No  Thought Content: Preoccupations  Hallucinations: None  Delusions: No  Memory:Normal: No  Memory: Poor Recent, Poor Remote  Insight and Judgment: No  Insight and Judgment: Poor Judgment, Poor Insight  Present Suicidal Ideation: No  Present Homicidal Ideation: No

## 2021-08-24 NOTE — PLAN OF CARE
Problem: Altered Mood, Deterioration in Function:  Goal: Ability to perform activities of daily living will improve  Description: Ability to perform activities of daily living will improve  Outcome: Ongoing     Problem: Altered Mood, Deterioration in Function:  Goal: Able to verbalize reality based thinking  Description: Able to verbalize reality based thinking  Outcome: Ongoing     Problem: Altered Mood, Deterioration in Function:  Goal: Skin appearance normal  Description: Skin appearance normal  Outcome: Ongoing     Problem: Altered Mood, Deterioration in Function:  Goal: Maintenance of adequate nutrition will improve  Description: Maintenance of adequate nutrition will improve  Outcome: Ongoing     Problem: Altered Mood, Deterioration in Function:  Goal: Ability to tolerate increased activity will improve  Description: Ability to tolerate increased activity will improve  Outcome: Ongoing     Problem: Altered Mood, Deterioration in Function:  Goal: Participates in care planning  Description: Participates in care planning  Outcome: Ongoing

## 2021-08-24 NOTE — OUTREACH NOTE
Prep Survey      Responses   [facility] facility patient discharged from?  Non-BH   Is LACE score < 7 ?  Non-BH Discharge   Emergency Room discharge w/ pulse ox?  No   Eligibility  [facility]   Date of Discharge  08/24/21   Discharge diagnosis  depression   Does the patient have one of the following disease processes/diagnoses(primary or secondary)?  Other   Prep survey completed?  Yes          Magui Montes RN

## 2021-08-24 NOTE — TELEPHONE ENCOUNTER
Caller: [facility]    Relationship to patient:     Best call back number:   New or established patient?  [] New  [x] Established    Date of discharge: 8/24/2021    Facility discharged from: [facility]    Diagnosis/Symptoms: Depression    Length of stay (If applicable): Unknown    Specialty Only: Did you see a [facility] health provider?    [] Yes  [x] No

## 2021-08-24 NOTE — PROGRESS NOTES
Progress Note    Patient name: Arslan Contreras  Patient : 1960  MR #[id number]  Room: 26    Portions of this note have been copied forward, however, changed to reflect the most current clinical status of this patient. Subjective: Complains of back discomfort. In dining room eating breakfast.    HISTORY OF PRESENT ILLNESS:  The patient is well-known to the clinic. She has a diagnosis of MS, cell carcinoma. She has a history of depression. She presented to the ER department on 2021. Patient was recently hospitalized at [facility].  Apparently family request a psych evaluation. She has a history of major depressive disorder, anxiety. Patient has dysphoric affect. She asked for pain medication. Poor relationship with family members. Apparently, no evidence of suicidality. The patient was discharged home from the psychiatry unit. The patient was being discharged and therefore oncology and hospice consulted for further recommendations. Diagnosis  Invasive squamous cell carcinoma of anus, 2021  p16 strongly positive  [id number]     Treatment Summary  5/10/-Radiation therapy  5/10/-chemotherapy with Xeloda 825 mg/m2 b.i.d. during radiation therapy and Mitomycin D1 & D 29     Hematology/Cancer History  Silvia Shelley was first seen by me on 2021. She was referred by medical oncology, [facility] for a diagnosis of advanced anal squamous cell carcinoma. Patient has multiple comorbidities including history of hepatitis C, treated, anxiety, recurrent nephrolithiasis, currently smoker. Had complaints of abdominal pain 2020.  20 CT abd/pelvis: Right nephrolithiasis. A 4 mm calculus is present lower pole the right kidney. The left renal contour is visualized. There may be very subtle pelvocaliectasis on the left. No mass lesion, fluid collection or significant lymphadenopathy is seen in the pelvis.    20 CT lung screening:  No suspicious pulmonary nodules or pathologic intrathoracic lymphadenopathy. Mild ectasia ascending thoracic aorta with mild vascular calcification. Lung RADS category 1-negative exam. Continued annual screening with low-dose CT chest in 12 months recommended. 2/8/21 CT abd/pelvis (urogram): Area of decreased ureteral distention on delayed phase images may reflect ureteral wall thickening or may be physiologic. No surrounding inflammatory change is identified. This is new compared to the prior exam. Punctate nonobstructive bilateral renal calculi. Mildly enlarged right inguinal lymph node of uncertain significance. Single enlarged lymph node is likely reactive but cannot be confirmed. Atherosclerotic disease. As mentioned in the body the report, a small pancreatic lesion is present. Follow-up nonemergent MRI abdomen with and without contrast with MRCP is recommended. 3/19/21 Anus, biopsy: Invasive squamous cell carcinoma. Histologic sections of the anal biopsy demonstrate involvement by an invasive squamous cell carcinoma, characterized by moderate pleomorphism and focal keratinization. Mitoses are readily seen. Immunohistochemistry for p16 is diffusely and strongly positive. 4/20/2021-discussed at length about results of pathology, imaging studies. Reviewed notes from your available medical oncology and surgical oncology. Recommended chemoradiation. Also recommendations for diverting colostomy were made by surgical oncology but the patient is contemplative. Recommending Xeloda/mitomycin. 4/22/21 PET scan [facility]): Markedly FDG avid rectal mass measuring about 4 cm with maximum SUV 14.65. Right inguinal and pelvic FDG avid adenopathy. No evidence of distant metastatic disease. Ascending thoracic aorta measures up to 4 cm.  4/30/21 US guided lymph node biopsy [facility]): Right inguinal lymph node, ultrasound-guided fine-needle core biopsies and touch preparations: Metastatic squamous cell carcinoma.   5/4/2021-discussed results of PET scan and biopsy. Recommend chemoradiation. Discussed about side effects. 5/10/21-6/30/2021-concurrent chemoradiation with chemotherapy with Xeloda 825 mg/m2 b.i.d. during radiation therapy and Mitomycin D1 & D 29  5/10/21-7/1/2021-completion of radiation therapy    Objective   PHYSICAL EXAM:  Physical Exam  Vitals reviewed. Constitutional:       General: She is not in acute distress. Appearance: She is well-developed. She is not toxic-appearing or diaphoretic. Comments: Wearing a facial mask. Overweight   HENT:      Head: Normocephalic and atraumatic. Right Ear: External ear normal.      Left Ear: External ear normal.      Nose: Nose normal.      Mouth/Throat:      Mouth: Mucous membranes are moist.   Eyes:      General: No scleral icterus. Right eye: No discharge. Left eye: No discharge. Conjunctiva/sclera: Conjunctivae normal.   Neck:      Trachea: No tracheal deviation. Cardiovascular:      Rate and Rhythm: Normal rate. Pulmonary:      Effort: Pulmonary effort is normal. No respiratory distress. Abdominal:      General: There is no distension. Palpations: Abdomen is soft. Tenderness: There is no guarding. Genitourinary:     Comments: Exam deferred  Musculoskeletal:         General: No tenderness or deformity. Cervical back: Neck supple. No muscular tenderness. Comments: Normal ROM all four extremities. Uses a rolling walker. Skin:     General: Skin is warm and dry. Findings: No rash. Neurological:      Mental Status: She is alert and oriented to person, place, and time. Comments: follows commands, non-focal.     Psychiatric:         Behavior: Behavior normal. Behavior is cooperative. Thought Content: Thought content normal.         Judgment: Judgment normal.      Comments: Alert and oriented to person, place and time.  Flat affect       Vital Signs  /74   Pulse 80   Temp 96.5 °F (35.8 °C) (Temporal) Resp 18   Ht 5' 4\" (1.626 m)   Wt 162 lb 6.4 oz (73.7 kg)   SpO2 100%   BMI 27.88 kg/m²   No intake or output data in the 24 hours ending 08/24/21 0713    Labs:  CBC: No results for input(s): WBC, HGB, PLT in the last 72 hours. CMP: No results for input(s): GLUCOSE, BUN, CREATININE, BCR, CO2, CALCIUM, ALBUMIN, LABIL2, ALKPHOS, AST, ALT in the last 72 hours. Invalid input(s): EGFRIFNONA, EGFRIFAFRI, SODIUM, POTASSIUM, CHLORIDE, PROTENTOTREF, GLOBULIN, BILIRUBIN  Hepatic: No results for input(s): AST, ALT, ALB, BILITOT, ALKPHOS in the last 72 hours. Troponin: No results for input(s): TROPONINI in the last 72 hours. BNP: No results for input(s): BNP in the last 72 hours. INR: No results for input(s): INR in the last 72 hours. ABG: No results for input(s): PHART, DUJ6YPK, PO2ART, PQB0BVH, S8HTNYPN, BEART in the last 72 hours. 30 Day lookback of cultures:    Blood Culture Recent: No results for input(s): BC in the last 720 hours. Gram Stain Recent: No results for input(s): LABGRAM in the last 720 hours. Resp Culture Recent: No results for input(s): CULTRESP in the last 720 hours. Body Fluid Recent : No results for input(s): BFCX in the last 720 hours. MRSA Recent : No results for input(s): [facility] in the last 720 hours. Urine Culture Recent :   Recent Labs     08/19/21  2158   LABURIN >100,000 CFU/ml mixed skin/urogenital bartolo. No further workup     Organism Recent : No results for input(s): ORG in the last 720 hours. ASSESSMENT/PLAN:  Locally advanced anal squamous cell carcinoma, p16 positive [id number]  PET scan showed pelvic and right inguinal lymph node metastasis, Us guided biopsy confirmed. Status post completion of chemoradiation on 7/1/2021.  8/19/2021-CT chest abdomen pelvis without contrast showed no acute abnormality. Right inguinal adenopathy-16 mm right inguinal adenopathy. PET scan showed right inguinal and pelvic lymph node metastasis.  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 She is being managed by [facility]. Major depressive disorder-managed by psychiatry. Normocytic anemia- 2/2 chemotherapy. Hgb 11.9, MCV 94.5 on 8/19/2021   Vitamin B12 level 8/21/2021: 768  Monitor    Mild hyponatremia (134) and hypokalemia (3.4)  As per attending    PLAN:  Further imaging/clinical reassessment as outpatient  Continue care ar per psych  Outpatient scans  At this time, there is no evidence of metastatic disease and would not be a hospice candidate from an oncology standpoint  Dr. Dipak Vickers discussed Darcy's case with Dr. Kemar Grewal         (Please note that portions of this note were completed with a voice recognition program. Efforts were made to edit the dictations but occasionally words are mis-transcribed.)    Samuel Legerpaige, APRN  08/24/21  7:13 AM  Physician's attestation/substantial contribution:  I, Dr Lucero George, independently performed an evaluation on [facility]. I have reviewed relevant medical information/data to include but not limited to medication list, relevant appropriate labs and imaging when applicable. I reviewed other physician's notes, ancillary services and nurses assessment. I have reviewed the above documentation completed by the Nurse Practitioner or Physician Assistant. Please see my additional contributions to the history of present illness, physical examination, and assessment/medical decision-making that reflect my findings and impressions. I discussed essential elements of the care plan with the NP or PA and the patient as well. I answered all the questions to the patient's satisfaction. I concur with above stated. Subjective-no new complaints morning. Discussed with psychiatry team.  Objective-looks anxious  Assessment/plan:  Anal cancer-the patient will need outpatient reevaluation with imaging and also colorectal surgery.   The patient is not terminal from a cancer standpoint.       Marcy Colmenares MD

## 2021-08-24 NOTE — PROGRESS NOTES
[facility] Daily Shift Assessment-Geriatric Unit  Nursing Progress Note          Room: [address] [place] [address]   Name: Garen Gilford   Age: 64 y.o. Gender: female   Dx: Major depressive disorder, recurrent severe without psychotic features ([address]  Precautions: suicide risk and fall risk  Inpatient Status: voluntary     SLEEP:    Sleep: Yes,   Sleep Quality Good   Hours Slept:    Sleep Medications: Yes  PRN Sleep Meds: No       MEDICAL:      Other PRN Meds: Yes   Med Compliant: Yes   Accu-Chek: No   Oxygen/CPAP/BiPAP: No  CIWA/CINA: No   PAIN Assessment: location, legs  Side Effects from medication: No    Is Patient experiencing any respiratory symptoms (headache, fever, body aches, cough. Devonte Benton ): no  Patient educated by nursing to practice social distancing, wear masks, wash hands frequently: yes      Metabolic Screening:    Lab Results   Component Value Date    LABA1C 4.8 08/21/2021       Lab Results   Component Value Date    CHOL 153 (L) 08/21/2021    CHOL 196 09/22/2020    CHOL 162 09/02/2020    CHOL 181 02/28/2020    CHOL 138 (L) 08/28/2019    CHOL 201 (H) 02/11/2019    CHOL 169 10/01/2018    CHOL 159 (L) 07/05/2017     Lab Results   Component Value Date    TRIG 103 08/21/2021    TRIG 94 09/22/2020    TRIG 104 09/02/2020    TRIG 68 02/28/2020    TRIG 86 08/28/2019    TRIG 72 02/11/2019    TRIG 162 (H) 10/01/2018    TRIG 78 (L) 07/05/2017     Lab Results   Component Value Date    HDL 43 (L) 08/21/2021    HDL 66 09/22/2020    HDL 47 (L) 09/02/2020    HDL 57 (L) 02/28/2020    HDL 44 (L) 08/28/2019    HDL 55 (L) 02/11/2019    HDL 41 (L) 10/01/2018    HDL 47 (L) 07/05/2017     No components found for: LDLCAL  No results found for: LABVLDL      Body mass index is 27.88 kg/m².     BP Readings from Last 2 Encounters:   08/23/21 101/74   06/30/21 98/60         PSYCH:     SI denies suicidal ideation    HI Negative for homicidal ideation        AVH:Absent      Depression: elysia Anxiety: elysia       GENERAL:      Appetite: improved  Social: No Speech: hesitant   Appearance:appropriately dressed  Assistive Devices: walker, wheelchairLevel of Assist: Minimal Assist      GROUP:    Group Participation:No Participation LevelNone    Participation QualityResistant    Notes:     Patient has been in bed in her room this evening. She had a consult visit from Oncology MD, DR. Stewart. Patient became anxious due to noise in the hallway from another patient. She was able to relax after receiving night time meds. The patient was also confused during medication administration and was worried that I was going to give her chemotherapy. I kept having to reassure her that she was just receiving her regular night time meds and that I was not giving her chemo. Her appetite has improved. She stated that she was hungry and wanted a snack before bed. She seems to be tolerating the increased dietary intake well at this time. Depression FELI, Anxiety FELI. No SI, HI, AVH.            Electronically signed by Belgica Zimmer RN on 8/23/21 at 9:53 PM CDT

## 2021-08-24 NOTE — DISCHARGE INSTR - COC
21   Time: 10:50  Prescriptions sent with pt.     General Information:   Questions regarding your bill: Call [phone]   Suicide Hotline (Rescue Crisis) 1-599.183.7107   To obtain results of pending studies call Medical Records at: [phone]   For emergencies and 24 hour/7 days a week contact information: [phone]

## 2021-08-24 NOTE — BH NOTE
up without pulling on my arm. The then scooted toward edge of bed, stood up and walked the few steps to the w/c and sit down without using me for a brace or balance. Talked with Dr. Laura Johnson and she discussed with Dr. Paola Perez and discharge canceled for 8/3/21.

## 2021-08-24 NOTE — PROGRESS NOTES
Discharge Note     Pt discharging on this date. Pt denies SI, HI, and AVH at this time. Pt reports improvement in behavior and is leaving unit in overall good condition. SW and pt discussed pt's follow up appointments and importance of attending appointments as scheduled, pt voiced understanding and agreement. Pt and SW also discussed pt safety plan and pt able to verbally identify: warning signs, coping strategies, places and people that help make the pt feel better/distract negative thoughts, friends/family/agencies/professionals the pt can reach out to in a crisis, and something that is important to the pt/worth living for. Pt provided the national suicide prevention hotline number (0-093-056-848-634-3679) as well as local community behavioral health [facility]) crisis number for emergencies ([phone]). Pt to follow up with:  [address] [place] [address]) on 08__/25__/21 @ 9:30am AM. Patient will follow up with [facility] for medication management, patient will be seen on _09_/02__/21 @ 8:30am for the med management appt. Referral to out patient tobacco cessation counseling treatment:    Patient refused referral to outpatient tobacco cessation counseling    SW offered to assist pt with transportation, pt reports that she has transportation.

## 2021-08-24 NOTE — DISCHARGE INSTR - DIET
 Good nutrition is important when healing from an illness, injury, or surgery. Follow any nutrition recommendations given to you during your hospital stay.  If you were given an oral nutrition supplement while in the hospital, continue to take this supplement at home. You can take it with meals, in-between meals, and/or before bedtime. These supplements can be purchased at most local grocery stores, pharmacies, and chain super-stores.  If you have any questions about your diet or nutrition, call the hospital and ask for the dietitian. High-Calorie and High-Protein Diet: Care Instructions  Overview     A high-calorie, high-protein diet gives you more energy and extra nutrition to help your body heal. Your doctor and dietitian can help you design a diet based on your health and what you prefer to eat. Always talk with your doctor or dietitian before you make changes in your diet. Follow-up care is a key part of your treatment and safety. Be sure to make and go to all appointments, and call your doctor if you are having problems. It's also a good idea to know your test results and keep a list of the medicines you take. How can you care for yourself at home? · Eat three meals a day, plus snacks in between and at bedtime. · Include favorite foods in your meals. This will help make meals more pleasant. · Drink your beverage at the end of the meal, because drinking before or during the meal can fill you up. · Eat high-protein foods, such as:  ? Meat, fish, and poultry. ? Milk and milk products. Add powdered milk to other foods (such as pudding or soups) to boost the protein. ? Eggs. ? Cooked dried beans and peas. ? Peanut butter, nuts, and seeds. ? Tofu.  ? Cheeses. ? Protein bars. · Eat high-calorie foods, such as:  ? Butter, honey, and brown sugar, added to foods to make them taste better. ? Oils, sauces, and gravies. ? Peanut butter. ?  Whole milk, yogurt, mayonnaise, and sour cream.  ? Granola cereal with fruit and granola bars. ? Muffins, pancakes, waffles, and other breads. ? Milkshakes, puddings, and custard. · Try a liquid meal replacement, such as Ensure, Boost, or instant breakfast drinks, if you have trouble eating solid foods. They will give you both calories and protein. Soups and smoothies also are good sources of nutrition. · Keep snacks around that are easy to eat, such as pudding, energy bars, ice cream, and flavored ice pops. Where can you learn more? Go to https://CURA Healthcarepepiceweb.[employer]. org and sign in to your [employer] account. Enter W827 in the [employer] box to learn more about \"High-Calorie and High-Protein Diet: Care Instructions. \"     If you do not have an account, please click on the \"Sign Up Now\" link. Current as of: December 17, 2020               Content Version: 12.9  © 2006-2021 [employer]. Care instructions adapted under license by [facility] ([facility]). If you have questions about a medical condition or this instruction, always ask your healthcare professional. [place] [address] any warranty or liability for your use of this information. Eating Healthy Foods: Care Instructions  Your Care Instructions     Eating healthy foods can help lower your risk for disease. Healthy food gives you energy and keeps your heart strong, your brain active, your muscles working, and your bones strong. A healthy diet includes a variety of foods from the basic food groups: grains, vegetables, fruits, milk and milk products, and meat and beans. Some people may eat more of their favorite foods from only one food group and, as a result, miss getting the nutrients they need. So, it is important to pay attention not only to what you eat but also to what you are missing from your diet. You can eat a healthy, balanced diet by making a few small changes. Follow-up care is a key part of your treatment and safety.  Be sure to make and go to all appointments, and call your doctor if you are having problems. It's also a good idea to know your test results and keep a list of the medicines you take. How can you care for yourself at home? Look at what you eat  · Keep a food diary for a week or two and record everything you eat or drink. Track the number of servings you eat from each food group. · For a balanced diet every day, eat a variety of:  ? 6 or more ounce-equivalents of grains, such as cereals, breads, crackers, rice, or pasta, every day. An ounce-equivalent is 1 slice of bread, 1 cup of ready-to-eat cereal, or ½ cup of cooked rice, cooked pasta, or cooked cereal.  ? 2½ cups of vegetables, especially:  § Dark-green vegetables such as broccoli and spinach. § Orange vegetables such as carrots and sweet potatoes. § Dry beans (such as green and kidney beans) and peas (such as lentils). ? 2 cups of fresh, frozen, or canned fruit. A small apple or 1 banana or orange equals 1 cup. ? 3 cups of nonfat or low-fat milk, yogurt, or other milk products. ? 5½ ounces of meat and beans, such as chicken, fish, lean meat, beans, nuts, and seeds. One egg, 1 tablespoon of peanut butter, ½ ounce nuts or seeds, or ¼ cup of cooked beans equals 1 ounce of meat. · Learn how to read food labels for serving sizes and ingredients. Fast-food and convenience-food meals often contain few or no fruits or vegetables. Make sure you eat some fruits and vegetables to make the meal more nutritious. · Look at your food diary. For each food group, add up what you have eaten and then divide the total by the number of days. This will give you an idea of how much you are eating from each food group. See if you can find some ways to change your diet to make it more healthy. Start small  · Do not try to make dramatic changes to your diet all at once. You might feel that you are missing out on your favorite foods and then be more likely to fail.   · Start slowly, and gradually change your habits. Try some of the following:  ? Use whole wheat bread instead of white bread. ? Use nonfat or low-fat milk instead of whole milk. ? Eat brown rice instead of white rice, and eat whole wheat pasta instead of white-flour pasta. ? Try low-fat cheeses and low-fat yogurt. ? Add more fruits and vegetables to meals and have them for snacks. ? Add lettuce, tomato, cucumber, and onion to sandwiches. ? Add fruit to yogurt and cereal.  Enjoy food  · You can still eat your favorite foods. You just may need to eat less of them. If your favorite foods are high in fat, salt, and sugar, limit how often you eat them, but do not cut them out entirely. · Eat a wide variety of foods. Make healthy choices when eating out  · The type of restaurant you choose can help you make healthy choices. Even fast-food chains are now offering more low-fat or healthier choices on the menu. · Choose smaller portions, or take half of your meal home. · When eating out, try:  ? A veggie pizza with a whole wheat crust or grilled chicken (instead of sausage or pepperoni). ? Pasta with roasted vegetables, grilled chicken, or marinara sauce instead of cream sauce. ? A vegetable wrap or grilled chicken wrap. ? Broiled or poached food instead of fried or breaded items. Make healthy choices easy  · Buy packaged, prewashed, ready-to-eat fresh vegetables and fruits, such as baby carrots, salad mixes, and chopped or shredded broccoli and cauliflower. · Buy packaged, presliced fruits, such as melon or pineapple. · Choose 100% fruit or vegetable juice instead of soda. Limit juice intake to 4 to 6 oz (½ to ¾ cup) a day. · Blend low-fat yogurt, fruit juice, and canned or frozen fruit to make a smoothie for breakfast or a snack. Where can you learn more? Go to https://brittney.[employer] Apps. org and sign in to your [employer] account.  Enter K191 in the [facility] box to learn more about \"Eating Healthy Foods: Care Instructions. \"     If you do not have an account, please click on the \"Sign Up Now\" link. Current as of: December 17, 2020               Content Version: 12.9  © 2006-2021 Healthwise, Incorporated. Care instructions adapted under license by [facility] ([facility]). If you have questions about a medical condition or this instruction, always ask your healthcare professional. [address] any warranty or liability for your use of this information.

## 2021-08-24 NOTE — PROGRESS NOTES
Progress Note  Natali Kyle  8/24/2021 7:20 AM  Subjective:   Admit Date:   8/19/2021      CC/ADMIT DX:       Interval History:   Reviewed overnight events and nursing notes. No new physical complaints. I have reviewed all labs/diagnostics from the last 24hrs. ROS:   I have done a 10 point ROS and all are negative, except what is mentioned in the HPI. ADULT DIET; Regular  Adult Oral Nutrition Supplement; Standard High Calorie/High Protein Oral Supplement    Medications:      enoxaparin  40 mg Subcutaneous Daily    sucralfate  1 g Oral BID    melatonin  5 mg Oral Nightly    Vitamin C  100 mg Oral Daily    vitamin B-12  500 mcg Oral Daily    Zinc Gluconate  10 mg Mouth/Throat Daily    gabapentin  300 mg Oral BID    mirtazapine  3.75 mg Oral Nightly           Objective:   Vitals: /74   Pulse 80   Temp 96.5 °F (35.8 °C) (Temporal)   Resp 18   Ht 5' 4\" (1.626 m)   Wt 162 lb 6.4 oz (73.7 kg)   SpO2 100%   BMI 27.88 kg/m²  No intake or output data in the 24 hours ending 08/24/21 0720  General appearance: alert and cooperative with exam  Extremities: extremities normal, atraumatic, no cyanosis or edema  Neurologic:  No obvious focal neurologic deficits. Skin: no rashes    Assessment and Plan:   Principal Problem:    Major depressive disorder, recurrent severe without psychotic features ([address]  Resolved Problems:    Depression, acute      Plan:  1. Continue present medication(s)   2. Follow with Psych  3. Monitor BP, pain level      Discharge planning:   her home     Reviewed treatment plans with the patient and/or family.              Electronically signed by Ira Ordaz MD on 8/24/2021 at 7:20 AM

## 2021-08-24 NOTE — BH NOTE
Oncologist here and talked with Dr. Joe Guerin reports there is no mets of ca, her cancer at this point is not terminal. Pt. To be discharge home with follow up at [facility], med list completed. Talked with daughter-in-law Maia Marquez she states that they had told the hospital that the sister was not to be contacted. I explained that the patient had requested that I contact her and that the sister was not robert to care for the patient at this time, explained that patient was being discharged to her apartment, she voiced that she had just gotten the patient's electric turned back on yesterday. Explained that we had tried to place her with hospice but that the oncologist and Dr. Joe Guerin state that her cancer is not terminal at this time, and she is not appropriate for hospice at this time. explained about AVS and follow up with [facility]. Son voices concern about the possibility that patient is not caring for her colostomy and it might get infected. Explained that pt. Does not require an ostomy nurse at this time also explained that pt. Will not be getting narcotic pain pills at discharge. Both pt. Son and daughter in law state they will go by and check on patient this evening.

## 2021-08-24 NOTE — PROGRESS NOTES
SW met with treatment team to discuss pt's progress and setbacks. SW 2 was present. Pt reportedly slept well last night, with medications, appetite is improved, refuses to participate in scheduled group activities, isolative to self/room, requires minimal assistance with ADLS and ambulation, uses a walker/wheelchair to ambulate, had consult visit from Oncology, cancer is not terminal, unable to assess level of depression/anxiety, denies SI/HI/AVH, will be discharged today, follow-up appointments will be scheduled.
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
## 2021-08-24 NOTE — DISCHARGE INSTR - ACTIVITY
Learning About Activities of Daily Living  What are activities of daily living? Activities of daily living (ADLs) are the basic self-care tasks you do every day. As you age, and if you have health problems, you may find that it's harder to do these things for yourself. That's when you may need some help. Your doctor uses ADLs to measure how much help you need. Knowing what you can and can't do for yourself is an important first step to getting help. And when you have the help you need, you can stay as independent as possible. Your doctor will want to know if you are able to do tasks such as:  · Take a bath or shower without help. · Go to the bathroom by yourself. · Dress and undress without help. · Shave, comb your hair, and brush teeth on your own. · Get in and out of bed or a chair without help. · Feed yourself without help. If you are having trouble doing basic self-care tasks, talk with your doctor. You may want to bring a caregiver or family member who can help the doctor understand your needs and abilities. How will a doctor assess your ADLs? Asking about ADLs is part of a routine health checkup your doctor will likely do as you age. Your health check might be done in a doctor's office, in your home, or at a hospital. The goal is to find out if you are having any problems that could make your health problems worse or that make it unsafe for you to be on your own. To measure your ADLs, your doctor will ask how hard it is for you to do routine tasks. He or she may also want to know if you have changed the way you do a task because of a health problem. He or she may watch how you:  · Walk back and forth. · Keep your balance while you stand or walk. · Move from sitting to standing or from a bed to a chair. · Button or unbutton a shirt or sweater. · Remove and put on your shoes.   It's normal to feel a little worried or anxious if you find you can't do all the things you used to be able to do. Talking with your doctor about ADLs isn't a test that you either pass or fail. It's just a way to get more information about your health and safety. Follow-up care is a key part of your treatment and safety. Be sure to make and go to all appointments, and call your doctor if you are having problems. It's also a good idea to know your test results and keep a list of the medicines you take. Where can you learn more? Go to https://[employer]. org and sign in to your "[employer] account. Enter Q333 in the [employer] box to learn more about \"Learning About Activities of Daily Living. \"     If you do not have an account, please click on the \"Sign Up Now\" link. Current as of: November 16, 2020               Content Version: 12.9  © 4112-7389 [employer]. Care instructions adapted under license by [facility] ([facility]). If you have questions about a medical condition or this instruction, always ask your healthcare professional. [place] [address] any warranty or liability for your use of this information.

## 2021-08-25 ENCOUNTER — TRANSITIONAL CARE MANAGEMENT TELEPHONE ENCOUNTER (OUTPATIENT)
Dept: CALL CENTER | Facility: HOSPITAL | Age: 61
End: 2021-08-25

## 2021-08-25 PROBLEM — R41.82 ALTERED MENTAL STATUS: Status: ACTIVE | Noted: 2021-08-25

## 2021-08-25 LAB
EKG P AXIS: 71 DEGREES
EKG P-R INTERVAL: 132 MS
EKG Q-T INTERVAL: 368 MS
EKG QRS DURATION: 82 MS
EKG QTC CALCULATION (BAZETT): 422 MS
EKG T AXIS: 61 DEGREES
SARS-COV-2, NAAT: NOT DETECTED

## 2021-08-25 PROCEDURE — 2500000003 HC RX 250 WO HCPCS: Performed by: INTERNAL MEDICINE

## 2021-08-25 PROCEDURE — 97116 GAIT TRAINING THERAPY: CPT

## 2021-08-25 PROCEDURE — 97161 PT EVAL LOW COMPLEX 20 MIN: CPT

## 2021-08-25 PROCEDURE — 93010 ELECTROCARDIOGRAM REPORT: CPT | Performed by: INTERNAL MEDICINE

## 2021-08-25 PROCEDURE — 97535 SELF CARE MNGMENT TRAINING: CPT

## 2021-08-25 PROCEDURE — 6370000000 HC RX 637 (ALT 250 FOR IP): Performed by: INTERNAL MEDICINE

## 2021-08-25 PROCEDURE — 96372 THER/PROPH/DIAG INJ SC/IM: CPT

## 2021-08-25 PROCEDURE — 6360000002 HC RX W HCPCS: Performed by: INTERNAL MEDICINE

## 2021-08-25 PROCEDURE — 97165 OT EVAL LOW COMPLEX 30 MIN: CPT

## 2021-08-25 PROCEDURE — 96376 TX/PRO/DX INJ SAME DRUG ADON: CPT

## 2021-08-25 PROCEDURE — 96375 TX/PRO/DX INJ NEW DRUG ADDON: CPT

## 2021-08-25 PROCEDURE — 87635 SARS-COV-2 COVID-19 AMP PRB: CPT

## 2021-08-25 PROCEDURE — 2580000003 HC RX 258: Performed by: INTERNAL MEDICINE

## 2021-08-25 PROCEDURE — 92523 SPEECH SOUND LANG COMPREHEN: CPT

## 2021-08-25 PROCEDURE — 1210000000 HC MED SURG R&B

## 2021-08-25 RX ORDER — SODIUM CHLORIDE 9 MG/ML
25 INJECTION, SOLUTION INTRAVENOUS PRN
Status: DISCONTINUED | OUTPATIENT
Start: 2021-08-25 | End: 2021-09-03 | Stop reason: HOSPADM

## 2021-08-25 RX ORDER — OXYCODONE HYDROCHLORIDE AND ACETAMINOPHEN 5; 325 MG/1; MG/1
1 TABLET ORAL EVERY 8 HOURS PRN
Status: DISCONTINUED | OUTPATIENT
Start: 2021-08-25 | End: 2021-09-03 | Stop reason: HOSPADM

## 2021-08-25 RX ORDER — MIRTAZAPINE 7.5 MG/1
3.75 TABLET, FILM COATED ORAL NIGHTLY
Status: DISCONTINUED | OUTPATIENT
Start: 2021-08-25 | End: 2021-09-03 | Stop reason: HOSPADM

## 2021-08-25 RX ORDER — GABAPENTIN 300 MG/1
600 CAPSULE ORAL NIGHTLY
Status: DISCONTINUED | OUTPATIENT
Start: 2021-08-25 | End: 2021-09-03 | Stop reason: HOSPADM

## 2021-08-25 RX ORDER — NICOTINE 21 MG/24HR
1 PATCH, TRANSDERMAL 24 HOURS TRANSDERMAL DAILY
Status: DISCONTINUED | OUTPATIENT
Start: 2021-08-25 | End: 2021-09-03 | Stop reason: HOSPADM

## 2021-08-25 RX ORDER — 0.9 % SODIUM CHLORIDE 0.9 %
500 INTRAVENOUS SOLUTION INTRAVENOUS ONCE
Status: COMPLETED | OUTPATIENT
Start: 2021-08-25 | End: 2021-08-25

## 2021-08-25 RX ORDER — SODIUM CHLORIDE 0.9 % (FLUSH) 0.9 %
5-40 SYRINGE (ML) INJECTION PRN
Status: DISCONTINUED | OUTPATIENT
Start: 2021-08-25 | End: 2021-09-03 | Stop reason: HOSPADM

## 2021-08-25 RX ORDER — ONDANSETRON 4 MG/1
4 TABLET, ORALLY DISINTEGRATING ORAL EVERY 8 HOURS PRN
Status: DISCONTINUED | OUTPATIENT
Start: 2021-08-25 | End: 2021-09-03 | Stop reason: HOSPADM

## 2021-08-25 RX ORDER — ONDANSETRON 2 MG/ML
4 INJECTION INTRAMUSCULAR; INTRAVENOUS EVERY 6 HOURS PRN
Status: DISCONTINUED | OUTPATIENT
Start: 2021-08-25 | End: 2021-09-03 | Stop reason: HOSPADM

## 2021-08-25 RX ORDER — GABAPENTIN 300 MG/1
300 CAPSULE ORAL 4 TIMES DAILY
Status: DISCONTINUED | OUTPATIENT
Start: 2021-08-25 | End: 2021-08-25 | Stop reason: SDUPTHER

## 2021-08-25 RX ORDER — ACETAMINOPHEN 650 MG/1
650 SUPPOSITORY RECTAL EVERY 6 HOURS PRN
Status: DISCONTINUED | OUTPATIENT
Start: 2021-08-25 | End: 2021-09-03 | Stop reason: HOSPADM

## 2021-08-25 RX ORDER — POLYETHYLENE GLYCOL 3350 17 G/17G
17 POWDER, FOR SOLUTION ORAL DAILY PRN
Status: DISCONTINUED | OUTPATIENT
Start: 2021-08-25 | End: 2021-09-03 | Stop reason: HOSPADM

## 2021-08-25 RX ORDER — SODIUM CHLORIDE 0.9 % (FLUSH) 0.9 %
5-40 SYRINGE (ML) INJECTION EVERY 12 HOURS SCHEDULED
Status: DISCONTINUED | OUTPATIENT
Start: 2021-08-25 | End: 2021-09-03 | Stop reason: HOSPADM

## 2021-08-25 RX ORDER — MECOBALAMIN 5000 MCG
5 TABLET,DISINTEGRATING ORAL NIGHTLY
Status: DISCONTINUED | OUTPATIENT
Start: 2021-08-25 | End: 2021-09-03 | Stop reason: HOSPADM

## 2021-08-25 RX ORDER — DICYCLOMINE HCL 20 MG
20 TABLET ORAL 4 TIMES DAILY PRN
Status: DISCONTINUED | OUTPATIENT
Start: 2021-08-25 | End: 2021-09-03 | Stop reason: HOSPADM

## 2021-08-25 RX ORDER — ALBUTEROL SULFATE 2.5 MG/3ML
2.5 SOLUTION RESPIRATORY (INHALATION) EVERY 6 HOURS PRN
Status: DISCONTINUED | OUTPATIENT
Start: 2021-08-25 | End: 2021-09-03 | Stop reason: HOSPADM

## 2021-08-25 RX ORDER — ALBUTEROL SULFATE 90 UG/1
2 AEROSOL, METERED RESPIRATORY (INHALATION) EVERY 6 HOURS PRN
Status: DISCONTINUED | OUTPATIENT
Start: 2021-08-25 | End: 2021-08-25 | Stop reason: CLARIF

## 2021-08-25 RX ORDER — ACETAMINOPHEN 325 MG/1
975 TABLET ORAL EVERY 8 HOURS SCHEDULED
Status: DISCONTINUED | OUTPATIENT
Start: 2021-08-25 | End: 2021-09-03 | Stop reason: HOSPADM

## 2021-08-25 RX ORDER — LEVOFLOXACIN 500 MG/1
500 TABLET, FILM COATED ORAL DAILY
Status: DISCONTINUED | OUTPATIENT
Start: 2021-08-25 | End: 2021-08-26

## 2021-08-25 RX ORDER — GABAPENTIN 300 MG/1
300 CAPSULE ORAL 2 TIMES DAILY
Status: DISCONTINUED | OUTPATIENT
Start: 2021-08-25 | End: 2021-09-03 | Stop reason: HOSPADM

## 2021-08-25 RX ORDER — ACETAMINOPHEN 325 MG/1
650 TABLET ORAL EVERY 6 HOURS PRN
Status: DISCONTINUED | OUTPATIENT
Start: 2021-08-25 | End: 2021-09-03 | Stop reason: HOSPADM

## 2021-08-25 RX ADMIN — ACETAMINOPHEN 975 MG: 325 TABLET ORAL at 21:54

## 2021-08-25 RX ADMIN — ENOXAPARIN SODIUM 40 MG: 40 INJECTION SUBCUTANEOUS at 09:13

## 2021-08-25 RX ADMIN — GABAPENTIN 600 MG: 300 CAPSULE ORAL at 21:54

## 2021-08-25 RX ADMIN — FAMOTIDINE 20 MG: 10 INJECTION, SOLUTION INTRAVENOUS at 09:11

## 2021-08-25 RX ADMIN — GABAPENTIN 300 MG: 300 CAPSULE ORAL at 13:12

## 2021-08-25 RX ADMIN — LEVOFLOXACIN 500 MG: 500 TABLET, FILM COATED ORAL at 09:11

## 2021-08-25 RX ADMIN — GABAPENTIN 300 MG: 300 CAPSULE ORAL at 05:30

## 2021-08-25 RX ADMIN — ONDANSETRON HYDROCHLORIDE 4 MG: 2 SOLUTION INTRAMUSCULAR; INTRAVENOUS at 21:54

## 2021-08-25 RX ADMIN — SODIUM CHLORIDE, PRESERVATIVE FREE 10 ML: 5 INJECTION INTRAVENOUS at 09:13

## 2021-08-25 RX ADMIN — SODIUM CHLORIDE 500 ML: 9 INJECTION, SOLUTION INTRAVENOUS at 05:29

## 2021-08-25 RX ADMIN — SODIUM CHLORIDE, PRESERVATIVE FREE 10 ML: 5 INJECTION INTRAVENOUS at 21:55

## 2021-08-25 RX ADMIN — FAMOTIDINE 20 MG: 10 INJECTION, SOLUTION INTRAVENOUS at 21:54

## 2021-08-25 RX ADMIN — Medication 5 MG: at 21:54

## 2021-08-25 RX ADMIN — MIRTAZAPINE 3.75 MG: 7.5 TABLET ORAL at 21:54

## 2021-08-25 RX ADMIN — ACETAMINOPHEN 975 MG: 325 TABLET ORAL at 13:12

## 2021-08-25 RX ADMIN — ACETAMINOPHEN 975 MG: 325 TABLET ORAL at 05:30

## 2021-08-25 RX ADMIN — LEVOFLOXACIN 500 MG: 500 TABLET, FILM COATED ORAL at 05:30

## 2021-08-25 RX ADMIN — DICYCLOMINE HYDROCHLORIDE 20 MG: 20 TABLET ORAL at 15:45

## 2021-08-25 ASSESSMENT — PAIN DESCRIPTION - PAIN TYPE
TYPE: CHRONIC PAIN
TYPE: CHRONIC PAIN

## 2021-08-25 ASSESSMENT — PAIN SCALES - GENERAL
PAINLEVEL_OUTOF10: 6
PAINLEVEL_OUTOF10: 7
PAINLEVEL_OUTOF10: 3
PAINLEVEL_OUTOF10: 5
PAINLEVEL_OUTOF10: 5

## 2021-08-25 ASSESSMENT — PAIN - FUNCTIONAL ASSESSMENT
PAIN_FUNCTIONAL_ASSESSMENT: PREVENTS OR INTERFERES SOME ACTIVE ACTIVITIES AND ADLS
PAIN_FUNCTIONAL_ASSESSMENT: PREVENTS OR INTERFERES SOME ACTIVE ACTIVITIES AND ADLS

## 2021-08-25 ASSESSMENT — ENCOUNTER SYMPTOMS
EYES NEGATIVE: 1
ABDOMINAL PAIN: 1
RESPIRATORY NEGATIVE: 1

## 2021-08-25 ASSESSMENT — PAIN DESCRIPTION - LOCATION
LOCATION: ABDOMEN;BACK
LOCATION: ABDOMEN;BACK

## 2021-08-25 ASSESSMENT — PAIN DESCRIPTION - FREQUENCY: FREQUENCY: INTERMITTENT

## 2021-08-25 ASSESSMENT — PAIN DESCRIPTION - DESCRIPTORS: DESCRIPTORS: ACHING

## 2021-08-25 NOTE — PROGRESS NOTES
[facility] ADULT INITIAL INTAKE ASSESSMENT     8/24/21    Miriam Portillo ,a 64 y.o. female, presents to the ED for a psychiatric assessment. ED Arrival time: 1950  ED physician: [facility] Notification time:   [facility] AN AFFILIATE OF [facility] Assessment start time:   Psychiatrist call time:   Spoke with Dr. Ria Corley    Patient is referred by: EMS    Reason for visit to ED - Presenting problem:     PT states reason for ED visit, \"Pt presents to ED with withdrawal. I looked back at chart pt got Oxycodone and neurontin today. Pt when I evaluated only keeps saying im hurting when asked why she is here. Pt denies SI,HI, AVH. I spoke with Dr Ria Corley who said she is not appropriate for our unit and when she left she had not once participated in activities. Pt just left [place] [facility]. Pt is to follow up with her outpt doctors.      Duration of symptoms: today     Current Stressors: health    C-SSRS Completed: yes    SI:  denies   Plan: no   Past SI attempts: no   If yes, when and how many times:  Describe suicide attempts:   HI: denies  If yes describe:   Delusions: denies  If yes describe:   Hallucinations: denies   If yes describe:   Risk of Harm to self: Self injurious/self mutilation behaviorsno   If yes explain:   Was it within the past 6 months: no   Risk of Harm to others: no   If yes explain:   Was it within the past 6 months: no   Trauma History:  Anxiety 1-10:  0  Explain if increased:   Depression 1-10:  0  Explain if increased:   Level of function outside hospital decreased: no   If yes explain:       Psychiatric Hospitalizations: Yes   Where & When: [place]  Outpatient Psychiatric Treatment:    Family History:    Family history of mental illness: no   \"Depression\",\"Anxiety\",\"Bipolar\",\"Schizophrenia\",\"Borderline\",\"ADHD\"}  Family members with suicide attempt: no   If yes explain (attempted or completed):    Substance Abuse History:     SBIRT Completed: yes  Brief Intervention completed if needed:  (Yes/No)    Current ETOH LEVELS: <10    ETOH Usage:     Amount drinking daily: denied    Date of last drink:   Longest period of sobriety:    Substance/Chemical Abuse/Recreational Drug History:  Substance used:   Date of last substance use: Tobacco Use: no   History of rehab treatment:  How many times in rehab:  Last time in rehab:  Family history of substance abuse:    Opiates: It was discussed with pt they would not be receiving opiates unless they were within 3 days post surgery/acute injury. Patient voiced understanding and agreed. Psychiatric Review Of Systems:     Recent Sleep changes: no   Recent appetite changes: no   Recent weight changes/Pounds gained (+) or lost (-): no      Medical History:     Medical Diagnosis/Issues:   CT today in ED:no  Use of 02 or CPAP: no  Ambulatory: yes  Independent or Need assistance with Self Care:     PCP: Adriana Bennett MD     Current Medications:   Scheduled Meds: No current facility-administered medications for this encounter. Current Outpatient Medications:     cephALEXin (KEFLEX) 500 MG capsule, Take 1 capsule by mouth 4 times daily for 7 days, Disp: 28 capsule, Rfl: 0    oxyCODONE-acetaminophen (PERCOCET) 5-325 MG per tablet, Take 1 tablet by mouth every 8 hours as needed for Pain for up to 30 days. , Disp: 60 tablet, Rfl: 0    melatonin 5 MG TBDP disintegrating tablet, Take 1 tablet by mouth nightly, Disp: 30 tablet, Rfl: 1    mirtazapine (REMERON) 7.5 MG tablet, Take 0.5 tablets by mouth nightly, Disp: 15 tablet, Rfl: 1    acetaminophen (TYLENOL) 325 MG tablet, Take 975 mg by mouth every 8 hours, Disp: , Rfl:     ibuprofen (ADVIL;MOTRIN) 600 MG tablet, Take 1 tablet by mouth every 8 hours as needed, Disp: , Rfl:     Ascorbic Acid 100 MG CHEW, , Disp: , Rfl:     Zinc Gluconate 10 MG LOZG, , Disp: , Rfl:     dicyclomine (BENTYL) 20 MG tablet, , Disp: , Rfl:     albuterol sulfate  (90 Base) MCG/ACT inhaler, INHALE 2 PUFFS INTO THE LUNGS EVERY 6 HOURS AS NEEDED FOR WHEEZING, Disp: 18 g, Rfl: 0    vitamin D (ERGOCALCIFEROL) 1.25 MG (89015 UT) CAPS capsule, Take 1 capsule by mouth every 30 days, Disp: 3 capsule, Rfl: 3    ondansetron (ZOFRAN-ODT) 8 MG TBDP disintegrating tablet, DISSOLVE 1 TABLET UNDER TONGUE EVERY 8 HOURS AS NEEDED FOR NAUSEA OR VOMITING, Disp: 12 tablet, Rfl: 0    Potassium Citrate ER 15 MEQ (1620 MG) TBCR, TAKE 1 TABLET BY MOUTH THREE TIMES DAILY. , Disp: 90 tablet, Rfl: 11    sucralfate (CARAFATE) 1 GM tablet, Take 1 g by mouth 2 times daily as needed , Disp: , Rfl:     gabapentin (NEURONTIN) 300 MG capsule, Take 1 capsule by mouth 4 times daily Indications: Backache, 1 in am, 1 at noon, 2 in the pm. (Patient taking differently: Take 300 mg by mouth 4 times daily. Pain Management.), Disp: 120 capsule, Rfl: 0    vitamin B-12 (CYANOCOBALAMIN) 500 MCG tablet, Take 500 mcg by mouth daily. , Disp: , Rfl:      Mental Status Evaluation:     Appearance:  casually dressed   Behavior:  Within Normal Limits   Speech:  normal pitch   Mood:  within normal limits   Affect:  normal   Thought Process:  within normal limits   Thought Content:     Sensorium:  person, place and time/date   Cognition:  impaired due to medication   Insight:  impaired due to medication       Collateral Information:     Name:   Relationship:   Phone Number:   Collateral:     Current living arrangement: home alone   Current Support System: nephew   Employment: no     Disposition:     Choose one of the options below for disposition:     1. Decision to admit to :no    If yes, which unit Adult or Geriatric Unit:  n/a  Is patient voluntary: n/a   If no has a 72 hold been initiated:   Admission Diagnosis:     Does the patient have a guardian or Medical POA:   Has the guardian been notified or Medical POA:       2. Decision to Discharge:   Does not meet criteria for acceptance to   unit due to: ptr is not SI,HI, AVH.  Pt is to follow up with doctors taht was set up this morning from behavioral health team as outpt.     3. Transferred:       Patient was transferred due to:     Other follow up information provided:      Shaan Peace RN

## 2021-08-25 NOTE — PLAN OF CARE
Nutrition Problem #1: Unintended weight loss  Intervention: Food and/or Nutrient Delivery: Continue Current Diet, Start Oral Nutrition Supplement  Nutritional Goals: Po intake 50% or greater meals and ONS. wt stable.

## 2021-08-25 NOTE — ED NOTES
Per EMS PT was taken off of xanax and fentanyl approx 72 hours ago.  PT states she feels very nauseous, PT is very anxious, diaphoretic, and is unable to sit still     Shu Aguillon RN  08/24/21 2000
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
## 2021-08-25 NOTE — PROGRESS NOTES
[address] arrived to room # [id number]. Presented with: mental status change  Mental Status: Patient is oriented, alert and , but forgetful, sad, not interactive with staff. Vitals:    08/25/21 0331   BP: (!) 107/57   Pulse: 72   Resp: 17   Temp:    SpO2: 93%     Patient safety contract and falls prevention contract reviewed with patient Yes. Oriented Patient to room. Call light within reach. Yes.   Needs, issues or concerns expressed at this time: no.      Electronically signed by Sandra Cummings RN on 8/25/2021 at 4:59 AM

## 2021-08-25 NOTE — ED PROVIDER NOTES
Abnormal; Notable for the following components:    CO2 21 (*)     Total Protein 6.5 (*)     All other components within normal limits   URINE RT REFLEX TO CULTURE - Abnormal; Notable for the following components:    Protein, UA TRACE (*)     Leukocyte Esterase, Urine MODERATE (*)     All other components within normal limits   MICROSCOPIC URINALYSIS - Abnormal; Notable for the following components:    Bacteria, UA NEGATIVE (*)     Crystals, UA NEG (*)     WBC, UA 44 (*)     All other components within normal limits   CULTURE, URINE   URINE DRUG SCREEN       All other labs were within normal range or not returned as of this dictation. EMERGENCY DEPARTMENT COURSE and DIFFERENTIAL DIAGNOSIS/MDM:   Vitals:    Vitals:    08/24/21 2031 08/24/21 2102   BP: 109/69 (!) 122/99   Pulse: 78 74   Resp: 17 14   SpO2: 96% 97%       MDM    VSS, pt admitted on 8/19 to [facility] for depression, I saw her then and had neg head ct, neg ct abdomen and labs then, her mental state is same today as was then when I saw her on 8/19, very flat affect and seems depressed, just was DC from [facility] today and ambulated but now wont walk with assistance for nurses, was doing this on [facility] floor per [facility], her family wont come get her and she lives alone, supposedly has strained relationships with most of her family at this point, [facility] evaluated earlier and they declined to re admit the patient, since she wont walk and ultimately I feel needs placement Dr. Thomas Armstrong very kindly has agreed to admit her, will have SW himanshual pt in AM        CONSULTS:  None    PROCEDURES:  Unless otherwise noted below, none     Procedures    FINAL IMPRESSION      1. Depression, unspecified depression type    2. Chronic pain syndrome          DISPOSITION/PLAN   DISPOSITION     PATIENT REFERRED TO:  No follow-up provider specified.     DISCHARGE MEDICATIONS:  New Prescriptions    No medications on file          (Please note that portions ofthis note were completed with a voice recognition program.  Efforts were made to edit the dictations but occasionally words are mis-transcribed.)    Brandin Don MD(electronically signed)  Attending Emergency Physician         Melani Mclaughlin MD  08/25/21 1689

## 2021-08-25 NOTE — PROGRESS NOTES
[facility] is being assessed upon: Admission    I agree that I, Lulu Santacruz, RN, along with Prudencio Sotelo RN (either 2 RN's or 1 LPN and 1 RN) have performed a thorough Head to Toe Skin Assessment on the patient. ALL assessment sites listed below have been assessed. Areas assessed by both nurses:     [x]   Head, Face, and Ears   [x]   Shoulders, Back, and Chest  [x]   Arms, Elbows, and Hands   [x]   Coccyx, Sacrum, and Ischium  [x]   Legs, Feet, and Heels    Does the Patient have Skin Breakdown?  No    Yfn Prevention initiated: Yes  Wound Care Orders initiated: No    WOC nurse consulted for Pressure Injury (Stage 3,4, Unstageable, DTI, NWPT, and Complex wounds) and New or Established Ostomies: NA        Primary Nurse eSignature: Sahil Álvarez RN on 8/25/2021 at 4:58 AM      Co-Signer eSignature: Electronically signed by Prudencio Sotelo RN on 8/25/21 at 5:19 AM CDT

## 2021-08-25 NOTE — PROGRESS NOTES
verbalize conditions in which she takes medications independently. Patient did not verbalize appropriate simple solutions to situations that could occur during activities of daily living at independent level. Subjective:  Chart Reviewed: Yes  Patient assessed for rehabilitation services?: Yes  Family / Caregiver Present: No     Objective: Auditory Comprehension  Comprehension:  (Delays were noted and significant break down was observed during yes/no questions regarding immediate environment and current state of being at independent level and with provision of repetition of questions. Delays were noted and significant break down was observed during simple 1, simple 2, simple 3, and complex 1 step commands independently and with provision of repetitions of commands.)     Expression  Primary Mode of Expression:  (Confrontation naming of items in room, structured responsive speech, and responses in natural conversation were all considered to be impaired with significant part word repetitions noted and frequent instances where no responses were observed.)    Motor Speech:  (Patient exhibited significantly decreased volume of speech and decreased labial movements with muttered quality noted.)     Overall Orientation Status:  (Patient verbalized name, state, county, city, and month at independent level. Patient did not verbalize birthday, age, address, phone number, year, season, date, day of week, hospital, or floor of hospital independently.)    Attention:  (Patient demonstrated decreased select and sustained attention during assessment.)     Memory:  (Patient demonstrated decreased immediate memory with sequences of unrelated numbers/words set up to 4 items with repetitions provided. Patient exhibited decreased short-term memory with less than 2 minute delay+distractions present.)     Problem Solving:  (It is noted that during evaluation, patient did not verbalize current PCP or pharmacy at independent level. Patient did not verbalize conditions in which she takes medications independently.  Patient did not verbalize appropriate simple solutions to situations that could occur during activities of daily living at independent level.)    Electronically signed by RODRIGUE Hardy on 8/25/2021 at 1:51 PM

## 2021-08-25 NOTE — OUTREACH NOTE
Call Center TCM Note      Responses   [facility] patient discharged from?  Non-BH   Does the patient have one of the following disease processes/diagnoses(primary or secondary)?  Other   TCM attempt successful?  No   Unsuccessful attempts  Attempt 2          Anamaria Nieves MA    8/25/2021, 15:14 CDT

## 2021-08-25 NOTE — ED NOTES
Attempted to get patient up to walk to ensure that patient would be able to get herself into her apartment if she was taken by cab. Patient was not able to ambulate independently at this time. MD Nona Morel notified.       Jermaine Upton RN  08/24/21 0423
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
## 2021-08-25 NOTE — ED NOTES
After assessment MD [facility] states that there is no need for medical admission and that since psych has cleared the patient also, there is nothing to admit the patient for. RN attempts to call family again at this time to update them and see if they are still unwilling to pick the patient up. There was no answer. MD [facility] and charge RN Alxeandru Elizondo notified at this time. Plan at this time is to see if patient is able to ambulate so we can get a cab for her home.       James Conway RN  08/24/21 [address], RN  08/24/21 3101

## 2021-08-25 NOTE — CARE COORDINATION
GAGE had lengthy conversation with Pt juan and POA regarding DC planning options. Pt nikristian stated she did not realize Pt was discharged from the 6th floor yesterday. The last update she had was that Pt was not discharging and it would be another day. GAGE explained Pt was back at the hospital on the 5th floor. Pt POA asked to speak with Pt sister and discuss personal care home/nursing facility options for Pt. Pt POA will fax over the POA paperwork first thing in the morning. Pt POA will contact GAGE with updates on placement options. Pt does live alone and will need 24/7 care for the near future.  Pt has updated Pt contact list.     MANAN Dubois [phone]    Electronically signed by Joyce Dudley on 8/25/2021 at 4:06 PM

## 2021-08-25 NOTE — PROGRESS NOTES
inpatient    Goals:  Po intake 50% or greater meals and ONS. wt stable.        Nutrition Monitoring and Evaluation:   Food/Nutrient Intake Outcomes:  Food and Nutrient Intake, Supplement Intake  Physical Signs/Symptoms Outcomes:  Biochemical Data, Meal Time Behavior, Nutrition Focused Physical Findings, Weight, GI Status     Discharge Planning:    Continue Oral Nutrition Supplement     Electronically signed by Justin Fulton MS, RD, LD on 8/25/21 at 1:07 PM CDT    Contact: [phone]

## 2021-08-25 NOTE — OUTREACH NOTE
Call Center TCM Note      Responses   [facility] patient discharged from?  Non-BH   Does the patient have one of the following disease processes/diagnoses(primary or secondary)?  Other   TCM attempt successful?  No   Unsuccessful attempts  Attempt 1          Anamaria Nieves MA    8/25/2021, 11:47 CDT

## 2021-08-25 NOTE — PROGRESS NOTES
Occupational Therapy   Occupational Therapy Initial Assessment  Date: 2021   Patient Name: Traci Matson  MRN: [id number]     : 1960    Date of Service: 2021    Discharge Recommendations:  Patient would benefit from continued therapy after discharge       Assessment   Assessment: Evaluation completed and tx initiated. The patient would benefit from further therapy to upgrade functional status. Likely to make gains in a structured therapeutic environment with encouragement  Treatment Diagnosis: Altered Mental Status, Spinal Stenosis, Dysthymia  History: has colostomy, recent discharge from psych services, opiod dependency, active hepatitis C, anal CA, major depressive disorder  REQUIRES OT FOLLOW UP: Yes  Activity Tolerance  Activity Tolerance: Patient limited by fatigue  Activity Tolerance: and lethargy  Safety Devices  Safety Devices in place: Yes  Type of devices: Call light within reach; Bed alarm in place           Patient Diagnosis(es): The primary encounter diagnosis was Depression, unspecified depression type. A diagnosis of Chronic pain syndrome was also pertinent to this visit. has a past medical history of Alcohol abuse, Anal cancer ([facility] [address] Anxiety, Arthritis, Calcification of abdominal aorta ([facility] [address] Chronic active hepatitis C ([facility] [address] - Genotype 1A, s/p Harvoni  with remission, Chronic back pain, Colon polyp, COPD (chronic obstructive pulmonary disease) ([address] Decrease in appetite, Depression, Diarrhea, GERD (gastroesophageal reflux disease), H/O: GI bleed, Herpes simplex, History of blood transfusion, Hx of chronic active hepatitis, Hypertension, Irregular heart beat, Kidney stones, Memory loss, Mitral valve disease, Neuropathy, Osteoarthritis, Other malaise and fatigue, Pancreatitis, Prediabetes, Recurrent UTI, Restless leg, SOB (shortness of breath), Status post placement of implantable loop recorder, and Weight loss. has a past surgical history that includes Hysterectomy;  Abdomen surgery; Breast surgery; Kidney stone surgery (09/2012); Finger surgery; Toe Surgery; lumbar laminectomy (06/24/2011); Foot surgery (10/2011); back surgery (2011); Colonoscopy (08/18/2008); Colonoscopy (09/18/2012); Lithotripsy; Cardiac catheterization; Tonsillectomy; Cholecystectomy; Upper gastrointestinal endoscopy (08/29/2008); Upper gastrointestinal endoscopy (08/19/2008); Upper gastrointestinal endoscopy (10/24/2013); Cosmetic surgery; Breast reduction surgery (Bilateral); Endoscopy, colon, diagnostic; skin biopsy; Insertable Cardiac Monitor; Ureter surgery (Left, 10/05/2016); Lithotripsy (Left, 10/05/2016); Cystoscopy (Left, 10/05/2016); pr cysto/uretero/pyeloscopy w/lithotripsy (Right, 06/21/2018); pr cystoscopy,insert ureteral stent (Right, 06/21/2018); Kidney surgery; Cystoscopy (Left, 02/20/2019); Cystoscopy (Left, 02/20/2019); Colonoscopy (03/12/2015); and liver biopsy (08/13/2008).     Treatment Diagnosis: Altered Mental Status, Spinal Stenosis, Dysthymia      Restrictions  Restrictions/Precautions  Restrictions/Precautions: Fall Risk  Position Activity Restriction  Other position/activity restrictions: COLOSTOMY    Subjective   General  Chart Reviewed: Yes  Patient assessed for rehabilitation services?: Yes  Family / Caregiver Present: No  Patient Currently in Pain: Yes  Pain Assessment  Pain Assessment: 0-10  Pain Level: 5  Pain Type: Chronic pain  Pain Location: Abdomen;Back  Pain Descriptors: Aching  Functional Pain Assessment: Prevents or interferes some active activities and ADLs  Non-Pharmaceutical Pain Intervention(s): Ambulation/Increased Activity;Repositioned  Response to Pain Intervention: Patient Satisfied  Vital Signs  Patient Currently in Pain: Yes  Social/Functional History  Social/Functional History  Lives With: Alone (assisted by sister)  Additional Comments: Per chart, patient stating she is unable to take care of herself       Objective           Observation/Palpation  Posture: Fair  Balance  Sitting Balance: Supervision  Standing Balance: Minimal assistance  Functional Mobility  Assist Level: Minimal assistance  Functional Mobility Comments: hand held assist  Toilet Transfers  Toilet Transfer: Minimal assistance  ADL  Feeding: Modified independent  (patient is  right handed but using her left hand with utensils)  Grooming: Minimal assistance  UE Bathing: Moderate assistance; Increased time to complete  LE Bathing: Moderate assistance; Increased time to complete  UE Dressing: Minimal assistance; Increased time to complete  LE Dressing: Minimal assistance; Increased time to complete  Toileting: Minimal assistance  Quality of Movement Other  Comment: tremulous throught out her body when moving, standing     Bed mobility  Supine to Sit: Minimal assistance  Sit to Supine: Minimal assistance  Transfers  Stand Step Transfers: Minimal assistance     Cognition  Cognition Comment: Awakens to participate in therapy, requires structure and encouragement, lethargic, flat affect, occasional verbal perseveration                 LUE AROM (degrees)  LUE General AROM: Minimally attempts to move L shoulder and repeats \"surgery, surgery\". Distal AROM is WNL  RUE AROM (degrees)  RUE General AROM: Minimal effort to raise shoulder off bed in supine; was able to hold arm in air after placement.   Distal AROM is WNL                    Tx initiated:  toileting (15 mins)    Plan   Plan  Times per week: 3-5    G-Code     OutComes Score                                                  AM-PAC Score             Goals  Short term goals  Time Frame for Short term goals: 1-2 weeks  Short term goal 1: Supervision for dressing  Short term goal 2: Supervision for toileting  Short term goal 3: Supervision for transfers  Short term goal 4: Supervision for light ambulatory home management  Long term goals  Long term goal 1: Upgrade as tolerated       Therapy Time   Individual Concurrent Group Co-treatment   Time In           Time Out           Minutes                   Shelby Root OT Electronically signed by Shelby Root OT on 8/25/2021 at 1:47 PM

## 2021-08-25 NOTE — ED NOTES
Bed: 14  Expected date:   Expected time:   Means of arrival:   Comments:  Terminal     Maria Del Carmen Serna RN  08/24/21 1958

## 2021-08-25 NOTE — ED NOTES
Patient is up for discharge after being seen by psych and MD Alea Hutson in ER. Called family in attempt for them to come pick her up. Daughter in law and son state that they feel that the patient is not mentally stable enough to go home. When they were caring for her earlier today they state that \"she was hysteric and kept yelling for help, and shaking\" The patient lives alone, and they are concerned that she will not be safe staying by herself. They also state that they are not willing to pick her up, as they both work tomorrow and are not able to. RN notify MD, who took over for Alea Hutson about the situation, who will assess the patient.       Judith Barragan RN  08/24/21 1501

## 2021-08-25 NOTE — PROGRESS NOTES
Physical Therapy    Facility/Department: [facility] SURG SERVICES  Initial Assessment    NAME: Arslan Contreras  : 1960  MRN: [id number]    Date of Service: 2021    Discharge Recommendations:  Continue to assess pending progress        Assessment   Body structures, Functions, Activity limitations: Decreased functional mobility ; Decreased ADL status; Decreased cognition;Decreased balance; Increased pain  Assessment: Pt LETHARGIC INITIALLY BUT ABLE TO FOLLOW COMMANDS AND PARTICIPATE. REQUIRES CUEING FOR ALL TASKS. ABLE TO AMB SLOWLY TO BR, MANAGE TOILET HYGIENE WITH ASSIST AND AMB BACK TO BED. DECLINED UP IN CHAIR DESPITE ENCOURAGEMENT. WILL CONT TO FOLLOW. PT Education: PT Role;Plan of Care  REQUIRES PT FOLLOW UP: Yes  Activity Tolerance  Activity Tolerance: Patient limited by fatigue;Patient limited by cognitive status      Restrictions  Restrictions/Precautions  Restrictions/Precautions: Fall Risk  Position Activity Restriction  Other position/activity restrictions: COLOSTOMY  Vision/Hearing  Vision: Within Functional Limits  Hearing: Within functional limits     Subjective  General  Diagnosis: SPINAL STENOSIS, PSYCHOSIS  Subjective  Subjective: Pt LETHARGIC, BUT WILLING TO PARTICIPATE  Pain Assessment  Pain Level: 7  Pain Type: Chronic pain  Pain Location: Abdomen;Back  Pain Frequency: Intermittent  Functional Pain Assessment: Prevents or interferes some active activities and ADLs  Non-Pharmaceutical Pain Intervention(s): Ambulation/Increased Activity;Repositioned; Rest  Response to Pain Intervention: Patient Satisfied       Orientation  Orientation  Overall Orientation Status: Within Functional Limits  Social/Functional History  Social/Functional History  Lives With: Alone  Additional Comments: CHART STATES WAS RECENTLY D/C FROM BEHAVIORAL HEALTH. INDEPENDENT WITH MOBILITY AT BASELINE. Cognition   Cognition  Cognition Comment: LETHARGIC BUT IMPROVES WITH PHYSICAL ACTIVITY.  NEEDS CUEING FOR MOST TASKS.     Objective     Observation/Palpation  Posture: Fair    AROM RLE (degrees)  RLE AROM: WFL  AROM LLE (degrees)  LLE AROM : WFL  Strength RLE  Comment: GROSSLY 4/5  Strength LLE  Comment: GROSSLY 4/5        Bed mobility  Supine to Sit: Minimal assistance  Sit to Supine: Minimal assistance  Transfers  Sit to Stand: Minimal Assistance  Stand to sit: Minimal Assistance  Bed to Chair: Minimal assistance  Ambulation 1  Device: Hand-Held Assist  Assistance: Minimal assistance  Quality of Gait: SLIGHTLY FLEXED POSTURE, SHAKY DUE TO BEING COLD  Gait Deviations: Slow Bridgette;Decreased step length  Distance: 10' X 2     Balance  Sitting - Dynamic: Good  Standing - Dynamic: [address]  Times per week: AT LEAST 5-6  Current Treatment Recommendations: Strengthening, Balance Training, Functional Mobility Training, Endurance Training, Transfer Training, Gait Training, Patient/Caregiver Education & Training, Safety Education & Training  Safety Devices  Type of devices: Bed alarm in place, Call light within reach    G-Code       OutComes Score                                                  AM-PAC Score             Goals  Short term goals  Time Frame for Short term goals: 14 DAYS  Short term goal 1: BED MOB INDEPENDENT  Short term goal 2: TRANSFERS SBA  Short term goal 3: ' AAD SBA       Therapy Time   Individual Concurrent Group Co-treatment   Time In           Time Out           Minutes                   Phil Rojas, PT

## 2021-08-26 ENCOUNTER — TRANSITIONAL CARE MANAGEMENT TELEPHONE ENCOUNTER (OUTPATIENT)
Dept: CALL CENTER | Facility: HOSPITAL | Age: 61
End: 2021-08-26

## 2021-08-26 LAB
ALBUMIN SERPL-MCNC: 3 G/DL (ref 3.5–5.2)
ALP BLD-CCNC: 45 U/L (ref 35–104)
ALT SERPL-CCNC: 11 U/L (ref 5–33)
ANION GAP SERPL CALCULATED.3IONS-SCNC: 14 MMOL/L (ref 7–19)
AST SERPL-CCNC: 17 U/L (ref 5–32)
BILIRUB SERPL-MCNC: <0.2 MG/DL (ref 0.2–1.2)
BUN BLDV-MCNC: 16 MG/DL (ref 8–23)
CALCIUM SERPL-MCNC: 9 MG/DL (ref 8.8–10.2)
CHLORIDE BLD-SCNC: 109 MMOL/L (ref 98–111)
CO2: 21 MMOL/L (ref 22–29)
CREAT SERPL-MCNC: 0.7 MG/DL (ref 0.5–0.9)
GFR AFRICAN AMERICAN: >59
GFR NON-AFRICAN AMERICAN: >60
GLUCOSE BLD-MCNC: 86 MG/DL (ref 74–109)
HCT VFR BLD CALC: 32.6 % (ref 37–47)
HEMOGLOBIN: 9.9 G/DL (ref 12–16)
MAGNESIUM: 1.6 MG/DL (ref 1.6–2.4)
MCH RBC QN AUTO: 29.8 PG (ref 27–31)
MCHC RBC AUTO-ENTMCNC: 30.4 G/DL (ref 33–37)
MCV RBC AUTO: 98.2 FL (ref 81–99)
PDW BLD-RTO: 13.4 % (ref 11.5–14.5)
PLATELET # BLD: 187 K/UL (ref 130–400)
PMV BLD AUTO: 12.1 FL (ref 9.4–12.3)
POTASSIUM REFLEX MAGNESIUM: 3.5 MMOL/L (ref 3.5–5)
RBC # BLD: 3.32 M/UL (ref 4.2–5.4)
SODIUM BLD-SCNC: 144 MMOL/L (ref 136–145)
TOTAL PROTEIN: 5.1 G/DL (ref 6.6–8.7)
URINE CULTURE, ROUTINE: NORMAL
WBC # BLD: 3.8 K/UL (ref 4.8–10.8)

## 2021-08-26 PROCEDURE — 97530 THERAPEUTIC ACTIVITIES: CPT

## 2021-08-26 PROCEDURE — 2500000003 HC RX 250 WO HCPCS: Performed by: INTERNAL MEDICINE

## 2021-08-26 PROCEDURE — 6370000000 HC RX 637 (ALT 250 FOR IP): Performed by: INTERNAL MEDICINE

## 2021-08-26 PROCEDURE — 85027 COMPLETE CBC AUTOMATED: CPT

## 2021-08-26 PROCEDURE — 36415 COLL VENOUS BLD VENIPUNCTURE: CPT

## 2021-08-26 PROCEDURE — 83735 ASSAY OF MAGNESIUM: CPT

## 2021-08-26 PROCEDURE — 97116 GAIT TRAINING THERAPY: CPT

## 2021-08-26 PROCEDURE — 97535 SELF CARE MNGMENT TRAINING: CPT

## 2021-08-26 PROCEDURE — 96372 THER/PROPH/DIAG INJ SC/IM: CPT

## 2021-08-26 PROCEDURE — 2580000003 HC RX 258: Performed by: INTERNAL MEDICINE

## 2021-08-26 PROCEDURE — 1210000000 HC MED SURG R&B

## 2021-08-26 PROCEDURE — 6360000002 HC RX W HCPCS: Performed by: INTERNAL MEDICINE

## 2021-08-26 PROCEDURE — 80053 COMPREHEN METABOLIC PANEL: CPT

## 2021-08-26 RX ADMIN — FAMOTIDINE 20 MG: 10 INJECTION, SOLUTION INTRAVENOUS at 20:25

## 2021-08-26 RX ADMIN — GABAPENTIN 300 MG: 300 CAPSULE ORAL at 12:33

## 2021-08-26 RX ADMIN — ENOXAPARIN SODIUM 40 MG: 40 INJECTION SUBCUTANEOUS at 09:44

## 2021-08-26 RX ADMIN — FAMOTIDINE 20 MG: 10 INJECTION, SOLUTION INTRAVENOUS at 09:44

## 2021-08-26 RX ADMIN — LEVOFLOXACIN 500 MG: 500 TABLET, FILM COATED ORAL at 09:44

## 2021-08-26 RX ADMIN — ACETAMINOPHEN 975 MG: 325 TABLET ORAL at 06:20

## 2021-08-26 RX ADMIN — MIRTAZAPINE 3.75 MG: 7.5 TABLET ORAL at 20:25

## 2021-08-26 RX ADMIN — GABAPENTIN 600 MG: 300 CAPSULE ORAL at 20:25

## 2021-08-26 RX ADMIN — Medication 5 MG: at 20:25

## 2021-08-26 RX ADMIN — SODIUM CHLORIDE, PRESERVATIVE FREE 10 ML: 5 INJECTION INTRAVENOUS at 20:25

## 2021-08-26 RX ADMIN — GABAPENTIN 300 MG: 300 CAPSULE ORAL at 06:20

## 2021-08-26 RX ADMIN — SODIUM CHLORIDE, PRESERVATIVE FREE 10 ML: 5 INJECTION INTRAVENOUS at 09:45

## 2021-08-26 RX ADMIN — ACETAMINOPHEN 975 MG: 325 TABLET ORAL at 15:10

## 2021-08-26 ASSESSMENT — PAIN DESCRIPTION - DESCRIPTORS
DESCRIPTORS: ACHING
DESCRIPTORS: ACHING

## 2021-08-26 ASSESSMENT — PAIN DESCRIPTION - PAIN TYPE
TYPE: CHRONIC PAIN
TYPE: CHRONIC PAIN

## 2021-08-26 ASSESSMENT — PAIN SCALES - GENERAL
PAINLEVEL_OUTOF10: 0
PAINLEVEL_OUTOF10: 3
PAINLEVEL_OUTOF10: 6
PAINLEVEL_OUTOF10: 6

## 2021-08-26 ASSESSMENT — PAIN DESCRIPTION - ORIENTATION: ORIENTATION: OTHER (COMMENT)

## 2021-08-26 ASSESSMENT — PAIN DESCRIPTION - FREQUENCY: FREQUENCY: CONTINUOUS

## 2021-08-26 ASSESSMENT — PAIN DESCRIPTION - LOCATION: LOCATION: ABDOMEN;BACK

## 2021-08-26 NOTE — CARE COORDINATION
Attempted to speak with Liz Sequeira re: POA paperwork and facility choices. No answer - unable to leave voicemail. Will call again later. Bull Espinoza returned call. She asked for more time to think about pt dc plans. SW asked her to please have a decision by tomorrow. hailey Espinoza stated she will fax POA paperwork today. Will follow up with her tomorrow.

## 2021-08-26 NOTE — PROGRESS NOTES
Physical Therapy  Taj   [id number]     08/26/21 0855   Subjective   Subjective Agrees to work with therapy. Bed Mobility   Supine to Sit Minimal assistance   Transfers   Sit to Stand Minimal Assistance   Stand to sit Minimal Assistance   Ambulation   Ambulation? Yes   Ambulation 1   Surface level tile   Device Hand-Held Assist   Assistance Minimal assistance   Distance 5'   Other Activities   Comment Worked with patient on bed mobility, sit to stand/stand to sit transfers. Patient positioned for comfort with all needs in reach and breakfast set up. Short term goals   Time Frame for Short term goals 14 DAYS   Short term goal 1 BED MOB INDEPENDENT   Short term goal 2 TRANSFERS SBA   Short term goal 3 ' AAD SBA   Activity Tolerance   Activity Tolerance Patient limited by fatigue;Patient limited by cognitive status   Safety Devices   Type of devices Call light within reach; Chair alarm in place; Left in chair   Electronically signed by Elias Sparks PTA on 8/26/2021 at 8:58 AM

## 2021-08-26 NOTE — PROGRESS NOTES
Physical Therapy  Name: Doreen Wadsworth  MRN:  [id number]  Date of service:  8/26/2021 08/26/21 1420   Restrictions/Precautions   Restrictions/Precautions Fall Risk   Position Activity Restriction   Other position/activity restrictions COLOSTOMY   Subjective   Subjective Pt agreed to therapy. Pain Screening   Patient Currently in Pain Denies   Bed Mobility   Sit to Supine Minimal assistance   Transfers   Sit to Stand Minimal Assistance   Stand to sit Minimal Assistance   Ambulation   Ambulation? Yes   Ambulation 1   Surface level tile   Device Hand-Held Assist   Assistance Minimal assistance   Gait Deviations Slow Renny;Decreased step length   Distance 15 ft   Short term goals   Time Frame for Short term goals 14 DAYS   Short term goal 1 BED MOB INDEPENDENT   Short term goal 2 TRANSFERS SBA   Short term goal 3 ' AAD SBA   Conditions Requiring Skilled Therapeutic Intervention   Body structures, Functions, Activity limitations Decreased functional mobility ; Decreased ADL status; Decreased cognition;Decreased balance; Increased pain   Assessment Pt able to tolerate amb to bathroom with min assist, no LOB but slow renny. Pt back to bed with call light in reach and bed alarm on. Activity Tolerance   Activity Tolerance Patient limited by fatigue;Patient limited by cognitive status   Safety Devices   Type of devices Left in bed;Call light within reach; Bed alarm in place         Electronically signed by Minal Dunham PTA on 8/26/2021 at 2:27 PM

## 2021-08-26 NOTE — PROGRESS NOTES
Occupational Therapy  Facility/Department: [facility] SURG SERVICES  Daily Treatment Note  NAME: Garen Gilford  : 1960  MRN: [id number]    Date of Service: 2021    Discharge Recommendations:  Patient would benefit from continued therapy after discharge       Assessment   Assessment: Treatment of bed mobility, sitting EOB, self feeding with RUE. Treatment Diagnosis: Altered Mental Status, Spinal Stenosis, Dysthymia  Decision Making: Medium Complexity  REQUIRES OT FOLLOW UP: Yes  Activity Tolerance  Activity Tolerance: Pt has flat affect and is fatigued quickly. Patient Diagnosis(es): The primary encounter diagnosis was Depression, unspecified depression type. A diagnosis of Chronic pain syndrome was also pertinent to this visit. has a past medical history of Alcohol abuse, Anal cancer ([facility] [address] Anxiety, Arthritis, Calcification of abdominal aorta ([facility] [address] Chronic active hepatitis C ([facility] [address] - Genotype 1A, s/p Harvoni  with remission, Chronic back pain, Colon polyp, COPD (chronic obstructive pulmonary disease) ([facility] [address] Decrease in appetite, Depression, Diarrhea, GERD (gastroesophageal reflux disease), H/O: GI bleed, Herpes simplex, History of blood transfusion, Hx of chronic active hepatitis, Hypertension, Irregular heart beat, Kidney stones, Memory loss, Mitral valve disease, Neuropathy, Osteoarthritis, Other malaise and fatigue, Pancreatitis, Prediabetes, Recurrent UTI, Restless leg, SOB (shortness of breath), Status post placement of implantable loop recorder, and Weight loss. has a past surgical history that includes Hysterectomy; Abdomen surgery; Breast surgery; Kidney stone surgery (2012); Finger surgery; Toe Surgery; lumbar laminectomy (2011); Foot surgery (10/2011); back surgery (); Colonoscopy (2008); Colonoscopy (2012); Lithotripsy; Cardiac catheterization; Tonsillectomy; Cholecystectomy; Upper gastrointestinal endoscopy (2008);  Upper gastrointestinal endoscopy (08/19/2008); Upper gastrointestinal endoscopy (10/24/2013); Cosmetic surgery; Breast reduction surgery (Bilateral); Endoscopy, colon, diagnostic; skin biopsy; Insertable Cardiac Monitor; Ureter surgery (Left, 10/05/2016); Lithotripsy (Left, 10/05/2016); Cystoscopy (Left, 10/05/2016); pr cysto/uretero/pyeloscopy w/lithotripsy (Right, 06/21/2018); pr cystoscopy,insert ureteral stent (Right, 06/21/2018); Kidney surgery; Cystoscopy (Left, 02/20/2019); Cystoscopy (Left, 02/20/2019); Colonoscopy (03/12/2015); and liver biopsy (08/13/2008). Restrictions  Restrictions/Precautions  Restrictions/Precautions: Fall Risk  Position Activity Restriction  Other position/activity restrictions: COLOSTOMY  Subjective   General  Chart Reviewed: Yes  Patient assessed for rehabilitation services?: Yes  Additional Pertinent Hx: Psychosis, spinal stenosis, AMS, hx of colostomy  Family / Caregiver Present: No  Referring Practitioner: Brenda Navarrete DO  Diagnosis: Altered mental status  General Comment  Comments: Pt has a delay to answering questions. Pain Assessment  Pain Assessment: Faces  Pain Level: 6  Pain Type: Chronic pain  Pain Orientation: Other (Comment)  Pain Radiating Towards: Pt reported pain all over in her legs, and back. Pain Descriptors: Aching  Pain Frequency: Continuous  Non-Pharmaceutical Pain Intervention(s): Repositioned  Response to Pain Intervention: Patient Satisfied  Pre Treatment Pain Screening  Comments / Details: Pt fatigued with transfers. Vital Signs  Patient Currently in Pain: Yes   Orientation: Person      Objective    Bed mobility  Supine to Sit: Minimal assistance  Sit to Supine: Minimal assistance  Transfers  Sit to stand: Contact guard assistance  Stand to sit: Contact guard assistance  Cognition  Overall Cognitive Status: Exceptions  Following Commands:  Follows one step commands with repetition  Cognition Comment: Awakens to participate in therapy, requires structure and encouragement, lethargic, flat affect, occasional verbal perseveration    Treatment: (therapeutic activity for 30 minutes. Pt requires extra time to answer questions and to get moving. Bed mobility, and functional transfers, self feeding and setup.)     Plan   Plan  Times per week: 3-5  Times per day: Daily  Plan weeks: 2  Plan Comment: OT To treat for weakness, fatigue, LUE limited function and ADLS.     Goals  Short term goals  Time Frame for Short term goals: 1-2 weeks  Short term goal 1: Supervision for dressing  Short term goal 2: Supervision for toileting  Short term goal 3: Supervision for transfers  Short term goal 4: Supervision for light ambulatory home management  Long term goals  Long term goal 1: Upgrade as tolerated       Therapy Time   Individual Concurrent Group Co-treatment   Time In           Time Out           Minutes                   Abena Bloom OT Electronically signed by NIRMALA Lawson MOT, OTR/L on 8/26/2021 at 9:07 AM

## 2021-08-26 NOTE — PROGRESS NOTES
Hospitalist Progress Note  8/26/2021 3:44 PM  Subjective:   Admit Date: 8/24/2021  PCP: Mo Caban MD    Chief Complaint: ambulatory dysfunction    Subjective: Patient states she is feeling well, denied pain or other complaints. POA contacted by social work, inquired why she is not on Percocet. Still has not decided on potential placement. Patient is without complaints. History is otherwise unchanged. [facility] History:   8-25: Brought to ED after the patient was discharged from behavioral health unit with depression and anxiety. Patient taken off medications during that hospitalization and felt to be stable, but claimed she was unable to ambulate upon returning home. Family summoned EMS but then could not be contacted by ED staff. Admitted to med/surg for placement as behavioral health refused the patient. Cognitive evaluation ordered showing severe deficits without capacity to make medical decisions. 8-26: Patient is without complaints but remains deconditioned with PT. Seeking placement but POA has not yet decided on places to seek placement. ROS: 14 point review of systems is negative except as specifically addressed above.     Adult Oral Nutrition Supplement; Standard High Calorie/High Protein Oral Supplement  ADULT DIET; Easy to Chew    Intake/Output Summary (Last 24 hours) at 8/26/2021 1544  Last data filed at 8/26/2021 1356  Gross per 24 hour   Intake 480 ml   Output --   Net 480 ml     Medications:   sodium chloride       Current Facility-Administered Medications   Medication Dose Route Frequency Provider Last Rate Last Admin    acetaminophen (TYLENOL) tablet 975 mg  975 mg Oral 3 times per day Patricia Chirinos MD   975 mg at 08/26/21 1510    dicyclomine (BENTYL) tablet 20 mg  20 mg Oral 4x Daily PRN Patricia Chirinos MD   20 mg at 08/25/21 1545    melatonin disintegrating tablet 5 mg  5 mg Oral Nightly Patricia Chirinos MD   5 mg at 08/25/21 1894    08/24/21 2008 08/26/21  0358   WBC 6.0 3.8*   RBC 3.60* 3.32*   HGB 10.8* 9.9*   HCT 34.7* 32.6*   MCV 96.4 98.2   MCH 30.0 29.8   MCHC 31.1* 30.4*    187     Recent Labs     08/24/21 2008 08/26/21  0358    144   K 4.4 3.5   ANIONGAP 13 14    109   CO2 21* 21*   BUN 23 16   CREATININE 0.6 0.7   GLUCOSE 96 86   CALCIUM 9.2 9.0     Recent Labs     08/26/21  0358   MG 1.6     Recent Labs     08/24/21 2008 08/26/21  0358   AST 24 17   ALT 15 11   BILITOT <0.2 <0.2   ALKPHOS 54 45     ABGs:No results for input(s): PH, PO2, PCO2, HCO3, BE, O2SAT in the last 72 hours. Troponin T: No results for input(s): TROPONINI in the last 72 hours. INR: No results for input(s): INR in the last 72 hours. Lactic Acid: No results for input(s): LACTA in the last 72 hours.     Objective:   Vitals: /61   Pulse 75   Temp 96.3 °F (35.7 °C) (Temporal)   Resp 18   Ht 5' 3\" (1.6 m)   Wt 173 lb 3.2 oz (78.6 kg)   SpO2 99%   BMI 30.68 kg/m²   24HR INTAKE/OUTPUT:      Intake/Output Summary (Last 24 hours) at 8/26/2021 1544  Last data filed at 8/26/2021 1356  Gross per 24 hour   Intake 480 ml   Output --   Net 480 ml     General appearance: alert and cooperative with exam  HEENT: atraumatic, eyes with clear conjunctiva and normal lids, pupils and irises normal, external ears and nose are normal, lips normal  Neck without masses, lympadenopathy, bruit, thyroid normal  Lungs: no increased work of breathing, \"clear to auscultation bilaterally\" without rales, rhonchi or wheezes  Heart: regular rate and rhythm, S1, S2 normal, no murmur, click, rub or gallop  Abdomen: soft, non-tender; bowel sounds normal; no masses,  no organomegaly  Extremities: extremities normal, atraumatic, no cyanosis or edema  Neurologic: no focal neurologic deficits, normal sensation, alert and oriented to place and time, affect and mood appropriate  Skin: no rashes, nodules    Assessment and Plan:   Principal Problem:    Altered mental status  Active Problems:    Displacement of lumbar intervertebral disc without myelopathy    Spinal stenosis, lumbar region, without neurogenic claudication    Incontinence of bowel    Dysthymia    Status post placement of implantable loop recorder    Left ureteral calculus    Hypertension    Family history of coronary artery disease    Opioid dependence with opioid-induced disorder ([facility] [address]    Chronic active hepatitis C ([facility] [address] - Genotype 1A, s/p Harvoni 2015 with remission    Right ureteral calculus    Simple chronic bronchitis (HCC)    Obesity (BMI 30.0-34. 9)    Anal cancer ([address]    Poor venous access    Dehydration    Major depressive disorder, recurrent severe without psychotic features ([address]  Resolved Problems:    * No resolved hospital problems. *      Discontinue levofloxacin. See no indication for antibiotic therapy and patient has a history of Clostridioides difficile. Seeking placement, awaiting word from POA. Cognitive evaluation shows patient cannot make own healthcare decisions.     Advance Directive: Full Code    DVT prophylaxis: enoxaparin    Discharge planning: TBDO Sherif Fung Hospitalist

## 2021-08-26 NOTE — OUTREACH NOTE
Call Center TCM Note      Responses   [facility] patient discharged from?  Non-BH   Does the patient have one of the following disease processes/diagnoses(primary or secondary)?  Other   TCM attempt successful?  No   Unsuccessful attempts  Attempt 3   Wrap up additional comments  Unable to reach pt x 3 attempts for TCM call. Pt is sched for TCM FWP with PCP Dr Weaver tomorrow 08/27/2021.          Anamaria Nieves MA    8/26/2021, 14:37 CDT

## 2021-08-27 LAB
ANION GAP SERPL CALCULATED.3IONS-SCNC: 10 MMOL/L (ref 7–19)
BUN BLDV-MCNC: 14 MG/DL (ref 8–23)
CALCIUM SERPL-MCNC: 8.3 MG/DL (ref 8.8–10.2)
CHLORIDE BLD-SCNC: 106 MMOL/L (ref 98–111)
CO2: 24 MMOL/L (ref 22–29)
CREAT SERPL-MCNC: 0.6 MG/DL (ref 0.5–0.9)
GFR AFRICAN AMERICAN: >59
GFR NON-AFRICAN AMERICAN: >60
GLUCOSE BLD-MCNC: 112 MG/DL (ref 74–109)
HCT VFR BLD CALC: 31.6 % (ref 37–47)
HEMOGLOBIN: 9.6 G/DL (ref 12–16)
MAGNESIUM: 1.5 MG/DL (ref 1.6–2.4)
MCH RBC QN AUTO: 29.8 PG (ref 27–31)
MCHC RBC AUTO-ENTMCNC: 30.4 G/DL (ref 33–37)
MCV RBC AUTO: 98.1 FL (ref 81–99)
PDW BLD-RTO: 13.3 % (ref 11.5–14.5)
PLATELET # BLD: 179 K/UL (ref 130–400)
PMV BLD AUTO: 12.1 FL (ref 9.4–12.3)
POTASSIUM REFLEX MAGNESIUM: 3.5 MMOL/L (ref 3.5–5)
RBC # BLD: 3.22 M/UL (ref 4.2–5.4)
SODIUM BLD-SCNC: 140 MMOL/L (ref 136–145)
WBC # BLD: 4.2 K/UL (ref 4.8–10.8)

## 2021-08-27 PROCEDURE — 80048 BASIC METABOLIC PNL TOTAL CA: CPT

## 2021-08-27 PROCEDURE — 6370000000 HC RX 637 (ALT 250 FOR IP): Performed by: INTERNAL MEDICINE

## 2021-08-27 PROCEDURE — 6370000000 HC RX 637 (ALT 250 FOR IP): Performed by: NURSE PRACTITIONER

## 2021-08-27 PROCEDURE — 36415 COLL VENOUS BLD VENIPUNCTURE: CPT

## 2021-08-27 PROCEDURE — 2580000003 HC RX 258: Performed by: INTERNAL MEDICINE

## 2021-08-27 PROCEDURE — 1210000000 HC MED SURG R&B

## 2021-08-27 PROCEDURE — 85027 COMPLETE CBC AUTOMATED: CPT

## 2021-08-27 PROCEDURE — 96372 THER/PROPH/DIAG INJ SC/IM: CPT

## 2021-08-27 PROCEDURE — 6360000002 HC RX W HCPCS: Performed by: INTERNAL MEDICINE

## 2021-08-27 PROCEDURE — 83735 ASSAY OF MAGNESIUM: CPT

## 2021-08-27 RX ORDER — FAMOTIDINE 20 MG/1
20 TABLET, FILM COATED ORAL 2 TIMES DAILY
Status: DISCONTINUED | OUTPATIENT
Start: 2021-08-27 | End: 2021-09-03 | Stop reason: HOSPADM

## 2021-08-27 RX ADMIN — ACETAMINOPHEN 975 MG: 325 TABLET ORAL at 12:48

## 2021-08-27 RX ADMIN — ENOXAPARIN SODIUM 40 MG: 40 INJECTION SUBCUTANEOUS at 10:46

## 2021-08-27 RX ADMIN — ACETAMINOPHEN 975 MG: 325 TABLET ORAL at 06:19

## 2021-08-27 RX ADMIN — SODIUM CHLORIDE, PRESERVATIVE FREE 10 ML: 5 INJECTION INTRAVENOUS at 21:52

## 2021-08-27 RX ADMIN — Medication 5 MG: at 21:52

## 2021-08-27 RX ADMIN — ACETAMINOPHEN 975 MG: 325 TABLET ORAL at 21:51

## 2021-08-27 RX ADMIN — GABAPENTIN 300 MG: 300 CAPSULE ORAL at 06:20

## 2021-08-27 RX ADMIN — SODIUM CHLORIDE, PRESERVATIVE FREE 10 ML: 5 INJECTION INTRAVENOUS at 10:46

## 2021-08-27 RX ADMIN — GABAPENTIN 300 MG: 300 CAPSULE ORAL at 12:48

## 2021-08-27 RX ADMIN — GABAPENTIN 600 MG: 300 CAPSULE ORAL at 21:51

## 2021-08-27 RX ADMIN — FAMOTIDINE 20 MG: 20 TABLET ORAL at 10:46

## 2021-08-27 RX ADMIN — FAMOTIDINE 20 MG: 20 TABLET ORAL at 21:52

## 2021-08-27 RX ADMIN — MIRTAZAPINE 3.75 MG: 7.5 TABLET ORAL at 21:51

## 2021-08-27 ASSESSMENT — PAIN DESCRIPTION - PAIN TYPE
TYPE: CHRONIC PAIN
TYPE: ACUTE PAIN

## 2021-08-27 ASSESSMENT — PAIN DESCRIPTION - LOCATION
LOCATION: ABDOMEN;BACK
LOCATION: HEAD

## 2021-08-27 ASSESSMENT — PAIN SCALES - GENERAL
PAINLEVEL_OUTOF10: 7
PAINLEVEL_OUTOF10: 5

## 2021-08-27 ASSESSMENT — PAIN DESCRIPTION - DESCRIPTORS: DESCRIPTORS: ACHING

## 2021-08-27 ASSESSMENT — PAIN DESCRIPTION - FREQUENCY: FREQUENCY: CONTINUOUS

## 2021-08-27 ASSESSMENT — PAIN - FUNCTIONAL ASSESSMENT: PAIN_FUNCTIONAL_ASSESSMENT: PREVENTS OR INTERFERES SOME ACTIVE ACTIVITIES AND ADLS

## 2021-08-27 NOTE — PROGRESS NOTES
Occupational Therapy  Pt refusing therapy at this time due to ostomy bag being full and possibly needing to be changed. Pt also complaining of not being able to see due to not having her glasses. Nursing notified.  Electronically signed by JUSTEN Perez on 8/27/2021 at 2:03 PM

## 2021-08-27 NOTE — PROGRESS NOTES
Physical Therapy  [facility]  [id number]     08/27/21 0805   Subjective   Subjective Attempt, patient declined OOB at this time. Patient requested to stay in bed. All needs in reach.    Electronically signed by Blaine Robbins PTA on 8/27/2021 at 8:06 AM

## 2021-08-27 NOTE — PROGRESS NOTES
Pharmacy Intravenous to Oral Protocol    Medication changed per [address] IV to PO protocol: Pepcid IV 20mg BID chaned to PO    Patient meets the following inclusion criteria and none of the exclusion criteria:    Inclusion criteria:  - IV therapy > 24 hours (antibiotics only)  - Tolerating diet more advanced than clear liquids  - Tolerating PO medications  - No vasopressor blood pressure support (ie no signs of shock)  - Patient hasn't had a seizure for 72 hrs (antiepileptic medications only)    Exclusion criteria:  - Infections requiring IV therapy (ie meningitis, endocarditis, osteomyelitis, pancreatitis)   - Nausea and/or vomiting or severe diarrhea within past 24 hours   - Has gastrectomy, ileus, gastric outlet or bowel obstruction, or malabsorption syndromes   - Has significant painful oral ulceration   - TPN with an NPO order   - Active GI bleed   - Unable to swallow   - NPO   - Febrile in the last 24 hours (antibiotics only)   - Clinical deteriorating or unstable (antibiotics only)   - Pediatric patients and patients who are not euthyroid (not on oral levothyroxine/not stabilized on oral levothyroxine)- Levothyroxine only     Electronically signed by Ryne Milian, [address] [place] [address] on 8/27/2021 at 1:55 AM

## 2021-08-27 NOTE — PROGRESS NOTES
Patient angry and yelling stating that she has not received neurontin since admission. Explained to patient that she received doses throughout the day. Patient states that the records are incorrect and she wants to speak to a  about it. 9pm dose of neurontin administered to patient and after swallowing patient stated that she did not receive dose of neurontin and needs for me to go get it right now. Patient still angry and said to get out of her room.

## 2021-08-27 NOTE — PROGRESS NOTES
Hospitalist Progress Note  8/27/2021 4:35 PM  Subjective:   Admit Date: 8/24/2021  PCP: Mary Garcia MD    Chief Complaint: ambulatory dysfunction    Subjective: Patient is without complaints this morning. Contacted POA this morning, who stated she would have to discuss with multiple people regarding placement options. History is otherwise unchanged. Cumulative Hospital History:   8-25: Brought to ED after the patient was discharged from behavioral health unit with depression and anxiety. Patient taken off medications during that hospitalization and felt to be stable, but claimed she was unable to ambulate upon returning home. Family summoned EMS but then could not be contacted by ED staff. Admitted to med/surg for placement as behavioral health refused the patient. Cognitive evaluation ordered showing severe deficits without capacity to make medical decisions. 8-26: Patient is without complaints but remains deconditioned with PT. Seeking placement but POA has not yet decided on places to seek placement. 8-27: Still pending placement. POA did not return a list of SNF options which would be acceptable. Patient is stable without complaints. Not receiving pain medication as she has not been complaining of pain. ROS: 14 point review of systems is negative except as specifically addressed above.     Adult Oral Nutrition Supplement; Standard High Calorie/High Protein Oral Supplement  ADULT DIET; Easy to Chew    Intake/Output Summary (Last 24 hours) at 8/27/2021 1635  Last data filed at 8/27/2021 1347  Gross per 24 hour   Intake 720 ml   Output 300 ml   Net 420 ml     Medications:   sodium chloride       Current Facility-Administered Medications   Medication Dose Route Frequency Provider Last Rate Last Admin    famotidine (PEPCID) tablet 20 mg  20 mg Oral BID AMARIS Welsh CNP   20 mg at 08/27/21 1046    acetaminophen (TYLENOL) tablet 975 mg  975 mg Oral 3 times per day Fiona Elizabeth MD 975 mg at 08/27/21 1248    dicyclomine (BENTYL) tablet 20 mg  20 mg Oral 4x Daily PRN Kym Martinez MD   20 mg at 08/25/21 1545    melatonin disintegrating tablet 5 mg  5 mg Oral Nightly Kym Martinez MD   5 mg at 08/26/21 2025    mirtazapine (REMERON) tablet 3.75 mg  3.75 mg Oral Nightly Kym Martinez MD   3.75 mg at 08/26/21 2025    oxyCODONE-acetaminophen (PERCOCET) 5-325 MG per tablet 1 tablet  1 tablet Oral Q8H PRN Kym Martinez MD        sodium chloride flush 0.9 % injection 5-40 mL  5-40 mL IntraVENous 2 times per day Kym Martinez MD   10 mL at 08/27/21 1046    sodium chloride flush 0.9 % injection 5-40 mL  5-40 mL IntraVENous PRN Kym Martinez MD        0.9 % sodium chloride infusion  25 mL IntraVENous YAA Martinez MD        enoxaparin (LOVENOX) injection 40 mg  40 mg Subcutaneous Daily Kym Martinez MD   40 mg at 08/27/21 1046    ondansetron (ZOFRAN-ODT) disintegrating tablet 4 mg  4 mg Oral Q8H PRN Kym Martinez MD        Or    ondansetron (ZOFRAN) injection 4 mg  4 mg IntraVENous Q6H PRN Kym Martinez MD   4 mg at 08/25/21 2154    polyethylene glycol (GLYCOLAX) packet 17 g  17 g Oral Daily PRN Kym Martinez MD        acetaminophen (TYLENOL) tablet 650 mg  650 mg Oral Q6H PRN Kym Martinez MD        Or    acetaminophen (TYLENOL) suppository 650 mg  650 mg Rectal Q6H PRN Kym Martinez MD        nicotine (NICODERM CQ) 21 MG/24HR 1 patch  1 patch Transdermal Daily Kym Martinez MD   1 patch at 08/25/21 0912    albuterol (PROVENTIL) nebulizer solution 2.5 mg  2.5 mg Nebulization Q6H PRN Kym Martinez MD        gabapentin (NEURONTIN) capsule 300 mg  300 mg Oral BID Kym Martinez MD   300 mg at 08/27/21 1248    gabapentin (NEURONTIN) capsule 600 mg  600 mg Oral Nightly Kym Martinez MD   600 mg at 08/26/21 2025 Labs:     Recent Labs     08/24/21 2008 08/26/21 0358 08/27/21  0410   WBC 6.0 3.8* 4.2*   RBC 3.60* 3.32* 3.22*   HGB 10.8* 9.9* 9.6*   HCT 34.7* 32.6* 31.6*   MCV 96.4 98.2 98.1   MCH 30.0 29.8 29.8   MCHC 31.1* 30.4* 30.4*    187 179     Recent Labs     08/24/21 2008 08/26/21 0358 08/27/21  0410    144 140   K 4.4 3.5 3.5   ANIONGAP 13 14 10    109 106   CO2 21* 21* 24   BUN 23 16 14   CREATININE 0.6 0.7 0.6   GLUCOSE 96 86 112*   CALCIUM 9.2 9.0 8.3*     Recent Labs     08/26/21 0358 08/27/21  0410   MG 1.6 1.5*     Recent Labs     08/24/21 2008 08/26/21  0358   AST 24 17   ALT 15 11   BILITOT <0.2 <0.2   ALKPHOS 54 45     ABGs:No results for input(s): PH, PO2, PCO2, HCO3, BE, O2SAT in the last 72 hours. Troponin T: No results for input(s): TROPONINI in the last 72 hours. INR: No results for input(s): INR in the last 72 hours. Lactic Acid: No results for input(s): LACTA in the last 72 hours.     Objective:   Vitals: BP 97/60   Pulse 62   Temp 96.6 °F (35.9 °C) (Temporal)   Resp 16   Ht 5' 3\" (1.6 m)   Wt 173 lb 3.2 oz (78.6 kg)   SpO2 96%   BMI 30.68 kg/m²   24HR INTAKE/OUTPUT:      Intake/Output Summary (Last 24 hours) at 8/27/2021 1635  Last data filed at 8/27/2021 1347  Gross per 24 hour   Intake 720 ml   Output 300 ml   Net 420 ml     General appearance: alert and cooperative with exam  HEENT: atraumatic, eyes with clear conjunctiva and normal lids, pupils and irises normal, external ears and nose are normal, lips normal  Neck without masses, lympadenopathy, bruit, thyroid normal  Lungs: no increased work of breathing, \"clear to auscultation bilaterally\" without rales, rhonchi or wheezes  Heart: regular rate and rhythm, S1, S2 normal, no murmur, click, rub or gallop  Abdomen: soft, non-tender; bowel sounds normal; no masses,  no organomegaly  Extremities: extremities normal, atraumatic, no cyanosis or edema  Neurologic: no focal neurologic deficits, normal sensation, alert and oriented to place and time, affect and mood appropriate  Skin: no rashes, nodules    Assessment and Plan:   Principal Problem:    Altered mental status  Active Problems:    Displacement of lumbar intervertebral disc without myelopathy    Spinal stenosis, lumbar region, without neurogenic claudication    Incontinence of bowel    Dysthymia    Status post placement of implantable loop recorder    Left ureteral calculus    Hypertension    Family history of coronary artery disease    Opioid dependence with opioid-induced disorder ([facility]    Chronic active hepatitis C ([facility] - Genotype 1A, s/p Harvoni 2015 with remission    Right ureteral calculus    Simple chronic bronchitis (HCC)    Obesity (BMI 30.0-34. 9)    Anal cancer ([address]    Poor venous access    Dehydration    Major depressive disorder, recurrent severe without psychotic features ([address]  Resolved Problems:    * No resolved hospital problems. *      Medically stable on current medical regimen. Seeking placement, awaiting word from POA. Cognitive evaluation shows patient cannot make own healthcare decisions.     Advance Directive: Full Code    DVT prophylaxis: enoxaparin    Discharge planning: DO Sherif Malik Hospitalist

## 2021-08-28 LAB
ANION GAP SERPL CALCULATED.3IONS-SCNC: 9 MMOL/L (ref 7–19)
BUN BLDV-MCNC: 12 MG/DL (ref 8–23)
CALCIUM SERPL-MCNC: 8.7 MG/DL (ref 8.8–10.2)
CHLORIDE BLD-SCNC: 110 MMOL/L (ref 98–111)
CO2: 22 MMOL/L (ref 22–29)
CREAT SERPL-MCNC: 0.5 MG/DL (ref 0.5–0.9)
GFR AFRICAN AMERICAN: >59
GFR NON-AFRICAN AMERICAN: >60
GLUCOSE BLD-MCNC: 108 MG/DL (ref 74–109)
HCT VFR BLD CALC: 31.9 % (ref 37–47)
HEMOGLOBIN: 9.5 G/DL (ref 12–16)
MCH RBC QN AUTO: 29.3 PG (ref 27–31)
MCHC RBC AUTO-ENTMCNC: 29.8 G/DL (ref 33–37)
MCV RBC AUTO: 98.5 FL (ref 81–99)
PDW BLD-RTO: 13.3 % (ref 11.5–14.5)
PLATELET # BLD: 188 K/UL (ref 130–400)
PMV BLD AUTO: 11.2 FL (ref 9.4–12.3)
POTASSIUM REFLEX MAGNESIUM: 3.6 MMOL/L (ref 3.5–5)
RBC # BLD: 3.24 M/UL (ref 4.2–5.4)
SODIUM BLD-SCNC: 141 MMOL/L (ref 136–145)
WBC # BLD: 3.6 K/UL (ref 4.8–10.8)

## 2021-08-28 PROCEDURE — 96372 THER/PROPH/DIAG INJ SC/IM: CPT

## 2021-08-28 PROCEDURE — 6370000000 HC RX 637 (ALT 250 FOR IP): Performed by: INTERNAL MEDICINE

## 2021-08-28 PROCEDURE — 2580000003 HC RX 258: Performed by: INTERNAL MEDICINE

## 2021-08-28 PROCEDURE — 1210000000 HC MED SURG R&B

## 2021-08-28 PROCEDURE — 80048 BASIC METABOLIC PNL TOTAL CA: CPT

## 2021-08-28 PROCEDURE — 6360000002 HC RX W HCPCS: Performed by: INTERNAL MEDICINE

## 2021-08-28 PROCEDURE — 85027 COMPLETE CBC AUTOMATED: CPT

## 2021-08-28 PROCEDURE — 36415 COLL VENOUS BLD VENIPUNCTURE: CPT

## 2021-08-28 PROCEDURE — 6370000000 HC RX 637 (ALT 250 FOR IP): Performed by: NURSE PRACTITIONER

## 2021-08-28 RX ADMIN — OXYCODONE HYDROCHLORIDE AND ACETAMINOPHEN 1 TABLET: 5; 325 TABLET ORAL at 18:30

## 2021-08-28 RX ADMIN — GABAPENTIN 300 MG: 300 CAPSULE ORAL at 15:58

## 2021-08-28 RX ADMIN — GABAPENTIN 300 MG: 300 CAPSULE ORAL at 06:13

## 2021-08-28 RX ADMIN — ACETAMINOPHEN 975 MG: 325 TABLET ORAL at 06:12

## 2021-08-28 RX ADMIN — SODIUM CHLORIDE, PRESERVATIVE FREE 10 ML: 5 INJECTION INTRAVENOUS at 08:13

## 2021-08-28 RX ADMIN — MIRTAZAPINE 3.75 MG: 7.5 TABLET ORAL at 20:43

## 2021-08-28 RX ADMIN — OXYCODONE HYDROCHLORIDE AND ACETAMINOPHEN 1 TABLET: 5; 325 TABLET ORAL at 09:46

## 2021-08-28 RX ADMIN — Medication 5 MG: at 20:43

## 2021-08-28 RX ADMIN — FAMOTIDINE 20 MG: 20 TABLET ORAL at 20:43

## 2021-08-28 RX ADMIN — ACETAMINOPHEN 975 MG: 325 TABLET ORAL at 15:59

## 2021-08-28 RX ADMIN — SODIUM CHLORIDE, PRESERVATIVE FREE 10 ML: 5 INJECTION INTRAVENOUS at 20:45

## 2021-08-28 RX ADMIN — ENOXAPARIN SODIUM 40 MG: 40 INJECTION SUBCUTANEOUS at 08:13

## 2021-08-28 RX ADMIN — FAMOTIDINE 20 MG: 20 TABLET ORAL at 08:13

## 2021-08-28 RX ADMIN — GABAPENTIN 600 MG: 300 CAPSULE ORAL at 20:44

## 2021-08-28 ASSESSMENT — PAIN SCALES - GENERAL
PAINLEVEL_OUTOF10: 0
PAINLEVEL_OUTOF10: 5
PAINLEVEL_OUTOF10: 10
PAINLEVEL_OUTOF10: 0
PAINLEVEL_OUTOF10: 6
PAINLEVEL_OUTOF10: 6
PAINLEVEL_OUTOF10: 10

## 2021-08-28 ASSESSMENT — PAIN DESCRIPTION - LOCATION: LOCATION: BACK

## 2021-08-28 ASSESSMENT — PAIN DESCRIPTION - PAIN TYPE: TYPE: ACUTE PAIN

## 2021-08-28 NOTE — PROGRESS NOTES
Physical Therapy  Name: Arslan Contreras  MRN:  [id number]  Date of service:  8/28/2021 08/28/21 1308   General   Missed reason Patient declined   Subjective   Subjective Attempt: Pt stated she was hungry but did not like her food, declined mobility at this time.          Electronically signed by Oj Woods PTA on 8/28/2021 at 1:10 PM

## 2021-08-28 NOTE — PROGRESS NOTES
Physical Therapy  Name: Lory Ochoa  MRN:  [id number]  Date of service:  8/28/2021 08/28/21 0807   General   Missed reason Patient declined   Subjective   Subjective Attempt: pt declined to get up to chair, stating she just had blood drawn and doesn't feel good. Will check back later.          Electronically signed by Hector Archer PTA on 8/28/2021 at 8:08 AM

## 2021-08-29 LAB
ANION GAP SERPL CALCULATED.3IONS-SCNC: 10 MMOL/L (ref 7–19)
BUN BLDV-MCNC: 13 MG/DL (ref 8–23)
CALCIUM SERPL-MCNC: 8.7 MG/DL (ref 8.8–10.2)
CHLORIDE BLD-SCNC: 106 MMOL/L (ref 98–111)
CO2: 25 MMOL/L (ref 22–29)
CREAT SERPL-MCNC: 0.5 MG/DL (ref 0.5–0.9)
GFR AFRICAN AMERICAN: >59
GFR NON-AFRICAN AMERICAN: >60
GLUCOSE BLD-MCNC: 124 MG/DL (ref 74–109)
HCT VFR BLD CALC: 31.5 % (ref 37–47)
HEMOGLOBIN: 9.4 G/DL (ref 12–16)
MAGNESIUM: 1.5 MG/DL (ref 1.6–2.4)
MCH RBC QN AUTO: 29.6 PG (ref 27–31)
MCHC RBC AUTO-ENTMCNC: 29.8 G/DL (ref 33–37)
MCV RBC AUTO: 99.1 FL (ref 81–99)
PDW BLD-RTO: 13.3 % (ref 11.5–14.5)
PLATELET # BLD: 186 K/UL (ref 130–400)
PMV BLD AUTO: 11.6 FL (ref 9.4–12.3)
POTASSIUM REFLEX MAGNESIUM: 3.5 MMOL/L (ref 3.5–5)
RBC # BLD: 3.18 M/UL (ref 4.2–5.4)
SODIUM BLD-SCNC: 141 MMOL/L (ref 136–145)
WBC # BLD: 3.9 K/UL (ref 4.8–10.8)

## 2021-08-29 PROCEDURE — 6370000000 HC RX 637 (ALT 250 FOR IP): Performed by: INTERNAL MEDICINE

## 2021-08-29 PROCEDURE — 1210000000 HC MED SURG R&B

## 2021-08-29 PROCEDURE — 6360000002 HC RX W HCPCS: Performed by: INTERNAL MEDICINE

## 2021-08-29 PROCEDURE — 6370000000 HC RX 637 (ALT 250 FOR IP): Performed by: NURSE PRACTITIONER

## 2021-08-29 PROCEDURE — 36415 COLL VENOUS BLD VENIPUNCTURE: CPT

## 2021-08-29 PROCEDURE — 2580000003 HC RX 258: Performed by: INTERNAL MEDICINE

## 2021-08-29 PROCEDURE — 85027 COMPLETE CBC AUTOMATED: CPT

## 2021-08-29 PROCEDURE — 83735 ASSAY OF MAGNESIUM: CPT

## 2021-08-29 PROCEDURE — 80048 BASIC METABOLIC PNL TOTAL CA: CPT

## 2021-08-29 PROCEDURE — 96372 THER/PROPH/DIAG INJ SC/IM: CPT

## 2021-08-29 RX ADMIN — GABAPENTIN 300 MG: 300 CAPSULE ORAL at 04:08

## 2021-08-29 RX ADMIN — FAMOTIDINE 20 MG: 20 TABLET ORAL at 20:52

## 2021-08-29 RX ADMIN — MIRTAZAPINE 3.75 MG: 7.5 TABLET ORAL at 20:52

## 2021-08-29 RX ADMIN — OXYCODONE HYDROCHLORIDE AND ACETAMINOPHEN 1 TABLET: 5; 325 TABLET ORAL at 19:10

## 2021-08-29 RX ADMIN — FAMOTIDINE 20 MG: 20 TABLET ORAL at 08:22

## 2021-08-29 RX ADMIN — GABAPENTIN 300 MG: 300 CAPSULE ORAL at 11:15

## 2021-08-29 RX ADMIN — ENOXAPARIN SODIUM 40 MG: 40 INJECTION SUBCUTANEOUS at 08:22

## 2021-08-29 RX ADMIN — GABAPENTIN 600 MG: 300 CAPSULE ORAL at 20:52

## 2021-08-29 RX ADMIN — OXYCODONE HYDROCHLORIDE AND ACETAMINOPHEN 1 TABLET: 5; 325 TABLET ORAL at 04:09

## 2021-08-29 RX ADMIN — SODIUM CHLORIDE, PRESERVATIVE FREE 10 ML: 5 INJECTION INTRAVENOUS at 20:53

## 2021-08-29 RX ADMIN — Medication 5 MG: at 20:52

## 2021-08-29 RX ADMIN — OXYCODONE HYDROCHLORIDE AND ACETAMINOPHEN 1 TABLET: 5; 325 TABLET ORAL at 11:15

## 2021-08-29 ASSESSMENT — PAIN SCALES - GENERAL
PAINLEVEL_OUTOF10: 0
PAINLEVEL_OUTOF10: 6
PAINLEVEL_OUTOF10: 9
PAINLEVEL_OUTOF10: 0
PAINLEVEL_OUTOF10: 6
PAINLEVEL_OUTOF10: 0

## 2021-08-29 NOTE — PROGRESS NOTES
Hospitalist Progress Note  8/29/2021 2:12 PM  Subjective:   Admit Date: 8/24/2021  PCP: Soraya Bhardwaj MD    Chief Complaint: ambulatory dysfunction    Subjective: Patient complains of back pain and lower extremity pain. History is otherwise unchanged. Cumulative Hospital History:   8-25: Brought to ED after the patient was discharged from behavioral health unit with depression and anxiety. Patient taken off medications during that hospitalization and felt to be stable, but claimed she was unable to ambulate upon returning home. Family summoned EMS but then could not be contacted by ED staff. Admitted to med/surg for placement as behavioral health refused the patient. Cognitive evaluation ordered showing severe deficits without capacity to make medical decisions. 8-26: Patient is without complaints but remains deconditioned with PT. Seeking placement but POA has not yet decided on places to seek placement. 8-27: Still pending placement. POA did not return a list of SNF options which would be acceptable. Patient is stable without complaints. Not receiving pain medication as she has not been complaining of pain. 8-28: Complained of some elbow pain but not visibly distressed. Refused to work with PT today. 8-29: Back pain and lower extremity pain, again does not appear distressed. ROS: 14 point review of systems is negative except as specifically addressed above.     Adult Oral Nutrition Supplement; Standard High Calorie/High Protein Oral Supplement  ADULT DIET; Easy to Chew    Intake/Output Summary (Last 24 hours) at 8/29/2021 1412  Last data filed at 8/29/2021 0955  Gross per 24 hour   Intake 1040 ml   Output 600 ml   Net 440 ml     Medications:   sodium chloride       Current Facility-Administered Medications   Medication Dose Route Frequency Provider Last Rate Last Admin    famotidine (PEPCID) tablet 20 mg  20 mg Oral BID AMARIS Schmitt CNP   20 mg at 08/29/21 7099    acetaminophen (TYLENOL) tablet 975 mg  975 mg Oral 3 times per day Marcie Sol MD   975 mg at 08/28/21 1559    dicyclomine (BENTYL) tablet 20 mg  20 mg Oral 4x Daily PRN Marcie Sol MD   20 mg at 08/25/21 1545    melatonin disintegrating tablet 5 mg  5 mg Oral Nightly Marcie Sol MD   5 mg at 08/28/21 2043    mirtazapine (REMERON) tablet 3.75 mg  3.75 mg Oral Nightly Marcie oSl MD   3.75 mg at 08/28/21 2043    oxyCODONE-acetaminophen (PERCOCET) 5-325 MG per tablet 1 tablet  1 tablet Oral Q8H PRN Marcie Sol MD   1 tablet at 08/29/21 1115    sodium chloride flush 0.9 % injection 5-40 mL  5-40 mL IntraVENous 2 times per day Marcie Sol MD   10 mL at 08/28/21 2045    sodium chloride flush 0.9 % injection 5-40 mL  5-40 mL IntraVENous PRN Marcie Sol MD        0.9 % sodium chloride infusion  25 mL IntraVENous PRN Marcie Sol MD        enoxaparin (LOVENOX) injection 40 mg  40 mg Subcutaneous Daily Marcie Sol MD   40 mg at 08/29/21 5539    ondansetron (ZOFRAN-ODT) disintegrating tablet 4 mg  4 mg Oral Q8H PRN Marcie Sol MD        Or    ondansetron (ZOFRAN) injection 4 mg  4 mg IntraVENous Q6H PRN Marcie Sol MD   4 mg at 08/25/21 0046    polyethylene glycol (GLYCOLAX) packet 17 g  17 g Oral Daily PRN Marcie Sol MD        acetaminophen (TYLENOL) tablet 650 mg  650 mg Oral Q6H PRN Marcie Sol MD        Or    acetaminophen (TYLENOL) suppository 650 mg  650 mg Rectal Q6H PRN Marcie Sol MD        nicotine (NICODERM CQ) 21 MG/24HR 1 patch  1 patch Transdermal Daily Marcie Sol MD   1 patch at 08/25/21 0912    albuterol (PROVENTIL) nebulizer solution 2.5 mg  2.5 mg Nebulization Q6H PRN Marcie Sol MD        gabapentin (NEURONTIN) capsule 300 mg  300 mg Oral BID Marcie Sol MD   300 mg at 08/29/21 1115    gabapentin (NEURONTIN) capsule 600 mg  600 mg Oral Nightly Marija Álvarez MD   600 mg at 08/28/21 2044        Labs:     Recent Labs     08/27/21 0410 08/28/21 0633 08/29/21  0335   WBC 4.2* 3.6* 3.9*   RBC 3.22* 3.24* 3.18*   HGB 9.6* 9.5* 9.4*   HCT 31.6* 31.9* 31.5*   MCV 98.1 98.5 99.1*   MCH 29.8 29.3 29.6   MCHC 30.4* 29.8* 29.8*    188 186     Recent Labs     08/27/21 0410 08/28/21 0633 08/29/21  0335    141 141   K 3.5 3.6 3.5   ANIONGAP 10 9 10    110 106   CO2 24 22 25   BUN 14 12 13   CREATININE 0.6 0.5 0.5   GLUCOSE 112* 108 124*   CALCIUM 8.3* 8.7* 8.7*     Recent Labs     08/27/21 0410 08/29/21  0335   MG 1.5* 1.5*     No results for input(s): AST, ALT, ALB, BILITOT, ALKPHOS, ALB in the last 72 hours. ABGs:No results for input(s): PH, PO2, PCO2, HCO3, BE, O2SAT in the last 72 hours. Troponin T: No results for input(s): TROPONINI in the last 72 hours. INR: No results for input(s): INR in the last 72 hours. Lactic Acid: No results for input(s): LACTA in the last 72 hours.     Objective:   Vitals: BP (!) 95/56   Pulse 68   Temp 97.3 °F (36.3 °C) (Temporal)   Resp 16   Ht 5' 3\" (1.6 m)   Wt 173 lb 3.2 oz (78.6 kg)   SpO2 96%   BMI 30.68 kg/m²   24HR INTAKE/OUTPUT:      Intake/Output Summary (Last 24 hours) at 8/29/2021 1412  Last data filed at 8/29/2021 0955  Gross per 24 hour   Intake 1040 ml   Output 600 ml   Net 440 ml     General appearance: alert and cooperative with exam  HEENT: atraumatic, eyes with clear conjunctiva and normal lids, pupils and irises normal, external ears and nose are normal, lips normal  Neck without masses, lympadenopathy, bruit, thyroid normal  Lungs: no increased work of breathing, \"clear to auscultation bilaterally\" without rales, rhonchi or wheezes  Heart: regular rate and rhythm, S1, S2 normal, no murmur, click, rub or gallop  Abdomen: soft, non-tender; bowel sounds normal; no masses,  no organomegaly  Extremities: extremities normal, atraumatic, no cyanosis or edema  Neurologic: no focal neurologic deficits, normal sensation, alert and oriented to place and time, affect and mood appropriate  Skin: no rashes, nodules    Assessment and Plan:   Principal Problem:    Altered mental status  Active Problems:    Displacement of lumbar intervertebral disc without myelopathy    Spinal stenosis, lumbar region, without neurogenic claudication    Incontinence of bowel    Dysthymia    Status post placement of implantable loop recorder    Left ureteral calculus    Hypertension    Family history of coronary artery disease    Opioid dependence with opioid-induced disorder ([facility]    [facility] - Genotype 1A, s/p Harvoni 2015 with remission    Right ureteral calculus    Simple chronic bronchitis (HCC)    Obesity (BMI 30.0-34. 9)    Anal cancer ([facility]    Poor venous access    Dehydration    Major depressive disorder, recurrent severe without psychotic features ([facility]  Resolved Problems:    * No resolved hospital problems. *      Medically stable on current medical regimen. Seeking placement, awaiting word from POA. Cognitive evaluation shows patient cannot make own healthcare decisions.     Advance Directive: Full Code    DVT prophylaxis: enoxaparin    Discharge planning: DO Sherif Schofield Hospitalist

## 2021-08-29 NOTE — PROGRESS NOTES
08/29/21 1458   Subjective   Subjective She just went to get me crackers. MY stomach is hurting    General Comment   Comments Patient declined gait at this time. Stated  she had been walking to the BR and going to chair.    Physical Therapy    Electronically signed by Carly Cole PTA on 8/29/2021 at 3:02 PM

## 2021-08-30 LAB
ANION GAP SERPL CALCULATED.3IONS-SCNC: 9 MMOL/L (ref 7–19)
BUN BLDV-MCNC: 13 MG/DL (ref 8–23)
CALCIUM SERPL-MCNC: 8.6 MG/DL (ref 8.8–10.2)
CHLORIDE BLD-SCNC: 109 MMOL/L (ref 98–111)
CO2: 22 MMOL/L (ref 22–29)
CREAT SERPL-MCNC: 0.5 MG/DL (ref 0.5–0.9)
GFR AFRICAN AMERICAN: >59
GFR NON-AFRICAN AMERICAN: >60
GLUCOSE BLD-MCNC: 95 MG/DL (ref 74–109)
HCT VFR BLD CALC: 30.8 % (ref 37–47)
HEMOGLOBIN: 9.2 G/DL (ref 12–16)
MCH RBC QN AUTO: 29.7 PG (ref 27–31)
MCHC RBC AUTO-ENTMCNC: 29.9 G/DL (ref 33–37)
MCV RBC AUTO: 99.4 FL (ref 81–99)
PDW BLD-RTO: 13.3 % (ref 11.5–14.5)
PLATELET # BLD: 195 K/UL (ref 130–400)
PMV BLD AUTO: 11.7 FL (ref 9.4–12.3)
POTASSIUM REFLEX MAGNESIUM: 3.9 MMOL/L (ref 3.5–5)
RBC # BLD: 3.1 M/UL (ref 4.2–5.4)
SODIUM BLD-SCNC: 140 MMOL/L (ref 136–145)
WBC # BLD: 3.5 K/UL (ref 4.8–10.8)

## 2021-08-30 PROCEDURE — 6370000000 HC RX 637 (ALT 250 FOR IP): Performed by: INTERNAL MEDICINE

## 2021-08-30 PROCEDURE — 80048 BASIC METABOLIC PNL TOTAL CA: CPT

## 2021-08-30 PROCEDURE — 6370000000 HC RX 637 (ALT 250 FOR IP): Performed by: NURSE PRACTITIONER

## 2021-08-30 PROCEDURE — 97535 SELF CARE MNGMENT TRAINING: CPT

## 2021-08-30 PROCEDURE — 6360000002 HC RX W HCPCS: Performed by: INTERNAL MEDICINE

## 2021-08-30 PROCEDURE — 85027 COMPLETE CBC AUTOMATED: CPT

## 2021-08-30 PROCEDURE — 1210000000 HC MED SURG R&B

## 2021-08-30 PROCEDURE — 96372 THER/PROPH/DIAG INJ SC/IM: CPT

## 2021-08-30 PROCEDURE — 2580000003 HC RX 258: Performed by: INTERNAL MEDICINE

## 2021-08-30 PROCEDURE — 36415 COLL VENOUS BLD VENIPUNCTURE: CPT

## 2021-08-30 PROCEDURE — 97116 GAIT TRAINING THERAPY: CPT

## 2021-08-30 RX ADMIN — OXYCODONE HYDROCHLORIDE AND ACETAMINOPHEN 1 TABLET: 5; 325 TABLET ORAL at 08:57

## 2021-08-30 RX ADMIN — ACETAMINOPHEN 975 MG: 325 TABLET ORAL at 21:14

## 2021-08-30 RX ADMIN — GABAPENTIN 300 MG: 300 CAPSULE ORAL at 08:57

## 2021-08-30 RX ADMIN — OXYCODONE HYDROCHLORIDE AND ACETAMINOPHEN 1 TABLET: 5; 325 TABLET ORAL at 03:02

## 2021-08-30 RX ADMIN — GABAPENTIN 300 MG: 300 CAPSULE ORAL at 13:44

## 2021-08-30 RX ADMIN — SODIUM CHLORIDE, PRESERVATIVE FREE 10 ML: 5 INJECTION INTRAVENOUS at 21:14

## 2021-08-30 RX ADMIN — FAMOTIDINE 20 MG: 20 TABLET ORAL at 21:14

## 2021-08-30 RX ADMIN — Medication 5 MG: at 21:14

## 2021-08-30 RX ADMIN — GABAPENTIN 600 MG: 300 CAPSULE ORAL at 21:14

## 2021-08-30 RX ADMIN — ACETAMINOPHEN 975 MG: 325 TABLET ORAL at 13:45

## 2021-08-30 RX ADMIN — MIRTAZAPINE 3.75 MG: 7.5 TABLET ORAL at 21:13

## 2021-08-30 RX ADMIN — FAMOTIDINE 20 MG: 20 TABLET ORAL at 08:57

## 2021-08-30 RX ADMIN — SODIUM CHLORIDE, PRESERVATIVE FREE 10 ML: 5 INJECTION INTRAVENOUS at 08:58

## 2021-08-30 RX ADMIN — OXYCODONE HYDROCHLORIDE AND ACETAMINOPHEN 1 TABLET: 5; 325 TABLET ORAL at 16:36

## 2021-08-30 RX ADMIN — ENOXAPARIN SODIUM 40 MG: 40 INJECTION SUBCUTANEOUS at 08:58

## 2021-08-30 ASSESSMENT — PAIN SCALES - GENERAL
PAINLEVEL_OUTOF10: 6
PAINLEVEL_OUTOF10: 5
PAINLEVEL_OUTOF10: 4
PAINLEVEL_OUTOF10: 5
PAINLEVEL_OUTOF10: 0
PAINLEVEL_OUTOF10: 9

## 2021-08-30 ASSESSMENT — PAIN DESCRIPTION - LOCATION
LOCATION: GENERALIZED
LOCATION: BACK

## 2021-08-30 ASSESSMENT — PAIN DESCRIPTION - PROGRESSION: CLINICAL_PROGRESSION: NOT CHANGED

## 2021-08-30 ASSESSMENT — PAIN SCALES - WONG BAKER: WONGBAKER_NUMERICALRESPONSE: 6

## 2021-08-30 ASSESSMENT — PAIN DESCRIPTION - DESCRIPTORS: DESCRIPTORS: DISCOMFORT

## 2021-08-30 ASSESSMENT — PAIN DESCRIPTION - PAIN TYPE: TYPE: ACUTE PAIN;CHRONIC PAIN

## 2021-08-30 ASSESSMENT — PAIN DESCRIPTION - FREQUENCY: FREQUENCY: CONTINUOUS

## 2021-08-30 NOTE — PROGRESS NOTES
Physical Therapy  Name: Traci Matson  MRN:  [id number]  Date of service:  8/30/2021 08/30/21 1500   Restrictions/Precautions   Restrictions/Precautions Fall Risk   General   Chart Reviewed Yes   Subjective   Subjective Pt in bed, states that she needs to use the bathroom. Pain Screening   Patient Currently in Pain No   Bed Mobility   Supine to Sit Minimal assistance   Transfers   Sit to Stand Minimal Assistance   Stand to sit Minimal Assistance   Ambulation   Ambulation? Yes   Ambulation 1   Surface level tile   Device Rolling Walker   Assistance Minimal assistance   Quality of Gait flightly fwd flexed, unsteady   Gait Deviations Slow Bridgette;Decreased step length   Distance 10' x2   Short term goals   Time Frame for Short term goals 14 DAYS   Short term goal 1 BED MOB INDEPENDENT   Short term goal 2 TRANSFERS SBA   Short term goal 3 ' AAD SBA   Conditions Requiring Skilled Therapeutic Intervention   Body structures, Functions, Activity limitations Decreased functional mobility ; Decreased ADL status; Decreased cognition;Decreased balance; Increased pain   Assessment Assisted pt to/from the BR, pt requires max encouragement to participate and becomes tearful frequently. Pt up to recliner and positioned for comfort with all needs in reach. Activity Tolerance   Activity Tolerance Patient limited by fatigue;Patient limited by cognitive status   Safety Devices   Type of devices Call light within reach; Chair alarm in place;Gait belt;Left in chair         Electronically signed by Matthias Johns PTA on 8/30/2021 at 3:03 PM

## 2021-08-30 NOTE — PROGRESS NOTES
Occupational Therapy  Pt in bed upon arrival for therapy. Pt refusing therapy this am, stating that she is tired.  Will attempt in pm. Electronically signed by JUSTEN Clements on 8/30/2021 at 11:52 AM

## 2021-08-30 NOTE — PROGRESS NOTES
Physical Therapy  Name: Eliu Mariano  MRN:  [id number]  Date of service:  8/30/2021 08/30/21 1130   General   Missed reason Patient declined   Subjective   Subjective Attempt: Pt declines mobility at this time, states she would be willing to try after lunch.          Electronically signed by Jacob Escobar PTA on 8/30/2021 at 11:30 AM

## 2021-08-30 NOTE — PROGRESS NOTES
Comprehensive Nutrition Assessment    Type and Reason for Visit:  Reassess    Nutrition Recommendations/Plan: continue current nutritional interventions    Nutrition Assessment:  Pt remains compromimsed, however pointake has improved and is now usually 50-75%. Is taking ONS also    Malnutrition Assessment:  Malnutrition Status: At risk for malnutrition (Comment)    Context:  Acute Illness     Findings of the 6 clinical characteristics of malnutrition:  Energy Intake:  Mild decrease in energy intake (Comment)  Weight Loss:  7 - Greater than 7.5% over 3 months     Body Fat Loss:  No significant body fat loss     Muscle Mass Loss:  No significant muscle mass loss    Fluid Accumulation:  No significant fluid accumulation Extremities   Strength:  Not Performed    Estimated Daily Nutrient Needs:  Energy (kcal):  6648-5458 kcals/day; Weight Used for Energy Requirements:  Current (18-22 kcals/kg)     Protein (g):  62-78 g/pro/day; Weight Used for Protein Requirements:  Ideal (1.2-1.5 g/kg)        Fluid (ml/day):  2235-7498 ml/fluid/day; Method Used for Fluid Requirements:  1 ml/kcal      Nutrition Related Findings:  LLQ colostomy      Wounds:  None       Current Nutrition Therapies:    Adult Oral Nutrition Supplement; Standard High Calorie/High Protein Oral Supplement  ADULT DIET; Easy to Chew    Anthropometric Measures:  · Height: 5' 3\" (160 cm)  · Current Body Weight: 173 lb (78.5 kg)   · Admission Body Weight: 173 lb (78.5 kg)    · Usual Body Weight: 200 lb (90.7 kg) (5/4/2021)     · Ideal Body Weight: 115 lbs; % Ideal Body Weight     · BMI: 30.7  · Adjusted Body Weight:  ; No Adjustment   · BMI Categories: Obese Class 1 (BMI 30.0-34. 9)       Nutrition Diagnosis:   · Unintended weight loss related to catabolic illness as evidenced by weight loss 7.5% in 3 months      Nutrition Interventions:   Food and/or Nutrient Delivery:  Continue Current Diet, Continue Oral Nutrition Supplement  Nutrition Education/Counseling:  Education not indicated   Coordination of Nutrition Care:  Continue to monitor while inpatient    Goals:  Po intake 50% or greater meals and ONS. wt stable.        Nutrition Monitoring and Evaluation:   Behavioral-Environmental Outcomes:  None Identified   Food/Nutrient Intake Outcomes:  Food and Nutrient Intake, Supplement Intake  Physical Signs/Symptoms Outcomes:  Biochemical Data, Weight, Skin, Nutrition Focused Physical Findings     Discharge Planning:    Continue current diet, Continue Oral Nutrition Supplement     Electronically signed by Dorena Canavan, MS, RD, LD on 8/30/21 at 1:40 PM CDT    Contact: [phone]

## 2021-08-30 NOTE — PROGRESS NOTES
Hospitalist Progress Note  8/30/2021 4:32 PM  Subjective:   Admit Date: 8/24/2021  PCP: Lalita Washington MD    Chief Complaint: ambulatory dysfunction    Subjective: Patient complains of generalized pain but wants to discharge home. Would not work with PT/OT this morning and still not functioning well enough for discharge to home when she would work with them. History is otherwise unchanged. [facility]:   8-25: Brought to ED after the patient was discharged from behavioral health unit with depression and anxiety. Patient taken off medications during that hospitalization and felt to be stable, but claimed she was unable to ambulate upon returning home. Family summoned EMS but then could not be contacted by ED staff. Admitted to med/surg for placement as behavioral health refused the patient. Cognitive evaluation ordered showing severe deficits without capacity to make medical decisions. 8-26: Patient is without complaints but remains deconditioned with PT. Seeking placement but POA has not yet decided on places to seek placement. 8-27: Still pending placement. POA did not return a list of SNF options which would be acceptable. Patient is stable without complaints. Not receiving pain medication as she has not been complaining of pain. 8-28: Complained of some elbow pain but not visibly distressed. Refused to work with PT today. 8-29: Back pain and lower extremity pain, again does not appear distressed. 8-30: SNF placement pending. Patient wants to go home but not safe to do so per PT/OT. ROS: 14 point review of systems is negative except as specifically addressed above.     Adult Oral Nutrition Supplement; Standard High Calorie/High Protein Oral Supplement  ADULT DIET; Easy to Chew    Intake/Output Summary (Last 24 hours) at 8/30/2021 1632  Last data filed at 8/30/2021 1200  Gross per 24 hour   Intake 1290 ml   Output 200 ml   Net 1090 ml     Medications:   sodium chloride       Current Facility-Administered Medications   Medication Dose Route Frequency Provider Last Rate Last Admin    famotidine (PEPCID) tablet 20 mg  20 mg Oral BID Violet Leung APRCHANTAL - CNP   20 mg at 08/30/21 0857    acetaminophen (TYLENOL) tablet 975 mg  975 mg Oral 3 times per day Suki Mullen MD   975 mg at 08/30/21 1345    dicyclomine (BENTYL) tablet 20 mg  20 mg Oral 4x Daily PRN Suki Mullen MD   20 mg at 08/25/21 1545    melatonin disintegrating tablet 5 mg  5 mg Oral Nightly Suki Mullen MD   5 mg at 08/29/21 2052    mirtazapine (REMERON) tablet 3.75 mg  3.75 mg Oral Nightly Suki Mullen MD   3.75 mg at 08/29/21 2052    oxyCODONE-acetaminophen (PERCOCET) 5-325 MG per tablet 1 tablet  1 tablet Oral Q8H PRN Suki Mullen MD   1 tablet at 08/30/21 0857    sodium chloride flush 0.9 % injection 5-40 mL  5-40 mL IntraVENous 2 times per day Suki Mullen MD   10 mL at 08/30/21 0858    sodium chloride flush 0.9 % injection 5-40 mL  5-40 mL IntraVENous PRN Suki Mullen MD        0.9 % sodium chloride infusion  25 mL IntraVENous PRN Suki Mullen MD        enoxaparin (LOVENOX) injection 40 mg  40 mg SubCUTAneous Daily Suki Mullen MD   40 mg at 08/30/21 0858    ondansetron (ZOFRAN-ODT) disintegrating tablet 4 mg  4 mg Oral Q8H PRN Suki Mullen MD        Or    ondansetron (ZOFRAN) injection 4 mg  4 mg IntraVENous Q6H PRN Suki Mullen MD   4 mg at 08/25/21 2154    polyethylene glycol (GLYCOLAX) packet 17 g  17 g Oral Daily PRN Suki Mullen MD        acetaminophen (TYLENOL) tablet 650 mg  650 mg Oral Q6H PRN Suki Mullen MD        Or    acetaminophen (TYLENOL) suppository 650 mg  650 mg Rectal Q6H PRN Suki Mullen MD        nicotine (NICODERM CQ) 21 MG/24HR 1 patch  1 patch Transdermal Daily Suki Mullen MD   1 patch at 08/25/21 0912    albuterol (PROVENTIL) nebulizer solution 2.5 mg  2.5 mg Nebulization Q6H PRN Elin Khan MD        gabapentin (NEURONTIN) capsule 300 mg  300 mg Oral BID Elin Khan MD   300 mg at 08/30/21 1344    gabapentin (NEURONTIN) capsule 600 mg  600 mg Oral Nightly Elin Khan MD   600 mg at 08/29/21 2052        Labs:     Recent Labs     08/28/21  0633 08/29/21  0335 08/30/21  0342   WBC 3.6* 3.9* 3.5*   RBC 3.24* 3.18* 3.10*   HGB 9.5* 9.4* 9.2*   HCT 31.9* 31.5* 30.8*   MCV 98.5 99.1* 99.4*   MCH 29.3 29.6 29.7   MCHC 29.8* 29.8* 29.9*    186 195     Recent Labs     08/28/21  0633 08/29/21  0335 08/30/21  0342    141 140   K 3.6 3.5 3.9   ANIONGAP 9 10 9    106 109   CO2 22 25 22   BUN 12 13 13   CREATININE 0.5 0.5 0.5   GLUCOSE 108 124* 95   CALCIUM 8.7* 8.7* 8.6*     Recent Labs     08/29/21  0335   MG 1.5*     No results for input(s): AST, ALT, ALB, BILITOT, ALKPHOS, ALB in the last 72 hours. ABGs:No results for input(s): PH, PO2, PCO2, HCO3, BE, O2SAT in the last 72 hours. Troponin T: No results for input(s): TROPONINI in the last 72 hours. INR: No results for input(s): INR in the last 72 hours. Lactic Acid: No results for input(s): LACTA in the last 72 hours.     Objective:   Vitals: BP (!) 98/55   Pulse 70   Temp 96.7 °F (35.9 °C)   Resp 16   Ht 5' 3\" (1.6 m)   Wt 173 lb 3.2 oz (78.6 kg)   SpO2 98%   BMI 30.68 kg/m²   24HR INTAKE/OUTPUT:      Intake/Output Summary (Last 24 hours) at 8/30/2021 1632  Last data filed at 8/30/2021 1200  Gross per 24 hour   Intake 1290 ml   Output 200 ml   Net 1090 ml     General appearance: alert and cooperative with exam  HEENT: atraumatic, eyes with clear conjunctiva and normal lids, pupils and irises normal, external ears and nose are normal, lips normal  Neck without masses, lympadenopathy, bruit, thyroid normal  Lungs: no increased work of breathing, \"clear to auscultation bilaterally\" without rales, rhonchi or wheezes  Heart: regular rate and rhythm, S1, S2 normal, no murmur, click, rub or gallop  Abdomen: soft, non-tender; bowel sounds normal; no masses,  no organomegaly  Extremities: extremities normal, atraumatic, no cyanosis or edema  Neurologic: no focal neurologic deficits, normal sensation, alert and oriented to place and time, affect and mood appropriate  Skin: no rashes, nodules    Assessment and Plan:   Principal Problem:    Altered mental status  Active Problems:    Displacement of lumbar intervertebral disc without myelopathy    Spinal stenosis, lumbar region, without neurogenic claudication    Incontinence of bowel    Dysthymia    Status post placement of implantable loop recorder    Left ureteral calculus    Hypertension    Family history of coronary artery disease    Opioid dependence with opioid-induced disorder (HCC)    Chronic active hepatitis C ([facility] - Genotype 1A, s/p Harvoni 2015 with remission    Right ureteral calculus    Simple chronic bronchitis (HCC)    Obesity (BMI 30.0-34. 9)    Anal cancer ([facility]    Poor venous access    Dehydration    Major depressive disorder, recurrent severe without psychotic features ([facility]  Resolved Problems:    * No resolved hospital problems. *      Medically stable on current medical regimen. Seeking placement, packets sent after POA gave options.     Advance Directive: Full Code    DVT prophylaxis: enoxaparin    Discharge planning: TBD      [facility] SERVICE, DO  [facility] Hospitalist

## 2021-08-30 NOTE — CARE COORDINATION
GAGE placed a call to Pt nikristian regarding updates on DC planning. Pt is wanting to go home but SW needs to confirm with [facility] before Pt can DC. As of last week, Pt montserratkristian was looking at placement options. Awaiting call back at this time. Electronically signed by Lynda Peck on 8/30/2021 at 10:17 AM     GAGE spoke with Pt POA/juan over the phone regarding SNF options. Pt POA requested a referral to [facility] as a first preference. She did not give any other options at this time.  GAGE has notified the facility of the referral. Awaiting approval/denial.  [facility]   K  [phone] F  Electronically signed by Lynda Peck on 8/30/2021 at 10:45 AM

## 2021-08-30 NOTE — PROGRESS NOTES
Occupational Therapy     08/30/21 1503   Pre Treatment Pain Screening   Comments / Details Pt fatigued with transfers. Restrictions/Precautions   Restrictions/Precautions Fall Risk   Position Activity Restriction   Other position/activity restrictions COLOSTOMY   Vision   Vision Impaired   Vision Exceptions Wears glasses at all times   Hearing   Hearing [facility]   General   Chart Reviewed Yes   Patient assessed for rehabilitation services? Yes   Additional Pertinent Hx Psychosis, spinal stenosis, AMS, hx of colostomy   Response to previous treatment Patient with no complaints from previous session   Family / Caregiver Present No   Referring Practitioner Brenda Navarrete DO   Diagnosis Altered mental status   Subjective   Subjective Pt in bed upon arrival for therapy after lunch this date. Pt required encouragement to participate. General Comment   Comments Pt presents with increased emotional state this date. Pain Assessment   Patient Currently in Pain Yes   Pain Assessment Faces   Pena-Baxter Pain Rating 6   Pain Location Generalized   Pain Descriptors Discomfort   Pain Frequency Continuous   Clinical Progression Not changed   Patient's Stated Pain Goal No pain   Pre Treatment Pain Screening   Pain at present 6   Scale Used Numeric Score   Intervention List Patient able to continue with treatment;Nurse/physician notified   Comments / Details Pt unable to give specifics about pain. nursing aware anf bringing pain medications as per patient. ADL   Feeding Independent   Grooming Contact guard assistance;Verbal cueing   UE Bathing Moderate assistance; Increased time to complete   LE Bathing Moderate assistance; Increased time to complete   UE Dressing Minimal assistance; Increased time to complete   LE Dressing Minimal assistance; Increased time to complete   Toileting Minimal assistance;Contact guard assistance; Increased time to complete   Balance   Sitting Balance Supervision   Standing Balance Contact guard assistance

## 2021-08-31 LAB
ANION GAP SERPL CALCULATED.3IONS-SCNC: 11 MMOL/L (ref 7–19)
BUN BLDV-MCNC: 14 MG/DL (ref 8–23)
CALCIUM SERPL-MCNC: 8.3 MG/DL (ref 8.8–10.2)
CHLORIDE BLD-SCNC: 106 MMOL/L (ref 98–111)
CO2: 25 MMOL/L (ref 22–29)
CREAT SERPL-MCNC: 0.5 MG/DL (ref 0.5–0.9)
GFR AFRICAN AMERICAN: >59
GFR NON-AFRICAN AMERICAN: >60
GLUCOSE BLD-MCNC: 106 MG/DL (ref 74–109)
HCT VFR BLD CALC: 30.6 % (ref 37–47)
HEMOGLOBIN: 9.4 G/DL (ref 12–16)
MCH RBC QN AUTO: 30.5 PG (ref 27–31)
MCHC RBC AUTO-ENTMCNC: 30.7 G/DL (ref 33–37)
MCV RBC AUTO: 99.4 FL (ref 81–99)
PDW BLD-RTO: 13.3 % (ref 11.5–14.5)
PLATELET # BLD: 197 K/UL (ref 130–400)
PMV BLD AUTO: 11.8 FL (ref 9.4–12.3)
POTASSIUM REFLEX MAGNESIUM: 3.8 MMOL/L (ref 3.5–5)
RBC # BLD: 3.08 M/UL (ref 4.2–5.4)
SODIUM BLD-SCNC: 142 MMOL/L (ref 136–145)
WBC # BLD: 3.6 K/UL (ref 4.8–10.8)

## 2021-08-31 PROCEDURE — 2580000003 HC RX 258: Performed by: INTERNAL MEDICINE

## 2021-08-31 PROCEDURE — 6370000000 HC RX 637 (ALT 250 FOR IP): Performed by: INTERNAL MEDICINE

## 2021-08-31 PROCEDURE — 80048 BASIC METABOLIC PNL TOTAL CA: CPT

## 2021-08-31 PROCEDURE — 6370000000 HC RX 637 (ALT 250 FOR IP): Performed by: NURSE PRACTITIONER

## 2021-08-31 PROCEDURE — 85027 COMPLETE CBC AUTOMATED: CPT

## 2021-08-31 PROCEDURE — 6360000002 HC RX W HCPCS: Performed by: INTERNAL MEDICINE

## 2021-08-31 PROCEDURE — 1210000000 HC MED SURG R&B

## 2021-08-31 PROCEDURE — 36415 COLL VENOUS BLD VENIPUNCTURE: CPT

## 2021-08-31 PROCEDURE — 96372 THER/PROPH/DIAG INJ SC/IM: CPT

## 2021-08-31 RX ADMIN — GABAPENTIN 600 MG: 300 CAPSULE ORAL at 19:43

## 2021-08-31 RX ADMIN — ACETAMINOPHEN 975 MG: 325 TABLET ORAL at 19:44

## 2021-08-31 RX ADMIN — MIRTAZAPINE 3.75 MG: 7.5 TABLET ORAL at 19:44

## 2021-08-31 RX ADMIN — ENOXAPARIN SODIUM 40 MG: 40 INJECTION SUBCUTANEOUS at 08:46

## 2021-08-31 RX ADMIN — FAMOTIDINE 20 MG: 20 TABLET ORAL at 08:46

## 2021-08-31 RX ADMIN — GABAPENTIN 300 MG: 300 CAPSULE ORAL at 05:49

## 2021-08-31 RX ADMIN — FAMOTIDINE 20 MG: 20 TABLET ORAL at 19:44

## 2021-08-31 RX ADMIN — Medication 5 MG: at 19:45

## 2021-08-31 RX ADMIN — SODIUM CHLORIDE, PRESERVATIVE FREE 10 ML: 5 INJECTION INTRAVENOUS at 08:47

## 2021-08-31 RX ADMIN — OXYCODONE HYDROCHLORIDE AND ACETAMINOPHEN 1 TABLET: 5; 325 TABLET ORAL at 02:03

## 2021-08-31 RX ADMIN — OXYCODONE HYDROCHLORIDE AND ACETAMINOPHEN 1 TABLET: 5; 325 TABLET ORAL at 10:15

## 2021-08-31 RX ADMIN — OXYCODONE HYDROCHLORIDE AND ACETAMINOPHEN 1 TABLET: 5; 325 TABLET ORAL at 17:57

## 2021-08-31 RX ADMIN — ACETAMINOPHEN 975 MG: 325 TABLET ORAL at 05:49

## 2021-08-31 ASSESSMENT — PAIN SCALES - GENERAL
PAINLEVEL_OUTOF10: 4
PAINLEVEL_OUTOF10: 2
PAINLEVEL_OUTOF10: 5
PAINLEVEL_OUTOF10: 8
PAINLEVEL_OUTOF10: 6
PAINLEVEL_OUTOF10: 8

## 2021-08-31 ASSESSMENT — PAIN DESCRIPTION - ONSET
ONSET: ON-GOING
ONSET: ON-GOING

## 2021-08-31 ASSESSMENT — PAIN DESCRIPTION - LOCATION
LOCATION: GENERALIZED
LOCATION: GENERALIZED;BACK;ABDOMEN

## 2021-08-31 ASSESSMENT — PAIN DESCRIPTION - FREQUENCY
FREQUENCY: CONTINUOUS
FREQUENCY: CONTINUOUS

## 2021-08-31 ASSESSMENT — PAIN DESCRIPTION - DESCRIPTORS
DESCRIPTORS: CONSTANT;DISCOMFORT
DESCRIPTORS: CONSTANT

## 2021-08-31 ASSESSMENT — PAIN DESCRIPTION - PAIN TYPE
TYPE: CHRONIC PAIN
TYPE: CHRONIC PAIN

## 2021-08-31 ASSESSMENT — PAIN DESCRIPTION - ORIENTATION
ORIENTATION: OTHER (COMMENT)
ORIENTATION: MID;LOWER

## 2021-08-31 ASSESSMENT — PAIN DESCRIPTION - PROGRESSION
CLINICAL_PROGRESSION: NOT CHANGED
CLINICAL_PROGRESSION: NOT CHANGED

## 2021-08-31 NOTE — PROGRESS NOTES
Received phone call from patient's sister Puja Fritz and she is concerned that patient is not on medications she was on. She emailed list to  and informed Dr. Abigail Hummel of list of medications.

## 2021-08-31 NOTE — ADT AUTH CERT
Utilization Reviews       Neurology [address] Day 6 (8/30/2021) by Deysi Rey RN       Review Status Review Entered   Completed 8/31/2021 12:52      Criteria Review      Care Day: 6 Care Date: 8/30/2021 Level of Care: Inpatient Floor    Guideline Day 3    Level Of Care    ( ) * Activity level acceptable    8/31/2021 1:52 PM EDT by Jorge Luis Benton      Attempt: Pt declines mobility at this time, states she would be willing to try after lunch    Ambulated 150'  Decreased functional mobility ; Decreased ADL status; Decreased cognition;Decreased balance; Increased pain  AssessmentAssisted pt to/from the BR    ( ) Floor to discharge    8/31/2021 1:52 PM EDT by Jorge Luis Benton      Tele, Routine VS, Home meds, Lovenox sc daily, Nicoderm patch daily, Norco po prnx3, BRP, PT/OT eval and tx    ( ) * Complete discharge planning    Clinical Status    (X) * No infection, or status acceptable    (X) * Isolation not needed, or status acceptable    (X) * Respiratory status acceptable    (X) * Pain and nausea absent or adequately managed    (X) * Ventilatory status acceptable    (X) * Neurologic problems absent or stabilized    (X) * Muscle or nerve damage absent or stable    ( ) * General Discharge Criteria met    8/31/2021 1:52 PM EDT by Jorge Luis Benton      Subjective: Patient complains of generalized pain but wants to discharge home. Would not work with PT/OT this morning and still not functioning well enough for discharge to home when she would work with them.  History is otherwise unchanged    Interventions    (X) * Intake acceptable    (X) * No inpatient interventions needed    8/31/2021 1:52 PM EDT by Jorge Luis Benton    Subject: Additional Clinical Information    8/30/21 Review      Vitals: BP (!) 98/55   Pulse 70   Temp 96.7 °F (35.9 °C)   Resp 16   Ht 5' 3\" (1.6 m)   Wt 173 lb 3.2 oz (78.6 kg)   SpO2 98%   BMI 30.68 kg/m²              No imaging      No pertinent abnormal lab work              Hospitalist      8-30: SNF placement pending. Patient wants to go home but not safe to do so per PT/OT.         ROS: 14 point review of systems is negative except as specifically addressed above. General appearance: alert and cooperative with exam    HEENT: atraumatic, eyes with clear conjunctiva and normal lids, pupils and irises normal, external ears and nose are normal, lips normal    Neck without masses, lympadenopathy, bruit, thyroid normal    Lungs: no increased work of breathing, \"clear to auscultation bilaterally\" without rales, rhonchi or wheezes    Heart: regular rate and rhythm, S1, S2 normal, no murmur, click, rub or gallop    Abdomen: soft, non-tender; bowel sounds normal; no masses,  no organomegaly    Extremities: extremities normal, atraumatic, no cyanosis or edema    Neurologic: no focal neurologic deficits, normal sensation, alert and oriented to place and time, affect and mood appropriate    Skin: no rashes, nodules         Assessment and Plan:    Principal Problem:      Altered mental status    Active Problems:      Displacement of lumbar intervertebral disc without myelopathy      Spinal stenosis, lumbar region, without neurogenic claudication      Incontinence of bowel      Dysthymia      Status post placement of implantable loop recorder      Left ureteral calculus      Hypertension      Family history of coronary artery disease      Opioid dependence with opioid-induced disorder (HCC)      Chronic active hepatitis C ([facility] [address] - Genotype 1A, s/p Harvoni 2015 with remission      Right ureteral calculus      Simple chronic bronchitis (HCC)      Obesity (BMI 30.0-34. 9)      Anal cancer (HCC)      Poor venous access      Dehydration      Major depressive disorder, recurrent severe without psychotic features ([address]    Resolved Problems:      * No resolved hospital problems.  *              Medically stable on current medical regimen.         Seeking placement, packets sent after POA gave options.         Advance Directive: Full Code         DVT prophylaxis: enoxaparin         Discharge planning: TBD                      CM Notes      GAGE placed a call to Pt niece regarding updates on DC planning. Pt is wanting to go home but SW needs to confirm with [facility] before Pt can DC. As of last week, Pt niece was looking at placement options. Awaiting call back at this time. Electronically signed by Ottoniel Atkinson on 8/30/2021 at 10:17 AM         GAGE spoke with Pt POA/niece over the phone regarding SNF options. Pt POA requested a referral to [facility] as a first preference. She did not give any other options at this time. GAGE has notified the facility of the referral. Awaiting approval/denial.    [facility]YS[facility]       * Milestone   Additional Notes   8/30/21 Review    Vitals: BP (!) 98/55   Pulse 70   Temp 96.7 °F (35.9 °C)   Resp 16   Ht 5' 3\" (1.6 m)   Wt 173 lb 3.2 oz (78.6 kg)   SpO2 98%   BMI 30.68 kg/m²        No imaging    No pertinent abnormal lab work        Hospitalist    8-30: SNF placement pending. Patient wants to go home but not safe to do so per PT/OT.       ROS: 14 point review of systems is negative except as specifically addressed above.    General appearance: alert and cooperative with exam   HEENT: atraumatic, eyes with clear conjunctiva and normal lids, pupils and irises normal, external ears and nose are normal, lips normal   Neck without masses, lympadenopathy, bruit, thyroid normal   Lungs: no increased work of breathing, \"clear to auscultation bilaterally\" without rales, rhonchi or wheezes   Heart: regular rate and rhythm, S1, S2 normal, no murmur, click, rub or gallop   Abdomen: soft, non-tender; bowel sounds normal; no masses,  no organomegaly   Extremities: extremities normal, atraumatic, no cyanosis or edema   Neurologic: no focal neurologic deficits, normal sensation, alert and oriented to place and time, affect and mood appropriate   Skin: no rashes, nodules       Assessment and Plan: Principal Problem:     Altered mental status   Active Problems:     Displacement of lumbar intervertebral disc without myelopathy     Spinal stenosis, lumbar region, without neurogenic claudication     Incontinence of bowel     Dysthymia     Status post placement of implantable loop recorder     Left ureteral calculus     Hypertension     Family history of coronary artery disease     Opioid dependence with opioid-induced disorder ([facility] [address]     Chronic active hepatitis C ([facility] [address] - Genotype 1A, s/p Harvoni 2015 with remission     Right ureteral calculus     Simple chronic bronchitis (HCC)     Obesity (BMI 30.0-34. 9)     Anal cancer (HCC)     Poor venous access     Dehydration     Major depressive disorder, recurrent severe without psychotic features ([facility] [address]   Resolved Problems:     * No resolved hospital problems. *           Medically stable on current medical regimen.       Seeking placement, packets sent after POA gave options.       Advance Directive: Full Code       DVT prophylaxis: enoxaparin       Discharge planning: TBD           CM Notes    GAGE placed a call to Pt niece regarding updates on DC planning. Pt is wanting to go home but GAGE needs to confirm with family [facility] before Pt can DC. As of last week, Pt niece was looking at placement options. Awaiting call back at this time. Electronically signed by Julia Sheldon on 8/30/2021 at 10:17 AM       GAGE spoke with Pt POA/niece over the phone regarding SNF options. Pt POA requested a referral to [facility]YS[facility] as a first preference. She did not give any other options at this time.  GAGE has notified the facility of the referral. Awaiting approval/denial.   [facility]         Neurology [address] Day 5 (8/29/2021) by Lisa Manzo RN       Review Status Review Entered   Completed 8/31/2021 12:48      Criteria Review      Care Day: 5 Care Date: 8/29/2021 Level of Care: Inpatient Floor    Guideline Day 3    Level Of Care    ( ) * Activity level acceptable    8/31/2021 1:48 PM EDT by Ward Layton      Patient declined gait at this time.   Stated  she had been walking to the BR and going to chair. ( ) Floor to discharge    8/31/2021 1:48 PM EDT by Ward Layton      Tele, Routine VS, Home meds, Lovenox sc daily, Nicoderm patch daily, Norco po prnx3, BRP, PT/OT eval and tx    ( ) * Complete discharge planning    Clinical Status    (X) * No infection, or status acceptable    (X) * Isolation not needed, or status acceptable    (X) * Respiratory status acceptable    (X) * Pain and nausea absent or adequately managed    (X) * Ventilatory status acceptable    (X) * Neurologic problems absent or stabilized    (X) * Muscle or nerve damage absent or stable    ( ) * General Discharge Criteria met    8/31/2021 1:48 PM EDT by Ward Layton      Medically stable on current medical regimen.     Seeking placement, awaiting word from POA. Cognitive evaluation shows patient cannot make own healthcare decisions.     Interventions    (X) * Intake acceptable    (X) * No inpatient interventions needed    8/31/2021 1:48 PM EDT by Ward Layton    Subject: Additional Clinical Information    8/29/21 Review      VS Vitals: BP (!) 95/56   Pulse 68   Temp 97.3 °F (36.3 °C) (Temporal)   Resp 16   Ht 5' 3\" (1.6 m)   Wt 173 lb 3.2 oz (78.6 kg)   SpO2 96%   BMI 30.68 kg/m²               No imaging              No pertinent abnormal lab work              Hospitalist      8-29: Back pain and lower extremity pain, again does not appear distressed.         ROS: 14 point review of systems is negative except as specifically addressed above.           Assessment and Plan:    Principal Problem:      Altered mental status    Active Problems:      Displacement of lumbar intervertebral disc without myelopathy      Spinal stenosis, lumbar region, without neurogenic claudication      Incontinence of bowel      Dysthymia      Status post placement of implantable loop recorder      Left ureteral calculus      30.0-34. 9)      Anal cancer (HCC)      Poor venous access      Dehydration      Major depressive disorder, recurrent severe without psychotic features ([facility] [address]    Resolved Problems:      * No resolved hospital problems. *              Medically stable on current medical regimen.         Seeking placement, awaiting word from [facility]. Cognitive evaluation shows patient cannot make own healthcare decisions.         Advance Directive: Full Code         DVT prophylaxis: enoxaparin         Discharge planning: TBD                Neurology [address] Day 4 (8/28/2021) by Julia Brannon RN       Review Status Review Entered   Completed 8/31/2021 12:44      Criteria Review      Care Day: 4 Care Date: 8/28/2021 Level of Care: Inpatient Floor    Guideline Day 3    Level Of Care    ( ) * Activity level acceptable    8/31/2021 1:44 PM EDT by Ivy Jackson      PT notes  Attempt: pt declined to get up to chair  Attempt: Pt stated she was hungry but did not like her food, declined mobility at this time. ( ) Floor to discharge    8/31/2021 1:44 PM EDT by Ivy Jackson      Tele, Routine VS, Home meds, Lovenox sc daily, Nicoderm patch daily, Norco po prnx2 , BRP, PT/OT eval and tx    ( ) * Complete discharge planning    Clinical Status    (X) * No infection, or status acceptable    (X) * Isolation not needed, or status acceptable    (X) * Respiratory status acceptable    (X) * Pain and nausea absent or adequately managed    (X) * Ventilatory status acceptable    (X) * Neurologic problems absent or stabilized    (X) * Muscle or nerve damage absent or stable    ( ) * General Discharge Criteria met    8/31/2021 1:44 PM EDT by Ivy Jackson      Seeking placement, awaiting word from [place].  Cognitive evaluation shows patient cannot make own healthcare decisions    Interventions    (X) * Intake acceptable    (X) * No inpatient interventions needed    8/31/2021 1:44 PM EDT by Ivy Jackson    Subject: Additional Clinical Information 8/28/21 Review      Vitals: /68   Pulse 64   Temp 96.7 °F (35.9 °C) (Temporal)   Resp 20   Ht 5' 3\" (1.6 m)   Wt 173 lb 3.2 oz (78.6 kg)   SpO2 96%   BMI 30.68 kg/m²              No imgaing              Wbc 3.6, Rbc 3.24, Hgb 95, Hct 31.9              Hospitalist Note      Subjective: Resting comfortably but complains of left elbow pain on questioning. Patient refused to work with PT today. History is otherwise unchanged      8-28: Complained of some elbow pain but not visibly distressed. Refused to work with PT today.         ROS: 14 point review of systems is negative except as specifically addressed above.         Adult Oral Nutrition Supplement; Standard High Calorie/High Protein Oral Supplement    ADULT DIET; Easy to Chew              Assessment and Plan:    Principal Problem:      Altered mental status    Active Problems:      Displacement of lumbar intervertebral disc without myelopathy      Spinal stenosis, lumbar region, without neurogenic claudication      Incontinence of bowel      Dysthymia      Status post placement of implantable loop recorder      Left ureteral calculus      Hypertension      Family history of coronary artery disease      Opioid dependence with opioid-induced disorder (HCC)      Chronic active hepatitis C ([facility] [address] - Genotype 1A, s/p Harvoni 2015 with remission      Right ureteral calculus      Simple chronic bronchitis (HCC)      Obesity (BMI 30.0-34. 9)      Anal cancer (HCC)      Poor venous access      Dehydration      Major depressive disorder, recurrent severe without psychotic features ([facility] [address]    Resolved Problems:      * No resolved hospital problems. *              Medically stable on current medical regimen.         Seeking placement, awaiting word from POA.  Cognitive evaluation shows patient cannot make own healthcare decisions.         Advance Directive: Full Code         DVT prophylaxis: enoxaparin            * Milestone   Additional Notes   8/28/21 Review Vitals: /68   Pulse 64   Temp 96.7 °F (35.9 °C) (Temporal)   Resp 20   Ht 5' 3\" (1.6 m)   Wt 173 lb 3.2 oz (78.6 kg)   SpO2 96%   BMI 30.68 kg/m²        No imgaing        Wbc 3.6, Rbc 3.24, Hgb 95, Hct 31.9        Hospitalist Note    Subjective: Resting comfortably but complains of left elbow pain on questioning. Patient refused to work with PT today. History is otherwise unchanged   8-28: Complained of some elbow pain but not visibly distressed. Refused to work with PT today.       ROS: 14 point review of systems is negative except as specifically addressed above.       Adult Oral Nutrition Supplement; Standard High Calorie/High Protein Oral Supplement   ADULT DIET; Easy to Chew       Assessment and Plan:   Principal Problem:     Altered mental status   Active Problems:     Displacement of lumbar intervertebral disc without myelopathy     Spinal stenosis, lumbar region, without neurogenic claudication     Incontinence of bowel     Dysthymia     Status post placement of implantable loop recorder     Left ureteral calculus     Hypertension     Family history of coronary artery disease     Opioid dependence with opioid-induced disorder (HCC)     Chronic active hepatitis C ([facility] [address] - Genotype 1A, s/p Harvoni 2015 with remission     Right ureteral calculus     Simple chronic bronchitis (HCC)     Obesity (BMI 30.0-34. 9)     Anal cancer (HCC)     Poor venous access     Dehydration     Major depressive disorder, recurrent severe without psychotic features ([facility] [address]   Resolved Problems:     * No resolved hospital problems. *           Medically stable on current medical regimen.       Seeking placement, awaiting word from POA.  Cognitive evaluation shows patient cannot make own healthcare decisions.       Advance Directive: Full Code       DVT prophylaxis: enoxaparin         Neurology [address] Day 3 (8/27/2021) by Ar Larose RN       Review Status Review Entered   Completed 8/31/2021 11:31      Criteria Review Care Day: 3 Care Date: 8/27/2021 Level of Care: Inpatient Floor    Guideline Day 3    Level Of Care    ( ) * Activity level acceptable    8/31/2021 12:31 PM EDT by Hazeline Guard      Pt declining ambulation with PT  Pt declines OT today    ( ) Floor to discharge    8/31/2021 12:31 PM EDT by Catrachita Parson, Routine VS, Home meds, Lovenox sc daily, Nicoderm patch daily, No PRN medications, BRP, PT/OT eval and tx    ( ) * Complete discharge planning    Clinical Status    (X) * No infection, or status acceptable    (X) * Isolation not needed, or status acceptable    (X) * Respiratory status acceptable    (X) * Pain and nausea absent or adequately managed    (X) * Ventilatory status acceptable    (X) * Neurologic problems absent or stabilized    (X) * Muscle or nerve damage absent or stable    ( ) * General Discharge Criteria met    8/31/2021 12:31 PM EDT by Marylene Schlatter SNF placement- POA needing to discuss with family    Interventions    (X) * Intake acceptable    (X) * No inpatient interventions needed    8/31/2021 12:31 PM EDT by Teresita Garcia    Subject: Additional Clinical Information    8/27/21 Review      Vitals: BP 97/60   Pulse 62   Temp 96.6 °F (35.9 °C) (Temporal)   Resp 16   Ht 5' 3\" (1.6 m)   Wt 173 lb 3.2 oz (78.6 kg)   SpO2 96%   BMI 30.68 kg/m²              No imaging              Wbc 4.2, Mg 1.5, Gluc 112, Ca 8.3              Hospitalist H&P    Chief Complaint: ambulatory dysfunction         Subjective: Patient is without complaints this morning. Contacted POA this morning, who stated she would have to discuss with multiple people regarding placement options. History is otherwise unchanged.         [facility]:    8-25: Brought to ED after the patient was discharged from behavioral health unit with depression and anxiety.  Patient taken off medications during that hospitalization and felt to be stable, but claimed she was unable to ambulate upon

## 2021-08-31 NOTE — PROGRESS NOTES
08/31/21 1507   Subjective   Subjective No.   General Comment   Comments Patient declined [id number] [id number]   Physical Therapy    Electronically signed by Glynn Miranda PTA on 8/31/2021 at 3:08 PM

## 2021-08-31 NOTE — CARE COORDINATION
Pre-cert is pending with [facility] PHYSICAL REHABILITATION [facility]. Admissions will notify  once pre-cert has cleared.    [facility] PHYSICAL REHABILITATION  [id number] [phone]  Electronically signed by Lynda Peck on 8/31/2021 at 12:22 PM

## 2021-08-31 NOTE — PROGRESS NOTES
Hospitalist Progress Note  8/31/2021 3:56 PM  Subjective:   Admit Date: 8/24/2021  PCP: Courtney Cobos MD    Chief Complaint: ambulatory dysfunction    Subjective: Patient without complaints today. Agrees to work with therapy and accepts that she will need rehab placement. Precertification is pending. Family again requesting that her opiates and benzodiazepines be given. History is otherwise unchanged. [facility] History:   8-25: Brought to ED after the patient was discharged from behavioral health unit with depression and anxiety. Patient taken off medications during that hospitalization and felt to be stable, but claimed she was unable to ambulate upon returning home. Family summoned EMS but then could not be contacted by ED staff. Admitted to med/surg for placement as behavioral health refused the patient. Cognitive evaluation ordered showing severe deficits without capacity to make medical decisions. 8-26: Patient is without complaints but remains deconditioned with PT. Seeking placement but POA has not yet decided on places to seek placement. 8-27: Still pending placement. POA did not return a list of SNF options which would be acceptable. Patient is stable without complaints. Not receiving pain medication as she has not been complaining of pain. 8-28: Complained of some elbow pain but not visibly distressed. Refused to work with PT today. 8-29: Back pain and lower extremity pain, again does not appear distressed. 8-30: SNF placement pending. Patient wants to go home but not safe to do so per PT/OT. 4-55: Precert pending for placement. Family again calling to ask why she is not getting all her medications. ROS: 14 point review of systems is negative except as specifically addressed above.     Adult Oral Nutrition Supplement; Standard High Calorie/High Protein Oral Supplement  ADULT DIET; Easy to Chew    Intake/Output Summary (Last 24 hours) at 8/31/2021 1551  Last data filed at 8/31/2021 7513  Gross per 24 hour   Intake 1080 ml   Output 200 ml   Net 880 ml     Medications:   sodium chloride       Current Facility-Administered Medications   Medication Dose Route Frequency Provider Last Rate Last Admin    famotidine (PEPCID) tablet 20 mg  20 mg Oral BID AMARIS Marks - CNP   20 mg at 08/31/21 0846    acetaminophen (TYLENOL) tablet 975 mg  975 mg Oral 3 times per day Kasia Elizondo MD   975 mg at 08/31/21 0549    dicyclomine (BENTYL) tablet 20 mg  20 mg Oral 4x Daily PRN Kasia Elizondo MD   20 mg at 08/25/21 1545    melatonin disintegrating tablet 5 mg  5 mg Oral Nightly Kasia Elizondo MD   5 mg at 08/30/21 2114    mirtazapine (REMERON) tablet 3.75 mg  3.75 mg Oral Nightly Kasia Elizondo MD   3.75 mg at 08/30/21 2113    oxyCODONE-acetaminophen (PERCOCET) 5-325 MG per tablet 1 tablet  1 tablet Oral Q8H PRN Kasia Elizondo MD   1 tablet at 08/31/21 1015    sodium chloride flush 0.9 % injection 5-40 mL  5-40 mL IntraVENous 2 times per day Kasia Elizondo MD   10 mL at 08/31/21 0847    sodium chloride flush 0.9 % injection 5-40 mL  5-40 mL IntraVENous PRN Kasia Elizondo MD        0.9 % sodium chloride infusion  25 mL IntraVENous PRN Kasia Elizondo MD        enoxaparin (LOVENOX) injection 40 mg  40 mg SubCUTAneous Daily Kasia Elizondo MD   40 mg at 08/31/21 0846    ondansetron (ZOFRAN-ODT) disintegrating tablet 4 mg  4 mg Oral Q8H PRN Kasia Elizondo MD        Or    ondansetron (ZOFRAN) injection 4 mg  4 mg IntraVENous Q6H PRN Kasia Elizondo MD   4 mg at 08/25/21 2154    polyethylene glycol (GLYCOLAX) packet 17 g  17 g Oral Daily PRN Kasia Elizondo MD        acetaminophen (TYLENOL) tablet 650 mg  650 mg Oral Q6H PRN Kasia Elizondo MD        Or    acetaminophen (TYLENOL) suppository 650 mg  650 mg Rectal Q6H PRN Kasia Elizondo MD        nicotine (Marcela Valdez) 21 MG/24HR 1 patch  1 patch Transdermal Daily Vianca Moseley MD   1 patch at 08/25/21 0912    albuterol (PROVENTIL) nebulizer solution 2.5 mg  2.5 mg Nebulization Q6H PRN Vianca Moseley MD        gabapentin (NEURONTIN) capsule 300 mg  300 mg Oral BID Vianca Moseley MD   300 mg at 08/31/21 0549    gabapentin (NEURONTIN) capsule 600 mg  600 mg Oral Nightly Vianca Moseley MD   600 mg at 08/30/21 2114        Labs:     Recent Labs     08/29/21  0335 08/30/21  0342 08/31/21  0341   WBC 3.9* 3.5* 3.6*   RBC 3.18* 3.10* 3.08*   HGB 9.4* 9.2* 9.4*   HCT 31.5* 30.8* 30.6*   MCV 99.1* 99.4* 99.4*   MCH 29.6 29.7 30.5   MCHC 29.8* 29.9* 30.7*    195 197     Recent Labs     08/29/21  0335 08/30/21  0342 08/31/21  0341    140 142   K 3.5 3.9 3.8   ANIONGAP 10 9 11    109 106   CO2 25 22 25   BUN 13 13 14   CREATININE 0.5 0.5 0.5   GLUCOSE 124* 95 106   CALCIUM 8.7* 8.6* 8.3*     Recent Labs     08/29/21  0335   MG 1.5*     No results for input(s): AST, ALT, ALB, BILITOT, ALKPHOS, ALB in the last 72 hours. ABGs:No results for input(s): PH, PO2, PCO2, HCO3, BE, O2SAT in the last 72 hours. Troponin T: No results for input(s): TROPONINI in the last 72 hours. INR: No results for input(s): INR in the last 72 hours. Lactic Acid: No results for input(s): LACTA in the last 72 hours.     Objective:   Vitals: /60   Pulse 72   Temp 96.7 °F (35.9 °C) (Temporal)   Resp 16   Ht 5' 3\" (1.6 m)   Wt 173 lb 3.2 oz (78.6 kg)   SpO2 96%   BMI 30.68 kg/m²   24HR INTAKE/OUTPUT:      Intake/Output Summary (Last 24 hours) at 8/31/2021 1556  Last data filed at 8/31/2021 0946  Gross per 24 hour   Intake 1080 ml   Output 200 ml   Net 880 ml     General appearance: alert and cooperative with exam  HEENT: atraumatic, eyes with clear conjunctiva and normal lids, pupils and irises normal, external ears and nose are normal, lips normal  Neck without masses, lympadenopathy, bruit, thyroid

## 2021-09-01 LAB
ANION GAP SERPL CALCULATED.3IONS-SCNC: 8 MMOL/L (ref 7–19)
BUN BLDV-MCNC: 12 MG/DL (ref 8–23)
CALCIUM SERPL-MCNC: 8.4 MG/DL (ref 8.8–10.2)
CHLORIDE BLD-SCNC: 106 MMOL/L (ref 98–111)
CO2: 27 MMOL/L (ref 22–29)
CREAT SERPL-MCNC: 0.5 MG/DL (ref 0.5–0.9)
GFR AFRICAN AMERICAN: >59
GFR NON-AFRICAN AMERICAN: >60
GLUCOSE BLD-MCNC: 102 MG/DL (ref 74–109)
HCT VFR BLD CALC: 31.8 % (ref 37–47)
HEMOGLOBIN: 9.5 G/DL (ref 12–16)
MCH RBC QN AUTO: 29.3 PG (ref 27–31)
MCHC RBC AUTO-ENTMCNC: 29.9 G/DL (ref 33–37)
MCV RBC AUTO: 98.1 FL (ref 81–99)
PDW BLD-RTO: 13.2 % (ref 11.5–14.5)
PLATELET # BLD: 210 K/UL (ref 130–400)
PMV BLD AUTO: 11.3 FL (ref 9.4–12.3)
POTASSIUM REFLEX MAGNESIUM: 4 MMOL/L (ref 3.5–5)
RBC # BLD: 3.24 M/UL (ref 4.2–5.4)
SODIUM BLD-SCNC: 141 MMOL/L (ref 136–145)
WBC # BLD: 3.8 K/UL (ref 4.8–10.8)

## 2021-09-01 PROCEDURE — 96372 THER/PROPH/DIAG INJ SC/IM: CPT

## 2021-09-01 PROCEDURE — 6360000002 HC RX W HCPCS: Performed by: INTERNAL MEDICINE

## 2021-09-01 PROCEDURE — 6370000000 HC RX 637 (ALT 250 FOR IP): Performed by: NURSE PRACTITIONER

## 2021-09-01 PROCEDURE — 97530 THERAPEUTIC ACTIVITIES: CPT

## 2021-09-01 PROCEDURE — 85027 COMPLETE CBC AUTOMATED: CPT

## 2021-09-01 PROCEDURE — 36415 COLL VENOUS BLD VENIPUNCTURE: CPT

## 2021-09-01 PROCEDURE — 80048 BASIC METABOLIC PNL TOTAL CA: CPT

## 2021-09-01 PROCEDURE — 1210000000 HC MED SURG R&B

## 2021-09-01 PROCEDURE — 97116 GAIT TRAINING THERAPY: CPT

## 2021-09-01 PROCEDURE — 6370000000 HC RX 637 (ALT 250 FOR IP): Performed by: INTERNAL MEDICINE

## 2021-09-01 PROCEDURE — 2580000003 HC RX 258: Performed by: INTERNAL MEDICINE

## 2021-09-01 RX ADMIN — ACETAMINOPHEN 975 MG: 325 TABLET ORAL at 21:50

## 2021-09-01 RX ADMIN — ENOXAPARIN SODIUM 40 MG: 40 INJECTION SUBCUTANEOUS at 07:42

## 2021-09-01 RX ADMIN — OXYCODONE HYDROCHLORIDE AND ACETAMINOPHEN 1 TABLET: 5; 325 TABLET ORAL at 18:46

## 2021-09-01 RX ADMIN — Medication 5 MG: at 21:50

## 2021-09-01 RX ADMIN — MIRTAZAPINE 3.75 MG: 7.5 TABLET ORAL at 21:49

## 2021-09-01 RX ADMIN — SODIUM CHLORIDE, PRESERVATIVE FREE 10 ML: 5 INJECTION INTRAVENOUS at 21:53

## 2021-09-01 RX ADMIN — OXYCODONE HYDROCHLORIDE AND ACETAMINOPHEN 1 TABLET: 5; 325 TABLET ORAL at 03:02

## 2021-09-01 RX ADMIN — GABAPENTIN 300 MG: 300 CAPSULE ORAL at 11:55

## 2021-09-01 RX ADMIN — OXYCODONE HYDROCHLORIDE AND ACETAMINOPHEN 1 TABLET: 5; 325 TABLET ORAL at 10:57

## 2021-09-01 RX ADMIN — FAMOTIDINE 20 MG: 20 TABLET ORAL at 07:41

## 2021-09-01 RX ADMIN — GABAPENTIN 300 MG: 300 CAPSULE ORAL at 07:43

## 2021-09-01 RX ADMIN — FAMOTIDINE 20 MG: 20 TABLET ORAL at 21:49

## 2021-09-01 RX ADMIN — ACETAMINOPHEN 975 MG: 325 TABLET ORAL at 07:41

## 2021-09-01 RX ADMIN — SODIUM CHLORIDE, PRESERVATIVE FREE 10 ML: 5 INJECTION INTRAVENOUS at 07:44

## 2021-09-01 RX ADMIN — GABAPENTIN 600 MG: 300 CAPSULE ORAL at 21:49

## 2021-09-01 RX ADMIN — ACETAMINOPHEN 975 MG: 325 TABLET ORAL at 15:22

## 2021-09-01 ASSESSMENT — PAIN DESCRIPTION - DESCRIPTORS
DESCRIPTORS: CONSTANT;SORE
DESCRIPTORS: ACHING;SORE
DESCRIPTORS: ACHING;SORE

## 2021-09-01 ASSESSMENT — PAIN DESCRIPTION - ORIENTATION
ORIENTATION: LEFT
ORIENTATION: MID;LOWER
ORIENTATION: ANTERIOR
ORIENTATION: LEFT

## 2021-09-01 ASSESSMENT — PAIN SCALES - GENERAL
PAINLEVEL_OUTOF10: 0
PAINLEVEL_OUTOF10: 8
PAINLEVEL_OUTOF10: 5
PAINLEVEL_OUTOF10: 7
PAINLEVEL_OUTOF10: 5
PAINLEVEL_OUTOF10: 8
PAINLEVEL_OUTOF10: 7

## 2021-09-01 ASSESSMENT — PAIN DESCRIPTION - LOCATION
LOCATION: GENERALIZED;BACK;ABDOMEN
LOCATION: SHOULDER
LOCATION: SHOULDER
LOCATION: HEAD

## 2021-09-01 ASSESSMENT — PAIN DESCRIPTION - PAIN TYPE
TYPE: ACUTE PAIN
TYPE: CHRONIC PAIN

## 2021-09-01 ASSESSMENT — PAIN DESCRIPTION - ONSET
ONSET: ON-GOING
ONSET: ON-GOING

## 2021-09-01 ASSESSMENT — PAIN DESCRIPTION - PROGRESSION
CLINICAL_PROGRESSION: NOT CHANGED
CLINICAL_PROGRESSION: NOT CHANGED

## 2021-09-01 ASSESSMENT — PAIN DESCRIPTION - FREQUENCY
FREQUENCY: CONTINUOUS

## 2021-09-01 ASSESSMENT — PAIN - FUNCTIONAL ASSESSMENT
PAIN_FUNCTIONAL_ASSESSMENT: PREVENTS OR INTERFERES SOME ACTIVE ACTIVITIES AND ADLS
PAIN_FUNCTIONAL_ASSESSMENT: PREVENTS OR INTERFERES WITH MANY ACTIVE NOT PASSIVE ACTIVITIES
PAIN_FUNCTIONAL_ASSESSMENT: PREVENTS OR INTERFERES SOME ACTIVE ACTIVITIES AND ADLS

## 2021-09-01 NOTE — PROGRESS NOTES
Hospitalist Progress Note  9/1/2021 3:03 PM  Subjective:   Admit Date: 8/24/2021  PCP: Janie Rojas MD    Chief Complaint: ambulatory dysfunction    Subjective: Patient seen and examined this a.m., states she is extremely fatigued. Awaiting acceptance to the nursing facility. Denies any headache, change in vision, chest pain, fevers or chills. [facility] History:   8-25: Brought to ED after the patient was discharged from behavioral health unit with depression and anxiety. Patient taken off medications during that hospitalization and felt to be stable, but claimed she was unable to ambulate upon returning home. Family summoned EMS but then could not be contacted by ED staff. Admitted to med/surg for placement as behavioral health refused the patient. Cognitive evaluation ordered showing severe deficits without capacity to make medical decisions. 8-26: Patient is without complaints but remains deconditioned with PT. Seeking placement but POA has not yet decided on places to seek placement. 8-27: Still pending placement. POA did not return a list of SNF options which would be acceptable. Patient is stable without complaints. Not receiving pain medication as she has not been complaining of pain. 8-28: Complained of some elbow pain but not visibly distressed. Refused to work with PT today. 8-29: Back pain and lower extremity pain, again does not appear distressed. 8-30: SNF placement pending. Patient wants to go home but not safe to do so per PT/OT. 6-60: Precert pending for placement. Family again calling to ask why she is not getting all her medications. 9-1: Electrolytes stable, awaiting precertification to [address] [place] [address].         Adult Oral Nutrition Supplement; Standard High Calorie/High Protein Oral Supplement  ADULT DIET; Easy to Chew    Intake/Output Summary (Last 24 hours) at 9/1/2021 1503  Last data filed at 9/1/2021 1143  Gross per 24 hour   Intake 360 ml   Output 251 ml Net 109 ml     Medications:   sodium chloride       Current Facility-Administered Medications   Medication Dose Route Frequency Provider Last Rate Last Admin    famotidine (PEPCID) tablet 20 mg  20 mg Oral BID Jasmynmiguel angel GruberAMARIS - CNP   20 mg at 09/01/21 0741    acetaminophen (TYLENOL) tablet 975 mg  975 mg Oral 3 times per day Therese Mayes MD   975 mg at 09/01/21 0741    dicyclomine (BENTYL) tablet 20 mg  20 mg Oral 4x Daily PRN Therese Mayes MD   20 mg at 08/25/21 1545    melatonin disintegrating tablet 5 mg  5 mg Oral Nightly Therese Mayes MD   5 mg at 08/31/21 1945    mirtazapine (REMERON) tablet 3.75 mg  3.75 mg Oral Nightly Therese Mayes MD   3.75 mg at 08/31/21 1944    oxyCODONE-acetaminophen (PERCOCET) 5-325 MG per tablet 1 tablet  1 tablet Oral Q8H PRN Therese Mayes MD   1 tablet at 09/01/21 1057    sodium chloride flush 0.9 % injection 5-40 mL  5-40 mL IntraVENous 2 times per day Therese Mayes MD   10 mL at 09/01/21 0744    sodium chloride flush 0.9 % injection 5-40 mL  5-40 mL IntraVENous PRN Therese Mayes MD        0.9 % sodium chloride infusion  25 mL IntraVENous PRN Therese Mayes MD        enoxaparin (LOVENOX) injection 40 mg  40 mg SubCUTAneous Daily Therese Mayes MD   40 mg at 09/01/21 0742    ondansetron (ZOFRAN-ODT) disintegrating tablet 4 mg  4 mg Oral Q8H PRN Therese Mayes MD        Or    ondansetron (ZOFRAN) injection 4 mg  4 mg IntraVENous Q6H PRN Therese Mayes MD   4 mg at 08/25/21 2154    polyethylene glycol (GLYCOLAX) packet 17 g  17 g Oral Daily PRN Therese Mayes MD        acetaminophen (TYLENOL) tablet 650 mg  650 mg Oral Q6H PRN Therese Mayes MD        Or    acetaminophen (TYLENOL) suppository 650 mg  650 mg Rectal Q6H PRN Therese Mayes MD        nicotine (NICODERM CQ) 21 MG/24HR 1 patch  1 patch TransDERmal Daily Genna MURILLO Hospitalist  On 9/1/2021  At 11:50 AM

## 2021-09-01 NOTE — CARE COORDINATION
CM outreached to Elena Borjaaguilarcarlos, [facility] worker, [phone]. Pt referred to APS for self neglect. Steven Humphrey does not know who Santi Vitale is and does not think patient think patient has a Guardian. Steven Humphrey has been following patient prior trying to find a reasonable safe location for patient. Per Steven Humphrey, patient has gone to a SNF and \"walked away\" Steven Humphrey will f/u with Santi Vitale and determine is Alix Huitron is valid. CM outreached to [facility]. CM being told in [address] patient does not have Guardian listed. CM called back to Steven Humphrey, who is reporting, Santi Vitale is POA/HCS. Apparently, Timo Grullon is concerned patient had medications adjusted during her [facility] stay, and is concern that patient may need to have some of mood/pain medications resumed.    Electronically signed by Manjeet Sequeira RN on 9/1/2021 at 3:07 PM

## 2021-09-01 NOTE — PROGRESS NOTES
Occupational Therapy  Facility/Department: [facility] SURG SERVICES  Daily Treatment Note  NAME: Bina Seaman  : 1960  MRN: [id number]    Date of Service: 2021    Discharge Recommendations:  Patient would benefit from continued therapy after discharge       Assessment   Performance deficits / Impairments: Decreased functional mobility ; Decreased ADL status; Decreased endurance;Decreased balance;Decreased high-level IADLs  Assessment: Pt walked to bathroom and back to bed with CGA. Pt very tearful throughout session. Pt left in bed with bed alarm on and all needs within reach. Pt continues to benefit from skilled OT services. Treatment Diagnosis: Altered Mental Status, Spinal Stenosis, Dysthymia  History: has colostomy, recent discharge from psych services, opiod dependency, active hepatitis C, anal CA, major depressive disorder  REQUIRES OT FOLLOW UP: Yes  Activity Tolerance  Activity Tolerance: Patient limited by pain; Patient limited by fatigue;Treatment limited secondary to medical complications (free text)         Patient Diagnosis(es): The primary encounter diagnosis was Depression, unspecified depression type. A diagnosis of Chronic pain syndrome was also pertinent to this visit.       has a past medical history of Alcohol abuse, Anal cancer ([facility] [place] [address] Anxiety, Arthritis, Calcification of abdominal aorta ([address] Chronic active hepatitis C ([facility] [place] [address] - Genotype 1A, s/p Harvoni  with remission, Chronic back pain, Colon polyp, COPD (chronic obstructive pulmonary disease) ([facility] [place] [address] Decrease in appetite, Depression, Diarrhea, GERD (gastroesophageal reflux disease), H/O: GI bleed, Herpes simplex, History of blood transfusion, Hx of chronic active hepatitis, Hypertension, Irregular heart beat, Kidney stones, Memory loss, Mitral valve disease, Neuropathy, Osteoarthritis, Other malaise and fatigue, Pancreatitis, Prediabetes, Recurrent UTI, Restless leg, SOB (shortness of breath), Status post placement of implantable loop recorder, and Weight loss. has a past surgical history that includes Hysterectomy; Abdomen surgery; Breast surgery; Kidney stone surgery (09/2012); Finger surgery; Toe Surgery; lumbar laminectomy (06/24/2011); Foot surgery (10/2011); back surgery (2011); Colonoscopy (08/18/2008); Colonoscopy (09/18/2012); Lithotripsy; Cardiac catheterization; Tonsillectomy; Cholecystectomy; Upper gastrointestinal endoscopy (08/29/2008); Upper gastrointestinal endoscopy (08/19/2008); Upper gastrointestinal endoscopy (10/24/2013); Cosmetic surgery; Breast reduction surgery (Bilateral); Endoscopy, colon, diagnostic; skin biopsy; Insertable Cardiac Monitor; Ureter surgery (Left, 10/05/2016); Lithotripsy (Left, 10/05/2016); Cystoscopy (Left, 10/05/2016); pr cysto/uretero/pyeloscopy w/lithotripsy (Right, 06/21/2018); pr cystoscopy,insert ureteral stent (Right, 06/21/2018); Kidney surgery; Cystoscopy (Left, 02/20/2019); Cystoscopy (Left, 02/20/2019); Colonoscopy (03/12/2015); and liver biopsy (08/13/2008). Restrictions  Restrictions/Precautions  Restrictions/Precautions: Fall Risk  Position Activity Restriction  Other position/activity restrictions: COLOSTOMY  Subjective   General  Chart Reviewed: Yes  Patient assessed for rehabilitation services?: Yes  Additional Pertinent Hx: Psychosis, spinal stenosis, AMS, hx of colostomy  Response to previous treatment: Patient with no complaints from previous session  Family / Caregiver Present: No  Referring Practitioner: Negin Swain DO  Diagnosis: Altered mental status  Subjective  Subjective: Pt in bed upon arrival for therapy after lunch this date. Pt required encouragement to participate. General Comment  Comments: Pt presents with increased emotional state this date. Pain Assessment  Pain Assessment: 0-10  Pain Level: 8  Pain Type: Chronic pain  Pain Location: Shoulder  Pain Orientation: Left  Pain Descriptors: Aching; Sore  Pain Frequency: Continuous  Pain Onset: On-going  Clinical Progression: Not changed  Functional Pain Assessment: Prevents or interferes some active activities and ADLs  Non-Pharmaceutical Pain Intervention(s): Ambulation/Increased Activity;Repositioned;Rest;Therapeutic touch  Response to Pain Intervention: Patient Satisfied  Vital Signs  Patient Currently in Pain: Yes   Orientation     Objective             Balance  Sitting Balance: Supervision  Standing Balance: Contact guard assistance  Bed mobility  Sit to Supine: Stand by assistance  Transfers  Stand Step Transfers: Contact guard assistance  Sit to stand: Contact guard assistance  Stand to sit: Contact guard assistance                                                           Plan   Plan  Times per week: 3-5  Times per day: Daily  Plan weeks: 2  Plan Comment: OT To treat for weakness, fatigue, LUE limited function and ADLS.   Goals  Short term goals  Time Frame for Short term goals: 1-2 weeks  Short term goal 1: Supervision for dressing  Short term goal 2: Supervision for toileting  Short term goal 3: Supervision for transfers  Short term goal 4: Supervision for light ambulatory home management  Long term goals  Long term goal 1: Upgrade as tolerated       Therapy Time   Individual Concurrent Group Co-treatment   Time In           Time Out           Minutes              JUSTEN Warren  Electronically signed by JUSTEN Warren on 9/1/2021 at 12:02 PM

## 2021-09-01 NOTE — PROGRESS NOTES
Physical Therapy  Name: Matty Castorena  MRN:  [id number]  Date of service:  9/1/2021 09/01/21 1039   Restrictions/Precautions   Restrictions/Precautions Fall Risk   Position Activity Restriction   Other position/activity restrictions COLOSTOMY   General   Chart Reviewed Yes   Subjective   Subjective Pt in bed, requires encouragement but agrees to work with therapy. Pain Screening   Patient Currently in Pain Yes   Pain Assessment   Pain Assessment 0-10   Pain Level 5   Pain Type Chronic pain   Pain Location Shoulder   Pain Descriptors Aching; Sore   Pain Frequency Continuous   Functional Pain Assessment Prevents or interferes some active activities and ADLs   Non-Pharmaceutical Pain Intervention(s) Ambulation/Increased Activity;Repositioned; Rest   Response to Pain Intervention Patient Satisfied  (once positioned for comfort)   Pain Orientation Left   Bed Mobility   Supine to Sit Stand by assistance   Sit to Supine Stand by assistance   Transfers   Sit to Stand Contact guard assistance   Stand to sit Contact guard assistance   Comment Pt sat EOB x10 min before willing to stand, good sitting balance   Ambulation   Ambulation? Yes   Ambulation 1   Surface level tile   Device Rolling Walker   Assistance Contact guard assistance   Quality of Gait flightly fwd flexed, unsteady   Gait Deviations Slow Bridgette;Decreased step length   Distance 10' x2   Comments to/from the BR   Short term goals   Time Frame for Short term goals 14 DAYS   Short term goal 1 BED MOB INDEPENDENT   Short term goal 2 TRANSFERS SBA   Short term goal 3 ' AAD SBA   Conditions Requiring Skilled Therapeutic Intervention   Body structures, Functions, Activity limitations Decreased functional mobility ; Decreased ADL status; Decreased cognition;Decreased balance; Increased pain   Assessment Worked with pt on bed mobility, pt sat EOB x10 min and became tearful, then stood and amb to and from the BR with CGA.  Pt requires much additional time and encouragement to perform all tasks. Pt returned to supine and positioned for comfort with all needs in reach. Activity Tolerance   Activity Tolerance Patient limited by endurance; Patient limited by cognitive status   Safety Devices   Type of devices Bed alarm in place;Call light within reach;Gait belt;Left in bed         Electronically signed by Giana Burnett PTA on 9/1/2021 at 11:39 AM

## 2021-09-01 NOTE — CARE COORDINATION
Late entry for CM note 8/31/2021  CM met with patient at the bedside. Pt reports, she is willing to go to SNF. CM advised, she has been referred to [facility] of in [address] [place] [address]. Apparently, her sister lives in [address] [place] [address]. Pt states, she lives alone. Pt states, she does not \"really have anyone to help her\" Pt has a son/daughter in law, and niece listed as contacts. Pt reports, she has not heard from son, daughter or other family members lately. Pt is wanting her glasses and other personal items. Pt reports, she has not been able to make her f/u appointments for lack of transportation. Patient is s/p Anal CA with colostomy. CM re-assured patient, transportation would be available to her at the SNF to get to f/u appointments.    Electronically signed by Enio Cerda RN on 9/1/2021 at 2:59 PM

## 2021-09-02 VITALS
BODY MASS INDEX: 30.69 KG/M2 | TEMPERATURE: 97.2 F | OXYGEN SATURATION: 96 % | HEIGHT: 63 IN | HEART RATE: 74 BPM | WEIGHT: 173.2 LBS | SYSTOLIC BLOOD PRESSURE: 101 MMHG | RESPIRATION RATE: 18 BRPM | DIASTOLIC BLOOD PRESSURE: 63 MMHG

## 2021-09-02 LAB
ANION GAP SERPL CALCULATED.3IONS-SCNC: 10 MMOL/L (ref 7–19)
BUN BLDV-MCNC: 13 MG/DL (ref 8–23)
CALCIUM SERPL-MCNC: 8.4 MG/DL (ref 8.8–10.2)
CHLORIDE BLD-SCNC: 105 MMOL/L (ref 98–111)
CO2: 25 MMOL/L (ref 22–29)
CREAT SERPL-MCNC: 0.5 MG/DL (ref 0.5–0.9)
GFR AFRICAN AMERICAN: >59
GFR NON-AFRICAN AMERICAN: >60
GLUCOSE BLD-MCNC: 106 MG/DL (ref 74–109)
HCT VFR BLD CALC: 31.5 % (ref 37–47)
HEMOGLOBIN: 9.6 G/DL (ref 12–16)
MCH RBC QN AUTO: 29.9 PG (ref 27–31)
MCHC RBC AUTO-ENTMCNC: 30.5 G/DL (ref 33–37)
MCV RBC AUTO: 98.1 FL (ref 81–99)
PDW BLD-RTO: 13.4 % (ref 11.5–14.5)
PLATELET # BLD: 225 K/UL (ref 130–400)
PMV BLD AUTO: 11 FL (ref 9.4–12.3)
POTASSIUM REFLEX MAGNESIUM: 3.8 MMOL/L (ref 3.5–5)
RBC # BLD: 3.21 M/UL (ref 4.2–5.4)
SARS-COV-2, NAAT: NOT DETECTED
SODIUM BLD-SCNC: 140 MMOL/L (ref 136–145)
WBC # BLD: 4.2 K/UL (ref 4.8–10.8)

## 2021-09-02 PROCEDURE — 2580000003 HC RX 258: Performed by: INTERNAL MEDICINE

## 2021-09-02 PROCEDURE — 96372 THER/PROPH/DIAG INJ SC/IM: CPT

## 2021-09-02 PROCEDURE — 87635 SARS-COV-2 COVID-19 AMP PRB: CPT

## 2021-09-02 PROCEDURE — 80048 BASIC METABOLIC PNL TOTAL CA: CPT

## 2021-09-02 PROCEDURE — 85027 COMPLETE CBC AUTOMATED: CPT

## 2021-09-02 PROCEDURE — 6370000000 HC RX 637 (ALT 250 FOR IP): Performed by: FAMILY MEDICINE

## 2021-09-02 PROCEDURE — 6370000000 HC RX 637 (ALT 250 FOR IP): Performed by: INTERNAL MEDICINE

## 2021-09-02 PROCEDURE — 36415 COLL VENOUS BLD VENIPUNCTURE: CPT

## 2021-09-02 PROCEDURE — 6370000000 HC RX 637 (ALT 250 FOR IP): Performed by: NURSE PRACTITIONER

## 2021-09-02 PROCEDURE — 6360000002 HC RX W HCPCS: Performed by: INTERNAL MEDICINE

## 2021-09-02 RX ORDER — GABAPENTIN 300 MG/1
600 CAPSULE ORAL NIGHTLY
Qty: 6 CAPSULE | Refills: 0 | Status: ON HOLD | OUTPATIENT
Start: 2021-09-02 | End: 2022-09-29 | Stop reason: HOSPADM

## 2021-09-02 RX ORDER — OXYCODONE HYDROCHLORIDE AND ACETAMINOPHEN 5; 325 MG/1; MG/1
1 TABLET ORAL EVERY 8 HOURS PRN
Qty: 9 TABLET | Refills: 0 | Status: SHIPPED | OUTPATIENT
Start: 2021-09-02 | End: 2021-09-05

## 2021-09-02 RX ORDER — GABAPENTIN 300 MG/1
300 CAPSULE ORAL 2 TIMES DAILY
Qty: 6 CAPSULE | Refills: 0 | Status: SHIPPED | OUTPATIENT
Start: 2021-09-02 | End: 2022-09-28

## 2021-09-02 RX ORDER — LIDOCAINE 4 G/G
1 PATCH TOPICAL DAILY
Status: DISCONTINUED | OUTPATIENT
Start: 2021-09-02 | End: 2021-09-03 | Stop reason: HOSPADM

## 2021-09-02 RX ORDER — POLYETHYLENE GLYCOL 3350 17 G/17G
17 POWDER, FOR SOLUTION ORAL DAILY PRN
Qty: 527 G | Refills: 1 | Status: SHIPPED | OUTPATIENT
Start: 2021-09-02 | End: 2021-10-02

## 2021-09-02 RX ADMIN — SODIUM CHLORIDE, PRESERVATIVE FREE 10 ML: 5 INJECTION INTRAVENOUS at 09:38

## 2021-09-02 RX ADMIN — FAMOTIDINE 20 MG: 20 TABLET ORAL at 09:37

## 2021-09-02 RX ADMIN — GABAPENTIN 300 MG: 300 CAPSULE ORAL at 06:40

## 2021-09-02 RX ADMIN — OXYCODONE HYDROCHLORIDE AND ACETAMINOPHEN 1 TABLET: 5; 325 TABLET ORAL at 19:38

## 2021-09-02 RX ADMIN — ENOXAPARIN SODIUM 40 MG: 40 INJECTION SUBCUTANEOUS at 09:37

## 2021-09-02 RX ADMIN — OXYCODONE HYDROCHLORIDE AND ACETAMINOPHEN 1 TABLET: 5; 325 TABLET ORAL at 03:32

## 2021-09-02 RX ADMIN — OXYCODONE HYDROCHLORIDE AND ACETAMINOPHEN 1 TABLET: 5; 325 TABLET ORAL at 11:21

## 2021-09-02 RX ADMIN — ACETAMINOPHEN 650 MG: 325 TABLET ORAL at 06:39

## 2021-09-02 RX ADMIN — ACETAMINOPHEN 650 MG: 325 TABLET ORAL at 16:25

## 2021-09-02 RX ADMIN — DICYCLOMINE HYDROCHLORIDE 20 MG: 20 TABLET ORAL at 09:37

## 2021-09-02 RX ADMIN — GABAPENTIN 300 MG: 300 CAPSULE ORAL at 11:21

## 2021-09-02 RX ADMIN — GABAPENTIN 600 MG: 300 CAPSULE ORAL at 19:38

## 2021-09-02 ASSESSMENT — PAIN DESCRIPTION - PAIN TYPE: TYPE: CHRONIC PAIN

## 2021-09-02 ASSESSMENT — PAIN SCALES - GENERAL
PAINLEVEL_OUTOF10: 5
PAINLEVEL_OUTOF10: 7
PAINLEVEL_OUTOF10: 3
PAINLEVEL_OUTOF10: 0
PAINLEVEL_OUTOF10: 8
PAINLEVEL_OUTOF10: 6
PAINLEVEL_OUTOF10: 5

## 2021-09-02 ASSESSMENT — PAIN DESCRIPTION - LOCATION: LOCATION: SHOULDER

## 2021-09-02 ASSESSMENT — PAIN DESCRIPTION - ORIENTATION: ORIENTATION: LEFT

## 2021-09-02 NOTE — CARE COORDINATION
Yanely in office this am, stating patient has been accepted to [facility]. Wil Cifuentes has visited with patient.    Electronically signed by Grady Ambrosio RN on 9/2/2021 at 3:09 PM   CM alerted Katerine Guerrero [facility], pt will be admitted to [facility] today  CM alerted Milad Baron [place], pt will be admitted to [facility] today  Electronically signed by Grady Ambrosio RN on 9/2/2021 at 3:15 PM

## 2021-09-02 NOTE — DISCHARGE INSTR - DIET

## 2021-09-02 NOTE — PROGRESS NOTES
Physical Therapy  Name: Taj Houston  MRN:  [id number]  Date of service:  9/2/2021 09/02/21 0948   General   Missed reason Patient declined   Subjective   Subjective Attempt: Pt states that she hasn't gotten any sleep and is unwilling to participate at this time.           Electronically signed by Lili Clark PTA on 9/2/2021 at 9:49 AM

## 2021-09-02 NOTE — DISCHARGE SUMMARY
Discharge Summary  Date:9/2/2021        Patient [id number]     YOB: 1960     Age:61 y.o. Admit Date:8/24/2021   Admission Condition:poor   Discharged Condition:fair  Discharge Date: 09/02/21     Discharge Diagnoses   Principal Problem:    Altered mental status  Active Problems:    Displacement of lumbar intervertebral disc without myelopathy    Spinal stenosis, lumbar region, without neurogenic claudication    Incontinence of bowel    Dysthymia    Status post placement of implantable loop recorder    Left ureteral calculus    Hypertension    Family history of coronary artery disease    Opioid dependence with opioid-induced disorder ([facility]    Chronic active hepatitis C ([facility] - Genotype 1A, s/p Harvoni 2015 with remission    Right ureteral calculus    Simple chronic bronchitis (HCC)    Obesity (BMI 30.0-34. 9)    Anal cancer ([facility]    Poor venous access    Dehydration    Major depressive disorder, recurrent severe without psychotic features ([facility]  Resolved Problems:    * No resolved hospital problems. [facility] Stay   Narrative of Hospital Course:     8-25: Brought to ED after the patient was discharged from behavioral health unit with depression and anxiety. Patient taken off medications during that hospitalization and felt to be stable, but claimed she was unable to ambulate upon returning home. Family summoned EMS but then could not be contacted by ED staff. Admitted to med/surg for placement as behavioral health refused the patient. Cognitive evaluation ordered showing severe deficits without capacity to make medical decisions. 8-26: Patient is without complaints but remains deconditioned with PT. Seeking placement but POA has not yet decided on places to seek placement. 8-27: Still pending placement. POA did not return a list of SNF options which would be acceptable. Patient is stable without complaints. Not receiving pain medication as she has not been complaining of pain.   8-28: Complained of some elbow pain but not visibly distressed. Refused to work with PT today. 8-29: Back pain and lower extremity pain, again does not appear distressed. 8-30: SNF placement pending. Patient wants to go home but not safe to do so per PT/OT. 5-70: Precert pending for placement. Family again calling to ask why she is not getting all her medications. 9-1: Electrolytes stable, awaiting precertification to [address] [place] [address] 1. 9-2: Informed by social work of acceptance to skilled nursing facility, will obtain Covid screening if necessary prior to transition. Addition of lidocaine patches patient complaining of some tenderness of the left shoulder joint. Urine culture revealing greater than 50,000 mixed bartolo. Consultants:   IP CONSULT TO SOCIAL WORK    Time Spent on Discharge:  45  minutes were spent in patient examination, evaluation, counseling as well as medication reconciliation, prescriptions for required medications, discharge plan and follow up.       Surgeries/Procedures Performed:       Treatments:   IV hydration, analgesia: acetaminophen w/ codeine and Percocet, anticoagulation: LMW heparin, respiratory therapy: O2 and albuterol/atropine nebulizer, therapies: PT and OT and electrolyte monitoring and stabilization    Significant Diagnostic Studies:   Recent Labs:  CBC:   Lab Results   Component Value Date    WBC 4.2 09/02/2021    RBC 3.21 09/02/2021    HGB 9.6 09/02/2021    HGB 13.9 06/14/2011    HCT 31.5 09/02/2021    HCT 42.4 03/05/2012    MCV 98.1 09/02/2021    MCH 29.9 09/02/2021    MCHC 30.5 09/02/2021    RDW 13.4 09/02/2021     09/02/2021     03/05/2012     BMP:    Lab Results   Component Value Date    GLUCOSE 106 09/02/2021     09/02/2021     03/05/2012    K 3.8 09/02/2021    K 3.7 03/05/2012     09/02/2021     03/05/2012    CO2 25 09/02/2021    ANIONGAP 10 09/02/2021    BUN 13 09/02/2021    CREATININE 0.5 09/02/2021    CREATININE 0.8 Pulmonary:      Effort: No respiratory distress. Breath sounds: No wheezing. Abdominal:      Tenderness: There is no abdominal tenderness. Comments: Colostomy LLQ   Musculoskeletal: Tenderness of the left shoulder. Cervical back: Normal range of motion. Skin:     Coloration: Skin is pale. Neurological:      Mental Status: She is alert. Motor: Weakness present. Psychiatric:         Mood and Affect: Mood normal.           Discharge Plan   Disposition: To a non-[facility] facility    Provider Follow-Up:   Anne Umana MD  [address]  [address]  [phone]    Schedule an appointment as soon as possible for a visit in 1 week           Patient Instructions   Diet: Easy to chew diet    Activity: activity as tolerated      Discharge Medications         Medication List      START taking these medications    polyethylene glycol 17 g packet  Commonly known as: GLYCOLAX  Take 17 g by mouth daily as needed for Constipation        CHANGE how you take these medications    * gabapentin 300 MG capsule  Commonly known as: NEURONTIN  Take 1 capsule by mouth 2 times daily for 3 days. What changed: when to take this     * gabapentin 300 MG capsule  Commonly known as: NEURONTIN  Take 2 capsules by mouth nightly for 3 days. What changed: You were already taking a medication with the same name, and this prescription was added. Make sure you understand how and when to take each. * This list has 2 medication(s) that are the same as other medications prescribed for you. Read the directions carefully, and ask your doctor or other care provider to review them with you.             CONTINUE taking these medications    acetaminophen 325 MG tablet  Commonly known as: TYLENOL     albuterol sulfate  (90 Base) MCG/ACT inhaler  INHALE 2 PUFFS INTO THE LUNGS EVERY 6 HOURS AS NEEDED FOR WHEEZING     Ascorbic Acid 100 MG Chew     Carafate 1 GM tablet  Generic drug: sucralfate     dicyclomine 20 MG tablet  Commonly known as: BENTYL     ibuprofen 600 MG tablet  Commonly known as: ADVIL;MOTRIN     melatonin 5 MG Tbdp disintegrating tablet  Take 1 tablet by mouth nightly     mirtazapine 7.5 MG tablet  Commonly known as: REMERON  Take 0.5 tablets by mouth nightly     ondansetron 8 MG Tbdp disintegrating tablet  Commonly known as: ZOFRAN-ODT  DISSOLVE 1 TABLET UNDER TONGUE EVERY 8 HOURS AS NEEDED FOR NAUSEA OR VOMITING     oxyCODONE-acetaminophen 5-325 MG per tablet  Commonly known as: PERCOCET  Take 1 tablet by mouth every 8 hours as needed for Pain for up to 3 days. Potassium Citrate ER 15 MEQ (1620 MG) Tbcr  TAKE 1 TABLET BY MOUTH THREE TIMES DAILY. vitamin B-12 500 MCG tablet  Commonly known as: CYANOCOBALAMIN     vitamin D 1.25 MG (49793 UT) Caps capsule  Commonly known as: ERGOCALCIFEROL  Take 1 capsule by mouth every 30 days     Zinc Gluconate 10 MG Lozg           Where to Get Your Medications      These medications were sent to [address] [place] [address] [address]  [address] [address], [address] [place] [address]    Phone: [phone]   · polyethylene glycol 17 g packet     You can get these medications from any pharmacy    Bring a paper prescription for each of these medications  · gabapentin 300 MG capsule  · gabapentin 300 MG capsule  · oxyCODONE-acetaminophen 5-325 MG per tablet         EMR Dragon/Transcription disclaimer:   Much of this encounter note is an electronic transcription/translation of spoken language to printed text.  The electronic translation of spoken language may permit erroneous, or at times, nonsensical words or phrases to be inadvertently transcribed; although attempts have made to review the note for such errors, some may still exist.    Electronically signed by   Valerie Kelly MD   Internal Medicine Hospitalist  On 9/2/2021  At 10:11 AM

## 2021-09-03 ENCOUNTER — TELEPHONE (OUTPATIENT)
Dept: INFUSION THERAPY | Age: 61
End: 2021-09-03

## 2021-09-03 NOTE — TELEPHONE ENCOUNTER
Patient's sister Irvin Wolfe [age] called to let us know that her sister missed her 08/24/2021 apt due to being inpatient at [facility] ([facility]) and has now been transferred to [facility]. She is unable to r/s the apt at this time. Stephanie Godfrey.  Lisbeth Mckeon

## 2021-09-09 ENCOUNTER — OFFICE VISIT (OUTPATIENT)
Dept: INTERNAL MEDICINE | Facility: CLINIC | Age: 61
End: 2021-09-09

## 2021-09-09 VITALS
HEIGHT: 63 IN | BODY MASS INDEX: 34.05 KG/M2 | SYSTOLIC BLOOD PRESSURE: 106 MMHG | OXYGEN SATURATION: 96 % | DIASTOLIC BLOOD PRESSURE: 64 MMHG | WEIGHT: 192.2 LBS | HEART RATE: 90 BPM

## 2021-09-09 DIAGNOSIS — G89.4 CHRONIC PAIN SYNDROME: ICD-10-CM

## 2021-09-09 DIAGNOSIS — G89.29 CHRONIC LEFT SHOULDER PAIN: Primary | ICD-10-CM

## 2021-09-09 DIAGNOSIS — F41.9 ANXIETY: ICD-10-CM

## 2021-09-09 DIAGNOSIS — F32.89 OTHER DEPRESSION: ICD-10-CM

## 2021-09-09 DIAGNOSIS — M25.512 CHRONIC LEFT SHOULDER PAIN: Primary | ICD-10-CM

## 2021-09-09 PROBLEM — F32.A DEPRESSIVE DISORDER: Status: ACTIVE | Noted: 2021-09-09

## 2021-09-09 PROBLEM — G25.81 RESTLESS LEGS SYNDROME: Status: ACTIVE | Noted: 2021-09-09

## 2021-09-09 PROBLEM — B19.20 VIRAL HEPATITIS C: Status: ACTIVE | Noted: 2021-04-12

## 2021-09-09 PROBLEM — N80.9 ENDOMETRIOSIS: Status: ACTIVE | Noted: 2021-09-09
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
## 2021-09-09 PROBLEM — M19.90 ARTHRITIS: Status: ACTIVE | Noted: 2021-09-09

## 2021-09-09 PROBLEM — Z79.899 LONG TERM CURRENT USE OF THERAPEUTIC DRUG: Status: ACTIVE | Noted: 2021-09-09

## 2021-09-09 PROBLEM — R31.9 HEMATURIA: Status: ACTIVE | Noted: 2021-09-09

## 2021-09-09 PROBLEM — N13.5 STRICTURE OF URETER: Status: ACTIVE | Noted: 2017-03-13

## 2021-09-09 PROBLEM — R19.5 DARK STOOLS: Status: ACTIVE | Noted: 2021-09-09

## 2021-09-09 PROBLEM — R00.2 PALPITATIONS: Status: ACTIVE | Noted: 2021-09-09

## 2021-09-09 PROBLEM — R25.2 CRAMPS OF LOWER EXTREMITY: Status: ACTIVE | Noted: 2021-09-09

## 2021-09-09 PROBLEM — R20.2 TINGLING OF SKIN: Status: ACTIVE | Noted: 2021-09-09

## 2021-09-09 PROBLEM — C21.0 ANAL CANCER (HCC): Status: ACTIVE | Noted: 2021-04-20

## 2021-09-09 PROBLEM — M25.50 ARTHRALGIA: Status: ACTIVE | Noted: 2021-09-09

## 2021-09-09 PROBLEM — M89.8X9 BONE PAIN: Status: ACTIVE | Noted: 2021-09-09

## 2021-09-09 PROBLEM — M75.00 ADHESIVE CAPSULITIS OF SHOULDER: Status: ACTIVE | Noted: 2021-09-09

## 2021-09-09 PROBLEM — F17.200 SMOKER: Status: ACTIVE | Noted: 2017-03-07

## 2021-09-09 PROBLEM — H53.8 BLURRING OF VISUAL IMAGE: Status: ACTIVE | Noted: 2021-09-09

## 2021-09-09 PROBLEM — R35.0 INCREASED FREQUENCY OF URINATION: Status: ACTIVE | Noted: 2021-09-09

## 2021-09-09 PROBLEM — R42 VERTIGO: Status: ACTIVE | Noted: 2019-10-15

## 2021-09-09 PROBLEM — N93.9 VAGINAL BLEEDING: Status: ACTIVE | Noted: 2021-09-09

## 2021-09-09 PROBLEM — M54.16 LUMBAR RADICULOPATHY: Status: ACTIVE | Noted: 2021-09-09

## 2021-09-09 PROBLEM — N20.0 CALCULUS OF KIDNEY: Status: ACTIVE | Noted: 2021-09-09

## 2021-09-09 PROBLEM — R53.1 WEAKNESS: Status: ACTIVE | Noted: 2021-09-09

## 2021-09-09 PROBLEM — I87.8 POOR VENOUS ACCESS: Status: ACTIVE | Noted: 2021-09-09

## 2021-09-09 PROBLEM — R63.0 DECREASE IN APPETITE: Status: ACTIVE | Noted: 2021-09-09

## 2021-09-09 PROBLEM — F33.2 MAJOR DEPRESSIVE DISORDER, RECURRENT SEVERE WITHOUT PSYCHOTIC FEATURES (HCC): Status: ACTIVE | Noted: 2021-08-20

## 2021-09-09 PROBLEM — I49.9 IRREGULAR HEART RHYTHM: Status: ACTIVE | Noted: 2021-09-09

## 2021-09-09 PROBLEM — R11.0 NAUSEA: Status: ACTIVE | Noted: 2021-09-09

## 2021-09-09 PROCEDURE — 99214 OFFICE O/P EST MOD 30 MIN: CPT | Performed by: NURSE PRACTITIONER

## 2021-09-09 NOTE — PROGRESS NOTES
Chief Complaint   Patient presents with   • Anxiety     c/o, pt states the hospital took her anxiety medications away and she is really nervous   • Depression     c/o         History:  Lenora Kathleen is a 61 y.o. female who presents today for evaluation of the above problems.          Patient is seen today for anxiety and back pain.  From what I can gather from the [facility] records and her history, she was admitted to the behavioral health at [facility] in August for depression.  She says at that time, all of her medications were discontinued.  She was discharged around 8/24 and ended up back in the ER with back pain and altered mental status.  She was discharged to a SNF for help after hospitalization based on recommendations from OT and PT. She says she did not stay at the SNF because of bad smells and problems with her roommate.  She sees pain management for low back pain and for reasons I am not clear on even after several times asking, she has been referred to Dr. Naylor for a new pain management practice.  She does not have any pain medication at present and wants refills until she gets in with Dr. Naylor. Sees Dr. Grant for behavioral health regularly and will next see him in a couple of months.  Has been in contact with their office, and Prozac was re-started yesterday for anxiety and depression.She is wanting a refill of her Xanax today until she gets in with them.  This was last refilled by them in July, prior to the last month's hospitalizations.  She says when she came home from the hospital, her house had been rummaged thru, she could tell someone had been thru her drawers, etc.  She says her Xanax is not in the house. She says Dr. Grant's office told her she would have to be seen before she could get a Xanax refill.  Says it has been over a week since she has had any.    Also has been diagnosed with anal cancer in the last few months and has colostomy bag now.    Also complains of left shoulder freezing up.   Says has had surgical manipulation for capsulitis in the past and is hurting again.      ROS:  Review of Systems  As above    Allergies   Allergen Reactions   • Morphine GI Intolerance and Nausea And Vomiting     Other reaction(s): Vomiting     • Codeine Other (See Comments)     Past Medical History:   Diagnosis Date   • Anxiety    • Blood in urine    • Chest tightness    • Colon polyps    • COPD (chronic obstructive pulmonary disease) (CMS/HCC)    • Depression    • Emphysema/COPD (CMS/HCC)    • Hepatitis C    • Hypertension    • Injury of back    • Kidney stone    • Palpitation    • PONV (postoperative nausea and vomiting)    • Rectal cancer (CMS/HCC)      Past Surgical History:   Procedure Laterality Date   • CHOLECYSTECTOMY     • COLONOSCOPY  03/12/2015    normal   • COLOSTOMY N/A 4/23/2021    Procedure: LAPAROSCOPIC DIVERTING COLOSTOMY;  Surgeon: Paulette Villegas MD;  Location: [facility];  Service: General;  Laterality: N/A;   • FINGER SURGERY     • HYSTERECTOMY     • KIDNEY STONE SURGERY     • KIDNEY SURGERY     • OTHER SURGICAL HISTORY      POSSIBLE CARDIAC MONITOR (LOOP RECORDER?)--IMPLANTED AND EXPLANTED    • US GUIDED LYMPH NODE BIOPSY  4/30/2021     Family History   Problem Relation Age of Onset   • Heart disease Mother    • No Known Problems Father      Lenora  reports that she has quit smoking. Her smoking use included cigarettes. She has a 11.25 pack-year smoking history. She has never used smokeless tobacco. She reports that she does not drink alcohol and does not use drugs.    I have reviewed and updated the above documentation (if necessary) including but not limited to chief complaint, ROS, PFSH, allergies and medications        Current Outpatient Medications:   •  acetaminophen (TYLENOL) 325 MG tablet, Take 2 tablets by mouth Every 4 (Four) Hours As Needed for Mild Pain ., Disp: , Rfl:   •  Cyanocobalamin (VITAMIN B 12) 500 MCG tablet, Take 500 mcg by mouth Daily., Disp: , Rfl:   •  FLUoxetine  (PROZAC) 20 MG capsule, Take 20 mg by mouth 4 (Four) Times a Day., Disp: , Rfl:   •  gabapentin (NEURONTIN) 100 MG capsule, Take 1 capsule by mouth 4 (Four) Times a Day., Disp: 12 capsule, Rfl: 0  •  ibuprofen (ADVIL,MOTRIN) 600 MG tablet, Take 1 tablet by mouth Every 8 (Eight) Hours As Needed for Moderate Pain  for up to 279 days., Disp: , Rfl:   •  vitamin D (ERGOCALCIFEROL) 1.25 MG (16896 UT) capsule capsule, Take 50,000 Units by mouth 1 (One) Time Per Week., Disp: , Rfl:   •  albuterol sulfate  (90 Base) MCG/ACT inhaler, Inhale 2 puffs As Needed., Disp: , Rfl:   •  ALPRAZolam (XANAX) 0.25 MG tablet, Take 1 tablet by mouth 3 (Three) Times a Day As Needed for Anxiety., Disp: 9 tablet, Rfl: 0  •  fentaNYL (DURAGESIC) 50 MCG/HR patch, Place 1 patch on the skin as directed by provider Every 72 (Seventy-Two) Hours., Disp: 1 patch, Rfl: 0  •  megestrol (MEGACE ES) 625 MG/5ML suspension, Take 5 mL by mouth Daily., Disp: 150 mL, Rfl: 0  •  ondansetron (Zofran) 4 MG tablet, Take 1 tablet by mouth Every 8 (Eight) Hours As Needed for Nausea or Vomiting., Disp: 20 tablet, Rfl: 1    PHQ-9 Depression Screening  Little interest or pleasure in doing things? 3   Feeling down, depressed, or hopeless? 3   Trouble falling or staying asleep, or sleeping too much? 3   Feeling tired or having little energy? 3   Poor appetite or overeating? 3   Feeling bad about yourself - or that you are a failure or have let yourself or your family down? 0   Trouble concentrating on things, such as reading the newspaper or watching television? 0   Moving or speaking so slowly that other people could have noticed? Or the opposite - being so fidgety or restless that you have been moving around a lot more than usual? 0   Thoughts that you would be better off dead, or of hurting yourself in some way? 0   PHQ-9 Total Score 15   If you checked off any problems, how difficult have these problems made it for you to do your work, take care of things at  "home, or get along with other people? Not difficult at all     PHQ-9 Total Score: 15    OBJECTIVE:  Visit Vitals  /64 (BP Location: Right arm, Patient Position: Sitting, Cuff Size: Adult)   Pulse 90   Ht 160 cm (63\")   Wt 87.2 kg (192 lb 3.2 oz)   SpO2 96%   BMI 34.05 kg/m²      Physical Exam  Vitals and nursing note reviewed.   Constitutional:       General: She is not in acute distress.     Appearance: Normal appearance. She is not ill-appearing, toxic-appearing or diaphoretic.      Comments: She is not in distress and does not appear to be in pain at the time of this exam.  In addition, she is conversational an appropriate and does not exhibit any signs of acute withdrawal from controlled substances.   HENT:      Head: Normocephalic and atraumatic.   Eyes:      General: No scleral icterus.        Right eye: No discharge.         Left eye: No discharge.      Conjunctiva/sclera: Conjunctivae normal.      Pupils: Pupils are equal, round, and reactive to light.   Cardiovascular:      Rate and Rhythm: Normal rate and regular rhythm.      Heart sounds: Normal heart sounds.   Pulmonary:      Effort: Pulmonary effort is normal. No respiratory distress.      Breath sounds: Normal breath sounds. No stridor. No wheezing, rhonchi or rales.   Abdominal:      General: There is no distension.      Palpations: Abdomen is soft.      Tenderness: There is no abdominal tenderness.   Musculoskeletal:         General: Tenderness (left should tender AC joint region) present.      Cervical back: Neck supple. No rigidity.      Comments: Decrease ROM left shoulder due to pain with extension and rotation.  Cannot reach up to scratch back due to decreased ROM and pain.   Skin:     General: Skin is warm and dry.   Neurological:      General: No focal deficit present.      Mental Status: She is alert and oriented to person, place, and time.      Comments: No tremor or agitation noted.   Psychiatric:         Thought Content: Thought " content normal.      Comments: She is tearful at times but not in distress.  She is appropriate, conversational.  She specifically denies suicidal ideation or plan.     Contains abnormal data BASIC METABOLIC PANEL  Order: [id number]  (suggestion)  Result Information displayed in this report will not trend and may not trigger automated decision support.   Component   Ref Range & Units 8 d ago   Sodium   136 - 145 mmol/L 140       Potassium reflex Magnesium   3.5 - 5.0 mmol/L 3.8       Chloride   98 - 111 mmol/L 105       CO2   22 - 29 mmol/L 25       Anion Gap   7 - 19 mmol/L 10       Glucose   74 - 109 mg/dL 106       BUN   8 - 23 mg/dL 13       Creatinine   0.5 - 0.9 mg/dL 0.5       eGFR Non African Am   >60  >60      CBC (NO DIFF)  Order: [id number]  Status:  Final result   Next appt:  12/13/2021 at 03:30 PM in Internal Medicine (SAM Weaver MD)  Specimen Information: Blood    Specimen type and source: Blood, Blood specimen (specimen)        Component   Ref Range & Units 8 d ago   [place]   WBC   4.8 - 10.8 K/uL 4.2Low     RBC   4.20 - 5.40 M/uL 3.21Low     Hemoglobin   12.0 - 16.0 g/dL 9.6Low     Hematocrit   37.0 - 47.0 % 31.5Low     MCV   81.0 - 99.0 fL 98.1    MCH   27.0 - 31.0 pg 29.9    MCHC   33.0 - 37.0 g/dL 30.5Low     RDW   11.5 - 14.5 % 13.4    Platelets   130 - 400 K/uL 225    MPV   9.4 - 12.3 fL 11.0    Resulting Agency [facility]         Specimen Collected: 09/02/21 05:18   Last Resulted: 09/02/21 05:43   Received From: [facility] [place]    Result Received: 09/03/21 01:10              Hgb was stable from 8/26/21 according to records.  TSH normal 1.69 on 8/21/21. B12 normal at 768. Vitamin D normal at 44.4. Hgb A1C normal at 4.8%.  [facility]    Assessment/Plan    Diagnoses and all orders for this visit:    1. Chronic left shoulder pain (Primary)  -     XR Shoulder 2+ View Left; Future  -     Ambulatory Referral to Physical Therapy Evaluate and treat    2. Anxiety    3.  Other depression    4. Chronic pain syndrome      She presents today primarily wanting refills of pain medication and Xanax.  I have told her this is not something we will do at this office.  She has been established with pain management, and she has a psychiatrist and is in regular contact with his office.  I have advised her to contact both of these offices for follow up and for medication management/refills as appropriate.    I am going to order the left shoulder xray today and PT to evaluate and treat for possible recurrence of frozen shoulder.  Can refer to ortho again if needed.      I do not see any labs that need to be urgently repeated today.      Education materials and an After Visit Summary were printed and given to the patient at discharge.  Return in about 3 months (around 12/9/2021) for yearly with Dr. Weaver.         ADIN Smith   13:44 CDT  9/10/2021

## 2021-09-21 ENCOUNTER — APPOINTMENT (OUTPATIENT)
Dept: CT IMAGING | Facility: HOSPITAL | Age: 61
End: 2021-09-21

## 2021-09-21 ENCOUNTER — TELEPHONE (OUTPATIENT)
Dept: WOUND CARE | Facility: HOSPITAL | Age: 61
End: 2021-09-21

## 2021-09-21 ENCOUNTER — HOSPITAL ENCOUNTER (EMERGENCY)
Facility: HOSPITAL | Age: 61
Discharge: HOME | End: 2021-09-22
Attending: EMERGENCY MEDICINE | Admitting: EMERGENCY MEDICINE

## 2021-09-21 DIAGNOSIS — R11.0 NAUSEA: ICD-10-CM

## 2021-09-21 DIAGNOSIS — K52.9 ENTERITIS: ICD-10-CM

## 2021-09-21 DIAGNOSIS — R10.84 GENERALIZED ABDOMINAL PAIN: Primary | ICD-10-CM

## 2021-09-21 LAB
ALBUMIN SERPL-MCNC: 3.1 G/DL (ref 3.5–5.2)
ALBUMIN/GLOB SERPL: 1.1 G/DL
ALP SERPL-CCNC: 46 U/L (ref 39–117)
ALT SERPL W P-5'-P-CCNC: 6 U/L (ref 1–33)
ANION GAP SERPL CALCULATED.3IONS-SCNC: 5 MMOL/L (ref 5–15)
APTT PPP: 29 SECONDS (ref 24.1–35)
AST SERPL-CCNC: 18 U/L (ref 1–32)
BASOPHILS # BLD AUTO: 0.01 10*3/MM3 (ref 0–0.2)
BASOPHILS NFR BLD AUTO: 0.2 % (ref 0–1.5)
BILIRUB SERPL-MCNC: <0.2 MG/DL (ref 0–1.2)
BUN SERPL-MCNC: 9 MG/DL (ref 8–23)
BUN/CREAT SERPL: 26.5 (ref 7–25)
CALCIUM SPEC-SCNC: 8.6 MG/DL (ref 8.6–10.5)
CHLORIDE SERPL-SCNC: 106 MMOL/L (ref 98–107)
CO2 SERPL-SCNC: 27 MMOL/L (ref 22–29)
CREAT SERPL-MCNC: 0.34 MG/DL (ref 0.57–1)
D-LACTATE SERPL-SCNC: 1.1 MMOL/L (ref 0.5–2)
DEPRECATED RDW RBC AUTO: 48.2 FL (ref 37–54)
EOSINOPHIL # BLD AUTO: 0.2 10*3/MM3 (ref 0–0.4)
EOSINOPHIL NFR BLD AUTO: 3 % (ref 0.3–6.2)
ERYTHROCYTE [DISTWIDTH] IN BLOOD BY AUTOMATED COUNT: 13.5 % (ref 12.3–15.4)
GFR SERPL CREATININE-BSD FRML MDRD: >150 ML/MIN/1.73
GLOBULIN UR ELPH-MCNC: 2.8 GM/DL
GLUCOSE SERPL-MCNC: 89 MG/DL (ref 65–99)
HCT VFR BLD AUTO: 34.1 % (ref 34–46.6)
HGB BLD-MCNC: 10.4 G/DL (ref 12–15.9)
IMM GRANULOCYTES # BLD AUTO: 0.02 10*3/MM3 (ref 0–0.05)
IMM GRANULOCYTES NFR BLD AUTO: 0.3 % (ref 0–0.5)
INR PPP: 1.07 (ref 0.91–1.09)
LIPASE SERPL-CCNC: 28 U/L (ref 13–60)
LYMPHOCYTES # BLD AUTO: 1.56 10*3/MM3 (ref 0.7–3.1)
LYMPHOCYTES NFR BLD AUTO: 23.7 % (ref 19.6–45.3)
MCH RBC QN AUTO: 29.8 PG (ref 26.6–33)
MCHC RBC AUTO-ENTMCNC: 30.5 G/DL (ref 31.5–35.7)
MCV RBC AUTO: 97.7 FL (ref 79–97)
MONOCYTES # BLD AUTO: 0.84 10*3/MM3 (ref 0.1–0.9)
MONOCYTES NFR BLD AUTO: 12.8 % (ref 5–12)
NEUTROPHILS NFR BLD AUTO: 3.95 10*3/MM3 (ref 1.7–7)
NEUTROPHILS NFR BLD AUTO: 60 % (ref 42.7–76)
NRBC BLD AUTO-RTO: 0 /100 WBC (ref 0–0.2)
PLATELET # BLD AUTO: 310 10*3/MM3 (ref 140–450)
PMV BLD AUTO: 10.7 FL (ref 6–12)
POTASSIUM SERPL-SCNC: 4.8 MMOL/L (ref 3.5–5.2)
PROT SERPL-MCNC: 5.9 G/DL (ref 6–8.5)
PROTHROMBIN TIME: 13.5 SECONDS (ref 11.9–14.6)
RBC # BLD AUTO: 3.49 10*6/MM3 (ref 3.77–5.28)
SODIUM SERPL-SCNC: 138 MMOL/L (ref 136–145)
WBC # BLD AUTO: 6.58 10*3/MM3 (ref 3.4–10.8)

## 2021-09-21 PROCEDURE — 96374 THER/PROPH/DIAG INJ IV PUSH: CPT

## 2021-09-21 PROCEDURE — 83605 ASSAY OF LACTIC ACID: CPT | Performed by: EMERGENCY MEDICINE

## 2021-09-21 PROCEDURE — 99283 EMERGENCY DEPT VISIT LOW MDM: CPT

## 2021-09-21 PROCEDURE — 36415 COLL VENOUS BLD VENIPUNCTURE: CPT

## 2021-09-21 PROCEDURE — 25010000002 DIPHENHYDRAMINE PER 50 MG: Performed by: EMERGENCY MEDICINE

## 2021-09-21 PROCEDURE — 25010000002 MORPHINE PER 10 MG: Performed by: EMERGENCY MEDICINE

## 2021-09-21 PROCEDURE — 80053 COMPREHEN METABOLIC PANEL: CPT | Performed by: EMERGENCY MEDICINE

## 2021-09-21 PROCEDURE — 83690 ASSAY OF LIPASE: CPT | Performed by: EMERGENCY MEDICINE

## 2021-09-21 PROCEDURE — 85025 COMPLETE CBC W/AUTO DIFF WBC: CPT | Performed by: EMERGENCY MEDICINE

## 2021-09-21 PROCEDURE — 74177 CT ABD & PELVIS W/CONTRAST: CPT

## 2021-09-21 PROCEDURE — 25010000002 IOPAMIDOL 61 % SOLUTION: Performed by: EMERGENCY MEDICINE

## 2021-09-21 PROCEDURE — 25010000002 ONDANSETRON PER 1 MG: Performed by: EMERGENCY MEDICINE

## 2021-09-21 PROCEDURE — 85610 PROTHROMBIN TIME: CPT | Performed by: EMERGENCY MEDICINE

## 2021-09-21 PROCEDURE — 96375 TX/PRO/DX INJ NEW DRUG ADDON: CPT

## 2021-09-21 PROCEDURE — 85730 THROMBOPLASTIN TIME PARTIAL: CPT | Performed by: EMERGENCY MEDICINE

## 2021-09-21 RX ORDER — SODIUM CHLORIDE 0.9 % (FLUSH) 0.9 %
10 SYRINGE (ML) INJECTION AS NEEDED
Status: DISCONTINUED | OUTPATIENT
Start: 2021-09-21 | End: 2021-09-22 | Stop reason: HOSPADM

## 2021-09-21 RX ORDER — ONDANSETRON 2 MG/ML
4 INJECTION INTRAMUSCULAR; INTRAVENOUS ONCE
Status: COMPLETED | OUTPATIENT
Start: 2021-09-21 | End: 2021-09-21

## 2021-09-21 RX ORDER — DIPHENHYDRAMINE HYDROCHLORIDE 50 MG/ML
25 INJECTION INTRAMUSCULAR; INTRAVENOUS ONCE
Status: COMPLETED | OUTPATIENT
Start: 2021-09-21 | End: 2021-09-21

## 2021-09-21 RX ADMIN — IOPAMIDOL 100 ML: 612 INJECTION, SOLUTION INTRAVENOUS at 21:44

## 2021-09-21 RX ADMIN — MORPHINE SULFATE 4 MG: 4 INJECTION, SOLUTION INTRAMUSCULAR; INTRAVENOUS at 20:28

## 2021-09-21 RX ADMIN — DIPHENHYDRAMINE HYDROCHLORIDE 25 MG: 50 INJECTION, SOLUTION INTRAMUSCULAR; INTRAVENOUS at 20:36

## 2021-09-21 RX ADMIN — ONDANSETRON 4 MG: 2 INJECTION INTRAMUSCULAR; INTRAVENOUS at 20:28

## 2021-09-21 RX ADMIN — SODIUM CHLORIDE, POTASSIUM CHLORIDE, SODIUM LACTATE AND CALCIUM CHLORIDE 500 ML: 600; 310; 30; 20 INJECTION, SOLUTION INTRAVENOUS at 20:32

## 2021-09-21 NOTE — ED PROVIDER NOTES
Emergency Medicine Provider Note    Subjective:    HISTORY OF PRESENT ILLNESS     This is a very pleasant 61 y.o. 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  Patient states that for the past few days she has had decreased ostomy output and states that when output has been there is liquid.  She states that she called the ostomy nurse today who is concerned for an obstruction shows she came here.  In addition to her decreased ostomy output she complains of a generalized abdominal pain that is constant.  Moderate.  Dull.  Nonradiating.  No exacerbating relieving factors.  Associated with nausea.  Denies any chest pain or shortness of breath.  Denies any dysuria or hematuria.  No fevers or chills.          History is obtained from patient.       Review of Systems: All other systems are reviewed and are negative other than noted in the HPI.    Past Medical History:  Past Medical History:   Diagnosis Date   • Anxiety    • Blood in urine    • Chest tightness    • Colon polyps    • COPD (chronic obstructive pulmonary disease) (CMS/HCC)    • Depression    • Emphysema/COPD (CMS/HCC)    • Hepatitis C    • Hypertension    • Injury of back    • Kidney stone    • Palpitation    • PONV (postoperative nausea and vomiting)    • Rectal cancer (CMS/HCC)        Allergies:  Allergies   Allergen Reactions   • Morphine GI Intolerance and Nausea And Vomiting     Other reaction(s): Vomiting     • Codeine Other (See Comments)       Past Surgical History:  Past Surgical History:   Procedure Laterality Date   • CHOLECYSTECTOMY     • COLONOSCOPY  03/12/2015    normal   • COLOSTOMY N/A 4/23/2021    Procedure: LAPAROSCOPIC DIVERTING COLOSTOMY;  Surgeon: Paulette Villegas MD;  Location: [facility] OR;  Service:  General;  Laterality: N/A;   • FINGER SURGERY     • HYSTERECTOMY     • KIDNEY STONE SURGERY     • KIDNEY SURGERY     • OTHER SURGICAL HISTORY      POSSIBLE CARDIAC MONITOR (LOOP RECORDER?)--IMPLANTED AND EXPLANTED    • US GUIDED LYMPH NODE BIOPSY  4/30/2021       Family History:  Family History   Problem Relation Age of Onset   • Heart disease Mother    • No Known Problems Father        Social History:  Social History     Socioeconomic History   • Marital status:      Spouse name: Not on file   • Number of children: Not on file   • Years of education: Not on file   • Highest education level: Not on file   Tobacco Use   • Smoking status: Former Smoker     Packs/day: 0.25     Years: 45.00     Pack years: 11.25     Types: Cigarettes   • Smokeless tobacco: Never Used   Substance and Sexual Activity   • Alcohol use: No   • Drug use: No   • Sexual activity: Defer     Partners: Male       Home Medications:  Prior to Admission medications    Medication Sig Start Date End Date Taking? Authorizing Provider   acetaminophen (TYLENOL) 325 MG tablet Take 2 tablets by mouth Every 4 (Four) Hours As Needed for Mild Pain . 7/21/21   Danielle Villagomez APRN   albuterol sulfate  (90 Base) MCG/ACT inhaler Inhale 2 puffs As Needed. 9/3/19   Emergency, Nurse NAEL Brumfield   ALPRAZolam (XANAX) 0.25 MG tablet Take 1 tablet by mouth 3 (Three) Times a Day As Needed for Anxiety. 7/21/21   Danielle Villagomez APRN   Cyanocobalamin (VITAMIN B 12) 500 MCG tablet Take 500 mcg by mouth Daily.    Emergency, Nurse NAEL Brumfield   fentaNYL (DURAGESIC) 50 MCG/HR patch Place 1 patch on the skin as directed by provider Every 72 (Seventy-Two) Hours. 7/21/21   Danielle Villagomez APRN   FLUoxetine (PROZAC) 20 MG capsule Take 20 mg by mouth 4 (Four) Times a Day. 1/19/18   Emergency, Nurse Octaviano RN   gabapentin (NEURONTIN) 100 MG capsule Take 1 capsule by mouth 4 (Four) Times a Day. 7/21/21   Danielle Villagomez APRN   ibuprofen (ADVIL,MOTRIN) 600 MG  tablet Take 1 tablet by mouth Every 8 (Eight) Hours As Needed for Moderate Pain  for up to 279 days. 7/21/21 4/26/22  Danielle Villagomez APRN   megestrol (MEGACE ES) 625 MG/5ML suspension Take 5 mL by mouth Daily. 6/9/21   Jadiel Guthrie III, MD   ondansetron (Zofran) 4 MG tablet Take 1 tablet by mouth Every 8 (Eight) Hours As Needed for Nausea or Vomiting. 4/26/21 4/26/22  Paulette Villegas MD   vitamin D (ERGOCALCIFEROL) 1.25 MG (44509 UT) capsule capsule Take 50,000 Units by mouth 1 (One) Time Per Week. 2/18/19   Emergency, Nurse Epic, RN         Objective:    PHYSICAL EXAM     Vitals:   Vitals:    09/22/21 0010   BP: 120/69   Pulse: 65   Resp: 16   Temp: 98.5 °F (36.9 °C)   SpO2:      GENERAL: Well appearing, in no acute distress.   HEENT: Moist mucous membranes, oropharynx clear without lesions, exudates, thrush.   EYES: No scleral icterus, conjunctivae clear.   NECK: No cervical lymphadenopathy, no stiffness.  CARDIAC: Normal rate, regular rhythm, no murmurs, 2+ peripheral pulses in all four extremities, normal capillary refill.   PULMONARY: Normal work of breathing on room air, lungs are clear to auscultation bilaterally without wheezes, crackles, rhonchi.  ABDOMINAL: Normal bowel sounds, abdomen is soft, there is tenderness to deep palpation of the lower abdomen but no rebound, no guarding, ostomy is well-appearing with a regular stool appearing output, no surrounding erythema of the skin immediately around the ostomy, non-distended, no hepatomegaly or splenomegaly.   MUSCULOSKELETAL: Normal range of motion, no lower extremity edema.  NEUROLOGIC: Alert and oriented x 3, EOM grossly intact and moves all four extremities with normal strength.  SKIN: Warm and dry without rashes.   PSYCHIATRIC: Mood and affect are normal.     PROCEDURES     Procedures    LAB AND RADIOLOGY RESULTS     Lab Results (last 24 hours)     Procedure Component Value Units Date/Time    CBC & Differential [[id number]]  (Abnormal)  Collected: 09/21/21 2016    Specimen: Blood Updated: 09/21/21 2027    Narrative:      The following orders were created for panel order CBC & Differential.  Procedure                               Abnormality         Status                     ---------                               -----------         ------                     CBC Auto Differential[[id number]]        Abnormal            Final result                 Please view results for these tests on the individual orders.    Comprehensive Metabolic Panel [[id number]]  (Abnormal) Collected: 09/21/21 2016    Specimen: Blood Updated: 09/21/21 2124     Glucose 89 mg/dL      BUN 9 mg/dL      Creatinine 0.34 mg/dL      Sodium 138 mmol/L      Potassium 4.8 mmol/L      Comment: Slight hemolysis detected by analyzer. Results may be affected.        Chloride 106 mmol/L      CO2 27.0 mmol/L      Calcium 8.6 mg/dL      Total Protein 5.9 g/dL      Albumin 3.10 g/dL      ALT (SGPT) 6 U/L      AST (SGOT) 18 U/L      Comment: Slight hemolysis detected by analyzer. Results may be affected.        Alkaline Phosphatase 46 U/L      Total Bilirubin <0.2 mg/dL      eGFR Non African Amer >150 mL/min/1.73      Globulin 2.8 gm/dL      A/G Ratio 1.1 g/dL      BUN/Creatinine Ratio 26.5     Anion Gap 5.0 mmol/L     Narrative:      GFR Normal >60  Chronic Kidney Disease <60  Kidney Failure <15      Protime-INR [[id number]]  (Normal) Collected: 09/21/21 2016    Specimen: Blood Updated: 09/21/21 2036     Protime 13.5 Seconds      INR 1.07    Lipase [[id number]]  (Normal) Collected: 09/21/21 2016    Specimen: Blood Updated: 09/21/21 2118     Lipase 28 U/L     aPTT [[id number]]  (Normal) Collected: 09/21/21 2016    Specimen: Blood Updated: 09/21/21 2036     PTT 29.0 seconds     Lactic Acid, Plasma [[id number]]  (Normal) Collected: 09/21/21 2016    Specimen: Blood Updated: 09/21/21 2103     Lactate 1.1 mmol/L     CBC Auto Differential [[id number]]  (Abnormal) Collected: 09/21/21 2016    Specimen:  Blood Updated: 09/21/21 2027     WBC 6.58 10*3/mm3      RBC 3.49 10*6/mm3      Hemoglobin 10.4 g/dL      Hematocrit 34.1 %      MCV 97.7 fL      MCH 29.8 pg      MCHC 30.5 g/dL      RDW 13.5 %      RDW-SD 48.2 fl      MPV 10.7 fL      Platelets 310 10*3/mm3      Neutrophil % 60.0 %      Lymphocyte % 23.7 %      Monocyte % 12.8 %      Eosinophil % 3.0 %      Basophil % 0.2 %      Immature Grans % 0.3 %      Neutrophils, Absolute 3.95 10*3/mm3      Lymphocytes, Absolute 1.56 10*3/mm3      Monocytes, Absolute 0.84 10*3/mm3      Eosinophils, Absolute 0.20 10*3/mm3      Basophils, Absolute 0.01 10*3/mm3      Immature Grans, Absolute 0.02 10*3/mm3      nRBC 0.0 /100 WBC           CT Abdomen Pelvis With Contrast    Result Date: 9/21/2021  Narrative: EXAM: CT ABDOMEN PELVIS W CONTRAST-9/21/2021 9:54 PM CDT INDICATION: Abdominal pain, acute, nonlocalized; R10.84-Generalized abdominal pain; R11.0-Nausea COMPARISON: CT abdomen pelvis dated 7/16/2021.. TECHNIQUE: Abdomen and pelvis were scanned utilizing a multidetector helical scanner from the diaphragm to the ischial tuberosities after administration of IV contrast. Coronal and sagittal reformations were obtained. [Routine protocol is performed.]  Radiation dose equals  mGy-cm.  Automated exposure control dose reduction technique was implemented.   FINDINGS:  LINES and TUBES: None.  LOWER THORAX: Dependent atelectasis. Medial left lower lobe pleural-based opacity is similar to reflect atelectasis..  HEPATOBILIARY:  No focal hepatic lesions.   No liver laceration.   No biliary duct dilatation, favored to reflect reservoir effect..  GALLBLADDER: Surgically absent.  SPLEEN:  No splenomegaly.    No splenic laceration.  PANCREAS:  No focal masses or ductal dilatation.  ADRENALS:  No adrenal nodules.  KIDNEYS/URETERS:  Kidneys enhance symmetrically.   No hydronephrosis.  No cystic or solid mass lesions.  3 mm nonobstructive stone in the lower pole of the right kidney..  GI  TRACT: There is no bowel obstruction. Hyperenhancement wall thickening of multiple loops of small bowel, most concerning for enteritis. There is a left lower quadrant ostomy in place. Parastomal hernia is also noted, without bowel obstruction..   There is no acute appendicitis..  PELVIC ORGANS/BLADDER:  Bladder wall thickening redemonstrated, concerning for cystitis..  LYMPH NODES:  There is no inguinal canal, pelvic wall, retroperitoneal or mesenteric lymphadenopathy by size criteria..  VESSELS:  Atherosclerotic disease.. The portal vein is patent.  PERITONEUM / RETROPERITONEUM:  Fat stranding in the abdomen. Trace free fluid in the pelvis. No organized fluid collection. No free air..  BONES:  No acute osseous pathology..  SOFT TISSUES:  Lower abdomen body wall edema diffusely. Fat necrosis in the anterior abdominal wall..      Impression: 1.  Findings are most concerning for enteritis. 2.  Additional chronic findings as above. This report was finalized on 09/21/2021 21:59 by Dr. Kirstin Valadez MD.      ED course:    Medications   sodium chloride 0.9 % flush 10 mL (has no administration in time range)   lactated ringers bolus 500 mL (0 mL Intravenous Stopped 9/21/21 2102)   morphine injection 4 mg (4 mg Intravenous Given 9/21/21 2028)   ondansetron (ZOFRAN) injection 4 mg (4 mg Intravenous Given 9/21/21 2028)   diphenhydrAMINE (BENADRYL) injection 25 mg (25 mg Intravenous Given 9/21/21 2036)   iopamidol (ISOVUE-300) 61 % injection 100 mL (100 mL Intravenous Given 9/21/21 2144)     ED Course as of Sep 22 0026   Tue Sep 21, 2021   8350 This is a turn over from Dr. Garcia. Labs essentially unremarkable. Mildly anemic with h/h 10.4 and 34.1, WBC within normal limits at 6.58. electrolytes within normal limits. Liver enzymes within normal limits. Lactic is normal at 1.1.     [TW]   2352 IMPRESSION:  1.  Findings are most concerning for enteritis.  2.  Additional chronic findings as above.    [TW]   2352 Dr. Villegas  called and reviewed ct scan prior to official read. She recommend colace bid and if she felt backed up or decreased output could do a dose of magnesium citrate.    [TW]      ED Course User Index  [TW] Carmen Stanford, ADIN       Amount and/or complexity of data reviewed:    • Clinical lab tests ordered and reviewed.  • Tests in the radiology section ordered and reviewed.  •       Risk of significant complications, morbidity, and/or mortality.    •  Presenting problem: high  •  Diagnostic procedures: high  •  Management options: high        MEDICAL DECISION MAKING     Patient presents with lower abdominal pain and decreased ostomy output. Upon arrival to the Emergency Department the patient is in no acute distress vital signs are reassuring.  IV access is obtained and labs are sent.  She is given fluids, morphine, Zofran.  She is evaluated with a CT scan of the abdomen pelvis with IV contrast.  Lab results are completed and are all reassuring.  Patient is pending her CT scan.  She has been signed out to the oncoming provider in stable condition.        Diagnosis:    Final diagnoses:   Generalized abdominal pain   Nausea   Enteritis         ED Disposition:     ED Disposition     ED Disposition Condition Comment    Discharge Good           No follow-up provider specified.       Medication List      No changes were made to your prescriptions during this visit.              Carmen Stanford, APRN  09/22/21 0026

## 2021-09-21 NOTE — TELEPHONE ENCOUNTER
"Patient and patient's daughter left messages requesting a call back.  Returned call to Lenora Kathleen.  She states she has swelling around stoma and the swelling is causing her stoma to point down.  The \"bulging\" behind the stoma is increasing with time.  She is passing only liquid stool.  She states it is usually a soft consistency.  She claims her pain is a 10 out of 10 at its worse and 7 out of 10 at its best.  The pain is worse with eating and improves with rest.  Patient denies fever and chills.  I suggested patient go to emergency department due to her symptoms.    "

## 2021-09-22 VITALS
HEART RATE: 65 BPM | DIASTOLIC BLOOD PRESSURE: 69 MMHG | TEMPERATURE: 98.5 F | OXYGEN SATURATION: 99 % | RESPIRATION RATE: 16 BRPM | WEIGHT: 200 LBS | SYSTOLIC BLOOD PRESSURE: 120 MMHG | BODY MASS INDEX: 35.44 KG/M2 | HEIGHT: 63 IN

## 2021-09-22 NOTE — ED NOTES
Pt resting after pain medication and benadryl administration. Pt 02 sat dropped to 83% on RA. Pt placed on 3L NC 02 came up to 98%.     Monica Hendrix RN  09/21/21 2050

## 2021-09-22 NOTE — DISCHARGE INSTRUCTIONS
Push fluids; CT scan read as Enteritis. Dr. Villegas recommends if you have symptoms of constipation to do colace twice daily and may always do a dose of over the counter magnesium citrate. What you are describing sounds more like enteritis which may need to run its course. F/u with pcp with continued sxs

## 2021-10-05 ENCOUNTER — TELEPHONE (OUTPATIENT)
Dept: INTERNAL MEDICINE | Facility: CLINIC | Age: 61
End: 2021-10-05

## 2021-10-05 DIAGNOSIS — N82.3 RECTOVAGINAL FISTULA: Primary | ICD-10-CM

## 2021-10-05 NOTE — TELEPHONE ENCOUNTER
Spoke with patient. She is needing a referral to the surgical office in [place]. The doctors name is Dr. Ramos. She is needing a fistula repaired now that the cancer is done. The phone number is [phone] fax number is: [phone]

## 2021-10-18 ENCOUNTER — OFFICE VISIT (OUTPATIENT)
Dept: INTERNAL MEDICINE | Facility: CLINIC | Age: 61
End: 2021-10-18

## 2021-10-18 VITALS
DIASTOLIC BLOOD PRESSURE: 80 MMHG | HEIGHT: 63 IN | OXYGEN SATURATION: 100 % | SYSTOLIC BLOOD PRESSURE: 120 MMHG | BODY MASS INDEX: 33.31 KG/M2 | TEMPERATURE: 98.6 F | HEART RATE: 65 BPM | WEIGHT: 188 LBS | RESPIRATION RATE: 15 BRPM

## 2021-10-18 DIAGNOSIS — N30.01 ACUTE CYSTITIS WITH HEMATURIA: Primary | ICD-10-CM

## 2021-10-18 DIAGNOSIS — Z71.85 VACCINE COUNSELING: ICD-10-CM

## 2021-10-18 DIAGNOSIS — E86.0 DEHYDRATION: ICD-10-CM

## 2021-10-18 PROCEDURE — 99213 OFFICE O/P EST LOW 20 MIN: CPT | Performed by: INTERNAL MEDICINE

## 2021-10-18 RX ORDER — HYDROXYZINE 50 MG/1
1 TABLET, FILM COATED ORAL 3 TIMES DAILY PRN
COMMUNITY
Start: 2021-09-28 | End: 2021-12-06

## 2021-10-18 RX ORDER — ERGOCALCIFEROL 1.25 MG/1
50000 CAPSULE ORAL WEEKLY
Qty: 5 CAPSULE | Refills: 5 | Status: SHIPPED | OUTPATIENT
Start: 2021-10-18

## 2021-10-18 RX ORDER — OXYCODONE HYDROCHLORIDE 15 MG/1
15 TABLET ORAL EVERY 6 HOURS PRN
COMMUNITY
Start: 2021-09-29

## 2021-10-18 RX ORDER — GABAPENTIN 100 MG/1
100 CAPSULE ORAL 4 TIMES DAILY
Qty: 12 CAPSULE | Refills: 0 | Status: CANCELLED | OUTPATIENT
Start: 2021-10-18

## 2021-10-18 NOTE — PROGRESS NOTES
Chief Complaint   Patient presents with   • Follow-up     Hospital   • Loss of Consciousness         History:  Lenora Kathleen is a 61 y.o. female who presents today for evaluation of the above problems.      She presents today for hospital follow-up.  It appears she was hospitalized 2 months ago for depression symptoms.  She says she passed out a couple times because she was not eating due to depression.  She is feeling somewhat better but still is depressed.  She continues to see Dr. Grant for this.  She had to cancel her appointment with the colorectal surgeon in [place] but it has been rescheduled for October 25.  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    She sees Dr. Sauceda for pain management.  Reviewed her Alfonso today  HPI      ROS:  Review of Systems   Constitutional: Positive for activity change, appetite change and fatigue.   Respiratory: Negative for shortness of breath.    Cardiovascular: Negative for chest pain.   Gastrointestinal:        Colostomy   Musculoskeletal: Positive for arthralgias, back pain and gait problem.   Psychiatric/Behavioral: Positive for sleep disturbance.         Current Outpatient Medications:   •  acetaminophen (TYLENOL) 325 MG tablet, Take 2 tablets by mouth Every 4 (Four) Hours As Needed for Mild Pain ., Disp: , Rfl:   •  Ascorbic Acid (VITAMIN C PO), Take 1 tablet by mouth Daily., Disp: , Rfl:   •  Cyanocobalamin (VITAMIN B 12) 500 MCG tablet, Take 500 mcg by mouth Daily., Disp: , Rfl:   •  FLUoxetine (PROZAC) 20 MG capsule, Take 20 mg by mouth 4 (Four) Times a Day., Disp: , Rfl:   •  gabapentin (NEURONTIN) 100 MG capsule, Take 1 capsule by mouth 4 (Four) Times a Day., Disp: 12 capsule, Rfl: 0  •  hydrOXYzine (ATARAX) 50 MG tablet, Take 1 tablet by mouth 3 (Three) Times a Day As Needed., Disp: , Rfl:   •   ibuprofen (ADVIL,MOTRIN) 600 MG tablet, Take 1 tablet by mouth Every 8 (Eight) Hours As Needed for Moderate Pain  for up to 279 days., Disp: , Rfl:   •  ondansetron (Zofran) 4 MG tablet, Take 1 tablet by mouth Every 8 (Eight) Hours As Needed for Nausea or Vomiting., Disp: 20 tablet, Rfl: 1  •  oxyCODONE (ROXICODONE) 15 MG immediate release tablet, Take 15 mg by mouth Every 6 (Six) Hours As Needed., Disp: , Rfl:   •  vitamin D (ERGOCALCIFEROL) 1.25 MG (87850 UT) capsule capsule, Take 1 capsule by mouth 1 (One) Time Per Week., Disp: 5 capsule, Rfl: 5    Lab Results   Component Value Date    GLUCOSE 89 09/21/2021    BUN 9 09/21/2021    CREATININE 0.34 (L) 09/21/2021    EGFRIFNONA >150 09/21/2021    EGFRIFAFRI >59 08/19/2021    BCR 26.5 (H) 09/21/2021    K 4.8 09/21/2021    CO2 27.0 09/21/2021    CALCIUM 8.6 09/21/2021    ALBUMIN 3.10 (L) 09/21/2021    AST 18 09/21/2021    ALT 6 09/21/2021       WBC   Date Value Ref Range Status   09/21/2021 6.58 3.40 - 10.80 10*3/mm3 Final   09/02/2021 4.2 (L) 4.8 - 10.8 K/uL Final     RBC   Date Value Ref Range Status   09/21/2021 3.49 (L) 3.77 - 5.28 10*6/mm3 Final   09/02/2021 3.21 (L) 4.20 - 5.40 M/uL Final     Hemoglobin   Date Value Ref Range Status   09/21/2021 10.4 (L) 12.0 - 15.9 g/dL Final   09/02/2021 9.6 (L) 12.0 - 16.0 g/dL Final     Hematocrit   Date Value Ref Range Status   09/21/2021 34.1 34.0 - 46.6 % Final   09/02/2021 31.5 (L) 37.0 - 47.0 % Final     MCV   Date Value Ref Range Status   09/21/2021 97.7 (H) 79.0 - 97.0 fL Final   09/02/2021 98.1 81.0 - 99.0 fL Final     MCH   Date Value Ref Range Status   09/21/2021 29.8 26.6 - 33.0 pg Final   09/02/2021 29.9 27.0 - 31.0 pg Final     MCHC   Date Value Ref Range Status   09/21/2021 30.5 (L) 31.5 - 35.7 g/dL Final   09/02/2021 30.5 (L) 33.0 - 37.0 g/dL Final     RDW   Date Value Ref Range Status   09/21/2021 13.5 12.3 - 15.4 % Final   09/02/2021 13.4 11.5 - 14.5 % Final     RDW-SD   Date Value Ref Range Status    09/21/2021 48.2 37.0 - 54.0 fl Final     MPV   Date Value Ref Range Status   09/21/2021 10.7 6.0 - 12.0 fL Final   09/02/2021 11.0 9.4 - 12.3 fL Final     Platelets   Date Value Ref Range Status   09/21/2021 310 140 - 450 10*3/mm3 Final   09/02/2021 225 130 - 400 K/uL Final     Neutrophil Rel %   Date Value Ref Range Status   08/24/2021 53.9 50.0 - 65.0 % Final     Neutrophil %   Date Value Ref Range Status   09/21/2021 60.0 42.7 - 76.0 % Final     Lymphocyte Rel %   Date Value Ref Range Status   08/24/2021 32.5 20.0 - 40.0 % Final     Lymphocyte %   Date Value Ref Range Status   09/21/2021 23.7 19.6 - 45.3 % Final     Monocyte Rel %   Date Value Ref Range Status   08/24/2021 9.7 0.0 - 10.0 % Final     Monocyte %   Date Value Ref Range Status   09/21/2021 12.8 (H) 5.0 - 12.0 % Final     Eosinophil Rel %   Date Value Ref Range Status   08/24/2021 3.4 0.0 - 5.0 % Final     Eosinophil %   Date Value Ref Range Status   09/21/2021 3.0 0.3 - 6.2 % Final     Basophil Rel %   Date Value Ref Range Status   08/24/2021 0.3 0.0 - 1.0 % Final     Basophil %   Date Value Ref Range Status   09/21/2021 0.2 0.0 - 1.5 % Final     Immature Grans %   Date Value Ref Range Status   09/21/2021 0.3 0.0 - 0.5 % Final     Neutrophils Absolute   Date Value Ref Range Status   08/24/2021 3.2 1.5 - 7.5 K/uL Final     Neutrophils, Absolute   Date Value Ref Range Status   09/21/2021 3.95 1.70 - 7.00 10*3/mm3 Final     Lymphocytes Absolute   Date Value Ref Range Status   08/24/2021 1.9 1.1 - 4.5 K/uL Final     Lymphocytes, Absolute   Date Value Ref Range Status   09/21/2021 1.56 0.70 - 3.10 10*3/mm3 Final     Monocytes Absolute   Date Value Ref Range Status   08/24/2021 0.60 0.00 - 0.90 K/uL Final     Monocytes, Absolute   Date Value Ref Range Status   09/21/2021 0.84 0.10 - 0.90 10*3/mm3 Final     Eosinophils Absolute   Date Value Ref Range Status   08/24/2021 0.20 0.00 - 0.60 K/uL Final     Eosinophils, Absolute   Date Value Ref Range Status  "  09/21/2021 0.20 0.00 - 0.40 10*3/mm3 Final     Basophils Absolute   Date Value Ref Range Status   08/24/2021 0.00 0.00 - 0.20 K/uL Final     Basophils, Absolute   Date Value Ref Range Status   09/21/2021 0.01 0.00 - 0.20 10*3/mm3 Final     Immature Grans, Absolute   Date Value Ref Range Status   09/21/2021 0.02 0.00 - 0.05 10*3/mm3 Final   08/24/2021 0.0 K/uL Final     nRBC   Date Value Ref Range Status   09/21/2021 0.0 0.0 - 0.2 /100 WBC Final         OBJECTIVE:  Visit Vitals  /80 (BP Location: Left arm, Patient Position: Sitting, Cuff Size: Adult)   Pulse 65   Temp 98.6 °F (37 °C) (Oral)   Resp 15   Ht 160 cm (62.99\")   Wt 85.3 kg (188 lb)   SpO2 100%   BMI 33.31 kg/m²      Physical Exam  Constitutional:       General: She is not in acute distress.     Appearance: She is well-developed. She is not diaphoretic.   HENT:      Head: Normocephalic and atraumatic.   Eyes:      Pupils: Pupils are equal, round, and reactive to light.   Neck:      Thyroid: No thyromegaly.      Trachea: Phonation normal.   Pulmonary:      Effort: No respiratory distress.   Skin:     Coloration: Skin is not pale.      Findings: No erythema.   Neurological:      Mental Status: She is alert.   Psychiatric:         Mood and Affect: Mood is depressed. Affect is tearful.         Behavior: Behavior normal.         Thought Content: Thought content normal. Thought content does not include homicidal or suicidal ideation. Thought content does not include homicidal or suicidal plan.         Cognition and Memory: Cognition normal.         Judgment: Judgment normal.         Assessment/Plan    Diagnoses and all orders for this visit:    1. Acute cystitis with hematuria (Primary)  -     Urinalysis With Culture If Indicated -; Future    2. Dehydration    3. Vaccine counseling    Other orders  -     vitamin D (ERGOCALCIFEROL) 1.25 MG (96311 UT) capsule capsule; Take 1 capsule by mouth 1 (One) Time Per Week.  Dispense: 5 capsule; Refill: 5      I " would like to check a urinalysis.  Treated for urinary tract infection consistent with UTI.  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  Refill vitamin D.  Defer pain medicine to Dr. Sauceda    Counseled on COVID-19 vaccine and I would recommend she get the vaccine as soon as she can.    I could not find records regarding a syncopal episode, but she was admitted to [facility] in August for depression.  She states that she passed out because she was not eating due to the depression.  Continue seeing Dr. Grant    Return for Next scheduled follow up for AWV.      LOC Weaver MD  16:39 CDT  10/18/2021

## 2021-11-03 ENCOUNTER — OFFICE VISIT (OUTPATIENT)
Dept: INTERNAL MEDICINE | Facility: CLINIC | Age: 61
End: 2021-11-03

## 2021-11-03 VITALS
HEIGHT: 63 IN | DIASTOLIC BLOOD PRESSURE: 68 MMHG | BODY MASS INDEX: 32.78 KG/M2 | OXYGEN SATURATION: 98 % | TEMPERATURE: 97.8 F | HEART RATE: 84 BPM | WEIGHT: 185 LBS | RESPIRATION RATE: 15 BRPM | SYSTOLIC BLOOD PRESSURE: 124 MMHG

## 2021-11-03 DIAGNOSIS — F41.9 ANXIETY: Primary | ICD-10-CM

## 2021-11-03 DIAGNOSIS — Z23 NEED FOR INFLUENZA VACCINATION: ICD-10-CM

## 2021-11-03 PROCEDURE — 90686 IIV4 VACC NO PRSV 0.5 ML IM: CPT | Performed by: INTERNAL MEDICINE

## 2021-11-03 PROCEDURE — 99214 OFFICE O/P EST MOD 30 MIN: CPT | Performed by: INTERNAL MEDICINE

## 2021-11-03 PROCEDURE — G0008 ADMIN INFLUENZA VIRUS VAC: HCPCS | Performed by: INTERNAL MEDICINE

## 2021-11-03 RX ORDER — BUSPIRONE HYDROCHLORIDE 10 MG/1
10 TABLET ORAL 2 TIMES DAILY
Qty: 60 TABLET | Refills: 2 | Status: SHIPPED | OUTPATIENT
Start: 2021-11-03 | End: 2021-12-06

## 2021-11-03 RX ORDER — GABAPENTIN 300 MG/1
300 CAPSULE ORAL 4 TIMES DAILY
COMMUNITY
Start: 2021-10-25

## 2021-11-03 RX ORDER — OXYCODONE AND ACETAMINOPHEN 10; 325 MG/1; MG/1
1 TABLET ORAL EVERY 6 HOURS PRN
COMMUNITY
Start: 2021-10-26 | End: 2021-12-06

## 2021-11-03 NOTE — PROGRESS NOTES
Chief Complaint   Patient presents with   • Office Visit   • Medication Refill         History:  Lenora Kathleen is a 61 y.o. female who presents today for evaluation of the above problems.      She presents today for an acute visit for anxiety.  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  She has had increasing anxiety related to this.  She is not eating very well, has been shaking and cannot seem to stop.  It seems very overwhelming to her.  She used to see Dr. Grant.  Her last prescribed Xanax 2 mg every 6 hours on July 22, 2021.  She was taken off and she is not sure why.  She is concerned that her sister may have called his office.  Dr. Grant also has her on Prozac 40 mg twice a day.  She was on BuSpar a long time ago but stopped because it was not effective.  She is on Atarax but this not effective in helping her anxiety    She denies any suicidal or homicidal ideation    She will be seeing Dr. Levine in [place] on November 15 for another surgery and this has increased her anxiety    HPI      ROS:  Review of Systems    See above    Current Outpatient Medications:   •  acetaminophen (TYLENOL) 325 MG tablet, Take 2 tablets by mouth Every 4 (Four) Hours As Needed for Mild Pain ., Disp: , Rfl:   •  Ascorbic Acid (VITAMIN C PO), Take 1 tablet by mouth Daily., Disp: , Rfl:   •  Cyanocobalamin (VITAMIN B 12) 500 MCG tablet, Take 500 mcg by mouth Daily., Disp: , Rfl:   •  FLUoxetine (PROZAC) 20 MG capsule, Take 20 mg by mouth 4 (Four) Times a Day., Disp: , Rfl:   •  gabapentin (NEURONTIN) 300 MG capsule, Take 300 mg by mouth 4 (Four) Times a Day., Disp: , Rfl:   •  hydrOXYzine (ATARAX) 50 MG tablet, Take 1 tablet by mouth 3 (Three) Times a Day As Needed., Disp: , Rfl:   •  ibuprofen (ADVIL,MOTRIN) 600 MG tablet, Take 1 tablet by mouth Every 8 (Eight) Hours As Needed for Moderate Pain  for up to 279 days., Disp: , Rfl:   •  ondansetron (Zofran) 4 MG tablet, Take 1 tablet by mouth Every 8  (Eight) Hours As Needed for Nausea or Vomiting., Disp: 20 tablet, Rfl: 1  •  oxyCODONE-acetaminophen (PERCOCET)  MG per tablet, Take 1 tablet by mouth Every 6 (Six) Hours As Needed., Disp: , Rfl:   •  vitamin D (ERGOCALCIFEROL) 1.25 MG (75707 UT) capsule capsule, Take 1 capsule by mouth 1 (One) Time Per Week., Disp: 5 capsule, Rfl: 5  •  busPIRone (BUSPAR) 10 MG tablet, Take 1 tablet by mouth 2 (Two) Times a Day., Disp: 60 tablet, Rfl: 2  •  gabapentin (NEURONTIN) 100 MG capsule, Take 1 capsule by mouth 4 (Four) Times a Day., Disp: 12 capsule, Rfl: 0  •  oxyCODONE (ROXICODONE) 15 MG immediate release tablet, Take 15 mg by mouth Every 6 (Six) Hours As Needed., Disp: , Rfl:     Lab Results   Component Value Date    GLUCOSE 89 09/21/2021    BUN 9 09/21/2021    CREATININE 0.34 (L) 09/21/2021    EGFRIFNONA >150 09/21/2021    EGFRIFAFRI >59 08/19/2021    BCR 26.5 (H) 09/21/2021    K 4.8 09/21/2021    CO2 27.0 09/21/2021    CALCIUM 8.6 09/21/2021    ALBUMIN 3.10 (L) 09/21/2021    AST 18 09/21/2021    ALT 6 09/21/2021       WBC   Date Value Ref Range Status   09/21/2021 6.58 3.40 - 10.80 10*3/mm3 Final   09/02/2021 4.2 (L) 4.8 - 10.8 K/uL Final     RBC   Date Value Ref Range Status   09/21/2021 3.49 (L) 3.77 - 5.28 10*6/mm3 Final   09/02/2021 3.21 (L) 4.20 - 5.40 M/uL Final     Hemoglobin   Date Value Ref Range Status   09/21/2021 10.4 (L) 12.0 - 15.9 g/dL Final   09/02/2021 9.6 (L) 12.0 - 16.0 g/dL Final     Hematocrit   Date Value Ref Range Status   09/21/2021 34.1 34.0 - 46.6 % Final   09/02/2021 31.5 (L) 37.0 - 47.0 % Final     MCV   Date Value Ref Range Status   09/21/2021 97.7 (H) 79.0 - 97.0 fL Final   09/02/2021 98.1 81.0 - 99.0 fL Final     MCH   Date Value Ref Range Status   09/21/2021 29.8 26.6 - 33.0 pg Final   09/02/2021 29.9 27.0 - 31.0 pg Final     MCHC   Date Value Ref Range Status   09/21/2021 30.5 (L) 31.5 - 35.7 g/dL Final   09/02/2021 30.5 (L) 33.0 - 37.0 g/dL Final     RDW   Date Value Ref Range  Status   09/21/2021 13.5 12.3 - 15.4 % Final   09/02/2021 13.4 11.5 - 14.5 % Final     RDW-SD   Date Value Ref Range Status   09/21/2021 48.2 37.0 - 54.0 fl Final     MPV   Date Value Ref Range Status   09/21/2021 10.7 6.0 - 12.0 fL Final   09/02/2021 11.0 9.4 - 12.3 fL Final     Platelets   Date Value Ref Range Status   09/21/2021 310 140 - 450 10*3/mm3 Final   09/02/2021 225 130 - 400 K/uL Final     Neutrophil Rel %   Date Value Ref Range Status   08/24/2021 53.9 50.0 - 65.0 % Final     Neutrophil %   Date Value Ref Range Status   09/21/2021 60.0 42.7 - 76.0 % Final     Lymphocyte Rel %   Date Value Ref Range Status   08/24/2021 32.5 20.0 - 40.0 % Final     Lymphocyte %   Date Value Ref Range Status   09/21/2021 23.7 19.6 - 45.3 % Final     Monocyte Rel %   Date Value Ref Range Status   08/24/2021 9.7 0.0 - 10.0 % Final     Monocyte %   Date Value Ref Range Status   09/21/2021 12.8 (H) 5.0 - 12.0 % Final     Eosinophil Rel %   Date Value Ref Range Status   08/24/2021 3.4 0.0 - 5.0 % Final     Eosinophil %   Date Value Ref Range Status   09/21/2021 3.0 0.3 - 6.2 % Final     Basophil Rel %   Date Value Ref Range Status   08/24/2021 0.3 0.0 - 1.0 % Final     Basophil %   Date Value Ref Range Status   09/21/2021 0.2 0.0 - 1.5 % Final     Immature Grans %   Date Value Ref Range Status   09/21/2021 0.3 0.0 - 0.5 % Final     Neutrophils Absolute   Date Value Ref Range Status   08/24/2021 3.2 1.5 - 7.5 K/uL Final     Neutrophils, Absolute   Date Value Ref Range Status   09/21/2021 3.95 1.70 - 7.00 10*3/mm3 Final     Lymphocytes Absolute   Date Value Ref Range Status   08/24/2021 1.9 1.1 - 4.5 K/uL Final     Lymphocytes, Absolute   Date Value Ref Range Status   09/21/2021 1.56 0.70 - 3.10 10*3/mm3 Final     Monocytes Absolute   Date Value Ref Range Status   08/24/2021 0.60 0.00 - 0.90 K/uL Final     Monocytes, Absolute   Date Value Ref Range Status   09/21/2021 0.84 0.10 - 0.90 10*3/mm3 Final     Eosinophils Absolute  "  Date Value Ref Range Status   08/24/2021 0.20 0.00 - 0.60 K/uL Final     Eosinophils, Absolute   Date Value Ref Range Status   09/21/2021 0.20 0.00 - 0.40 10*3/mm3 Final     Basophils Absolute   Date Value Ref Range Status   08/24/2021 0.00 0.00 - 0.20 K/uL Final     Basophils, Absolute   Date Value Ref Range Status   09/21/2021 0.01 0.00 - 0.20 10*3/mm3 Final     Immature Grans, Absolute   Date Value Ref Range Status   09/21/2021 0.02 0.00 - 0.05 10*3/mm3 Final   08/24/2021 0.0 K/uL Final     nRBC   Date Value Ref Range Status   09/21/2021 0.0 0.0 - 0.2 /100 WBC Final         OBJECTIVE:  Visit Vitals  /68 (BP Location: Left arm, Patient Position: Sitting, Cuff Size: Adult)   Pulse 84   Temp 97.8 °F (36.6 °C) (Oral)   Resp 15   Ht 160 cm (62.99\")   Wt 83.9 kg (185 lb)   SpO2 98%   BMI 32.78 kg/m²      Physical Exam  Constitutional:       General: She is not in acute distress.     Appearance: She is well-developed. She is not diaphoretic.   HENT:      Head: Normocephalic and atraumatic.   Eyes:      Pupils: Pupils are equal, round, and reactive to light.   Neck:      Thyroid: No thyromegaly.      Trachea: Phonation normal.   Pulmonary:      Effort: No respiratory distress.   Skin:     Coloration: Skin is not pale.      Findings: No erythema.   Neurological:      Mental Status: She is alert.   Psychiatric:         Mood and Affect: Affect is tearful.         Behavior: Behavior normal.         Thought Content: Thought content normal.         Judgment: Judgment normal.         Assessment/Plan    Diagnoses and all orders for this visit:    1. Anxiety (Primary)  -     Ambulatory Referral to Psychiatry  -     busPIRone (BUSPAR) 10 MG tablet; Take 1 tablet by mouth 2 (Two) Times a Day.  Dispense: 60 tablet; Refill: 2    2. Need for influenza vaccination  -     FluLaval/Fluarix/Fluzone >6 Months (5422-8280)      Her anxiety is not well controlled, and is worsening.  She used to see psychiatry, but he recently took her " off of her Xanax.  Instead, she was placed on hydroxyzine.  She has not seen him in the office since then and does not want to go back.  In fact, she is going tomorrow to  her records.  Discussed that I do not manage chronic benzodiazepines.  She notes understanding.  Going to prescribe BuSpar refer to Dr. James on an urgent basis.      She has appointment December 13 for her Medicare wellness, I will keep this appointment.  Follow-up sooner if needed.    Return for Next scheduled follow up for AWV.      LOC Weaver MD  15:50 CDT  11/3/2021

## 2021-11-03 NOTE — PATIENT INSTRUCTIONS
-Talk with Dr. Levine's office to see if he recommends getting the COVID-19 vaccine prior to your surgery  Managing Anxiety, Adult  After being diagnosed with an anxiety disorder, you may be relieved to know why you have felt or behaved a certain way. You may also feel overwhelmed about the treatment ahead and what it will mean for your life. With care and support, you can manage this condition and recover from it.  How to manage lifestyle changes  Managing stress and anxiety    Stress is your body's reaction to life changes and events, both good and bad. Most stress will last just a few hours, but stress can be ongoing and can lead to more than just stress. Although stress can play a major role in anxiety, it is not the same as anxiety. Stress is usually caused by something external, such as a deadline, test, or competition. Stress normally passes after the triggering event has ended.   Anxiety is caused by something internal, such as imagining a terrible outcome or worrying that something will go wrong that will devastate you. Anxiety often does not go away even after the triggering event is over, and it can become long-term (chronic) worry. It is important to understand the differences between stress and anxiety and to manage your stress effectively so that it does not lead to an anxious response.  Talk with your health care provider or a counselor to learn more about reducing anxiety and stress. He or she may suggest tension reduction techniques, such as:  · Music therapy. This can include creating or listening to music that you enjoy and that inspires you.  · Mindfulness-based meditation. This involves being aware of your normal breaths while not trying to control your breathing. It can be done while sitting or walking.  · Centering prayer. This involves focusing on a word, phrase, or sacred image that means something to you and brings you peace.  · Deep breathing. To do this, expand your stomach and inhale  slowly through your nose. Hold your breath for 3-5 seconds. Then exhale slowly, letting your stomach muscles relax.  · Self-talk. This involves identifying thought patterns that lead to anxiety reactions and changing those patterns.  · Muscle relaxation. This involves tensing muscles and then relaxing them.  Choose a tension reduction technique that suits your lifestyle and personality. These techniques take time and practice. Set aside 5-15 minutes a day to do them. Therapists can offer counseling and training in these techniques. The training to help with anxiety may be covered by some insurance plans. Other things you can do to manage stress and anxiety include:  · Keeping a stress/anxiety diary. This can help you learn what triggers your reaction and then learn ways to manage your response.  · Thinking about how you react to certain situations. You may not be able to control everything, but you can control your response.  · Making time for activities that help you relax and not feeling guilty about spending your time in this way.  · Visual imagery and yoga can help you stay calm and relax.    Medicines  Medicines can help ease symptoms. Medicines for anxiety include:  · Anti-anxiety drugs.  · Antidepressants.  Medicines are often used as a primary treatment for anxiety disorder. Medicines will be prescribed by a health care provider. When used together, medicines, psychotherapy, and tension reduction techniques may be the most effective treatment.  Relationships  Relationships can play a big part in helping you recover. Try to spend more time connecting with trusted friends and family members. Consider going to couples counseling, taking family education classes, or going to family therapy. Therapy can help you and others better understand your condition.  How to recognize changes in your anxiety  Everyone responds differently to treatment for anxiety. Recovery from anxiety happens when symptoms decrease and  stop interfering with your daily activities at home or work. This may mean that you will start to:  · Have better concentration and focus. Worry will interfere less in your daily thinking.  · Sleep better.  · Be less irritable.  · Have more energy.  · Have improved memory.  It is important to recognize when your condition is getting worse. Contact your health care provider if your symptoms interfere with home or work and you feel like your condition is not improving.  Follow these instructions at home:  Activity  · Exercise. Most adults should do the following:  ? Exercise for at least 150 minutes each week. The exercise should increase your heart rate and make you sweat (moderate-intensity exercise).  ? Strengthening exercises at least twice a week.  · Get the right amount and quality of sleep. Most adults need 7-9 hours of sleep each night.  Lifestyle    · Eat a healthy diet that includes plenty of vegetables, fruits, whole grains, low-fat dairy products, and lean protein. Do not eat a lot of foods that are high in solid fats, added sugars, or salt.  · Make choices that simplify your life.  · Do not use any products that contain nicotine or tobacco, such as cigarettes, e-cigarettes, and chewing tobacco. If you need help quitting, ask your health care provider.  · Avoid caffeine, alcohol, and certain over-the-counter cold medicines. These may make you feel worse. Ask your pharmacist which medicines to avoid.    General instructions  · Take over-the-counter and prescription medicines only as told by your health care provider.  · Keep all follow-up visits as told by your health care provider. This is important.  Where to find support  You can get help and support from these sources:  · Self-help groups.  · Online and community organizations.  · A trusted spiritual leader.  · Couples counseling.  · Family education classes.  · Family therapy.  Where to find more information  You may find that joining a support group  helps you deal with your anxiety. The following sources can help you locate counselors or support groups near you:  · Mental Health [place]: www.mentalhealth[place].net  · Anxiety and Depression Association of [place] (ADAA): www.adaa.org  · National [place] [employer]ness (BENNY): www.benny.org  Contact a health care provider if you:  · Have a hard time staying focused or finishing daily tasks.  · Spend many hours a day feeling worried about everyday life.  · Become exhausted by worry.  · Start to have headaches, feel tense, or have nausea.  · Urinate more than normal.  · Have diarrhea.  Get help right away if you have:  · A racing heart and shortness of breath.  · Thoughts of hurting yourself or others.  If you ever feel like you may hurt yourself or others, or have thoughts about taking your own life, get help right away. You can go to your nearest emergency department or call:  · Your local emergency services (911 in the [place]  · A suicide crisis helpline, such as the National Suicide Prevention Lifeline at 1-636.812.7688. This is open 24 hours a day.  Summary  · Taking steps to learn and use tension reduction techniques can help calm you and help prevent triggering an anxiety reaction.  · When used together, medicines, psychotherapy, and tension reduction techniques may be the most effective treatment.  · Family, friends, and partners can play a big part in helping you recover from an anxiety disorder.  This information is not intended to replace advice given to you by your health care provider. Make sure you discuss any questions you have with your health care provider.  Document Revised: 05/19/2020 Document Reviewed: 05/19/2020  [employer] Patient Education © 2021 [employer]

## 2021-11-05 RX ORDER — ONDANSETRON 4 MG/1
4 TABLET, FILM COATED ORAL EVERY 8 HOURS PRN
Qty: 20 TABLET | Refills: 1 | Status: SHIPPED | OUTPATIENT
Start: 2021-11-05 | End: 2022-11-05

## 2021-11-05 NOTE — TELEPHONE ENCOUNTER
Caller: Lenora Kathleen    Relationship: Self          Requested Prescriptions:   Requested Prescriptions     Pending Prescriptions Disp Refills   • ondansetron (Zofran) 4 MG tablet 20 tablet 1     Sig: Take 1 tablet by mouth Every 8 (Eight) Hours As Needed for Nausea or Vomiting.        Pharmacy where request should be sent: [facility], [address] - [phone]  - [phone]   [phone]  Associate Signed OrdersPatient       Best call back number: [phone]      Does the patient have less than a 3 day supply:  [x] Yes  [] No  STATES BUSPAR IS MAKING HER NAUSEATED    Patricia Boyce Rep   11/05/21 13:19 CDT

## 2021-11-12 ENCOUNTER — TRANSCRIBE ORDERS (OUTPATIENT)
Dept: LAB | Facility: HOSPITAL | Age: 61
End: 2021-11-12

## 2021-11-12 DIAGNOSIS — Z01.818 PREOPERATIVE TESTING: Primary | ICD-10-CM

## 2021-11-13 ENCOUNTER — LAB (OUTPATIENT)
Dept: LAB | Facility: HOSPITAL | Age: 61
End: 2021-11-13

## 2021-11-13 LAB — SARS-COV-2 ORF1AB RESP QL NAA+PROBE: NOT DETECTED

## 2021-11-13 PROCEDURE — U0004 COV-19 TEST NON-CDC HGH THRU: HCPCS | Performed by: COLON & RECTAL SURGERY

## 2021-11-13 PROCEDURE — C9803 HOPD COVID-19 SPEC COLLECT: HCPCS | Performed by: COLON & RECTAL SURGERY

## 2021-11-22 ENCOUNTER — HOSPITAL ENCOUNTER (OUTPATIENT)
Dept: GENERAL RADIOLOGY | Facility: HOSPITAL | Age: 61
Discharge: HOME | End: 2021-11-22

## 2021-11-22 ENCOUNTER — OFFICE VISIT (OUTPATIENT)
Dept: URGENT CARE | Age: 61
End: 2021-11-22

## 2021-11-22 ENCOUNTER — OFFICE VISIT (OUTPATIENT)
Dept: INTERNAL MEDICINE | Facility: CLINIC | Age: 61
End: 2021-11-22

## 2021-11-22 ENCOUNTER — LAB (OUTPATIENT)
Dept: LAB | Facility: HOSPITAL | Age: 61
End: 2021-11-22

## 2021-11-22 VITALS
TEMPERATURE: 96.7 F | HEIGHT: 63 IN | DIASTOLIC BLOOD PRESSURE: 73 MMHG | HEART RATE: 92 BPM | RESPIRATION RATE: 18 BRPM | WEIGHT: 196.8 LBS | BODY MASS INDEX: 34.87 KG/M2 | SYSTOLIC BLOOD PRESSURE: 123 MMHG | OXYGEN SATURATION: 99 %

## 2021-11-22 VITALS
TEMPERATURE: 97.6 F | DIASTOLIC BLOOD PRESSURE: 60 MMHG | HEART RATE: 91 BPM | SYSTOLIC BLOOD PRESSURE: 111 MMHG | HEIGHT: 63 IN | WEIGHT: 197 LBS | RESPIRATION RATE: 15 BRPM | BODY MASS INDEX: 34.91 KG/M2 | OXYGEN SATURATION: 93 %

## 2021-11-22 DIAGNOSIS — R94.31 ABNORMAL ELECTROCARDIOGRAM (ECG) (EKG): ICD-10-CM

## 2021-11-22 DIAGNOSIS — R06.09 DOE (DYSPNEA ON EXERTION): ICD-10-CM

## 2021-11-22 DIAGNOSIS — Z53.21 PROCEDURE AND TREATMENT NOT CARRIED OUT DUE TO PATIENT LEAVING PRIOR TO BEING SEEN BY HEALTH CARE PROVIDER: Primary | ICD-10-CM

## 2021-11-22 DIAGNOSIS — R29.6 REPEATED FALLS: ICD-10-CM

## 2021-11-22 DIAGNOSIS — R76.8 HEPATITIS C ANTIBODY POSITIVE IN BLOOD: ICD-10-CM

## 2021-11-22 DIAGNOSIS — R63.5 WEIGHT GAIN: ICD-10-CM

## 2021-11-22 DIAGNOSIS — R53.83 FATIGUE, UNSPECIFIED TYPE: ICD-10-CM

## 2021-11-22 DIAGNOSIS — R53.1 WEAKNESS: ICD-10-CM

## 2021-11-22 DIAGNOSIS — R53.83 FATIGUE, UNSPECIFIED TYPE: Primary | ICD-10-CM

## 2021-11-22 LAB
ALBUMIN SERPL-MCNC: 3.5 G/DL (ref 3.5–5.2)
ALBUMIN/GLOB SERPL: 1.3 G/DL
ALP SERPL-CCNC: 51 U/L (ref 39–117)
ALT SERPL W P-5'-P-CCNC: 13 U/L (ref 1–33)
ANION GAP SERPL CALCULATED.3IONS-SCNC: 6 MMOL/L (ref 5–15)
AST SERPL-CCNC: 30 U/L (ref 1–32)
BILIRUB SERPL-MCNC: 0.3 MG/DL (ref 0–1.2)
BUN SERPL-MCNC: 10 MG/DL (ref 8–23)
BUN/CREAT SERPL: 14.5 (ref 7–25)
CALCIUM SPEC-SCNC: 9.1 MG/DL (ref 8.6–10.5)
CHLORIDE SERPL-SCNC: 103 MMOL/L (ref 98–107)
CK SERPL-CCNC: 1115 U/L (ref 20–180)
CO2 SERPL-SCNC: 29 MMOL/L (ref 22–29)
CREAT SERPL-MCNC: 0.69 MG/DL (ref 0.57–1)
GFR SERPL CREATININE-BSD FRML MDRD: 86 ML/MIN/1.73
GLOBULIN UR ELPH-MCNC: 2.7 GM/DL
GLUCOSE SERPL-MCNC: 90 MG/DL (ref 65–99)
POTASSIUM SERPL-SCNC: 3.8 MMOL/L (ref 3.5–5.2)
PROT SERPL-MCNC: 6.2 G/DL (ref 6–8.5)
SODIUM SERPL-SCNC: 138 MMOL/L (ref 136–145)

## 2021-11-22 PROCEDURE — 87077 CULTURE AEROBIC IDENTIFY: CPT

## 2021-11-22 PROCEDURE — 99999 PR OFFICE/OUTPT VISIT,PROCEDURE ONLY: CPT | Performed by: NURSE PRACTITIONER

## 2021-11-22 PROCEDURE — 36415 COLL VENOUS BLD VENIPUNCTURE: CPT

## 2021-11-22 PROCEDURE — 71046 X-RAY EXAM CHEST 2 VIEWS: CPT

## 2021-11-22 PROCEDURE — 81001 URINALYSIS AUTO W/SCOPE: CPT

## 2021-11-22 PROCEDURE — 87186 SC STD MICRODIL/AGAR DIL: CPT

## 2021-11-22 PROCEDURE — 87086 URINE CULTURE/COLONY COUNT: CPT

## 2021-11-22 PROCEDURE — 87522 HEPATITIS C REVRS TRNSCRPJ: CPT

## 2021-11-22 PROCEDURE — 99214 OFFICE O/P EST MOD 30 MIN: CPT | Performed by: INTERNAL MEDICINE

## 2021-11-22 PROCEDURE — 80053 COMPREHEN METABOLIC PANEL: CPT

## 2021-11-22 PROCEDURE — 82550 ASSAY OF CK (CPK): CPT

## 2021-11-22 NOTE — PROGRESS NOTES
Spoke with scheduling at PCP office after patient was triaged at [facility]. Staff notified of patient s/s that she checked in with  staff at [facility]. Staff reports they will see her at 2pm today, patient and NP assigned to her visit here notified, staff assisted patient with exiting clinic and notified sister who is dirivng patient that she needs to be headed to her PCP office for a 2pm appt time. Patient left in stable condition.

## 2021-11-22 NOTE — PROGRESS NOTES
Chief Complaint   Patient presents with   • Office Visit   • Memory Loss   • Muscle Weakness         History:  Lenora Kathleen is a 61 y.o. female who presents today for evaluation of the above problems.      She presents today for an acute visit.  I last saw her on November 3, 2021 anxiety.  I placed her on BuSpar at that time.    She is here mostly for muscle weakness, fatigue, and edema associated with increase weight.  She has had numerous falls.  She feels weak within her arms and legs.  She has gained 16 pounds in about 2 weeks.  She is present today with her sister.  They are concerned given the family history of congestive heart failure.  Her mom had swelling of her face, and within the last week Lenora has swelling of her face.  That has resolved, but she has had lower extremity swelling which remains.  She is having dyspnea on exertion although this symptom is not new.    Symptoms started prior to starting BuSpar    She is concerned about the possibility of another urinary tract infection given the weakness.  She denies any urinary symptoms    She was anemic previously with hemoglobin of 10.4 and her previous albumin was 3.1.  Her sister reports that she has not been eating very well.  Upon further questioning she only drinks 8 ounces of water per day.    She is concerned that she may have continued infection of hepatitis C.  She is status post Harvoni years ago      HPI      ROS:  Review of Systems   As above      Current Outpatient Medications:   •  acetaminophen (TYLENOL) 325 MG tablet, Take 2 tablets by mouth Every 4 (Four) Hours As Needed for Mild Pain ., Disp: , Rfl:   •  Ascorbic Acid (VITAMIN C PO), Take 1 tablet by mouth Daily., Disp: , Rfl:   •  busPIRone (BUSPAR) 10 MG tablet, Take 1 tablet by mouth 2 (Two) Times a Day., Disp: 60 tablet, Rfl: 2  •  Cyanocobalamin (VITAMIN B 12) 500 MCG tablet, Take 500 mcg by mouth Daily., Disp: , Rfl:   •  FLUoxetine (PROZAC) 20 MG capsule, Take 20 mg by mouth 4  (Four) Times a Day., Disp: , Rfl:   •  gabapentin (NEURONTIN) 100 MG capsule, Take 1 capsule by mouth 4 (Four) Times a Day., Disp: 12 capsule, Rfl: 0  •  gabapentin (NEURONTIN) 300 MG capsule, Take 300 mg by mouth 4 (Four) Times a Day., Disp: , Rfl:   •  hydrOXYzine (ATARAX) 50 MG tablet, Take 1 tablet by mouth 3 (Three) Times a Day As Needed., Disp: , Rfl:   •  ibuprofen (ADVIL,MOTRIN) 600 MG tablet, Take 1 tablet by mouth Every 8 (Eight) Hours As Needed for Moderate Pain  for up to 279 days., Disp: , Rfl:   •  ondansetron (Zofran) 4 MG tablet, Take 1 tablet by mouth Every 8 (Eight) Hours As Needed for Nausea or Vomiting., Disp: 20 tablet, Rfl: 1  •  oxyCODONE (ROXICODONE) 15 MG immediate release tablet, Take 15 mg by mouth Every 6 (Six) Hours As Needed., Disp: , Rfl:   •  oxyCODONE-acetaminophen (PERCOCET)  MG per tablet, Take 1 tablet by mouth Every 6 (Six) Hours As Needed., Disp: , Rfl:   •  vitamin D (ERGOCALCIFEROL) 1.25 MG (52901 UT) capsule capsule, Take 1 capsule by mouth 1 (One) Time Per Week., Disp: 5 capsule, Rfl: 5    Lab Results   Component Value Date    GLUCOSE 89 09/21/2021    BUN 9 09/21/2021    CREATININE 0.34 (L) 09/21/2021    EGFRIFNONA >150 09/21/2021    EGFRIFAFRI >59 08/19/2021    BCR 26.5 (H) 09/21/2021    K 4.8 09/21/2021    CO2 27.0 09/21/2021    CALCIUM 8.6 09/21/2021    ALBUMIN 3.10 (L) 09/21/2021    AST 18 09/21/2021    ALT 6 09/21/2021       WBC   Date Value Ref Range Status   09/21/2021 6.58 3.40 - 10.80 10*3/mm3 Final   09/02/2021 4.2 (L) 4.8 - 10.8 K/uL Final     RBC   Date Value Ref Range Status   09/21/2021 3.49 (L) 3.77 - 5.28 10*6/mm3 Final   09/02/2021 3.21 (L) 4.20 - 5.40 M/uL Final     Hemoglobin   Date Value Ref Range Status   09/21/2021 10.4 (L) 12.0 - 15.9 g/dL Final   09/02/2021 9.6 (L) 12.0 - 16.0 g/dL Final     Hematocrit   Date Value Ref Range Status   09/21/2021 34.1 34.0 - 46.6 % Final   09/02/2021 31.5 (L) 37.0 - 47.0 % Final     MCV   Date Value Ref Range  Status   09/21/2021 97.7 (H) 79.0 - 97.0 fL Final   09/02/2021 98.1 81.0 - 99.0 fL Final     MCH   Date Value Ref Range Status   09/21/2021 29.8 26.6 - 33.0 pg Final   09/02/2021 29.9 27.0 - 31.0 pg Final     MCHC   Date Value Ref Range Status   09/21/2021 30.5 (L) 31.5 - 35.7 g/dL Final   09/02/2021 30.5 (L) 33.0 - 37.0 g/dL Final     RDW   Date Value Ref Range Status   09/21/2021 13.5 12.3 - 15.4 % Final   09/02/2021 13.4 11.5 - 14.5 % Final     RDW-SD   Date Value Ref Range Status   09/21/2021 48.2 37.0 - 54.0 fl Final     MPV   Date Value Ref Range Status   09/21/2021 10.7 6.0 - 12.0 fL Final   09/02/2021 11.0 9.4 - 12.3 fL Final     Platelets   Date Value Ref Range Status   09/21/2021 310 140 - 450 10*3/mm3 Final   09/02/2021 225 130 - 400 K/uL Final     Neutrophil Rel %   Date Value Ref Range Status   08/24/2021 53.9 50.0 - 65.0 % Final     Neutrophil %   Date Value Ref Range Status   09/21/2021 60.0 42.7 - 76.0 % Final     Lymphocyte Rel %   Date Value Ref Range Status   08/24/2021 32.5 20.0 - 40.0 % Final     Lymphocyte %   Date Value Ref Range Status   09/21/2021 23.7 19.6 - 45.3 % Final     Monocyte Rel %   Date Value Ref Range Status   08/24/2021 9.7 0.0 - 10.0 % Final     Monocyte %   Date Value Ref Range Status   09/21/2021 12.8 (H) 5.0 - 12.0 % Final     Eosinophil Rel %   Date Value Ref Range Status   08/24/2021 3.4 0.0 - 5.0 % Final     Eosinophil %   Date Value Ref Range Status   09/21/2021 3.0 0.3 - 6.2 % Final     Basophil Rel %   Date Value Ref Range Status   08/24/2021 0.3 0.0 - 1.0 % Final     Basophil %   Date Value Ref Range Status   09/21/2021 0.2 0.0 - 1.5 % Final     Immature Grans %   Date Value Ref Range Status   09/21/2021 0.3 0.0 - 0.5 % Final     Neutrophils Absolute   Date Value Ref Range Status   08/24/2021 3.2 1.5 - 7.5 K/uL Final     Neutrophils, Absolute   Date Value Ref Range Status   09/21/2021 3.95 1.70 - 7.00 10*3/mm3 Final     Lymphocytes Absolute   Date Value Ref Range  "Status   08/24/2021 1.9 1.1 - 4.5 K/uL Final     Lymphocytes, Absolute   Date Value Ref Range Status   09/21/2021 1.56 0.70 - 3.10 10*3/mm3 Final     Monocytes Absolute   Date Value Ref Range Status   08/24/2021 0.60 0.00 - 0.90 K/uL Final     Monocytes, Absolute   Date Value Ref Range Status   09/21/2021 0.84 0.10 - 0.90 10*3/mm3 Final     Eosinophils Absolute   Date Value Ref Range Status   08/24/2021 0.20 0.00 - 0.60 K/uL Final     Eosinophils, Absolute   Date Value Ref Range Status   09/21/2021 0.20 0.00 - 0.40 10*3/mm3 Final     Basophils Absolute   Date Value Ref Range Status   08/24/2021 0.00 0.00 - 0.20 K/uL Final     Basophils, Absolute   Date Value Ref Range Status   09/21/2021 0.01 0.00 - 0.20 10*3/mm3 Final     Immature Grans, Absolute   Date Value Ref Range Status   09/21/2021 0.02 0.00 - 0.05 10*3/mm3 Final   08/24/2021 0.0 K/uL Final     nRBC   Date Value Ref Range Status   09/21/2021 0.0 0.0 - 0.2 /100 WBC Final         OBJECTIVE:  Visit Vitals  /60 (BP Location: Left arm, Patient Position: Sitting, Cuff Size: Adult)   Pulse 91   Temp 97.6 °F (36.4 °C) (Oral)   Resp 15   Ht 160 cm (62.99\")   Wt 89.4 kg (197 lb)   SpO2 93%   BMI 34.91 kg/m²      Physical Exam  Constitutional:       General: She is not in acute distress.     Appearance: She is well-developed. She is not diaphoretic.   HENT:      Head: Normocephalic and atraumatic.   Eyes:      Pupils: Pupils are equal, round, and reactive to light.   Neck:      Thyroid: No thyromegaly.      Vascular: JVD (Angle of jaw) present.      Trachea: Phonation normal.   Cardiovascular:      Rate and Rhythm: Normal rate and regular rhythm.      Heart sounds: No murmur heard.      Pulmonary:      Effort: Pulmonary effort is normal. No respiratory distress.      Breath sounds: No stridor. Wheezing (Slight end expiratory wheezes) present. No rhonchi.   Skin:     General: Skin is warm and dry.      Coloration: Skin is not jaundiced or pale.      Findings: No " bruising or erythema.   Neurological:      Mental Status: She is alert.   Psychiatric:         Mood and Affect: Affect is tearful.         Behavior: Behavior normal.         Thought Content: Thought content normal.         Judgment: Judgment normal.         Assessment/Plan    Diagnoses and all orders for this visit:    1. Fatigue, unspecified type (Primary)  -     Adult Transthoracic Echo Complete W/ Cont if Necessary Per Protocol; Future  -     Urinalysis With Culture If Indicated -; Future    2. Weakness  -     Adult Transthoracic Echo Complete W/ Cont if Necessary Per Protocol; Future  -     Urinalysis With Culture If Indicated -; Future  -     CK; Future    3. Repeated falls  -     Urinalysis With Culture If Indicated -; Future  -     CK; Future    4. ROWLEY (dyspnea on exertion)  -     XR Chest PA & Lateral; Future    5. Weight gain    6. Abnormal electrocardiogram (ECG) (EKG)   -     Adult Transthoracic Echo Complete W/ Cont if Necessary Per Protocol; Future    7. Hepatitis C antibody positive in blood  -     Hepatitis C RNA, Quantitative, PCR (graph); Future      I would like to obtain further work-up.  There is concern for congestive heart failure given her unexplained weight gain, edema, and dyspnea on exertion.  We will get a 2D echocardiogram for further work-up.  Symptoms could be related to hypoalbuminemia and will recheck a CMP as well.  She was slightly anemic and given the fatigue we will recheck a CBC.  Her TSH was normal back in August so I do not think we need to repeat it today.    Given her prior urinary tract infection with the only symptom and weakness we will recheck a urinalysis today    Check chest x-ray today.  Also confirm that she has cleared the hepatitis C infection.    Keep next follow-up in a couple weeks for her Medicare wellness, follow-up sooner if indicated.  Further work-up and management based on the above results.    Return for Next scheduled follow up for DIAZ Durham  MD Meg  14:12 CST  11/22/2021

## 2021-11-23 DIAGNOSIS — R74.8 ELEVATED CK: ICD-10-CM

## 2021-11-23 DIAGNOSIS — R53.1 WEAKNESS: Primary | ICD-10-CM

## 2021-11-23 LAB
BACTERIA UR QL AUTO: ABNORMAL /HPF
BILIRUB UR QL STRIP: NEGATIVE
CLARITY UR: ABNORMAL
COD CRY URNS QL: ABNORMAL /HPF
COLOR UR: ABNORMAL
GLUCOSE UR STRIP-MCNC: NEGATIVE MG/DL
HGB UR QL STRIP.AUTO: NEGATIVE
HYALINE CASTS UR QL AUTO: ABNORMAL /LPF
KETONES UR QL STRIP: ABNORMAL
LEUKOCYTE ESTERASE UR QL STRIP.AUTO: ABNORMAL
NITRITE UR QL STRIP: NEGATIVE
PH UR STRIP.AUTO: 6.5 [PH] (ref 5–8)
PROT UR QL STRIP: ABNORMAL
RBC # UR STRIP: ABNORMAL /HPF
REF LAB TEST METHOD: ABNORMAL
SP GR UR STRIP: >=1.03 (ref 1–1.03)
SQUAMOUS #/AREA URNS HPF: ABNORMAL /HPF
UROBILINOGEN UR QL STRIP: ABNORMAL
WBC # UR STRIP: ABNORMAL /HPF

## 2021-11-23 RX ORDER — CEFDINIR 300 MG/1
300 CAPSULE ORAL 2 TIMES DAILY
Qty: 14 CAPSULE | Refills: 0 | Status: SHIPPED | OUTPATIENT
Start: 2021-11-23 | End: 2021-12-06

## 2021-11-23 NOTE — PROGRESS NOTES
Spoke with patient regarding results. Noted understanding. She stated that she has not been drinking the water but she is going to start drinking water and she is going to start her antibiotic.

## 2021-11-25 LAB — BACTERIA SPEC AEROBE CULT: ABNORMAL

## 2021-11-26 LAB
HCV RNA SERPL NAA+PROBE-ACNC: NORMAL IU/ML
TEST INFORMATION: NORMAL

## 2021-11-29 ENCOUNTER — TELEPHONE (OUTPATIENT)
Dept: INTERNAL MEDICINE | Facility: CLINIC | Age: 61
End: 2021-11-29

## 2021-11-29 ENCOUNTER — LAB (OUTPATIENT)
Dept: LAB | Facility: HOSPITAL | Age: 61
End: 2021-11-29

## 2021-11-29 DIAGNOSIS — R53.1 WEAKNESS: ICD-10-CM

## 2021-11-29 DIAGNOSIS — N30.01 ACUTE CYSTITIS WITH HEMATURIA: ICD-10-CM

## 2021-11-29 DIAGNOSIS — R74.8 ELEVATED CK: ICD-10-CM

## 2021-11-29 LAB
BACTERIA UR QL AUTO: ABNORMAL /HPF
BILIRUB UR QL STRIP: ABNORMAL
CK SERPL-CCNC: 132 U/L (ref 20–180)
CLARITY UR: CLEAR
COLOR UR: ABNORMAL
GLUCOSE UR STRIP-MCNC: NEGATIVE MG/DL
HGB UR QL STRIP.AUTO: NEGATIVE
HYALINE CASTS UR QL AUTO: ABNORMAL /LPF
KETONES UR QL STRIP: ABNORMAL
LEUKOCYTE ESTERASE UR QL STRIP.AUTO: ABNORMAL
NITRITE UR QL STRIP: NEGATIVE
PH UR STRIP.AUTO: 5.5 [PH] (ref 5–8)
PROT UR QL STRIP: ABNORMAL
RBC # UR STRIP: ABNORMAL /HPF
REF LAB TEST METHOD: ABNORMAL
SP GR UR STRIP: 1.02 (ref 1–1.03)
SQUAMOUS #/AREA URNS HPF: ABNORMAL /HPF
UROBILINOGEN UR QL STRIP: ABNORMAL
WBC # UR STRIP: ABNORMAL /HPF

## 2021-11-29 PROCEDURE — 82550 ASSAY OF CK (CPK): CPT

## 2021-11-29 PROCEDURE — 36415 COLL VENOUS BLD VENIPUNCTURE: CPT

## 2021-11-29 PROCEDURE — 81001 URINALYSIS AUTO W/SCOPE: CPT

## 2021-11-29 PROCEDURE — 87086 URINE CULTURE/COLONY COUNT: CPT

## 2021-11-30 LAB — BACTERIA SPEC AEROBE CULT: NO GROWTH

## 2021-12-03 ENCOUNTER — HOSPITAL ENCOUNTER (OUTPATIENT)
Dept: RADIATION ONCOLOGY | Facility: HOSPITAL | Age: 61
Setting detail: RADIATION/ONCOLOGY SERIES
End: 2021-12-03

## 2021-12-03 ENCOUNTER — PRE-PROCEDURE TELEPHONE (OUTPATIENT)
Dept: OTHER | Age: 61
End: 2021-12-03

## 2021-12-03 DIAGNOSIS — Z87.891 PERSONAL HISTORY OF TOBACCO USE, PRESENTING HAZARDS TO HEALTH: Primary | ICD-10-CM

## 2021-12-06 ENCOUNTER — OFFICE VISIT (OUTPATIENT)
Dept: RADIATION ONCOLOGY | Facility: HOSPITAL | Age: 61
End: 2021-12-06

## 2021-12-06 VITALS
DIASTOLIC BLOOD PRESSURE: 91 MMHG | HEIGHT: 63 IN | SYSTOLIC BLOOD PRESSURE: 150 MMHG | HEART RATE: 92 BPM | OXYGEN SATURATION: 98 % | BODY MASS INDEX: 34.2 KG/M2 | WEIGHT: 193 LBS

## 2021-12-06 DIAGNOSIS — Z92.3 HISTORY OF RADIATION THERAPY: ICD-10-CM

## 2021-12-06 DIAGNOSIS — C44.520 PRIMARY SQUAMOUS CELL CARCINOMA OF ANAL CANAL: Primary | ICD-10-CM

## 2021-12-06 DIAGNOSIS — Z87.891 FORMER SMOKER: ICD-10-CM

## 2021-12-06 PROCEDURE — G0463 HOSPITAL OUTPT CLINIC VISIT: HCPCS | Performed by: RADIOLOGY

## 2021-12-06 RX ORDER — ALPRAZOLAM 1 MG/1
1 TABLET ORAL 4 TIMES DAILY
COMMUNITY
Start: 2021-11-18

## 2021-12-06 RX ORDER — FLUOXETINE HYDROCHLORIDE 40 MG/1
40 CAPSULE ORAL DAILY
COMMUNITY
Start: 2021-11-17

## 2021-12-06 RX ORDER — ACYCLOVIR 400 MG/1
800 TABLET ORAL
Qty: 70 TABLET | Refills: 0 | Status: SHIPPED | OUTPATIENT
Start: 2021-12-06 | End: 2021-12-13

## 2021-12-06 NOTE — PROGRESS NOTES
[facility]  MD Darell Dixon APRN  ____________________________________________________________  [facility]  Department of Radiation Oncology  [address] [place] [id number]  Office:  [phone]  Fax: [phone]    DATE:  12/06/2021  PATIENT: Lenora Kathleen  1960                         MEDICAL RECORD #:  [id number]                                                       Reason for Follow Up Visit:    Chief Complaint   Patient presents with   • Squamous Cell Carcinoma Anal Canal     Lenora Kathleen is a 61 y.o. patient that has completed radiation therapy to the anal canal and returns to the clinic today for inital follow up exam. Reports appetite change, fatigue, unexpected weight change, abdominal pain, blood in stool, vaginal discharge, and back pain. Denies visual disturbance, SOB, chest pain, anal bleeding, nausea/vomiting, diarrhea, dysuria, hematuria, light-headedness, weakness, and headaches.She continues to follow MD's at [facility].     History of Present Illness:  Diagnosed with carcinoma of the anal canal. She completed 5400 cGy in 30 fractions to the anal canal on 07/01/2021.     09/24/2020 - CT Abdomen/Pelvis with and without contrast:  • Right nephrolithiasis. A 4 mm calculus is present lower pole the right kidney.   • The left renal contour is visualized. There may be very subtle pelvocaliectasis on the left     12/11/2020 - Transvaginal Ultrasound due to vaginal bleeding:  • Status post total abdominal hysterectomy.   • No evidence of discrete mass or free fluid.     12/22/2020 - CT Lung Screening:  • No suspicious pulmonary nodules or pathologic intrathoracic lymphadenopathy. Mild ectasia ascending thoracic aorta with mild vascular calcification.   • Lung RADS category 1-negative exam. Continued annual screening with low-dose CT chest in 12 months recommended.     02/03/2021 - Appointment with ADIN Santos - [facility]:  • Right flank pain  o Complaints of right flank pain greater than left. History of 4mm calculi in lower pole of right kidney. Will obtain new CT to determine exact location of stone.   • Gross hematuria  o This has been intermittent since Oct 2020. Urine ranges from clear yellow to bloody. History of stones. RBC's detected on UA. Will obtain CT to evaluate stones.   • Vaginal discharge  o She states a malodorous discharge that she thought was coming through her urine. I was unable to identify any infection in her urine on microscope. Will order urine culture to rule out any possible infection. Recommended a visit to her GYN for swab of possible vaginal discharge.   • Ureteral stricture, left  o History of scarring and ureteral stricture on left. Has had a previous balloon dilation however Dr. Yeboah did discuss more definitive repair by specialist but was not having pain nor hydronephrosis. Will order CT urogram to further evaluate.  • Return in about 1 week (around 2/10/2021) for follow up, CT prior to appt, with labs prior.     02/08/2021 - CT Abdomen/Pelvis due to hematuria:  • Area of decreased ureteral distention on delayed phase images may reflect ureteral wall thickening or may be physiologic. No surrounding inflammatory change is identified. This is new compared to the prior exam.   • Punctate nonobstructive bilateral renal calculi.   • Mildly enlarged right inguinal lymph node of uncertain   • significance. Single enlarged lymph node is likely reactive but cannot be confirmed.   • Atherosclerotic disease.     03/01/2021 - Appointment with Dr.Slatter Shelton - OB/GYN - [facility]:  • Lenora Kathleen presents today for complaints of vaginal bleeding.   • Pt has had a hysterectomy, due to abnormal bleeding.   • Pt had some bleeding in October when wiping. Pt thought it was from kidney stones. A few months later, she had some red bleeding. Pt has had bleeding on and off. Pt also has an odor.    Recommendations:  • Pt has a GI appt next week in [place], encourage pt to keep appt and get a colonoscopy asap.   • Refer to Gyn Onc, will call with appt.   • Pt told she has a vaginal /rectal mass that needs to be evaluated further with likely biopsies.   • Vaginal pap and HPV  • Posterior vaginal wall specimen sent in thin prep  • Diatherix swab sent     03/01/2021 - Pap smear per Dr.Slatter Shelton:  • Satisfactory for Evaluation.    • Endocervical cells/transformation zone component present.     03/09/2021 - Appointment with Edward Clarke MD at [facility]:  Assessment, Management and Plan:   • 62 y/o presenting for evaluation of rectovaginal mass.   • Last colonoscopy was 5-6 years ago.   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 Given this finding, it is more concerning for rectal cancer as mass is more prominent in rectum.   • Will need Exam under anesthesia and proctoscopy with biopsies.   • COVID testing to be performed prior to test  • Discussed with the patient the risk, benefits and alternatives to surgery, including but not limited to: possible infection, bleeding, fluid overload, injuring to vital structures, fistula formation, blood transfusion, anesthesia risks, thrombus formation which could lead to DVT, CVA, PE, MI, as well as possibility of death. Patient verbalized understanding and wishes to proceed. All questions were answered. Informed consent obtained.  • Discussed the procedure and post-operative expectations.    03/19/2021 - EUA, anoscopy and anal biopsy per Edward Clarke MD at [facility]:  Surgical Findings:  • Normal appearing external genitalia.  • Surgically absent uterus and intact vaginal cuff.  • Firm ~3cm mass palpated just distal to the introitus of the vagina with dimpling and evidence of a small rectovaginal fistula with water noted to be coming from it when injected into the  rectum.  • Vaginal mucosa looked grossly normal in the are overlying the palpated mass.  • On rectovaginal exam the mass palpated from the vaginal side appears to be an anal mass along the anterior wall of the anus.  • On proctoscopy an ~3cm anterior, pale mass was noted in the anus distal to the anal sphincter.  • There seems to be extensions of the mass into the right perirectal space, no evidence on the left.   Pathology:  • Anus, biopsy:   o Invasive squamous cell carcinoma (see comment)   Comment: Histologic sections of the anal biopsy demonstrate involvement by an invasive squamous cell carcinoma, characterized by moderate pleomorphism and focal keratinization. Mitoses are readily seen. Immunohistochemistry for p16 is diffusely and strongly positive.     03/22/2021 - Telemedicine with Edward Clarke MD at [facility]:  • Telemedicine visit today via Zoom.  • Advised final pathology c/w SCC of the anus.   • Discussed in detail surgical findings and recommend referral to CRS and Medical Oncology for treatment.  • Will place referrals today.  • Marie with schedule and contact with date and time of appts.   • May follow-up with our office on a PRN basis.     04/05/2021 - Telemedicine with Edward Clarke MD at [facility]:  • Telemedicine visit today via Zoom for F/U.  • Newly diagnosed SCC of the anus.   • TB recommendations for referral to CRS and Medical Oncology for treatment recommendations.  • Has initial consultation with Dr. Levine, CRS, and Medical Oncology, Dr. Diaz on 4/12/2021.  • We discussed her situation in detail and answered several questions regarding her diagnosis and treatment. She does have a small fistula and will defer to Dr. Levine for treatment recommendations.   • May F/U with our office on a PRN basis.     04/12/2021 - Consult with Wiliam Levine MD at [facility]:  • I could not view pts recent CT scan but  will review once it is available. Based on the impression of her CT scan, I informed her lymph node is enlarged and she may require PET scan.   • I discussed potential treatment options of chemoradiation but treatment is complicated with her rectovaginal fistula. I dicussed potential surgical intervention of APR which would require permanent colostomy.   • After her visit with Dr. Diaz, I will further discuss the best plan moving forward. All pt questions and concerns were answered.   • RTC precautions discussed, otherwise f/u after discussion with Dr. Diaz.   • RVF makes treatment decisions difficult. Consider pre-treatment stoma vs. Start treatment and reconsider if symptoms worsen. Suspect patient will require permanent stoma in future.   • Follow up in about 3 months (around 7/12/2021).    04/22/2021 - PET Scan:  • Markedly FDG avid rectal mass measuring about 4 cm with maximum SUV 14.65.  • Right inguinal and pelvic FDG avid adenopathy.  • No evidence of distant metastatic disease.  • Ascending thoracic aorta measures up to 4 cm.    04/21/2021 - Consult with :  • I have seen, examined and reviewed this patient's medication list, appropriate labs and imaging studies as well as other physician notes. We discussed the goals and plans of care and answered all questions. In consideration of the diagnotic data and evaluation of the patient, a course of chemoradiation is recommended, concurrent chemotherapy will be delivered under the direction oncology, I anticipate a dose of 5040 cGy over 28 fractions, final dose dependant on PET findings. We discussed the need for an urgent diverting colostomy to minimize her symptoms.   • The patient and family verbalize understanding of this discussion, voice no further questions and wish to proceed with recommendations.  We will simulate treatment fields today, will fuse with PET scan when obtained and will notify her when ready to begin.  Plan:  • Top priority is PET  scan and diverting colostomy.  • I have placed urgent referral to general surgery and pain management.  They will call you.  • We will start radiation planning and fuse with PET scan  • Plan on chemotherapy and radiation, total number of treatments depend on PET scan.    04/30/2021 - Right inguinal lymph node, ultrasound-guided fine-needle core biopsies and touch preparations:   • Metastatic squamous cell carcinoma.    05/10/2021 - 07/01/2021 - Completed Radiation course:  • Received 5400 cGy in 30 fractions to anal canal via external beam radiation therapy.    05/10/2021 - 06/30/2021 - Chemotherapy course:  • Xeloda BID/Mitomycin Day 1 and 29 during XRT    07/16/2021 - CT Abdomen/Pelvis with contrast:  • Multiple inflamed small bowel loops in the pelvis. Wall thickening and inflammation of the rectum and sigmoid colon. Findings are most compatible with acute enterocolitis.  • Urinary bladder wall thickening, correlate for cystitis.  • Enlarging RIGHT inguinal lymph node.    08/05/2021 - CT Head without contrast:  • No acute intracranial process.    08/19/2021 - CT head due to confusion:  • No acute intracranial abnormality is seen.     08/19/2021 - CT Abdomen/Pelvis:  • No acute abnormality is seen     09/21/2021 - CT Abdomen/Pelvis:  • Findings are most concerning for enteritis. Additional chronic findings as above.    10/25/2021 - Telemedicine per Dr. Wiliam Levine MD at [facility]:  • I informed pt of fistula repair may be complicated s/p radiation treatment. I recommend proceeding with EUA with possible biopsy to rule out any persistent CA and further evaluate fistula. All pt questions and concerns were answered.   • Pt agrees with plan. RTC precautions discussed, otherwise f/u with EUA.     11/15/2021 - Anterior anal canal biopsy:   • Benign anal and transitional mucosa with mild chronic inflammation.   • Negative for dysplasia and invasive carcinoma.      History obtained from  PATIENT and CHART    PAST  MEDICAL HISTORY  Past Medical History:   Diagnosis Date   • Anxiety    • Blood in urine    • Chest tightness    • Colon polyps    • COPD (chronic obstructive pulmonary disease) (HCC)    • Depression    • Emphysema/COPD (HCC)    • Hepatitis C    • Hypertension    • Injury of back    • Kidney stone    • Palpitation    • PONV (postoperative nausea and vomiting)    • Rectal cancer (HCC)       PAST SURGICAL HISTORY  Past Surgical History:   Procedure Laterality Date   • CHOLECYSTECTOMY     • COLONOSCOPY  03/12/2015    normal   • COLOSTOMY N/A 4/23/2021    Procedure: LAPAROSCOPIC DIVERTING COLOSTOMY;  Surgeon: Paulette Villegas MD;  Location: [facility];  Service: General;  Laterality: N/A;   • FINGER SURGERY     • HYSTERECTOMY     • KIDNEY STONE SURGERY     • KIDNEY SURGERY     • OTHER SURGICAL HISTORY      POSSIBLE CARDIAC MONITOR (LOOP RECORDER?)--IMPLANTED AND EXPLANTED    • US GUIDED LYMPH NODE BIOPSY  4/30/2021      FAMILY HISTORY  family history includes Friedreich's ataxia in her father and paternal grandfather; Heart disease in her mother.  Cancer-related family history is not on file.     SOCIAL HISTORY  Social History     Socioeconomic History   • Marital status:    Tobacco Use   • Smoking status: Former Smoker     Packs/day: 0.25     Years: 45.00     Pack years: 11.25     Types: Cigarettes   • Smokeless tobacco: Never Used   Substance and Sexual Activity   • Alcohol use: No   • Drug use: No   • Sexual activity: Defer     Partners: Male     ALLERGIES  Morphine and Codeine     MEDICATIONS    Current Outpatient Medications:   •  acetaminophen (TYLENOL) 325 MG tablet, Take 2 tablets by mouth Every 4 (Four) Hours As Needed for Mild Pain ., Disp: , Rfl:   •  ALPRAZolam (XANAX) 1 MG tablet, Take 1 mg by mouth 4 (Four) Times a Day., Disp: , Rfl:   •  Cyanocobalamin (VITAMIN B 12) 500 MCG tablet, Take 500 mcg by mouth Daily., Disp: , Rfl:   •  FLUoxetine (PROzac) 40 MG capsule, Take 40 mg by mouth Daily.,  "Disp: , Rfl:   •  gabapentin (NEURONTIN) 300 MG capsule, Take 300 mg by mouth 4 (Four) Times a Day., Disp: , Rfl:   •  ibuprofen (ADVIL,MOTRIN) 600 MG tablet, Take 1 tablet by mouth Every 8 (Eight) Hours As Needed for Moderate Pain  for up to 279 days., Disp: , Rfl:   •  ondansetron (Zofran) 4 MG tablet, Take 1 tablet by mouth Every 8 (Eight) Hours As Needed for Nausea or Vomiting., Disp: 20 tablet, Rfl: 1  •  oxyCODONE (ROXICODONE) 15 MG immediate release tablet, Take 15 mg by mouth Every 6 (Six) Hours As Needed., Disp: , Rfl:   •  vitamin D (ERGOCALCIFEROL) 1.25 MG (11084 UT) capsule capsule, Take 1 capsule by mouth 1 (One) Time Per Week., Disp: 5 capsule, Rfl: 5  •  acyclovir (Zovirax) 400 MG tablet, Take 2 tablets by mouth 5 (Five) Times a Day for 7 days. Take no more than 5 doses a day., Disp: 70 tablet, Rfl: 0  •  Ascorbic Acid (VITAMIN C PO), Take 1 tablet by mouth Daily., Disp: , Rfl:     Current outpatient and discharge medications have been reconciled for the patient.  Reviewed by: Jadiel Guthrie III, MD    The following portions of the patient's history were reviewed and updated as appropriate: allergies, current medications, past family history, past medical history, past social history, past surgical history and problem list.     REVIEW OF SYSTEMS  Review of Systems   Constitutional: Positive for appetite change (\"comes and goes\"), fatigue (\"horrible\") and unexpected weight change (d/t fluid).   HENT: Negative.    Eyes: Negative for visual disturbance.        Glasses    Respiratory: Negative for shortness of breath.    Cardiovascular: Negative for chest pain.   Gastrointestinal: Positive for abdominal pain and blood in stool (\"sometimes\" ). Negative for anal bleeding, nausea and vomiting.        Colostomy   Mucous type discharge, occasional blood      Endocrine: Negative.    Genitourinary: Positive for vaginal discharge (\"mucousy, like white snot\" ). Negative for dysuria and hematuria. " "  Musculoskeletal: Positive for back pain.   Skin: Negative.    Allergic/Immunologic: Negative.    Neurological: Positive for syncope (d/t dehydration ). Negative for dizziness, light-headedness and headaches.   Hematological: Negative.    Psychiatric/Behavioral:        Depression       PHYSICAL EXAM  VITAL SIGNS:   Vitals:    12/06/21 1358   BP: 150/91   Pulse: 92   SpO2: 98%  Comment: room air   Weight: 87.5 kg (193 lb)   Height: 160 cm (62.99\")   PainSc: 10-Worst pain ever   PainLoc: Back  Comment: low back     Physical Exam    General Appearance:  awake, alert, oriented, in no acute distress.  Head: Normocephalic  Eyes: Conjunctiva pink, pupils equal and reactive.   Ears:  Normal externally.   Nose/Sinuses:  Mucosa normal.  Mouth/Throat:  Mucosa moist, no lesions; pharynx without erythema, edema or exudate.   Neck: Supple, no mass, non-tender  Back:  Symmetric, no curvature, ROM normal, no CVA tenderness   Lungs:  Normal expansion.  Clear to auscultation.  No rales, rhonchi, or wheezing.  Heart:  Heart sounds are normal.  Regular rate and rhythm without murmur, gallop or rub.  Abdomen:  Soft, non-tender, normal bowel sounds; no bruits, organomegaly or masses.  Pelvic: Rectovaginal fistula noted  Rectal: Rectovaginal fistula noted, minimal scar tissue noted.   Extremities: Warm to touch, pink, with no edema. Pulses 2+ bilaterally  Musculoskeletal: strength and sensation grossly normal  Neurologic:  Alert and oriented, gait normal, non-focal exam  Psych exam: normal situational behavior   Skin:  Warm and moist. No suspicious lesions or rashes of concern    Performance Status: ECOG (0) Fully active, able to carry on all predisease performance without restriction    Clinical Quality Measures  -Pain Documented by Standardized Tool, FPS Lenora VARGAS Frannie reports a pain score of 10. Given her pain assessment as noted, treatment options were discussed and the following options were decided upon as a follow-up plan to address " the patient's pain: continuation of current treatment plan for pain and use of non-medical modalities (ice, heat, stretching and/or behavior modifications).   Pain Medications             acetaminophen (TYLENOL) 325 MG tablet Take 2 tablets by mouth Every 4 (Four) Hours As Needed for Mild Pain .    FLUoxetine (PROzac) 40 MG capsule Take 40 mg by mouth Daily.    gabapentin (NEURONTIN) 300 MG capsule Take 300 mg by mouth 4 (Four) Times a Day.    ibuprofen (ADVIL,MOTRIN) 600 MG tablet Take 1 tablet by mouth Every 8 (Eight) Hours As Needed for Moderate Pain  for up to 279 days.    oxyCODONE (ROXICODONE) 15 MG immediate release tablet Take 15 mg by mouth Every 6 (Six) Hours As Needed.        Body Mass Index Screening and Follow-Up Plan  Patient's Body mass index is 34.2 kg/m². indicating that she is obese (BMI >30). Obesity-related health conditions include the following: none.     Tobacco Use: Screening and Cessation Intervention  Social History    Tobacco Use      Smoking status: Former Smoker        Packs/day: 0.25        Years: 45.00        Pack years: 11.25        Types: Cigarettes      Smokeless tobacco: Never Used    -Advanced Care Planning Advance Care Planning    ACP discussion was held with the patient during this visit. Patient has an advance directive in EMR which is still valid.     ASSESSMENT AND PLAN  1. Primary squamous cell carcinoma of anal canal    2. History of radiation therapy    3. Former smoker      RECOMMENDATIONS: Lenora Kathleen is status post completion of radiation therapy to the colon and presents to our clinic today for surveillance. Diagnosed with carcinoma of the anal canal. She completed 5400 cGy in 30 fractions to the anal canal on 07/01/2021.     Ms. Kathleen had an anterior anal canal biopsy on 11/15/2021, which revealed benign anal and transitional mucosa with mild chronic inflammation and was negative for dysplasia and invasive carcinoma. She has a telephone visit scheduled this week with  Dr. Levine to discuss future surgery considerations for fistula repair/colostomy reversal. She is requesting a refill of acyclovir for shingles, I will send that in.     Acute and long term radiation effects were reviewed today as well as NCCN post treatment surveillance guidelines. There is no clinical evidence suggestive of local recurrence of cancer on exam today. We will schedule a routine follow up with her in 4 months or sooner if needed, she will continue ongoing management with other physicians.    Patient Instructions   1) Return to Dr. Guthrie in 4 months     Time Spent: I spent 30 minutes caring for Lenora on this date of service. This time includes time spent by me in the following activities: preparing for the visit, reviewing tests, obtaining and/or reviewing a separately obtained history, performing a medically appropriate examination and/or evaluation, counseling and educating the patient/family/caregiver, ordering medications, tests, or procedures, referring and communicating with other health care professionals, documenting information in the medical record and independently interpreting results and communicating that information with the patient/family/caregiver.   Jadiel Guthrie III, MD  12/06/2021

## 2021-12-15 ENCOUNTER — OFFICE VISIT (OUTPATIENT)
Dept: INTERNAL MEDICINE | Facility: CLINIC | Age: 61
End: 2021-12-15

## 2021-12-15 VITALS
DIASTOLIC BLOOD PRESSURE: 80 MMHG | RESPIRATION RATE: 15 BRPM | WEIGHT: 189 LBS | HEART RATE: 81 BPM | TEMPERATURE: 97.8 F | SYSTOLIC BLOOD PRESSURE: 151 MMHG | HEIGHT: 63 IN | BODY MASS INDEX: 33.49 KG/M2 | OXYGEN SATURATION: 98 %

## 2021-12-15 DIAGNOSIS — F17.200 CURRENT EVERY DAY SMOKER: ICD-10-CM

## 2021-12-15 DIAGNOSIS — Z00.00 MEDICARE ANNUAL WELLNESS VISIT, SUBSEQUENT: Primary | ICD-10-CM

## 2021-12-15 DIAGNOSIS — Z01.818 PRE-OP TESTING: ICD-10-CM

## 2021-12-15 DIAGNOSIS — C44.520 PRIMARY SQUAMOUS CELL CARCINOMA OF ANAL CANAL: ICD-10-CM

## 2021-12-15 DIAGNOSIS — N82.3 RECTOVAGINAL FISTULA: ICD-10-CM

## 2021-12-15 DIAGNOSIS — Z12.31 ENCOUNTER FOR SCREENING MAMMOGRAM FOR MALIGNANT NEOPLASM OF BREAST: ICD-10-CM

## 2021-12-15 DIAGNOSIS — Z91.81 AT MODERATE RISK FOR FALL: ICD-10-CM

## 2021-12-15 PROBLEM — G93.41 METABOLIC ENCEPHALOPATHY: Status: RESOLVED | Noted: 2021-07-17 | Resolved: 2021-12-15

## 2021-12-15 PROBLEM — R00.2 PALPITATIONS: Status: RESOLVED | Noted: 2021-09-09 | Resolved: 2021-12-15

## 2021-12-15 PROBLEM — E43 SEVERE MALNUTRITION (HCC): Status: RESOLVED | Noted: 2021-04-26 | Resolved: 2021-12-15

## 2021-12-15 PROCEDURE — 1159F MED LIST DOCD IN RCRD: CPT | Performed by: INTERNAL MEDICINE

## 2021-12-15 PROCEDURE — G0439 PPPS, SUBSEQ VISIT: HCPCS | Performed by: INTERNAL MEDICINE

## 2021-12-15 PROCEDURE — 1125F AMNT PAIN NOTED PAIN PRSNT: CPT | Performed by: INTERNAL MEDICINE

## 2021-12-15 PROCEDURE — 1170F FXNL STATUS ASSESSED: CPT | Performed by: INTERNAL MEDICINE

## 2021-12-15 RX ORDER — METOPROLOL TARTRATE 50 MG/1
50 TABLET, FILM COATED ORAL DAILY
COMMUNITY

## 2021-12-15 RX ORDER — ACYCLOVIR 400 MG/1
400 TABLET ORAL
COMMUNITY
End: 2022-02-22 | Stop reason: SDUPTHER
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
## 2021-12-27 ENCOUNTER — TELEPHONE (OUTPATIENT)
Dept: INTERNAL MEDICINE | Facility: CLINIC | Age: 61
End: 2021-12-27

## 2021-12-27 DIAGNOSIS — Z71.89 OSTOMY NURSE CONSULTATION: ICD-10-CM

## 2021-12-27 DIAGNOSIS — N82.3 RECTOVAGINAL FISTULA: Primary | ICD-10-CM

## 2021-12-27 NOTE — TELEPHONE ENCOUNTER
Patient called and stated that she has attempted to contact the ostomy nurse and she has finally received a phone call back the ostomy nurse will not be back until the first of the year. She called the wound care nurse and they suggested that she call our office. The patient stated that she has lost so much weight that it is hard for her to get a good seal on her ostomy and she is having a lot of leakage from the ostomy. She is wanting to know if we can order home health for a short time.

## 2021-12-27 NOTE — TELEPHONE ENCOUNTER
Will you please let her know I put the home health order in; I am not sure if it will be approved, but they can assess and tell her if she meets the qualifications for home health. Thanks!

## 2021-12-28 ENCOUNTER — HOME HEALTH ADMISSION (OUTPATIENT)
Dept: HOME HEALTH SERVICES | Facility: HOME HEALTHCARE | Age: 61
End: 2021-12-28

## 2021-12-28 NOTE — TELEPHONE ENCOUNTER
I spoke with the patient and looked at the referral. They can not accept patients at this time due to staffing issues.

## 2021-12-29 ENCOUNTER — TELEPHONE (OUTPATIENT)
Dept: INTERNAL MEDICINE | Facility: CLINIC | Age: 61
End: 2021-12-29

## 2021-12-29 PROBLEM — I87.2 VENOUS INSUFFICIENCY (CHRONIC) (PERIPHERAL): Status: ACTIVE | Noted: 2021-09-02

## 2021-12-29 PROBLEM — J41.0 SIMPLE CHRONIC BRONCHITIS (HCC): Status: ACTIVE | Noted: 2021-09-02

## 2021-12-29 PROBLEM — F34.1 DYSTHYMIC DISORDER: Status: ACTIVE | Noted: 2021-09-02

## 2021-12-29 PROBLEM — N20.1 URETERIC STONE: Status: ACTIVE | Noted: 2021-09-02

## 2021-12-29 PROBLEM — M48.061 SPINAL STENOSIS, LUMBAR REGION WITHOUT NEUROGENIC CLAUDICATION: Status: ACTIVE | Noted: 2021-09-02

## 2021-12-29 PROBLEM — F11.288 OPIOID DEPENDENCE WITH OTHER OPIOID-INDUCED DISORDER (HCC): Status: ACTIVE | Noted: 2021-09-02

## 2021-12-29 PROBLEM — Z95.818 PRESENCE OF OTHER CARDIAC IMPLANTS AND GRAFTS: Status: ACTIVE | Noted: 2021-09-02

## 2021-12-29 PROBLEM — M51.26 OTHER INTERVERTEBRAL DISC DISPLACEMENT, LUMBAR REGION: Status: ACTIVE | Noted: 2021-09-02

## 2021-12-29 PROBLEM — Z93.3 STATUS POST COLOSTOMY (HCC): Status: ACTIVE | Noted: 2021-09-02

## 2021-12-29 PROBLEM — C21.0 MALIGNANT NEOPLASM OF ANUS, UNSPECIFIED: Status: ACTIVE | Noted: 2021-09-02

## 2021-12-29 PROBLEM — E66.9 OBESITY, UNSPECIFIED: Status: ACTIVE | Noted: 2021-09-02

## 2021-12-29 PROBLEM — I10 ESSENTIAL (PRIMARY) HYPERTENSION: Status: ACTIVE | Noted: 2021-09-02

## 2021-12-29 PROBLEM — R15.9 FULL INCONTINENCE OF FECES: Status: ACTIVE | Noted: 2021-09-02

## 2021-12-29 PROBLEM — B18.2 CHRONIC VIRAL HEPATITIS C: Status: ACTIVE | Noted: 2021-09-02

## 2021-12-29 RX ORDER — METOPROLOL SUCCINATE 50 MG/1
50 TABLET, EXTENDED RELEASE ORAL DAILY
COMMUNITY
Start: 2021-12-14

## 2021-12-29 NOTE — TELEPHONE ENCOUNTER
Caller: Lenora Kathleen    Relationship: Self    Best call back number: [phone]    What orders are you requesting (i.e. lab or imaging):      Home Health for ostomy care or whatever suggestion the PCP may have.    In what timeframe would the patient need to come in:     ASAP    Additional notes:     Patient states she has lost a lot of weight over the last year, and she is having a hard time sealing her ostomy. She states she tried contacting the only wound care nurse in her area, and has not received a reply. She is needing an order for someone to come in and help her with taking care of this. Please advise.

## 2021-12-29 NOTE — TELEPHONE ENCOUNTER
I spoke with the patient regarding this. Elizabeth had placed an order for home health and home health sent the referral back and stated that they are not able to admit patient due to staffing.

## 2022-01-03 ENCOUNTER — LAB (OUTPATIENT)
Dept: LAB | Facility: HOSPITAL | Age: 62
End: 2022-01-03

## 2022-01-03 DIAGNOSIS — Z01.818 PRE-OP TESTING: ICD-10-CM

## 2022-01-03 LAB — SARS-COV-2 ORF1AB RESP QL NAA+PROBE: NOT DETECTED

## 2022-01-03 PROCEDURE — C9803 HOPD COVID-19 SPEC COLLECT: HCPCS

## 2022-01-03 PROCEDURE — U0004 COV-19 TEST NON-CDC HGH THRU: HCPCS

## 2022-01-04 ENCOUNTER — TELEPHONE (OUTPATIENT)
Dept: WOUND CARE | Facility: HOSPITAL | Age: 62
End: 2022-01-04

## 2022-01-04 NOTE — TELEPHONE ENCOUNTER
"Continuous leaking.  Appliances last from 1 to 4 days but most often only a day or 2.  She states with weight loss she has wrinkles of abdomen causing appliance to leak.  She does have slight skin irritation around the stoma.  She states she is using adhesive remover wipes, paste, barrier spray, and powder.  She is wearing a Corolla 2 1/4\" 2 piece flat    Dr. Ramos with [facility] at [facility] is planning for surgery Friday.  This will be to repair the rectovaginal fistula.      Due to patient having upcoming surgery, will have [employer] send samples of ostomy supplies.  Will request convex 2 1/4\" 2 piece appliance samples to be sent overnight.  Suggest to continue doing same process, to only change type of appliance at this time.     Patient is to call after she is home from surgery to discuss change of supplies and the result.        "

## 2022-01-07 ENCOUNTER — APPOINTMENT (OUTPATIENT)
Dept: CARDIOLOGY | Facility: HOSPITAL | Age: 62
End: 2022-01-07

## 2022-01-18 ENCOUNTER — TELEPHONE (OUTPATIENT)
Dept: INTERNAL MEDICINE | Facility: CLINIC | Age: 62
End: 2022-01-18

## 2022-01-18 NOTE — TELEPHONE ENCOUNTER
PATIENT WAS CALLED, (LEFT A VM TO CALL HER BACK)  SHE STATED THAT SHE HAS NOT HEARD ANYTHING FROM HOME HEALTH     CALLED [facility] AND LEFT VM FOR THE  TO CALL BACK.      DID RETURN CALL, SHE STATED THAT THE REFERRAL FOR [facility] WAS NEVER RECEIVED.  SHE STATED THAT IF IT COULD BE REFAXED TO THEM THEN SHE COULD TRY TO GET PATIENT WORKED UP IN 48hours.     CALLED JOSE PATEL REFERRAL COORDINATOR AND MESSAGE HAS BEEN LEFT FOR RETURN CALL.

## 2022-01-20 ENCOUNTER — TELEPHONE (OUTPATIENT)
Dept: INTERNAL MEDICINE | Facility: CLINIC | Age: 62
End: 2022-01-20

## 2022-01-23 ENCOUNTER — NURSE TRIAGE (OUTPATIENT)
Dept: CALL CENTER | Facility: HOSPITAL | Age: 62
End: 2022-01-23

## 2022-01-23 NOTE — TELEPHONE ENCOUNTER
"Key Reynolds from  calling needing orders for Home Health to see, they had  was supposed to be seen Friday for admit and could not reach pt. Dorene OAKLEY was called and conf with  Nurse, orders rec. as requested.     Reason for Disposition  • [1] Follow-up call from patient regarding patient's clinical status AND [2] information urgent    Additional Information  • Negative: Lab calling with strep throat test results and triager can call in prescription  • Negative: Lab calling with urinalysis test results and triager can call in prescription  • Negative: Medication questions  • Negative: ED call to PCP  • Negative: Physician call to PCP  • Negative: Call about patient who is currently hospitalized  • Negative: Lab or radiology calling with CRITICAL test results  • Negative: [1] Prescription not at pharmacy AND [2] was prescribed today by PCP  • Negative: [1] Caller requests to speak ONLY to PCP AND [2] URGENT question  • Negative: [1] Caller requests to speak to PCP now AND [2] won't tell us reason for call  (Exception: if 10 pm to 6 am, caller must first discuss reason for the call)  • Negative: Notification of hospital admission    Answer Assessment - Initial Assessment Questions  1. REASON FOR CALL or QUESTION: \"What is your reason for calling today?\" or \"How can I best  help you?\" or \"What question do you have that I can help answer?\"      Needing Home Health orders  2. CALLER: Document the source of call. (e.g., laboratory, patient).      Key Reynolds RN from Home Health    Protocols used: PCP CALL - NO TRIAGE-ADULT-      "

## 2022-01-24 ENCOUNTER — HOME HEALTH ADMISSION (OUTPATIENT)
Dept: HOME HEALTH SERVICES | Facility: HOME HEALTHCARE | Age: 62
End: 2022-01-24

## 2022-01-27 ENCOUNTER — TELEPHONE (OUTPATIENT)
Dept: INTERNAL MEDICINE | Facility: CLINIC | Age: 62
End: 2022-01-27

## 2022-01-27 NOTE — TELEPHONE ENCOUNTER
Patients sister called and stated that she is very worried about the patient. She stated that she has been losing a lot of weight not sure of the EXACT amount the sister did not know. She also stated that she had told the home health that she did not want home health right now, but according to the sister the patient needs home health to help bath her.,etc. The patient does not remember things and has a lot of trouble remembering.     I made an appointment with you tomorrow at 3:15 with you. Would it be okay if the sister came back with the patient so that she could speak with you about these concerns?

## 2022-01-28 ENCOUNTER — OFFICE VISIT (OUTPATIENT)
Dept: INTERNAL MEDICINE | Facility: CLINIC | Age: 62
End: 2022-01-28

## 2022-01-28 VITALS
DIASTOLIC BLOOD PRESSURE: 80 MMHG | OXYGEN SATURATION: 98 % | BODY MASS INDEX: 30.3 KG/M2 | HEIGHT: 63 IN | WEIGHT: 171 LBS | TEMPERATURE: 97.4 F | HEART RATE: 70 BPM | SYSTOLIC BLOOD PRESSURE: 122 MMHG | RESPIRATION RATE: 15 BRPM

## 2022-01-28 DIAGNOSIS — R63.4 UNINTENTIONAL WEIGHT LOSS: Primary | ICD-10-CM

## 2022-01-28 DIAGNOSIS — F32.89 OTHER DEPRESSION: ICD-10-CM

## 2022-01-28 DIAGNOSIS — R63.0 DECREASED APPETITE: ICD-10-CM

## 2022-01-28 PROCEDURE — 99214 OFFICE O/P EST MOD 30 MIN: CPT | Performed by: INTERNAL MEDICINE

## 2022-01-28 RX ORDER — MIRTAZAPINE 15 MG/1
15 TABLET, FILM COATED ORAL NIGHTLY
Qty: 30 TABLET | Refills: 2 | Status: SHIPPED | OUTPATIENT
Start: 2022-01-28 | End: 2022-02-21 | Stop reason: SDUPTHER

## 2022-01-28 NOTE — PROGRESS NOTES
Chief Complaint   Patient presents with   • Office Visit   • Weight Loss         History:  Lenora Kathleen is a 62 y.o. female who presents today for evaluation of the above problems.      She presents for an acute visit for unintentional weight loss and decrease in appetite.  She has lost about 26 pounds in 2 months.  She has not been eating.  She had surgery January 7 in [place] to repair a fistula and has appointment on Monday for follow-up.  She also reports having an ulcer removed during the surgery.    She denies any nausea or vomiting.  She has an ostomy but there is no change in the output.  She is been tired fatigued and depressed.  She has been depressed about her ostomy and her health in general.  She sees Dr. Grant and is on Prozac.  Her last TSH was 2.25 on August 6, 2021.    She reports going to as many as 5 days without eating any food.  She also does not like fixing meals because of her fatigue.  In December her PHQ-9 was 15.    Of note, she is present with her sister at the bedside today.    No suicidal ideation.    HPI     ROS:  Review of Systems   See above        Current Outpatient Medications:   •  acetaminophen (TYLENOL) 325 MG tablet, Take 2 tablets by mouth Every 4 (Four) Hours As Needed for Mild Pain ., Disp: , Rfl:   •  acyclovir (ZOVIRAX) 400 MG tablet, Take 400 mg by mouth Every 4 (Four) Hours While Awake. Take no more than 5 doses a day., Disp: , Rfl:   •  ALPRAZolam (XANAX) 1 MG tablet, Take 1 mg by mouth 4 (Four) Times a Day., Disp: , Rfl:   •  Ascorbic Acid (VITAMIN C PO), Take 1 tablet by mouth Daily., Disp: , Rfl:   •  Cyanocobalamin (VITAMIN B 12) 500 MCG tablet, Take 500 mcg by mouth Daily., Disp: , Rfl:   •  FLUoxetine (PROzac) 40 MG capsule, Take 40 mg by mouth Daily., Disp: , Rfl:   •  gabapentin (NEURONTIN) 300 MG capsule, Take 300 mg by mouth 4 (Four) Times a Day., Disp: , Rfl:   •  ibuprofen (ADVIL,MOTRIN) 600 MG tablet, Take 1 tablet by mouth Every 8 (Eight) Hours As Needed  for Moderate Pain  for up to 279 days., Disp: , Rfl:   •  ondansetron (Zofran) 4 MG tablet, Take 1 tablet by mouth Every 8 (Eight) Hours As Needed for Nausea or Vomiting., Disp: 20 tablet, Rfl: 1  •  oxyCODONE (ROXICODONE) 15 MG immediate release tablet, Take 15 mg by mouth Every 6 (Six) Hours As Needed., Disp: , Rfl:   •  vitamin D (ERGOCALCIFEROL) 1.25 MG (43964 UT) capsule capsule, Take 1 capsule by mouth 1 (One) Time Per Week., Disp: 5 capsule, Rfl: 5  •  Zinc 50 MG capsule, Take 1 tablet by mouth Daily., Disp: , Rfl:   •  metoprolol succinate XL (TOPROL-XL) 50 MG 24 hr tablet, Take 50 mg by mouth Daily., Disp: , Rfl:   •  metoprolol tartrate (LOPRESSOR) 50 MG tablet, Take 50 mg by mouth Daily., Disp: , Rfl:   •  mirtazapine (Remeron) 15 MG tablet, Take 1 tablet by mouth Every Night., Disp: 30 tablet, Rfl: 2    Lab Results   Component Value Date    GLUCOSE 90 11/22/2021    BUN 10 11/22/2021    CREATININE 0.69 11/22/2021    EGFRIFNONA 86 11/22/2021    EGFRIFAFRI >59 08/19/2021    BCR 14.5 11/22/2021    K 3.8 11/22/2021    CO2 29.0 11/22/2021    CALCIUM 9.1 11/22/2021    ALBUMIN 3.50 11/22/2021    AST 30 11/22/2021    ALT 13 11/22/2021       WBC   Date Value Ref Range Status   01/07/2022 5.17 4.5 - 11.0 10*3/uL Final     RBC   Date Value Ref Range Status   01/07/2022 4.56 4.0 - 5.2 10*6/uL Final     Hemoglobin   Date Value Ref Range Status   01/07/2022 12.1 12.0 - 16.0 g/dL Final     Hematocrit   Date Value Ref Range Status   01/07/2022 40.3 36.0 - 46.0 % Final     MCV   Date Value Ref Range Status   01/07/2022 88.4 80.0 - 100.0 fL Final     MCH   Date Value Ref Range Status   01/07/2022 26.5 26.0 - 34.0 pg Final     MCHC   Date Value Ref Range Status   01/07/2022 30.0 (L) 31.0 - 37.0 g/dL Final     RDW   Date Value Ref Range Status   01/07/2022 14.6 12.0 - 16.8 % Final     RDW-SD   Date Value Ref Range Status   09/21/2021 48.2 37.0 - 54.0 fl Final     MPV   Date Value Ref Range Status   01/07/2022 11.2 8.4 - 12.4  "fL Final     Platelets   Date Value Ref Range Status   01/07/2022 200 140 - 440 10*3/uL Final     Neutrophil Rel %   Date Value Ref Range Status   01/07/2022 68.9 45 - 80 % Final     Lymphocyte Rel %   Date Value Ref Range Status   01/07/2022 19.5 15 - 50 % Final     Monocyte Rel %   Date Value Ref Range Status   01/07/2022 8.1 0 - 15 % Final     Eosinophil Rel %   Date Value Ref Range Status   08/24/2021 3.4 0.0 - 5.0 % Final     Eosinophil %   Date Value Ref Range Status   01/07/2022 2.9 0 - 7 % Final     Basophil Rel %   Date Value Ref Range Status   01/07/2022 0.2 0 - 2 % Final     Immature Grans %   Date Value Ref Range Status   01/07/2022 0.4 0.0 - 1.0 % Final     Neutrophils Absolute   Date Value Ref Range Status   01/07/2022 3.56 2.0 - 8.8 10*3/uL Final     Lymphocytes Absolute   Date Value Ref Range Status   01/07/2022 1.01 0.7 - 5.5 10*3/uL Final     Monocytes Absolute   Date Value Ref Range Status   01/07/2022 0.42 0.0 - 1.7 10*3/uL Final     Eosinophils Absolute   Date Value Ref Range Status   01/07/2022 0.15 0.0 - 0.8 10*3/uL Final     Basophils Absolute   Date Value Ref Range Status   01/07/2022 0.01 0.0 - 0.2 10*3/uL Final     Immature Grans, Absolute   Date Value Ref Range Status   01/07/2022 0.02 0.00 - 0.10 10*3/uL Final     nRBC   Date Value Ref Range Status   01/07/2022 0 0 /100(WBC) Final   09/21/2021 0.0 0.0 - 0.2 /100 WBC Final         OBJECTIVE:  Visit Vitals  /80 (BP Location: Left arm, Patient Position: Sitting, Cuff Size: Adult)   Pulse 70   Temp 97.4 °F (36.3 °C) (Oral)   Resp 15   Ht 160 cm (62.99\")   Wt 77.6 kg (171 lb)   SpO2 98%   BMI 30.30 kg/m²      Physical Exam  Constitutional:       General: She is not in acute distress.     Appearance: She is well-developed. She is not diaphoretic.   HENT:      Head: Normocephalic and atraumatic.   Eyes:      Pupils: Pupils are equal, round, and reactive to light.   Neck:      Thyroid: No thyromegaly.      Trachea: Phonation normal. "   Cardiovascular:      Rate and Rhythm: Normal rate and regular rhythm.      Heart sounds: No murmur heard.      Pulmonary:      Effort: No respiratory distress.   Abdominal:      Tenderness: There is abdominal tenderness (Diffuse).   Skin:     Coloration: Skin is not pale.      Findings: No erythema.   Neurological:      Mental Status: She is alert.   Psychiatric:         Mood and Affect: Affect is tearful (At times).         Behavior: Behavior normal.         Thought Content: Thought content normal.         Judgment: Judgment normal.         Assessment/Plan    Diagnoses and all orders for this visit:    1. Unintentional weight loss (Primary)  -     mirtazapine (Remeron) 15 MG tablet; Take 1 tablet by mouth Every Night.  Dispense: 30 tablet; Refill: 2    2. Decreased appetite  -     mirtazapine (Remeron) 15 MG tablet; Take 1 tablet by mouth Every Night.  Dispense: 30 tablet; Refill: 2    3. Other depression  -     mirtazapine (Remeron) 15 MG tablet; Take 1 tablet by mouth Every Night.  Dispense: 30 tablet; Refill: 2      Her depression is uncontrolled.  I recommend she get back with Dr. Grant to help with this.  Ultimately, her depressive symptoms are more related to the fact she has an ostomy.  I believe this is contributing to her fatigue as is her poor oral intake.  She is also been having poor sleep, and I believe adding to a dose of Remeron at night may help all of her symptoms.  Discussed that it is getting in the best shape with adequate nutrition is her best chance at reversing her ostomy.  This is the ultimate goal for her.  I would like to see her back in 4 weeks for recheck.  Follow-up sooner if needed.    Return in about 4 weeks (around 2/25/2022) for Recheck.      LOC Weaver MD  15:20 CST  1/31/2022

## 2022-02-08 ENCOUNTER — LAB (OUTPATIENT)
Dept: LAB | Facility: HOSPITAL | Age: 62
End: 2022-02-08

## 2022-02-08 ENCOUNTER — OFFICE VISIT (OUTPATIENT)
Dept: INTERNAL MEDICINE | Facility: CLINIC | Age: 62
End: 2022-02-08

## 2022-02-08 VITALS
TEMPERATURE: 97.2 F | HEIGHT: 63 IN | RESPIRATION RATE: 16 BRPM | DIASTOLIC BLOOD PRESSURE: 70 MMHG | OXYGEN SATURATION: 98 % | BODY MASS INDEX: 31.54 KG/M2 | HEART RATE: 59 BPM | SYSTOLIC BLOOD PRESSURE: 135 MMHG | WEIGHT: 178 LBS

## 2022-02-08 DIAGNOSIS — R63.4 UNINTENTIONAL WEIGHT LOSS: ICD-10-CM

## 2022-02-08 DIAGNOSIS — F32.89 OTHER DEPRESSION: ICD-10-CM

## 2022-02-08 DIAGNOSIS — N82.3 RECTOVAGINAL FISTULA: ICD-10-CM

## 2022-02-08 DIAGNOSIS — N39.0 URINARY TRACT INFECTION WITHOUT HEMATURIA, SITE UNSPECIFIED: Primary | ICD-10-CM

## 2022-02-08 DIAGNOSIS — N39.0 URINARY TRACT INFECTION WITHOUT HEMATURIA, SITE UNSPECIFIED: ICD-10-CM

## 2022-02-08 LAB
BACTERIA UR QL AUTO: ABNORMAL /HPF
BILIRUB UR QL STRIP: NEGATIVE
CLARITY UR: CLEAR
COLOR UR: ABNORMAL
GLUCOSE UR STRIP-MCNC: NEGATIVE MG/DL
HGB UR QL STRIP.AUTO: NEGATIVE
HYALINE CASTS UR QL AUTO: ABNORMAL /LPF
KETONES UR QL STRIP: ABNORMAL
LEUKOCYTE ESTERASE UR QL STRIP.AUTO: ABNORMAL
NITRITE UR QL STRIP: NEGATIVE
PH UR STRIP.AUTO: 6 [PH] (ref 5–8)
PROT UR QL STRIP: ABNORMAL
RBC # UR STRIP: ABNORMAL /HPF
REF LAB TEST METHOD: ABNORMAL
SP GR UR STRIP: >1.03 (ref 1–1.03)
SQUAMOUS #/AREA URNS HPF: ABNORMAL /HPF
UROBILINOGEN UR QL STRIP: ABNORMAL
WBC # UR STRIP: ABNORMAL /HPF

## 2022-02-08 PROCEDURE — 81001 URINALYSIS AUTO W/SCOPE: CPT

## 2022-02-08 PROCEDURE — 99213 OFFICE O/P EST LOW 20 MIN: CPT | Performed by: INTERNAL MEDICINE

## 2022-02-08 PROCEDURE — 87086 URINE CULTURE/COLONY COUNT: CPT

## 2022-02-08 NOTE — PROGRESS NOTES
"Chief Complaint   Patient presents with   • Office Visit   • Painful Urination         History:  Lenora Kathleen is a 62 y.o. female who presents today for evaluation of the above problems.    She presents today for an acute visit and is present today with her family member.    A couple weeks ago she started having dysuria, worsening abdominal pain and feeling \"out of sorts\" and foggy brain.  She denies any fever.  She has a known rectovaginal fistula and has noticed increased discharge lately.  She sees Dr. Levine in [place].  She recently had surgery to repair the fistula but this was not successful.  She is now having to decide next steps.  This includes the possibility of closing off her rectum, but this would definitively mean lifelong good colostomy.  Another option would be to keep things as they are now, and 1 more option would be to remove a muscle in her leg to try to patch the fistula.  She is undecided on what she would like to do.  However, the colostomy has caused her significant depression and mental anguish    At last visit I started Remeron 15 mg at night as it would help her appetite, sleep and depression.  I am pleased to report that she has gained 7 pounds since the last visit.  She is eating better as well.    She had C. difficile infection years ago and has not had recurrence.  She is had numerous UTIs and antibiotics without recurrence of the C. difficile.    c diff years ago  Has had uti and abx without recurrence of c diff    HPI      ROS:  Review of Systems     See above    Current Outpatient Medications:   •  acetaminophen (TYLENOL) 325 MG tablet, Take 2 tablets by mouth Every 4 (Four) Hours As Needed for Mild Pain ., Disp: , Rfl:   •  acyclovir (ZOVIRAX) 400 MG tablet, Take 400 mg by mouth Every 4 (Four) Hours While Awake. Take no more than 5 doses a day., Disp: , Rfl:   •  ALPRAZolam (XANAX) 1 MG tablet, Take 1 mg by mouth 4 (Four) Times a Day., Disp: , Rfl:   •  Ascorbic Acid (VITAMIN C " PO), Take 1 tablet by mouth Daily., Disp: , Rfl:   •  Cyanocobalamin (VITAMIN B 12) 500 MCG tablet, Take 500 mcg by mouth Daily., Disp: , Rfl:   •  FLUoxetine (PROzac) 40 MG capsule, Take 40 mg by mouth Daily., Disp: , Rfl:   •  gabapentin (NEURONTIN) 300 MG capsule, Take 300 mg by mouth 4 (Four) Times a Day., Disp: , Rfl:   •  ibuprofen (ADVIL,MOTRIN) 600 MG tablet, Take 1 tablet by mouth Every 8 (Eight) Hours As Needed for Moderate Pain  for up to 279 days., Disp: , Rfl:   •  metoprolol succinate XL (TOPROL-XL) 50 MG 24 hr tablet, Take 50 mg by mouth Daily., Disp: , Rfl:   •  metoprolol tartrate (LOPRESSOR) 50 MG tablet, Take 50 mg by mouth Daily., Disp: , Rfl:   •  mirtazapine (Remeron) 15 MG tablet, Take 1 tablet by mouth Every Night., Disp: 30 tablet, Rfl: 2  •  ondansetron (Zofran) 4 MG tablet, Take 1 tablet by mouth Every 8 (Eight) Hours As Needed for Nausea or Vomiting., Disp: 20 tablet, Rfl: 1  •  oxyCODONE (ROXICODONE) 15 MG immediate release tablet, Take 15 mg by mouth Every 6 (Six) Hours As Needed., Disp: , Rfl:   •  vitamin D (ERGOCALCIFEROL) 1.25 MG (15090 UT) capsule capsule, Take 1 capsule by mouth 1 (One) Time Per Week., Disp: 5 capsule, Rfl: 5  •  Zinc 50 MG capsule, Take 1 tablet by mouth Daily., Disp: , Rfl:     Lab Results   Component Value Date    GLUCOSE 90 11/22/2021    BUN 10 11/22/2021    CREATININE 0.69 11/22/2021    EGFRIFNONA 86 11/22/2021    EGFRIFAFRI >59 08/19/2021    BCR 14.5 11/22/2021    K 3.8 11/22/2021    CO2 29.0 11/22/2021    CALCIUM 9.1 11/22/2021    ALBUMIN 3.50 11/22/2021    AST 30 11/22/2021    ALT 13 11/22/2021       WBC   Date Value Ref Range Status   01/07/2022 5.17 4.5 - 11.0 10*3/uL Final     RBC   Date Value Ref Range Status   01/07/2022 4.56 4.0 - 5.2 10*6/uL Final     Hemoglobin   Date Value Ref Range Status   01/07/2022 12.1 12.0 - 16.0 g/dL Final     Hematocrit   Date Value Ref Range Status   01/07/2022 40.3 36.0 - 46.0 % Final     MCV   Date Value Ref Range  Status   01/07/2022 88.4 80.0 - 100.0 fL Final     MCH   Date Value Ref Range Status   01/07/2022 26.5 26.0 - 34.0 pg Final     MCHC   Date Value Ref Range Status   01/07/2022 30.0 (L) 31.0 - 37.0 g/dL Final     RDW   Date Value Ref Range Status   01/07/2022 14.6 12.0 - 16.8 % Final     RDW-SD   Date Value Ref Range Status   09/21/2021 48.2 37.0 - 54.0 fl Final     MPV   Date Value Ref Range Status   01/07/2022 11.2 8.4 - 12.4 fL Final     Platelets   Date Value Ref Range Status   01/07/2022 200 140 - 440 10*3/uL Final     Neutrophil Rel %   Date Value Ref Range Status   01/07/2022 68.9 45 - 80 % Final     Lymphocyte Rel %   Date Value Ref Range Status   01/07/2022 19.5 15 - 50 % Final     Monocyte Rel %   Date Value Ref Range Status   01/07/2022 8.1 0 - 15 % Final     Eosinophil Rel %   Date Value Ref Range Status   08/24/2021 3.4 0.0 - 5.0 % Final     Eosinophil %   Date Value Ref Range Status   01/07/2022 2.9 0 - 7 % Final     Basophil Rel %   Date Value Ref Range Status   01/07/2022 0.2 0 - 2 % Final     Immature Grans %   Date Value Ref Range Status   01/07/2022 0.4 0.0 - 1.0 % Final     Neutrophils Absolute   Date Value Ref Range Status   01/07/2022 3.56 2.0 - 8.8 10*3/uL Final     Lymphocytes Absolute   Date Value Ref Range Status   01/07/2022 1.01 0.7 - 5.5 10*3/uL Final     Monocytes Absolute   Date Value Ref Range Status   01/07/2022 0.42 0.0 - 1.7 10*3/uL Final     Eosinophils Absolute   Date Value Ref Range Status   01/07/2022 0.15 0.0 - 0.8 10*3/uL Final     Basophils Absolute   Date Value Ref Range Status   01/07/2022 0.01 0.0 - 0.2 10*3/uL Final     Immature Grans, Absolute   Date Value Ref Range Status   01/07/2022 0.02 0.00 - 0.10 10*3/uL Final     [facility]   Date Value Ref Range Status   01/07/2022 0 0 /100(WBC) Final   09/21/2021 0.0 0.0 - 0.2 /100 WBC Final         OBJECTIVE:  Visit Vitals  /70 (BP Location: Left arm, Patient Position: Sitting, Cuff Size: Adult)   Pulse 59   Temp 97.2 °F (36.2  "°C) (Oral)   Resp 16   Ht 160 cm (62.99\")   Wt 80.7 kg (178 lb)   SpO2 98%   BMI 31.54 kg/m²      Physical Exam  Constitutional:       General: She is not in acute distress.     Appearance: She is well-developed. She is not diaphoretic.   HENT:      Head: Normocephalic and atraumatic.   Eyes:      Pupils: Pupils are equal, round, and reactive to light.   Neck:      Thyroid: No thyromegaly.      Trachea: Phonation normal.   Cardiovascular:      Rate and Rhythm: Normal rate and regular rhythm.      Heart sounds: No murmur heard.      Pulmonary:      Effort: No respiratory distress.   Abdominal:      Tenderness: There is abdominal tenderness (Diffuse).   Skin:     Coloration: Skin is not pale.      Findings: No erythema.   Neurological:      Mental Status: She is alert.   Psychiatric:         Mood and Affect: Mood is depressed (But improved).         Behavior: Behavior normal.         Thought Content: Thought content normal.         Judgment: Judgment normal.       Reviewed Dr. Levine's office note from January 31, 2022  Assessment/Plan    Diagnoses and all orders for this visit:    1. Urinary tract infection without hematuria, site unspecified (Primary)  -     Urinalysis With Culture If Indicated -; Future    2. Rectovaginal fistula    3. Other depression    4. Unintentional weight loss      I do suspect her to have a urinary tract infection.  Will check urinalysis and start antibiotics if this is suggestive of UTI.  She is high risk of having get urinary tract infection given the rectovaginal fistula.    She is in a difficult spot with the neck steps to treat her rectovaginal fistula.  If she continues with urinary tract infections it may be beneficial for her to have a surgery soon close off the rectum so there is no more stool discharge from her vagina.  However, this would lead to lifelong need for colostomy which she is trying to come to  with.    I am pleased that her depression is better and that she has " gained about 7 pounds since last visit.      We will keep her next appointment in about a month to recheck her PHQ-9.  Follow-up sooner if needed.    Return for Next scheduled follow up.      LOC Weaver MD  15:39 CST  2/9/2022

## 2022-02-09 LAB — BACTERIA SPEC AEROBE CULT: NORMAL

## 2022-02-09 RX ORDER — CEFDINIR 300 MG/1
300 CAPSULE ORAL 2 TIMES DAILY
Qty: 10 CAPSULE | Refills: 0 | Status: SHIPPED | OUTPATIENT
Start: 2022-02-09 | End: 2022-02-14

## 2022-02-21 ENCOUNTER — HOSPITAL ENCOUNTER (OUTPATIENT)
Dept: GENERAL RADIOLOGY | Facility: HOSPITAL | Age: 62
Discharge: HOME | End: 2022-02-21

## 2022-02-21 ENCOUNTER — LAB (OUTPATIENT)
Dept: LAB | Facility: HOSPITAL | Age: 62
End: 2022-02-21

## 2022-02-21 ENCOUNTER — OFFICE VISIT (OUTPATIENT)
Dept: INTERNAL MEDICINE | Facility: CLINIC | Age: 62
End: 2022-02-21

## 2022-02-21 VITALS
HEIGHT: 63 IN | BODY MASS INDEX: 32.27 KG/M2 | HEART RATE: 90 BPM | DIASTOLIC BLOOD PRESSURE: 82 MMHG | TEMPERATURE: 98.1 F | SYSTOLIC BLOOD PRESSURE: 118 MMHG | WEIGHT: 182.1 LBS | OXYGEN SATURATION: 97 %
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
## 2022-02-21 DIAGNOSIS — R40.0 SOMNOLENCE: ICD-10-CM

## 2022-02-21 DIAGNOSIS — M54.50 ACUTE MIDLINE LOW BACK PAIN WITHOUT SCIATICA: Primary | ICD-10-CM

## 2022-02-21 DIAGNOSIS — R63.4 UNINTENTIONAL WEIGHT LOSS: ICD-10-CM

## 2022-02-21 DIAGNOSIS — F32.89 OTHER DEPRESSION: ICD-10-CM

## 2022-02-21 DIAGNOSIS — R41.82 ALTERED MENTAL STATUS, UNSPECIFIED ALTERED MENTAL STATUS TYPE: ICD-10-CM

## 2022-02-21 DIAGNOSIS — M54.50 ACUTE MIDLINE LOW BACK PAIN WITHOUT SCIATICA: ICD-10-CM

## 2022-02-21 LAB
ALBUMIN SERPL-MCNC: 3.8 G/DL (ref 3.5–5.2)
ALBUMIN/GLOB SERPL: 1.6 G/DL
ALP SERPL-CCNC: 61 U/L (ref 39–117)
ALT SERPL W P-5'-P-CCNC: 18 U/L (ref 1–33)
ANION GAP SERPL CALCULATED.3IONS-SCNC: 8 MMOL/L (ref 5–15)
AST SERPL-CCNC: 42 U/L (ref 1–32)
BASOPHILS # BLD AUTO: 0.02 10*3/MM3 (ref 0–0.2)
BASOPHILS NFR BLD AUTO: 0.5 % (ref 0–1.5)
BILIRUB SERPL-MCNC: 0.3 MG/DL (ref 0–1.2)
BUN SERPL-MCNC: 13 MG/DL (ref 8–23)
BUN/CREAT SERPL: 15.3 (ref 7–25)
CALCIUM SPEC-SCNC: 8.3 MG/DL (ref 8.6–10.5)
CHLORIDE SERPL-SCNC: 102 MMOL/L (ref 98–107)
CO2 SERPL-SCNC: 33 MMOL/L (ref 22–29)
CREAT SERPL-MCNC: 0.85 MG/DL (ref 0.57–1)
DEPRECATED RDW RBC AUTO: 45.6 FL (ref 37–54)
EOSINOPHIL # BLD AUTO: 0.15 10*3/MM3 (ref 0–0.4)
EOSINOPHIL NFR BLD AUTO: 3.4 % (ref 0.3–6.2)
ERYTHROCYTE [DISTWIDTH] IN BLOOD BY AUTOMATED COUNT: 14.1 % (ref 12.3–15.4)
GFR SERPL CREATININE-BSD FRML MDRD: 68 ML/MIN/1.73
GLOBULIN UR ELPH-MCNC: 2.4 GM/DL
GLUCOSE SERPL-MCNC: 92 MG/DL (ref 65–99)
HCT VFR BLD AUTO: 36.1 % (ref 34–46.6)
HGB BLD-MCNC: 11.2 G/DL (ref 12–15.9)
IMM GRANULOCYTES # BLD AUTO: 0.01 10*3/MM3 (ref 0–0.05)
IMM GRANULOCYTES NFR BLD AUTO: 0.2 % (ref 0–0.5)
LYMPHOCYTES # BLD AUTO: 0.86 10*3/MM3 (ref 0.7–3.1)
LYMPHOCYTES NFR BLD AUTO: 19.4 % (ref 19.6–45.3)
MCH RBC QN AUTO: 27.5 PG (ref 26.6–33)
MCHC RBC AUTO-ENTMCNC: 31 G/DL (ref 31.5–35.7)
MCV RBC AUTO: 88.5 FL (ref 79–97)
MONOCYTES # BLD AUTO: 0.49 10*3/MM3 (ref 0.1–0.9)
MONOCYTES NFR BLD AUTO: 11.1 % (ref 5–12)
NEUTROPHILS NFR BLD AUTO: 2.9 10*3/MM3 (ref 1.7–7)
NEUTROPHILS NFR BLD AUTO: 65.4 % (ref 42.7–76)
NRBC BLD AUTO-RTO: 0 /100 WBC (ref 0–0.2)
PLATELET # BLD AUTO: 244 10*3/MM3 (ref 140–450)
PMV BLD AUTO: 11 FL (ref 6–12)
POTASSIUM SERPL-SCNC: 3.4 MMOL/L (ref 3.5–5.2)
PROT SERPL-MCNC: 6.2 G/DL (ref 6–8.5)
RBC # BLD AUTO: 4.08 10*6/MM3 (ref 3.77–5.28)
SODIUM SERPL-SCNC: 143 MMOL/L (ref 136–145)
WBC NRBC COR # BLD: 4.43 10*3/MM3 (ref 3.4–10.8)

## 2022-02-21 PROCEDURE — 36415 COLL VENOUS BLD VENIPUNCTURE: CPT

## 2022-02-21 PROCEDURE — 99214 OFFICE O/P EST MOD 30 MIN: CPT | Performed by: NURSE PRACTITIONER

## 2022-02-21 PROCEDURE — 72100 X-RAY EXAM L-S SPINE 2/3 VWS: CPT

## 2022-02-21 PROCEDURE — 80053 COMPREHEN METABOLIC PANEL: CPT

## 2022-02-21 PROCEDURE — 85025 COMPLETE CBC W/AUTO DIFF WBC: CPT

## 2022-02-21 RX ORDER — MIRTAZAPINE 15 MG/1
7.5 TABLET, FILM COATED ORAL NIGHTLY
Qty: 30 TABLET | Refills: 2
Start: 2022-02-21 | End: 2022-04-27

## 2022-02-21 RX ORDER — ACYCLOVIR 400 MG/1
400 TABLET ORAL
Status: CANCELLED | OUTPATIENT
Start: 2022-02-21

## 2022-02-21 NOTE — TELEPHONE ENCOUNTER
Caller: neno morales    Relationship: Emergency Contact    Best call back number:    Requested Prescriptions:   Requested Prescriptions     Pending Prescriptions Disp Refills   • acyclovir (ZOVIRAX) 400 MG tablet       Sig: Take 1 tablet by mouth Every 4 (Four) Hours While Awake. Take no more than 5 doses a day.        Pharmacy where request should be sent: [facility], [address] - [phone]  - [phone]     Additional details provided by patient: PATIENT IS OUT OF THIS MEDICATION    Does the patient have less than a 3 day supply:  [x] Yes  [] No    Patricia Art Rep   02/21/22 16:19 CST

## 2022-02-22 ENCOUNTER — LAB (OUTPATIENT)
Dept: LAB | Facility: HOSPITAL | Age: 62
End: 2022-02-22

## 2022-02-22 DIAGNOSIS — E87.6 HYPOKALEMIA: Primary | ICD-10-CM

## 2022-02-22 LAB
AMORPH URATE CRY URNS QL MICRO: ABNORMAL /HPF
BACTERIA UR QL AUTO: ABNORMAL /HPF
BILIRUB UR QL STRIP: NEGATIVE
CLARITY UR: ABNORMAL
COLOR UR: ABNORMAL
GLUCOSE UR STRIP-MCNC: NEGATIVE MG/DL
HGB UR QL STRIP.AUTO: NEGATIVE
HYALINE CASTS UR QL AUTO: ABNORMAL /LPF
KETONES UR QL STRIP: NEGATIVE
LEUKOCYTE ESTERASE UR QL STRIP.AUTO: ABNORMAL
NITRITE UR QL STRIP: NEGATIVE
PH UR STRIP.AUTO: 6 [PH] (ref 5–8)
PROT UR QL STRIP: ABNORMAL
RBC # UR STRIP: ABNORMAL /HPF
REF LAB TEST METHOD: ABNORMAL
SP GR UR STRIP: 1.02 (ref 1–1.03)
SQUAMOUS #/AREA URNS HPF: ABNORMAL /HPF
UROBILINOGEN UR QL STRIP: ABNORMAL
WBC # UR STRIP: ABNORMAL /HPF

## 2022-02-22 PROCEDURE — 87086 URINE CULTURE/COLONY COUNT: CPT

## 2022-02-22 PROCEDURE — 81001 URINALYSIS AUTO W/SCOPE: CPT

## 2022-02-22 RX ORDER — ACYCLOVIR 400 MG/1
400 TABLET ORAL
Qty: 70 TABLET | Refills: 0 | OUTPATIENT
Start: 2022-02-22

## 2022-02-22 RX ORDER — ACYCLOVIR 400 MG/1
400 TABLET ORAL
Qty: 70 TABLET | Refills: 1 | Status: SHIPPED | OUTPATIENT
Start: 2022-02-22

## 2022-02-22 RX ORDER — POTASSIUM CHLORIDE 750 MG/1
10 CAPSULE, EXTENDED RELEASE ORAL DAILY
Qty: 3 CAPSULE | Refills: 0 | Status: SHIPPED | OUTPATIENT
Start: 2022-02-22
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
## 2022-02-22 NOTE — TELEPHONE ENCOUNTER
SHE STATED THAT SHE WILL MAKE AN APPOINTMENT IF NEEDED.  SHE STATED THAT SHE REALLY NEEDS THIS MEDICATION.

## 2022-02-22 NOTE — TELEPHONE ENCOUNTER
PATIENT HAS BEEN CALLED, SHE STATED THAT DR WALKER (HER FORMER PROVIDER) PRESCRIBED IT FOR HER.  SHE STATED THAT SHE HAS TAKEN IT FOR A LONG TIME FOR FEVER BLISTERS.

## 2022-02-23 DIAGNOSIS — N30.00 ACUTE CYSTITIS WITHOUT HEMATURIA: Primary | ICD-10-CM

## 2022-02-23 RX ORDER — NITROFURANTOIN 25; 75 MG/1; MG/1
100 CAPSULE ORAL 2 TIMES DAILY
Qty: 14 CAPSULE | Refills: 0 | Status: SHIPPED | OUTPATIENT
Start: 2022-02-23

## 2022-02-24 LAB — BACTERIA SPEC AEROBE CULT: NO GROWTH

## 2022-03-11 ENCOUNTER — APPOINTMENT (OUTPATIENT)
Dept: GENERAL RADIOLOGY | Facility: HOSPITAL | Age: 62
End: 2022-03-11

## 2022-03-11 ENCOUNTER — APPOINTMENT (OUTPATIENT)
Dept: CT IMAGING | Facility: HOSPITAL | Age: 62
End: 2022-03-11

## 2022-03-11 ENCOUNTER — HOSPITAL ENCOUNTER (EMERGENCY)
Facility: HOSPITAL | Age: 62
Discharge: HOME | End: 2022-03-11
Attending: EMERGENCY MEDICINE | Admitting: EMERGENCY MEDICINE

## 2022-03-11 VITALS
HEIGHT: 63 IN | HEART RATE: 66 BPM | WEIGHT: 182 LBS | BODY MASS INDEX: 32.25 KG/M2 | SYSTOLIC BLOOD PRESSURE: 156 MMHG | TEMPERATURE: 98.4 F | RESPIRATION RATE: 16 BRPM | DIASTOLIC BLOOD PRESSURE: 93 MMHG | OXYGEN SATURATION: 94 %

## 2022-03-11 DIAGNOSIS — F41.9 ANXIETY: ICD-10-CM

## 2022-03-11 DIAGNOSIS — R07.9 CHEST PAIN, UNSPECIFIED TYPE: Primary | ICD-10-CM

## 2022-03-11 LAB
ALBUMIN SERPL-MCNC: 4.4 G/DL (ref 3.5–5.2)
ALBUMIN/GLOB SERPL: 1.3 G/DL
ALP SERPL-CCNC: 62 U/L (ref 39–117)
ALT SERPL W P-5'-P-CCNC: 10 U/L (ref 1–33)
ANION GAP SERPL CALCULATED.3IONS-SCNC: 14 MMOL/L (ref 5–15)
AST SERPL-CCNC: 20 U/L (ref 1–32)
BASOPHILS # BLD AUTO: 0.01 10*3/MM3 (ref 0–0.2)
BASOPHILS NFR BLD AUTO: 0.2 % (ref 0–1.5)
BILIRUB SERPL-MCNC: 0.3 MG/DL (ref 0–1.2)
BUN SERPL-MCNC: 14 MG/DL (ref 8–23)
BUN/CREAT SERPL: 21.9 (ref 7–25)
CALCIUM SPEC-SCNC: 10.3 MG/DL (ref 8.6–10.5)
CHLORIDE SERPL-SCNC: 103 MMOL/L (ref 98–107)
CO2 SERPL-SCNC: 22 MMOL/L (ref 22–29)
CREAT SERPL-MCNC: 0.64 MG/DL (ref 0.57–1)
D DIMER PPP FEU-MCNC: 0.37 MG/L (FEU) (ref 0–0.5)
DEPRECATED RDW RBC AUTO: 45 FL (ref 37–54)
EGFRCR SERPLBLD CKD-EPI 2021: 100.1 ML/MIN/1.73
EOSINOPHIL # BLD AUTO: 0.03 10*3/MM3 (ref 0–0.4)
EOSINOPHIL NFR BLD AUTO: 0.5 % (ref 0.3–6.2)
ERYTHROCYTE [DISTWIDTH] IN BLOOD BY AUTOMATED COUNT: 14.4 % (ref 12.3–15.4)
GLOBULIN UR ELPH-MCNC: 3.3 GM/DL
GLUCOSE SERPL-MCNC: 104 MG/DL (ref 65–99)
HCT VFR BLD AUTO: 40.5 % (ref 34–46.6)
HGB BLD-MCNC: 13 G/DL (ref 12–15.9)
IMM GRANULOCYTES # BLD AUTO: 0.01 10*3/MM3 (ref 0–0.05)
IMM GRANULOCYTES NFR BLD AUTO: 0.2 % (ref 0–0.5)
LYMPHOCYTES # BLD AUTO: 1.52 10*3/MM3 (ref 0.7–3.1)
LYMPHOCYTES NFR BLD AUTO: 24.7 % (ref 19.6–45.3)
MAGNESIUM SERPL-MCNC: 2.1 MG/DL (ref 1.6–2.4)
MCH RBC QN AUTO: 27.8 PG (ref 26.6–33)
MCHC RBC AUTO-ENTMCNC: 32.1 G/DL (ref 31.5–35.7)
MCV RBC AUTO: 86.7 FL (ref 79–97)
MONOCYTES # BLD AUTO: 0.43 10*3/MM3 (ref 0.1–0.9)
MONOCYTES NFR BLD AUTO: 7 % (ref 5–12)
NEUTROPHILS NFR BLD AUTO: 4.16 10*3/MM3 (ref 1.7–7)
NEUTROPHILS NFR BLD AUTO: 67.4 % (ref 42.7–76)
NRBC BLD AUTO-RTO: 0 /100 WBC (ref 0–0.2)
NT-PROBNP SERPL-MCNC: 523.3 PG/ML (ref 0–900)
PLATELET # BLD AUTO: 315 10*3/MM3 (ref 140–450)
PMV BLD AUTO: 10.5 FL (ref 6–12)
POTASSIUM SERPL-SCNC: 3.8 MMOL/L (ref 3.5–5.2)
PROT SERPL-MCNC: 7.7 G/DL (ref 6–8.5)
RBC # BLD AUTO: 4.67 10*6/MM3 (ref 3.77–5.28)
SODIUM SERPL-SCNC: 139 MMOL/L (ref 136–145)
TROPONIN T SERPL-MCNC: <0.01 NG/ML (ref 0–0.03)
TROPONIN T SERPL-MCNC: <0.01 NG/ML (ref 0–0.03)
WBC NRBC COR # BLD: 6.16 10*3/MM3 (ref 3.4–10.8)

## 2022-03-11 PROCEDURE — 83735 ASSAY OF MAGNESIUM: CPT | Performed by: EMERGENCY MEDICINE

## 2022-03-11 PROCEDURE — 99284 EMERGENCY DEPT VISIT MOD MDM: CPT

## 2022-03-11 PROCEDURE — 84484 ASSAY OF TROPONIN QUANT: CPT | Performed by: EMERGENCY MEDICINE

## 2022-03-11 PROCEDURE — 93010 ELECTROCARDIOGRAM REPORT: CPT | Performed by: INTERNAL MEDICINE

## 2022-03-11 PROCEDURE — 71045 X-RAY EXAM CHEST 1 VIEW: CPT

## 2022-03-11 PROCEDURE — 0 IOPAMIDOL PER 1 ML: Performed by: EMERGENCY MEDICINE

## 2022-03-11 PROCEDURE — 36415 COLL VENOUS BLD VENIPUNCTURE: CPT | Performed by: EMERGENCY MEDICINE

## 2022-03-11 PROCEDURE — 71275 CT ANGIOGRAPHY CHEST: CPT

## 2022-03-11 PROCEDURE — 83880 ASSAY OF NATRIURETIC PEPTIDE: CPT | Performed by: EMERGENCY MEDICINE

## 2022-03-11 PROCEDURE — 85025 COMPLETE CBC W/AUTO DIFF WBC: CPT | Performed by: EMERGENCY MEDICINE

## 2022-03-11 PROCEDURE — 93005 ELECTROCARDIOGRAM TRACING: CPT

## 2022-03-11 PROCEDURE — 80053 COMPREHEN METABOLIC PANEL: CPT | Performed by: EMERGENCY MEDICINE

## 2022-03-11 PROCEDURE — 85379 FIBRIN DEGRADATION QUANT: CPT | Performed by: EMERGENCY MEDICINE

## 2022-03-11 RX ORDER — SODIUM CHLORIDE 0.9 % (FLUSH) 0.9 %
10 SYRINGE (ML) INJECTION AS NEEDED
Status: DISCONTINUED | OUTPATIENT
Start: 2022-03-11 | End: 2022-03-11 | Stop reason: HOSPADM

## 2022-03-11 RX ADMIN — IOPAMIDOL 100 ML: 755 INJECTION, SOLUTION INTRAVENOUS at 13:18
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
## 2022-03-13 LAB
QT INTERVAL: 388 MS
QTC INTERVAL: 430 MS

## 2022-03-22 ENCOUNTER — TELEPHONE (OUTPATIENT)
Dept: INTERNAL MEDICINE | Facility: CLINIC | Age: 62
End: 2022-03-22

## 2022-03-23 ENCOUNTER — TELEPHONE (OUTPATIENT)
Dept: INTERNAL MEDICINE | Facility: CLINIC | Age: 62
End: 2022-03-23

## 2022-03-23 ENCOUNTER — HOME HEALTH ADMISSION (OUTPATIENT)
Dept: HOME HEALTH SERVICES | Facility: HOME HEALTHCARE | Age: 62
End: 2022-03-23

## 2022-03-23 DIAGNOSIS — C44.520 PRIMARY SQUAMOUS CELL CARCINOMA OF ANAL CANAL: ICD-10-CM

## 2022-03-23 DIAGNOSIS — Z71.89 OSTOMY NURSE CONSULTATION: ICD-10-CM

## 2022-03-23 DIAGNOSIS — R63.4 UNINTENTIONAL WEIGHT LOSS: Primary | ICD-10-CM

## 2022-03-23 NOTE — TELEPHONE ENCOUNTER
I don't write disability letters. She may go to occupational med or [employer] for such a letter. Thanks for the update on [facility]

## 2022-03-23 NOTE — TELEPHONE ENCOUNTER
PATIENT HAS BEEN CALLED, GIVEN MESSAGE AND WOULD LIKE A REFERRAL FOR [facility] TO BE SENT.

## 2022-03-25 ENCOUNTER — TELEPHONE (OUTPATIENT)
Dept: INTERNAL MEDICINE | Facility: CLINIC | Age: 62
End: 2022-03-25

## 2022-03-30 ENCOUNTER — TELEPHONE (OUTPATIENT)
Dept: INTERNAL MEDICINE | Facility: CLINIC | Age: 62
End: 2022-03-30

## 2022-03-30 NOTE — TELEPHONE ENCOUNTER
Caller: MARIA L    Relationship: [facility]     [phone]      Who are you requesting to speak with (clinical staff, provider,  specific staff member): CLINICAL STAFF    Do you know the name of the person who called: MARIA L FROM [facility]    What was the call regarding: PATIENT WILL NOT BE ON HOME HEALTH AS PATIENT IS NOT HOMEBOUND; SHE DID HAVE AN APPOINTMENT WITH  OSTOMY NURSE IN [place], AND HER FOLLOW UP WITH HER PSYCHIATRIST, DR PABLO HAS BEEN SCHEDULED        Do you require a callback: NO

## 2022-04-06 ENCOUNTER — HOSPITAL ENCOUNTER (OUTPATIENT)
Dept: RADIATION ONCOLOGY | Facility: HOSPITAL | Age: 62
Setting detail: RADIATION/ONCOLOGY SERIES
End: 2022-04-06

## 2022-04-07 ENCOUNTER — TELEPHONE (OUTPATIENT)
Dept: ONCOLOGY | Facility: OTHER | Age: 62
End: 2022-04-07

## 2022-04-15 ENCOUNTER — TRANSCRIBE ORDERS (OUTPATIENT)
Dept: ADMINISTRATIVE | Facility: HOSPITAL | Age: 62
End: 2022-04-15

## 2022-04-15 ENCOUNTER — HOSPITAL ENCOUNTER (OUTPATIENT)
Dept: GENERAL RADIOLOGY | Facility: HOSPITAL | Age: 62
Discharge: HOME | End: 2022-04-15
Admitting: NURSE PRACTITIONER

## 2022-04-15 DIAGNOSIS — M53.3 DISORDER OF SACRUM: Primary | ICD-10-CM

## 2022-04-15 DIAGNOSIS — M53.3 DISORDER OF SACRUM: ICD-10-CM

## 2022-04-15 PROCEDURE — 72110 X-RAY EXAM L-2 SPINE 4/>VWS: CPT

## 2022-04-15 PROCEDURE — 72220 X-RAY EXAM SACRUM TAILBONE: CPT
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
## 2022-04-25 ENCOUNTER — TELEPHONE (OUTPATIENT)
Dept: RADIATION ONCOLOGY | Facility: HOSPITAL | Age: 62
End: 2022-04-25

## 2022-04-27 DIAGNOSIS — R63.4 UNINTENTIONAL WEIGHT LOSS: ICD-10-CM

## 2022-04-27 DIAGNOSIS — F32.89 OTHER DEPRESSION: ICD-10-CM

## 2022-04-27 RX ORDER — MIRTAZAPINE 15 MG/1
15 TABLET, FILM COATED ORAL NIGHTLY
Qty: 30 TABLET | Refills: 2 | Status: SHIPPED | OUTPATIENT
Start: 2022-04-27

## 2022-04-29 DIAGNOSIS — C21.0 ANAL CANCER (HCC): Primary | ICD-10-CM

## 2022-05-02 ENCOUNTER — TELEPHONE (OUTPATIENT)
Dept: RADIATION ONCOLOGY | Facility: HOSPITAL | Age: 62
End: 2022-05-02

## 2022-05-02 NOTE — TELEPHONE ENCOUNTER
Caller: Lenora Kathleen    Relationship: Self    Best call back number: [phone]  CAN CALL ANYTIME AND PLEASE LEAVE DETAILED VM IF PATIENT DOES NOT ANSWER.     Who are you requesting to speak with (clinical staff, provider,  specific staff member): CLINICAL STAFF.    What was the call regarding: PATIENT IS HAVING AN URGENT SURGERY ON 05/06/2022 AT [address] BY MD CHAMPION AND MD PHILOMENA ROMAN -[phone].  PATIENT NEEDS TO BE AT [facility] BY 05/05/2022.  PATIENT NEEDS TO HAVE THE PRIOR AUTH REQUEST AMBULANCE TRANSPORTATION REQUEST FILLED OUT SO HER PAYOR CAN PROVIDE THE PROPER TRANSPORTATION FOR HER.  THIS HAS BEEN ESCULATED WITH PATIENTS PAYOR TO A HIGHER DEPARTMENT DUE TO THE 72 HOUR ADVANCE NOTICE THE PAYOR NORMALLY REQUIRES.  ONCE ADDRESSED AND A DETERMINATION IS MADE PLEASE CONTACT PATIENT BACK IMMEDIATELY DUE TO CONCERNS.      PH AND FAX FOR PAYOR:  [phone] PH NUMBER   [phone]   PRIOR AUTH REQUEST FOR AMBULANCE SERVICE FOR PAYOR [facility].         REF NUMBER FOR ESCALATED TRANSPORT FOR PAYOR IS # [id number].    PATIENT IS GOING TO FAX OVER THE SURGERY PLAN TO OUR OFFICE.      Do you require a callback: YES ASAP.        [facility]

## 2022-05-03 NOTE — TELEPHONE ENCOUNTER
My supervisor and I spoke to a rep from the [employer] who is going to follow up with her PCP and her surgeon who referred her for surgery.

## 2022-05-09 ENCOUNTER — HOME HEALTH ADMISSION (OUTPATIENT)
Dept: HOME HEALTH SERVICES | Facility: HOME HEALTHCARE | Age: 62
End: 2022-05-09

## 2022-05-17 ENCOUNTER — HOME HEALTH ADMISSION (OUTPATIENT)
Dept: HOME HEALTH SERVICES | Facility: HOME HEALTHCARE | Age: 62
End: 2022-05-17

## 2022-05-23 ENCOUNTER — TELEPHONE (OUTPATIENT)
Dept: INTERNAL MEDICINE | Facility: CLINIC | Age: 62
End: 2022-05-23

## 2022-05-23 NOTE — TELEPHONE ENCOUNTER
[facility] HAS BEEN CALLED, THEY ARE ASKING IF DR BESS WILL FOLLOW PATIENT FOR HOME HEALTH FOR NURSING, EVAL FOR PT AND OT.

## 2022-05-23 NOTE — TELEPHONE ENCOUNTER
Caller: GHAZAL     Relationship: NURSE    Best call back number: [phone]      Who are you requesting to speak with     CLINICAL STAFF    Do you know the name of the person who called:     GHAZAL    What was the call regarding:    PATIENT WAS ADMITTED FOR HOME HEALTH    Do you require a callback:     NO

## 2022-06-02 ENCOUNTER — TELEPHONE (OUTPATIENT)
Dept: INTERNAL MEDICINE | Facility: CLINIC | Age: 62
End: 2022-06-02

## 2022-06-02 NOTE — TELEPHONE ENCOUNTER
MONET BROWN OCCUPATIONAL THERAPIST WITH [facility] HAS CALLED, SHE HAS BEEN EVALUATED AND DOES NOT NEED ANYTHING.   SHE IS STAYING WITH FAMILY AS OF NOW.      MONET BROWN OCCUPATIONAL THERAPIST WOULD LIKE TO RE-EVALUATE PATIENT WHEN SHE RETURNS HOME.   PATIENT IS RECEIVING HOME HEALTH.       [phone]

## 2022-06-08 ENCOUNTER — HOSPITAL ENCOUNTER (OUTPATIENT)
Dept: INFUSION THERAPY | Age: 62
Discharge: HOME | End: 2022-06-08
Payer: MEDICARE

## 2022-06-08 ENCOUNTER — OFFICE VISIT (OUTPATIENT)
Dept: HEMATOLOGY | Age: 62
End: 2022-06-08
Payer: MEDICARE

## 2022-06-08 ENCOUNTER — TRANSCRIBE ORDERS (OUTPATIENT)
Dept: ADMINISTRATIVE | Facility: HOSPITAL | Age: 62
End: 2022-06-08

## 2022-06-08 VITALS
OXYGEN SATURATION: 97 % | SYSTOLIC BLOOD PRESSURE: 130 MMHG | HEART RATE: 87 BPM | BODY MASS INDEX: 32.24 KG/M2 | DIASTOLIC BLOOD PRESSURE: 80 MMHG | WEIGHT: 182 LBS

## 2022-06-08 DIAGNOSIS — C21.0 MALIGNANT NEOPLASM OF ANUS: Primary | ICD-10-CM

## 2022-06-08 DIAGNOSIS — Z71.89 ENCOUNTER FOR OSTOMY CARE EDUCATION: ICD-10-CM

## 2022-06-08 DIAGNOSIS — Z71.89 CARE PLAN DISCUSSED WITH PATIENT: ICD-10-CM

## 2022-06-08 DIAGNOSIS — D64.9 NORMOCYTIC ANEMIA: ICD-10-CM

## 2022-06-08 DIAGNOSIS — C21.0 ANAL CANCER (HCC): ICD-10-CM

## 2022-06-08 DIAGNOSIS — C21.0 ANAL CANCER (HCC): Primary | ICD-10-CM

## 2022-06-08 LAB
HCT VFR BLD CALC: 35.2 % (ref 34.1–44.9)
HEMOGLOBIN: 10.6 G/DL (ref 11.2–15.7)
MCH RBC QN AUTO: 27.7 PG (ref 25.6–32.2)
MCHC RBC AUTO-ENTMCNC: 30.1 G/DL (ref 32.3–35.5)
MCV RBC AUTO: 92.1 FL (ref 79.4–94.8)
PDW BLD-RTO: 14.2 % (ref 11.7–14.4)
PLATELET # BLD: 262 K/UL (ref 182–369)
PMV BLD AUTO: 11 FL (ref 7.4–10.4)
RBC # BLD: 3.82 M/UL (ref 3.93–5.22)
WBC # BLD: 4.32 K/UL (ref 3.98–10.04)

## 2022-06-08 PROCEDURE — 85025 COMPLETE CBC W/AUTO DIFF WBC: CPT

## 2022-06-08 PROCEDURE — 99212 OFFICE O/P EST SF 10 MIN: CPT

## 2022-06-08 PROCEDURE — 99214 OFFICE O/P EST MOD 30 MIN: CPT | Performed by: INTERNAL MEDICINE

## 2022-06-08 RX ORDER — OXYCODONE HYDROCHLORIDE 15 MG/1
15 TABLET ORAL EVERY 6 HOURS
COMMUNITY
Start: 2022-05-23

## 2022-06-15 ENCOUNTER — TELEPHONE (OUTPATIENT)
Dept: INTERNAL MEDICINE | Facility: CLINIC | Age: 62
End: 2022-06-15

## 2022-06-15 NOTE — TELEPHONE ENCOUNTER
I think she needs to call whoever did the surgery if the pain is related to that.  At this point if she is having pain enough to need pain meds, there may be something going on that needs to be re-checked.

## 2022-06-15 NOTE — TELEPHONE ENCOUNTER
PATIENT HAS CALLED, LEFT MESSAGE ON PHONE AND IS ASKING FOR A RETURN CALL.     ATTEMPTED TO CALL PATIENT, VM LEFT FOR RETURN CALL.

## 2022-06-15 NOTE — TELEPHONE ENCOUNTER
PATIENT HAS BEEN CALLED, GIVEN MESSAGE AND STATED THAT THEY ARE THE ONES SHE IS GETTING A RUN AROUND FROM.  SHE STATED THAT WILL JUST NEED TO FIND ANOTHER DR.  SHE STATED THAT SHE IS IN TERRIBLE PAIN AND ONLY NEEDS MEDICATION SHORT TERM.  SHE DOES NOT NEED IT FOR LONG TERM.

## 2022-06-15 NOTE — TELEPHONE ENCOUNTER
PATIENT HAS CALLED, SHE HAS HAD SURGERY ON MAY 6th.  SHE STATED THAT SHE IS ASKING FOR SOMETHING FOR BREAKTHROUGH PAIN FOR A COUPLE OF WEEKS.  SHE STATED THAT SHE WAS NOT GIVEN ANYTHING.  PATIENT IS ALSO DOING PT WITH HOME HEALTH.  SHE STATED THAT THE PAIN SHE IS HAVING IS BAD.       SHE DOES NOT WANT THIS TO BE A REGULAR THING.  JUST FOR A COUPLE WEEKS.

## 2022-06-16 ENCOUNTER — HOSPITAL ENCOUNTER (OUTPATIENT)
Dept: CT IMAGING | Facility: HOSPITAL | Age: 62
Discharge: HOME | End: 2022-06-16
Admitting: INTERNAL MEDICINE

## 2022-06-16 LAB — CREAT BLDA-MCNC: 0.6 MG/DL (ref 0.6–1.3)

## 2022-06-16 PROCEDURE — 74177 CT ABD & PELVIS W/CONTRAST: CPT

## 2022-06-16 PROCEDURE — 82565 ASSAY OF CREATININE: CPT

## 2022-06-16 PROCEDURE — 0 IOPAMIDOL PER 1 ML: Performed by: INTERNAL MEDICINE

## 2022-06-16 RX ADMIN — IOPAMIDOL 100 ML: 755 INJECTION, SOLUTION INTRAVENOUS at 10:36

## 2022-06-16 NOTE — TELEPHONE ENCOUNTER
Will you please let her know I am so sorry she is having pain still; we would not prescribe pain meds for pain related to recto-vaginal surgical repair.  I am concerned that 6 weeks out she is still having so much pain.  I am concerned there may be something wrong that needs further evaluation.  If she is in this much pain and cannot get in with the surgeon, I think she needs to go to ER for further evaluation.

## 2022-06-16 NOTE — TELEPHONE ENCOUNTER
PATIENT HAS BEEN CALLED, GIVEN MESSAGE AND STATED THAT HER SISTER SPOKE TO DR MARY AND HE WANTS TO TALK WITH PATIENT.   PATIENT STATED THAT SHE WONT GO TO THE ER.   SHE WILL AWAIT DR MARY.

## 2022-06-20 NOTE — TELEPHONE ENCOUNTER
Recieved a call from Calvin Michel requesting medication for pain. She sees Dr Tova Solo for [employer]. She stated that her contract is on hold due to recent surgery. I called [facility] and spoke with Marni Carmen. She stated that Dr. Tova Solo seen patient 6/17/22 and prescribed Belbuca. I called Calvin Michel back and explained to her that she has a pain agreement with Dr. Juan David Garnica office and that is who she needs to be calling for pain medication. This is a surgical and/or non-medical patient.; IMPROVE-DD Application Not Available

## 2022-06-24 ENCOUNTER — TELEPHONE (OUTPATIENT)
Dept: HEMATOLOGY | Age: 62
End: 2022-06-24

## 2022-06-24 NOTE — TELEPHONE ENCOUNTER
Call to pt with CT scan results and to explain that Dr Lynne Montana has spoken to Dr Villeda/[facility] surgery. They both agreed with that what is showing up is probably due to radiation therapy changes and that he plans on doing a scope exam at her next visit. Pt voiced understanding and stated that she is now having stool coming out of her vagina when she urinates. Signer encouraged her to contact her surgeon for management of this. She talked a very long time to signer about her pain after her surgery. Signer encouraged her to discuss this with Dr Juliano Boss and Dr Feliciano Snow at her next appt or to call their office. Pt voiced understanding.

## 2022-07-14 ENCOUNTER — DOCUMENTATION (OUTPATIENT)
Dept: RADIATION ONCOLOGY | Facility: HOSPITAL | Age: 62
End: 2022-07-14

## 2022-07-14 RX ORDER — OXYCODONE AND ACETAMINOPHEN 10; 325 MG/1; MG/1
1 TABLET ORAL EVERY 6 HOURS PRN
Qty: 60 TABLET | Refills: 0 | Status: CANCELLED | OUTPATIENT
Start: 2022-07-14 | End: 2022-08-13

## 2022-07-15 ENCOUNTER — TELEPHONE (OUTPATIENT)
Dept: ONCOLOGY | Facility: OTHER | Age: 62
End: 2022-07-15

## 2022-07-15 DIAGNOSIS — G89.3 CANCER RELATED PAIN: ICD-10-CM

## 2022-07-15 DIAGNOSIS — C44.520 PRIMARY SQUAMOUS CELL CARCINOMA OF ANAL CANAL: Primary | ICD-10-CM

## 2022-07-15 RX ORDER — OXYCODONE AND ACETAMINOPHEN 10; 325 MG/1; MG/1
1 TABLET ORAL EVERY 6 HOURS PRN
Qty: 60 TABLET | Refills: 0 | Status: SHIPPED | OUTPATIENT
Start: 2022-07-15

## 2022-07-15 NOTE — TELEPHONE ENCOUNTER
DR. MOSHER'S OFFICE WILL TALK WITH DR. MOSHER WHEN HE GETS IN REGARDING PATIENTS PAIN MEDS THAT WERE TO BE CALLED IN YESTERDAY, I COULD SEE IT WAS PENDING.  PATIENT CALLED WRONG NUMBER BUT I CALLED OVER TO THEIR OFFICE.

## 2022-07-15 NOTE — PROGRESS NOTES
This patient had recent surgery in [place] for rectovaginal fistula.  She was sent home without any pain medication is having significant pain secondary to her cancer related treatment for her rectovaginal fistula.  I will send in a prescription for Percocet 10 mg to her local pharmacy.

## 2022-08-04 NOTE — PROGRESS NOTES
Hospitalist Progress Note  8/28/2021 3:32 PM  Subjective:   Admit Date: 8/24/2021  PCP: Lianet Hyman MD    Chief Complaint: ambulatory dysfunction    Subjective: Resting comfortably but complains of left elbow pain on questioning. Patient refused to work with PT today. History is otherwise unchanged. Cumulative Hospital History:   8-25: Brought to ED after the patient was discharged from behavioral health unit with depression and anxiety. Patient taken off medications during that hospitalization and felt to be stable, but claimed she was unable to ambulate upon returning home. Family summoned EMS but then could not be contacted by ED staff. Admitted to med/surg for placement as behavioral health refused the patient. Cognitive evaluation ordered showing severe deficits without capacity to make medical decisions. 8-26: Patient is without complaints but remains deconditioned with PT. Seeking placement but POA has not yet decided on places to seek placement. 8-27: Still pending placement. POA did not return a list of SNF options which would be acceptable. Patient is stable without complaints. Not receiving pain medication as she has not been complaining of pain. 8-28: Complained of some elbow pain but not visibly distressed. Refused to work with PT today. ROS: 14 point review of systems is negative except as specifically addressed above.     Adult Oral Nutrition Supplement; Standard High Calorie/High Protein Oral Supplement  ADULT DIET; Easy to Chew    Intake/Output Summary (Last 24 hours) at 8/28/2021 1532  Last data filed at 8/28/2021 0535  Gross per 24 hour   Intake 720 ml   Output 500 ml   Net 220 ml     Medications:   sodium chloride       Current Facility-Administered Medications   Medication Dose Route Frequency Provider Last Rate Last Admin    famotidine (PEPCID) tablet 20 mg  20 mg Oral BID AMARIS Katz CNP   20 mg at 08/28/21 0813    acetaminophen (TYLENOL) tablet 975 mg  975 mg Oral 3 times per day Zena Ayers MD   975 mg at 08/28/21 0612    dicyclomine (BENTYL) tablet 20 mg  20 mg Oral 4x Daily PRN Zena Ayers MD   20 mg at 08/25/21 1545    melatonin disintegrating tablet 5 mg  5 mg Oral Nightly Zena Ayers MD   5 mg at 08/27/21 2152    mirtazapine (REMERON) tablet 3.75 mg  3.75 mg Oral Nightly Zena Ayers MD   3.75 mg at 08/27/21 2151    oxyCODONE-acetaminophen (PERCOCET) 5-325 MG per tablet 1 tablet  1 tablet Oral Q8H PRN Zena Ayers MD   1 tablet at 08/28/21 0946    sodium chloride flush 0.9 % injection 5-40 mL  5-40 mL IntraVENous 2 times per day Zena Ayers MD   10 mL at 08/28/21 0813    sodium chloride flush 0.9 % injection 5-40 mL  5-40 mL IntraVENous PRN Zena Ayers MD        0.9 % sodium chloride infusion  25 mL IntraVENous PRN Zena Ayers MD        enoxaparin (LOVENOX) injection 40 mg  40 mg Subcutaneous Daily Zena Ayers MD   40 mg at 08/28/21 0813    ondansetron (ZOFRAN-ODT) disintegrating tablet 4 mg  4 mg Oral Q8H PRN Zena Ayers MD        Or    ondansetron (ZOFRAN) injection 4 mg  4 mg IntraVENous Q6H PRN Zena Ayers MD   4 mg at 08/25/21 2154    polyethylene glycol (GLYCOLAX) packet 17 g  17 g Oral Daily PRN Zena Ayers MD        acetaminophen (TYLENOL) tablet 650 mg  650 mg Oral Q6H PRN Zena Ayers MD        Or    acetaminophen (TYLENOL) suppository 650 mg  650 mg Rectal Q6H PRN Zena Ayers MD        nicotine (NICODERM CQ) 21 MG/24HR 1 patch  1 patch Transdermal Daily Zena Ayers MD   1 patch at 08/25/21 0912    albuterol (PROVENTIL) nebulizer solution 2.5 mg  2.5 mg Nebulization Q6H PRN Zena Ayers MD        gabapentin (NEURONTIN) capsule 300 mg  300 mg Oral BID Zena Ayers MD   300 mg at 08/28/21 7655    gabapentin (NEURONTIN) capsule 600 mg  600 mg Oral Nightly Fiona Elizabeth MD   600 mg at 08/27/21 2151        Labs:     Recent Labs     08/26/21  0358 08/27/21  0410 08/28/21  0633   WBC 3.8* 4.2* 3.6*   RBC 3.32* 3.22* 3.24*   HGB 9.9* 9.6* 9.5*   HCT 32.6* 31.6* 31.9*   MCV 98.2 98.1 98.5   MCH 29.8 29.8 29.3   MCHC 30.4* 30.4* 29.8*    179 188     Recent Labs     08/26/21  0358 08/27/21  0410 08/28/21  0633    140 141   K 3.5 3.5 3.6   ANIONGAP 14 10 9    106 110   CO2 21* 24 22   BUN 16 14 12   CREATININE 0.7 0.6 0.5   GLUCOSE 86 112* 108   CALCIUM 9.0 8.3* 8.7*     Recent Labs     08/26/21  0358 08/27/21  0410   MG 1.6 1.5*     Recent Labs     08/26/21  0358   AST 17   ALT 11   BILITOT <0.2   ALKPHOS 45     ABGs:No results for input(s): PH, PO2, PCO2, HCO3, BE, O2SAT in the last 72 hours. Troponin T: No results for input(s): TROPONINI in the last 72 hours. INR: No results for input(s): INR in the last 72 hours. Lactic Acid: No results for input(s): LACTA in the last 72 hours.     Objective:   Vitals: /68   Pulse 64   Temp 96.7 °F (35.9 °C) (Temporal)   Resp 20   Ht 5' 3\" (1.6 m)   Wt 173 lb 3.2 oz (78.6 kg)   SpO2 96%   BMI 30.68 kg/m²   24HR INTAKE/OUTPUT:      Intake/Output Summary (Last 24 hours) at 8/28/2021 1532  Last data filed at 8/28/2021 0535  Gross per 24 hour   Intake 720 ml   Output 500 ml   Net 220 ml     General appearance: alert and cooperative with exam  HEENT: atraumatic, eyes with clear conjunctiva and normal lids, pupils and irises normal, external ears and nose are normal, lips normal  Neck without masses, lympadenopathy, bruit, thyroid normal  Lungs: no increased work of breathing, \"clear to auscultation bilaterally\" without rales, rhonchi or wheezes  Heart: regular rate and rhythm, S1, S2 normal, no murmur, click, rub or gallop  Abdomen: soft, non-tender; bowel sounds normal; no masses,  no organomegaly  Extremities: extremities normal, atraumatic, no cyanosis or edema  Neurologic: no focal neurologic deficits, normal sensation, alert and oriented to place and time, affect and mood appropriate  Skin: no rashes, nodules    Assessment and Plan:   Principal Problem:    Altered mental status  Active Problems:    Displacement of lumbar intervertebral disc without myelopathy    Spinal stenosis, lumbar region, without neurogenic claudication    Incontinence of bowel    Dysthymia    Status post placement of implantable loop recorder    Left ureteral calculus    Hypertension    Family history of coronary artery disease    Opioid dependence with opioid-induced disorder ([facility]    Chronic active hepatitis C ([facility] - Genotype 1A, s/p Harvoni 2015 with remission    Right ureteral calculus    Simple chronic bronchitis (HCC)    Obesity (BMI 30.0-34. 9)    Anal cancer ([address]    Poor venous access    Dehydration    Major depressive disorder, recurrent severe without psychotic features ([address]  Resolved Problems:    * No resolved hospital problems. *      Medically stable on current medical regimen. Seeking placement, awaiting word from POA. Cognitive evaluation shows patient cannot make own healthcare decisions.     Advance Directive: Full Code    DVT prophylaxis: enoxaparin    Discharge planning: DO Sherif Tavarez Hospitalist Hydroquinone Pregnancy And Lactation Text: This medication has not been assigned a Pregnancy Risk Category but animal studies failed to show danger with the topical medication. It is unknown if the medication is excreted in breast milk.

## 2022-08-08 ENCOUNTER — HOSPITAL ENCOUNTER (OUTPATIENT)
Dept: RADIATION ONCOLOGY | Facility: HOSPITAL | Age: 62
Setting detail: RADIATION/ONCOLOGY SERIES
End: 2022-08-08

## 2022-08-08 ENCOUNTER — TELEPHONE (OUTPATIENT)
Dept: RADIATION ONCOLOGY | Facility: HOSPITAL | Age: 62
End: 2022-08-08

## 2022-08-22 NOTE — PROGRESS NOTES
Behavioral Services  Medicare Certification Upon Admission    I certify that this patient's inpatient psychiatric hospital admission is medically necessary for:    [x] (1) Treatment which could reasonably be expected to improve this patient's condition,       [] (2) Or for diagnostic study;     AND     [x](2) The inpatient psychiatric services are provided while the individual is under the care of a physician and are included in the individualized plan of care.     Estimated length of stay/service 3-10 days    Plan for post-hospital care TBA    Electronically signed by Altagracia Cruz MD on 8/20/2021 at 10:54 AM Yes

## 2022-09-06 ENCOUNTER — APPOINTMENT (OUTPATIENT)
Dept: CT IMAGING | Age: 62
End: 2022-09-06
Payer: MEDICARE

## 2022-09-06 ENCOUNTER — HOSPITAL ENCOUNTER (EMERGENCY)
Age: 62
Discharge: HOME | End: 2022-09-07
Payer: MEDICARE

## 2022-09-06 DIAGNOSIS — A04.8 CLOSTRIDIAL GASTROENTERITIS: ICD-10-CM

## 2022-09-06 DIAGNOSIS — K52.9 GASTROENTERITIS: Primary | ICD-10-CM

## 2022-09-06 LAB
ADENOVIRUS BY PCR: NOT DETECTED
ALBUMIN SERPL-MCNC: 4.1 G/DL (ref 3.5–5.2)
ALP BLD-CCNC: 86 U/L (ref 35–104)
ALT SERPL-CCNC: 16 U/L (ref 5–33)
ANION GAP SERPL CALCULATED.3IONS-SCNC: 12 MMOL/L (ref 7–19)
AST SERPL-CCNC: 23 U/L (ref 5–32)
BACTERIA: ABNORMAL /HPF
BASOPHILS ABSOLUTE: 0 K/UL (ref 0–0.2)
BASOPHILS RELATIVE PERCENT: 0.2 % (ref 0–1)
BILIRUB SERPL-MCNC: 0.4 MG/DL (ref 0.2–1.2)
BILIRUBIN URINE: NEGATIVE
BLOOD, URINE: NEGATIVE
BORDETELLA PARAPERTUSSIS BY PCR: NOT DETECTED
BORDETELLA PERTUSSIS BY PCR: NOT DETECTED
BUN BLDV-MCNC: 18 MG/DL (ref 8–23)
CALCIUM SERPL-MCNC: 9.9 MG/DL (ref 8.8–10.2)
CHLAMYDOPHILIA PNEUMONIAE BY PCR: NOT DETECTED
CHLORIDE BLD-SCNC: 96 MMOL/L (ref 98–111)
CLARITY: CLEAR
CO2: 26 MMOL/L (ref 22–29)
COLOR: YELLOW
CORONAVIRUS 229E BY PCR: NOT DETECTED
CORONAVIRUS HKU1 BY PCR: NOT DETECTED
CORONAVIRUS NL63 BY PCR: NOT DETECTED
CORONAVIRUS OC43 BY PCR: NOT DETECTED
CREAT SERPL-MCNC: 0.6 MG/DL (ref 0.5–0.9)
EOSINOPHILS ABSOLUTE: 0.1 K/UL (ref 0–0.6)
EOSINOPHILS RELATIVE PERCENT: 1.1 % (ref 0–5)
EPITHELIAL CELLS, UA: 0 /HPF (ref 0–5)
GFR AFRICAN AMERICAN: >59
GFR NON-AFRICAN AMERICAN: >60
GLUCOSE BLD-MCNC: 94 MG/DL (ref 74–109)
GLUCOSE URINE: NEGATIVE MG/DL
HCT VFR BLD CALC: 40.9 % (ref 37–47)
HEMOGLOBIN: 13.3 G/DL (ref 12–16)
HUMAN METAPNEUMOVIRUS BY PCR: NOT DETECTED
HUMAN RHINOVIRUS/ENTEROVIRUS BY PCR: NOT DETECTED
HYALINE CASTS: 0 /HPF (ref 0–8)
IMMATURE GRANULOCYTES #: 0 K/UL
INFLUENZA A BY PCR: NOT DETECTED
INFLUENZA B BY PCR: NOT DETECTED
KETONES, URINE: 15 MG/DL
LACTIC ACID: 1.1 MMOL/L (ref 0.5–1.9)
LEUKOCYTE ESTERASE, URINE: ABNORMAL
LYMPHOCYTES ABSOLUTE: 1.7 K/UL (ref 1.1–4.5)
LYMPHOCYTES RELATIVE PERCENT: 32.6 % (ref 20–40)
MCH RBC QN AUTO: 28.1 PG (ref 27–31)
MCHC RBC AUTO-ENTMCNC: 32.5 G/DL (ref 33–37)
MCV RBC AUTO: 86.5 FL (ref 81–99)
MONOCYTES ABSOLUTE: 0.5 K/UL (ref 0–0.9)
MONOCYTES RELATIVE PERCENT: 8.9 % (ref 0–10)
MYCOPLASMA PNEUMONIAE BY PCR: NOT DETECTED
NEUTROPHILS ABSOLUTE: 3 K/UL (ref 1.5–7.5)
NEUTROPHILS RELATIVE PERCENT: 57 % (ref 50–65)
NITRITE, URINE: NEGATIVE
PARAINFLUENZA VIRUS 1 BY PCR: NOT DETECTED
PARAINFLUENZA VIRUS 2 BY PCR: NOT DETECTED
PARAINFLUENZA VIRUS 3 BY PCR: NOT DETECTED
PARAINFLUENZA VIRUS 4 BY PCR: NOT DETECTED
PDW BLD-RTO: 12.9 % (ref 11.5–14.5)
PH UA: 6.5 (ref 5–8)
PLATELET # BLD: 231 K/UL (ref 130–400)
PMV BLD AUTO: 10.7 FL (ref 9.4–12.3)
POTASSIUM REFLEX MAGNESIUM: 4.3 MMOL/L (ref 3.5–5)
PROTEIN UA: NEGATIVE MG/DL
RBC # BLD: 4.73 M/UL (ref 4.2–5.4)
RBC UA: 2 /HPF (ref 0–4)
RESPIRATORY SYNCYTIAL VIRUS BY PCR: NOT DETECTED
SARS-COV-2, PCR: NOT DETECTED
SODIUM BLD-SCNC: 134 MMOL/L (ref 136–145)
SPECIFIC GRAVITY UA: 1.01 (ref 1–1.03)
TOTAL PROTEIN: 7.4 G/DL (ref 6.6–8.7)
UROBILINOGEN, URINE: 0.2 E.U./DL
WBC # BLD: 5.3 K/UL (ref 4.8–10.8)
WBC UA: 4 /HPF (ref 0–5)

## 2022-09-06 PROCEDURE — 6360000002 HC RX W HCPCS: Performed by: PHYSICIAN ASSISTANT

## 2022-09-06 PROCEDURE — 2580000003 HC RX 258: Performed by: PHYSICIAN ASSISTANT

## 2022-09-06 PROCEDURE — 0202U NFCT DS 22 TRGT SARS-COV-2: CPT

## 2022-09-06 PROCEDURE — 74176 CT ABD & PELVIS W/O CONTRAST: CPT

## 2022-09-06 PROCEDURE — 80053 COMPREHEN METABOLIC PANEL: CPT

## 2022-09-06 PROCEDURE — 96374 THER/PROPH/DIAG INJ IV PUSH: CPT

## 2022-09-06 PROCEDURE — 81001 URINALYSIS AUTO W/SCOPE: CPT

## 2022-09-06 PROCEDURE — 87507 IADNA-DNA/RNA PROBE TQ 12-25: CPT

## 2022-09-06 PROCEDURE — 87040 BLOOD CULTURE FOR BACTERIA: CPT

## 2022-09-06 PROCEDURE — 83605 ASSAY OF LACTIC ACID: CPT

## 2022-09-06 PROCEDURE — 36415 COLL VENOUS BLD VENIPUNCTURE: CPT

## 2022-09-06 PROCEDURE — 85025 COMPLETE CBC W/AUTO DIFF WBC: CPT

## 2022-09-06 PROCEDURE — 99285 EMERGENCY DEPT VISIT HI MDM: CPT

## 2022-09-06 RX ORDER — SODIUM CHLORIDE, SODIUM LACTATE, POTASSIUM CHLORIDE, AND CALCIUM CHLORIDE .6; .31; .03; .02 G/100ML; G/100ML; G/100ML; G/100ML
1000 INJECTION, SOLUTION INTRAVENOUS ONCE
Status: COMPLETED | OUTPATIENT
Start: 2022-09-06 | End: 2022-09-07

## 2022-09-06 RX ORDER — METOCLOPRAMIDE HYDROCHLORIDE 5 MG/ML
10 INJECTION INTRAMUSCULAR; INTRAVENOUS ONCE
Status: COMPLETED | OUTPATIENT
Start: 2022-09-06 | End: 2022-09-06

## 2022-09-06 RX ADMIN — SODIUM CHLORIDE, SODIUM LACTATE, POTASSIUM CHLORIDE, AND CALCIUM CHLORIDE 1000 ML: 600; 310; 30; 20 INJECTION, SOLUTION INTRAVENOUS at 17:51

## 2022-09-06 RX ADMIN — METOCLOPRAMIDE HYDROCHLORIDE 10 MG: 5 INJECTION INTRAMUSCULAR; INTRAVENOUS at 17:45

## 2022-09-06 ASSESSMENT — ENCOUNTER SYMPTOMS
ABDOMINAL DISTENTION: 0
EYE PAIN: 0
RHINORRHEA: 0
SORE THROAT: 0
COUGH: 0
APNEA: 0
ABDOMINAL PAIN: 1
DIARRHEA: 1
SHORTNESS OF BREATH: 0
VOMITING: 1
EYE DISCHARGE: 0
COLOR CHANGE: 0
PHOTOPHOBIA: 0
NAUSEA: 1
BACK PAIN: 0

## 2022-09-06 ASSESSMENT — PAIN SCALES - GENERAL: PAINLEVEL_OUTOF10: 4

## 2022-09-06 ASSESSMENT — PAIN DESCRIPTION - LOCATION: LOCATION: ABDOMEN

## 2022-09-06 ASSESSMENT — PAIN - FUNCTIONAL ASSESSMENT: PAIN_FUNCTIONAL_ASSESSMENT: 0-10

## 2022-09-06 ASSESSMENT — PAIN DESCRIPTION - PAIN TYPE: TYPE: ACUTE PAIN
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
## 2022-09-07 VITALS
HEART RATE: 56 BPM | OXYGEN SATURATION: 99 % | WEIGHT: 175 LBS | TEMPERATURE: 97.8 F | SYSTOLIC BLOOD PRESSURE: 126 MMHG | BODY MASS INDEX: 31.01 KG/M2 | DIASTOLIC BLOOD PRESSURE: 61 MMHG | HEIGHT: 63 IN | RESPIRATION RATE: 17 BRPM

## 2022-09-07 LAB
ADENOVIRUS F 40 41 PCR: NOT DETECTED
ASTROVIRUS PCR: NOT DETECTED
CAMPYLOBACTER PCR: NOT DETECTED
CLOSTRIDIUM DIFFICILE, PCR: DETECTED
CRYPTOSPORIDIUM PCR: NOT DETECTED
CYCLOSPORA CAYETANENSIS PCR: NOT DETECTED
E COLI ENTEROAGGREGATIVE PCR: NOT DETECTED
E COLI ENTEROPATHOGENIC PCR: NOT DETECTED
E COLI ENTEROTOXIGENIC PCR: NOT DETECTED
E COLI SHIGELLA/ENTEROINVASIVE PCR: NOT DETECTED
ENTAMOEBA HISTOLYTICA PCR: NOT DETECTED
GIARDIA LAMBLIA PCR: NOT DETECTED
NOROVIRUS GI GII PCR: NOT DETECTED
PLESIOMONAS SHIGELLOIDES PCR: NOT DETECTED
ROTAVIRUS A PCR: NOT DETECTED
SALMONELLA PCR: NOT DETECTED
SAPOVIRUS PCR: NOT DETECTED
SHIGA-LIKE TOXIN-PRODUCING E. COLI (STEC) STX1/STX2: NOT DETECTED
VIBRIO CHOLERAE PCR: NOT DETECTED
VIBRIO PCR: NOT DETECTED
YERSINIA ENTEROCOLITICA PCR: NOT DETECTED

## 2022-09-07 RX ORDER — METOCLOPRAMIDE 10 MG/1
10 TABLET ORAL 4 TIMES DAILY
Qty: 120 TABLET | Refills: 3 | Status: SHIPPED | OUTPATIENT
Start: 2022-09-07 | End: 2022-09-07

## 2022-09-07 RX ORDER — VANCOMYCIN HYDROCHLORIDE 250 MG/1
250 CAPSULE ORAL 4 TIMES DAILY
Qty: 40 CAPSULE | Refills: 0 | Status: SHIPPED | OUTPATIENT
Start: 2022-09-07 | End: 2022-09-17

## 2022-09-07 RX ORDER — VANCOMYCIN HYDROCHLORIDE 250 MG/1
250 CAPSULE ORAL 4 TIMES DAILY
Qty: 40 CAPSULE | Refills: 0 | Status: SHIPPED | OUTPATIENT
Start: 2022-09-07 | End: 2022-09-07

## 2022-09-07 RX ORDER — METOCLOPRAMIDE 10 MG/1
10 TABLET ORAL 4 TIMES DAILY
Qty: 120 TABLET | Refills: 3 | Status: ON HOLD
Start: 2022-09-07 | End: 2022-09-29 | Stop reason: HOSPADM

## 2022-09-07 NOTE — DISCHARGE INSTRUCTIONS
We will call you if gastro panel shows anything warranting abx coverage  Take meds as prescribed follow closely with your specialists.

## 2022-09-07 NOTE — ED NOTES
Tawana WASSERMAN notified unable to get PIV access after multiple failed attempts by multiple RNs and RN x2 with [address] Maria De Jesus Hummel RN  09/06/22 0087

## 2022-09-11 LAB — BLOOD CULTURE, ROUTINE: NORMAL

## 2022-09-27 ENCOUNTER — HOSPITAL ENCOUNTER (INPATIENT)
Age: 62
LOS: 2 days | Discharge: HOME | DRG: 372 | End: 2022-09-29
Attending: INTERNAL MEDICINE | Admitting: INTERNAL MEDICINE
Payer: MEDICARE

## 2022-09-27 ENCOUNTER — APPOINTMENT (OUTPATIENT)
Dept: GENERAL RADIOLOGY | Age: 62
DRG: 372 | End: 2022-09-27
Payer: MEDICARE

## 2022-09-27 ENCOUNTER — TELEPHONE (OUTPATIENT)
Dept: HEMATOLOGY | Age: 62
End: 2022-09-27

## 2022-09-27 ENCOUNTER — APPOINTMENT (OUTPATIENT)
Dept: CT IMAGING | Age: 62
DRG: 372 | End: 2022-09-27
Payer: MEDICARE

## 2022-09-27 DIAGNOSIS — A09 DIARRHEA OF INFECTIOUS ORIGIN: Primary | ICD-10-CM

## 2022-09-27 DIAGNOSIS — Z93.3 COLOSTOMY IN PLACE (HCC): ICD-10-CM

## 2022-09-27 DIAGNOSIS — C21.0 ANAL CANCER (HCC): ICD-10-CM

## 2022-09-27 LAB
ADENOVIRUS F 40 41 PCR: NOT DETECTED
ALBUMIN SERPL-MCNC: 4.5 G/DL (ref 3.5–5.2)
ALP BLD-CCNC: 88 U/L (ref 35–104)
ALT SERPL-CCNC: 12 U/L (ref 5–33)
ANION GAP SERPL CALCULATED.3IONS-SCNC: 13 MMOL/L (ref 7–19)
AST SERPL-CCNC: 16 U/L (ref 5–32)
ASTROVIRUS PCR: NOT DETECTED
BACTERIA: NEGATIVE /HPF
BASOPHILS ABSOLUTE: 0 K/UL (ref 0–0.2)
BASOPHILS RELATIVE PERCENT: 0.2 % (ref 0–1)
BILIRUB SERPL-MCNC: 0.3 MG/DL (ref 0.2–1.2)
BILIRUBIN URINE: NEGATIVE
BLOOD, URINE: NEGATIVE
BUN BLDV-MCNC: 7 MG/DL (ref 8–23)
CALCIUM SERPL-MCNC: 10.4 MG/DL (ref 8.8–10.2)
CAMPYLOBACTER PCR: NOT DETECTED
CHLORIDE BLD-SCNC: 102 MMOL/L (ref 98–111)
CLARITY: CLEAR
CLOSTRIDIUM DIFFICILE, PCR: NOT DETECTED
CO2: 25 MMOL/L (ref 22–29)
COLOR: YELLOW
CREAT SERPL-MCNC: 0.5 MG/DL (ref 0.5–0.9)
CRYPTOSPORIDIUM PCR: NOT DETECTED
CRYSTALS, UA: ABNORMAL /HPF
CYCLOSPORA CAYETANENSIS PCR: NOT DETECTED
E COLI ENTEROAGGREGATIVE PCR: NOT DETECTED
E COLI ENTEROPATHOGENIC PCR: NOT DETECTED
E COLI ENTEROTOXIGENIC PCR: DETECTED
E COLI SHIGELLA/ENTEROINVASIVE PCR: NOT DETECTED
ENTAMOEBA HISTOLYTICA PCR: NOT DETECTED
EOSINOPHILS ABSOLUTE: 0 K/UL (ref 0–0.6)
EOSINOPHILS RELATIVE PERCENT: 0.6 % (ref 0–5)
EPITHELIAL CELLS, UA: 1 /HPF (ref 0–5)
GFR AFRICAN AMERICAN: >59
GFR NON-AFRICAN AMERICAN: >60
GIARDIA LAMBLIA PCR: NOT DETECTED
GLUCOSE BLD-MCNC: 89 MG/DL (ref 74–109)
GLUCOSE URINE: NEGATIVE MG/DL
HCT VFR BLD CALC: 45.5 % (ref 37–47)
HEMOGLOBIN: 14.2 G/DL (ref 12–16)
HYALINE CASTS: 0 /HPF (ref 0–8)
IMMATURE GRANULOCYTES #: 0 K/UL
KETONES, URINE: NEGATIVE MG/DL
LACTIC ACID: 1.4 MMOL/L (ref 0.5–1.9)
LEUKOCYTE ESTERASE, URINE: ABNORMAL
LYMPHOCYTES ABSOLUTE: 1.1 K/UL (ref 1.1–4.5)
LYMPHOCYTES RELATIVE PERCENT: 20.1 % (ref 20–40)
MCH RBC QN AUTO: 27.8 PG (ref 27–31)
MCHC RBC AUTO-ENTMCNC: 31.2 G/DL (ref 33–37)
MCV RBC AUTO: 89 FL (ref 81–99)
MONOCYTES ABSOLUTE: 0.4 K/UL (ref 0–0.9)
MONOCYTES RELATIVE PERCENT: 7.6 % (ref 0–10)
NEUTROPHILS ABSOLUTE: 3.9 K/UL (ref 1.5–7.5)
NEUTROPHILS RELATIVE PERCENT: 71.3 % (ref 50–65)
NITRITE, URINE: NEGATIVE
NOROVIRUS GI GII PCR: NOT DETECTED
PDW BLD-RTO: 13.2 % (ref 11.5–14.5)
PH UA: 8 (ref 5–8)
PLATELET # BLD: 295 K/UL (ref 130–400)
PLESIOMONAS SHIGELLOIDES PCR: NOT DETECTED
PMV BLD AUTO: 10.6 FL (ref 9.4–12.3)
POTASSIUM REFLEX MAGNESIUM: 3.9 MMOL/L (ref 3.5–5)
PROTEIN UA: NEGATIVE MG/DL
RBC # BLD: 5.11 M/UL (ref 4.2–5.4)
RBC UA: 1 /HPF (ref 0–4)
ROTAVIRUS A PCR: NOT DETECTED
SALMONELLA PCR: NOT DETECTED
SAPOVIRUS PCR: NOT DETECTED
SARS-COV-2, NAAT: NOT DETECTED
SHIGA-LIKE TOXIN-PRODUCING E. COLI (STEC) STX1/STX2: NOT DETECTED
SODIUM BLD-SCNC: 140 MMOL/L (ref 136–145)
SPECIFIC GRAVITY UA: 1.02 (ref 1–1.03)
TOTAL PROTEIN: 8 G/DL (ref 6.6–8.7)
UROBILINOGEN, URINE: 0.2 E.U./DL
VIBRIO CHOLERAE PCR: NOT DETECTED
VIBRIO PCR: NOT DETECTED
WBC # BLD: 5.4 K/UL (ref 4.8–10.8)
WBC UA: 4 /HPF (ref 0–5)
YERSINIA ENTEROCOLITICA PCR: NOT DETECTED

## 2022-09-27 PROCEDURE — 96375 TX/PRO/DX INJ NEW DRUG ADDON: CPT

## 2022-09-27 PROCEDURE — 6360000004 HC RX CONTRAST MEDICATION: Performed by: NURSE PRACTITIONER

## 2022-09-27 PROCEDURE — 96374 THER/PROPH/DIAG INJ IV PUSH: CPT

## 2022-09-27 PROCEDURE — C9113 INJ PANTOPRAZOLE SODIUM, VIA: HCPCS | Performed by: NURSE PRACTITIONER

## 2022-09-27 PROCEDURE — 83605 ASSAY OF LACTIC ACID: CPT

## 2022-09-27 PROCEDURE — 2580000003 HC RX 258: Performed by: NURSE PRACTITIONER

## 2022-09-27 PROCEDURE — 99285 EMERGENCY DEPT VISIT HI MDM: CPT

## 2022-09-27 PROCEDURE — 71045 X-RAY EXAM CHEST 1 VIEW: CPT | Performed by: RADIOLOGY

## 2022-09-27 PROCEDURE — 6360000002 HC RX W HCPCS: Performed by: NURSE PRACTITIONER

## 2022-09-27 PROCEDURE — 85025 COMPLETE CBC W/AUTO DIFF WBC: CPT

## 2022-09-27 PROCEDURE — 6370000000 HC RX 637 (ALT 250 FOR IP): Performed by: NURSE PRACTITIONER

## 2022-09-27 PROCEDURE — 87635 SARS-COV-2 COVID-19 AMP PRB: CPT

## 2022-09-27 PROCEDURE — 74177 CT ABD & PELVIS W/CONTRAST: CPT | Performed by: RADIOLOGY

## 2022-09-27 PROCEDURE — 87507 IADNA-DNA/RNA PROBE TQ 12-25: CPT

## 2022-09-27 PROCEDURE — 87150 DNA/RNA AMPLIFIED PROBE: CPT

## 2022-09-27 PROCEDURE — 74177 CT ABD & PELVIS W/CONTRAST: CPT

## 2022-09-27 PROCEDURE — 71045 X-RAY EXAM CHEST 1 VIEW: CPT

## 2022-09-27 PROCEDURE — 73560 X-RAY EXAM OF KNEE 1 OR 2: CPT

## 2022-09-27 PROCEDURE — 81001 URINALYSIS AUTO W/SCOPE: CPT

## 2022-09-27 PROCEDURE — 80053 COMPREHEN METABOLIC PANEL: CPT

## 2022-09-27 PROCEDURE — 1210000000 HC MED SURG R&B

## 2022-09-27 PROCEDURE — 73560 X-RAY EXAM OF KNEE 1 OR 2: CPT | Performed by: RADIOLOGY

## 2022-09-27 PROCEDURE — 93005 ELECTROCARDIOGRAM TRACING: CPT | Performed by: NURSE PRACTITIONER

## 2022-09-27 PROCEDURE — 36415 COLL VENOUS BLD VENIPUNCTURE: CPT

## 2022-09-27 PROCEDURE — 87040 BLOOD CULTURE FOR BACTERIA: CPT

## 2022-09-27 PROCEDURE — 99222 1ST HOSP IP/OBS MODERATE 55: CPT | Performed by: INTERNAL MEDICINE

## 2022-09-27 RX ORDER — OXYCODONE HYDROCHLORIDE 5 MG/1
15 TABLET ORAL EVERY 6 HOURS PRN
Status: COMPLETED | OUTPATIENT
Start: 2022-09-27 | End: 2022-09-28

## 2022-09-27 RX ORDER — ZOLPIDEM TARTRATE 10 MG/1
10 TABLET ORAL NIGHTLY PRN
COMMUNITY

## 2022-09-27 RX ORDER — ALPRAZOLAM 1 MG/1
1 TABLET ORAL 4 TIMES DAILY PRN
COMMUNITY

## 2022-09-27 RX ORDER — SODIUM CHLORIDE 0.9 % (FLUSH) 0.9 %
5-40 SYRINGE (ML) INJECTION EVERY 12 HOURS SCHEDULED
Status: DISCONTINUED | OUTPATIENT
Start: 2022-09-27 | End: 2022-09-29 | Stop reason: HOSPADM

## 2022-09-27 RX ORDER — GABAPENTIN 300 MG/1
300 CAPSULE ORAL 2 TIMES DAILY
Status: DISCONTINUED | OUTPATIENT
Start: 2022-09-27 | End: 2022-09-29 | Stop reason: HOSPADM

## 2022-09-27 RX ORDER — SODIUM CHLORIDE 9 MG/ML
25 INJECTION, SOLUTION INTRAVENOUS PRN
Status: DISCONTINUED | OUTPATIENT
Start: 2022-09-27 | End: 2022-09-29 | Stop reason: HOSPADM

## 2022-09-27 RX ORDER — DICYCLOMINE HCL 20 MG
20 TABLET ORAL
Status: DISCONTINUED | OUTPATIENT
Start: 2022-09-27 | End: 2022-09-29 | Stop reason: HOSPADM

## 2022-09-27 RX ORDER — SODIUM CHLORIDE 0.9 % (FLUSH) 0.9 %
5-40 SYRINGE (ML) INJECTION PRN
Status: DISCONTINUED | OUTPATIENT
Start: 2022-09-27 | End: 2022-09-29 | Stop reason: HOSPADM

## 2022-09-27 RX ORDER — POTASSIUM CHLORIDE 7.45 MG/ML
10 INJECTION INTRAVENOUS PRN
Status: DISCONTINUED | OUTPATIENT
Start: 2022-09-27 | End: 2022-09-29 | Stop reason: HOSPADM

## 2022-09-27 RX ORDER — CIPROFLOXACIN 2 MG/ML
400 INJECTION, SOLUTION INTRAVENOUS EVERY 12 HOURS
Status: DISCONTINUED | OUTPATIENT
Start: 2022-09-27 | End: 2022-09-29

## 2022-09-27 RX ORDER — MECOBALAMIN 5000 MCG
5 TABLET,DISINTEGRATING ORAL NIGHTLY
Status: DISCONTINUED | OUTPATIENT
Start: 2022-09-27 | End: 2022-09-29 | Stop reason: HOSPADM

## 2022-09-27 RX ORDER — ERGOCALCIFEROL 1.25 MG/1
50000 CAPSULE ORAL
Status: DISCONTINUED | OUTPATIENT
Start: 2022-09-27 | End: 2022-09-29 | Stop reason: HOSPADM

## 2022-09-27 RX ORDER — ONDANSETRON 4 MG/1
4 TABLET, ORALLY DISINTEGRATING ORAL EVERY 8 HOURS PRN
Status: DISCONTINUED | OUTPATIENT
Start: 2022-09-27 | End: 2022-09-29 | Stop reason: HOSPADM

## 2022-09-27 RX ORDER — ALBUTEROL SULFATE 90 UG/1
2 AEROSOL, METERED RESPIRATORY (INHALATION) EVERY 6 HOURS PRN
Status: DISCONTINUED | OUTPATIENT
Start: 2022-09-27 | End: 2022-09-27

## 2022-09-27 RX ORDER — ACETAMINOPHEN 650 MG/1
650 SUPPOSITORY RECTAL EVERY 6 HOURS PRN
Status: DISCONTINUED | OUTPATIENT
Start: 2022-09-27 | End: 2022-09-29 | Stop reason: HOSPADM

## 2022-09-27 RX ORDER — 0.9 % SODIUM CHLORIDE 0.9 %
1000 INTRAVENOUS SOLUTION INTRAVENOUS ONCE
Status: DISCONTINUED | OUTPATIENT
Start: 2022-09-27 | End: 2022-09-29 | Stop reason: HOSPADM

## 2022-09-27 RX ORDER — POLYETHYLENE GLYCOL 3350 17 G/17G
17 POWDER, FOR SOLUTION ORAL DAILY PRN
Status: DISCONTINUED | OUTPATIENT
Start: 2022-09-27 | End: 2022-09-29 | Stop reason: HOSPADM

## 2022-09-27 RX ORDER — ONDANSETRON 2 MG/ML
4 INJECTION INTRAMUSCULAR; INTRAVENOUS EVERY 6 HOURS PRN
Status: DISCONTINUED | OUTPATIENT
Start: 2022-09-27 | End: 2022-09-29 | Stop reason: HOSPADM

## 2022-09-27 RX ORDER — 0.9 % SODIUM CHLORIDE 0.9 %
500 INTRAVENOUS SOLUTION INTRAVENOUS ONCE
Status: COMPLETED | OUTPATIENT
Start: 2022-09-27 | End: 2022-09-27

## 2022-09-27 RX ORDER — CHOLECALCIFEROL (VITAMIN D3) 125 MCG
500 CAPSULE ORAL DAILY
Status: DISCONTINUED | OUTPATIENT
Start: 2022-09-27 | End: 2022-09-29 | Stop reason: HOSPADM

## 2022-09-27 RX ORDER — ENOXAPARIN SODIUM 100 MG/ML
40 INJECTION SUBCUTANEOUS DAILY
Status: DISCONTINUED | OUTPATIENT
Start: 2022-09-27 | End: 2022-09-29 | Stop reason: HOSPADM

## 2022-09-27 RX ORDER — METOCLOPRAMIDE 10 MG/1
10 TABLET ORAL 4 TIMES DAILY
Status: DISCONTINUED | OUTPATIENT
Start: 2022-09-27 | End: 2022-09-29 | Stop reason: HOSPADM

## 2022-09-27 RX ORDER — FENTANYL CITRATE 50 UG/ML
25 INJECTION, SOLUTION INTRAMUSCULAR; INTRAVENOUS ONCE
Status: COMPLETED | OUTPATIENT
Start: 2022-09-27 | End: 2022-09-27

## 2022-09-27 RX ORDER — MAGNESIUM SULFATE IN WATER 40 MG/ML
2000 INJECTION, SOLUTION INTRAVENOUS PRN
Status: DISCONTINUED | OUTPATIENT
Start: 2022-09-27 | End: 2022-09-29 | Stop reason: HOSPADM

## 2022-09-27 RX ORDER — ACETAMINOPHEN 325 MG/1
650 TABLET ORAL EVERY 6 HOURS PRN
Status: DISCONTINUED | OUTPATIENT
Start: 2022-09-27 | End: 2022-09-29 | Stop reason: HOSPADM

## 2022-09-27 RX ORDER — ONDANSETRON 2 MG/ML
4 INJECTION INTRAMUSCULAR; INTRAVENOUS ONCE
Status: COMPLETED | OUTPATIENT
Start: 2022-09-27 | End: 2022-09-27

## 2022-09-27 RX ORDER — HYDROMORPHONE HYDROCHLORIDE 1 MG/ML
0.5 INJECTION, SOLUTION INTRAMUSCULAR; INTRAVENOUS; SUBCUTANEOUS ONCE
Status: COMPLETED | OUTPATIENT
Start: 2022-09-27 | End: 2022-09-27

## 2022-09-27 RX ORDER — ALPRAZOLAM 0.5 MG/1
1 TABLET ORAL ONCE
Status: COMPLETED | OUTPATIENT
Start: 2022-09-27 | End: 2022-09-27

## 2022-09-27 RX ORDER — ALBUTEROL SULFATE 2.5 MG/3ML
2.5 SOLUTION RESPIRATORY (INHALATION) EVERY 6 HOURS PRN
Status: DISCONTINUED | OUTPATIENT
Start: 2022-09-27 | End: 2022-09-29 | Stop reason: HOSPADM

## 2022-09-27 RX ORDER — SODIUM CHLORIDE, SODIUM LACTATE, POTASSIUM CHLORIDE, CALCIUM CHLORIDE 600; 310; 30; 20 MG/100ML; MG/100ML; MG/100ML; MG/100ML
INJECTION, SOLUTION INTRAVENOUS CONTINUOUS
Status: DISCONTINUED | OUTPATIENT
Start: 2022-09-27 | End: 2022-09-29 | Stop reason: HOSPADM

## 2022-09-27 RX ORDER — MIRTAZAPINE 7.5 MG/1
3.75 TABLET, FILM COATED ORAL NIGHTLY
Status: DISCONTINUED | OUTPATIENT
Start: 2022-09-27 | End: 2022-09-29 | Stop reason: HOSPADM

## 2022-09-27 RX ADMIN — ONDANSETRON 4 MG: 2 INJECTION INTRAMUSCULAR; INTRAVENOUS at 10:02

## 2022-09-27 RX ADMIN — SODIUM CHLORIDE 500 ML: 9 INJECTION, SOLUTION INTRAVENOUS at 10:02

## 2022-09-27 RX ADMIN — METOCLOPRAMIDE 10 MG: 10 TABLET ORAL at 20:14

## 2022-09-27 RX ADMIN — GABAPENTIN 300 MG: 300 CAPSULE ORAL at 20:14

## 2022-09-27 RX ADMIN — SODIUM CHLORIDE, PRESERVATIVE FREE 40 MG: 5 INJECTION INTRAVENOUS at 10:43

## 2022-09-27 RX ADMIN — MIRTAZAPINE 3.75 MG: 7.5 TABLET ORAL at 20:14

## 2022-09-27 RX ADMIN — CYANOCOBALAMIN TAB 500 MCG 500 MCG: 500 TAB at 20:14

## 2022-09-27 RX ADMIN — ALPRAZOLAM 1 MG: 0.5 TABLET ORAL at 15:17

## 2022-09-27 RX ADMIN — DICYCLOMINE HYDROCHLORIDE 20 MG: 20 TABLET ORAL at 20:15

## 2022-09-27 RX ADMIN — Medication 5 MG: at 20:10

## 2022-09-27 RX ADMIN — FENTANYL CITRATE 25 MCG: 50 INJECTION, SOLUTION INTRAMUSCULAR; INTRAVENOUS at 10:57

## 2022-09-27 RX ADMIN — CIPROFLOXACIN 400 MG: 2 INJECTION, SOLUTION INTRAVENOUS at 20:36

## 2022-09-27 RX ADMIN — SODIUM CHLORIDE, SODIUM LACTATE, POTASSIUM CHLORIDE, AND CALCIUM CHLORIDE: 600; 310; 30; 20 INJECTION, SOLUTION INTRAVENOUS at 20:30

## 2022-09-27 RX ADMIN — IOPAMIDOL 70 ML: 755 INJECTION, SOLUTION INTRAVENOUS at 10:25

## 2022-09-27 RX ADMIN — OXYCODONE 15 MG: 5 TABLET ORAL at 20:14

## 2022-09-27 RX ADMIN — SODIUM CHLORIDE 500 ML: 9 INJECTION, SOLUTION INTRAVENOUS at 14:07

## 2022-09-27 RX ADMIN — ERGOCALCIFEROL 50000 UNITS: 1.25 CAPSULE ORAL at 20:10

## 2022-09-27 RX ADMIN — HYDROMORPHONE HYDROCHLORIDE 0.5 MG: 1 INJECTION, SOLUTION INTRAMUSCULAR; INTRAVENOUS; SUBCUTANEOUS at 12:43

## 2022-09-27 ASSESSMENT — ENCOUNTER SYMPTOMS
ABDOMINAL PAIN: 1
DIARRHEA: 1
VOMITING: 1

## 2022-09-27 ASSESSMENT — PAIN SCALES - GENERAL
PAINLEVEL_OUTOF10: 9
PAINLEVEL_OUTOF10: 8

## 2022-09-27 ASSESSMENT — PAIN DESCRIPTION - LOCATION
LOCATION: ABDOMEN
LOCATION: BACK

## 2022-09-27 ASSESSMENT — PAIN DESCRIPTION - ORIENTATION: ORIENTATION: LEFT

## 2022-09-27 ASSESSMENT — PAIN - FUNCTIONAL ASSESSMENT: PAIN_FUNCTIONAL_ASSESSMENT: 0-10

## 2022-09-27 NOTE — ED NOTES
[facility] [address] [place] [address]  09/27/22 [id number]
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
## 2022-09-27 NOTE — ED NOTES
Report given and care transferred to [facility] GOPI URIARTE RN  09/27/22 [id number]

## 2022-09-27 NOTE — TELEPHONE ENCOUNTER
[facility] Consult      Fernando Taylor  1960    Bed: 84 [facility], NP  [place], [address] [place] [address]    [phone]    Dx: colostomy in place, diarrhea of infection origin, anal cancer
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
## 2022-09-28 LAB
ACINETOBACTER CALCOAC BAUMANNII COMPLEX BY PCR: NOT DETECTED
ANION GAP SERPL CALCULATED.3IONS-SCNC: 8 MMOL/L (ref 7–19)
BACTEROIDES FRAGILIS BY PCR: NOT DETECTED
BASOPHILS ABSOLUTE: 0 K/UL (ref 0–0.2)
BASOPHILS RELATIVE PERCENT: 0.5 % (ref 0–1)
BUN BLDV-MCNC: 6 MG/DL (ref 8–23)
CALCIUM SERPL-MCNC: 9.2 MG/DL (ref 8.8–10.2)
CANDIDA ALBICANS BY PCR: NOT DETECTED
CANDIDA AURIS BY PCR: NOT DETECTED
CANDIDA GLABRATA BY PCR: NOT DETECTED
CANDIDA KRUSEI BY PCR: NOT DETECTED
CANDIDA PARAPSILOSIS BY PCR: NOT DETECTED
CANDIDA TROPICALIS BY PCR: NOT DETECTED
CHLORIDE BLD-SCNC: 103 MMOL/L (ref 98–111)
CO2: 27 MMOL/L (ref 22–29)
CREAT SERPL-MCNC: 0.5 MG/DL (ref 0.5–0.9)
CRYPTOCOCCUS NEOFORMANS/GATTII BY PCR: NOT DETECTED
ENTEROBACTER CLOACAE COMPLEX BY PCR: NOT DETECTED
ENTEROBACTERALES BY PCR: NOT DETECTED
ENTEROCOCCUS FAECALIS BY PCR: NOT DETECTED
ENTEROCOCCUS FAECIUM BY PCR: NOT DETECTED
EOSINOPHILS ABSOLUTE: 0.1 K/UL (ref 0–0.6)
EOSINOPHILS RELATIVE PERCENT: 2.1 % (ref 0–5)
ESCHERICHIA COLI BY PCR: NOT DETECTED
GFR AFRICAN AMERICAN: >59
GFR NON-AFRICAN AMERICAN: >60
GLUCOSE BLD-MCNC: 96 MG/DL (ref 74–109)
HAEMOPHILUS INFLUENZAE BY PCR: NOT DETECTED
HCT VFR BLD CALC: 37 % (ref 37–47)
HEMOGLOBIN: 11.5 G/DL (ref 12–16)
IMMATURE GRANULOCYTES #: 0 K/UL
KLEBSIELLA AEROGENES BY PCR: NOT DETECTED
KLEBSIELLA OXYTOCA BY PCR: NOT DETECTED
KLEBSIELLA PNEUMONIAE GROUP BY PCR: NOT DETECTED
LISTERIA MONOCYTOGENES BY PCR: NOT DETECTED
LYMPHOCYTES ABSOLUTE: 1.3 K/UL (ref 1.1–4.5)
LYMPHOCYTES RELATIVE PERCENT: 31.4 % (ref 20–40)
MCH RBC QN AUTO: 28.2 PG (ref 27–31)
MCHC RBC AUTO-ENTMCNC: 31.1 G/DL (ref 33–37)
MCV RBC AUTO: 90.7 FL (ref 81–99)
METHICILLIN RESISTANCE MECA/C  BY PCR: DETECTED
MONOCYTES ABSOLUTE: 0.5 K/UL (ref 0–0.9)
MONOCYTES RELATIVE PERCENT: 12.3 % (ref 0–10)
NEISSERIA MENINGITIDIS BY PCR: NOT DETECTED
NEUTROPHILS ABSOLUTE: 2.3 K/UL (ref 1.5–7.5)
NEUTROPHILS RELATIVE PERCENT: 53.5 % (ref 50–65)
PDW BLD-RTO: 13.2 % (ref 11.5–14.5)
PLATELET # BLD: 229 K/UL (ref 130–400)
PMV BLD AUTO: 11.4 FL (ref 9.4–12.3)
POTASSIUM REFLEX MAGNESIUM: 3.8 MMOL/L (ref 3.5–5)
PROTEUS SPECIES BY PCR: NOT DETECTED
PSEUDOMONAS AERUGINOSA BY PCR: NOT DETECTED
RBC # BLD: 4.08 M/UL (ref 4.2–5.4)
SALMONELLA SPECIES BY PCR: NOT DETECTED
SERRATIA MARCESCENS BY PCR: NOT DETECTED
SODIUM BLD-SCNC: 138 MMOL/L (ref 136–145)
STAPHYLOCOCCUS AUREUS BY PCR: NOT DETECTED
STAPHYLOCOCCUS EPIDERMIDIS BY PCR: DETECTED
STAPHYLOCOCCUS LUGDUNENSIS BY PCR: NOT DETECTED
STAPHYLOCOCCUS SPECIES BY PCR: DETECTED
STENOTROPHOMONAS MALTOPHILIA BY PCR: NOT DETECTED
STREPTOCOCCUS AGALACTIAE BY PCR: NOT DETECTED
STREPTOCOCCUS PNEUMONIAE BY PCR: NOT DETECTED
STREPTOCOCCUS PYOGENES  BY PCR: NOT DETECTED
STREPTOCOCCUS SPECIES BY PCR: NOT DETECTED
WBC # BLD: 4.2 K/UL (ref 4.8–10.8)

## 2022-09-28 PROCEDURE — 99231 SBSQ HOSP IP/OBS SF/LOW 25: CPT | Performed by: INTERNAL MEDICINE

## 2022-09-28 PROCEDURE — 6360000002 HC RX W HCPCS: Performed by: NURSE PRACTITIONER

## 2022-09-28 PROCEDURE — 87040 BLOOD CULTURE FOR BACTERIA: CPT

## 2022-09-28 PROCEDURE — 6370000000 HC RX 637 (ALT 250 FOR IP): Performed by: NURSE PRACTITIONER

## 2022-09-28 PROCEDURE — 36415 COLL VENOUS BLD VENIPUNCTURE: CPT

## 2022-09-28 PROCEDURE — 1210000000 HC MED SURG R&B

## 2022-09-28 PROCEDURE — 85025 COMPLETE CBC W/AUTO DIFF WBC: CPT

## 2022-09-28 PROCEDURE — C9113 INJ PANTOPRAZOLE SODIUM, VIA: HCPCS | Performed by: NURSE PRACTITIONER

## 2022-09-28 PROCEDURE — 2580000003 HC RX 258: Performed by: NURSE PRACTITIONER

## 2022-09-28 PROCEDURE — 80048 BASIC METABOLIC PNL TOTAL CA: CPT

## 2022-09-28 RX ORDER — OXYCODONE HYDROCHLORIDE 5 MG/1
15 TABLET ORAL EVERY 6 HOURS PRN
Status: DISCONTINUED | OUTPATIENT
Start: 2022-09-28 | End: 2022-09-29 | Stop reason: HOSPADM

## 2022-09-28 RX ORDER — ALPRAZOLAM 0.5 MG/1
1 TABLET ORAL 4 TIMES DAILY PRN
Status: DISCONTINUED | OUTPATIENT
Start: 2022-09-28 | End: 2022-09-29 | Stop reason: HOSPADM

## 2022-09-28 RX ADMIN — OXYCODONE 15 MG: 5 TABLET ORAL at 13:56

## 2022-09-28 RX ADMIN — DICYCLOMINE HYDROCHLORIDE 20 MG: 20 TABLET ORAL at 09:14

## 2022-09-28 RX ADMIN — VANCOMYCIN HYDROCHLORIDE 1250 MG: 10 INJECTION, POWDER, LYOPHILIZED, FOR SOLUTION INTRAVENOUS at 21:42

## 2022-09-28 RX ADMIN — ONDANSETRON 4 MG: 2 INJECTION INTRAMUSCULAR; INTRAVENOUS at 09:16

## 2022-09-28 RX ADMIN — ALPRAZOLAM 1 MG: 0.5 TABLET ORAL at 19:56

## 2022-09-28 RX ADMIN — ALPRAZOLAM 1 MG: 0.5 TABLET ORAL at 09:44

## 2022-09-28 RX ADMIN — SODIUM CHLORIDE, PRESERVATIVE FREE 10 ML: 5 INJECTION INTRAVENOUS at 09:16

## 2022-09-28 RX ADMIN — METOCLOPRAMIDE 10 MG: 10 TABLET ORAL at 09:14

## 2022-09-28 RX ADMIN — DICYCLOMINE HYDROCHLORIDE 20 MG: 20 TABLET ORAL at 19:55

## 2022-09-28 RX ADMIN — METOCLOPRAMIDE 10 MG: 10 TABLET ORAL at 16:08

## 2022-09-28 RX ADMIN — METOCLOPRAMIDE 10 MG: 10 TABLET ORAL at 11:12

## 2022-09-28 RX ADMIN — SODIUM CHLORIDE, SODIUM LACTATE, POTASSIUM CHLORIDE, AND CALCIUM CHLORIDE: 600; 310; 30; 20 INJECTION, SOLUTION INTRAVENOUS at 09:19

## 2022-09-28 RX ADMIN — ENOXAPARIN SODIUM 40 MG: 100 INJECTION SUBCUTANEOUS at 16:08

## 2022-09-28 RX ADMIN — OXYCODONE 15 MG: 5 TABLET ORAL at 01:21

## 2022-09-28 RX ADMIN — MIRTAZAPINE 3.75 MG: 7.5 TABLET ORAL at 19:56

## 2022-09-28 RX ADMIN — GABAPENTIN 300 MG: 300 CAPSULE ORAL at 09:14

## 2022-09-28 RX ADMIN — CIPROFLOXACIN 400 MG: 2 INJECTION, SOLUTION INTRAVENOUS at 19:59

## 2022-09-28 RX ADMIN — SODIUM CHLORIDE, SODIUM LACTATE, POTASSIUM CHLORIDE, AND CALCIUM CHLORIDE: 600; 310; 30; 20 INJECTION, SOLUTION INTRAVENOUS at 19:58

## 2022-09-28 RX ADMIN — CIPROFLOXACIN 400 MG: 2 INJECTION, SOLUTION INTRAVENOUS at 09:17

## 2022-09-28 RX ADMIN — CYANOCOBALAMIN TAB 500 MCG 500 MCG: 500 TAB at 09:14

## 2022-09-28 RX ADMIN — METOCLOPRAMIDE 10 MG: 10 TABLET ORAL at 19:55

## 2022-09-28 RX ADMIN — DICYCLOMINE HYDROCHLORIDE 20 MG: 20 TABLET ORAL at 11:17

## 2022-09-28 RX ADMIN — VANCOMYCIN HYDROCHLORIDE 2000 MG: 10 INJECTION, POWDER, LYOPHILIZED, FOR SOLUTION INTRAVENOUS at 11:12

## 2022-09-28 RX ADMIN — OXYCODONE 15 MG: 5 TABLET ORAL at 19:56

## 2022-09-28 RX ADMIN — SODIUM CHLORIDE, PRESERVATIVE FREE 40 MG: 5 INJECTION INTRAVENOUS at 09:15

## 2022-09-28 RX ADMIN — DICYCLOMINE HYDROCHLORIDE 20 MG: 20 TABLET ORAL at 16:08

## 2022-09-28 RX ADMIN — GABAPENTIN 300 MG: 300 CAPSULE ORAL at 19:57

## 2022-09-28 RX ADMIN — OXYCODONE 15 MG: 5 TABLET ORAL at 06:06

## 2022-09-28 RX ADMIN — Medication 5 MG: at 19:57

## 2022-09-28 ASSESSMENT — ENCOUNTER SYMPTOMS
COLOR CHANGE: 0
BACK PAIN: 1
VOMITING: 0
SHORTNESS OF BREATH: 0
BLOOD IN STOOL: 0
DIARRHEA: 1
CHEST TIGHTNESS: 0
ABDOMINAL PAIN: 1
NAUSEA: 1

## 2022-09-28 ASSESSMENT — PAIN DESCRIPTION - LOCATION
LOCATION: BACK

## 2022-09-28 ASSESSMENT — PAIN DESCRIPTION - DESCRIPTORS: DESCRIPTORS: DULL;POUNDING

## 2022-09-28 ASSESSMENT — PAIN SCALES - GENERAL
PAINLEVEL_OUTOF10: 7
PAINLEVEL_OUTOF10: 9
PAINLEVEL_OUTOF10: 8
PAINLEVEL_OUTOF10: 9

## 2022-09-28 NOTE — PROGRESS NOTES
PROGRESS NOTE    Patient name: Marco A Cassidy  Patient : 1960  Room: 418      SUBJECTIVE: Orthostatic symptoms on admission. She reports that colostomy output has slowed down slightly. Still liquidy. No nausea or vomiting. She is eager to have something to eat. INTERVAL HISTORY  Aranza Weeks is well-known to my clinic. 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  She was diagnosed in 2021. She was recommended chemoradiation and completed chemoradiation on 2021. This was complicated by development of rectovaginal fistula. She has been seen by surgery at [facility] for closure of her fistula. She has a significant history of depression followed by psychiatry. She has lost a significant amount of weight. She was recently treated for C. difficile colitis. She was treated with vancomycin. She presents to the ER department today with complaints of diarrhea. 2022-CT abdomen pelvis showed nonobstructing bilateral kidney stones. There was no hydronephrosis. Nonspecific fluid-filled colon and small bowel loops. Findings can be seen with enteritis. 2022-CT abdomen/pelvis showed Left lower quadrant colostomy, likely diverting loop colostomy, from the descending colon. 2. Nondistended small bowel. 3. Probable 3 x 2 mm right renal calculus; no hydronephrosis. The patient is a status post completion of chemoradiation with 5-FU/mitomycin for a history of squamous cell carcinoma of the anus. 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 She recently had surgery performed at [facility] for closure of the fistula. In addition, she has permanent colostomy. She has a significant history of depression and is followed by behavioral health/psychiatry. She has had a very hard time to accept her permanent colostomy.   She presented to the ER department on 9/27/2022 with complaints of generalized weakness, recurrent diarrhea as the patient had a recent diagnosis of C. difficile and was treated with p.o. vancomycin. Repeat GI PCR panel was positive only for E. coli. Negative for C. difficile. She was admitted to the hospitalist service due to dehydration and significant weakness. I was consulted for continuity of care. CBC today was mostly unremarkable. Normal WBC, normal hemoglobin normal platelet counts. Chemistry showed calcium level 10.4. Normal LFTs. Normal creatinine. Extensive review of medical records from [facility], prior surgery and prior CT scans. Prior oncological history  Diagnosis  Invasive squamous cell carcinoma of anus, March 2021  p16 strongly positive  [id number]     Treatment Summary  5/10/21-7/1/2001-Radiation therapy  5/10/21-6/30/2021-chemotherapy with Xeloda 825 mg/m2 b.i.d. during radiation therapy and Mitomycin D1 & D 29     Hematology/Cancer History  Haydee Westfall was first seen by me on 4/20/2021. She was referred by medical oncology, [facility] for a diagnosis of advanced anal squamous cell carcinoma. Patient has multiple comorbidities including history of hepatitis C, treated, anxiety, recurrent nephrolithiasis, currently smoker. Had complaints of abdominal pain October 2020.  9/24/20 CT abd/pelvis: Right nephrolithiasis. A 4 mm calculus is present lower pole the right kidney. The left renal contour is visualized. There may be very subtle pelvocaliectasis on the left. No mass lesion, fluid collection or significant lymphadenopathy is seen in the pelvis. 12/22/20 CT lung screening:  No suspicious pulmonary nodules or pathologic intrathoracic lymphadenopathy. Mild ectasia ascending thoracic aorta with mild vascular calcification. Lung RADS category 1-negative exam. Continued annual screening with low-dose CT chest in 12 months recommended.   2/8/21 CT abd/pelvis (urogram): Area of decreased ureteral distention on delayed phase images may reflect ureteral wall thickening or may be physiologic. No surrounding inflammatory change is identified. This is new compared to the prior exam. Punctate nonobstructive bilateral renal calculi. Mildly enlarged right inguinal lymph node of uncertain significance. Single enlarged lymph node is likely reactive but cannot be confirmed. Atherosclerotic disease. As mentioned in the body the report, a small pancreatic lesion is present. Follow-up nonemergent MRI abdomen with and without contrast with MRCP is recommended. 3/19/21 Anus, biopsy: Invasive squamous cell carcinoma. Histologic sections of the anal biopsy demonstrate involvement by an invasive squamous cell carcinoma, characterized by moderate pleomorphism and focal keratinization. Mitoses are readily seen. Immunohistochemistry for p16 is diffusely and strongly positive. 4/20/2021-discussed at length about results of pathology, imaging studies. Reviewed notes from your available medical oncology and surgical oncology. Recommended chemoradiation. Also recommendations for diverting colostomy were made by surgical oncology but the patient is contemplative. Recommending Xeloda/mitomycin. 4/22/21 PET scan [facility]): Markedly FDG avid rectal mass measuring about 4 cm with maximum SUV 14.65. Right inguinal and pelvic FDG avid adenopathy. No evidence of distant metastatic disease. Ascending thoracic aorta measures up to 4 cm.  4/30/21 US guided lymph node biopsy [facility]): Right inguinal lymph node, ultrasound-guided fine-needle core biopsies and touch preparations: Metastatic squamous cell carcinoma. 5/4/2021-discussed results of PET scan and biopsy. Recommend chemoradiation. Discussed about side effects.   5/10/21-6/30/2021-concurrent chemoradiation with chemotherapy with Xeloda 825 mg/m2 b.i.d. during radiation therapy and Mitomycin D1 & D 29  5/10/21-7/1/2021-completion of radiation therapy  1/11/2022 DIAGNOSIS: ANAL CANAL, BIOPSY ([facility]) Fragments of ulcerated tissue with acute inflammation,Necrosis, and Fibrosis/Hyalinzation. No evidence of malignancy. 3/11/2022 CT Chest Angiogram [facility]) No evidence of pulmonary thromboembolic disease. The lungs are clear. There is mild bronchial wall thickening. There is mild aneurysmal dilatation of the ascending thoracic aorta with a transverse dimension of 4 cm. The thoracic aorta and proximal great vessels are otherwise unremarkable. May 2022- she was seen by proctologist/plastic surgery at [facility]. She had surgical closure of rectovaginal fistula with gracilis muscle. 6/16/2022-CT abdomen/pelvis showed no acute abnormality of the abdomen pelvis. Left lower lobe abdominal colostomy and parastomal herniation of the large bowel loops. No obstruction. Asymmetrical thickening of the wall of the rectosigmoid and sigmoid colon which may be due to incomplete distention. This is similar to the prior study. Process of neoplastic process in this area not excluded. The areas to be optimally evaluated. 8/19/2022-she was seen [facility] by colorectal surgery Dr. Sha Ta. She had rectal exam under anesthesia. There was no evidence of persistent rectal fistula or recurrent disease. Pathology showed benign ulcerated squamous mucosa with granulation tissue, fibrosis and reactive changes. Negative for high-grade dysplasia or carcinoma. 9/6/2022-CT abdomen/pelvis showed nonobstructive bilateral kidney stones. No hydronephrosis seen. Nonspecific fluid-filled colon and small bowel loops. Findings can be seen with enteritis. 9/22/2022-CT abdomen/pelvis showed left lower quadrant colostomy, lateral diverting loop colostomy from descending colon. Nondistended small bowel. Probable 3 x 2 mm right renal calculus. No hydronephrosis.        Objective   /67   Pulse 67   Temp 97.2 °F (36.2 °C)   Resp 16   Ht 5' 3\" (1.6 m)   Wt 179 lb (81.2 kg)   SpO2 97%   BMI 31.71 kg/m²     PHYSICAL EXAM:  CONSTITUTIONAL: Alert, appropriate, no acute distress  EYES: Non icteric,  ENT: Mucus membranes moist,external inspection of ears and nose are normal  NECK: Supple, no masses. No palpable thyroid mass  CHEST/LUNGS: CTA bilaterally, normal respiratory effort   CARDIOVASCULAR: RRR, no murmurs. No lower extremity edema  ABDOMEN: Mild Abdominal tenderness, LLQ colostomy with liquid stool  EXTREMITIES: warm, full ROM in all 4 extremities, no focal weakness. SKIN: warm, dry with no rashes or lesions  LYMPH: No cervical, clavicular, axillary, or inguinal lymphadenopathy  NEUROLOGIC: follows commands, non focal       Recent Labs     09/27/22  0908 09/06/22  1630   WBC 5.4 5.3   HGB 14.2 13.3   HCT 45.5 40.9   MCV 89.0 86.5    231       Lab Results   Component Value Date     09/27/2022    K 3.9 09/27/2022     09/27/2022    CO2 25 09/27/2022    BUN 7 (L) 09/27/2022    CREATININE 0.5 09/27/2022    GLUCOSE 89 09/27/2022    CALCIUM 10.4 (H) 09/27/2022    PROT 8.0 09/27/2022    LABALBU 4.5 09/27/2022    BILITOT 0.3 09/27/2022    ALKPHOS 88 09/27/2022    AST 16 09/27/2022    ALT 12 09/27/2022    LABGLOM >60 09/27/2022    GFRAA >59 09/27/2022    GLOB 2.5 06/30/2021       Lab Results   Component Value Date    INR 1.04 09/30/2016    INR 1.03 10/22/2013    INR 1.01 10/30/2012    PROTIME 13.6 09/30/2016    PROTIME 13.0 10/22/2013    PROTIME 12.9 10/30/2012       30 Day lookback of cultures:    Blood Culture Recent:   Recent Labs     09/06/22 2044   BC No growth after 5 days of incubation. Gram Stain Recent: No results for input(s): LABGRAM in the last 720 hours. Resp Culture Recent: No results for input(s): CULTRESP in the last 720 hours. Body Fluid Recent : No results for input(s): BFCX in the last 720 hours. MRSA Recent : No results for input(s): [address] in the last 720 hours.    Urine Culture Recent : No results for input(s): LABURIN in the last 720 hours. Organism Recent : No results for input(s): ORG in the last 720 hours. Narrative   Exam: CT OF THE ABDOMEN/PELVIS WITH IV CONTRAST   Clinical data: Abdominal pain. Technique:Direct contiguousaxial CT images were acquired through the abdomen and pelvis with intravenouscontrastusing soft tissue and bone algorithms. Oral contrast was not administered. Reformatted/MPR images. Radiation dose: CTDIvol =27.53 mGy, DLP    =1374 mGy x cm. Limitations: Lack of oral contrast limits evaluation of the bowel loops. Prior Studies: No prior studies submitted. Findings: Lung bases: Clear   Liver:Unremarkable size, contour, and density. No evidence of biliary ductal dilation, status post cholecystectomy, with clips in the gallbladder fossa. Spleen: Grossly unremarkable. Pancreas:  Grossly unremarkable. Adrenal glands: Grossly unremarkable size, contour and density. Kidneys: In anatomic position. Grossly unremarkablerenal size, contour and density. No hydronephrosis. 3 x 2 mm density in the lower pole the right kidney could represent a renal calculus versus early excretion of contrast. No evidence of a renal mass or    cyst. Perinephric space is unremarkable. Retroperitoneum: No retroperitoneal lymphadenopathy. Atherosclerotic calcification of the abdominal aorta. The IVC is grossly unremarkable. Peritoneal cavity: No evidence of free air or ascites. Gastrointestinal tract: Nondistended small bowel. Air and stool throughout the colon. Left lower quadrant colostomy at the distal descending colon, likely diverting colostomy; a short-segment of distal descending colon is herniated into the subcutaneous    tissues through the ostomy defect. Appendix: Nondistended with no periappendiceal fat stranding, and a surgical clip in this region. Pelvis: Uterus is not visualized and may be atrophic or surgically absent. No adnexal mass is detected. Bladder is moderately distended. Phleboliths.     Osseous structures:Mild degenerative disc disease. No acute or destructive bony process identified. Impression   1. Left lower quadrant colostomy, likely diverting loop colostomy, from the descending colon. 2. Nondistended small bowel. 3. Probable 3 x 2 mm right renal calculus; no hydronephrosis. Recommendation: Follow up as clinically indicated. All CT scans at this facility utilize dose modulation, iterative reconstruction, and/or weight based dosing when appropriate to reduce radiation dose to as low as reasonably achievable. Electronically Signed by Jeannine Parry MD at 27-Sep-2022 12:43:01 PM                ASSESSMENT/PLAN:  Locally advanced anal squamous cell carcinoma, p16 positive [id number]  -Status post completion of chemoradiation 7/1/2021  -Developed rectovaginal fistula with recurrent UTI. -Closure of rectovaginal fistula-[facility] proctology/plastic surgery May 2022  -Patient may undergo reversal of colostomy in the near future. -Rectal exam under anesthesia 8/19/2022 showed no evidence disease recurrence. Lab Results   Component Value Date    WBC 5.4 09/27/2022    HGB 14.2 09/27/2022    HCT 45.5 09/27/2022    MCV 89.0 09/27/2022     09/27/2022        -CT abdomen pelvis 9/27/2022 showed no evidence of disease recurrence. No evidence of cancer recurrence at this time. Continue current supportive care. Depression-continue follow-up with behavioral health/psychiatry     Ostomy care-followed by wound care     Diarrhea-unknown etiology. Consider GI consultation    Crt 0.5 with GFR > 60 on 9/27/2022     cc an hour  -As per hospitalist     History of C. difficile-treated with p.o. vancomycin    -GI PCR panel positive for E. Coli and negative for C. difficile during this admission    Cipro 400 IV every 12 hours initiated on admission. Unfortunately, this may increase risk of C. difficile recurrence.      PLAN:  Continue current supportive care  Cipro 400 IV every 12 hours   cc an hour    No oncologic intervention -we will reschedule outpatient follow-up         Lux Grant PA-C    09/28/22  7:01 AM  Physician's attestation/substantial contribution:  I, Dr Peyman Sexton, independently performed an evaluation on Ebony Cortez. I have reviewed relevant medical information/data to include but not limited to medication list, relevant appropriate labs and imaging when applicable. I reviewed other physician's notes, ancillary services and nurses assessment. I have reviewed the above documentation completed by the Nurse Practitioner or Physician Assistant. Please see my additional contributions to the history of present illness, physical examination, and assessment/medical decision-making that reflect my findings and impressions. I have seen and examined the patient and the key elements of all parts of the encounter have been performed by me. I agree with the assessment and plan as outlined by the ARNP/PA. Subjective-no new complaints  Objective-as above  Assessment/plan:  Continue current supportive care. No oncologic intervention.     Peyman Sexton MD

## 2022-09-28 NOTE — PROGRESS NOTES
[address] [facility] Pharmacokinetic Monitoring Service - Vancomycin     Nia Braga is a 58 y.o. female starting on vancomycin therapy for Bloodstream infection. Pharmacy consulted by AMARIS Benton CNP for monitoring and adjustment. Target Concentration: Goal AUC/NABIL 400-600 mg*hr/L    Additional Antimicrobials: Cipro    Pertinent Laboratory Values: Wt Readings from Last 1 Encounters:   09/27/22 179 lb (81.2 kg)     Temp Readings from Last 1 Encounters:   09/28/22 97.2 °F (36.2 °C)     Estimated Creatinine Clearance: 118 mL/min (based on SCr of 0.5 mg/dL). Recent Labs     09/27/22  0908   CREATININE 0.5   WBC 5.4     Procalcitonin: none    Pertinent Cultures:  Culture Date Source Results   09/27/22 Blood Gram positive cocci resembling Staph   MRSA Nasal Swab: N/A. Non-respiratory infection.     Plan:  Dosing recommendations based on Bayesian software  Start vancomycin 2000 mg IV once followed by 1250 mg IV every 12 hours  Anticipated AUC of 468 and trough concentration of 13.5 at steady state  Renal labs as indicated   Vancomycin concentration ordered for 09/29 @ 2000   Pharmacy will continue to monitor patient and adjust therapy as indicated    Thank you for the consult,  Alayna Castro [facility]  9/28/2022 9:15 AM

## 2022-09-28 NOTE — PROGRESS NOTES
[facility] Hospitalists      Patient:  Kassandra Chavez  YOB: 1960  Date of Service: 9/28/2022  MRN: [id number]   Acct: [de-identified]   Primary Care Physician: Junie Caba DO  Advance Directive: Full Code  Admit Date: 9/27/2022       Hospital Day: 1  Portions of this note have been copied forward, however, changed to reflect the most current clinical status of this patient. CHIEF COMPLAINT abd pain, nausea    SUBJECTIVE: Patient reports decrease in ostomy output today. Patient complaining of chronic pain. Patient reports overall she is fatigued and does not feel well today. Patient denies fever or chills. Patient reports improvement in appetite and would like to eat. CUMULATIVE HOSPITAL COURSE:  The patient is a 59-year-old female with past medical history of colorectal cancer, alcohol abuse, arthritis, anxiety, hep C, COPD, and chronic back pain who presented to [facility] ED with a complaint of abdominal pain and nausea. Patient reported she was recently treated for C. difficile and had improvement in her diarrhea. Patient reported over the past 2 to 3 days having increase in ostomy output and watery diarrhea again. Patient reported overall fatigue but no fever or chills. GI panel in the ED positive for E. coli enterotoxigenic. Patient began on empiric ciprofloxacin. Blood cultures obtained in ED. 1 set indicates gram positive cocci in clusters resembling staph and other set with no growth to date. Repeat blood cultures obtained this morning and patient placed on empiric vancomycin. Decrease in ostomy output overnight. Review of Systems:   Review of Systems   Constitutional:  Positive for appetite change and fatigue. Negative for chills and fever. Respiratory:  Negative for chest tightness and shortness of breath. Cardiovascular:  Negative for chest pain, palpitations and leg swelling. Gastrointestinal:  Positive for abdominal pain, diarrhea and nausea.  Negative for blood in stool deficit present. Mental Status: She is alert and oriented to person, place, and time. Medications:      sodium chloride      lactated ringers 150 mL/hr at 09/28/22 0919      vancomycin (VANCOCIN) intermittent dosing (placeholder)   Other RX Placeholder    vancomycin  1,250 mg IntraVENous Q12H    pantoprazole (PROTONIX) 40 mg injection  40 mg IntraVENous Daily    sodium chloride flush  5-40 mL IntraVENous 2 times per day    enoxaparin  40 mg SubCUTAneous Daily    ciprofloxacin  400 mg IntraVENous Q12H    sodium chloride  1,000 mL IntraVENous Once    dicyclomine  20 mg Oral 4x Daily AC & HS    gabapentin  300 mg Oral BID    melatonin  5 mg Oral Nightly    metoclopramide  10 mg Oral 4x Daily    mirtazapine  3.75 mg Oral Nightly    vitamin B-12  500 mcg Oral Daily    vitamin D  50,000 Units Oral Q30 Days     ALPRAZolam, oxyCODONE, sodium chloride flush, sodium chloride, potassium chloride, magnesium sulfate, acetaminophen **OR** acetaminophen, polyethylene glycol, ondansetron **OR** ondansetron, albuterol  ADULT DIET; Regular     Lab and other Data:     Recent Labs     09/27/22  0908 09/28/22  0819   WBC 5.4 4.2*   HGB 14.2 11.5*    229     Recent Labs     09/27/22  0908 09/28/22  0819    138   K 3.9 3.8    103   CO2 25 27   BUN 7* 6*   CREATININE 0.5 0.5   GLUCOSE 89 96     Recent Labs     09/27/22  0908   AST 16   ALT 12   BILITOT 0.3   ALKPHOS 88     Troponin T: No results for input(s): TROPONINI in the last 72 hours. Pro-BNP: No results for input(s): BNP in the last 72 hours. INR: No results for input(s): INR in the last 72 hours. UA:  Recent Labs     09/27/22  1039   COLORU YELLOW   PHUR 8.0   WBCUA 4   RBCUA 1   BACTERIA NEGATIVE*   CLARITYU Clear   SPECGRAV 1.016   LEUKOCYTESUR MODERATE*   UROBILINOGEN 0.2   BILIRUBINUR Negative   BLOODU Negative   GLUCOSEU Negative     A1C: No results for input(s): LABA1C in the last 72 hours.   ABG:No results for input(s): PHART, IPO6VPZ, PO2ART, AOQ8ISN, BEART, HGBAE, U8NLSWJO, CARBOXHGBART in the last 72 hours. RAD:   XR KNEE RIGHT (1-2 VIEWS)    Result Date: 9/27/2022  Early changes of osteoarthritis. Recommendation: Follow up as clinically indicated. Electronically Signed by Clemente Hurst MD at 27-Sep-2022 12:06:38 PM             CT ABDOMEN PELVIS W IV CONTRAST Additional Contrast? None    Result Date: 9/27/2022  1. Left lower quadrant colostomy, likely diverting loop colostomy, from the descending colon. 2. Nondistended small bowel. 3. Probable 3 x 2 mm right renal calculus; no hydronephrosis. Recommendation: Follow up as clinically indicated. All CT scans at this facility utilize dose modulation, iterative reconstruction, and/or weight based dosing when appropriate to reduce radiation dose to as low as reasonably achievable. Electronically Signed by Gema Wray MD at 27-Sep-2022 12:43:01 PM             XR CHEST PORTABLE    Result Date: 9/27/2022  1. No significant abnormality. Recommendation:  Follow up as clinically indicated. Electronically Signed by Clemente Hurst MD at 27-Sep-2022 12:06:59 PM                Micro:   Component 9/27/22 0948    Blood Culture, Routine No Growth to date. Any change in status will be called. Component 9/27/22 0953    Culture,   Blood 2  Abnormal   Gram stain Aerobic bottle:   Gram positive cocci in clusters   resembling Staphylococcus   Culture in progress   Please notify Physician    Bottle volume = 9 ml             Assessment/Plan   Principal Problem:    Infectious diarrhea   -continue Cipro for e.  Coli   -follow blood cultures   -monitor intake and output   -diet as tolerated   -IV hydration   -monitor CBC and CMP   -replace electrolytes as warranted     Active Problems:  Positive blood culture   -repeat cultures drawn this am   -empiric vancomycin   -follow new cultures   -likely contaminant      Chronic active hepatitis C ([facility] [address] - Genotype 1A, s/p Harvoni 2015 with remission   -noted      Anal cancer [facility])   -ostomy in place   -outpatient f/u with oncology    Resolved Problems:    * No resolved hospital problems.  *      Antibiotic: Cipro and Vancomycin     DVT Prophylaxis:lovenox    GI prophylaxis:  Protonix    AMARIS Sanchez - CNP, 9/28/2022 3:46 PM

## 2022-09-29 VITALS
BODY MASS INDEX: 31.71 KG/M2 | HEART RATE: 73 BPM | SYSTOLIC BLOOD PRESSURE: 122 MMHG | WEIGHT: 179 LBS | OXYGEN SATURATION: 99 % | TEMPERATURE: 97.5 F | DIASTOLIC BLOOD PRESSURE: 76 MMHG | HEIGHT: 63 IN | RESPIRATION RATE: 18 BRPM

## 2022-09-29 LAB
ANION GAP SERPL CALCULATED.3IONS-SCNC: 11 MMOL/L (ref 7–19)
BASOPHILS ABSOLUTE: 0 K/UL (ref 0–0.2)
BASOPHILS RELATIVE PERCENT: 0.9 % (ref 0–1)
BUN BLDV-MCNC: 6 MG/DL (ref 8–23)
CALCIUM SERPL-MCNC: 8.8 MG/DL (ref 8.8–10.2)
CHLORIDE BLD-SCNC: 104 MMOL/L (ref 98–111)
CO2: 27 MMOL/L (ref 22–29)
CREAT SERPL-MCNC: 0.6 MG/DL (ref 0.5–0.9)
CULTURE, BLOOD 2: ABNORMAL
CULTURE, BLOOD 2: ABNORMAL
EKG P AXIS: 52 DEGREES
EKG P-R INTERVAL: 144 MS
EKG Q-T INTERVAL: 376 MS
EKG QRS DURATION: 103 MS
EKG QTC CALCULATION (BAZETT): 415 MS
EKG T AXIS: 53 DEGREES
EOSINOPHILS ABSOLUTE: 0.2 K/UL (ref 0–0.6)
EOSINOPHILS RELATIVE PERCENT: 3.5 % (ref 0–5)
GFR AFRICAN AMERICAN: >59
GFR NON-AFRICAN AMERICAN: >60
GLUCOSE BLD-MCNC: 106 MG/DL (ref 74–109)
HCT VFR BLD CALC: 34.6 % (ref 37–47)
HEMOGLOBIN: 10.8 G/DL (ref 12–16)
IMMATURE GRANULOCYTES #: 0.1 K/UL
LYMPHOCYTES ABSOLUTE: 1.7 K/UL (ref 1.1–4.5)
LYMPHOCYTES RELATIVE PERCENT: 36.5 % (ref 20–40)
MCH RBC QN AUTO: 27.5 PG (ref 27–31)
MCHC RBC AUTO-ENTMCNC: 31.2 G/DL (ref 33–37)
MCV RBC AUTO: 88 FL (ref 81–99)
MONOCYTES ABSOLUTE: 0.5 K/UL (ref 0–0.9)
MONOCYTES RELATIVE PERCENT: 11.7 % (ref 0–10)
NEUTROPHILS ABSOLUTE: 2.1 K/UL (ref 1.5–7.5)
NEUTROPHILS RELATIVE PERCENT: 46.1 % (ref 50–65)
ORGANISM: ABNORMAL
PDW BLD-RTO: 13.1 % (ref 11.5–14.5)
PLATELET # BLD: 198 K/UL (ref 130–400)
PMV BLD AUTO: 12 FL (ref 9.4–12.3)
POTASSIUM REFLEX MAGNESIUM: 5.1 MMOL/L (ref 3.5–5)
POTASSIUM SERPL-SCNC: 4.1 MMOL/L (ref 3.5–5)
RBC # BLD: 3.93 M/UL (ref 4.2–5.4)
SODIUM BLD-SCNC: 142 MMOL/L (ref 136–145)
WBC # BLD: 4.5 K/UL (ref 4.8–10.8)

## 2022-09-29 PROCEDURE — 85025 COMPLETE CBC W/AUTO DIFF WBC: CPT

## 2022-09-29 PROCEDURE — 80048 BASIC METABOLIC PNL TOTAL CA: CPT

## 2022-09-29 PROCEDURE — 84132 ASSAY OF SERUM POTASSIUM: CPT

## 2022-09-29 PROCEDURE — 2580000003 HC RX 258: Performed by: NURSE PRACTITIONER

## 2022-09-29 PROCEDURE — 6370000000 HC RX 637 (ALT 250 FOR IP): Performed by: NURSE PRACTITIONER

## 2022-09-29 PROCEDURE — 36415 COLL VENOUS BLD VENIPUNCTURE: CPT

## 2022-09-29 PROCEDURE — 6360000002 HC RX W HCPCS: Performed by: NURSE PRACTITIONER

## 2022-09-29 PROCEDURE — 99231 SBSQ HOSP IP/OBS SF/LOW 25: CPT | Performed by: INTERNAL MEDICINE

## 2022-09-29 PROCEDURE — C9113 INJ PANTOPRAZOLE SODIUM, VIA: HCPCS | Performed by: NURSE PRACTITIONER

## 2022-09-29 RX ORDER — CIPROFLOXACIN 500 MG/1
500 TABLET, FILM COATED ORAL EVERY 12 HOURS SCHEDULED
Status: DISCONTINUED | OUTPATIENT
Start: 2022-09-29 | End: 2022-09-29 | Stop reason: HOSPADM

## 2022-09-29 RX ORDER — CIPROFLOXACIN 500 MG/1
500 TABLET, FILM COATED ORAL EVERY 12 HOURS SCHEDULED
Qty: 6 TABLET | Refills: 0 | Status: SHIPPED | OUTPATIENT
Start: 2022-09-29 | End: 2022-10-02

## 2022-09-29 RX ADMIN — ALPRAZOLAM 1 MG: 0.5 TABLET ORAL at 14:10

## 2022-09-29 RX ADMIN — POLYETHYLENE GLYCOL 3350 17 G: 17 POWDER, FOR SOLUTION ORAL at 11:20

## 2022-09-29 RX ADMIN — DICYCLOMINE HYDROCHLORIDE 20 MG: 20 TABLET ORAL at 11:05

## 2022-09-29 RX ADMIN — CYANOCOBALAMIN TAB 500 MCG 500 MCG: 500 TAB at 11:04

## 2022-09-29 RX ADMIN — SODIUM CHLORIDE, PRESERVATIVE FREE 10 ML: 5 INJECTION INTRAVENOUS at 10:58

## 2022-09-29 RX ADMIN — METOCLOPRAMIDE 10 MG: 10 TABLET ORAL at 11:04

## 2022-09-29 RX ADMIN — GABAPENTIN 300 MG: 300 CAPSULE ORAL at 10:58

## 2022-09-29 RX ADMIN — OXYCODONE 15 MG: 5 TABLET ORAL at 01:31

## 2022-09-29 RX ADMIN — OXYCODONE 15 MG: 5 TABLET ORAL at 11:03

## 2022-09-29 RX ADMIN — SODIUM CHLORIDE, PRESERVATIVE FREE 40 MG: 5 INJECTION INTRAVENOUS at 10:58

## 2022-09-29 ASSESSMENT — PAIN DESCRIPTION - LOCATION: LOCATION: BACK

## 2022-09-29 ASSESSMENT — PAIN SCALES - GENERAL
PAINLEVEL_OUTOF10: 3
PAINLEVEL_OUTOF10: 8
PAINLEVEL_OUTOF10: 9

## 2022-09-29 NOTE — PROGRESS NOTES
PROGRESS NOTE    Patient name: Gavin Banegas  Patient : 1960  Room: 418      SUBJECTIVE: Orthostatic symptoms have improved. She was eating a regular diet yesterday tolerated well without any significant nausea or vomiting. She did ask for Zofran once. Colostomy output still with liquid but volume has decreased. She does feel better than admission. INTERVAL HISTORY  Vida Silverman is well-known to my clinic. 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  She was diagnosed in 2021. She was recommended chemoradiation and completed chemoradiation on 2021. This was complicated by development of rectovaginal fistula. She has been seen by surgery at [facility] for closure of her fistula. She has a significant history of depression followed by psychiatry. She has lost a significant amount of weight. She was recently treated for C. difficile colitis. She was treated with vancomycin. She presents to the ER department today with complaints of diarrhea. 2022-CT abdomen pelvis showed nonobstructing bilateral kidney stones. There was no hydronephrosis. Nonspecific fluid-filled colon and small bowel loops. Findings can be seen with enteritis. 2022-CT abdomen/pelvis showed Left lower quadrant colostomy, likely diverting loop colostomy, from the descending colon. 2. Nondistended small bowel. 3. Probable 3 x 2 mm right renal calculus; no hydronephrosis. The patient is a status post completion of chemoradiation with 5-FU/mitomycin for a history of squamous cell carcinoma of the anus. 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 She recently had surgery performed at [facility] for closure of the fistula. In addition, she has permanent colostomy. She has a significant history of depression and is followed by behavioral health/psychiatry.   She has had a very hard time to accept her permanent colostomy. She presented to the ER department on 9/27/2022 with complaints of generalized weakness, recurrent diarrhea as the patient had a recent diagnosis of C. difficile and was treated with p.o. vancomycin. Repeat GI PCR panel was positive only for E. coli. Negative for C. difficile. She was admitted to the hospitalist service due to dehydration and significant weakness. I was consulted for continuity of care. CBC today was mostly unremarkable. Normal WBC, normal hemoglobin normal platelet counts. Chemistry showed calcium level 10.4. Normal LFTs. Normal creatinine. Extensive review of medical records from [facility], prior surgery and prior CT scans. Prior oncological history  Diagnosis  Invasive squamous cell carcinoma of anus, March 2021  p16 strongly positive  [id number]     Treatment Summary  5/10/21-7/1/2001-Radiation therapy  5/10/21-6/30/2021-chemotherapy with Xeloda 825 mg/m2 b.i.d. during radiation therapy and Mitomycin D1 & D 29     Hematology/Cancer History  Pernell Martinez was first seen by me on 4/20/2021. She was referred by medical oncology, [facility] for a diagnosis of advanced anal squamous cell carcinoma. Patient has multiple comorbidities including history of hepatitis C, treated, anxiety, recurrent nephrolithiasis, currently smoker. Had complaints of abdominal pain October 2020.  9/24/20 CT abd/pelvis: Right nephrolithiasis. A 4 mm calculus is present lower pole the right kidney. The left renal contour is visualized. There may be very subtle pelvocaliectasis on the left. No mass lesion, fluid collection or significant lymphadenopathy is seen in the pelvis. 12/22/20 CT lung screening:  No suspicious pulmonary nodules or pathologic intrathoracic lymphadenopathy. Mild ectasia ascending thoracic aorta with mild vascular calcification.  Lung RADS category 1-negative exam. Continued annual screening with low-dose CT chest in 12 months recommended. 2/8/21 CT abd/pelvis (urogram): Area of decreased ureteral distention on delayed phase images may reflect ureteral wall thickening or may be physiologic. No surrounding inflammatory change is identified. This is new compared to the prior exam. Punctate nonobstructive bilateral renal calculi. Mildly enlarged right inguinal lymph node of uncertain significance. Single enlarged lymph node is likely reactive but cannot be confirmed. Atherosclerotic disease. As mentioned in the body the report, a small pancreatic lesion is present. Follow-up nonemergent MRI abdomen with and without contrast with MRCP is recommended. 3/19/21 Anus, biopsy: Invasive squamous cell carcinoma. Histologic sections of the anal biopsy demonstrate involvement by an invasive squamous cell carcinoma, characterized by moderate pleomorphism and focal keratinization. Mitoses are readily seen. Immunohistochemistry for p16 is diffusely and strongly positive. 4/20/2021-discussed at length about results of pathology, imaging studies. Reviewed notes from your available medical oncology and surgical oncology. Recommended chemoradiation. Also recommendations for diverting colostomy were made by surgical oncology but the patient is contemplative. Recommending Xeloda/mitomycin. 4/22/21 PET scan [facility]): Markedly FDG avid rectal mass measuring about 4 cm with maximum SUV 14.65. Right inguinal and pelvic FDG avid adenopathy. No evidence of distant metastatic disease. Ascending thoracic aorta measures up to 4 cm.  4/30/21 US guided lymph node biopsy [facility]): Right inguinal lymph node, ultrasound-guided fine-needle core biopsies and touch preparations: Metastatic squamous cell carcinoma. 5/4/2021-discussed results of PET scan and biopsy. Recommend chemoradiation. Discussed about side effects.   5/10/21-6/30/2021-concurrent chemoradiation with chemotherapy with Xeloda 825 mg/m2 b.i.d. during radiation therapy and Pulse 72   Temp 98 °F (36.7 °C)   Resp 18   Ht 5' 3\" (1.6 m)   Wt 179 lb (81.2 kg)   SpO2 100%   BMI 31.71 kg/m²     PHYSICAL EXAM:  CONSTITUTIONAL: Alert, appropriate, no acute distress  EYES: Non icteric,  ENT: Mucus membranes moist,external inspection of ears and nose are normal  NECK: Supple, no masses. No palpable thyroid mass  CHEST/LUNGS: CTA bilaterally, normal respiratory effort   CARDIOVASCULAR: RRR, no murmurs. No lower extremity edema  ABDOMEN: Mild Abdominal tenderness, LLQ colostomy with liquid stool  EXTREMITIES: warm, full ROM in all 4 extremities, no focal weakness. SKIN: warm, dry with no rashes or lesions  LYMPH: No cervical, clavicular, axillary, or inguinal lymphadenopathy  NEUROLOGIC: follows commands, non focal       Recent Labs     09/29/22  0338 09/28/22  0819 09/27/22  0908   WBC 4.5* 4.2* 5.4   HGB 10.8* 11.5* 14.2   HCT 34.6* 37.0 45.5   MCV 88.0 90.7 89.0    229 295       Lab Results   Component Value Date     09/29/2022    K 5.1 (H) 09/29/2022     09/29/2022    CO2 27 09/29/2022    BUN 6 (L) 09/29/2022    CREATININE 0.6 09/29/2022    GLUCOSE 106 09/29/2022    CALCIUM 8.8 09/29/2022    PROT 8.0 09/27/2022    LABALBU 4.5 09/27/2022    BILITOT 0.3 09/27/2022    ALKPHOS 88 09/27/2022    AST 16 09/27/2022    ALT 12 09/27/2022    LABGLOM >60 09/29/2022    GFRAA >59 09/29/2022    GLOB 2.5 06/30/2021       Lab Results   Component Value Date    INR 1.04 09/30/2016    INR 1.03 10/22/2013    INR 1.01 10/30/2012    PROTIME 13.6 09/30/2016    PROTIME 13.0 10/22/2013    PROTIME 12.9 10/30/2012       30 Day lookback of cultures:    Blood Culture Recent:   Recent Labs     09/27/22  1848   BC No Growth to date. Any change in status will be called. Gram Stain Recent: No results for input(s): LABGRAM in the last 720 hours. Resp Culture Recent: No results for input(s): CULTRESP in the last 720 hours.   Body Fluid Recent : No results for input(s): BFCX in the last 720 hours.  MRSA Recent : No results for input(s): MRSAC in the last 720 hours. Urine Culture Recent : No results for input(s): LABURIN in the last 720 hours. Organism Recent : No results for input(s): ORG in the last 720 hours. Narrative   Exam: CT OF THE ABDOMEN/PELVIS WITH IV CONTRAST   Clinical data: Abdominal pain. Technique:Direct contiguousaxial CT images were acquired through the abdomen and pelvis with intravenouscontrastusing soft tissue and bone algorithms. Oral contrast was not administered. Reformatted/MPR images. Radiation dose: CTDIvol =27.53 mGy, DLP    =1374 mGy x cm. Limitations: Lack of oral contrast limits evaluation of the bowel loops. Prior Studies: No prior studies submitted. Findings: Lung bases: Clear   Liver:Unremarkable size, contour, and density. No evidence of biliary ductal dilation, status post cholecystectomy, with clips in the gallbladder fossa. Spleen: Grossly unremarkable. Pancreas:  Grossly unremarkable. Adrenal glands: Grossly unremarkable size, contour and density. Kidneys: In anatomic position. Grossly unremarkablerenal size, contour and density. No hydronephrosis. 3 x 2 mm density in the lower pole the right kidney could represent a renal calculus versus early excretion of contrast. No evidence of a renal mass or    cyst. Perinephric space is unremarkable. Retroperitoneum: No retroperitoneal lymphadenopathy. Atherosclerotic calcification of the abdominal aorta. The IVC is grossly unremarkable. Peritoneal cavity: No evidence of free air or ascites. Gastrointestinal tract: Nondistended small bowel. Air and stool throughout the colon. Left lower quadrant colostomy at the distal descending colon, likely diverting colostomy; a short-segment of distal descending colon is herniated into the subcutaneous    tissues through the ostomy defect. Appendix: Nondistended with no periappendiceal fat stranding, and a surgical clip in this region.    Pelvis: Uterus is not visualized and may be atrophic or surgically absent. No adnexal mass is detected. Bladder is moderately distended. Phleboliths. Osseous structures:Mild degenerative disc disease. No acute or destructive bony process identified. Impression   1. Left lower quadrant colostomy, likely diverting loop colostomy, from the descending colon. 2. Nondistended small bowel. 3. Probable 3 x 2 mm right renal calculus; no hydronephrosis. Recommendation: Follow up as clinically indicated. All CT scans at this facility utilize dose modulation, iterative reconstruction, and/or weight based dosing when appropriate to reduce radiation dose to as low as reasonably achievable. Electronically Signed by Rob Prieto MD at 27-Sep-2022 12:43:01 PM                ASSESSMENT/PLAN:  Locally advanced anal squamous cell carcinoma, p16 positive [id number]  -Status post completion of chemoradiation 7/1/2021  -Developed rectovaginal fistula with recurrent UTI. -Closure of rectovaginal fistula-[facility] proctology/plastic surgery May 2022  -Patient may undergo reversal of colostomy in the near future. -Rectal exam under anesthesia 8/19/2022 showed no evidence disease recurrence. Lab Results   Component Value Date    WBC 4.5 (L) 09/29/2022    HGB 10.8 (L) 09/29/2022    HCT 34.6 (L) 09/29/2022    MCV 88.0 09/29/2022     09/29/2022        -CT abdomen pelvis 9/27/2022 showed no evidence of disease recurrence. No evidence of cancer recurrence at this time. Continue current supportive care. Depression-continue follow-up with behavioral health/psychiatry     Ostomy care-followed by wound care     Diarrhea-GI PCR E. coli           cc an hour  -As per hospitalist     History of C. difficile-treated with p.o. vancomycin    -GI PCR panel positive for E. Coli and negative for C. difficile during this admission    Cipro 400 IV every 12 hours initiated on admission 9/27/2020.     Gram-positive cocci-blood culture 9/27/2022    Blood culture x2 9/27/2022 -1 of 2 positive gram-positive cocci    Repeat blood culture x2 9/28/2022-in process    Vancomycin IV added 9/28/2022     PLAN:  Continue current supportive care  Cipro 400 IV every 12 hours  Vancomycin 1250 mg IV every 12 hours   cc an hour    No oncologic intervention -we will reschedule outpatient follow-up         Babak Obando PA-C    09/29/22  6:42 AM   Physician's attestation/substantial contribution:  I, Dr Jaziel Red, independently performed an evaluation on Trish Kibmle. I have reviewed relevant medical information/data to include but not limited to medication list, relevant appropriate labs and imaging when applicable. I reviewed other physician's notes, ancillary services and nurses assessment. I have reviewed the above documentation completed by the Nurse Practitioner or Physician Assistant. Please see my additional contributions to the history of present illness, physical examination, and assessment/medical decision-making that reflect my findings and impressions. I have seen and examined the patient and the key elements of all parts of the encounter have been performed by me. I agree with the assessment and plan as outlined by the ARNP/PA. Subjective-overall feels much better. Orthostatic symptoms improved  Objective-as above  Assessment/plan:  Continue current supportive care  We will sign off. No oncologic intervention at this time.   Will arrange follow-up in clinic in 2-month  She is to continue follow-up with [facility], surgeon oncology    Jaziel Red MD

## 2022-09-29 NOTE — DISCHARGE SUMMARY
Candy Madison  :  1960  MRN:  [id number]    Admit date:  2022  Discharge date:  2022    Discharging Physician:  Dr. Charley Michelle Directive: Full Code    Consults: Dimitri Stein     Primary Care Physician:  Hershall Bosworth, DO    Discharge Diagnoses:  Principal Problem:    Infectious diarrhea  Active Problems:    Chronic active hepatitis C ([facility] [address] - Genotype 1A, s/p Harvoni  with remission    Anal cancer ([facility] [address]  Resolved Problems:    * No resolved hospital problems. *      Portions of this note have been copied forward, however, changed to reflect the most current clinical status of this patient. Hospital Course: The patient is a 57-year-old female with past medical history of colorectal cancer, alcohol abuse, arthritis, anxiety, hep C, COPD, and chronic back pain who presented to [facility] ED with a complaint of abdominal pain and nausea. Patient reported she was recently treated for C. difficile and had improvement in her diarrhea. Patient reported over the past 2 to 3 days having increase in ostomy output and watery diarrhea again. Patient reported overall fatigue but no fever or chills. GI panel in the ED positive for E. coli enterotoxigenic. Patient began on empiric ciprofloxacin. Blood cultures obtained in ED. blood cultures obtained at admission 1 set with no growth or gait at the 48-hour heber and 1 set with possible contaminant of staph epi,  Repeat blood cultures obtained  with no growth at the 24-hour heber. Decrease in ostomy output overnight. Tolerating diet without difficulty. Patient reports she is feeling much better today. Per patient request discussed plan with sister who reports that she is understanding. Labs remained stable and patient is now medically stable for discharge home.     Significant Diagnostic Studies:   XR KNEE RIGHT (1-2 VIEWS)    Result Date: 2022  NO PRIOR REPORT AVAILABLE Exam: X-RAYS OF THE RIGHT KNEE Clinical data: [address]. Pain. History of knee surgery. Technique: Two views of the right knee. Prior studies: No prior studies submitted. Findings: The right knee demonstrates no evidence of fracture or dislocation. Bone mineral density is preserved. The medial compartment of the joint is reduced with prominence of the intercondylar eminence. The intra-articular surfaces are within normal limits. Surgical clips in the soft tissues of the distal medial thigh. Early changes of osteoarthritis. Recommendation: Follow up as clinically indicated. Electronically Signed by Bárbara Ramirez MD at 27-Sep-2022 12:06:38 PM             CT ABDOMEN PELVIS W IV CONTRAST Additional Contrast? None    Result Date: 9/27/2022  Exam: CT OF THE ABDOMEN/PELVIS WITH IV CONTRAST Clinical data: Abdominal pain. Technique:Direct contiguousaxial CT images were acquired through the abdomen and pelvis with intravenouscontrastusing soft tissue and bone algorithms. Oral contrast was not administered. Reformatted/MPR images. Radiation dose: CTDIvol =27.53 mGy, DLP =1374 mGy x cm. Limitations: Lack of oral contrast limits evaluation of the bowel loops. Prior Studies: No prior studies submitted. Findings: Lung bases: Clear Liver:Unremarkable size, contour, and density. No evidence of biliary ductal dilation, status post cholecystectomy, with clips in the gallbladder fossa. Spleen: Grossly unremarkable. Pancreas:  Grossly unremarkable. Adrenal glands: Grossly unremarkable size, contour and density. Kidneys: In anatomic position. Grossly unremarkablerenal size, contour and density. No hydronephrosis. 3 x 2 mm density in the lower pole the right kidney could represent a renal calculus versus early excretion of contrast. No evidence of a renal mass or cyst. Perinephric space is unremarkable. Retroperitoneum: No retroperitoneal lymphadenopathy. Atherosclerotic calcification of the abdominal aorta. The IVC is grossly unremarkable.  Peritoneal cavity: No evidence of free air or ascites. Gastrointestinal tract: Nondistended small bowel. Air and stool throughout the colon. Left lower quadrant colostomy at the distal descending colon, likely diverting colostomy; a short-segment of distal descending colon is herniated into the subcutaneous tissues through the ostomy defect. Appendix: Nondistended with no periappendiceal fat stranding, and a surgical clip in this region. Pelvis: Uterus is not visualized and may be atrophic or surgically absent. No adnexal mass is detected. Bladder is moderately distended. Phleboliths. Osseous structures:Mild degenerative disc disease. No acute or destructive bony process identified. 1. Left lower quadrant colostomy, likely diverting loop colostomy, from the descending colon. 2. Nondistended small bowel. 3. Probable 3 x 2 mm right renal calculus; no hydronephrosis. Recommendation: Follow up as clinically indicated. All CT scans at this facility utilize dose modulation, iterative reconstruction, and/or weight based dosing when appropriate to reduce radiation dose to as low as reasonably achievable. Electronically Signed by Anson Wolf MD at 27-Sep-2022 12:43:01 PM             XR CHEST PORTABLE    Result Date: 9/27/2022  NO PRIOR REPORT AVAILABLE Exam: X-RAY OF THE CHEST Clinical data: GI distress. Technique: Single view of the chest. Prior studies: No prior studies submitted. Findings: The lungs are grossly clear; no evidence of acute infiltrate or pleural effusion. Cardiac silhouette is within normal limits. No acute osseous abnormality is detected. 1. No significant abnormality. Recommendation:  Follow up as clinically indicated.  Electronically Signed by Edilberto Nicole MD at 27-Sep-2022 12:06:59 PM               Pertinent Labs:   CBC:   Recent Labs     09/27/22  0908 09/28/22  0819 09/29/22  0338   WBC 5.4 4.2* 4.5*   HGB 14.2 11.5* 10.8*    229 198     BMP:    Recent Labs     09/27/22  0908 09/28/22  0819 09/29/22  0338 09/29/22  1124  138 142  --    K 3.9 3.8 5.1* 4.1    103 104  --    CO2 25 27 27  --    BUN 7* 6* 6*  --    CREATININE 0.5 0.5 0.6  --    GLUCOSE 89 96 106  --      INR: No results for input(s): INR in the last 72 hours. Physical Exam:  Vital Signs: /76   Pulse 73   Temp 97.5 °F (36.4 °C)   Resp 18   Ht 5' 3\" (1.6 m)   Wt 179 lb (81.2 kg)   SpO2 99%   BMI 31.71 kg/m²   Physical Exam  Vitals and nursing note reviewed. Constitutional:       Appearance: She is not ill-appearing. HENT:      Head: Normocephalic. Mouth/Throat:      Mouth: Mucous membranes are moist.      Pharynx: Oropharynx is clear. Eyes:      Pupils: Pupils are equal, round, and reactive to light. Cardiovascular:      Rate and Rhythm: Normal rate and regular rhythm. Pulses: Normal pulses. Heart sounds: Normal heart sounds. Pulmonary:      Effort: Pulmonary effort is normal.      Breath sounds: Normal breath sounds. Abdominal:      General: Bowel sounds are normal. There is no distension. Palpations: Abdomen is soft. Tenderness: There is no abdominal tenderness. There is no guarding. Hernia: A hernia is present. Comments: Ostomy in left lower quadrant no output noted in bag this morning, patient reports she just changed it, hernia noted midline to her ostomy   Musculoskeletal:         General: Normal range of motion. Right lower leg: No edema. Left lower leg: No edema. Skin:     General: Skin is warm and dry. Capillary Refill: Capillary refill takes less than 2 seconds. Neurological:      General: No focal deficit present. Mental Status: She is alert and oriented to person, place, and time.        Discharge Medications:         Medication List        START taking these medications      ciprofloxacin 500 MG tablet  Commonly known as: CIPRO  Take 1 tablet by mouth every 12 hours for 6 doses            CHANGE how you take these medications      gabapentin 300 MG capsule  Commonly known as: NEURONTIN  Take 1 capsule by mouth 2 times daily for 3 days. What changed:   when to take this  additional instructions  Another medication with the same name was removed. Continue taking this medication, and follow the directions you see here. vitamin D 1.25 MG (20568 UT) Caps capsule  Commonly known as: ERGOCALCIFEROL  Take 1 capsule by mouth every 30 days  What changed: when to take this            CONTINUE taking these medications      acetaminophen 325 MG tablet  Commonly known as: TYLENOL     albuterol sulfate  (90 Base) MCG/ACT inhaler  Commonly known as: PROVENTIL;VENTOLIN;PROAIR  INHALE 2 PUFFS INTO THE LUNGS EVERY 6 HOURS AS NEEDED FOR WHEEZING     ALPRAZolam 1 MG tablet  Commonly known as: XANAX     Ascorbic Acid 100 MG Chew     ondansetron 8 MG Tbdp disintegrating tablet  Commonly known as: ZOFRAN-ODT  DISSOLVE 1 TABLET UNDER TONGUE EVERY 8 HOURS AS NEEDED FOR NAUSEA OR VOMITING     oxyCODONE 15 MG immediate release tablet  Commonly known as: OXY-IR     vitamin B-12 500 MCG tablet  Commonly known as: CYANOCOBALAMIN     ZINC GLUCONATE MT     zolpidem 10 MG tablet  Commonly known as: AMBIEN            STOP taking these medications      dicyclomine 20 MG tablet  Commonly known as: BENTYL     ibuprofen 600 MG tablet  Commonly known as: ADVIL;MOTRIN     melatonin 5 MG Tbdp disintegrating tablet     metoclopramide 10 MG tablet  Commonly known as: Reglan     mirtazapine 7.5 MG tablet  Commonly known as: REMERON     Potassium Citrate ER 15 MEQ (1620 MG) Tbcr extended release tablet  Commonly known as: UROCIT-K     sucralfate 1 GM tablet  Commonly known as: CARAFATE               Where to Get Your Medications        These medications were sent to [facility], [address]  [address] , [facility] [place] [address]      Phone: [phone]   ciprofloxacin 500 MG tablet         Discharge Instructions:    Follow up with Melecio Steel Olga Sawyer, DO in 5-7 days. Take medications as directed. Resume activity as tolerated. Diet: ADULT DIET; Regular     Disposition: Patient is Stableand will be discharged to Home. Time spent on discharge 31 minutes spent in assessing patient, reviewing medications, discussion with nursing, confirming safe discharge plan and preparation of discharge summary.     Signed:  AMARIS Burleson CNP, 9/29/2022 1:07 PM

## 2022-09-29 NOTE — PROGRESS NOTES
CLINICAL PHARMACY NOTE: MEDS TO BEDS    Total # of Prescriptions Filled: 1   The following medications were delivered to the patient:  Ciprofloxacin 500 mg    Additional Documentation:   Delivered Rx to patients room. Gave Rx to patient. Patient paid $0.00 copay.

## 2022-10-02 LAB
BLOOD CULTURE, ROUTINE: NORMAL
BLOOD CULTURE, ROUTINE: NORMAL

## 2022-10-03 LAB
BLOOD CULTURE, ROUTINE: NORMAL
CULTURE, BLOOD 2: NORMAL

## 2022-11-09 ENCOUNTER — APPOINTMENT (OUTPATIENT)
Dept: CT IMAGING | Facility: HOSPITAL | Age: 62
End: 2022-11-09

## 2022-11-09 ENCOUNTER — HOSPITAL ENCOUNTER (EMERGENCY)
Facility: HOSPITAL | Age: 62
Discharge: HOME | End: 2022-11-09
Attending: EMERGENCY MEDICINE | Admitting: EMERGENCY MEDICINE

## 2022-11-09 VITALS
BODY MASS INDEX: 32.78 KG/M2 | WEIGHT: 185 LBS | RESPIRATION RATE: 15 BRPM | TEMPERATURE: 98.2 F | DIASTOLIC BLOOD PRESSURE: 82 MMHG | SYSTOLIC BLOOD PRESSURE: 117 MMHG | HEART RATE: 62 BPM | HEIGHT: 63 IN | OXYGEN SATURATION: 95 %

## 2022-11-09 DIAGNOSIS — R10.84 GENERALIZED ABDOMINAL PAIN: Primary | ICD-10-CM

## 2022-11-09 LAB
ALBUMIN SERPL-MCNC: 4 G/DL (ref 3.5–5.2)
ALBUMIN/GLOB SERPL: 1.4 G/DL
ALP SERPL-CCNC: 76 U/L (ref 39–117)
ALT SERPL W P-5'-P-CCNC: 13 U/L (ref 1–33)
ANION GAP SERPL CALCULATED.3IONS-SCNC: 6 MMOL/L (ref 5–15)
APTT PPP: 28.5 SECONDS (ref 24.1–35)
AST SERPL-CCNC: 16 U/L (ref 1–32)
BASOPHILS # BLD AUTO: 0.02 10*3/MM3 (ref 0–0.2)
BASOPHILS NFR BLD AUTO: 0.5 % (ref 0–1.5)
BILIRUB SERPL-MCNC: 0.2 MG/DL (ref 0–1.2)
BUN SERPL-MCNC: 13 MG/DL (ref 8–23)
BUN/CREAT SERPL: 26.5 (ref 7–25)
CALCIUM SPEC-SCNC: 9.6 MG/DL (ref 8.6–10.5)
CHLORIDE SERPL-SCNC: 106 MMOL/L (ref 98–107)
CO2 SERPL-SCNC: 32 MMOL/L (ref 22–29)
CREAT SERPL-MCNC: 0.49 MG/DL (ref 0.57–1)
DEPRECATED RDW RBC AUTO: 44.7 FL (ref 37–54)
DEVELOPER EXPIRATION DATE: NORMAL
DEVELOPER LOT NUMBER: 225
EGFRCR SERPLBLD CKD-EPI 2021: 106.7 ML/MIN/1.73
EOSINOPHIL # BLD AUTO: 0.08 10*3/MM3 (ref 0–0.4)
EOSINOPHIL NFR BLD AUTO: 1.8 % (ref 0.3–6.2)
ERYTHROCYTE [DISTWIDTH] IN BLOOD BY AUTOMATED COUNT: 13.6 % (ref 12.3–15.4)
EXPIRATION DATE: NORMAL
FECAL OCCULT BLOOD SCREEN, POC: NEGATIVE
GLOBULIN UR ELPH-MCNC: 2.9 GM/DL
GLUCOSE SERPL-MCNC: 89 MG/DL (ref 65–99)
HCT VFR BLD AUTO: 38.6 % (ref 34–46.6)
HGB BLD-MCNC: 12.1 G/DL (ref 12–15.9)
IMM GRANULOCYTES # BLD AUTO: 0.01 10*3/MM3 (ref 0–0.05)
IMM GRANULOCYTES NFR BLD AUTO: 0.2 % (ref 0–0.5)
INR PPP: 1.02 (ref 0.91–1.09)
LYMPHOCYTES # BLD AUTO: 1.44 10*3/MM3 (ref 0.7–3.1)
LYMPHOCYTES NFR BLD AUTO: 32.7 % (ref 19.6–45.3)
Lab: 225
MCH RBC QN AUTO: 28.3 PG (ref 26.6–33)
MCHC RBC AUTO-ENTMCNC: 31.3 G/DL (ref 31.5–35.7)
MCV RBC AUTO: 90.4 FL (ref 79–97)
MONOCYTES # BLD AUTO: 0.37 10*3/MM3 (ref 0.1–0.9)
MONOCYTES NFR BLD AUTO: 8.4 % (ref 5–12)
NEGATIVE CONTROL: NEGATIVE
NEUTROPHILS NFR BLD AUTO: 2.48 10*3/MM3 (ref 1.7–7)
NEUTROPHILS NFR BLD AUTO: 56.4 % (ref 42.7–76)
NRBC BLD AUTO-RTO: 0 /100 WBC (ref 0–0.2)
PLATELET # BLD AUTO: 219 10*3/MM3 (ref 140–450)
PMV BLD AUTO: 11.1 FL (ref 6–12)
POSITIVE CONTROL: POSITIVE
POTASSIUM SERPL-SCNC: 4.2 MMOL/L (ref 3.5–5.2)
PROT SERPL-MCNC: 6.9 G/DL (ref 6–8.5)
PROTHROMBIN TIME: 13 SECONDS (ref 11.9–14.6)
RBC # BLD AUTO: 4.27 10*6/MM3 (ref 3.77–5.28)
SODIUM SERPL-SCNC: 144 MMOL/L (ref 136–145)
WBC NRBC COR # BLD: 4.4 10*3/MM3 (ref 3.4–10.8)

## 2022-11-09 PROCEDURE — 82270 OCCULT BLOOD FECES: CPT | Performed by: EMERGENCY MEDICINE

## 2022-11-09 PROCEDURE — 85025 COMPLETE CBC W/AUTO DIFF WBC: CPT | Performed by: EMERGENCY MEDICINE

## 2022-11-09 PROCEDURE — 80053 COMPREHEN METABOLIC PANEL: CPT | Performed by: EMERGENCY MEDICINE

## 2022-11-09 PROCEDURE — 0 HYDROMORPHONE 1 MG/ML SOLUTION: Performed by: EMERGENCY MEDICINE

## 2022-11-09 PROCEDURE — 96375 TX/PRO/DX INJ NEW DRUG ADDON: CPT

## 2022-11-09 PROCEDURE — 25010000002 ONDANSETRON PER 1 MG: Performed by: EMERGENCY MEDICINE

## 2022-11-09 PROCEDURE — 96374 THER/PROPH/DIAG INJ IV PUSH: CPT

## 2022-11-09 PROCEDURE — 85730 THROMBOPLASTIN TIME PARTIAL: CPT | Performed by: EMERGENCY MEDICINE

## 2022-11-09 PROCEDURE — 96376 TX/PRO/DX INJ SAME DRUG ADON: CPT

## 2022-11-09 PROCEDURE — 99284 EMERGENCY DEPT VISIT MOD MDM: CPT

## 2022-11-09 PROCEDURE — 85610 PROTHROMBIN TIME: CPT | Performed by: EMERGENCY MEDICINE

## 2022-11-09 PROCEDURE — 74176 CT ABD & PELVIS W/O CONTRAST: CPT

## 2022-11-09 RX ORDER — SODIUM CHLORIDE 0.9 % (FLUSH) 0.9 %
10 SYRINGE (ML) INJECTION AS NEEDED
Status: DISCONTINUED | OUTPATIENT
Start: 2022-11-09 | End: 2022-11-09 | Stop reason: HOSPADM

## 2022-11-09 RX ORDER — ONDANSETRON 2 MG/ML
4 INJECTION INTRAMUSCULAR; INTRAVENOUS ONCE
Status: COMPLETED | OUTPATIENT
Start: 2022-11-09 | End: 2022-11-09

## 2022-11-09 RX ADMIN — HYDROMORPHONE HYDROCHLORIDE 1 MG: 1 INJECTION, SOLUTION INTRAMUSCULAR; INTRAVENOUS; SUBCUTANEOUS at 16:02

## 2022-11-09 RX ADMIN — ONDANSETRON 4 MG: 2 INJECTION INTRAMUSCULAR; INTRAVENOUS at 16:02

## 2022-11-09 RX ADMIN — HYDROMORPHONE HYDROCHLORIDE 1 MG: 1 INJECTION, SOLUTION INTRAMUSCULAR; INTRAVENOUS; SUBCUTANEOUS at 18:25
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
## 2022-11-16 ENCOUNTER — OFFICE VISIT (OUTPATIENT)
Dept: SURGERY | Facility: CLINIC | Age: 62
End: 2022-11-16

## 2022-11-16 VITALS
DIASTOLIC BLOOD PRESSURE: 80 MMHG | BODY MASS INDEX: 31.18 KG/M2 | TEMPERATURE: 98.4 F | HEART RATE: 65 BPM | HEIGHT: 63 IN | SYSTOLIC BLOOD PRESSURE: 155 MMHG | WEIGHT: 176 LBS

## 2022-11-16 DIAGNOSIS — E66.09 CLASS 1 OBESITY DUE TO EXCESS CALORIES WITHOUT SERIOUS COMORBIDITY WITH BODY MASS INDEX (BMI) OF 32.0 TO 32.9 IN ADULT: ICD-10-CM

## 2022-11-16 DIAGNOSIS — Z93.3 COLOSTOMY IN PLACE: Primary | ICD-10-CM

## 2022-11-16 DIAGNOSIS — F17.210 NICOTINE DEPENDENCE, CIGARETTES, UNCOMPLICATED: ICD-10-CM

## 2022-11-16 PROCEDURE — 99214 OFFICE O/P EST MOD 30 MIN: CPT | Performed by: STUDENT IN AN ORGANIZED HEALTH CARE EDUCATION/TRAINING PROGRAM

## 2022-11-16 NOTE — PATIENT INSTRUCTIONS
"BMI for Adults  What is BMI?  Body mass index (BMI) is a number that is calculated from a person's weight and height. BMI can help estimate how much of a person's weight is composed of fat. BMI does not measure body fat directly. Rather, it is an alternative to procedures that directly measure body fat, which can be difficult and expensive.  BMI can help identify people who may be at higher risk for certain medical problems.  What are BMI measurements used for?  BMI is used as a screening tool to identify possible weight problems. It helps determine whether a person is obese, overweight, a healthy weight, or underweight.  BMI is useful for:  Identifying a weight problem that may be related to a medical condition or may increase the risk for medical problems.  Promoting changes, such as changes in diet and exercise, to help reach a healthy weight. BMI screening can be repeated to see if these changes are working.  How is BMI calculated?  BMI involves measuring your weight in relation to your height. Both height and weight are measured, and the BMI is calculated from those numbers. This can be done either in English ([place] or metric measurements. Note that charts and online BMI calculators are available to help you find your BMI quickly and easily without having to do these calculations yourself.  To calculate your BMI in English ([place] measurements:    Measure your weight in pounds (lb).  Multiply the number of pounds by 703.  For example, for a person who weighs 180 lb, multiply that number by 703, which equals 126,540.  Measure your height in inches. Then multiply that number by itself to get a measurement called \"inches squared.\"  For example, for a person who is 70 inches tall, the \"inches squared\" measurement is 70 inches x 70 inches, which equals 4,900 inches squared.  Divide the total from step 2 (number of lb x 703) by the total from step 3 (inches squared): 126,540 ÷ 4,900 = 25.8. This is your BMI.    To " "calculate your BMI in metric measurements:  Measure your weight in kilograms (kg).  Measure your height in meters (m). Then multiply that number by itself to get a measurement called \"meters squared.\"  For example, for a person who is 1.75 m tall, the \"meters squared\" measurement is 1.75 m x 1.75 m, which is equal to 3.1 meters squared.  Divide the number of kilograms (your weight) by the meters squared number. In this example: 70 ÷ 3.1 = 22.6. This is your BMI.  What do the results mean?  BMI charts are used to identify whether you are underweight, normal weight, overweight, or obese. The following guidelines will be used:  Underweight: BMI less than 18.5.  Normal weight: BMI between 18.5 and 24.9.  Overweight: BMI between 25 and 29.9.  Obese: BMI of 30 or above.  Keep these notes in mind:  Weight includes both fat and muscle, so someone with a muscular build, such as an athlete, may have a BMI that is higher than 24.9. In cases like these, BMI is not an accurate measure of body fat.  To determine if excess body fat is the cause of a BMI of 25 or higher, further assessments may need to be done by a health care provider.  BMI is usually interpreted in the same way for men and women.  Where to find more information  For more information about BMI, including tools to quickly calculate your BMI, go to these websites:  Centers for Disease Control and Prevention: www.cdc.gov  American Heart Association: www.heart.org  National Heart, Lung, and [employer] [place]: www.nhlbi.nih.gov  Summary  Body mass index (BMI) is a number that is calculated from a person's weight and height.  BMI may help estimate how much of a person's weight is composed of fat. BMI can help identify those who may be at higher risk for certain medical problems.  BMI can be measured using English measurements or metric measurements.  BMI charts are used to identify whether you are underweight, normal weight, overweight, or obese.  This information is not " intended to replace advice given to you by your health care provider. Make sure you discuss any questions you have with your health care provider.  Document Revised: 09/09/2020 Document Reviewed: 07/17/2020  [employer] Patient Education © 2021 [employer] Smoking Tobacco Information, Adult  Smoking tobacco can be harmful to your health. Tobacco contains a poisonous (toxic), colorless chemical called nicotine. Nicotine is addictive. It changes the brain and can make it hard to stop smoking. Tobacco also has other toxic chemicals that can hurt your body and raise your risk of many cancers.  How can smoking tobacco affect me?  Smoking tobacco puts you at risk for:  Cancer. Smoking is most commonly associated with lung cancer, but can also lead to cancer in other parts of the body.  Chronic obstructive pulmonary disease (COPD). This is a long-term lung condition that makes it hard to breathe. It also gets worse over time.  High blood pressure (hypertension), heart disease, stroke, or heart attack.  Lung infections, such as pneumonia.  Cataracts. This is when the lenses in the eyes become clouded.  Digestive problems. This may include peptic ulcers, heartburn, and gastroesophageal reflux disease (GERD).  Oral health problems, such as gum disease and tooth loss.  Loss of taste and smell.  Smoking can affect your appearance by causing:  Wrinkles.  Yellow or stained teeth, fingers, and fingernails.  Smoking tobacco can also affect your social life, because:  It may be challenging to find places to smoke when away from home. Many workplaces, restaurants, hotels, and public places are tobacco-free.  Smoking is expensive. This is due to the cost of tobacco and the long-term costs of treating health problems from smoking.  Secondhand smoke may affect those around you. Secondhand smoke can cause lung cancer, breathing problems, and heart disease. Children of smokers have a higher risk for:  Sudden infant death syndrome  (SIDS).  Ear infections.  Lung infections.  If you currently smoke tobacco, quitting now can help you:  Lead a longer and healthier life.  Look, smell, breathe, and feel better over time.  Save money.  Protect others from the harms of secondhand smoke.  What actions can I take to prevent health problems?  Quit smoking    Do not start smoking. Quit if you already do.  Make a plan to quit smoking and commit to it. Look for programs to help you and ask your health care provider for recommendations and ideas.  Set a date and write down all the reasons you want to quit.  Let your friends and family know you are quitting so they can help and support you. Consider finding friends who also want to quit. It can be easier to quit with someone else, so that you can support each other.  Talk with your health care provider about using nicotine replacement medicines to help you quit, such as gum, lozenges, patches, sprays, or pills.  Do not replace cigarette smoking with electronic cigarettes, which are commonly called e-cigarettes. The safety of e-cigarettes is not known, and some may contain harmful chemicals.  If you try to quit but return to smoking, stay positive. It is common to slip up when you first quit, so take it one day at a time.  Be prepared for cravings. When you feel the urge to smoke, chew gum or suck on hard candy.    Lifestyle  Stay busy and take care of your body.  Drink enough fluid to keep your urine pale yellow.  Get plenty of exercise and eat a healthy diet. This can help prevent weight gain after quitting.  Monitor your eating habits. Quitting smoking can cause you to have a larger appetite than when you smoke.  Find ways to relax. Go out with friends or family to a movie or a restaurant where people do not smoke.  Ask your health care provider about having regular tests (screenings) to check for cancer. This may include blood tests, imaging tests, and other tests.  Find ways to manage your stress, such  as meditation, yoga, or exercise.  Where to find support  To get support to quit smoking, consider:  Asking your health care provider for more information and resources.  Taking classes to learn more about quitting smoking.  Looking for local organizations that offer resources about quitting smoking.  Joining a support group for people who want to quit smoking in your local community.  Calling the smokefree.gov counselor helpline: 1-800-Quit-Now (1-867.701.6126)  Where to find more information  You may find more information about quitting smoking from:  HelpGuide.org: www.helpguide.org  Smokefree.gov: smokefree.gov  American Lung Association: www.lung.org  Contact a health care provider if you:  Have problems breathing.  Notice that your lips, nose, or fingers turn blue.  Have chest pain.  Are coughing up blood.  Feel faint or you pass out.  Have other health changes that cause you to worry.  Summary  Smoking tobacco can negatively affect your health, the health of those around you, your finances, and your social life.  Do not start smoking. Quit if you already do. If you need help quitting, ask your health care provider.  Think about joining a support group for people who want to quit smoking in your local community. There are many effective programs that will help you to quit this behavior.  This information is not intended to replace advice given to you by your health care provider. Make sure you discuss any questions you have with your health care provider.  Document Revised: 09/11/2020 Document Reviewed: 01/02/2018  [employer] Patient Education © 2021 [employer]
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
## 2022-11-29 ENCOUNTER — HOSPITAL ENCOUNTER (OUTPATIENT)
Dept: INFUSION THERAPY | Age: 62
Discharge: HOME | End: 2022-11-29
Payer: MEDICARE

## 2022-11-29 ENCOUNTER — TRANSCRIBE ORDERS (OUTPATIENT)
Dept: ADMINISTRATIVE | Facility: HOSPITAL | Age: 62
End: 2022-11-29

## 2022-11-29 ENCOUNTER — OFFICE VISIT (OUTPATIENT)
Dept: HEMATOLOGY | Age: 62
End: 2022-11-29
Payer: MEDICARE

## 2022-11-29 VITALS
SYSTOLIC BLOOD PRESSURE: 96 MMHG | OXYGEN SATURATION: 95 % | HEIGHT: 63 IN | HEART RATE: 74 BPM | BODY MASS INDEX: 30.53 KG/M2 | DIASTOLIC BLOOD PRESSURE: 68 MMHG | WEIGHT: 172.3 LBS

## 2022-11-29 DIAGNOSIS — F32.A DEPRESSION, UNSPECIFIED DEPRESSION TYPE: ICD-10-CM

## 2022-11-29 DIAGNOSIS — C21.0 ANAL CANCER (HCC): Primary | ICD-10-CM

## 2022-11-29 DIAGNOSIS — C21.0 ANAL CANCER: Primary | ICD-10-CM

## 2022-11-29 DIAGNOSIS — C21.0 ANAL CANCER (HCC): ICD-10-CM

## 2022-11-29 DIAGNOSIS — Z71.89 CARE PLAN DISCUSSED WITH PATIENT: ICD-10-CM

## 2022-11-29 LAB
BASOPHILS ABSOLUTE: 0.02 K/UL (ref 0.01–0.08)
BASOPHILS RELATIVE PERCENT: 0.3 % (ref 0.1–1.2)
EOSINOPHILS ABSOLUTE: 0.05 K/UL (ref 0.04–0.54)
EOSINOPHILS RELATIVE PERCENT: 0.7 % (ref 0.7–7)
HCT VFR BLD CALC: 39.6 % (ref 34.1–44.9)
HEMOGLOBIN: 12.5 G/DL (ref 11.2–15.7)
LYMPHOCYTES ABSOLUTE: 1.23 K/UL (ref 1.18–3.74)
LYMPHOCYTES RELATIVE PERCENT: 17.5 % (ref 19.3–53.1)
MCH RBC QN AUTO: 28.4 PG (ref 25.6–32.2)
MCHC RBC AUTO-ENTMCNC: 31.6 G/DL (ref 32.3–35.5)
MCV RBC AUTO: 90 FL (ref 79.4–94.8)
MONOCYTES ABSOLUTE: 0.38 K/UL (ref 0.24–0.82)
MONOCYTES RELATIVE PERCENT: 5.4 % (ref 4.7–12.5)
NEUTROPHILS ABSOLUTE: 5.3 K/UL (ref 1.56–6.13)
NEUTROPHILS RELATIVE PERCENT: 75.7 % (ref 34–71.1)
PDW BLD-RTO: 13.2 % (ref 11.7–14.4)
PLATELET # BLD: 180 K/UL (ref 182–369)
PMV BLD AUTO: 11.7 FL (ref 7.4–10.4)
RBC # BLD: 4.4 M/UL (ref 3.93–5.22)
WBC # BLD: 7.01 K/UL (ref 3.98–10.04)

## 2022-11-29 PROCEDURE — 99213 OFFICE O/P EST LOW 20 MIN: CPT | Performed by: INTERNAL MEDICINE

## 2022-11-29 PROCEDURE — 3074F SYST BP LT 130 MM HG: CPT | Performed by: INTERNAL MEDICINE

## 2022-11-29 PROCEDURE — 36415 COLL VENOUS BLD VENIPUNCTURE: CPT

## 2022-11-29 PROCEDURE — 3078F DIAST BP <80 MM HG: CPT | Performed by: INTERNAL MEDICINE

## 2022-11-29 PROCEDURE — 99212 OFFICE O/P EST SF 10 MIN: CPT

## 2022-11-29 PROCEDURE — 85025 COMPLETE CBC W/AUTO DIFF WBC: CPT

## 2023-03-22 ENCOUNTER — TELEPHONE (OUTPATIENT)
Dept: HEMATOLOGY | Age: 63
End: 2023-03-22

## 2023-03-22 NOTE — TELEPHONE ENCOUNTER
CALLED MRS. CHAPIN BACK FROM PREVIOUS CALL WHERE SHE HAD TALKED WITH ME ABOUT [facility] SAYING SHE WAS CLEAR ON HER SCANS THROUGH THEM AND THEY WANT TO PROCEED WITH HER REVERSAL ON OSTOMY. SHE WANTED TO MAKE SURE THAT DOING SCANS THROUGH OUR OFFICE COULD BE CANCELLED. SO AFTER TALKING WITH SUJEY SHE SAID THAT SHE NEEDED TO FOLLOW THROUGH WITH [facility] AND THAT WOULD BE FINE TO CX OUR APPT'S. CALLED PT BACK AND LET HER KNOW. SHE STATED THAT SHE UNDERSTOOD, BUT THAT SHE DID WANT TO KEEP FOLLOW UP DUE TO WANTING TO TALK WITH DR ANDERSON ABOUT THE REVERSAL SINCE HE HAS BEEN WITH HER THIS WHOLE TIME THROUGH TREATMENT [facility]. PT WILL LET US KNOW IF ANYTHING CHANGES.
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
## 2023-04-07 ENCOUNTER — TRANSCRIBE ORDERS (OUTPATIENT)
Dept: ADMINISTRATIVE | Facility: HOSPITAL | Age: 63
End: 2023-04-07
Payer: MEDICARE

## 2023-04-07 ENCOUNTER — OFFICE VISIT (OUTPATIENT)
Dept: HEMATOLOGY | Age: 63
End: 2023-04-07
Payer: MEDICARE

## 2023-04-07 ENCOUNTER — HOSPITAL ENCOUNTER (OUTPATIENT)
Dept: INFUSION THERAPY | Age: 63
Discharge: HOME | End: 2023-04-07
Payer: MEDICARE

## 2023-04-07 VITALS
BODY MASS INDEX: 32.61 KG/M2 | WEIGHT: 184.1 LBS | HEART RATE: 88 BPM | DIASTOLIC BLOOD PRESSURE: 78 MMHG | TEMPERATURE: 97.8 F | SYSTOLIC BLOOD PRESSURE: 120 MMHG | OXYGEN SATURATION: 98 %

## 2023-04-07 DIAGNOSIS — C21.0 ANAL CANCER (HCC): ICD-10-CM

## 2023-04-07 DIAGNOSIS — Z08 ENCOUNTER FOR FOLLOW-UP SURVEILLANCE OF ANAL CANCER: ICD-10-CM

## 2023-04-07 DIAGNOSIS — Z85.048 ENCOUNTER FOR FOLLOW-UP SURVEILLANCE OF ANAL CANCER: ICD-10-CM

## 2023-04-07 DIAGNOSIS — F32.A DEPRESSION, UNSPECIFIED DEPRESSION TYPE: ICD-10-CM

## 2023-04-07 DIAGNOSIS — C21.0 ANAL CANCER (HCC): Primary | ICD-10-CM

## 2023-04-07 DIAGNOSIS — D22.9 ATYPICAL MOLE: ICD-10-CM

## 2023-04-07 DIAGNOSIS — Z71.89 CARE PLAN DISCUSSED WITH PATIENT: ICD-10-CM

## 2023-04-07 DIAGNOSIS — C21.0 ANAL CANCER: Primary | ICD-10-CM

## 2023-04-07 LAB
BASOPHILS # BLD: 0.02 K/UL (ref 0.01–0.08)
BASOPHILS NFR BLD: 0.4 % (ref 0.1–1.2)
EOSINOPHIL # BLD: 0.11 K/UL (ref 0.04–0.54)
EOSINOPHIL NFR BLD: 2 % (ref 0.7–7)
ERYTHROCYTE [DISTWIDTH] IN BLOOD BY AUTOMATED COUNT: 13 % (ref 11.7–14.4)
HCT VFR BLD AUTO: 44.4 % (ref 34.1–44.9)
HGB BLD-MCNC: 13.6 G/DL (ref 11.2–15.7)
LYMPHOCYTES # BLD: 1.64 K/UL (ref 1.18–3.74)
LYMPHOCYTES NFR BLD: 30.4 % (ref 19.3–53.1)
MCH RBC QN AUTO: 28.2 PG (ref 25.6–32.2)
MCHC RBC AUTO-ENTMCNC: 30.6 G/DL (ref 32.3–35.5)
MCV RBC AUTO: 91.9 FL (ref 79.4–94.8)
MONOCYTES # BLD: 0.44 K/UL (ref 0.24–0.82)
MONOCYTES NFR BLD: 8.1 % (ref 4.7–12.5)
NEUTROPHILS # BLD: 3.17 K/UL (ref 1.56–6.13)
NEUTS SEG NFR BLD: 58.7 % (ref 34–71.1)
PLATELET # BLD AUTO: 207 K/UL (ref 182–369)
PMV BLD AUTO: 11.7 FL (ref 7.4–10.4)
RBC # BLD AUTO: 4.83 M/UL (ref 3.93–5.22)
WBC # BLD AUTO: 5.4 K/UL (ref 3.98–10.04)

## 2023-04-07 PROCEDURE — 3078F DIAST BP <80 MM HG: CPT | Performed by: INTERNAL MEDICINE

## 2023-04-07 PROCEDURE — 99214 OFFICE O/P EST MOD 30 MIN: CPT | Performed by: INTERNAL MEDICINE

## 2023-04-07 PROCEDURE — 85025 COMPLETE CBC W/AUTO DIFF WBC: CPT

## 2023-04-07 PROCEDURE — 36415 COLL VENOUS BLD VENIPUNCTURE: CPT

## 2023-04-07 PROCEDURE — 3074F SYST BP LT 130 MM HG: CPT | Performed by: INTERNAL MEDICINE

## 2023-04-07 RX ORDER — FLUOXETINE HYDROCHLORIDE 40 MG/1
CAPSULE ORAL
COMMUNITY
Start: 2023-03-29

## 2023-04-07 RX ORDER — METOPROLOL SUCCINATE 50 MG/1
TABLET, EXTENDED RELEASE ORAL
COMMUNITY
Start: 2023-03-29

## 2023-04-07 RX ORDER — OXYCODONE AND ACETAMINOPHEN 10; 325 MG/1; MG/1
TABLET ORAL
COMMUNITY
Start: 2023-03-18

## 2023-05-05 ENCOUNTER — HOSPITAL ENCOUNTER (OUTPATIENT)
Dept: CT IMAGING | Facility: HOSPITAL | Age: 63
Discharge: HOME | End: 2023-05-05
Payer: MEDICARE

## 2023-05-05 DIAGNOSIS — C21.0 ANAL CANCER: ICD-10-CM

## 2023-05-05 PROCEDURE — 25510000001 IOPAMIDOL 61 % SOLUTION: Performed by: INTERNAL MEDICINE

## 2023-05-05 PROCEDURE — 74177 CT ABD & PELVIS W/CONTRAST: CPT

## 2023-05-05 PROCEDURE — 71260 CT THORAX DX C+: CPT

## 2023-05-05 RX ADMIN — IOPAMIDOL 100 ML: 612 INJECTION, SOLUTION INTRAVENOUS at 12:25

## 2023-05-08 LAB — CREAT BLDA-MCNC: 0.7 MG/DL (ref 0.6–1.3)

## 2023-05-09 ENCOUNTER — NURSE TRIAGE (OUTPATIENT)
Dept: OTHER | Facility: CLINIC | Age: 63
End: 2023-05-09

## 2023-05-09 NOTE — TELEPHONE ENCOUNTER
[address] and spoke with Harpreet Aguilar disconnected because I could connect with her  Judy Donovan is worried because of some reported stroke like symptoms yesterday    She did not call, her sister may have called    She is undergoing cancer treatment   She is changing providers because of her complications    Does not want triage  Reason for Disposition   Information only question and nurse able to answer    Protocols used:  Information Only Call - No Triage-ADULT-[place]

## 2023-05-12 ENCOUNTER — TELEMEDICINE (OUTPATIENT)
Dept: HEMATOLOGY | Age: 63
End: 2023-05-12

## 2023-05-12 DIAGNOSIS — Z08 ENCOUNTER FOR FOLLOW-UP SURVEILLANCE OF ANAL CANCER: ICD-10-CM

## 2023-05-12 DIAGNOSIS — Z71.89 CARE PLAN DISCUSSED WITH PATIENT: ICD-10-CM

## 2023-05-12 DIAGNOSIS — Z85.048 ENCOUNTER FOR FOLLOW-UP SURVEILLANCE OF ANAL CANCER: ICD-10-CM

## 2023-05-12 DIAGNOSIS — C21.0 ANAL CANCER (HCC): Primary | ICD-10-CM

## 2023-05-12 DIAGNOSIS — D22.9 MULTIPLE ATYPICAL SKIN MOLES: ICD-10-CM

## 2023-06-05 ENCOUNTER — TELEPHONE (OUTPATIENT)
Dept: NEUROLOGY | Age: 63
End: 2023-06-05

## 2023-06-05 NOTE — TELEPHONE ENCOUNTER
Darcy's sister Wellington Og called this morning to check on status of referral to the office. She advised that the patient had surgery last week and is still recovering. She has requested a call to her telephone number please when the patients records have been reviewed and the office is ready to schedule the appt. Thank you.

## 2023-07-14 ENCOUNTER — APPOINTMENT (OUTPATIENT)
Dept: CT IMAGING | Age: 63
DRG: 682 | End: 2023-07-14
Payer: MEDICARE

## 2023-07-14 ENCOUNTER — HOSPITAL ENCOUNTER (INPATIENT)
Age: 63
LOS: 4 days | Discharge: HOME HEALTH | DRG: 682 | End: 2023-07-18
Attending: HOSPITALIST | Admitting: HOSPITALIST
Payer: MEDICARE

## 2023-07-14 ENCOUNTER — APPOINTMENT (OUTPATIENT)
Dept: GENERAL RADIOLOGY | Age: 63
DRG: 682 | End: 2023-07-14
Payer: MEDICARE

## 2023-07-14 DIAGNOSIS — G93.40 ACUTE ENCEPHALOPATHY: ICD-10-CM

## 2023-07-14 DIAGNOSIS — R41.0 DELIRIUM: Primary | ICD-10-CM

## 2023-07-14 LAB
ALBUMIN SERPL-MCNC: 3.7 G/DL (ref 3.5–5.2)
ALP SERPL-CCNC: 65 U/L (ref 35–104)
ALT SERPL-CCNC: 138 U/L (ref 5–33)
AMPHET UR QL SCN: NEGATIVE
ANION GAP SERPL CALCULATED.3IONS-SCNC: 10 MMOL/L (ref 7–19)
AST SERPL-CCNC: 250 U/L (ref 5–32)
BARBITURATES UR QL SCN: NEGATIVE
BASE EXCESS ARTERIAL: 7.2 MMOL/L (ref -2–2)
BASOPHILS # BLD: 0 K/UL (ref 0–0.2)
BASOPHILS NFR BLD: 0.2 % (ref 0–1)
BENZODIAZ UR QL SCN: POSITIVE
BILIRUB SERPL-MCNC: 0.3 MG/DL (ref 0.2–1.2)
BILIRUB UR QL STRIP: NEGATIVE
BUN SERPL-MCNC: 24 MG/DL (ref 8–23)
BUPRENORPHINE URINE: NEGATIVE
CALCIUM SERPL-MCNC: 9 MG/DL (ref 8.8–10.2)
CANNABINOIDS UR QL SCN: NEGATIVE
CARBOXYHEMOGLOBIN ARTERIAL: 3.6 % (ref 0–5)
CHLORIDE SERPL-SCNC: 96 MMOL/L (ref 98–111)
CLARITY UR: CLEAR
CO2 SERPL-SCNC: 28 MMOL/L (ref 22–29)
COCAINE UR QL SCN: NEGATIVE
COLOR UR: YELLOW
CREAT SERPL-MCNC: 1.3 MG/DL (ref 0.5–0.9)
DRUG SCREEN COMMENT UR-IMP: ABNORMAL
EOSINOPHIL # BLD: 0.1 K/UL (ref 0–0.6)
EOSINOPHIL NFR BLD: 0.9 % (ref 0–5)
ERYTHROCYTE [DISTWIDTH] IN BLOOD BY AUTOMATED COUNT: 13.4 % (ref 11.5–14.5)
GLUCOSE SERPL-MCNC: 108 MG/DL (ref 74–109)
GLUCOSE UR STRIP.AUTO-MCNC: NEGATIVE MG/DL
HCO3 ARTERIAL: 33 MMOL/L (ref 22–26)
HCT VFR BLD AUTO: 32.4 % (ref 37–47)
HEMOGLOBIN, ART, EXTENDED: 10.3 G/DL (ref 12–16)
HGB BLD-MCNC: 10.3 G/DL (ref 12–16)
HGB UR STRIP.AUTO-MCNC: NEGATIVE MG/L
IMM GRANULOCYTES # BLD: 0 K/UL
INR PPP: 1.15 (ref 0.88–1.18)
KETONES UR STRIP.AUTO-MCNC: ABNORMAL MG/DL
LACTATE BLDV-SCNC: 0.9 MMOL/L (ref 0.5–1.9)
LEUKOCYTE ESTERASE UR QL STRIP.AUTO: NEGATIVE
LYMPHOCYTES # BLD: 0.8 K/UL (ref 1.1–4.5)
LYMPHOCYTES NFR BLD: 11.9 % (ref 20–40)
MAGNESIUM SERPL-MCNC: 1.6 MG/DL (ref 1.6–2.4)
MCH RBC QN AUTO: 28.1 PG (ref 27–31)
MCHC RBC AUTO-ENTMCNC: 31.8 G/DL (ref 33–37)
MCV RBC AUTO: 88.5 FL (ref 81–99)
METHADONE UR QL SCN: NEGATIVE
METHAMPHETAMINE, URINE: NEGATIVE
METHEMOGLOBIN ARTERIAL: 1 %
MONOCYTES # BLD: 0.7 K/UL (ref 0–0.9)
MONOCYTES NFR BLD: 10.8 % (ref 0–10)
NEUTROPHILS # BLD: 4.8 K/UL (ref 1.5–7.5)
NEUTS SEG NFR BLD: 75.9 % (ref 50–65)
NITRITE UR QL STRIP.AUTO: NEGATIVE
O2 CONTENT ARTERIAL: 13.7 ML/DL
O2 DELIVERY DEVICE: ABNORMAL
O2 SAT, ARTERIAL: 93.7 %
O2 THERAPY: ABNORMAL
OPIATES UR QL SCN: POSITIVE
OXYCODONE UR QL SCN: POSITIVE
OXYGEN FLOW: 2
PCO2 ARTERIAL: 52 MMHG (ref 35–45)
PCP UR QL SCN: NEGATIVE
PH ARTERIAL: 7.41 (ref 7.35–7.45)
PH UR STRIP.AUTO: 6.5 [PH] (ref 5–8)
PLATELET # BLD AUTO: 190 K/UL (ref 130–400)
PLATELET SLIDE REVIEW: ADEQUATE
PMV BLD AUTO: 11.7 FL (ref 9.4–12.3)
PO2 ARTERIAL: 76 MMHG (ref 80–100)
POTASSIUM BLD-SCNC: 3.1 MMOL/L
POTASSIUM SERPL-SCNC: 3.4 MMOL/L (ref 3.5–5)
PROCALCITONIN: 0.07 NG/ML (ref 0–0.09)
PROPOXYPH UR QL SCN: NEGATIVE
PROT SERPL-MCNC: 6.1 G/DL (ref 6.6–8.7)
PROT UR STRIP.AUTO-MCNC: ABNORMAL MG/DL
PROTHROMBIN TIME: 14.4 SEC (ref 12–14.6)
RBC # BLD AUTO: 3.66 M/UL (ref 4.2–5.4)
SODIUM SERPL-SCNC: 134 MMOL/L (ref 136–145)
SP GR UR STRIP.AUTO: 1.01 (ref 1–1.03)
TRICYCLIC, URINE: NEGATIVE
TROPONIN T SERPL-MCNC: <0.01 NG/ML (ref 0–0.03)
UROBILINOGEN UR STRIP.AUTO-MCNC: 0.2 E.U./DL
WBC # BLD AUTO: 6.4 K/UL (ref 4.8–10.8)

## 2023-07-14 PROCEDURE — 2140000000 HC CCU INTERMEDIATE R&B

## 2023-07-14 PROCEDURE — 82607 VITAMIN B-12: CPT

## 2023-07-14 PROCEDURE — 84145 PROCALCITONIN (PCT): CPT

## 2023-07-14 PROCEDURE — 70450 CT HEAD/BRAIN W/O DYE: CPT

## 2023-07-14 PROCEDURE — 82306 VITAMIN D 25 HYDROXY: CPT

## 2023-07-14 PROCEDURE — 81003 URINALYSIS AUTO W/O SCOPE: CPT

## 2023-07-14 PROCEDURE — 2580000003 HC RX 258: Performed by: NURSE PRACTITIONER

## 2023-07-14 PROCEDURE — 99285 EMERGENCY DEPT VISIT HI MDM: CPT

## 2023-07-14 PROCEDURE — 82803 BLOOD GASES ANY COMBINATION: CPT

## 2023-07-14 PROCEDURE — 83550 IRON BINDING TEST: CPT

## 2023-07-14 PROCEDURE — 80306 DRUG TEST PRSMV INSTRMNT: CPT

## 2023-07-14 PROCEDURE — 36415 COLL VENOUS BLD VENIPUNCTURE: CPT

## 2023-07-14 PROCEDURE — 36600 WITHDRAWAL OF ARTERIAL BLOOD: CPT

## 2023-07-14 PROCEDURE — 6360000002 HC RX W HCPCS: Performed by: NURSE PRACTITIONER

## 2023-07-14 PROCEDURE — 82746 ASSAY OF FOLIC ACID SERUM: CPT

## 2023-07-14 PROCEDURE — 84443 ASSAY THYROID STIM HORMONE: CPT

## 2023-07-14 PROCEDURE — 83540 ASSAY OF IRON: CPT

## 2023-07-14 PROCEDURE — 80053 COMPREHEN METABOLIC PANEL: CPT

## 2023-07-14 PROCEDURE — 96374 THER/PROPH/DIAG INJ IV PUSH: CPT

## 2023-07-14 PROCEDURE — 82728 ASSAY OF FERRITIN: CPT

## 2023-07-14 PROCEDURE — 83605 ASSAY OF LACTIC ACID: CPT

## 2023-07-14 PROCEDURE — 71045 X-RAY EXAM CHEST 1 VIEW: CPT

## 2023-07-14 PROCEDURE — 84484 ASSAY OF TROPONIN QUANT: CPT

## 2023-07-14 PROCEDURE — 73630 X-RAY EXAM OF FOOT: CPT

## 2023-07-14 PROCEDURE — 85610 PROTHROMBIN TIME: CPT

## 2023-07-14 PROCEDURE — 83735 ASSAY OF MAGNESIUM: CPT

## 2023-07-14 PROCEDURE — 85025 COMPLETE CBC W/AUTO DIFF WBC: CPT

## 2023-07-14 RX ORDER — 0.9 % SODIUM CHLORIDE 0.9 %
1000 INTRAVENOUS SOLUTION INTRAVENOUS ONCE
Status: COMPLETED | OUTPATIENT
Start: 2023-07-14 | End: 2023-07-14

## 2023-07-14 RX ORDER — 0.9 % SODIUM CHLORIDE 0.9 %
2000 INTRAVENOUS SOLUTION INTRAVENOUS ONCE
Status: COMPLETED | OUTPATIENT
Start: 2023-07-14 | End: 2023-07-15

## 2023-07-14 RX ORDER — NALOXONE HYDROCHLORIDE 0.4 MG/ML
2 INJECTION, SOLUTION INTRAMUSCULAR; INTRAVENOUS; SUBCUTANEOUS ONCE
Status: COMPLETED | OUTPATIENT
Start: 2023-07-14 | End: 2023-07-14

## 2023-07-14 RX ADMIN — SODIUM CHLORIDE 2000 ML: 9 INJECTION, SOLUTION INTRAVENOUS at 23:26

## 2023-07-14 RX ADMIN — SODIUM CHLORIDE 1000 ML: 9 INJECTION, SOLUTION INTRAVENOUS at 21:16

## 2023-07-14 RX ADMIN — NALOXONE HYDROCHLORIDE 2 MG: 0.4 INJECTION, SOLUTION INTRAMUSCULAR; INTRAVENOUS; SUBCUTANEOUS at 22:06

## 2023-07-14 ASSESSMENT — PAIN - FUNCTIONAL ASSESSMENT: PAIN_FUNCTIONAL_ASSESSMENT: NONE - DENIES PAIN

## 2023-07-15 ENCOUNTER — APPOINTMENT (OUTPATIENT)
Dept: GENERAL RADIOLOGY | Age: 63
DRG: 682 | End: 2023-07-15
Payer: MEDICARE

## 2023-07-15 PROBLEM — R09.89 ABNORMAL LUNG SOUNDS: Status: ACTIVE | Noted: 2023-07-15

## 2023-07-15 PROBLEM — Z79.899 POLYPHARMACY: Status: ACTIVE | Noted: 2023-07-15

## 2023-07-15 PROBLEM — N28.9 RENAL DYSFUNCTION: Status: ACTIVE | Noted: 2023-07-15

## 2023-07-15 PROBLEM — N17.9 AKI (ACUTE KIDNEY INJURY) (HCC): Status: ACTIVE | Noted: 2023-07-15

## 2023-07-15 LAB
25(OH)D3 SERPL-MCNC: 40.4 NG/ML
ALBUMIN SERPL-MCNC: 3.1 G/DL (ref 3.5–5.2)
ALP SERPL-CCNC: 58 U/L (ref 35–104)
ALT SERPL-CCNC: 111 U/L (ref 5–33)
AMMONIA PLAS-SCNC: 46 UMOL/L (ref 11–51)
ANION GAP SERPL CALCULATED.3IONS-SCNC: 12 MMOL/L (ref 7–19)
AST SERPL-CCNC: 164 U/L (ref 5–32)
BILIRUB SERPL-MCNC: 0.4 MG/DL (ref 0.2–1.2)
BUN SERPL-MCNC: 17 MG/DL (ref 8–23)
CALCIUM SERPL-MCNC: 8.3 MG/DL (ref 8.8–10.2)
CHLORIDE SERPL-SCNC: 104 MMOL/L (ref 98–111)
CO2 SERPL-SCNC: 23 MMOL/L (ref 22–29)
CREAT SERPL-MCNC: 0.7 MG/DL (ref 0.5–0.9)
ERYTHROCYTE [DISTWIDTH] IN BLOOD BY AUTOMATED COUNT: 13.5 % (ref 11.5–14.5)
FERRITIN SERPL-MCNC: 99.3 NG/ML (ref 13–150)
FOLATE SERPL-MCNC: 11.3 NG/ML (ref 4.8–37.3)
GLUCOSE SERPL-MCNC: 100 MG/DL (ref 74–109)
HCT VFR BLD AUTO: 35.5 % (ref 37–47)
HGB BLD-MCNC: 10.8 G/DL (ref 12–16)
IRON SATN MFR SERPL: 25 % (ref 14–50)
IRON SERPL-MCNC: 70 UG/DL (ref 37–145)
MCH RBC QN AUTO: 28.3 PG (ref 27–31)
MCHC RBC AUTO-ENTMCNC: 30.4 G/DL (ref 33–37)
MCV RBC AUTO: 92.9 FL (ref 81–99)
PLATELET # BLD AUTO: 207 K/UL (ref 130–400)
PMV BLD AUTO: 10.6 FL (ref 9.4–12.3)
POTASSIUM SERPL-SCNC: 3.4 MMOL/L (ref 3.5–5)
PROT SERPL-MCNC: 5.9 G/DL (ref 6.6–8.7)
RBC # BLD AUTO: 3.82 M/UL (ref 4.2–5.4)
SODIUM SERPL-SCNC: 139 MMOL/L (ref 136–145)
TIBC SERPL-MCNC: 275 UG/DL (ref 250–400)
TSH SERPL DL<=0.005 MIU/L-ACNC: 1.85 UIU/ML (ref 0.27–4.2)
VIT B12 SERPL-MCNC: 721 PG/ML (ref 211–946)
WBC # BLD AUTO: 4.9 K/UL (ref 4.8–10.8)

## 2023-07-15 PROCEDURE — 6360000002 HC RX W HCPCS: Performed by: HOSPITALIST

## 2023-07-15 PROCEDURE — 74018 RADEX ABDOMEN 1 VIEW: CPT

## 2023-07-15 PROCEDURE — 6370000000 HC RX 637 (ALT 250 FOR IP): Performed by: NURSE PRACTITIONER

## 2023-07-15 PROCEDURE — 36415 COLL VENOUS BLD VENIPUNCTURE: CPT

## 2023-07-15 PROCEDURE — 2140000000 HC CCU INTERMEDIATE R&B

## 2023-07-15 PROCEDURE — 6370000000 HC RX 637 (ALT 250 FOR IP): Performed by: HOSPITALIST

## 2023-07-15 PROCEDURE — 94640 AIRWAY INHALATION TREATMENT: CPT

## 2023-07-15 PROCEDURE — 99223 1ST HOSP IP/OBS HIGH 75: CPT | Performed by: PSYCHIATRY & NEUROLOGY

## 2023-07-15 PROCEDURE — 80053 COMPREHEN METABOLIC PANEL: CPT

## 2023-07-15 PROCEDURE — 2580000003 HC RX 258: Performed by: HOSPITALIST

## 2023-07-15 PROCEDURE — 2700000000 HC OXYGEN THERAPY PER DAY

## 2023-07-15 PROCEDURE — 94760 N-INVAS EAR/PLS OXIMETRY 1: CPT

## 2023-07-15 PROCEDURE — 2580000003 HC RX 258: Performed by: NURSE PRACTITIONER

## 2023-07-15 PROCEDURE — 82140 ASSAY OF AMMONIA: CPT

## 2023-07-15 PROCEDURE — 85027 COMPLETE CBC AUTOMATED: CPT

## 2023-07-15 RX ORDER — ONDANSETRON 4 MG/1
4 TABLET, ORALLY DISINTEGRATING ORAL EVERY 8 HOURS PRN
Status: DISCONTINUED | OUTPATIENT
Start: 2023-07-15 | End: 2023-07-18 | Stop reason: HOSPADM

## 2023-07-15 RX ORDER — ONDANSETRON 2 MG/ML
4 INJECTION INTRAMUSCULAR; INTRAVENOUS EVERY 6 HOURS PRN
Status: DISCONTINUED | OUTPATIENT
Start: 2023-07-15 | End: 2023-07-18 | Stop reason: HOSPADM

## 2023-07-15 RX ORDER — METOPROLOL SUCCINATE 50 MG/1
50 TABLET, EXTENDED RELEASE ORAL DAILY
Status: DISCONTINUED | OUTPATIENT
Start: 2023-07-15 | End: 2023-07-18 | Stop reason: HOSPADM

## 2023-07-15 RX ORDER — CHOLECALCIFEROL (VITAMIN D3) 125 MCG
500 CAPSULE ORAL DAILY
Status: DISCONTINUED | OUTPATIENT
Start: 2023-07-15 | End: 2023-07-18 | Stop reason: HOSPADM

## 2023-07-15 RX ORDER — MIDODRINE HYDROCHLORIDE 5 MG/1
5 TABLET ORAL ONCE
Status: COMPLETED | OUTPATIENT
Start: 2023-07-15 | End: 2023-07-15

## 2023-07-15 RX ORDER — IPRATROPIUM BROMIDE AND ALBUTEROL SULFATE 2.5; .5 MG/3ML; MG/3ML
1 SOLUTION RESPIRATORY (INHALATION)
Status: DISCONTINUED | OUTPATIENT
Start: 2023-07-15 | End: 2023-07-18

## 2023-07-15 RX ORDER — 0.9 % SODIUM CHLORIDE 0.9 %
500 INTRAVENOUS SOLUTION INTRAVENOUS ONCE
Status: COMPLETED | OUTPATIENT
Start: 2023-07-15 | End: 2023-07-15

## 2023-07-15 RX ORDER — FLUOXETINE HYDROCHLORIDE 20 MG/1
40 CAPSULE ORAL DAILY
Status: DISCONTINUED | OUTPATIENT
Start: 2023-07-15 | End: 2023-07-18 | Stop reason: HOSPADM

## 2023-07-15 RX ORDER — POTASSIUM CHLORIDE 20 MEQ/1
20 TABLET, EXTENDED RELEASE ORAL DAILY
Status: DISCONTINUED | OUTPATIENT
Start: 2023-07-15 | End: 2023-07-18 | Stop reason: HOSPADM

## 2023-07-15 RX ORDER — POLYETHYLENE GLYCOL 3350 17 G/17G
17 POWDER, FOR SOLUTION ORAL DAILY PRN
Status: DISCONTINUED | OUTPATIENT
Start: 2023-07-15 | End: 2023-07-18 | Stop reason: HOSPADM

## 2023-07-15 RX ORDER — POTASSIUM CHLORIDE 20 MEQ/1
20 TABLET, EXTENDED RELEASE ORAL ONCE
Status: DISCONTINUED | OUTPATIENT
Start: 2023-07-15 | End: 2023-07-15

## 2023-07-15 RX ORDER — SODIUM CHLORIDE 9 MG/ML
INJECTION, SOLUTION INTRAVENOUS CONTINUOUS
Status: DISCONTINUED | OUTPATIENT
Start: 2023-07-15 | End: 2023-07-15

## 2023-07-15 RX ORDER — MAGNESIUM SULFATE 1 G/100ML
1000 INJECTION INTRAVENOUS ONCE
Status: COMPLETED | OUTPATIENT
Start: 2023-07-15 | End: 2023-07-15

## 2023-07-15 RX ORDER — SODIUM CHLORIDE 0.9 % (FLUSH) 0.9 %
5-40 SYRINGE (ML) INJECTION EVERY 12 HOURS SCHEDULED
Status: DISCONTINUED | OUTPATIENT
Start: 2023-07-15 | End: 2023-07-18 | Stop reason: HOSPADM

## 2023-07-15 RX ORDER — ALPRAZOLAM 0.5 MG/1
1 TABLET ORAL 4 TIMES DAILY PRN
Status: DISCONTINUED | OUTPATIENT
Start: 2023-07-15 | End: 2023-07-16

## 2023-07-15 RX ORDER — OXYCODONE HYDROCHLORIDE 5 MG/1
15 TABLET ORAL EVERY 6 HOURS
Status: DISCONTINUED | OUTPATIENT
Start: 2023-07-15 | End: 2023-07-16

## 2023-07-15 RX ORDER — POTASSIUM CHLORIDE 20 MEQ/1
20 TABLET, EXTENDED RELEASE ORAL ONCE
Status: COMPLETED | OUTPATIENT
Start: 2023-07-15 | End: 2023-07-15

## 2023-07-15 RX ORDER — SODIUM CHLORIDE 9 MG/ML
INJECTION, SOLUTION INTRAVENOUS PRN
Status: DISCONTINUED | OUTPATIENT
Start: 2023-07-15 | End: 2023-07-18 | Stop reason: HOSPADM

## 2023-07-15 RX ORDER — ENOXAPARIN SODIUM 100 MG/ML
40 INJECTION SUBCUTANEOUS DAILY
Status: DISCONTINUED | OUTPATIENT
Start: 2023-07-15 | End: 2023-07-18 | Stop reason: HOSPADM

## 2023-07-15 RX ORDER — SODIUM CHLORIDE 0.9 % (FLUSH) 0.9 %
5-40 SYRINGE (ML) INJECTION PRN
Status: DISCONTINUED | OUTPATIENT
Start: 2023-07-15 | End: 2023-07-18 | Stop reason: HOSPADM

## 2023-07-15 RX ORDER — ACETAMINOPHEN 650 MG/1
650 SUPPOSITORY RECTAL EVERY 6 HOURS PRN
Status: DISCONTINUED | OUTPATIENT
Start: 2023-07-15 | End: 2023-07-15

## 2023-07-15 RX ORDER — SORBITOL SOLUTION 70 %
30 SOLUTION, ORAL MISCELLANEOUS EVERY 6 HOURS
Status: DISCONTINUED | OUTPATIENT
Start: 2023-07-15 | End: 2023-07-15

## 2023-07-15 RX ORDER — ACETAMINOPHEN 325 MG/1
650 TABLET ORAL EVERY 6 HOURS PRN
Status: DISCONTINUED | OUTPATIENT
Start: 2023-07-15 | End: 2023-07-15

## 2023-07-15 RX ORDER — SODIUM CHLORIDE 9 MG/ML
INJECTION, SOLUTION INTRAVENOUS CONTINUOUS
Status: DISCONTINUED | OUTPATIENT
Start: 2023-07-15 | End: 2023-07-18

## 2023-07-15 RX ADMIN — IPRATROPIUM BROMIDE AND ALBUTEROL SULFATE 1 DOSE: 2.5; .5 SOLUTION RESPIRATORY (INHALATION) at 18:56

## 2023-07-15 RX ADMIN — POTASSIUM CHLORIDE 20 MEQ: 1500 TABLET, EXTENDED RELEASE ORAL at 01:37

## 2023-07-15 RX ADMIN — MAGNESIUM SULFATE HEPTAHYDRATE 1000 MG: 1 INJECTION, SOLUTION INTRAVENOUS at 02:15

## 2023-07-15 RX ADMIN — SODIUM CHLORIDE: 9 INJECTION, SOLUTION INTRAVENOUS at 06:44

## 2023-07-15 RX ADMIN — IPRATROPIUM BROMIDE AND ALBUTEROL SULFATE 1 DOSE: 2.5; .5 SOLUTION RESPIRATORY (INHALATION) at 11:25

## 2023-07-15 RX ADMIN — MIDODRINE HYDROCHLORIDE 5 MG: 5 TABLET ORAL at 13:16

## 2023-07-15 RX ADMIN — CYANOCOBALAMIN TAB 500 MCG 500 MCG: 500 TAB at 13:16

## 2023-07-15 RX ADMIN — SODIUM CHLORIDE: 9 INJECTION, SOLUTION INTRAVENOUS at 03:35

## 2023-07-15 RX ADMIN — POTASSIUM CHLORIDE 20 MEQ: 1500 TABLET, EXTENDED RELEASE ORAL at 06:39

## 2023-07-15 RX ADMIN — FLUOXETINE 40 MG: 20 CAPSULE ORAL at 13:16

## 2023-07-15 RX ADMIN — IPRATROPIUM BROMIDE AND ALBUTEROL SULFATE 1 DOSE: 2.5; .5 SOLUTION RESPIRATORY (INHALATION) at 14:28

## 2023-07-15 RX ADMIN — NALOXEGOL OXALATE 12.5 MG: 12.5 TABLET, FILM COATED ORAL at 06:39

## 2023-07-15 RX ADMIN — ENOXAPARIN SODIUM 40 MG: 100 INJECTION SUBCUTANEOUS at 09:36

## 2023-07-15 RX ADMIN — SODIUM CHLORIDE: 9 INJECTION, SOLUTION INTRAVENOUS at 13:21

## 2023-07-15 RX ADMIN — SODIUM CHLORIDE 500 ML: 9 INJECTION, SOLUTION INTRAVENOUS at 09:38

## 2023-07-15 RX ADMIN — SODIUM CHLORIDE: 9 INJECTION, SOLUTION INTRAVENOUS at 02:08

## 2023-07-15 ASSESSMENT — ENCOUNTER SYMPTOMS
SINUS PAIN: 0
SORE THROAT: 0
NAUSEA: 0
CONSTIPATION: 0
SHORTNESS OF BREATH: 0
ABDOMINAL DISTENTION: 0
DIARRHEA: 0
BLOOD IN STOOL: 0
ABDOMINAL PAIN: 0
WHEEZING: 0
TROUBLE SWALLOWING: 0
COUGH: 0
VOMITING: 0

## 2023-07-16 LAB
ALBUMIN SERPL-MCNC: 2.7 G/DL (ref 3.5–5.2)
ALP SERPL-CCNC: 47 U/L (ref 35–104)
ALT SERPL-CCNC: 73 U/L (ref 5–33)
ANION GAP SERPL CALCULATED.3IONS-SCNC: 10 MMOL/L (ref 7–19)
AST SERPL-CCNC: 78 U/L (ref 5–32)
BILIRUB SERPL-MCNC: <0.2 MG/DL (ref 0.2–1.2)
BUN SERPL-MCNC: 13 MG/DL (ref 8–23)
CALCIUM SERPL-MCNC: 7.8 MG/DL (ref 8.8–10.2)
CHLORIDE SERPL-SCNC: 110 MMOL/L (ref 98–111)
CO2 SERPL-SCNC: 21 MMOL/L (ref 22–29)
CREAT SERPL-MCNC: 0.5 MG/DL (ref 0.5–0.9)
ERYTHROCYTE [DISTWIDTH] IN BLOOD BY AUTOMATED COUNT: 14 % (ref 11.5–14.5)
GLUCOSE SERPL-MCNC: 98 MG/DL (ref 74–109)
HCT VFR BLD AUTO: 31 % (ref 37–47)
HGB BLD-MCNC: 9.5 G/DL (ref 12–16)
MCH RBC QN AUTO: 28.5 PG (ref 27–31)
MCHC RBC AUTO-ENTMCNC: 30.6 G/DL (ref 33–37)
MCV RBC AUTO: 93.1 FL (ref 81–99)
PLATELET # BLD AUTO: 206 K/UL (ref 130–400)
PMV BLD AUTO: 11.2 FL (ref 9.4–12.3)
POTASSIUM SERPL-SCNC: 4 MMOL/L (ref 3.5–5)
PROT SERPL-MCNC: 4.6 G/DL (ref 6.6–8.7)
RBC # BLD AUTO: 3.33 M/UL (ref 4.2–5.4)
SODIUM SERPL-SCNC: 141 MMOL/L (ref 136–145)
WBC # BLD AUTO: 4.7 K/UL (ref 4.8–10.8)

## 2023-07-16 PROCEDURE — 2580000003 HC RX 258: Performed by: NURSE PRACTITIONER

## 2023-07-16 PROCEDURE — 97165 OT EVAL LOW COMPLEX 30 MIN: CPT

## 2023-07-16 PROCEDURE — 97530 THERAPEUTIC ACTIVITIES: CPT

## 2023-07-16 PROCEDURE — 6360000002 HC RX W HCPCS: Performed by: HOSPITALIST

## 2023-07-16 PROCEDURE — 94640 AIRWAY INHALATION TREATMENT: CPT

## 2023-07-16 PROCEDURE — 6370000000 HC RX 637 (ALT 250 FOR IP): Performed by: HOSPITALIST

## 2023-07-16 PROCEDURE — 36415 COLL VENOUS BLD VENIPUNCTURE: CPT

## 2023-07-16 PROCEDURE — 2140000000 HC CCU INTERMEDIATE R&B

## 2023-07-16 PROCEDURE — 97162 PT EVAL MOD COMPLEX 30 MIN: CPT

## 2023-07-16 PROCEDURE — 85027 COMPLETE CBC AUTOMATED: CPT

## 2023-07-16 PROCEDURE — 6370000000 HC RX 637 (ALT 250 FOR IP): Performed by: NURSE PRACTITIONER

## 2023-07-16 PROCEDURE — 80053 COMPREHEN METABOLIC PANEL: CPT

## 2023-07-16 PROCEDURE — 99233 SBSQ HOSP IP/OBS HIGH 50: CPT | Performed by: PSYCHIATRY & NEUROLOGY

## 2023-07-16 PROCEDURE — 94760 N-INVAS EAR/PLS OXIMETRY 1: CPT

## 2023-07-16 PROCEDURE — 2580000003 HC RX 258: Performed by: HOSPITALIST

## 2023-07-16 RX ORDER — ACETAMINOPHEN 325 MG/1
650 TABLET ORAL EVERY 4 HOURS PRN
Status: DISCONTINUED | OUTPATIENT
Start: 2023-07-16 | End: 2023-07-18 | Stop reason: HOSPADM

## 2023-07-16 RX ADMIN — SODIUM CHLORIDE 125 ML/HR: 9 INJECTION, SOLUTION INTRAVENOUS at 01:47

## 2023-07-16 RX ADMIN — SODIUM CHLORIDE, PRESERVATIVE FREE 10 ML: 5 INJECTION INTRAVENOUS at 21:04

## 2023-07-16 RX ADMIN — FLUOXETINE 40 MG: 20 CAPSULE ORAL at 08:36

## 2023-07-16 RX ADMIN — IPRATROPIUM BROMIDE AND ALBUTEROL SULFATE 1 DOSE: 2.5; .5 SOLUTION RESPIRATORY (INHALATION) at 18:19

## 2023-07-16 RX ADMIN — SODIUM CHLORIDE, PRESERVATIVE FREE 10 ML: 5 INJECTION INTRAVENOUS at 08:36

## 2023-07-16 RX ADMIN — POTASSIUM CHLORIDE 20 MEQ: 1500 TABLET, EXTENDED RELEASE ORAL at 08:36

## 2023-07-16 RX ADMIN — IPRATROPIUM BROMIDE AND ALBUTEROL SULFATE 1 DOSE: 2.5; .5 SOLUTION RESPIRATORY (INHALATION) at 10:35

## 2023-07-16 RX ADMIN — ENOXAPARIN SODIUM 40 MG: 100 INJECTION SUBCUTANEOUS at 08:36

## 2023-07-16 RX ADMIN — CYANOCOBALAMIN TAB 500 MCG 500 MCG: 500 TAB at 08:36

## 2023-07-16 RX ADMIN — IPRATROPIUM BROMIDE AND ALBUTEROL SULFATE 1 DOSE: 2.5; .5 SOLUTION RESPIRATORY (INHALATION) at 06:58

## 2023-07-16 ASSESSMENT — ENCOUNTER SYMPTOMS
ABDOMINAL PAIN: 0
NAUSEA: 1
DIARRHEA: 0
VOMITING: 0
COLOR CHANGE: 0
ABDOMINAL DISTENTION: 0
CONSTIPATION: 0
CHEST TIGHTNESS: 0
SHORTNESS OF BREATH: 0
BACK PAIN: 1

## 2023-07-17 ENCOUNTER — TELEPHONE (OUTPATIENT)
Dept: NEUROSURGERY | Age: 63
End: 2023-07-17

## 2023-07-17 LAB
ALBUMIN SERPL-MCNC: 2.8 G/DL (ref 3.5–5.2)
ALP SERPL-CCNC: 49 U/L (ref 35–104)
ALT SERPL-CCNC: 60 U/L (ref 5–33)
ANION GAP SERPL CALCULATED.3IONS-SCNC: 11 MMOL/L (ref 7–19)
AST SERPL-CCNC: 49 U/L (ref 5–32)
BILIRUB SERPL-MCNC: 0.3 MG/DL (ref 0.2–1.2)
BUN SERPL-MCNC: 9 MG/DL (ref 8–23)
CALCIUM SERPL-MCNC: 8.3 MG/DL (ref 8.8–10.2)
CHLORIDE SERPL-SCNC: 110 MMOL/L (ref 98–111)
CO2 SERPL-SCNC: 23 MMOL/L (ref 22–29)
CREAT SERPL-MCNC: 0.5 MG/DL (ref 0.5–0.9)
ERYTHROCYTE [DISTWIDTH] IN BLOOD BY AUTOMATED COUNT: 14.1 % (ref 11.5–14.5)
GLUCOSE BLD-MCNC: 101 MG/DL (ref 70–99)
GLUCOSE BLD-MCNC: 101 MG/DL (ref 70–99)
GLUCOSE SERPL-MCNC: 93 MG/DL (ref 74–109)
HCT VFR BLD AUTO: 31.5 % (ref 37–47)
HGB BLD-MCNC: 9.6 G/DL (ref 12–16)
MCH RBC QN AUTO: 28.5 PG (ref 27–31)
MCHC RBC AUTO-ENTMCNC: 30.5 G/DL (ref 33–37)
MCV RBC AUTO: 93.5 FL (ref 81–99)
PERFORMED ON: ABNORMAL
PERFORMED ON: ABNORMAL
PLATELET # BLD AUTO: 224 K/UL (ref 130–400)
PMV BLD AUTO: 11.2 FL (ref 9.4–12.3)
POTASSIUM SERPL-SCNC: 3.9 MMOL/L (ref 3.5–5)
PROT SERPL-MCNC: 4.9 G/DL (ref 6.6–8.7)
RBC # BLD AUTO: 3.37 M/UL (ref 4.2–5.4)
SODIUM SERPL-SCNC: 144 MMOL/L (ref 136–145)
WBC # BLD AUTO: 4.7 K/UL (ref 4.8–10.8)

## 2023-07-17 PROCEDURE — 97116 GAIT TRAINING THERAPY: CPT

## 2023-07-17 PROCEDURE — 99232 SBSQ HOSP IP/OBS MODERATE 35: CPT | Performed by: PSYCHIATRY & NEUROLOGY

## 2023-07-17 PROCEDURE — 97110 THERAPEUTIC EXERCISES: CPT

## 2023-07-17 PROCEDURE — 6360000002 HC RX W HCPCS: Performed by: HOSPITALIST

## 2023-07-17 PROCEDURE — 6370000000 HC RX 637 (ALT 250 FOR IP): Performed by: NURSE PRACTITIONER

## 2023-07-17 PROCEDURE — 2140000000 HC CCU INTERMEDIATE R&B

## 2023-07-17 PROCEDURE — 82962 GLUCOSE BLOOD TEST: CPT

## 2023-07-17 PROCEDURE — 85027 COMPLETE CBC AUTOMATED: CPT

## 2023-07-17 PROCEDURE — 36415 COLL VENOUS BLD VENIPUNCTURE: CPT

## 2023-07-17 PROCEDURE — 94760 N-INVAS EAR/PLS OXIMETRY 1: CPT

## 2023-07-17 PROCEDURE — 6370000000 HC RX 637 (ALT 250 FOR IP): Performed by: HOSPITALIST

## 2023-07-17 PROCEDURE — 94640 AIRWAY INHALATION TREATMENT: CPT

## 2023-07-17 PROCEDURE — 80053 COMPREHEN METABOLIC PANEL: CPT

## 2023-07-17 RX ADMIN — IPRATROPIUM BROMIDE AND ALBUTEROL SULFATE 1 DOSE: 2.5; .5 SOLUTION RESPIRATORY (INHALATION) at 06:46

## 2023-07-17 RX ADMIN — FLUOXETINE 40 MG: 20 CAPSULE ORAL at 08:05

## 2023-07-17 RX ADMIN — POTASSIUM CHLORIDE 20 MEQ: 1500 TABLET, EXTENDED RELEASE ORAL at 08:06

## 2023-07-17 RX ADMIN — IPRATROPIUM BROMIDE AND ALBUTEROL SULFATE 1 DOSE: 2.5; .5 SOLUTION RESPIRATORY (INHALATION) at 18:42

## 2023-07-17 RX ADMIN — ENOXAPARIN SODIUM 40 MG: 100 INJECTION SUBCUTANEOUS at 08:05

## 2023-07-17 RX ADMIN — IPRATROPIUM BROMIDE AND ALBUTEROL SULFATE 1 DOSE: 2.5; .5 SOLUTION RESPIRATORY (INHALATION) at 14:52

## 2023-07-17 RX ADMIN — CYANOCOBALAMIN TAB 500 MCG 500 MCG: 500 TAB at 08:06

## 2023-07-17 ASSESSMENT — ENCOUNTER SYMPTOMS
BACK PAIN: 1
DIARRHEA: 0
CHEST TIGHTNESS: 0
CONSTIPATION: 0
ABDOMINAL DISTENTION: 0
SHORTNESS OF BREATH: 0
COLOR CHANGE: 0
NAUSEA: 1
VOMITING: 0
ABDOMINAL PAIN: 0

## 2023-07-17 NOTE — PROGRESS NOTES
[facility]      Patient:  Tavon Vilchis  YOB: 1960  Date of Service: 7/17/2023  MRN: [id number]   Acct: [de-identified]   Primary Care Physician: Denia Khanna DO  Advance Directive: Full Code  Admit Date: 7/14/2023       Hospital Day: 3  Portions of this note have been copied forward, however, changed to reflect the most current clinical status of this patient. CHIEF COMPLAINT AMS    SUBJECTIVE:  Patient is anxious for discharge. States she is feeling much better today. Denies dizziness, lightheadedness, or confusion. CUMULATIVE HOSPITAL COURSE:  The patient is a 61-year-old female with past medical history of anxiety, chronic viral hepatitis cured with Harvoni in remission, mood disorder, neuropathy, restless leg syndrome, chronic back pain who presented to [facility] ED with complaints of altered mental status. Sister brought patient in for evaluation as she had returned from an out-of-town trip and found the patient confused and forgetful. According to ED report patient had been seen recently and diagnosed with UTI however was not given any medications. Sister indicated patient had previous confusion after major surgery however nothing chronic. Sister indicates she has been setting her medications up and available in her however on the day of admission she found the patient's pill planner in the microwave with part medications. Sister was unable to find OxyContin and Xanax bottles. Sister also indicated there were more gabapentin pills missing and should have been. Sister also indicated patient had had multiple falls over the past 2 weeks. In the ED patient was found to be hypoxic with sat of 88 on room air. Laboratory findings revealed sodium 134, potassium 3.4, BUN 24, and creatinine 1.3. UDS positive for benzodiazepines, opiates, and oxycodone. CT head unremarkable. UA negative for infection. Chest x-ray with no acute findings.   Patient was admitted to the hospital service

## 2023-07-17 NOTE — PROGRESS NOTES
07/17/23 1300   Subjective   Subjective They said I was going home today. Pain Assessment   Pain Assessment None - Denies Pain   Vitals   O2 Device None (Room air)   Bed Mobility Training   Bed Mobility Training Yes   Supine to Sit Independent   Sit to Supine Independent   Balance   Sitting Intact   Standing Intact   Transfer Training   Transfer Training Yes   Sit to Stand Supervision   Stand to Sit Supervision   Gait Training   Gait Training Yes   Gait   Overall Level of Assistance Stand-by assistance   Distance (ft) 200 Feet   Assistive Device   (NONE)   PT Exercises   A/AROM Exercises BL LE EX in sitting x 20 reps   Other Specialty Interventions   Other Treatments/Modalities BTB needs in reach.    PT Plan of Care   Monday X         Electronically signed by Jayesh Trevizo PTA on 7/17/2023 at 1:26 PM

## 2023-07-17 NOTE — TELEPHONE ENCOUNTER
Spoke with pt and she is currently in the hospital and being treated by Dr. Christoph Urban. I advised that I do not have any sooner openings. I stated that if the hospitalist or Dr. Tony Colbert feels she needs to f/u sooner than 7/27 then they will place a note and we can address it with Dr. Jl Yang at that point. Pt voiced understanding.

## 2023-07-17 NOTE — PROGRESS NOTES
Physician Progress Note      Leora Busby  CSN #:                  [id number]  :                       1960  ADMIT DATE:       2023 5:17 PM  [facility] DATE:  RESPONDING  PROVIDER #:        Lauren Leggett MD          QUERY TEXT:    Pt admitted with altered mental status and has encephalopathy documented. If   possible, please document in progress notes and discharge summary further   specificity regarding the type of encephalopathy:    The medical record reflects the following:  Risk Factors:  acute encephalopathy, drug abuse. YASMANI  Clinical Indicators: Drug abuse opiates, low back pain, spinal stenosis. CT   head no acute changes  Treatment: Neurology consult, CT head, IVF,    Thank you  Price Morgan RN, BSN, [facility]  [phone]  Options provided:  -- Acute hepatic encephalopathy without coma  -- Chronic hepatic encephalopathy with coma  -- Chronic hepatic encephalopathy without coma  -- Metabolic encephalopathy  -- Toxic encephalopathy  -- Drug-induced encephalopathy due to opiates  -- Drug-induced encephalopathy due to, Please specify substance. -- Toxic metabolic encephalopathy  -- Other - I will add my own diagnosis  -- Disagree - Not applicable / Not valid  -- Disagree - Clinically unable to determine / Unknown  -- Refer to Clinical Documentation Reviewer    PROVIDER RESPONSE TEXT:    This patient has metabolic encephalopathy.     Query created by: Price Morgan on 2023 7:29 AM      Electronically signed by:  Lauren Leggett MD 2023 11:26 AM
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
## 2023-07-17 NOTE — PROGRESS NOTES
Patient:   Nahomi Michael  MR#:    [id number]   Room:    [address] [place] [address]   YOB: 1960  Date of Progress Note: 7/17/2023  Time of Note                           7:05 AM  Consulting Physician:   Santosh Landry M.D. Attending Physician:  Shey Elder MD     Chief complaint Altered mental status    S:This 61 y.o. female  with a past medical history screen for alcohol abuse, anxiety, chronic hepatitis, chronic pain, mood disorder, neuropathy, pancreatitis, and restless leg syndrome is seen for evaluation of altered mental status. This was present for about 2 weeks. The history is obtained from the chart and nursing. The patient been apparently out of town she appeared to be confused and forgetful when she returned. She had apparently been recently seen in the hospital and diagnosed with a UTI but not given any medications. Her oxygen saturations are 88%. She does smoke but does not use oxygen. History was unable to find the patient's OxyContin and Xanax. She does take gabapentin and more pills than usual missing from the bottle. Patient denies any memory issues at baseline. In the ER she was given Narcan and immediately improved but then became hypotensive at 80/56 and was started on some fluids. Her urine toxicology was positive for benzodiazepines, and oxycodone this morning she indicates she feels better. Ammonia, B12 and thyroid functions unremarkable. Renal dysfunction noted with serum creatinine of 1.3. Feels better this morning. No new complaints.     REVIEW OF SYSTEMS:  Constitutional: No fevers No chills  Neck:No stiffness  Respiratory: No shortness of breath  Cardiovascular: No chest pain No palpitations  Gastrointestinal: No abdominal pain    Genitourinary: No Dysuria  Neurological: No headache, no confusion    Past Medical History:      Diagnosis Date    Alcohol abuse     Anal cancer ([address]) 4/20/2021    Anxiety     Arthritis     Calcification of abdominal aorta (HCC)     per pt/per ct

## 2023-07-17 NOTE — TELEPHONE ENCOUNTER
Patient's sister called related to patient needing sooner appointment related to possible neurological issues. Requesting call back.

## 2023-07-18 VITALS
OXYGEN SATURATION: 98 % | BODY MASS INDEX: 34.77 KG/M2 | SYSTOLIC BLOOD PRESSURE: 154 MMHG | TEMPERATURE: 97.2 F | HEIGHT: 63 IN | HEART RATE: 55 BPM | RESPIRATION RATE: 16 BRPM | DIASTOLIC BLOOD PRESSURE: 79 MMHG | WEIGHT: 196.25 LBS

## 2023-07-18 LAB
ADV 40+41 DNA STL QL NAA+NON-PROBE: NOT DETECTED
C CAYETANENSIS DNA STL QL NAA+NON-PROBE: NOT DETECTED
C COLI+JEJ+UPSA DNA STL QL NAA+NON-PROBE: NOT DETECTED
C DIF TOX TCDA+TCDB STL QL NAA+NON-PROBE: DETECTED
CRYPTOSP DNA STL QL NAA+NON-PROBE: NOT DETECTED
E HISTOLYT DNA STL QL NAA+NON-PROBE: NOT DETECTED
EAEC PAA PLAS AGGR+AATA ST NAA+NON-PRB: DETECTED
EC STX1+STX2 GENES STL QL NAA+NON-PROBE: NOT DETECTED
EPEC EAE GENE STL QL NAA+NON-PROBE: NOT DETECTED
ETEC LTA+ST1A+ST1B TOX ST NAA+NON-PROBE: NOT DETECTED
G LAMBLIA DNA STL QL NAA+NON-PROBE: NOT DETECTED
GI PATH DNA+RNA PNL STL NAA+NON-PROBE: NOT DETECTED
NOROVIRUS GI+II RNA STL QL NAA+NON-PROBE: NOT DETECTED
P SHIGELLOIDES DNA STL QL NAA+NON-PROBE: NOT DETECTED
RVA RNA STL QL NAA+NON-PROBE: NOT DETECTED
S ENT+BONG DNA STL QL NAA+NON-PROBE: NOT DETECTED
SAPO I+II+IV+V RNA STL QL NAA+NON-PROBE: NOT DETECTED
SHIGELLA SP+EIEC IPAH ST NAA+NON-PROBE: NOT DETECTED
V CHOL+PARA+VUL DNA STL QL NAA+NON-PROBE: NOT DETECTED
V CHOLERAE DNA STL QL NAA+NON-PROBE: NOT DETECTED
Y ENTEROCOL DNA STL QL NAA+NON-PROBE: NOT DETECTED

## 2023-07-18 PROCEDURE — 97530 THERAPEUTIC ACTIVITIES: CPT

## 2023-07-18 PROCEDURE — 6370000000 HC RX 637 (ALT 250 FOR IP): Performed by: NURSE PRACTITIONER

## 2023-07-18 PROCEDURE — 87507 IADNA-DNA/RNA PROBE TQ 12-25: CPT

## 2023-07-18 PROCEDURE — 94760 N-INVAS EAR/PLS OXIMETRY 1: CPT

## 2023-07-18 PROCEDURE — 6370000000 HC RX 637 (ALT 250 FOR IP): Performed by: HOSPITALIST

## 2023-07-18 PROCEDURE — 92523 SPEECH SOUND LANG COMPREHEN: CPT

## 2023-07-18 RX ORDER — POTASSIUM CHLORIDE 20 MEQ/1
20 TABLET, EXTENDED RELEASE ORAL DAILY
Qty: 7 TABLET | Refills: 0 | Status: SHIPPED | OUTPATIENT
Start: 2023-07-19 | End: 2023-07-26

## 2023-07-18 RX ORDER — IPRATROPIUM BROMIDE AND ALBUTEROL SULFATE 2.5; .5 MG/3ML; MG/3ML
1 SOLUTION RESPIRATORY (INHALATION) EVERY 4 HOURS PRN
Status: DISCONTINUED | OUTPATIENT
Start: 2023-07-18 | End: 2023-07-18 | Stop reason: HOSPADM

## 2023-07-18 RX ORDER — VANCOMYCIN HYDROCHLORIDE 125 MG/1
125 CAPSULE ORAL 4 TIMES DAILY
Status: DISCONTINUED | OUTPATIENT
Start: 2023-07-18 | End: 2023-07-18 | Stop reason: HOSPADM

## 2023-07-18 RX ORDER — VANCOMYCIN HYDROCHLORIDE 125 MG/1
125 CAPSULE ORAL 4 TIMES DAILY
Qty: 40 CAPSULE | Refills: 0 | Status: SHIPPED | OUTPATIENT
Start: 2023-07-18 | End: 2023-07-28

## 2023-07-18 RX ADMIN — FLUOXETINE 40 MG: 20 CAPSULE ORAL at 08:30

## 2023-07-18 RX ADMIN — ONDANSETRON 4 MG: 4 TABLET, ORALLY DISINTEGRATING ORAL at 13:39

## 2023-07-18 RX ADMIN — METOPROLOL SUCCINATE 50 MG: 50 TABLET, EXTENDED RELEASE ORAL at 08:30

## 2023-07-18 RX ADMIN — POTASSIUM CHLORIDE 20 MEQ: 1500 TABLET, EXTENDED RELEASE ORAL at 08:30

## 2023-07-18 RX ADMIN — CYANOCOBALAMIN TAB 500 MCG 500 MCG: 500 TAB at 08:30

## 2023-07-18 RX ADMIN — VANCOMYCIN HYDROCHLORIDE 125 MG: 125 CAPSULE ORAL at 17:35
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
## 2023-07-18 NOTE — DISCHARGE SUMMARY
Zenaida Nolan  :  1960  MRN:  [id number]    Admit date:  2023  Discharge date:  2023    Discharging Physician:  Dr. Nahid Hart Directive: Full Code    Consults: Daniel Hyatt PRASANTH     Primary Care Physician:  Rick Domingo, DO    Discharge Diagnoses:  Principal Problem:    Acute encephalopathy  Active Problems:    Lumbago    Spinal stenosis, lumbar region, without neurogenic claudication    Dysthymia    Hypertension    Smoker    Opioid dependence with opioid-induced disorder (HCC)    Myofascial muscle pain - right scapular area    Altered mental status    Renal dysfunction    Abnormal lung sounds    Polypharmacy    YASMANI (acute kidney injury) ([address])  Resolved Problems:    * No resolved hospital problems. *      Portions of this note have been copied forward, however, changed to reflect the most current clinical status of this patient. Hospital Course: The patient is a 69-year-old female with past medical history of anxiety, chronic viral hepatitis cured with Harvoni in remission, mood disorder, neuropathy, restless leg syndrome, chronic back pain who presented to [facility] ED with complaints of altered mental status. Sister brought patient in for evaluation as she had returned from an out-of-town trip and found the patient confused and forgetful. According to ED report patient had been seen recently and diagnosed with UTI however was not given any medications. Sister indicated patient had previous confusion after major surgery however nothing chronic. Sister indicates she has been setting her medications up and available in her however on the day of admission she found the patient's pill planner in the microwave with part medications. Sister was unable to find OxyContin and Xanax bottles. Sister also indicated there were more gabapentin pills missing and should have been.   Sister also indicated patient had had multiple falls

## 2023-07-18 NOTE — PROGRESS NOTES
Physical Therapy     07/18/23 1409   Subjective   Subjective Pt refused 2nd tx due to c/o pain in bottom from diarrhea.        Electronically signed by Jamee Severs, PTA on 7/18/2023 at 2:18 PM

## 2023-07-18 NOTE — PROGRESS NOTES
Physical Therapy     07/18/23 0910   Restrictions/Precautions   Restrictions/Precautions Fall Risk   Subjective   Subjective Pt agreeable to tx after encouragement due to c/o diarrhea   Subjective   Pain Only pain is from raw skin due to diarrhea   Bed mobility   Supine to Sit Modified independent   Scooting Independent   Bed Mobility Comments Required 1 verbal cue for rolling like a log vs long sitting for improved supine to sit with HOB flat and use of L guard rail. Transfers   Stand to Sit Supervision   Bed to Chair Supervision   Ambulation   Surface Level tile   Device No Device   Assistance Stand by assistance   Quality of Gait steady but weak and fatigues quickly   Gait Deviations Slow Bridgette;Decreased step length;Decreased step height   Distance 50'   Comments Pt unable to AMB past 50' due to c/o pain from diarrhea with raw skin. Short Term Goals   Time Frame for Short Term Goals 2 wks   Short Term Goal 1 supine to sit indep   Short Term Goal 2 sit to stand indep   Short Term Goal 3 amb. 200' indep   Activity Tolerance   Activity Tolerance Patient limited by pain   Assessment   Assessment Pt with c/o frequent diarrhea episodes, unable to AMB past 50' SBA with no AD due to c/o pain in bottom. Challenges included 360 degree turns and posterior AMB with no LOB/instability noted. Supervision for short distance transfer from EOB to toilet with no AD and no LOB. Nurse notified of pt c/o pain from diarrhea with pt left in chair/alarm activated/all needs in reach.    PT Plan of Care   Monday X   Safety Devices   Type of Devices Chair alarm in place;Gait belt;Call light within reach   PT Individual Minutes   Time In 0910   Time Out 0934   Minutes 24   Recommendation   Requires PT Follow-Up Yes     Electronically signed by Clemente Srinivasan PTA on 7/18/2023 at 10:21 AM

## 2023-07-18 NOTE — CARE COORDINATION
CM attempted to reach Ute, niece and Kathryn Funk, sister, to discuss outcome of cognitive eval, message left to call back. CM met w/patient to discuss d/c plan Pt is not wanting to go to SNF for rehab at this time but is agree[facility]. Pt aware she did have some struggles w/cognitive testing. Pt is agreeable to having her medication reviewed and possible some \"discontinued\" if necessary. Pt is feeling like she does need pain medication d/t chronic pain. Pt is agreeable to having family remove medications from the home except those needed for the day, pt agreeable to having medication \"locked up\" CM recommend patient have assistance with paying bills. However, she was not sure how that would work for her. Pt is experiencing diarrhea toda and will not be discharged.   Electronically signed by Tk Sung RN on 7/18/2023 at 2:31 PM

## 2023-07-18 NOTE — CARE COORDINATION
CM discussed d/c plan w/sister. Presentl she has patient's medications locked in safe. Sister has been filling medication planner for a week at a time. Pt had been prescribed pain medication 5 x day but sister is not providing this frequency. Sister has been and will be assisting patient with bill pay. However, patient mis-used her debit card and struggling to catch up w/things. Deborrlani Ward states, sister \"sometimes fights her on things\" Overall sister/niece are trying to assist patient with care needs and are concerned for patient's well being. CM sister is patient continues to have medication/financial misadventures would   recommend seeking Guardianship of patient. CM offered assistance as needed.   Electronically signed by Heath Lozada RN on 7/18/2023 at 2:47 PM

## 2023-07-18 NOTE — CARE COORDINATION
Spoke with patient regarding MD orders for [facility] services. Patient agreeable and has chosen [facility]. Referral Faxed. [phone] [facility] [phone]. [phone]. Please notify [phone] [facility] when patient discharges and fax DC Summary,  DC med list and any new [facility] orders. The Patient and/or patient representative was provided with a choice of provider and agrees   with the discharge plan. [x] Yes [] No    Freedom of choice list was provided with basic dialogue that supports the patient's individualized plan of care/goals, treatment preferences and shares the quality data associated with the providers.  [x] Yes [] No  Electronically signed by Sandra Abdullahi on 7/18/2023 at 2:00 PM

## 2023-07-18 NOTE — CARE COORDINATION
CM reached out to Ute, niece/POA and Talita Smiley, sister. CM advised current recommendation for patient has been 24/7  supervision. Especially in area of medication management. Depending on actual score of cognitive evaluation, the role of POA would be evoked and choices made for patient that patient may not agree to, such as SNF. Other options would be for famil to supply 24/7 care, patient pay privately for supervision (CM could provide list of private sitters) and patient may qualify for Medicaid assistance. CM advised family is moving towards readiness for d/c. Family stating that patient is followed by community psychiatrist Brannon Brasher.   Electronically signed by Heath Lozada RN on 7/18/2023 at 1:58 PM

## 2023-07-27 ENCOUNTER — OFFICE VISIT (OUTPATIENT)
Dept: NEUROLOGY | Age: 63
End: 2023-07-27
Payer: MEDICARE

## 2023-07-27 VITALS
BODY MASS INDEX: 32.25 KG/M2 | HEIGHT: 63 IN | DIASTOLIC BLOOD PRESSURE: 62 MMHG | SYSTOLIC BLOOD PRESSURE: 123 MMHG | WEIGHT: 182 LBS

## 2023-07-27 DIAGNOSIS — Z86.59 HISTORY OF ANXIETY: ICD-10-CM

## 2023-07-27 DIAGNOSIS — R41.3 MEMORY LOSS: ICD-10-CM

## 2023-07-27 DIAGNOSIS — R25.1 TREMORS OF NERVOUS SYSTEM: ICD-10-CM

## 2023-07-27 DIAGNOSIS — R41.3 MEMORY LOSS: Primary | ICD-10-CM

## 2023-07-27 LAB — VIT B12 SERPL-MCNC: 503 PG/ML (ref 211–946)

## 2023-07-27 PROCEDURE — 3074F SYST BP LT 130 MM HG: CPT | Performed by: PSYCHIATRY & NEUROLOGY

## 2023-07-27 PROCEDURE — 3078F DIAST BP <80 MM HG: CPT | Performed by: PSYCHIATRY & NEUROLOGY

## 2023-07-27 PROCEDURE — 99204 OFFICE O/P NEW MOD 45 MIN: CPT | Performed by: PSYCHIATRY & NEUROLOGY

## 2023-07-27 RX ORDER — OLANZAPINE 2.5 MG/1
2.5 TABLET ORAL NIGHTLY
COMMUNITY

## 2023-07-27 RX ORDER — GABAPENTIN 300 MG/1
CAPSULE ORAL
COMMUNITY
Start: 2023-07-05

## 2023-07-27 RX ORDER — OXYCODONE AND ACETAMINOPHEN 7.5; 325 MG/1; MG/1
TABLET ORAL
COMMUNITY
Start: 2023-07-20

## 2023-07-27 NOTE — PROGRESS NOTES
REVIEW OF SYSTEMS    Constitutional: []Fever []Sweat []Chills [] Recent Injury [x] Denies all unless marked  HEENT:[]Headache  [] Head Injury/Hearing Loss  [] Sore Throat  [] Ear Ache/Dizziness  [x] Denies all unless marked  Spine:  [] Neck pain  [] Back pain  [] Sciaticia  [x] Denies all unless marked  Cardiovascular:[]Heart Disease []Chest Pain [] Palpitations  [x] Denies all unless marked  Pulmonary: []Shortness of Breath []Cough   [x] Denies all unless marke  Gastrointestinal: []Nausea  []Vomiting  []Abdominal Pain  []Constipation  []Diarrhea  []Dark Bloody Stools  [x] Denies all unless marked  Psychiatric/Behavioral:[] Depression [] Anxiety [x] Denies all unless marked  Genitourinary:   [] Frequency  [] Urgency  [] Incontinence [] Pain with Urination  [x] Denies all unless marked  Extremities: []Pain  []Swelling  [x] Denies all unless marked  Musculoskeletal: [] Muscle Pain  [] Joint Pain  [] Arthritis [] Muscle Cramps [x] Muscle Twitches  [x] Denies all unless marked  Sleep: [] Insomnia [] Snoring [] Restless Legs [] Sleep Apnea  [] Daytime Sleepiness  [x] Denies all unless marked  Skin:[] Rash [] Skin Discoloration [x] Denies all unless marked   Neurological: [x]Visual Disturbance/Memory Loss [] Loss of Balance [] Slurred Speech/Weakness [] Seizures  [] Vertigo/Dizziness [x] Denies all unless marked
Social History Narrative    Not on file     Social Determinants of Health     Financial Resource Strain: Not on file   Food Insecurity: Not on file   Transportation Needs: Not on file   Physical Activity: Not on file   Stress: Not on file   Social Connections: Not on file   Intimate Partner Violence: Not on file   Housing Stability: Not on file       Review of Systems  Constitutional: []Fever []Sweat []Chills [] Recent Injury [x] Denies all unless marked  HEENT:[]Headache  [] Head Injury/Hearing Loss  [] Sore Throat  [] Ear Ache/Dizziness  [x] Denies all unless marked  Spine:  [] Neck pain  [] Back pain  [] Sciaticia  [x] Denies all unless marked  Cardiovascular:[]Heart Disease []Chest Pain [] Palpitations  [x] Denies all unless marked  Pulmonary: []Shortness of Breath []Cough   [x] Denies all unless marke  Gastrointestinal: []Nausea  []Vomiting  []Abdominal Pain  []Constipation  []Diarrhea  []Dark Bloody Stools  [x] Denies all unless marked  Psychiatric/Behavioral:[] Depression [] Anxiety [x] Denies all unless marked  Genitourinary:   [] Frequency  [] Urgency  [] Incontinence [] Pain with Urination  [x] Denies all unless marked  Extremities: []Pain  []Swelling  [x] Denies all unless marked  Musculoskeletal: [] Muscle Pain  [] Joint Pain  [] Arthritis [] Muscle Cramps [x] Muscle Twitches  [x] Denies all unless marked  Sleep: [] Insomnia [] Snoring [] Restless Legs [] Sleep Apnea  [] Daytime Sleepiness  [x] Denies all unless marked  Skin:[] Rash [] Skin Discoloration [x] Denies all unless marked   Neurological: [x]Visual Disturbance/Memory Loss [] Loss of Balance [] Slurred Speech/Weakness [] Seizures  [] Vertigo/Dizziness [x] Denies all unless marked            Current Outpatient Medications   Medication Sig Dispense Refill    gabapentin (NEURONTIN) 300 MG capsule       oxyCODONE-acetaminophen (PERCOCET) 7.5-325 MG per tablet       OLANZapine (ZYPREXA) 2.5 MG tablet Take 1 tablet by mouth nightly      potassium

## 2023-08-22 NOTE — PATIENT INSTRUCTIONS
Have the right shoulder xray, if abnormal may need CT, pcp can order this  F/u with pcp may need nerve conduction test for radiating pain  May use pain meds and neurontin you have for the pain.    Avoid over use of the arm The patient location is:  Patient Home  The chief complaint leading to consultation is: as below  Visit type: Virtual visit with synchronous audio and video  Total time spent with patient: 15 minutes  Each patient to whom he or she provides medical services by telemedicine is:  (1) informed of the relationship between the physician and patient and the respective role of any other health care provider with respect to management of the patient; and (2) notified that she may decline to receive medical services by telemedicine and may withdraw from such care at any time.      HPI     Chief Complaint:  Suspected UTI      Antonia Jeffers is a 45 y.o. female with multiple medical diagnoses as listed in the medical history and problem list that presents for suspected UTI.  Pt is new to me but is known to this clinic with her last appointment being Visit date not found.      Dysuria   This is a recurrent problem. The current episode started yesterday. The problem occurs every urination. The problem has been gradually worsening. The quality of the pain is described as aching. The pain is at a severity of 4/10. The pain is mild. There has been no fever. She is Sexually active. There is No history of pyelonephritis. Associated symptoms include frequency and urgency. Pertinent negatives include no behavior changes, chills, discharge, flank pain, hematuria, hesitancy, possible pregnancy, sweats, vomiting, weight loss, constipation, rash or withholding. She has tried home medications for the symptoms. The treatment provided mild relief. Her past medical history is significant for catheterization and recurrent UTIs. There is no history of diabetes insipidus, diabetes mellitus, genitourinary reflux, hypertension, kidney stones, a single kidney, STD, urinary stasis or a urological procedure.       Urinalysis from 7/10/23 was unremarkable.     Urine cx from 11/17/15 revealed E. Coli which was sensitive to macrobid. Pt was last  prescribed macrobid in June 2023. She was also prescribed augmentin in July.     She has an appointment with urology on 8/25/23.      Assessment & Plan     Problem List Items Addressed This Visit          Psychiatric    Adjustment disorder with mixed anxiety and depressed mood    Reports mood is stable. Denies thoughts of self harm.    Encouraged the patient to perform self-calming techniques, such as deep breathing/relaxation techniques and exercise.         Other    VIOLETTA (obstructive sleep apnea): see HST 5/21, mild positional    CPAP compliance: no    We discussed the potential ramifications of untreated sleep apnea, which could include daytime sleepiness, hypertension, heart disease including CHF, sudden death while sleeping and/or stroke. The patient was advised to abstain from driving should they feel sleepy or drowsy.  We discussed potential treatment options, which could include weight loss, body positioning, continuous positive airway pressure (CPAP), or referral for surgical consideration.       Overview     HST 5-4-2021 AHI 9     After discussing all option, pt agree to trial apap          Other Visit Diagnoses       Suspected UTI    -  Primary    Pt deposited urine sample at the lab earlier today which is in process.    -hx of recurrent UTIs. Denies fever or flank pain.  -advised adequate hydration and proper hygiene  -avoid bladder irritants such as tea, coffee, caffeine, alcohol, artificial sweeteners, citrus, spicy foods, acidic foods,chocolate, tomato-based foods, smoking.  -pelvic rest until symptoms resolve    Follow up with urology on Friday.    UTI prevention:  a. perineal hygiene  b. drink water prior to intercourse and urinate afterwards and avoid certain positions which could increase likelyhood of UTIs  c. establish regular bladder habits   e. increase fluids and avoid caffeine and alcohol    Macrobid 100mg BID x5 days    Discussed DDx, condition, and treatment.   Education sent to patient  portal/included in after visit summary.  Discussed signs of pyelonephritis fever, chills, n/v, back pain, worsening dyuria, hematuria.   ED precautions given.   Notify provider if symptoms do not resolve or increase in severity.   Patient verbalizes understanding and agrees with plan of care.        Relevant Medications    nitrofurantoin, macrocrystal-monohydrate, (MACROBID) 100 MG capsule            --------------------------------------------      Health Maintenance:  Health Maintenance         Date Due Completion Date    Mammogram 11/18/2022 11/18/2021    Colorectal Cancer Screening Never done ---    COVID-19 Vaccine (1) 05/12/2024 (Originally 1978) ---    Influenza Vaccine (1) 09/01/2023 2/7/2019    Hemoglobin A1c (Diabetic Prevention Screening) 05/12/2026 5/12/2023    Cervical Cancer Screening 08/05/2026 8/5/2021    Override on 6/13/2012: Done (Outside MD)    Lipid Panel 05/12/2028 5/12/2023    TETANUS VACCINE 10/11/2028 10/11/2018            Discussed the importance of overdue vaccines which were offered during this encounter. Patient declined overdue vaccines at this time    Follow Up:  Follow up in about 2 weeks (around 9/5/2023), or if symptoms worsen or fail to improve.    Exam     Review of Systems:  (as noted above)  Review of Systems   Constitutional:  Negative for chills, fever and weight loss.   HENT:  Negative for trouble swallowing.    Eyes:  Negative for visual disturbance.   Respiratory:  Negative for chest tightness and shortness of breath.    Cardiovascular:  Negative for chest pain.   Gastrointestinal:  Negative for blood in stool, constipation and vomiting.   Genitourinary:  Positive for dysuria, frequency and urgency. Negative for flank pain, hematuria and hesitancy.   Skin:  Negative for rash.       Physical Exam:   Physical Exam  Constitutional:       General: She is not in acute distress.     Appearance: She is not toxic-appearing or diaphoretic.   Pulmonary:      Effort: Pulmonary  effort is normal.   Neurological:      Mental Status: She is alert.   Psychiatric:         Mood and Affect: Mood normal.       There were no vitals filed for this visit.   There is no height or weight on file to calculate BMI.        History     Past Medical History:  Past Medical History:   Diagnosis Date    Abnormal Pap smear     ABN pap ~19 yo s/p laser cervix and since then all pap has been normal seen only Dr. Luna since    Acute deep vein thrombosis (DVT) of femoral vein of left lower extremity 2018    Cholelithiasis 2021    Gallstones: see CT 2021    GERD (gastroesophageal reflux disease)     Seasonal allergic rhinitis due to pollen 2017       Past Surgical History:  Past Surgical History:   Procedure Laterality Date     SECTION      CHOLECYSTECTOMY      LAPAROSCOPIC CHOLECYSTECTOMY N/A 2021    Procedure: CHOLECYSTECTOMY, LAPAROSCOPIC;  Surgeon: Luis Rodriguez MD;  Location: [facility] OR [address] [place] [address];  Service: General;  Laterality: N/A;       Social History:  Social History     Socioeconomic History    Marital status:     Number of children: 1   Tobacco Use    Smoking status: Never    Smokeless tobacco: Never   Substance and Sexual Activity    Alcohol use: No     Comment: rarely, 2-3 drinks a week maximum    Drug use: No    Sexual activity: Yes     Partners: Male     Birth control/protection: I.U.D.   Social History Narrative    ** Merged History Encounter **Family history of thyroid cancer mother         Family History:  Family History   Problem Relation Age of Onset    Cancer Mother         follicular lymphoma, thyroid; B lymphoma x 2    Lymphoma Mother 60    Diabetes Mother     Hypertension Mother     Depression Mother     Thyroid disease Mother     Heart disease Mother         A fib    Hyperlipidemia Mother     Diabetes Father     Hypertension Father     Heart disease Father         A fib; MI; PPM/defib    Thyroid disease Father     Hyperlipidemia Father     No  Known Problems Sister     Clotting disorder Sister     Thyroid disease Sister     Factor V Leiden deficiency Sister     Deep vein thrombosis Sister     No Known Problems Son     Cancer Maternal Grandmother         Brain    Diabetes Maternal Grandmother     Cancer Other     Lymphoma Other     Breast cancer Neg Hx     Colon cancer Neg Hx     Ovarian cancer Neg Hx        Allergies and Medications: (updated and reviewed)  Review of patient's allergies indicates:  No Known Allergies  Current Outpatient Medications   Medication Sig Dispense Refill    cyclobenzaprine (FLEXERIL) 10 MG tablet Take 1 tablet (10 mg total) by mouth 3 (three) times daily. 30 tablet 3    FLAREX 0.1 % DrpS       fluconazole (DIFLUCAN) 150 MG Tab Take daily x 2 days 2 tablet 1    fluticasone propionate (FLONASE) 50 mcg/actuation nasal spray       ibuprofen (ADVIL,MOTRIN) 800 MG tablet Take 800 mg by mouth 3 (three) times daily.      levonorgestreL (MIRENA) 20 mcg/24 hours (7 yrs) 52 mg IUD 1 each by Intrauterine route once.      montelukast (SINGULAIR) 10 mg tablet Take 1 tablet (10 mg total) by mouth every evening. 30 tablet 1    nitrofurantoin, macrocrystal-monohydrate, (MACROBID) 100 MG capsule Take 1 capsule (100 mg total) by mouth 2 (two) times daily. for 5 days 10 capsule 0    semaglutide (OZEMPIC) 2 mg/dose (8 mg/3 mL) PnIj Inject 2 mg into the skin every 7 days. 3 mL 12    tretinoin (RETIN-A) 0.025 % cream Apply 1 application topically nightly.       No current facility-administered medications for this visit.       Patient Care Team:  Cherry Matute MD as PCP - General (Internal Medicine)  Cherry Matute MD (Internal Medicine)  Devyn Thomas LPN (Inactive) as Care Coordinator (Internal Medicine)       - The patient was sent an After Visit Summary virtually that lists all medications with directions, allergies, education, orders placed during this encounter and follow-up instructions.      - I have reviewed the patient's medical  information including past medical, family, and social history sections including the medications and allergies.      - We discussed the patient's current medications.     This note was created by combination of typed  and MModal dictation.  Transcription errors may be present.  If there are any questions, please contact me.       Carl Morton NP
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
## 2023-12-01 ENCOUNTER — TELEPHONE (OUTPATIENT)
Dept: HEMATOLOGY | Age: 63
End: 2023-12-01

## 2023-12-01 NOTE — TELEPHONE ENCOUNTER
Called pt to remind them of their appt on 12/04/2023 but was unable to leave message or speak to the patient due to invalid number or number was disconnected.

## 2023-12-18 ENCOUNTER — TRANSCRIBE ORDERS (OUTPATIENT)
Dept: ADMINISTRATIVE | Facility: HOSPITAL | Age: 63
End: 2023-12-18
Payer: MEDICARE

## 2023-12-19 ENCOUNTER — HOSPITAL ENCOUNTER (OUTPATIENT)
Dept: INFUSION THERAPY | Age: 63
Discharge: HOME | End: 2023-12-19
Payer: MEDICARE

## 2023-12-19 DIAGNOSIS — C21.0 ANAL CANCER (HCC): ICD-10-CM

## 2023-12-19 DIAGNOSIS — R58 BLEEDING: ICD-10-CM

## 2023-12-19 LAB
ALBUMIN SERPL-MCNC: 4.2 G/DL (ref 3.5–5.2)
ALP SERPL-CCNC: 63 U/L (ref 35–104)
ALT SERPL-CCNC: 16 U/L (ref 9–52)
ANION GAP SERPL CALCULATED.3IONS-SCNC: 6 MMOL/L (ref 7–19)
AST SERPL-CCNC: 21 U/L (ref 14–36)
BASOPHILS # BLD: 0.03 K/UL (ref 0.01–0.08)
BASOPHILS NFR BLD: 0.5 % (ref 0.1–1.2)
BILIRUB SERPL-MCNC: <0.2 MG/DL (ref 0.2–1.3)
BUN SERPL-MCNC: 11 MG/DL (ref 7–17)
CALCIUM SERPL-MCNC: 9.5 MG/DL (ref 8.4–10.2)
CEA SERPL-MCNC: 2.2 NG/ML (ref 0–4.7)
CHLORIDE SERPL-SCNC: 104 MMOL/L (ref 98–111)
CO2 SERPL-SCNC: 29 MMOL/L (ref 22–29)
CREAT SERPL-MCNC: 0.5 MG/DL (ref 0.5–1)
EOSINOPHIL # BLD: 0.11 K/UL (ref 0.04–0.54)
EOSINOPHIL NFR BLD: 1.7 % (ref 0.7–7)
ERYTHROCYTE [DISTWIDTH] IN BLOOD BY AUTOMATED COUNT: 13.4 % (ref 11.7–14.4)
FERRITIN SERPL-MCNC: 48.9 NG/ML (ref 13–150)
FOLATE SERPL-MCNC: 4.9 NG/ML (ref 4.8–37.3)
GLOBULIN: 3.2 G/DL
GLUCOSE SERPL-MCNC: 111 MG/DL (ref 74–106)
HAPTOGLOB SERPL-MCNC: 258 MG/DL (ref 30–200)
HCT VFR BLD AUTO: 37.6 % (ref 34.1–44.9)
HCT VFR BLD AUTO: 37.6 % (ref 34.1–44.9)
HGB BLD-MCNC: 12.4 G/DL (ref 11.2–15.7)
IRON SATN MFR SERPL: 24 % (ref 14–50)
IRON SERPL-MCNC: 78 UG/DL (ref 37–145)
LYMPHOCYTES # BLD: 1.55 K/UL (ref 1.18–3.74)
LYMPHOCYTES NFR BLD: 23.4 % (ref 19.3–53.1)
MCH RBC QN AUTO: 28.6 PG (ref 25.6–32.2)
MCHC RBC AUTO-ENTMCNC: 33 G/DL (ref 32.3–35.5)
MCV RBC AUTO: 86.8 FL (ref 79.4–94.8)
MONOCYTES # BLD: 0.54 K/UL (ref 0.24–0.82)
MONOCYTES NFR BLD: 8.2 % (ref 4.7–12.5)
NEUTROPHILS # BLD: 4.37 K/UL (ref 1.56–6.13)
NEUTS SEG NFR BLD: 65.9 % (ref 34–71.1)
PLATELET # BLD AUTO: 319 K/UL (ref 182–369)
PMV BLD AUTO: 10.1 FL (ref 7.4–10.4)
POTASSIUM SERPL-SCNC: 3.9 MMOL/L (ref 3.5–5.1)
PROT SERPL-MCNC: 7.3 G/DL (ref 6.3–8.2)
RBC # BLD AUTO: 4.33 M/UL (ref 3.93–5.22)
RETICS # AUTO: 0.08 M/UL (ref 0.03–0.12)
RETICS/RBC NFR: 1.74 % (ref 0.5–1.5)
SODIUM SERPL-SCNC: 139 MMOL/L (ref 137–145)
TIBC SERPL-MCNC: 327 UG/DL (ref 250–400)
VIT B12 SERPL-MCNC: 313 PG/ML (ref 211–946)
WBC # BLD AUTO: 6.62 K/UL (ref 3.98–10.04)

## 2023-12-19 PROCEDURE — 85025 COMPLETE CBC W/AUTO DIFF WBC: CPT

## 2023-12-19 PROCEDURE — 99212 OFFICE O/P EST SF 10 MIN: CPT

## 2023-12-19 PROCEDURE — 80053 COMPREHEN METABOLIC PANEL: CPT

## 2023-12-19 PROCEDURE — 85045 AUTOMATED RETICULOCYTE COUNT: CPT

## 2023-12-19 PROCEDURE — 36415 COLL VENOUS BLD VENIPUNCTURE: CPT

## 2023-12-29 ENCOUNTER — TRANSCRIBE ORDERS (OUTPATIENT)
Dept: ADMINISTRATIVE | Facility: HOSPITAL | Age: 63
End: 2023-12-29
Payer: MEDICARE

## 2023-12-29 DIAGNOSIS — N82.3 FISTULA OF VAGINA TO LARGE INTESTINE: Primary | ICD-10-CM

## 2024-01-03 ENCOUNTER — HOSPITAL ENCOUNTER (OUTPATIENT)
Dept: CT IMAGING | Age: 64
Discharge: HOME | End: 2024-01-03
Payer: MEDICARE

## 2024-01-03 ENCOUNTER — TELEPHONE (OUTPATIENT)
Dept: HEMATOLOGY | Age: 64
End: 2024-01-03

## 2024-01-03 DIAGNOSIS — C21.0 ANAL CANCER (HCC): ICD-10-CM

## 2024-01-03 PROCEDURE — 71260 CT THORAX DX C+: CPT

## 2024-01-03 PROCEDURE — 74177 CT ABD & PELVIS W/CONTRAST: CPT

## 2024-01-03 PROCEDURE — 6360000004 HC RX CONTRAST MEDICATION: Performed by: INTERNAL MEDICINE

## 2024-01-03 RX ADMIN — IOPAMIDOL 75 ML: 755 INJECTION, SOLUTION INTRAVENOUS at 09:08

## 2024-01-03 NOTE — TELEPHONE ENCOUNTER
I have called patient with results of CT Abdomen and Pelvis. Patient has verbalized understand and gratitude of phone call. I have requested for patient to call if she has any questions or concerns.

## 2024-01-05 NOTE — PROGRESS NOTES
ostomy care    Return in about 4 months (around 5/9/2024) for CBC, CMP, Appointment with Dr. Stewart.   Data Unavailable      I, Milly Grewal, am pre-charting as a registered nurse for Jessica Stewart MD. Electronically signed by Milly Grewal RN on 1/9/2024 at 4:58 PM CST.  I have seen, examined and reviewed this patient medication list, appropriate labs and imaging studies. I reviewed relevant medical records and others physician’s notes. I discussed the plans of care with the patient. I answered all the questions to the patient’s satisfaction. I have also reviewed the chief complaint (CC) and part of the history (History of Present Illness (HPI), Past Family Social History (PFSH), or Review of Systems (ROS) and made changes when appropriated.       (Please note that portions of this note were completed with a voice recognition program. Efforts were made to edit the dictations but occasionally words are mis-transcribed.)Electronically signed by Jessica Stewart MD on 1/9/2024 at 3:26 PM

## 2024-01-08 ENCOUNTER — TELEPHONE (OUTPATIENT)
Dept: HEMATOLOGY | Age: 64
End: 2024-01-08

## 2024-01-09 ENCOUNTER — HOSPITAL ENCOUNTER (OUTPATIENT)
Dept: INFUSION THERAPY | Age: 64
Discharge: HOME | End: 2024-01-09
Payer: MEDICARE

## 2024-01-09 ENCOUNTER — OFFICE VISIT (OUTPATIENT)
Dept: HEMATOLOGY | Age: 64
End: 2024-01-09

## 2024-01-09 VITALS
HEIGHT: 63 IN | OXYGEN SATURATION: 96 % | TEMPERATURE: 97.4 F | DIASTOLIC BLOOD PRESSURE: 82 MMHG | HEART RATE: 92 BPM | BODY MASS INDEX: 34.2 KG/M2 | WEIGHT: 193 LBS | SYSTOLIC BLOOD PRESSURE: 122 MMHG

## 2024-01-09 DIAGNOSIS — Z85.048 ENCOUNTER FOR FOLLOW-UP SURVEILLANCE OF ANAL CANCER: ICD-10-CM

## 2024-01-09 DIAGNOSIS — Z71.89 CARE PLAN DISCUSSED WITH PATIENT: ICD-10-CM

## 2024-01-09 DIAGNOSIS — K62.5 RECTAL BLEEDING: ICD-10-CM

## 2024-01-09 DIAGNOSIS — C21.0 ANAL CANCER (HCC): Primary | ICD-10-CM

## 2024-01-09 DIAGNOSIS — C21.0 ANAL CANCER (HCC): ICD-10-CM

## 2024-01-09 DIAGNOSIS — Z08 ENCOUNTER FOR FOLLOW-UP SURVEILLANCE OF ANAL CANCER: ICD-10-CM

## 2024-01-09 LAB
ALBUMIN SERPL-MCNC: 4 G/DL (ref 3.5–5.2)
ALP SERPL-CCNC: 62 U/L (ref 35–104)
ALT SERPL-CCNC: 19 U/L (ref 9–52)
ANION GAP SERPL CALCULATED.3IONS-SCNC: 7 MMOL/L (ref 7–19)
AST SERPL-CCNC: 31 U/L (ref 14–36)
BASOPHILS # BLD: 0.02 K/UL (ref 0.01–0.08)
BASOPHILS NFR BLD: 0.3 % (ref 0.1–1.2)
BILIRUB SERPL-MCNC: 0.3 MG/DL (ref 0.2–1.3)
BUN SERPL-MCNC: 8 MG/DL (ref 7–17)
CALCIUM SERPL-MCNC: 9.2 MG/DL (ref 8.4–10.2)
CHLORIDE SERPL-SCNC: 109 MMOL/L (ref 98–111)
CO2 SERPL-SCNC: 24 MMOL/L (ref 22–29)
CREAT SERPL-MCNC: 0.5 MG/DL (ref 0.5–1)
EOSINOPHIL # BLD: 0.1 K/UL (ref 0.04–0.54)
EOSINOPHIL NFR BLD: 1.7 % (ref 0.7–7)
ERYTHROCYTE [DISTWIDTH] IN BLOOD BY AUTOMATED COUNT: 13.3 % (ref 11.7–14.4)
GLOBULIN: 2.9 G/DL
GLUCOSE SERPL-MCNC: 101 MG/DL (ref 74–106)
HCT VFR BLD AUTO: 40.7 % (ref 34.1–44.9)
HGB BLD-MCNC: 13.1 G/DL (ref 11.2–15.7)
LYMPHOCYTES # BLD: 1.37 K/UL (ref 1.18–3.74)
LYMPHOCYTES NFR BLD: 23.6 % (ref 19.3–53.1)
MCH RBC QN AUTO: 27.8 PG (ref 25.6–32.2)
MCHC RBC AUTO-ENTMCNC: 32.2 G/DL (ref 32.3–35.5)
MCV RBC AUTO: 86.4 FL (ref 79.4–94.8)
MONOCYTES # BLD: 0.49 K/UL (ref 0.24–0.82)
MONOCYTES NFR BLD: 8.4 % (ref 4.7–12.5)
NEUTROPHILS # BLD: 3.8 K/UL (ref 1.56–6.13)
NEUTS SEG NFR BLD: 65.7 % (ref 34–71.1)
PLATELET # BLD AUTO: 244 K/UL (ref 182–369)
PMV BLD AUTO: 11.2 FL (ref 7.4–10.4)
POTASSIUM SERPL-SCNC: 3.8 MMOL/L (ref 3.5–5.1)
PROT SERPL-MCNC: 6.9 G/DL (ref 6.3–8.2)
RBC # BLD AUTO: 4.71 M/UL (ref 3.93–5.22)
SODIUM SERPL-SCNC: 140 MMOL/L (ref 137–145)
WBC # BLD AUTO: 5.8 K/UL (ref 3.98–10.04)

## 2024-01-09 PROCEDURE — 85025 COMPLETE CBC W/AUTO DIFF WBC: CPT

## 2024-01-09 PROCEDURE — 36415 COLL VENOUS BLD VENIPUNCTURE: CPT

## 2024-01-09 PROCEDURE — 99212 OFFICE O/P EST SF 10 MIN: CPT

## 2024-01-09 PROCEDURE — 80053 COMPREHEN METABOLIC PANEL: CPT

## 2024-01-09 RX ORDER — PANTOPRAZOLE SODIUM 40 MG/1
TABLET, DELAYED RELEASE ORAL
COMMUNITY
Start: 2023-12-21

## 2024-01-09 RX ORDER — SUCRALFATE 1 G/1
TABLET ORAL
COMMUNITY
Start: 2024-01-03

## 2024-05-06 ENCOUNTER — TELEPHONE (OUTPATIENT)
Dept: HEMATOLOGY | Age: 64
End: 2024-05-06

## 2024-05-07 NOTE — PROGRESS NOTES
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  Patient had a CT C/A/P and also endoscopy and colonoscopy. She was found to have a pseudocyst pancreas and saw dr Méndez. She had a hemorrhoidal bleeding.    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 during radiation therapy and Mitomycin D1 & D 29    Hematology/Cancer History  Darcy Walker was first seen by me on 4/20/2021.  She was referred by medical oncology, [facility] for a diagnosis of advanced anal squamous cell carcinoma. Patient has multiple comorbidities including history of hepatitis C, treated, anxiety, recurrent nephrolithiasis, currently smoker.  Had complaints of abdominal pain October 2020.  9/24/20 CT abd/pelvis: Right nephrolithiasis. A 4 mm calculus is present lower pole the right kidney.The left renal contour is visualized. There may be very subtle pelvocaliectasis on the left. No mass lesion, fluid collection or significant lymphadenopathy is seen in the pelvis.   12/22/20 CT lung screening:  No suspicious pulmonary nodules or pathologic intrathoracic lymphadenopathy. Mild ectasia ascending thoracic aorta with mild vascular calcification. Lung RADS category 1-negative exam. Continued annual screening with low-dose CT chest in 12 months recommended.  2/8/21 CT abd/pelvis (urogram): Area of decreased ureteral distention on

## 2024-05-17 ENCOUNTER — TRANSCRIBE ORDERS (OUTPATIENT)
Dept: ADMINISTRATIVE | Facility: HOSPITAL | Age: 64
End: 2024-05-17
Payer: MEDICARE

## 2024-05-17 DIAGNOSIS — D49.0 INTRADUCTAL PAPILLARY MUCINOUS NEOPLASM: Primary | ICD-10-CM

## 2024-06-06 ENCOUNTER — HOSPITAL ENCOUNTER (OUTPATIENT)
Dept: MRI IMAGING | Facility: HOSPITAL | Age: 64
Discharge: HOME | End: 2024-06-06
Payer: MEDICARE

## 2024-06-06 DIAGNOSIS — D49.0 INTRADUCTAL PAPILLARY MUCINOUS NEOPLASM: ICD-10-CM

## 2024-06-06 LAB — CREAT BLDA-MCNC: 1.2 MG/DL (ref 0.6–1.3)

## 2024-06-06 PROCEDURE — 82565 ASSAY OF CREATININE: CPT

## 2024-06-06 PROCEDURE — 0 GADOBENATE DIMEGLUMINE 529 MG/ML SOLUTION: Performed by: SURGERY

## 2024-06-06 PROCEDURE — A9577 INJ MULTIHANCE: HCPCS | Performed by: SURGERY

## 2024-06-06 PROCEDURE — 74183 MRI ABD W/O CNTR FLWD CNTR: CPT

## 2024-06-06 RX ADMIN — GADOBENATE DIMEGLUMINE 20 ML: 529 INJECTION, SOLUTION INTRAVENOUS at 11:47

## 2024-07-03 ENCOUNTER — TELEPHONE (OUTPATIENT)
Dept: HEMATOLOGY | Age: 64
End: 2024-07-03

## 2024-07-03 NOTE — TELEPHONE ENCOUNTER
I called and reminded pt. of their appt. on 07/08/2024. Made pt. aware to arrive at appt. time & not lab appt. time. I also advised pt. to arrive no sooner than appt time. Pt voiced understanding and confirmed appt. date and time.

## 2024-07-08 ENCOUNTER — TRANSCRIBE ORDERS (OUTPATIENT)
Dept: ADMINISTRATIVE | Facility: HOSPITAL | Age: 64
End: 2024-07-08
Payer: MEDICARE

## 2024-07-08 ENCOUNTER — HOSPITAL ENCOUNTER (OUTPATIENT)
Dept: INFUSION THERAPY | Age: 64
Discharge: HOME | End: 2024-07-08
Payer: MEDICARE

## 2024-07-08 ENCOUNTER — OFFICE VISIT (OUTPATIENT)
Dept: HEMATOLOGY | Age: 64
End: 2024-07-08
Payer: MEDICARE

## 2024-07-08 VITALS
WEIGHT: 190.5 LBS | BODY MASS INDEX: 33.75 KG/M2 | DIASTOLIC BLOOD PRESSURE: 80 MMHG | TEMPERATURE: 98.7 F | HEART RATE: 89 BPM | HEIGHT: 63 IN | SYSTOLIC BLOOD PRESSURE: 136 MMHG | OXYGEN SATURATION: 97 %

## 2024-07-08 DIAGNOSIS — K62.5 RECTAL BLEEDING: ICD-10-CM

## 2024-07-08 DIAGNOSIS — Z71.89 CARE PLAN DISCUSSED WITH PATIENT: ICD-10-CM

## 2024-07-08 DIAGNOSIS — C21.0 ANAL CANCER (HCC): ICD-10-CM

## 2024-07-08 DIAGNOSIS — C21.0 ANAL CANCER (HCC): Primary | ICD-10-CM

## 2024-07-08 DIAGNOSIS — C21.0: Primary | ICD-10-CM

## 2024-07-08 LAB
ALBUMIN SERPL-MCNC: 4.1 G/DL (ref 3.5–5.2)
ALP SERPL-CCNC: 69 U/L (ref 35–104)
ALT SERPL-CCNC: 27 U/L (ref 9–52)
ANION GAP SERPL CALCULATED.3IONS-SCNC: 8 MMOL/L (ref 7–19)
AST SERPL-CCNC: 30 U/L (ref 14–36)
BASOPHILS # BLD: 0.03 K/UL (ref 0.01–0.08)
BASOPHILS NFR BLD: 0.5 % (ref 0.1–1.2)
BILIRUB SERPL-MCNC: 0.5 MG/DL (ref 0.2–1.3)
BUN SERPL-MCNC: 15 MG/DL (ref 7–17)
CALCIUM SERPL-MCNC: 9.3 MG/DL (ref 8.4–10.2)
CHLORIDE SERPL-SCNC: 105 MMOL/L (ref 98–111)
CO2 SERPL-SCNC: 31 MMOL/L (ref 22–29)
CREAT SERPL-MCNC: 0.8 MG/DL (ref 0.5–1)
EOSINOPHIL # BLD: 0.11 K/UL (ref 0.04–0.54)
EOSINOPHIL NFR BLD: 1.8 % (ref 0.7–7)
ERYTHROCYTE [DISTWIDTH] IN BLOOD BY AUTOMATED COUNT: 16.1 % (ref 11.7–14.4)
GLOBULIN: 2.8 G/DL
GLUCOSE SERPL-MCNC: 82 MG/DL (ref 74–106)
HCT VFR BLD AUTO: 42.6 % (ref 34.1–44.9)
HGB BLD-MCNC: 13.4 G/DL (ref 11.2–15.7)
LYMPHOCYTES # BLD: 1.58 K/UL (ref 1.18–3.74)
LYMPHOCYTES NFR BLD: 25.6 % (ref 19.3–53.1)
MCH RBC QN AUTO: 28.3 PG (ref 25.6–32.2)
MCHC RBC AUTO-ENTMCNC: 31.5 G/DL (ref 32.3–35.5)
MCV RBC AUTO: 90.1 FL (ref 79.4–94.8)
MONOCYTES # BLD: 0.62 K/UL (ref 0.24–0.82)
MONOCYTES NFR BLD: 10 % (ref 4.7–12.5)
NEUTROPHILS # BLD: 3.83 K/UL (ref 1.56–6.13)
NEUTS SEG NFR BLD: 61.9 % (ref 34–71.1)
PLATELET # BLD AUTO: 281 K/UL (ref 182–369)
PMV BLD AUTO: 10.8 FL (ref 7.4–10.4)
POTASSIUM SERPL-SCNC: 4.3 MMOL/L (ref 3.5–5.1)
PROT SERPL-MCNC: 6.8 G/DL (ref 6.3–8.2)
RBC # BLD AUTO: 4.73 M/UL (ref 3.93–5.22)
SODIUM SERPL-SCNC: 144 MMOL/L (ref 137–145)
WBC # BLD AUTO: 6.18 K/UL (ref 3.98–10.04)

## 2024-07-08 PROCEDURE — 85025 COMPLETE CBC W/AUTO DIFF WBC: CPT

## 2024-07-08 PROCEDURE — G8417 CALC BMI ABV UP PARAM F/U: HCPCS | Performed by: INTERNAL MEDICINE

## 2024-07-08 PROCEDURE — 4004F PT TOBACCO SCREEN RCVD TLK: CPT | Performed by: INTERNAL MEDICINE

## 2024-07-08 PROCEDURE — 36415 COLL VENOUS BLD VENIPUNCTURE: CPT

## 2024-07-08 PROCEDURE — 3079F DIAST BP 80-89 MM HG: CPT | Performed by: INTERNAL MEDICINE

## 2024-07-08 PROCEDURE — G2211 COMPLEX E/M VISIT ADD ON: HCPCS | Performed by: INTERNAL MEDICINE

## 2024-07-08 PROCEDURE — 80053 COMPREHEN METABOLIC PANEL: CPT

## 2024-07-08 PROCEDURE — 3075F SYST BP GE 130 - 139MM HG: CPT | Performed by: INTERNAL MEDICINE

## 2024-07-08 PROCEDURE — G8427 DOCREV CUR MEDS BY ELIG CLIN: HCPCS | Performed by: INTERNAL MEDICINE

## 2024-07-08 PROCEDURE — 99213 OFFICE O/P EST LOW 20 MIN: CPT

## 2024-07-08 PROCEDURE — 3017F COLORECTAL CA SCREEN DOC REV: CPT | Performed by: INTERNAL MEDICINE

## 2024-07-08 PROCEDURE — 99213 OFFICE O/P EST LOW 20 MIN: CPT | Performed by: INTERNAL MEDICINE

## 2024-07-31 ENCOUNTER — SCHEDULED TELEPHONE ENCOUNTER (OUTPATIENT)
Dept: HEMATOLOGY | Age: 64
End: 2024-07-31

## 2024-07-31 DIAGNOSIS — C80.1 CANCER (HCC): Primary | ICD-10-CM

## 2024-07-31 PROCEDURE — NBSRV NON-BILLABLE SERVICE: Performed by: INTERNAL MEDICINE

## 2024-07-31 NOTE — PROGRESS NOTES
MADYSON Bush completed telephone visit with patient following a referral from Dr. Stewart. SW introduced self and explained role and source of support. Patient reports having difficulty with Depression and states she was considering seeking inpatient treatment. Patient states at this time she does not feel comfortable with inpatient treatment due to the frequency and symptoms related to chronic diarrhea. Patient states she has been confined ot her home for approximately 6 months due to diarrhea and typically only leaves her home for medical appointments. Patient relies on her sister for transportation. Patient denies SI or HI at this time. Patient voices she is willing to consider telehealth counseling or in person counseling near her home if her sister is able to provide the needed transportation. SW to follow up with the patient in 2 weeks.

## 2024-08-14 ENCOUNTER — SCHEDULED TELEPHONE ENCOUNTER (OUTPATIENT)
Dept: HEMATOLOGY | Age: 64
End: 2024-08-14

## 2024-08-14 DIAGNOSIS — Z71.9 COUNSELING, UNSPECIFIED: Primary | ICD-10-CM

## 2024-08-14 PROCEDURE — NBSRV NON-BILLABLE SERVICE: Performed by: INTERNAL MEDICINE

## 2024-08-20 ENCOUNTER — FOLLOWUP TELEPHONE ENCOUNTER (OUTPATIENT)
Dept: HEMATOLOGY | Age: 64
End: 2024-08-20

## 2024-09-17 ENCOUNTER — TELEPHONE (OUTPATIENT)
Dept: HEMATOLOGY | Age: 64
End: 2024-09-17

## 2024-12-31 NOTE — PROGRESS NOTES
MEDICAL ONCOLOGY PROGRESS NOTE                                                          Pt Name: Darcy Walker  MRN: [id number]  YOB: 1960  Date of evaluation: 1/8/2025    HISTORY OF PRESENT ILLNESS:  Reason for MD visit-toxicity assessment his disease management.  The patient is a status post completion of chemoradiation with 5-FU/mitomycin for a history of squamous cell carcinoma of the anus June 2021. 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  She has complains of occasional rectal bleeding.  She is currently having a cold.  She has significant memory issues.  She has moved to [place] to stay close to her sister.    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 during radiation therapy and Mitomycin D1 & D 29    Hematology/Cancer History  Darcy Walker was first seen by me on 4/20/2021.  She was referred by medical oncology, [facility] for a diagnosis of advanced anal squamous cell carcinoma. Patient has multiple comorbidities including history of hepatitis C, treated, anxiety, recurrent nephrolithiasis, currently smoker.  Had complaints of abdominal pain October 2020.  9/24/20 CT abd/pelvis: Right nephrolithiasis. A 4 mm calculus is present lower pole the right kidney.The left renal contour is visualized. There may be very subtle pelvocaliectasis on the left. No mass lesion, fluid collection or significant lymphadenopathy is seen in the pelvis.   12/22/20 CT lung screening:  No suspicious pulmonary nodules or pathologic intrathoracic lymphadenopathy. Mild ectasia ascending thoracic aorta with mild vascular calcification. Lung RADS category 1-negative exam. Continued annual screening with low-dose CT chest in 12 months recommended.  2/8/21 CT abd/pelvis (urogram): Area of decreased

## 2025-01-08 ENCOUNTER — TELEMEDICINE (OUTPATIENT)
Dept: HEMATOLOGY | Age: 65
End: 2025-01-08

## 2025-01-08 DIAGNOSIS — C21.0 ANAL CANCER (HCC): Primary | ICD-10-CM

## 2025-01-08 DIAGNOSIS — Z71.89 CARE PLAN DISCUSSED WITH PATIENT: ICD-10-CM

## 2025-01-08 DIAGNOSIS — K62.5 RECTAL BLEEDING: ICD-10-CM

## 2025-01-08 DIAGNOSIS — Z08 ENCOUNTER FOR FOLLOW-UP SURVEILLANCE OF ANAL CANCER: ICD-10-CM

## 2025-01-08 DIAGNOSIS — Z85.048 ENCOUNTER FOR FOLLOW-UP SURVEILLANCE OF ANAL CANCER: ICD-10-CM

## 2025-02-05 ENCOUNTER — HOSPITAL ENCOUNTER (OUTPATIENT)
Dept: CT IMAGING | Age: 65
Discharge: HOME | End: 2025-02-05
Attending: INTERNAL MEDICINE
Payer: MEDICARE

## 2025-02-05 DIAGNOSIS — C21.0 ANAL CANCER (HCC): ICD-10-CM

## 2025-02-05 LAB
CREAT SERPL-MCNC: 0.8 MG/DL (ref 0.3–1.3)
PERFORMED ON: NORMAL

## 2025-02-05 PROCEDURE — 82565 ASSAY OF CREATININE: CPT

## 2025-02-05 PROCEDURE — 6360000004 HC RX CONTRAST MEDICATION: Performed by: INTERNAL MEDICINE

## 2025-02-05 PROCEDURE — 71260 CT THORAX DX C+: CPT

## 2025-02-05 PROCEDURE — 74177 CT ABD & PELVIS W/CONTRAST: CPT

## 2025-02-05 RX ORDER — IOPAMIDOL 755 MG/ML
70 INJECTION, SOLUTION INTRAVASCULAR
Status: COMPLETED | OUTPATIENT
Start: 2025-02-05 | End: 2025-02-05

## 2025-02-05 RX ADMIN — IOPAMIDOL 70 ML: 755 INJECTION, SOLUTION INTRAVENOUS at 15:40

## 2025-02-06 NOTE — CONSULTS
[facility] Vascular Access Team:  Consult Note    Patient: Darcy Walker  YOB: 1960   MRN: [id number]  Room: Room/bed info not found     Attending Physician: Jessica Stewart MD  Ordering Physician: Jessica Stewart MD    Diagnosis:   Anal cancer (HCC) [C21.0]    Active LDAs:       Reason for Consult:  [facility] vascular access team consulted for placement of Ultrasound Guided Peripheral IV.    Indication(s):  Darcy Walker is a 65 y.o. female presenting to the radiology department for a CT with IV contrast. She reports a history of poor venous access and requiring an anesthesiologist to place PIV recently.     Findings:  Successfully placed a 22 gauge 1.75\" ultrasound guided peripheral IV in the patient's distal right forearm with one attempt and no complications. Patient tolerated well. CT tech utilized IV site for contrast injection with no signs of complications.     Impression/Plan:  1. Ultrasound-Guided Peripheral IV is ready to be used    Thank you for your time and consult.     Electronically Signed By: Pete Roy RN on 2/6/2025 at 11:06 AM

## 2025-02-10 ENCOUNTER — OFFICE VISIT (OUTPATIENT)
Dept: NEUROLOGY | Age: 65
End: 2025-02-10
Payer: MEDICARE

## 2025-02-10 VITALS
SYSTOLIC BLOOD PRESSURE: 124 MMHG | OXYGEN SATURATION: 96 % | RESPIRATION RATE: 16 BRPM | HEART RATE: 76 BPM | BODY MASS INDEX: 33.66 KG/M2 | WEIGHT: 190 LBS | DIASTOLIC BLOOD PRESSURE: 74 MMHG | HEIGHT: 63 IN

## 2025-02-10 DIAGNOSIS — R41.3 MEMORY LOSS: Primary | ICD-10-CM

## 2025-02-10 DIAGNOSIS — R25.1 TREMORS OF NERVOUS SYSTEM: ICD-10-CM

## 2025-02-10 DIAGNOSIS — Z86.59 HISTORY OF ANXIETY: ICD-10-CM

## 2025-02-10 PROCEDURE — 3017F COLORECTAL CA SCREEN DOC REV: CPT | Performed by: PSYCHIATRY & NEUROLOGY

## 2025-02-10 PROCEDURE — 1124F ACP DISCUSS-NO DSCNMKR DOCD: CPT | Performed by: PSYCHIATRY & NEUROLOGY

## 2025-02-10 PROCEDURE — 1090F PRES/ABSN URINE INCON ASSESS: CPT | Performed by: PSYCHIATRY & NEUROLOGY

## 2025-02-10 PROCEDURE — G8427 DOCREV CUR MEDS BY ELIG CLIN: HCPCS | Performed by: PSYCHIATRY & NEUROLOGY

## 2025-02-10 PROCEDURE — G8399 PT W/DXA RESULTS DOCUMENT: HCPCS | Performed by: PSYCHIATRY & NEUROLOGY

## 2025-02-10 PROCEDURE — 3078F DIAST BP <80 MM HG: CPT | Performed by: PSYCHIATRY & NEUROLOGY

## 2025-02-10 PROCEDURE — G8417 CALC BMI ABV UP PARAM F/U: HCPCS | Performed by: PSYCHIATRY & NEUROLOGY

## 2025-02-10 PROCEDURE — 99214 OFFICE O/P EST MOD 30 MIN: CPT | Performed by: PSYCHIATRY & NEUROLOGY

## 2025-02-10 PROCEDURE — 4004F PT TOBACCO SCREEN RCVD TLK: CPT | Performed by: PSYCHIATRY & NEUROLOGY

## 2025-02-10 PROCEDURE — 3074F SYST BP LT 130 MM HG: CPT | Performed by: PSYCHIATRY & NEUROLOGY

## 2025-02-10 RX ORDER — RIFAXIMIN 550 MG/1
TABLET ORAL
COMMUNITY

## 2025-02-10 RX ORDER — LOPERAMIDE HYDROCHLORIDE 2 MG/1
CAPSULE ORAL
COMMUNITY

## 2025-02-10 NOTE — PROGRESS NOTES
REVIEW OF SYSTEMS    Constitutional: []Fever []Sweat []Chills [] Recent Injury [x] Denies all unless marked  HEENT:[]Headache  [] Head Injury/Hearing Loss  [] Sore Throat  [] Ear Ache/Dizziness  [x] Denies all unless marked  Spine:  [] Neck pain  [] Back pain  [] Sciaticia  [x] Denies all unless marked  Cardiovascular:[]Heart Disease []Chest Pain [] Palpitations  [x] Denies all unless marked  Pulmonary: []Shortness of Breath []Cough   [x] Denies all unless marke  Gastrointestinal: []Nausea  []Vomiting  []Abdominal Pain  []Constipation  []Diarrhea  []Dark Bloody Stools  [x] Denies all unless marked  Psychiatric/Behavioral:[] Depression [] Anxiety [x] Denies all unless marked  Genitourinary:   [] Frequency  [] Urgency  [] Incontinence [] Pain with Urination  [x] Denies all unless marked  Extremities: []Pain  []Swelling  [x] Denies all unless marked  Musculoskeletal: [] Muscle Pain  [] Joint Pain  [] Arthritis [] Muscle Cramps [x] Muscle Twitches  [x] Denies all unless marked  Sleep: [] Insomnia [] Snoring [] Restless Legs [] Sleep Apnea  [] Daytime Sleepiness  [x] Denies all unless marked  Skin:[] Rash [] Skin Discoloration [x] Denies all unless marked   Neurological: [x]Visual Disturbance/Memory Loss [] Loss of Balance [] Slurred Speech/Weakness [] Seizures  [] Vertigo/Dizziness [x] Denies all unless marked

## 2025-02-10 NOTE — PROGRESS NOTES
Chief Complaint   Patient presents with    Memory Loss     Patient states she still having tremors under her arms. Patient states her memory is worse       Darcy Walker is a 65 y.o. year old female who is seen for evaluation of poor memory and tremors.  She was diagnosed with rectal cancer a couple of years ago and had chemo brain fog when being treated.  Also in May of 2023 she underwent an 8-hour surgical procedure for an ostomy or fistula and her memory significantly worsened.  Again in June she had this reversed and again had significant cognitive dysfunction following it.  Also at 1 point she appears to be taking too many of her medications improperly.  Has been doing better from a cognition standpoint over the last several weeks or more.  Has had some tremors intermittently but really not much in the way of that since her medications got straightened out.  She sees psychiatry for some chronic depression related to her father committing suicide about 10 years ago..  When I last saw her 7/23 she had some short-term memory issues but otherwise did well.  She was asked to come back and see me should symptoms persist or worsen.  Follow-up B12 level was normal at that time.  Complaining of worsening short-term memory at this time.  She is on oxycodone 3-4 times a day for low back pain and gabapentin 4 times a day.  Also has lots of stress and depression and takes Prozac chronically.  Active Ambulatory Problems     Diagnosis Date Noted    Lumbago 04/19/2011    Displacement of lumbar intervertebral disc without myelopathy 04/19/2011    Spinal stenosis, lumbar region, without neurogenic claudication 04/19/2011    Incontinence of bowel 03/07/2012    Dysthymia 11/25/2013    Status post placement of implantable loop recorder 02/08/2016    Left ureteral calculus 10/05/2016    Kidney stones 10/05/2016    Hypertension 03/07/2017    Family history of coronary artery disease 03/07/2017    Smoker 03/07/2017    Ureteral

## 2025-02-12 ENCOUNTER — TELEPHONE (OUTPATIENT)
Dept: NEUROLOGY | Age: 65
End: 2025-02-12
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
## 2025-02-18 ENCOUNTER — TELEPHONE (OUTPATIENT)
Dept: NEUROLOGY | Age: 65
End: 2025-02-18

## 2025-02-18 NOTE — TELEPHONE ENCOUNTER
Left vm for patient to call our office back to where we can get her memory test appointment rescheduled. Due to the office being closed due to weather.

## 2025-02-20 NOTE — TELEPHONE ENCOUNTER
Arrives ambulatory with c/o pain and numbness to right leg for the past 3 days.    Has been seeing md about same.  Alert and oriented.   Per Ted's last note, KUB order ok to place. Order placed.

## 2025-02-24 ENCOUNTER — TELEPHONE (OUTPATIENT)
Dept: HEMATOLOGY | Age: 65
End: 2025-02-24

## 2025-02-24 DIAGNOSIS — N28.89 RENAL DILATATION: Primary | ICD-10-CM

## 2025-02-24 NOTE — PROGRESS NOTES
Referral for urology placed at this time. Electronically signed by Milly Grewal RN on 2/24/2025 at 12:51 PM

## 2025-02-24 NOTE — TELEPHONE ENCOUNTER
I called patient this morning. Patient asked for a return phone call. I attempted to call at this time and left patient a message to reach out to me when she could. Electronically signed by Milly Grewal RN on 2/24/2025 at 12:46 PM

## 2025-02-25 ENCOUNTER — TELEPHONE (OUTPATIENT)
Dept: HEMATOLOGY | Age: 65
End: 2025-02-25

## 2025-02-25 DIAGNOSIS — N20.0 KIDNEY STONES: Primary | ICD-10-CM

## 2025-02-25 NOTE — TELEPHONE ENCOUNTER
Called and spoke with patient at this time. Patient notified of CT scans, urology appt, and order for CMP. Patient notified that at the [facility] lab is a walk in. Patient can go at her convenience to the lab. Patient verbalized understanding. Electronically signed by Milly Grewal RN on 2/25/2025 at 8:28 AM

## 2025-02-28 ENCOUNTER — HOSPITAL ENCOUNTER (OUTPATIENT)
Dept: GENERAL RADIOLOGY | Age: 65
Discharge: HOME | End: 2025-02-28
Payer: MEDICARE

## 2025-02-28 DIAGNOSIS — N28.89 RENAL DILATATION: Primary | ICD-10-CM

## 2025-02-28 DIAGNOSIS — N20.0 KIDNEY STONE: ICD-10-CM

## 2025-02-28 DIAGNOSIS — N28.89 RENAL DILATATION: ICD-10-CM

## 2025-02-28 DIAGNOSIS — N20.0 KIDNEY STONES: ICD-10-CM

## 2025-02-28 LAB
ALBUMIN SERPL-MCNC: 4.5 G/DL (ref 3.5–5.2)
ALP SERPL-CCNC: 69 U/L (ref 35–104)
ALT SERPL-CCNC: 18 U/L (ref 5–33)
ANION GAP SERPL CALCULATED.3IONS-SCNC: 13 MMOL/L (ref 8–16)
AST SERPL-CCNC: 23 U/L (ref 5–32)
BILIRUB SERPL-MCNC: 0.3 MG/DL (ref 0.2–1.2)
BUN SERPL-MCNC: 16 MG/DL (ref 8–23)
CALCIUM SERPL-MCNC: 10.2 MG/DL (ref 8.8–10.2)
CHLORIDE SERPL-SCNC: 99 MMOL/L (ref 98–107)
CO2 SERPL-SCNC: 23 MMOL/L (ref 22–29)
CREAT SERPL-MCNC: 0.8 MG/DL (ref 0.5–0.9)
GLUCOSE SERPL-MCNC: 109 MG/DL (ref 70–99)
POTASSIUM SERPL-SCNC: 4.5 MMOL/L (ref 3.5–5.1)
PROT SERPL-MCNC: 8 G/DL (ref 6.4–8.3)
SODIUM SERPL-SCNC: 135 MMOL/L (ref 136–145)

## 2025-02-28 PROCEDURE — 74018 RADEX ABDOMEN 1 VIEW: CPT

## 2025-03-03 ENCOUNTER — TELEPHONE (OUTPATIENT)
Dept: NEUROLOGY | Age: 65
End: 2025-03-03

## 2025-03-03 ENCOUNTER — OFFICE VISIT (OUTPATIENT)
Dept: UROLOGY | Age: 65
End: 2025-03-03
Payer: MEDICARE

## 2025-03-03 ENCOUNTER — PREP FOR PROCEDURE (OUTPATIENT)
Dept: UROLOGY | Age: 65
End: 2025-03-03

## 2025-03-03 VITALS — BODY MASS INDEX: 31.36 KG/M2 | HEIGHT: 63 IN | TEMPERATURE: 97.1 F | WEIGHT: 177 LBS

## 2025-03-03 DIAGNOSIS — N13.5 STRICTURE OF LEFT URETER: ICD-10-CM

## 2025-03-03 DIAGNOSIS — N20.0 LEFT RENAL STONE: Primary | ICD-10-CM

## 2025-03-03 DIAGNOSIS — N13.5 URETERAL STRICTURE, LEFT: ICD-10-CM

## 2025-03-03 DIAGNOSIS — N20.1 LEFT URETERAL STONE: ICD-10-CM

## 2025-03-03 LAB
BACTERIA URINE, POC: ABNORMAL
BILIRUBIN URINE: 3 MG/DL
BLOOD, URINE: POSITIVE
CASTS URINE, POC: ABNORMAL
CLARITY, UA: CLEAR
COLOR, UA: YELLOW
CRYSTALS URINE, POC: ABNORMAL
EPI CELLS URINE, POC: 1
GLUCOSE URINE: ABNORMAL
KETONES, URINE: POSITIVE
LEUKOCYTE EST, POC: ABNORMAL
NITRITE, URINE: POSITIVE
PH UA: 5.5 (ref 4.5–8)
PROTEIN UA: ABNORMAL
RBC URINE, POC: >200
SPECIFIC GRAVITY UA: 1.03 (ref 1–1.03)
UROBILINOGEN, URINE: ABNORMAL
WBC URINE, POC: 1
YEAST URINE, POC: ABNORMAL

## 2025-03-03 PROCEDURE — 1090F PRES/ABSN URINE INCON ASSESS: CPT | Performed by: NURSE PRACTITIONER

## 2025-03-03 PROCEDURE — G8417 CALC BMI ABV UP PARAM F/U: HCPCS | Performed by: NURSE PRACTITIONER

## 2025-03-03 PROCEDURE — 4004F PT TOBACCO SCREEN RCVD TLK: CPT | Performed by: NURSE PRACTITIONER

## 2025-03-03 PROCEDURE — 81001 URINALYSIS AUTO W/SCOPE: CPT | Performed by: NURSE PRACTITIONER

## 2025-03-03 PROCEDURE — 99204 OFFICE O/P NEW MOD 45 MIN: CPT | Performed by: NURSE PRACTITIONER

## 2025-03-03 PROCEDURE — G8399 PT W/DXA RESULTS DOCUMENT: HCPCS | Performed by: NURSE PRACTITIONER

## 2025-03-03 PROCEDURE — P9612 CATHETERIZE FOR URINE SPEC: HCPCS | Performed by: NURSE PRACTITIONER

## 2025-03-03 PROCEDURE — G8427 DOCREV CUR MEDS BY ELIG CLIN: HCPCS | Performed by: NURSE PRACTITIONER

## 2025-03-03 PROCEDURE — 1124F ACP DISCUSS-NO DSCNMKR DOCD: CPT | Performed by: NURSE PRACTITIONER

## 2025-03-03 PROCEDURE — 3017F COLORECTAL CA SCREEN DOC REV: CPT | Performed by: NURSE PRACTITIONER

## 2025-03-03 RX ORDER — ONDANSETRON 8 MG/1
8 TABLET, ORALLY DISINTEGRATING ORAL EVERY 8 HOURS PRN
Qty: 30 TABLET | Refills: 0 | Status: SHIPPED | OUTPATIENT
Start: 2025-03-03

## 2025-03-03 RX ORDER — TAMSULOSIN HYDROCHLORIDE 0.4 MG/1
0.4 CAPSULE ORAL NIGHTLY
Qty: 30 CAPSULE | Refills: 1 | Status: SHIPPED | OUTPATIENT
Start: 2025-03-03

## 2025-03-03 ASSESSMENT — ENCOUNTER SYMPTOMS
VOMITING: 0
ABDOMINAL PAIN: 0
ABDOMINAL DISTENTION: 0
NAUSEA: 1
BACK PAIN: 1

## 2025-03-03 NOTE — H&P (VIEW-ONLY)
AMARIS LOVE - CNP    All information inputted into the note by the MA to include chief complaint, past medical history, past surgical history, medications, allergies, social and family history and review of systems has been reviewed and updated as needed by me.    EMR Dragon/transcription disclaimer: Much of this document is electronic transcription/translation of spoken language to printed text. The electronic translation of spoken language may be erroneous or, at times, nonsensical words or phrases may be inadvertently transcribed. Although I have reviewed the document for such errors, some may still exist.

## 2025-03-03 NOTE — PROGRESS NOTES
Patient complained of PAIN. After discussing with RADHA LOVE I was given verbal orders to obtain cath urine specimen.     After obtaining consent from patient. Patient was then placed in the dorsal lithotomy position. Using sterile technique patient is carefully prepped with Betadine around the urethra. A pediatric catheterization kit is used which contains an approximate 5 Bhutanese catheter. Urethral Catheter is introduced into the urinary bladder. Urine specimen is obtained and sent to the lab for culture and sensitivity. Patient tolerated procedure well.     RADHA LOVE was in office at time of procedure.     
mg/dL     RBC Urine, POC >200     WBC Urine, POC 1     Bacteria Urine, POC TRACE     yeast urine, poc      Casts Urine, POC      Epi Cells Urine, POC 1     crystals urine, poc         IMAGING:  IMPRESSION:  1.  Biliary ductal dilatation post cholecystectomy.  Biliary ductal dilatation has increased since prior study.  Reference measurements above.  2.  6 mm stone within the dependent left renal pelvis.  Mild dilatation of the left renal pelvis.  The left renal calyces remain nondistended.  The right kidney is unremarkable.  3.  No evidence of recurrent or metastatic disease.      All CT scans are performed using dose optimization techniques as appropriate to the performed exam and include   at least one of the following: Automated exposure control, adjustment of the mA and/or kV according to size, and the use of iterative reconstruction technique.      ______________________________________   Electronically signed by: TAMIKA ALVAREZ M.D.  Date:     02/22/2025  Time:    04:56     I have reviewed CT imaging.  There is a stone located in the left renal pelvis measured by me to be about 7.3 x 6.8 x 7.1 mm.  There is some mild hydronephrosis as well as some urothelial thickening/hydroureter down to the level about L4-L5.  No renal cyst or masses.  No evidence of nephrolithiasis on the right.  Questionable right distal ureteral stone, however, when comparing CT images all the way back to 2022, this does appear to be a vascular calcification.    Patient also had KUB obtained prior to her appointment today.  There is significant bowel artifact, but the stone is visible.    ASSESSMENT/PLAN  1. Left renal stone  2. Ureteral stricture, left  Patient presents with a left renal stone.  Based on the stone location and severity of symptoms the patient has elected to proceed with ureteroscopic management of the stone with a staged procedure.  She does have known history of a left ureteral stricture.  Because of this, she is not a

## 2025-03-03 NOTE — H&P (VIEW-ONLY)
good candidate for ESWL.  She will require cystoscopy with left ureteral stent placement for a period of passive dilation then approximately 2 weeks later will need definitive treatment of her stone with left ureteroscopy, laser lithotripsy, stone basket extraction.  I discussed this with the patient and her family member.  She has not tolerated the stent well in the past, so is somewhat anxious about this, however, is ready to proceed.  Her urinalysis does appear contaminated today, however, as we are planning for surgical intervention, we will go ahead and collect a catheterized urine specimen as well as a Diatherix swab for culture today.  This will leave plenty of time for treatment of any bacteria present prior to intervention    We have discussed other modalities of treatment, including but not limited to a trial of medical expulsion therapy, ESWL, and monitoring the stone with subsequent imaging.  Risks of the procedure were discussed which include but are not limited to bleeding, infection, postprocedural pain, damage to the kidney, ureter, bladder, or urethra, stricturing or perforation of the ureter which may require additional procedures, pain arising from the postoperative stent, failure to remove the stone, etc.    The patient has been provided with preoperative arrival times/lab work and has been instructed to strain urine prior to the procedure.   - tamsulosin (FLOMAX) 0.4 MG capsule; Take 1 capsule by mouth at bedtime  Dispense: 30 capsule; Refill: 1  - Culture, Urine  - POCT Urinalysis Dipstick w/ Micro (Auto)    At least 46 minutes were spent on the day of the visit reviewing the patient's past medical records/imaging, speaking face to face with the patient, and charting in the post visit period.    Orders Placed This Encounter   Procedures    Culture, Urine    POCT Urinalysis Dipstick w/ Micro (Auto)        Return for surgery.    An electronic signature was used to authenticate this note.    RADHA

## 2025-03-03 NOTE — TELEPHONE ENCOUNTER
Darcy's sister  called to reschedule an appointment for  nurse visit  . Appt was cancelled due to weather, per sister needs to be seen for cognitive test.  [facility] was unable to accommodate in the time frame needed. Please be advised that the best time to call Anytime.    Thank you.

## 2025-03-05 ENCOUNTER — TELEPHONE (OUTPATIENT)
Dept: UROLOGY | Age: 65
End: 2025-03-05

## 2025-03-05 DIAGNOSIS — N13.5 URETERAL STRICTURE, LEFT: Primary | ICD-10-CM

## 2025-03-05 DIAGNOSIS — N20.0 LEFT RENAL STONE: ICD-10-CM

## 2025-03-05 RX ORDER — CEFDINIR 300 MG/1
300 CAPSULE ORAL 2 TIMES DAILY
Qty: 28 CAPSULE | Refills: 0 | Status: SHIPPED | OUTPATIENT
Start: 2025-03-05 | End: 2025-03-19

## 2025-03-05 NOTE — TELEPHONE ENCOUNTER
Estlea left  regarding patient prescription. Stated someone called stating she had infection and that prescription would be sent in. Prescription should go to [facility] in [place] and they stated they have not received anything. Please contact Esetla at [phone].

## 2025-03-06 LAB
BACTERIA UR CULT: ABNORMAL
ORGANISM: ABNORMAL
ORGANISM: ABNORMAL

## 2025-03-10 ENCOUNTER — TELEPHONE (OUTPATIENT)
Dept: NEUROLOGY | Age: 65
End: 2025-03-10

## 2025-03-10 NOTE — TELEPHONE ENCOUNTER
Patient cancel her Neurocognitive Test patient sick will called back to rescheduled when she feels better.      Thank you

## 2025-03-12 ENCOUNTER — APPOINTMENT (OUTPATIENT)
Dept: GENERAL RADIOLOGY | Age: 65
End: 2025-03-12
Payer: MEDICARE

## 2025-03-12 ENCOUNTER — HOSPITAL ENCOUNTER (EMERGENCY)
Age: 65
Discharge: HOME | End: 2025-03-12
Attending: EMERGENCY MEDICINE
Payer: MEDICARE

## 2025-03-12 ENCOUNTER — HOSPITAL ENCOUNTER (OUTPATIENT)
Dept: PREADMISSION TESTING | Age: 65
Discharge: HOME | End: 2025-03-16
Payer: MEDICARE

## 2025-03-12 ENCOUNTER — RESULTS FOLLOW-UP (OUTPATIENT)
Dept: PREADMISSION TESTING | Age: 65
End: 2025-03-12

## 2025-03-12 ENCOUNTER — APPOINTMENT (OUTPATIENT)
Dept: CT IMAGING | Age: 65
End: 2025-03-12
Payer: MEDICARE

## 2025-03-12 VITALS
WEIGHT: 177 LBS | OXYGEN SATURATION: 98 % | RESPIRATION RATE: 18 BRPM | TEMPERATURE: 98.6 F | HEIGHT: 63 IN | DIASTOLIC BLOOD PRESSURE: 80 MMHG | SYSTOLIC BLOOD PRESSURE: 141 MMHG | HEART RATE: 84 BPM | BODY MASS INDEX: 31.36 KG/M2

## 2025-03-12 VITALS — BODY MASS INDEX: 31.36 KG/M2 | WEIGHT: 177 LBS | HEIGHT: 63 IN

## 2025-03-12 DIAGNOSIS — R19.7 NAUSEA VOMITING AND DIARRHEA: ICD-10-CM

## 2025-03-12 DIAGNOSIS — R07.9 CHEST PAIN, UNSPECIFIED TYPE: Primary | ICD-10-CM

## 2025-03-12 DIAGNOSIS — N39.0 URINARY TRACT INFECTION WITHOUT HEMATURIA, SITE UNSPECIFIED: ICD-10-CM

## 2025-03-12 DIAGNOSIS — N20.0 KIDNEY STONE: ICD-10-CM

## 2025-03-12 DIAGNOSIS — N13.5 URETERAL STRICTURE, LEFT: ICD-10-CM

## 2025-03-12 DIAGNOSIS — R11.2 NAUSEA VOMITING AND DIARRHEA: ICD-10-CM

## 2025-03-12 DIAGNOSIS — N20.0 LEFT RENAL STONE: ICD-10-CM

## 2025-03-12 LAB
ALBUMIN SERPL-MCNC: 3.6 G/DL (ref 3.5–5.2)
ALBUMIN SERPL-MCNC: 4.5 G/DL (ref 3.5–5.2)
ALP SERPL-CCNC: 53 U/L (ref 35–104)
ALP SERPL-CCNC: 69 U/L (ref 35–104)
ALT SERPL-CCNC: 12 U/L (ref 10–35)
ALT SERPL-CCNC: 8 U/L (ref 10–35)
ANION GAP SERPL CALCULATED.3IONS-SCNC: 14 MMOL/L (ref 8–16)
ANION GAP SERPL CALCULATED.3IONS-SCNC: 78 MMOL/L (ref 8–16)
AST SERPL-CCNC: 18 U/L (ref 10–35)
AST SERPL-CCNC: 24 U/L (ref 10–35)
BACTERIA URNS QL MICRO: ABNORMAL /HPF
BASOPHILS # BLD: 0 K/UL (ref 0–0.2)
BASOPHILS # BLD: 0 K/UL (ref 0–0.2)
BASOPHILS NFR BLD: 0.3 % (ref 0–1)
BASOPHILS NFR BLD: 0.3 % (ref 0–1)
BILIRUB SERPL-MCNC: 0.3 MG/DL (ref 0.2–1.2)
BILIRUB SERPL-MCNC: 0.3 MG/DL (ref 0.2–1.2)
BILIRUB UR QL STRIP: NEGATIVE
BUN SERPL-MCNC: 24 MG/DL (ref 8–23)
BUN SERPL-MCNC: 27 MG/DL (ref 8–23)
CALCIUM SERPL-MCNC: 8.2 MG/DL (ref 8.8–10.2)
CALCIUM SERPL-MCNC: 9.9 MG/DL (ref 8.8–10.2)
CHLORIDE SERPL-SCNC: 70 MMOL/L (ref 98–107)
CHLORIDE SERPL-SCNC: 93 MMOL/L (ref 98–107)
CLARITY UR: CLEAR
CO2 SERPL-SCNC: 17 MMOL/L (ref 22–29)
CO2 SERPL-SCNC: 28 MMOL/L (ref 22–29)
COLOR UR: YELLOW
CREAT SERPL-MCNC: 0.7 MG/DL (ref 0.5–0.9)
CREAT SERPL-MCNC: 0.8 MG/DL (ref 0.5–0.9)
CRYSTALS URNS MICRO: ABNORMAL /HPF
D DIMER PPP FEU-MCNC: <0.27 UG/ML FEU (ref 0–0.48)
EOSINOPHIL # BLD: 0 K/UL (ref 0–0.6)
EOSINOPHIL # BLD: 0.1 K/UL (ref 0–0.6)
EOSINOPHIL NFR BLD: 0.3 % (ref 0–5)
EOSINOPHIL NFR BLD: 0.9 % (ref 0–5)
EPI CELLS #/AREA URNS AUTO: 1 /HPF (ref 0–5)
ERYTHROCYTE [DISTWIDTH] IN BLOOD BY AUTOMATED COUNT: 16.4 % (ref 11.5–14.5)
ERYTHROCYTE [DISTWIDTH] IN BLOOD BY AUTOMATED COUNT: 16.6 % (ref 11.5–14.5)
GLUCOSE SERPL-MCNC: 109 MG/DL (ref 70–99)
GLUCOSE SERPL-MCNC: 119 MG/DL (ref 70–99)
GLUCOSE UR STRIP.AUTO-MCNC: NEGATIVE MG/DL
HCT VFR BLD AUTO: 46.4 % (ref 37–47)
HCT VFR BLD AUTO: 46.9 % (ref 37–47)
HGB BLD-MCNC: 15.4 G/DL (ref 12–16)
HGB BLD-MCNC: 15.4 G/DL (ref 12–16)
HGB UR STRIP.AUTO-MCNC: ABNORMAL MG/L
HYALINE CASTS #/AREA URNS AUTO: 46 /HPF (ref 0–8)
IMM GRANULOCYTES # BLD: 0 K/UL
IMM GRANULOCYTES # BLD: 0 K/UL
KETONES UR STRIP.AUTO-MCNC: 15 MG/DL
LACTATE BLDV-SCNC: 1.7 MMOL/L (ref 0.5–1.9)
LACTATE BLDV-SCNC: 2.1 MMOL/L (ref 0.5–1.9)
LEUKOCYTE ESTERASE UR QL STRIP.AUTO: ABNORMAL
LIPASE SERPL-CCNC: 14 U/L (ref 13–60)
LYMPHOCYTES # BLD: 1.5 K/UL (ref 1.1–4.5)
LYMPHOCYTES # BLD: 1.6 K/UL (ref 1.1–4.5)
LYMPHOCYTES NFR BLD: 21.5 % (ref 20–40)
LYMPHOCYTES NFR BLD: 22 % (ref 20–40)
MAGNESIUM SERPL-MCNC: 1.8 MG/DL (ref 1.6–2.4)
MCH RBC QN AUTO: 29.2 PG (ref 27–31)
MCH RBC QN AUTO: 29.3 PG (ref 27–31)
MCHC RBC AUTO-ENTMCNC: 32.8 G/DL (ref 33–37)
MCHC RBC AUTO-ENTMCNC: 33.2 G/DL (ref 33–37)
MCV RBC AUTO: 88.2 FL (ref 81–99)
MCV RBC AUTO: 89 FL (ref 81–99)
MONOCYTES # BLD: 0.5 K/UL (ref 0–0.9)
MONOCYTES # BLD: 0.5 K/UL (ref 0–0.9)
MONOCYTES NFR BLD: 6.5 % (ref 0–10)
MONOCYTES NFR BLD: 6.7 % (ref 0–10)
NEUTROPHILS # BLD: 4.9 K/UL (ref 1.5–7.5)
NEUTROPHILS # BLD: 5.4 K/UL (ref 1.5–7.5)
NEUTS SEG NFR BLD: 69.9 % (ref 50–65)
NEUTS SEG NFR BLD: 70.7 % (ref 50–65)
NITRITE UR QL STRIP.AUTO: POSITIVE
PH UR STRIP.AUTO: 6 [PH] (ref 5–8)
PLATELET # BLD AUTO: 271 K/UL (ref 130–400)
PLATELET # BLD AUTO: 288 K/UL (ref 130–400)
PMV BLD AUTO: 12.5 FL (ref 9.4–12.3)
PMV BLD AUTO: 12.5 FL (ref 9.4–12.3)
POTASSIUM SERPL-SCNC: 3.1 MMOL/L (ref 3.5–5.1)
POTASSIUM SERPL-SCNC: 3.6 MMOL/L (ref 3.5–5.1)
PROT SERPL-MCNC: 5.8 G/DL (ref 6.4–8.3)
PROT SERPL-MCNC: 8 G/DL (ref 6.4–8.3)
PROT UR STRIP.AUTO-MCNC: 30 MG/DL
RBC # BLD AUTO: 5.26 M/UL (ref 4.2–5.4)
RBC # BLD AUTO: 5.27 M/UL (ref 4.2–5.4)
RBC #/AREA URNS AUTO: 16 /HPF (ref 0–4)
SODIUM SERPL-SCNC: 135 MMOL/L (ref 136–145)
SODIUM SERPL-SCNC: 165 MMOL/L (ref 136–145)
SP GR UR STRIP.AUTO: >=1.045 (ref 1–1.03)
T4 FREE SERPL-MCNC: 1.83 NG/DL (ref 0.93–1.7)
TROPONIN, HIGH SENSITIVITY: 8 NG/L (ref 0–14)
TROPONIN, HIGH SENSITIVITY: 9 NG/L (ref 0–14)
TSH SERPL DL<=0.005 MIU/L-ACNC: 0.99 UIU/ML (ref 0.27–4.2)
UROBILINOGEN UR STRIP.AUTO-MCNC: 1 E.U./DL
WBC # BLD AUTO: 7 K/UL (ref 4.8–10.8)
WBC # BLD AUTO: 7.6 K/UL (ref 4.8–10.8)
WBC #/AREA URNS AUTO: 168 /HPF (ref 0–5)

## 2025-03-12 PROCEDURE — 85379 FIBRIN DEGRADATION QUANT: CPT

## 2025-03-12 PROCEDURE — 83605 ASSAY OF LACTIC ACID: CPT

## 2025-03-12 PROCEDURE — 84439 ASSAY OF FREE THYROXINE: CPT

## 2025-03-12 PROCEDURE — 84484 ASSAY OF TROPONIN QUANT: CPT

## 2025-03-12 PROCEDURE — 74177 CT ABD & PELVIS W/CONTRAST: CPT

## 2025-03-12 PROCEDURE — 87086 URINE CULTURE/COLONY COUNT: CPT

## 2025-03-12 PROCEDURE — 83690 ASSAY OF LIPASE: CPT

## 2025-03-12 PROCEDURE — 2580000003 HC RX 258: Performed by: EMERGENCY MEDICINE

## 2025-03-12 PROCEDURE — 85025 COMPLETE CBC W/AUTO DIFF WBC: CPT

## 2025-03-12 PROCEDURE — 93005 ELECTROCARDIOGRAM TRACING: CPT | Performed by: ANESTHESIOLOGY

## 2025-03-12 PROCEDURE — 96374 THER/PROPH/DIAG INJ IV PUSH: CPT

## 2025-03-12 PROCEDURE — 6360000002 HC RX W HCPCS: Performed by: EMERGENCY MEDICINE

## 2025-03-12 PROCEDURE — 81001 URINALYSIS AUTO W/SCOPE: CPT

## 2025-03-12 PROCEDURE — 93005 ELECTROCARDIOGRAM TRACING: CPT | Performed by: EMERGENCY MEDICINE

## 2025-03-12 PROCEDURE — 71045 X-RAY EXAM CHEST 1 VIEW: CPT

## 2025-03-12 PROCEDURE — 83735 ASSAY OF MAGNESIUM: CPT

## 2025-03-12 PROCEDURE — 80053 COMPREHEN METABOLIC PANEL: CPT

## 2025-03-12 PROCEDURE — 6360000004 HC RX CONTRAST MEDICATION: Performed by: EMERGENCY MEDICINE

## 2025-03-12 PROCEDURE — 99285 EMERGENCY DEPT VISIT HI MDM: CPT

## 2025-03-12 PROCEDURE — 36415 COLL VENOUS BLD VENIPUNCTURE: CPT

## 2025-03-12 PROCEDURE — 87040 BLOOD CULTURE FOR BACTERIA: CPT

## 2025-03-12 PROCEDURE — 84443 ASSAY THYROID STIM HORMONE: CPT

## 2025-03-12 RX ORDER — VALACYCLOVIR HYDROCHLORIDE 1 G/1
1000 TABLET, FILM COATED ORAL 2 TIMES DAILY PRN
COMMUNITY

## 2025-03-12 RX ORDER — ZOLPIDEM TARTRATE 5 MG/1
5 TABLET ORAL NIGHTLY PRN
COMMUNITY

## 2025-03-12 RX ORDER — 0.9 % SODIUM CHLORIDE 0.9 %
1000 INTRAVENOUS SOLUTION INTRAVENOUS ONCE
Status: COMPLETED | OUTPATIENT
Start: 2025-03-12 | End: 2025-03-12

## 2025-03-12 RX ORDER — ONDANSETRON 2 MG/ML
4 INJECTION INTRAMUSCULAR; INTRAVENOUS ONCE
Status: COMPLETED | OUTPATIENT
Start: 2025-03-12 | End: 2025-03-12

## 2025-03-12 RX ORDER — IOPAMIDOL 755 MG/ML
70 INJECTION, SOLUTION INTRAVASCULAR
Status: COMPLETED | OUTPATIENT
Start: 2025-03-12 | End: 2025-03-12

## 2025-03-12 RX ADMIN — ONDANSETRON 4 MG: 2 INJECTION, SOLUTION INTRAMUSCULAR; INTRAVENOUS at 19:15

## 2025-03-12 RX ADMIN — IOPAMIDOL 70 ML: 755 INJECTION, SOLUTION INTRAVENOUS at 19:37

## 2025-03-12 RX ADMIN — SODIUM CHLORIDE 1000 ML: 9 INJECTION, SOLUTION INTRAVENOUS at 18:32

## 2025-03-12 ASSESSMENT — ENCOUNTER SYMPTOMS
DIARRHEA: 1
ABDOMINAL PAIN: 0
EYE PAIN: 0
SHORTNESS OF BREATH: 0
BLOOD IN STOOL: 0
VOMITING: 1
NAUSEA: 1
CHEST TIGHTNESS: 1

## 2025-03-12 NOTE — DISCHARGE INSTRUCTIONS
will need to be removed prior to your surgery. This includes wedding rings. Any metal touching your body can cause a burn or may have to be cut off due to swelling or injury.    Do not wear fingernail polish or make-up.    It is best not to bring any valuables with you.    If you are to stay in the hospital overnight, bring your robe, slippers and personal toiletries that you may need.    POSTOPERATIVE GUIDELINES AFTER RECEIVING ANESTHESIA    If you are to go home after your surgery, you will need a responsible adult to drive you home.     You will not be able to take public transportation after your discharge from the Operative Care Unit unless you are accompanied by a        responsible adult.    On returning home, be sure to follow your physician's orders regarding diet, activity and medications.    Remember, surgery with general anesthesia or sedation may leave you sleepy, very tired and with a decreased appetite for 12 to 24 hours.    If you develop any post-surgical complications or problems, call your surgeon or [facility] ([phone]).
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
## 2025-03-13 ENCOUNTER — ANESTHESIA EVENT (OUTPATIENT)
Dept: OPERATING ROOM | Age: 65
End: 2025-03-13
Payer: MEDICARE

## 2025-03-13 ENCOUNTER — TELEPHONE (OUTPATIENT)
Dept: UROLOGY | Age: 65
End: 2025-03-13

## 2025-03-13 LAB
BACTERIA BLD CULT ORG #2: NORMAL
BACTERIA BLD CULT: NORMAL

## 2025-03-13 NOTE — TELEPHONE ENCOUNTER
Patient sister, Estela, called on behalf of patient stating that she received a call from Jenni last night advising her to take patient to ER due to labs results from preoperative lab work. She stated they saw Dr. Triana and he wanted he to contact office this morning for Jenni to review work up from the ER. Main complaints from sister were that she was experiencing extreme nausea/vomiting but was given Zofran.    He did mention a referral to Cardiology but didn't feel he need to keep her at ER. Cardiology has been contacted and they are wanting to schedule but she is wanting to know if this is okay to wait until after surgery on 3/19/2025?

## 2025-03-14 LAB
BACTERIA UR CULT: NORMAL
EKG P AXIS: 66 DEGREES
EKG P AXIS: 68 DEGREES
EKG P AXIS: 70 DEGREES
EKG P-R INTERVAL: 144 MS
EKG P-R INTERVAL: 146 MS
EKG P-R INTERVAL: 146 MS
EKG Q-T INTERVAL: 412 MS
EKG Q-T INTERVAL: 416 MS
EKG Q-T INTERVAL: 428 MS
EKG QRS DURATION: 100 MS
EKG QRS DURATION: 100 MS
EKG QRS DURATION: 92 MS
EKG QTC CALCULATION (BAZETT): 433 MS
EKG QTC CALCULATION (BAZETT): 435 MS
EKG QTC CALCULATION (BAZETT): 435 MS
EKG T AXIS: 62 DEGREES
EKG T AXIS: 63 DEGREES
EKG T AXIS: 72 DEGREES

## 2025-03-14 PROCEDURE — 93010 ELECTROCARDIOGRAM REPORT: CPT | Performed by: INTERNAL MEDICINE

## 2025-03-17 ENCOUNTER — TELEPHONE (OUTPATIENT)
Dept: UROLOGY | Age: 65
End: 2025-03-17

## 2025-03-17 LAB
BACTERIA BLD CULT ORG #2: NORMAL
BACTERIA BLD CULT: NORMAL

## 2025-03-17 NOTE — TELEPHONE ENCOUNTER
Called PAT in regards to repeat labs on patient per Anesthesia due to patient being seen in the ER with chest pain  PAT is going to have a repeat CMP done the morning of her surgery.     The labs was abnormal at the beginning of her ER visit but became normal.  They repeated the labs prior to discharging patient from the ER

## 2025-03-18 ENCOUNTER — TELEPHONE (OUTPATIENT)
Dept: UROLOGY | Age: 65
End: 2025-03-18

## 2025-03-18 NOTE — TELEPHONE ENCOUNTER
Patient's sister Shanita called saying patient is still having diarrhea. It appears she was having this when she was seen in the ER. They are unsure if she has c-diff or is she was checked for it, as she has a history of it. She is scheduled for surgery with Dr. Maguire tomorrow. Please advise. Sister states she has also called her GI office but have not heard back from them.

## 2025-03-18 NOTE — TELEPHONE ENCOUNTER
Spoke with AMARIS Lorenz and she said if there was concern for c. Diff they would have checked for that in the ER. Her diarrhea could be from the antibiotics she has been on for UTI. Advised them to show up for surgery as scheduled and to just let them know they get there and anesthesia can make that decision. Patient's sister voiced understanding.

## 2025-03-18 NOTE — TELEPHONE ENCOUNTER
Edwardo Even, patient missed follow up. She is calling for ct results. What is your recommendation for her? Size Of Lesion In Cm (Optional): 0 Detail Level: Detailed Morphology Per Location (Optional): Treated with MOHS by Dr. Adam 07/05/2016 Morphology Per Location (Optional): sBCC treated with EDC by Dr. Bright on 4/14/2021

## 2025-03-19 ENCOUNTER — APPOINTMENT (OUTPATIENT)
Dept: GENERAL RADIOLOGY | Age: 65
End: 2025-03-19
Attending: UROLOGY
Payer: MEDICARE

## 2025-03-19 ENCOUNTER — HOSPITAL ENCOUNTER (OUTPATIENT)
Age: 65
Setting detail: OUTPATIENT SURGERY
Discharge: HOME | End: 2025-03-19
Attending: UROLOGY | Admitting: UROLOGY
Payer: MEDICARE

## 2025-03-19 ENCOUNTER — ANESTHESIA (OUTPATIENT)
Dept: OPERATING ROOM | Age: 65
End: 2025-03-19
Payer: MEDICARE

## 2025-03-19 VITALS
HEART RATE: 57 BPM | RESPIRATION RATE: 16 BRPM | WEIGHT: 177 LBS | HEIGHT: 63 IN | SYSTOLIC BLOOD PRESSURE: 141 MMHG | DIASTOLIC BLOOD PRESSURE: 69 MMHG | TEMPERATURE: 98.3 F | BODY MASS INDEX: 31.36 KG/M2 | OXYGEN SATURATION: 100 %

## 2025-03-19 DIAGNOSIS — N13.5 STRICTURE OF LEFT URETER: ICD-10-CM

## 2025-03-19 DIAGNOSIS — N20.1 LEFT URETERAL STONE: Primary | ICD-10-CM

## 2025-03-19 LAB
ANION GAP SERPL CALCULATED.3IONS-SCNC: 12 MMOL/L (ref 8–16)
BUN SERPL-MCNC: 15 MG/DL (ref 8–23)
CALCIUM SERPL-MCNC: 9.7 MG/DL (ref 8.8–10.2)
CHLORIDE SERPL-SCNC: 102 MMOL/L (ref 98–107)
CO2 SERPL-SCNC: 26 MMOL/L (ref 22–29)
CREAT SERPL-MCNC: 0.6 MG/DL (ref 0.5–0.9)
GLUCOSE SERPL-MCNC: 92 MG/DL (ref 70–99)
POTASSIUM SERPL-SCNC: 3.9 MMOL/L (ref 3.5–4.9)
SODIUM SERPL-SCNC: 140 MMOL/L (ref 136–145)

## 2025-03-19 PROCEDURE — 7100000000 HC PACU RECOVERY - FIRST 15 MIN: Performed by: UROLOGY

## 2025-03-19 PROCEDURE — 3600000004 HC SURGERY LEVEL 4 BASE: Performed by: UROLOGY

## 2025-03-19 PROCEDURE — 3700000000 HC ANESTHESIA ATTENDED CARE: Performed by: UROLOGY

## 2025-03-19 PROCEDURE — C1758 CATHETER, URETERAL: HCPCS | Performed by: UROLOGY

## 2025-03-19 PROCEDURE — 74420 UROGRAPHY RTRGR +-KUB: CPT

## 2025-03-19 PROCEDURE — 6360000002 HC RX W HCPCS

## 2025-03-19 PROCEDURE — 3700000001 HC ADD 15 MINUTES (ANESTHESIA): Performed by: UROLOGY

## 2025-03-19 PROCEDURE — 2580000003 HC RX 258: Performed by: ANESTHESIOLOGY

## 2025-03-19 PROCEDURE — 7100000011 HC PHASE II RECOVERY - ADDTL 15 MIN: Performed by: UROLOGY

## 2025-03-19 PROCEDURE — 7100000001 HC PACU RECOVERY - ADDTL 15 MIN: Performed by: UROLOGY

## 2025-03-19 PROCEDURE — 6370000000 HC RX 637 (ALT 250 FOR IP): Performed by: ANESTHESIOLOGY

## 2025-03-19 PROCEDURE — 36415 COLL VENOUS BLD VENIPUNCTURE: CPT

## 2025-03-19 PROCEDURE — 3600000014 HC SURGERY LEVEL 4 ADDTL 15MIN: Performed by: UROLOGY

## 2025-03-19 PROCEDURE — 6360000002 HC RX W HCPCS: Performed by: NURSE PRACTITIONER

## 2025-03-19 PROCEDURE — 6360000002 HC RX W HCPCS: Performed by: ANESTHESIOLOGY

## 2025-03-19 PROCEDURE — 80048 BASIC METABOLIC PNL TOTAL CA: CPT

## 2025-03-19 PROCEDURE — 7100000010 HC PHASE II RECOVERY - FIRST 15 MIN: Performed by: UROLOGY

## 2025-03-19 PROCEDURE — 2709999900 HC NON-CHARGEABLE SUPPLY: Performed by: UROLOGY

## 2025-03-19 PROCEDURE — C1769 GUIDE WIRE: HCPCS | Performed by: UROLOGY

## 2025-03-19 PROCEDURE — C2617 STENT, NON-COR, TEM W/O DEL: HCPCS | Performed by: UROLOGY

## 2025-03-19 PROCEDURE — 6370000000 HC RX 637 (ALT 250 FOR IP): Performed by: UROLOGY

## 2025-03-19 PROCEDURE — 2500000003 HC RX 250 WO HCPCS

## 2025-03-19 PROCEDURE — 6360000002 HC RX W HCPCS: Performed by: UROLOGY

## 2025-03-19 DEVICE — URETERAL STENT WITH SIDE HOLES 6FX24CM
Type: IMPLANTABLE DEVICE | Site: URETER | Status: FUNCTIONAL
Brand: TRIA™ SOFT

## 2025-03-19 RX ORDER — SODIUM CHLORIDE 9 MG/ML
INJECTION, SOLUTION INTRAVENOUS PRN
Status: DISCONTINUED | OUTPATIENT
Start: 2025-03-19 | End: 2025-03-19 | Stop reason: HOSPADM

## 2025-03-19 RX ORDER — KETOROLAC TROMETHAMINE 10 MG/1
10 TABLET, FILM COATED ORAL EVERY 6 HOURS PRN
Qty: 20 TABLET | Refills: 0 | Status: SHIPPED | OUTPATIENT
Start: 2025-03-19 | End: 2026-03-19

## 2025-03-19 RX ORDER — HYDROMORPHONE HYDROCHLORIDE 1 MG/ML
0.5 INJECTION, SOLUTION INTRAMUSCULAR; INTRAVENOUS; SUBCUTANEOUS EVERY 5 MIN PRN
Status: DISCONTINUED | OUTPATIENT
Start: 2025-03-19 | End: 2025-03-19 | Stop reason: HOSPADM

## 2025-03-19 RX ORDER — SODIUM CHLORIDE 0.9 % (FLUSH) 0.9 %
5-40 SYRINGE (ML) INJECTION EVERY 12 HOURS SCHEDULED
Status: DISCONTINUED | OUTPATIENT
Start: 2025-03-19 | End: 2025-03-19 | Stop reason: HOSPADM

## 2025-03-19 RX ORDER — SCOPOLAMINE 1 MG/3D
1 PATCH, EXTENDED RELEASE TRANSDERMAL
Status: DISCONTINUED | OUTPATIENT
Start: 2025-03-19 | End: 2025-03-19 | Stop reason: HOSPADM

## 2025-03-19 RX ORDER — LIDOCAINE HYDROCHLORIDE 10 MG/ML
1 INJECTION, SOLUTION EPIDURAL; INFILTRATION; INTRACAUDAL; PERINEURAL
Status: DISCONTINUED | OUTPATIENT
Start: 2025-03-19 | End: 2025-03-19 | Stop reason: HOSPADM

## 2025-03-19 RX ORDER — PHENAZOPYRIDINE HYDROCHLORIDE 100 MG/1
100 TABLET, FILM COATED ORAL 3 TIMES DAILY PRN
Qty: 30 TABLET | Refills: 1 | Status: SHIPPED | OUTPATIENT
Start: 2025-03-19

## 2025-03-19 RX ORDER — NALOXONE HYDROCHLORIDE 0.4 MG/ML
INJECTION, SOLUTION INTRAMUSCULAR; INTRAVENOUS; SUBCUTANEOUS PRN
Status: DISCONTINUED | OUTPATIENT
Start: 2025-03-19 | End: 2025-03-19 | Stop reason: HOSPADM

## 2025-03-19 RX ORDER — MIDAZOLAM HYDROCHLORIDE 2 MG/2ML
2 INJECTION, SOLUTION INTRAMUSCULAR; INTRAVENOUS
Status: DISCONTINUED | OUTPATIENT
Start: 2025-03-19 | End: 2025-03-19 | Stop reason: HOSPADM

## 2025-03-19 RX ORDER — PHENAZOPYRIDINE HYDROCHLORIDE 100 MG/1
100 TABLET, FILM COATED ORAL ONCE
Status: COMPLETED | OUTPATIENT
Start: 2025-03-19 | End: 2025-03-19

## 2025-03-19 RX ORDER — FAMOTIDINE 20 MG/1
20 TABLET, FILM COATED ORAL ONCE
Status: COMPLETED | OUTPATIENT
Start: 2025-03-19 | End: 2025-03-19

## 2025-03-19 RX ORDER — APREPITANT 40 MG/1
40 CAPSULE ORAL ONCE
Status: COMPLETED | OUTPATIENT
Start: 2025-03-19 | End: 2025-03-19

## 2025-03-19 RX ORDER — PANTOPRAZOLE SODIUM 40 MG/1
40 TABLET, DELAYED RELEASE ORAL DAILY
Qty: 30 TABLET | Refills: 0
Start: 2025-03-19

## 2025-03-19 RX ORDER — SODIUM CHLORIDE 9 MG/ML
INJECTION, SOLUTION INTRAVENOUS CONTINUOUS
Status: DISCONTINUED | OUTPATIENT
Start: 2025-03-19 | End: 2025-03-19 | Stop reason: HOSPADM

## 2025-03-19 RX ORDER — DIPHENHYDRAMINE HYDROCHLORIDE 50 MG/ML
12.5 INJECTION, SOLUTION INTRAMUSCULAR; INTRAVENOUS
Status: DISCONTINUED | OUTPATIENT
Start: 2025-03-19 | End: 2025-03-19 | Stop reason: HOSPADM

## 2025-03-19 RX ORDER — SODIUM CHLORIDE 0.9 % (FLUSH) 0.9 %
5-40 SYRINGE (ML) INJECTION PRN
Status: DISCONTINUED | OUTPATIENT
Start: 2025-03-19 | End: 2025-03-19 | Stop reason: HOSPADM

## 2025-03-19 RX ORDER — METOCLOPRAMIDE HYDROCHLORIDE 5 MG/ML
10 INJECTION INTRAMUSCULAR; INTRAVENOUS
Status: DISCONTINUED | OUTPATIENT
Start: 2025-03-19 | End: 2025-03-19 | Stop reason: HOSPADM

## 2025-03-19 RX ORDER — FENTANYL CITRATE 50 UG/ML
INJECTION, SOLUTION INTRAMUSCULAR; INTRAVENOUS
Status: DISCONTINUED | OUTPATIENT
Start: 2025-03-19 | End: 2025-03-19 | Stop reason: SDUPTHER

## 2025-03-19 RX ORDER — ROCURONIUM BROMIDE 10 MG/ML
INJECTION, SOLUTION INTRAVENOUS
Status: DISCONTINUED | OUTPATIENT
Start: 2025-03-19 | End: 2025-03-19 | Stop reason: SDUPTHER

## 2025-03-19 RX ORDER — LIDOCAINE HYDROCHLORIDE 10 MG/ML
INJECTION, SOLUTION INFILTRATION; PERINEURAL
Status: DISCONTINUED | OUTPATIENT
Start: 2025-03-19 | End: 2025-03-19 | Stop reason: SDUPTHER

## 2025-03-19 RX ORDER — TAMSULOSIN HYDROCHLORIDE 0.4 MG/1
0.4 CAPSULE ORAL ONCE
Status: COMPLETED | OUTPATIENT
Start: 2025-03-19 | End: 2025-03-19

## 2025-03-19 RX ORDER — KETOROLAC TROMETHAMINE 30 MG/ML
15 INJECTION, SOLUTION INTRAMUSCULAR; INTRAVENOUS ONCE
Status: COMPLETED | OUTPATIENT
Start: 2025-03-19 | End: 2025-03-19

## 2025-03-19 RX ORDER — HYDROMORPHONE HYDROCHLORIDE 1 MG/ML
0.25 INJECTION, SOLUTION INTRAMUSCULAR; INTRAVENOUS; SUBCUTANEOUS EVERY 5 MIN PRN
Status: DISCONTINUED | OUTPATIENT
Start: 2025-03-19 | End: 2025-03-19 | Stop reason: HOSPADM

## 2025-03-19 RX ORDER — PROPOFOL 10 MG/ML
INJECTION, EMULSION INTRAVENOUS
Status: DISCONTINUED | OUTPATIENT
Start: 2025-03-19 | End: 2025-03-19 | Stop reason: SDUPTHER

## 2025-03-19 RX ORDER — SODIUM CHLORIDE, SODIUM LACTATE, POTASSIUM CHLORIDE, CALCIUM CHLORIDE 600; 310; 30; 20 MG/100ML; MG/100ML; MG/100ML; MG/100ML
INJECTION, SOLUTION INTRAVENOUS CONTINUOUS
Status: DISCONTINUED | OUTPATIENT
Start: 2025-03-19 | End: 2025-03-19 | Stop reason: HOSPADM

## 2025-03-19 RX ORDER — LEVOFLOXACIN 5 MG/ML
500 INJECTION, SOLUTION INTRAVENOUS
Status: COMPLETED | OUTPATIENT
Start: 2025-03-19 | End: 2025-03-19

## 2025-03-19 RX ORDER — HYOSCYAMINE SULFATE 0.12 MG/1
0.12 TABLET SUBLINGUAL ONCE
Status: COMPLETED | OUTPATIENT
Start: 2025-03-19 | End: 2025-03-19

## 2025-03-19 RX ORDER — OXYCODONE AND ACETAMINOPHEN 10; 325 MG/1; MG/1
1 TABLET ORAL EVERY 6 HOURS PRN
Qty: 12 TABLET | Refills: 0 | Status: SHIPPED | OUTPATIENT
Start: 2025-03-19 | End: 2025-03-22

## 2025-03-19 RX ORDER — OXYCODONE AND ACETAMINOPHEN 5; 325 MG/1; MG/1
2 TABLET ORAL EVERY 4 HOURS PRN
Refills: 0 | Status: DISCONTINUED | OUTPATIENT
Start: 2025-03-19 | End: 2025-03-19 | Stop reason: HOSPADM

## 2025-03-19 RX ADMIN — HYDROMORPHONE HYDROCHLORIDE 0.5 MG: 1 INJECTION, SOLUTION INTRAMUSCULAR; INTRAVENOUS; SUBCUTANEOUS at 16:44

## 2025-03-19 RX ADMIN — LEVOFLOXACIN 500 MG: 5 INJECTION, SOLUTION INTRAVENOUS at 16:11

## 2025-03-19 RX ADMIN — SODIUM CHLORIDE, SODIUM LACTATE, POTASSIUM CHLORIDE, AND CALCIUM CHLORIDE: .6; .31; .03; .02 INJECTION, SOLUTION INTRAVENOUS at 13:02

## 2025-03-19 RX ADMIN — FAMOTIDINE 20 MG: 20 TABLET, FILM COATED ORAL at 15:22

## 2025-03-19 RX ADMIN — PROPOFOL 180 MG: 10 INJECTION, EMULSION INTRAVENOUS at 16:01

## 2025-03-19 RX ADMIN — PHENAZOPYRIDINE 100 MG: 100 TABLET ORAL at 16:45

## 2025-03-19 RX ADMIN — TAMSULOSIN HYDROCHLORIDE 0.4 MG: 0.4 CAPSULE ORAL at 16:45

## 2025-03-19 RX ADMIN — APREPITANT 40 MG: 40 CAPSULE ORAL at 15:22

## 2025-03-19 RX ADMIN — LIDOCAINE HYDROCHLORIDE 50 MG: 10 INJECTION, SOLUTION INFILTRATION; PERINEURAL at 16:01

## 2025-03-19 RX ADMIN — FENTANYL CITRATE 50 MCG: 0.05 INJECTION, SOLUTION INTRAMUSCULAR; INTRAVENOUS at 16:01

## 2025-03-19 RX ADMIN — HYDROMORPHONE HYDROCHLORIDE 0.5 MG: 1 INJECTION, SOLUTION INTRAMUSCULAR; INTRAVENOUS; SUBCUTANEOUS at 16:52

## 2025-03-19 RX ADMIN — KETOROLAC TROMETHAMINE 15 MG: 30 INJECTION, SOLUTION INTRAMUSCULAR at 16:44

## 2025-03-19 RX ADMIN — SUGAMMADEX 300 MG: 100 INJECTION, SOLUTION INTRAVENOUS at 16:28

## 2025-03-19 RX ADMIN — FENTANYL CITRATE 50 MCG: 0.05 INJECTION, SOLUTION INTRAMUSCULAR; INTRAVENOUS at 16:33

## 2025-03-19 RX ADMIN — ROCURONIUM BROMIDE 50 MG: 10 INJECTION, SOLUTION INTRAVENOUS at 16:01

## 2025-03-19 RX ADMIN — HYOSCYAMINE SULFATE 0.12 MG: 0.12 TABLET SUBLINGUAL at 16:45

## 2025-03-19 ASSESSMENT — PAIN - FUNCTIONAL ASSESSMENT
PAIN_FUNCTIONAL_ASSESSMENT: 0-10
PAIN_FUNCTIONAL_ASSESSMENT: 0-10
PAIN_FUNCTIONAL_ASSESSMENT: NONE - DENIES PAIN
PAIN_FUNCTIONAL_ASSESSMENT: 0-10
PAIN_FUNCTIONAL_ASSESSMENT: 0-10

## 2025-03-19 ASSESSMENT — PAIN DESCRIPTION - DESCRIPTORS
DESCRIPTORS: ACHING
DESCRIPTORS: ACHING
DESCRIPTORS: PRESSURE

## 2025-03-19 ASSESSMENT — ENCOUNTER SYMPTOMS
DYSPNEA ACTIVITY LEVEL: AFTER AMBULATING 1 FLIGHT OF STAIRS
SHORTNESS OF BREATH: 1

## 2025-03-19 ASSESSMENT — LIFESTYLE VARIABLES: SMOKING_STATUS: 1

## 2025-03-19 NOTE — OP NOTE
Operative Note      Patient: Darcy Walker  YOB: 1960  MRN: [id number]    Date of Procedure: 3/19/2025    Pre-Op Diagnosis Codes:      * Left ureteral stone [N20.1]     * Stricture of left ureter [N13.5]    Post-Op Diagnosis: Same       Procedure(s):  CYSTOSCOPY LEFT URETERAL STENT INSERTION    Surgeon(s):  Nura Johnson MD    Assistant:   * No surgical staff found *    Anesthesia: General    Estimated Blood Loss (mL): 0    Complications: None    Specimens:   * No specimens in log *    Implants:  Implant Name Type Inv. Item Serial No.  Lot No. LRB No. Used Action   STENT URETERAL 6 FRX24 CM SOFT MONOFILAMENT TRIA - [id number]  STENT URETERAL 6 FRX24 CM SOFT MONOFILAMENT TRIA  [employer]- [id number] Left 1 Implanted         Drains:   Colostomy LLQ (Active)       Findings:  Infection Present At Time Of Surgery (PATOS) (choose all levels that have infection present):  No infection present  Other Findings: Proximal left ureteral narrowing without significant hydronephrosis at L2-L3 level approximately 3 mm in length.  6 mm stone in the left renal pelvis.  A 6 [place] by 24 cm Tria soft ureteral stent was placed.      Detailed Description of Procedure:   See dictated report: [id number]    Disposition patient be scheduled for second staged procedure after period of passive dilation for approximately 14 days with a stent with planned staged left ureteroscopy laser lithotripsy stone extraction and stent placement.    Electronically signed by NURA JOHNSON MD on 3/19/2025 at 4:34 PM    
procedure and we will get her on the schedule in approximately 2 weeks after a period of passive dilation.          CATE JOHNSON MD      D:  03/19/2025 17:44:22     T:  03/19/2025 18:44:36     WILLA/JAYA  Job #:  [id number]     Doc#:  [id number]

## 2025-03-19 NOTE — INTERVAL H&P NOTE
Update History & Physical    The patient's History and Physical of March 3, 2025 was reviewed with the patient and I examined the patient. There was no change. The surgical site was confirmed by the patient and me.     Plan: The risks, benefits, expected outcome, and alternative to the recommended procedure have been discussed with the patient. Patient understands and wants to proceed with the procedure.     Electronically signed by CATE JOHNSON MD on 3/19/2025 at 3:25 PM

## 2025-03-19 NOTE — DISCHARGE INSTRUCTIONS
[facility], Dr Maguire    Cystoscopy / Ureteroscopy / Bladder Endoscopy Procedures Discharge Instructions      You may experience : Burning sensation when you void     A feeling of a need to go to the bathroom frequently     You may have urgent urination     Your urine may be blood tinged    These symptoms should be relieved within a few hours and days  as you increase the amounts of fluids you drink and the number of times that you empty your bladder. They may persist for 1-2 weeks if you have a stent or had a biopsy or resection.  We recommended that you drink plenty of fluid a few days after surgery.    If you have a ureteral stent he may have pain in your side or back related to the stent. Stents also cause bladder irritation symptoms of frequency and urgency.    No strenuous activities for 1 week or  until you talk to your doctor.    You may feel light headed up to 24 hours after anesthesia.  You should not do the following for the next 24 hours.       Drive a car, operate machinery or power tools     Drink any alcoholic drinks (not even beer or wine)     Make any important decisions, i.e., signing important papers.    It is recommended that you begin with clear liquids and/or light foods.  If you  Are not nauseated, progress to your normal diet.    I you are unable to urinate you should call your surgeon.    Dr. Nura Maguire

## 2025-03-19 NOTE — ANESTHESIA POSTPROCEDURE EVALUATION
Department of Anesthesiology  Postprocedure Note    Patient: Darcy Walker  MRN: [id number]  YOB: 1960  Date of evaluation: 3/19/2025    Procedure Summary       Date: 03/19/25 Room / Location: [facility]    Anesthesia Start: 1556 Anesthesia Stop: 1639    Procedure: CYSTOSCOPY LEFT URETERAL STENT INSERTION (Left: Ureter) Diagnosis:       Left ureteral stone      Stricture of left ureter      (Left ureteral stone [N20.1])      (Stricture of left ureter [N13.5])    Surgeons: Nura Maguire MD Responsible Provider: Nita Hernandez APRN - CRNA    Anesthesia Type: general ASA Status: 3            Anesthesia Type: No value filed.    Eduin Phase I: Eduin Score: 10    Eduin Phase II:      Anesthesia Post Evaluation    Patient location during evaluation: PACU  Patient participation: complete - patient participated  Level of consciousness: awake  Pain score: 0  Airway patency: patent  Nausea & Vomiting: no nausea and no vomiting  Cardiovascular status: hemodynamically stable  Respiratory status: acceptable and spontaneous ventilation  Hydration status: stable  Comments: BP (!) 134/93   Pulse 60   Temp 97.4 °F (36.3 °C)   Resp 13   Ht 1.6 m (5' 3\")   Wt 80.3 kg (177 lb)   SpO2 96%   BMI 31.35 kg/m²     Pain management: adequate    No notable events documented.

## 2025-03-19 NOTE — ANESTHESIA PRE PROCEDURE
patient    PONV (postoperative nausea and vomiting)     Prediabetes     not currently taking any meds    Rectal cancer (HCC)     Recurrent UTI     Restless leg     with burning neuropathy symptoms    SOB (shortness of breath)     Status post placement of implantable loop recorder 02/08/2016    Weight loss        Past Surgical History:        Procedure Laterality Date    ABDOMEN SURGERY      Liposuction    BACK SURGERY  2011    Dr Bradford    BREAST REDUCTION SURGERY Bilateral     BREAST SURGERY      reduction    CARDIAC CATHETERIZATION      x 4-Dr Bermudez    CHOLECYSTECTOMY      COLONOSCOPY  08/18/2008    Dr Lal, TA    COLONOSCOPY  09/18/2012    Dr Lal colonic polyp & diarrhea    COLONOSCOPY  03/12/2015    Dr Mccarthy Normal    COLONOSCOPY  01/2024    COSMETIC SURGERY      liposuction    CYSTOSCOPY Left 10/05/2016    PLACEMENT OF STENT  performed by Nura Maguire MD at [facility] OR    CYSTOSCOPY Left 02/20/2019    CYSTOSCOPY LEFT URETEROSCOPY  LASER LITHOTRIPSY BALLOON DIALATION OF URETERAL STRICTURE performed by Nura Maguire MD at [facility] OR    CYSTOSCOPY Left 02/20/2019    PLACEMENT OF LEFT DOUBLE J STENT performed by Nura Maguire MD at [facility] OR    ENDOSCOPY, COLON, DIAGNOSTIC      FINGER SURGERY      FOOT SURGERY  10/2011    Dr Vieira     HYSTERECTOMY (CERVIX STATUS UNKNOWN)      22    INSERTABLE CARDIAC MONITOR      KIDNEY STONE SURGERY  09/2012    multiple    KIDNEY SURGERY      LITHOTRIPSY      LITHOTRIPSY Left 10/05/2016    LITHOTRIPSY LASER performed by Nura Maguire MD at [facility] OR    LIVER BIOPSY  08/13/2008    Dr Lal, Grade 2, stage 1 liver biopsy ([facility]), Mild piecemeal necrosis, Mild lobular inflam, Portal fibrosis.    .    LUMBAR LAMINECTOMY  06/24/2011    OSTOMY TAKE DOWN  2023    MI CYSTO W/INSERT URETERAL STENT Right 06/21/2018    INSERTION OF RIGHT URETERAL STENT performed by Nura Maguire MD at [facility] OR    MI CYSTO W/URETEROSCOPY W/LITHOTRIPSY Right 06/21/2018

## 2025-03-20 ENCOUNTER — TELEPHONE (OUTPATIENT)
Dept: UROLOGY | Age: 65
End: 2025-03-20

## 2025-03-20 NOTE — TELEPHONE ENCOUNTER
Patient sister called they were told to called the office today. Patient needs to  get scheduled for her next stage procedure which should be scheduled in approximately 2 weeks . Please called Estela @[phone]     Thank you

## 2025-03-21 ENCOUNTER — PREP FOR PROCEDURE (OUTPATIENT)
Dept: UROLOGY | Age: 65
End: 2025-03-21

## 2025-03-21 DIAGNOSIS — N20.0 RENAL CALCULUS, LEFT: ICD-10-CM

## 2025-03-31 ENCOUNTER — OFFICE VISIT (OUTPATIENT)
Dept: CARDIOLOGY CLINIC | Age: 65
End: 2025-03-31
Payer: MEDICARE

## 2025-03-31 VITALS
DIASTOLIC BLOOD PRESSURE: 80 MMHG | HEART RATE: 85 BPM | WEIGHT: 171 LBS | HEIGHT: 63 IN | OXYGEN SATURATION: 95 % | BODY MASS INDEX: 30.3 KG/M2 | SYSTOLIC BLOOD PRESSURE: 116 MMHG

## 2025-03-31 DIAGNOSIS — R07.9 CHEST PAIN, UNSPECIFIED TYPE: ICD-10-CM

## 2025-03-31 DIAGNOSIS — R07.89 OTHER CHEST PAIN: Primary | ICD-10-CM

## 2025-03-31 DIAGNOSIS — I10 ESSENTIAL HYPERTENSION: ICD-10-CM

## 2025-03-31 DIAGNOSIS — Z82.49 FAMILY HISTORY OF CORONARY ARTERY DISEASE: ICD-10-CM

## 2025-03-31 PROCEDURE — 1124F ACP DISCUSS-NO DSCNMKR DOCD: CPT | Performed by: NURSE PRACTITIONER

## 2025-03-31 PROCEDURE — G8399 PT W/DXA RESULTS DOCUMENT: HCPCS | Performed by: NURSE PRACTITIONER

## 2025-03-31 PROCEDURE — G8417 CALC BMI ABV UP PARAM F/U: HCPCS | Performed by: NURSE PRACTITIONER

## 2025-03-31 PROCEDURE — 3079F DIAST BP 80-89 MM HG: CPT | Performed by: NURSE PRACTITIONER

## 2025-03-31 PROCEDURE — 99203 OFFICE O/P NEW LOW 30 MIN: CPT | Performed by: NURSE PRACTITIONER

## 2025-03-31 PROCEDURE — 3017F COLORECTAL CA SCREEN DOC REV: CPT | Performed by: NURSE PRACTITIONER

## 2025-03-31 PROCEDURE — 3074F SYST BP LT 130 MM HG: CPT | Performed by: NURSE PRACTITIONER

## 2025-03-31 PROCEDURE — 1090F PRES/ABSN URINE INCON ASSESS: CPT | Performed by: NURSE PRACTITIONER

## 2025-03-31 PROCEDURE — 4004F PT TOBACCO SCREEN RCVD TLK: CPT | Performed by: NURSE PRACTITIONER

## 2025-03-31 PROCEDURE — G8427 DOCREV CUR MEDS BY ELIG CLIN: HCPCS | Performed by: NURSE PRACTITIONER

## 2025-03-31 PROCEDURE — 93000 ELECTROCARDIOGRAM COMPLETE: CPT | Performed by: NURSE PRACTITIONER

## 2025-03-31 NOTE — PROGRESS NOTES
Dear Dewey Murguia MD & Chin Hayes MD,    Thank you for allowing me to participate in the care of Ms. Darcy Walker. She presents today at the [facility]. As you know, Ms. Walker is a 65 y.o. female with history of hypertension, family history CAD, rectal cancer, anxiety, depression, and kidney stones who presents with the chief complaint of ER follow-up. All records from referring provider have been reviewed.  ER visit from 3/12/2025 reviewed.  Patient was sent to the ER by Dr. Maguire's office after abnormal preop lab.  Patient sodium was 165.  She also had vomiting and diarrhea.  Repeat labs done in the ER showed slightly low sodium at 135.  She apparently had some complaints of chest tightnesw which sounded to be unlikely for cardiac in nature.  She was given IV fluids and felt much better.  Troponins in the ER were negative.  She has had no chest tightness since being home.  She denies any shortness of breath.  She has been about a pack per day.  She did have a family history of heart disease.  She is very sedentary at home.  She does 0 activity.  She does notice palpitations at night when she lays down.  Her sister is present with her today.  She is scheduled for surgery for kidney stone on Wednesday.she is sleeping on 1 pillow at night. she is not waking gasping for air. she denies any swelling. she has been compliant with her medications. her BP has been controlled. PCP follows labs and lipids.      She otherwise denies SOA, FERNANDEZ, PND, orthopnea, syncope, or near syncope. She has no other complaints.    Review of Systems   Constitutional: Negative for fever, chills, diaphoresis, activity change, appetite change, fatigue and unexpected weight change.   Eyes: Negative for photophobia, pain, redness and visual disturbance.   Respiratory: Negative for apnea, cough, chest tightness, shortness of breath, wheezing and stridor.    Cardiovascular: Positive for chest pain.

## 2025-04-01 ENCOUNTER — HOSPITAL ENCOUNTER (OUTPATIENT)
Age: 65
Discharge: HOME | End: 2025-04-03
Payer: MEDICARE

## 2025-04-01 VITALS
SYSTOLIC BLOOD PRESSURE: 134 MMHG | BODY MASS INDEX: 30.3 KG/M2 | DIASTOLIC BLOOD PRESSURE: 88 MMHG | WEIGHT: 171 LBS | HEIGHT: 63 IN

## 2025-04-01 DIAGNOSIS — R07.9 CHEST PAIN, UNSPECIFIED TYPE: ICD-10-CM

## 2025-04-01 PROCEDURE — 6360000004 HC RX CONTRAST MEDICATION: Performed by: NURSE PRACTITIONER

## 2025-04-01 PROCEDURE — 36415 COLL VENOUS BLD VENIPUNCTURE: CPT

## 2025-04-01 PROCEDURE — 6360000002 HC RX W HCPCS: Performed by: NURSE PRACTITIONER

## 2025-04-01 PROCEDURE — 93017 CV STRESS TEST TRACING ONLY: CPT

## 2025-04-01 PROCEDURE — 76937 US GUIDE VASCULAR ACCESS: CPT

## 2025-04-01 RX ORDER — DOBUTAMINE HYDROCHLORIDE 200 MG/100ML
10 INJECTION INTRAVENOUS CONTINUOUS PRN
Status: DISPENSED | OUTPATIENT
Start: 2025-04-01 | End: 2025-04-02

## 2025-04-01 RX ORDER — METOPROLOL TARTRATE 1 MG/ML
5 INJECTION, SOLUTION INTRAVENOUS PRN
Status: ACTIVE | OUTPATIENT
Start: 2025-04-01 | End: 2025-04-02

## 2025-04-01 RX ORDER — ATROPINE SULFATE 0.1 MG/ML
1 INJECTION INTRAVENOUS PRN
Status: DISPENSED | OUTPATIENT
Start: 2025-04-01 | End: 2025-04-02

## 2025-04-01 RX ORDER — SODIUM CHLORIDE 9 MG/ML
INJECTION, SOLUTION INTRAVENOUS
Status: DISCONTINUED | OUTPATIENT
Start: 2025-04-01 | End: 2025-04-05 | Stop reason: HOSPADM

## 2025-04-01 RX ADMIN — DOBUTAMINE HYDROCHLORIDE 10 MCG/KG/MIN: 200 INJECTION INTRAVENOUS at 14:15

## 2025-04-01 RX ADMIN — SULFUR HEXAFLUORIDE 2 ML: 60.7; .19; .19 INJECTION, POWDER, LYOPHILIZED, FOR SUSPENSION INTRAVENOUS; INTRAVESICAL at 14:30

## 2025-04-01 NOTE — CONSULTS
[facility] Vascular Access Team:  Consult Note    Patient: Darcy Walker  YOB: 1960   MRN: [id number]  Room: Room/bed info not found     Attending Physician: No att. providers found  Ordering Physician:  Andrew Mejia APRN    Diagnosis:   Chest pain, unspecified type [R07.9]    Active LDAs:       Reason for Consult:  [facility] vascular access team consulted for placement of Ultrasound Guided Peripheral IV.    Indication(s):  Darcy Walker is a 65 y.o. female presenting to [facility] ECHO department. She will need IV access for ordered study, however staff have been unsuccessful thus far.    Findings:  Successfully placed a 22 gauge 1.75\" ultrasound guided peripheral IV in the patient's right forearm with one attempt and no complications. Patient tolerated well.    Impression/Plan:  1. Ultrasound-Guided Peripheral IV is ready to be used    Thank you for your time and consult.     Electronically Signed By: Pete Roy RN on 4/1/2025 at 3:30 PM

## 2025-04-02 ENCOUNTER — APPOINTMENT (OUTPATIENT)
Dept: GENERAL RADIOLOGY | Age: 65
End: 2025-04-02
Attending: UROLOGY
Payer: MEDICARE

## 2025-04-02 ENCOUNTER — ANESTHESIA EVENT (OUTPATIENT)
Dept: OPERATING ROOM | Age: 65
End: 2025-04-02
Payer: MEDICARE

## 2025-04-02 ENCOUNTER — TELEPHONE (OUTPATIENT)
Dept: UROLOGY | Age: 65
End: 2025-04-02

## 2025-04-02 ENCOUNTER — ANESTHESIA (OUTPATIENT)
Dept: OPERATING ROOM | Age: 65
End: 2025-04-02
Payer: MEDICARE

## 2025-04-02 ENCOUNTER — HOSPITAL ENCOUNTER (OUTPATIENT)
Age: 65
Setting detail: OUTPATIENT SURGERY
Discharge: HOME | End: 2025-04-02
Attending: UROLOGY | Admitting: UROLOGY
Payer: MEDICARE

## 2025-04-02 VITALS
DIASTOLIC BLOOD PRESSURE: 85 MMHG | WEIGHT: 171 LBS | TEMPERATURE: 97 F | BODY MASS INDEX: 30.3 KG/M2 | SYSTOLIC BLOOD PRESSURE: 141 MMHG | HEART RATE: 69 BPM | RESPIRATION RATE: 16 BRPM | HEIGHT: 63 IN | OXYGEN SATURATION: 96 %

## 2025-04-02 DIAGNOSIS — N13.5 STRICTURE OF LEFT URETER: ICD-10-CM

## 2025-04-02 DIAGNOSIS — N20.0 RENAL CALCULUS, LEFT: Primary | ICD-10-CM

## 2025-04-02 LAB
ECHO BSA: 1.86 M2
STRESS BASELINE DIAS BP: 88 MMHG
STRESS BASELINE HR: 72 BPM
STRESS BASELINE SYS BP: 134 MMHG
STRESS ESTIMATED WORKLOAD: 1 METS
STRESS PEAK DIAS BP: 82 MMHG
STRESS PEAK SYS BP: 159 MMHG
STRESS PERCENT HR ACHIEVED: 91 %
STRESS POST PEAK HR: 141 BPM
STRESS RATE PRESSURE PRODUCT: NORMAL BPM*MMHG
STRESS TARGET HR: 155 BPM

## 2025-04-02 PROCEDURE — 2580000003 HC RX 258: Performed by: ANESTHESIOLOGY

## 2025-04-02 PROCEDURE — 6360000002 HC RX W HCPCS: Performed by: NURSE PRACTITIONER

## 2025-04-02 PROCEDURE — 7100000011 HC PHASE II RECOVERY - ADDTL 15 MIN: Performed by: UROLOGY

## 2025-04-02 PROCEDURE — C1894 INTRO/SHEATH, NON-LASER: HCPCS | Performed by: UROLOGY

## 2025-04-02 PROCEDURE — 3600000014 HC SURGERY LEVEL 4 ADDTL 15MIN: Performed by: UROLOGY

## 2025-04-02 PROCEDURE — C1769 GUIDE WIRE: HCPCS | Performed by: UROLOGY

## 2025-04-02 PROCEDURE — 6360000002 HC RX W HCPCS: Performed by: UROLOGY

## 2025-04-02 PROCEDURE — 3600000004 HC SURGERY LEVEL 4 BASE: Performed by: UROLOGY

## 2025-04-02 PROCEDURE — C1726 CATH, BAL DIL, NON-VASCULAR: HCPCS | Performed by: UROLOGY

## 2025-04-02 PROCEDURE — 6360000002 HC RX W HCPCS: Performed by: ANESTHESIOLOGY

## 2025-04-02 PROCEDURE — 3700000000 HC ANESTHESIA ATTENDED CARE: Performed by: UROLOGY

## 2025-04-02 PROCEDURE — C1758 CATHETER, URETERAL: HCPCS | Performed by: UROLOGY

## 2025-04-02 PROCEDURE — 94640 AIRWAY INHALATION TREATMENT: CPT

## 2025-04-02 PROCEDURE — 6370000000 HC RX 637 (ALT 250 FOR IP): Performed by: UROLOGY

## 2025-04-02 PROCEDURE — 7100000001 HC PACU RECOVERY - ADDTL 15 MIN: Performed by: UROLOGY

## 2025-04-02 PROCEDURE — 3700000001 HC ADD 15 MINUTES (ANESTHESIA): Performed by: UROLOGY

## 2025-04-02 PROCEDURE — 6370000000 HC RX 637 (ALT 250 FOR IP): Performed by: ANESTHESIOLOGY

## 2025-04-02 PROCEDURE — 6360000004 HC RX CONTRAST MEDICATION: Performed by: UROLOGY

## 2025-04-02 PROCEDURE — 2720000010 HC SURG SUPPLY STERILE: Performed by: UROLOGY

## 2025-04-02 PROCEDURE — 2500000003 HC RX 250 WO HCPCS: Performed by: NURSE ANESTHETIST, CERTIFIED REGISTERED

## 2025-04-02 PROCEDURE — C2617 STENT, NON-COR, TEM W/O DEL: HCPCS | Performed by: UROLOGY

## 2025-04-02 PROCEDURE — 6360000002 HC RX W HCPCS: Performed by: NURSE ANESTHETIST, CERTIFIED REGISTERED

## 2025-04-02 PROCEDURE — 2709999900 HC NON-CHARGEABLE SUPPLY: Performed by: UROLOGY

## 2025-04-02 PROCEDURE — 74420 UROGRAPHY RTRGR +-KUB: CPT

## 2025-04-02 PROCEDURE — 7100000000 HC PACU RECOVERY - FIRST 15 MIN: Performed by: UROLOGY

## 2025-04-02 PROCEDURE — 7100000010 HC PHASE II RECOVERY - FIRST 15 MIN: Performed by: UROLOGY

## 2025-04-02 DEVICE — URETERAL STENT BRAIDED SUTURE W/SIDE HOLES 4.8FX24CM
Type: IMPLANTABLE DEVICE | Site: URETER | Status: FUNCTIONAL
Brand: TRIA™ FIRM

## 2025-04-02 RX ORDER — IOPAMIDOL 612 MG/ML
INJECTION, SOLUTION INTRAVASCULAR PRN
Status: DISCONTINUED | OUTPATIENT
Start: 2025-04-02 | End: 2025-04-02 | Stop reason: HOSPADM

## 2025-04-02 RX ORDER — DEXAMETHASONE SODIUM PHOSPHATE 4 MG/ML
4 INJECTION, SOLUTION INTRA-ARTICULAR; INTRALESIONAL; INTRAMUSCULAR; INTRAVENOUS; SOFT TISSUE ONCE
Status: COMPLETED | OUTPATIENT
Start: 2025-04-02 | End: 2025-04-02

## 2025-04-02 RX ORDER — ONDANSETRON 2 MG/ML
4 INJECTION INTRAMUSCULAR; INTRAVENOUS
Status: DISCONTINUED | OUTPATIENT
Start: 2025-04-02 | End: 2025-04-02 | Stop reason: HOSPADM

## 2025-04-02 RX ORDER — SODIUM CHLORIDE 0.9 % (FLUSH) 0.9 %
5-40 SYRINGE (ML) INJECTION PRN
Status: DISCONTINUED | OUTPATIENT
Start: 2025-04-02 | End: 2025-04-02 | Stop reason: HOSPADM

## 2025-04-02 RX ORDER — HYDROMORPHONE HYDROCHLORIDE 1 MG/ML
0.25 INJECTION, SOLUTION INTRAMUSCULAR; INTRAVENOUS; SUBCUTANEOUS EVERY 5 MIN PRN
Status: DISCONTINUED | OUTPATIENT
Start: 2025-04-02 | End: 2025-04-02 | Stop reason: HOSPADM

## 2025-04-02 RX ORDER — CIPROFLOXACIN 500 MG/1
500 TABLET, FILM COATED ORAL 2 TIMES DAILY
Qty: 6 TABLET | Refills: 0 | Status: SHIPPED | OUTPATIENT
Start: 2025-04-02 | End: 2025-04-05

## 2025-04-02 RX ORDER — HYDROMORPHONE HYDROCHLORIDE 1 MG/ML
0.5 INJECTION, SOLUTION INTRAMUSCULAR; INTRAVENOUS; SUBCUTANEOUS
Refills: 0 | Status: DISCONTINUED | OUTPATIENT
Start: 2025-04-02 | End: 2025-04-02 | Stop reason: HOSPADM

## 2025-04-02 RX ORDER — NALOXONE HYDROCHLORIDE 0.4 MG/ML
INJECTION, SOLUTION INTRAMUSCULAR; INTRAVENOUS; SUBCUTANEOUS PRN
Status: DISCONTINUED | OUTPATIENT
Start: 2025-04-02 | End: 2025-04-02 | Stop reason: HOSPADM

## 2025-04-02 RX ORDER — HYDROMORPHONE HYDROCHLORIDE 1 MG/ML
0.5 INJECTION, SOLUTION INTRAMUSCULAR; INTRAVENOUS; SUBCUTANEOUS EVERY 5 MIN PRN
Status: DISCONTINUED | OUTPATIENT
Start: 2025-04-02 | End: 2025-04-02 | Stop reason: HOSPADM

## 2025-04-02 RX ORDER — SODIUM CHLORIDE 9 MG/ML
INJECTION, SOLUTION INTRAVENOUS PRN
Status: CANCELLED | OUTPATIENT
Start: 2025-04-02

## 2025-04-02 RX ORDER — MIDAZOLAM HYDROCHLORIDE 1 MG/ML
INJECTION, SOLUTION INTRAMUSCULAR; INTRAVENOUS
Status: DISCONTINUED | OUTPATIENT
Start: 2025-04-02 | End: 2025-04-02 | Stop reason: SDUPTHER

## 2025-04-02 RX ORDER — HYOSCYAMINE SULFATE 0.12 MG/1
0.12 TABLET SUBLINGUAL ONCE
Status: COMPLETED | OUTPATIENT
Start: 2025-04-02 | End: 2025-04-02

## 2025-04-02 RX ORDER — KETOROLAC TROMETHAMINE 30 MG/ML
15 INJECTION, SOLUTION INTRAMUSCULAR; INTRAVENOUS ONCE
Status: COMPLETED | OUTPATIENT
Start: 2025-04-02 | End: 2025-04-02

## 2025-04-02 RX ORDER — FENTANYL CITRATE 50 UG/ML
INJECTION, SOLUTION INTRAMUSCULAR; INTRAVENOUS
Status: DISCONTINUED | OUTPATIENT
Start: 2025-04-02 | End: 2025-04-02 | Stop reason: SDUPTHER

## 2025-04-02 RX ORDER — SODIUM CHLORIDE, SODIUM LACTATE, POTASSIUM CHLORIDE, CALCIUM CHLORIDE 600; 310; 30; 20 MG/100ML; MG/100ML; MG/100ML; MG/100ML
INJECTION, SOLUTION INTRAVENOUS CONTINUOUS
Status: CANCELLED | OUTPATIENT
Start: 2025-04-02

## 2025-04-02 RX ORDER — SODIUM CHLORIDE 0.9 % (FLUSH) 0.9 %
5-40 SYRINGE (ML) INJECTION EVERY 12 HOURS SCHEDULED
Status: DISCONTINUED | OUTPATIENT
Start: 2025-04-02 | End: 2025-04-02 | Stop reason: HOSPADM

## 2025-04-02 RX ORDER — SODIUM CHLORIDE, SODIUM LACTATE, POTASSIUM CHLORIDE, CALCIUM CHLORIDE 600; 310; 30; 20 MG/100ML; MG/100ML; MG/100ML; MG/100ML
INJECTION, SOLUTION INTRAVENOUS CONTINUOUS
Status: DISCONTINUED | OUTPATIENT
Start: 2025-04-02 | End: 2025-04-02 | Stop reason: HOSPADM

## 2025-04-02 RX ORDER — APREPITANT 40 MG/1
40 CAPSULE ORAL ONCE
Status: COMPLETED | OUTPATIENT
Start: 2025-04-02 | End: 2025-04-02

## 2025-04-02 RX ORDER — OXYCODONE AND ACETAMINOPHEN 7.5; 325 MG/1; MG/1
1 TABLET ORAL EVERY 6 HOURS PRN
Qty: 12 TABLET | Refills: 0 | Status: SHIPPED | OUTPATIENT
Start: 2025-04-02 | End: 2025-04-05

## 2025-04-02 RX ORDER — ROCURONIUM BROMIDE 10 MG/ML
INJECTION, SOLUTION INTRAVENOUS
Status: DISCONTINUED | OUTPATIENT
Start: 2025-04-02 | End: 2025-04-02 | Stop reason: SDUPTHER

## 2025-04-02 RX ORDER — SODIUM CHLORIDE 0.9 % (FLUSH) 0.9 %
5-40 SYRINGE (ML) INJECTION EVERY 12 HOURS SCHEDULED
Status: CANCELLED | OUTPATIENT
Start: 2025-04-02

## 2025-04-02 RX ORDER — PROPOFOL 10 MG/ML
INJECTION, EMULSION INTRAVENOUS
Status: DISCONTINUED | OUTPATIENT
Start: 2025-04-02 | End: 2025-04-02 | Stop reason: SDUPTHER

## 2025-04-02 RX ORDER — ONDANSETRON 2 MG/ML
INJECTION INTRAMUSCULAR; INTRAVENOUS
Status: DISCONTINUED | OUTPATIENT
Start: 2025-04-02 | End: 2025-04-02 | Stop reason: SDUPTHER

## 2025-04-02 RX ORDER — IPRATROPIUM BROMIDE AND ALBUTEROL SULFATE 2.5; .5 MG/3ML; MG/3ML
1 SOLUTION RESPIRATORY (INHALATION) ONCE
Status: COMPLETED | OUTPATIENT
Start: 2025-04-02 | End: 2025-04-02

## 2025-04-02 RX ORDER — OXYCODONE AND ACETAMINOPHEN 5; 325 MG/1; MG/1
2 TABLET ORAL EVERY 4 HOURS PRN
Refills: 0 | Status: DISCONTINUED | OUTPATIENT
Start: 2025-04-02 | End: 2025-04-02 | Stop reason: HOSPADM

## 2025-04-02 RX ORDER — SODIUM CHLORIDE 9 MG/ML
INJECTION, SOLUTION INTRAVENOUS CONTINUOUS
Status: DISCONTINUED | OUTPATIENT
Start: 2025-04-02 | End: 2025-04-02 | Stop reason: HOSPADM

## 2025-04-02 RX ORDER — SCOPOLAMINE 1 MG/3D
1 PATCH, EXTENDED RELEASE TRANSDERMAL ONCE
Status: DISCONTINUED | OUTPATIENT
Start: 2025-04-02 | End: 2025-04-02 | Stop reason: HOSPADM

## 2025-04-02 RX ORDER — LIDOCAINE HYDROCHLORIDE 20 MG/ML
INJECTION, SOLUTION EPIDURAL; INFILTRATION; INTRACAUDAL; PERINEURAL
Status: DISCONTINUED | OUTPATIENT
Start: 2025-04-02 | End: 2025-04-02 | Stop reason: SDUPTHER

## 2025-04-02 RX ORDER — DEXAMETHASONE SODIUM PHOSPHATE 4 MG/ML
INJECTION, SOLUTION INTRA-ARTICULAR; INTRALESIONAL; INTRAMUSCULAR; INTRAVENOUS; SOFT TISSUE
Status: DISCONTINUED | OUTPATIENT
Start: 2025-04-02 | End: 2025-04-02 | Stop reason: SDUPTHER

## 2025-04-02 RX ORDER — LEVOFLOXACIN 5 MG/ML
500 INJECTION, SOLUTION INTRAVENOUS
Status: COMPLETED | OUTPATIENT
Start: 2025-04-02 | End: 2025-04-02

## 2025-04-02 RX ORDER — SODIUM CHLORIDE 9 MG/ML
INJECTION, SOLUTION INTRAVENOUS PRN
Status: DISCONTINUED | OUTPATIENT
Start: 2025-04-02 | End: 2025-04-02 | Stop reason: HOSPADM

## 2025-04-02 RX ORDER — SODIUM CHLORIDE 0.9 % (FLUSH) 0.9 %
5-40 SYRINGE (ML) INJECTION PRN
Status: CANCELLED | OUTPATIENT
Start: 2025-04-02

## 2025-04-02 RX ORDER — TAMSULOSIN HYDROCHLORIDE 0.4 MG/1
0.4 CAPSULE ORAL ONCE
Status: COMPLETED | OUTPATIENT
Start: 2025-04-02 | End: 2025-04-02

## 2025-04-02 RX ORDER — PHENAZOPYRIDINE HYDROCHLORIDE 100 MG/1
100 TABLET, FILM COATED ORAL ONCE
Status: COMPLETED | OUTPATIENT
Start: 2025-04-02 | End: 2025-04-02

## 2025-04-02 RX ADMIN — LIDOCAINE HYDROCHLORIDE 80 MG: 20 INJECTION, SOLUTION EPIDURAL; INFILTRATION; INTRACAUDAL; PERINEURAL at 09:01

## 2025-04-02 RX ADMIN — TAMSULOSIN HYDROCHLORIDE 0.4 MG: 0.4 CAPSULE ORAL at 10:20

## 2025-04-02 RX ADMIN — FENTANYL CITRATE 50 MCG: 0.05 INJECTION, SOLUTION INTRAMUSCULAR; INTRAVENOUS at 10:06

## 2025-04-02 RX ADMIN — ROCURONIUM BROMIDE 50 MG: 10 INJECTION, SOLUTION INTRAVENOUS at 09:02

## 2025-04-02 RX ADMIN — HYDROMORPHONE HYDROCHLORIDE 0.5 MG: 1 INJECTION, SOLUTION INTRAMUSCULAR; INTRAVENOUS; SUBCUTANEOUS at 10:29

## 2025-04-02 RX ADMIN — HYOSCYAMINE SULFATE 0.12 MG: 0.12 TABLET SUBLINGUAL at 10:28

## 2025-04-02 RX ADMIN — MIDAZOLAM 2 MG: 1 INJECTION INTRAMUSCULAR; INTRAVENOUS at 08:58

## 2025-04-02 RX ADMIN — PROPOFOL 150 MG: 10 INJECTION, EMULSION INTRAVENOUS at 09:01

## 2025-04-02 RX ADMIN — FENTANYL CITRATE 50 MCG: 0.05 INJECTION, SOLUTION INTRAMUSCULAR; INTRAVENOUS at 09:01

## 2025-04-02 RX ADMIN — FENTANYL CITRATE 50 MCG: 0.05 INJECTION, SOLUTION INTRAMUSCULAR; INTRAVENOUS at 09:36

## 2025-04-02 RX ADMIN — IPRATROPIUM BROMIDE AND ALBUTEROL SULFATE 1 DOSE: 2.5; .5 SOLUTION RESPIRATORY (INHALATION) at 07:56

## 2025-04-02 RX ADMIN — LEVOFLOXACIN 500 MG: 5 INJECTION, SOLUTION INTRAVENOUS at 09:11

## 2025-04-02 RX ADMIN — FENTANYL CITRATE 50 MCG: 0.05 INJECTION, SOLUTION INTRAMUSCULAR; INTRAVENOUS at 09:23

## 2025-04-02 RX ADMIN — HYDROMORPHONE HYDROCHLORIDE 0.5 MG: 1 INJECTION, SOLUTION INTRAMUSCULAR; INTRAVENOUS; SUBCUTANEOUS at 10:47

## 2025-04-02 RX ADMIN — DEXAMETHASONE SODIUM PHOSPHATE 4 MG: 4 INJECTION INTRA-ARTICULAR; INTRALESIONAL; INTRAMUSCULAR; INTRAVENOUS; SOFT TISSUE at 08:00

## 2025-04-02 RX ADMIN — APREPITANT 40 MG: 40 CAPSULE ORAL at 08:00

## 2025-04-02 RX ADMIN — DEXAMETHASONE SODIUM PHOSPHATE 4 MG: 4 INJECTION, SOLUTION INTRAMUSCULAR; INTRAVENOUS at 09:11

## 2025-04-02 RX ADMIN — PHENAZOPYRIDINE 100 MG: 100 TABLET ORAL at 10:20

## 2025-04-02 RX ADMIN — SODIUM CHLORIDE, PRESERVATIVE FREE 20 MG: 5 INJECTION INTRAVENOUS at 08:00

## 2025-04-02 RX ADMIN — HYDROMORPHONE HYDROCHLORIDE 0.5 MG: 1 INJECTION, SOLUTION INTRAMUSCULAR; INTRAVENOUS; SUBCUTANEOUS at 10:39

## 2025-04-02 RX ADMIN — ONDANSETRON 4 MG: 2 INJECTION INTRAMUSCULAR; INTRAVENOUS at 10:00

## 2025-04-02 RX ADMIN — KETOROLAC TROMETHAMINE 15 MG: 30 INJECTION, SOLUTION INTRAMUSCULAR at 10:18

## 2025-04-02 RX ADMIN — SODIUM CHLORIDE, SODIUM LACTATE, POTASSIUM CHLORIDE, AND CALCIUM CHLORIDE: .6; .31; .03; .02 INJECTION, SOLUTION INTRAVENOUS at 07:59

## 2025-04-02 ASSESSMENT — ENCOUNTER SYMPTOMS: SHORTNESS OF BREATH: 1

## 2025-04-02 ASSESSMENT — PAIN - FUNCTIONAL ASSESSMENT
PAIN_FUNCTIONAL_ASSESSMENT: NONE - DENIES PAIN
PAIN_FUNCTIONAL_ASSESSMENT: 0-10
PAIN_FUNCTIONAL_ASSESSMENT: NONE - DENIES PAIN
PAIN_FUNCTIONAL_ASSESSMENT: 0-10

## 2025-04-02 ASSESSMENT — PAIN SCALES - GENERAL
PAINLEVEL_OUTOF10: 8
PAINLEVEL_OUTOF10: 7
PAINLEVEL_OUTOF10: 8

## 2025-04-02 ASSESSMENT — PAIN DESCRIPTION - LOCATION
LOCATION: GROIN
LOCATION: BACK;GROIN

## 2025-04-02 ASSESSMENT — LIFESTYLE VARIABLES: SMOKING_STATUS: 1

## 2025-04-02 NOTE — ANESTHESIA POSTPROCEDURE EVALUATION
Department of Anesthesiology  Postprocedure Note    Patient: Darcy Walker  MRN: [id number]  YOB: 1960  Date of evaluation: 4/2/2025    Procedure Summary       Date: 04/02/25 Room / Location: [facility]    Anesthesia Start: 0857 Anesthesia Stop: 1008    Procedures:       CYSTOSCOPY,  LEFT STENT REMOVAL (Left)      BALLOON DILATION OF LEFT URETERAL STRICTURE, LEFT URETEROSCOPIC LASER LITHOTRIPSY, LEFT URETERAL STENT PLACEMENT Diagnosis:       Renal calculus, left      Ureteral stricture, left      (Renal calculus, left [N20.0])      (Ureteral stricture, left [N13.5])    Surgeons: Nura Maguire MD Responsible Provider: Courtney Walter APRN - CRNA    Anesthesia Type: General ASA Status: 3            Anesthesia Type: General    Eduin Phase I: Eduin Score: 10    Eduin Phase II:      Anesthesia Post Evaluation    Patient location during evaluation: PACU  Patient participation: complete - patient participated  Level of consciousness: awake  Pain score: 0  Airway patency: patent  Nausea & Vomiting: no nausea and no vomiting  Cardiovascular status: blood pressure returned to baseline  Respiratory status: acceptable, spontaneous ventilation and room air  Hydration status: euvolemic  Comments: BP (!) 137/91   Pulse 80   Temp 97.3 °F (36.3 °C) (Temporal)   Resp 16   Ht 1.6 m (5' 3\")   Wt 77.6 kg (171 lb)   SpO2 95%   BMI 30.29 kg/m²     Pain management: adequate    No notable events documented.

## 2025-04-02 NOTE — INTERVAL H&P NOTE
Update History & Physical    The patient's History and Physical of March 3, 2025 was reviewed with the patient and I examined the patient. There was no change.  She did undergo left ureteral stent placement on 3/19/2025 because of narrowing in the lumbar ureter to allow a period of passive dilation.  She now returns for definitive treatment.  She does understand he may have to dilate this narrowed ureter we discussed the risk of injury to the ureter and/or stents postoperatively the surgical site was confirmed by the patient and me.     Plan: The risks, benefits, expected outcome, and alternative to the recommended procedure have been discussed with the patient. Patient understands and wants to proceed with the procedure.     Electronically signed by CATE JOHNSON MD on 4/2/2025 at 8:13 AM

## 2025-04-02 NOTE — TELEPHONE ENCOUNTER
Hospital called  patient needs   Follow-Ups: Follow up with Jenni Bal APRN - CNP (Urology) in 1 week (4/2/2025); for stent removal pt has string. [facility] unable to make appointment in that time frame . Please called patient.      Thank you

## 2025-04-02 NOTE — OP NOTE
[id number] [place] [address]                            OPERATIVE REPORT      PATIENT NAME: CLARY CHAPIN                 : 1960  MED REC NO: [id number]                          ROOM: [facility] [place] [place]  ACCOUNT NO: [id number]                       ADMIT DATE: 2025  PROVIDER: Nura Maguire MD      DATE OF PROCEDURE:  2025    SURGEON:  Nura Maguire MD    PROCEDURES PERFORMED:    1. Cystoscopy, left ureteral stent removal.  2. Balloon dilation of left proximal ureteral stricture.  3. Left ureteroscopy, laser lithotripsy, and placement of left ureteral stent, 4.8-Guinean x 24 cm Tria Soft.    PREOPERATIVE DIAGNOSES:    1. Left renal calculus.  2. Left proximal ureteral stricture.  3. Retained left ureteral stent.    POSTOPERATIVE DIAGNOSES:    1. Left renal calculus.  2. Left proximal ureteral stricture.  3. Retained left ureteral stent.    ANESTHESIA:  General anesthetic.    ESTIMATED BLOOD LOSS:  Zero.    HISTORY:  The patient is a 65-year-old female who has a stone in the left renal pelvis.  She has a history of recurrent stones.  She has a known proximal left ureteral stricture, which I have had to balloon dilate in the past; however, she has no residual hydronephrosis or obstruction, but unfortunately, this does not allow stones to pass distal to this area.  She was found incidentally on a CT for followup of anal cancer to have a stone measuring 7 mm in left renal pelvis.  I took her to the operating room approximately 2 weeks ago, on 2025.  At that time, retrograde showed narrowing of the proximal left ureter where the previous stricture was, and so therefore, I elected to place a stent to allow for passive dilation to see if this would open it up for retrograde ureteroscopy after a period of passive dilation, so a stent was placed at that time.  She now presents to undergo treatment of the stone with ureteroscopy,

## 2025-04-02 NOTE — ANESTHESIA PRE PROCEDURE
Department of Anesthesiology  Preprocedure Note       Name:  Darcy Walker   Age:  65 y.o.  :  1960                                          MRN:  [id number]         Date:  2025      Surgeon: Surgeon(s):  Nura Maguire MD    Procedure: Procedure(s):  CYSTOSCOPY LEFT STENT REMOVAL  CYSTOSCOPY LEFT URETEROSCOPY LASER LITHOTRIPSY, SONE BASKET EXTRACTION    Medications prior to admission:   Prior to Admission medications    Medication Sig Start Date End Date Taking? Authorizing Provider   phenazopyridine (PYRIDIUM) 100 MG tablet Take 1 tablet by mouth 3 times daily as needed for Pain (for burning and spasms) 3/19/25  Yes Nura Maguire MD   pantoprazole (PROTONIX) 40 MG tablet Take 1 tablet by mouth daily After protein 3/19/25  Yes Nura Maguire MD   zolpidem (AMBIEN) 5 MG tablet Take 1 tablet by mouth nightly as needed for Sleep.   Yes Lulú Butler MD   valACYclovir (VALTREX) 1 g tablet Take 1 tablet by mouth 2 times daily as needed (cold sores)   Yes ProviderLulú MD   Corn Dextrin (CLEAR FIBER POWDER PO) Take 1 Scoop by mouth daily   Yes ProviderLulú MD   Cranberry-Vitamin C-Probiotic (AZO CRANBERRY PO) Take 1 caplet by mouth in the morning and at bedtime   Yes ProviderLulú MD   ondansetron (ZOFRAN-ODT) 8 MG TBDP disintegrating tablet Take 1 tablet by mouth every 8 hours as needed for Nausea or Vomiting 3/3/25  Yes Jenni Bla APRN - CNP   tamsulosin (FLOMAX) 0.4 MG capsule Take 1 capsule by mouth at bedtime 3/3/25  Yes Jenni Bal APRN - CNP   XIFAXAN 550 MG tablet Take 1 tablet by mouth 2 times daily   Yes Lulú Butler MD   loperamide (IMODIUM) 2 MG capsule Take 1 capsule by mouth 4 times daily as needed   Yes Lulú Butler MD   sucralfate (CARAFATE) 1 GM tablet Take 1 tablet by mouth 4 times daily 1/3/24  Yes Lulú Butler MD   gabapentin (NEURONTIN) 300 MG capsule Take 1 capsule by mouth See Admin Instructions. 1

## 2025-04-02 NOTE — OP NOTE
Operative Note      Patient: Darcy Walker  YOB: 1960  MRN: [id number]    Date of Procedure: 4/2/2025    Pre-Op Diagnosis Codes:      * Renal calculus, left [N20.0]     * Ureteral stricture, left [N13.5]     * Retained ureteral stent [Z96.0]    Post-Op Diagnosis: Same       Procedure(s):  CYSTOSCOPY,  LEFT STENT REMOVAL  BALLOON DILATION OF LEFT URETERAL STRICTURE, LEFT URETEROSCOPIC LASER LITHOTRIPSY, LEFT URETERAL STENT PLACEMENT    Surgeon(s):  Nura Johnson MD    Assistant:   * No surgical staff found *    Anesthesia: General    Estimated Blood Loss (mL): 0    Complications: None    Specimens:   * No specimens in log *    Implants:  Implant Name Type Inv. Item Serial No.  Lot No. LRB No. Used Action   STENT URETERAL 4.8 FRX24 CM FIRM BRAIDED TRIA - [id number]  STENT URETERAL 4.8 FRX24 CM FIRM BRAIDED TRIA  [employer] [id number] Left 1 Implanted         Drains:   Colostomy LLQ (Active)       Findings:  Infection Present At Time Of Surgery (PATOS) (choose all levels that have infection present):  No infection present  Other Findings: Left proximal ureteral stricture was easily to balloon dilated and I was able to pass the 12 Guinean introducer past this area but the 14 Guinean outer access sheath would not pass through the area.  There was no evidence of any ureteral trauma.  Left renal calculus was dusted and small tiny particles that should easily pass.  4.8 x 24 cm left ureteral stent Tria soft placed with string attached.  Can follow-up in 1 week for stent removal    Detailed Description of Procedure:   See dictation:  [id number]    Disposition to PACU then to op care outpatient follow-up in 1 week for stent removal patient has a string.  She will need follow-up imaging to assure passage of the stone dust in approximately 6 to 8 weeks with CT for stone protocol    Electronically signed by NURA JOHNSON MD on 4/2/2025 at 10:06 AM

## 2025-04-02 NOTE — PROGRESS NOTES
CLINICAL PHARMACY NOTE: MEDS TO BEDS    Total # of Prescriptions Filled: 2   The following medications were delivered to the patient:  Discharge Medication List as of 4/2/2025 10:45 AM        START taking these medications    Details   ciprofloxacin (CIPRO) 500 MG tablet Take 1 tablet by mouth 2 times daily for 3 days, Disp-6 tablet, R-0Normal         Oxycodone-acetaminophe 7.5-325 tabs      Additional Documentation:  Family picked up inside pharmacy. Paid with cash.

## 2025-04-03 DIAGNOSIS — N20.0 LEFT RENAL STONE: ICD-10-CM

## 2025-04-04 RX ORDER — TAMSULOSIN HYDROCHLORIDE 0.4 MG/1
0.4 CAPSULE ORAL NIGHTLY
Qty: 30 CAPSULE | Refills: 0 | Status: SHIPPED | OUTPATIENT
Start: 2025-04-04

## 2025-04-07 ENCOUNTER — RESULTS FOLLOW-UP (OUTPATIENT)
Dept: CARDIOLOGY CLINIC | Age: 65
End: 2025-04-07

## 2025-04-08 ENCOUNTER — TELEPHONE (OUTPATIENT)
Dept: NEUROLOGY | Age: 65
End: 2025-04-08

## 2025-04-08 NOTE — TELEPHONE ENCOUNTER
Estela called to speak with a nurse regarding cognitive test. Wanting to reschedule. Please advise.

## 2025-04-09 ENCOUNTER — OFFICE VISIT (OUTPATIENT)
Dept: UROLOGY | Age: 65
End: 2025-04-09
Payer: MEDICARE

## 2025-04-09 VITALS — BODY MASS INDEX: 30.65 KG/M2 | HEIGHT: 63 IN | WEIGHT: 173 LBS | TEMPERATURE: 97 F

## 2025-04-09 DIAGNOSIS — N20.0 LEFT RENAL STONE: Primary | ICD-10-CM

## 2025-04-09 DIAGNOSIS — N13.5 URETERAL STRICTURE, LEFT: ICD-10-CM

## 2025-04-09 LAB
BACTERIA URINE, POC: ABNORMAL
BILIRUBIN URINE: 0 MG/DL
BLOOD, URINE: POSITIVE
CASTS URINE, POC: ABNORMAL
CLARITY, UA: CLEAR
COLOR, UA: YELLOW
CRYSTALS URINE, POC: ABNORMAL
EPI CELLS URINE, POC: ABNORMAL
GLUCOSE URINE: ABNORMAL
KETONES, URINE: NEGATIVE
LEUKOCYTE EST, POC: ABNORMAL
NITRITE, URINE: NEGATIVE
PH UA: 5.5 (ref 4.5–8)
PROTEIN UA: POSITIVE
RBC URINE, POC: ABNORMAL
SPECIFIC GRAVITY UA: 1.03 (ref 1–1.03)
UROBILINOGEN, URINE: NORMAL
WBC URINE, POC: ABNORMAL
YEAST URINE, POC: ABNORMAL

## 2025-04-09 PROCEDURE — G8417 CALC BMI ABV UP PARAM F/U: HCPCS | Performed by: NURSE PRACTITIONER

## 2025-04-09 PROCEDURE — 99213 OFFICE O/P EST LOW 20 MIN: CPT | Performed by: NURSE PRACTITIONER

## 2025-04-09 PROCEDURE — 81001 URINALYSIS AUTO W/SCOPE: CPT | Performed by: NURSE PRACTITIONER

## 2025-04-09 PROCEDURE — 3017F COLORECTAL CA SCREEN DOC REV: CPT | Performed by: NURSE PRACTITIONER

## 2025-04-09 PROCEDURE — 1090F PRES/ABSN URINE INCON ASSESS: CPT | Performed by: NURSE PRACTITIONER

## 2025-04-09 PROCEDURE — G8427 DOCREV CUR MEDS BY ELIG CLIN: HCPCS | Performed by: NURSE PRACTITIONER

## 2025-04-09 PROCEDURE — G8399 PT W/DXA RESULTS DOCUMENT: HCPCS | Performed by: NURSE PRACTITIONER

## 2025-04-09 PROCEDURE — 1124F ACP DISCUSS-NO DSCNMKR DOCD: CPT | Performed by: NURSE PRACTITIONER

## 2025-04-09 PROCEDURE — 4004F PT TOBACCO SCREEN RCVD TLK: CPT | Performed by: NURSE PRACTITIONER
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
## 2025-04-11 ASSESSMENT — ENCOUNTER SYMPTOMS
BACK PAIN: 0
ABDOMINAL DISTENTION: 0
VOMITING: 0
ABDOMINAL PAIN: 0
NAUSEA: 0

## 2025-05-19 ENCOUNTER — OFFICE VISIT (OUTPATIENT)
Dept: NEUROLOGY | Age: 65
End: 2025-05-19
Payer: MEDICARE

## 2025-05-19 VITALS
DIASTOLIC BLOOD PRESSURE: 70 MMHG | BODY MASS INDEX: 30.65 KG/M2 | SYSTOLIC BLOOD PRESSURE: 132 MMHG | HEART RATE: 68 BPM | HEIGHT: 63 IN | WEIGHT: 173 LBS | RESPIRATION RATE: 16 BRPM | OXYGEN SATURATION: 98 %

## 2025-05-19 DIAGNOSIS — R41.3 MEMORY LOSS: Primary | ICD-10-CM

## 2025-05-19 DIAGNOSIS — Z86.59 HISTORY OF ANXIETY: ICD-10-CM

## 2025-05-19 DIAGNOSIS — R25.1 TREMORS OF NERVOUS SYSTEM: ICD-10-CM

## 2025-05-19 PROCEDURE — G8399 PT W/DXA RESULTS DOCUMENT: HCPCS | Performed by: PSYCHIATRY & NEUROLOGY

## 2025-05-19 PROCEDURE — 3017F COLORECTAL CA SCREEN DOC REV: CPT | Performed by: PSYCHIATRY & NEUROLOGY

## 2025-05-19 PROCEDURE — G8427 DOCREV CUR MEDS BY ELIG CLIN: HCPCS | Performed by: PSYCHIATRY & NEUROLOGY

## 2025-05-19 PROCEDURE — 1090F PRES/ABSN URINE INCON ASSESS: CPT | Performed by: PSYCHIATRY & NEUROLOGY

## 2025-05-19 PROCEDURE — 3075F SYST BP GE 130 - 139MM HG: CPT | Performed by: PSYCHIATRY & NEUROLOGY

## 2025-05-19 PROCEDURE — 99214 OFFICE O/P EST MOD 30 MIN: CPT | Performed by: PSYCHIATRY & NEUROLOGY

## 2025-05-19 PROCEDURE — 1124F ACP DISCUSS-NO DSCNMKR DOCD: CPT | Performed by: PSYCHIATRY & NEUROLOGY

## 2025-05-19 PROCEDURE — G8417 CALC BMI ABV UP PARAM F/U: HCPCS | Performed by: PSYCHIATRY & NEUROLOGY

## 2025-05-19 PROCEDURE — 96116 NUBHVL XM PHYS/QHP 1ST HR: CPT | Performed by: PSYCHIATRY & NEUROLOGY

## 2025-05-19 PROCEDURE — 4004F PT TOBACCO SCREEN RCVD TLK: CPT | Performed by: PSYCHIATRY & NEUROLOGY

## 2025-05-19 PROCEDURE — 3078F DIAST BP <80 MM HG: CPT | Performed by: PSYCHIATRY & NEUROLOGY

## 2025-05-19 NOTE — PROGRESS NOTES
REVIEW OF SYSTEMS    Constitutional: []Fever []Sweat []Chills [] Recent Injury [x] Denies all unless marked  HEENT:[]Headache  [] Head Injury/Hearing Loss  [] Sore Throat  [] Ear Ache/Dizziness  [x] Denies all unless marked  Spine:  [] Neck pain  [] Back pain  [] Sciaticia  [x] Denies all unless marked  Cardiovascular:[]Heart Disease []Chest Pain [] Palpitations  [x] Denies all unless marked  Pulmonary: []Shortness of Breath []Cough   [x] Denies all unless marke  Gastrointestinal: []Nausea  []Vomiting  []Abdominal Pain  []Constipation  []Diarrhea  []Dark Bloody Stools  [x] Denies all unless marked  Psychiatric/Behavioral:[] Depression [] Anxiety [x] Denies all unless marked  Genitourinary:   [] Frequency  [] Urgency  [] Incontinence [] Pain with Urination  [x] Denies all unless marked  Extremities: []Pain  []Swelling  [x] Denies all unless marked  Musculoskeletal: [] Muscle Pain  [] Joint Pain  [] Arthritis [] Muscle Cramps [] Muscle Twitches  [x] Denies all unless marked  Sleep: [] Insomnia [] Snoring [] Restless Legs [] Sleep Apnea  [] Daytime Sleepiness  [x] Denies all unless marked  Skin:[] Rash [] Skin Discoloration [x] Denies all unless marked   Neurological: []Visual Disturbance/Memory Loss [] Loss of Balance [] Slurred Speech/Weakness [] Seizures  [] Vertigo/Dizziness [x] Denies all unless marked     
light  Ear, nose, throat -hearing intact to finger rub No scars, masses, or lesions over external nose or ears, no atrophy of tongue  Neck-symmetric, no masses noted, no jugular vein distension.  No bruits noted.  Respiration- chest wall appears symmetric, good expansion,   normal effort without use of accessory muscles  Cardiovascular- RRR  Musculoskeletal - no significant wasting of muscles noted, no bony deformities, gait no gross ataxia.  Amputated left index finger  Extremities-no clubbing, cyanosis or edema  Skin - warm, dry, and intact.  No rash, erythema, or pallor.  Psychiatric - mood, affect, and behavior appear normal.      Neurological exam  Awake, alert, fluent oriented x 3 appropriate affect  Attention and concentration appear appropriate  Good remote memory.  Follows complex commands.  Speech normal without dysarthria  No clear issues with language of fund of knowledge    Cranial Nerve Exam   CN II- Visual fields grossly unremarkable. VA adequate. Discs sharp  CN III, IV,VI- PERRLA, EOMI, No nystagmus, conjugate eye movements, no ptosis  CN VII-no facial asymmetry  CN VIII-Hearing intact   CN IX and X- Palate elevates in midline  CN XI-good shoulder shrug  CN XII-Tongue midline with no fasciculations or fibrillations    Motor Exam  V/V throughout upper and lower extremities bilaterally, no cogwheeling, normal tone      Reflexes trace in the arms and 1-2+ in the legs    Tremors- no tremors in hands or head noted    Gait  Normal base and speed  No ataxia. No Romberg sign    Coordination  Finger to nose and TITUS-unremarkable    Lab Results   Component Value Date    MTHIFSHD05 313 12/19/2023     Lab Results   Component Value Date    WBC 7.6 03/12/2025    HGB 15.4 03/12/2025    HCT 46.4 03/12/2025    MCV 88.2 03/12/2025     03/12/2025     Lab Results   Component Value Date     03/19/2025    K 3.9 03/19/2025     03/19/2025    CO2 26 03/19/2025    BUN 15 03/19/2025    CREATININE 0.6

## 2025-05-21 ENCOUNTER — HOSPITAL ENCOUNTER (OUTPATIENT)
Dept: CT IMAGING | Age: 65
Discharge: HOME | End: 2025-05-21
Payer: MEDICARE

## 2025-05-21 DIAGNOSIS — N20.0 LEFT RENAL STONE: ICD-10-CM

## 2025-05-21 DIAGNOSIS — N13.5 URETERAL STRICTURE, LEFT: ICD-10-CM

## 2025-05-21 PROCEDURE — 74176 CT ABD & PELVIS W/O CONTRAST: CPT

## 2025-06-23 ENCOUNTER — TRANSCRIBE ORDERS (OUTPATIENT)
Dept: ADMINISTRATIVE | Facility: HOSPITAL | Age: 65
End: 2025-06-23
Payer: MEDICARE

## 2025-06-23 DIAGNOSIS — D49.0 IPMN (INTRADUCTAL PAPILLARY MUCINOUS NEOPLASM): Primary | ICD-10-CM

## 2025-07-03 ENCOUNTER — TRANSCRIBE ORDERS (OUTPATIENT)
Dept: ADMINISTRATIVE | Age: 65
End: 2025-07-03

## 2025-07-03 ENCOUNTER — TRANSCRIBE ORDERS (OUTPATIENT)
Dept: ADMINISTRATIVE | Facility: HOSPITAL | Age: 65
End: 2025-07-03
Payer: MEDICARE

## 2025-07-03 DIAGNOSIS — K86.2 PANCREAS CYST: Primary | ICD-10-CM

## 2025-07-08 ENCOUNTER — TRANSCRIBE ORDERS (OUTPATIENT)
Dept: ADMINISTRATIVE | Facility: HOSPITAL | Age: 65
End: 2025-07-08
Payer: MEDICARE

## 2025-07-08 DIAGNOSIS — D49.0 IPMN (INTRADUCTAL PAPILLARY MUCINOUS NEOPLASM): Primary | ICD-10-CM

## 2025-07-10 ENCOUNTER — TELEPHONE (OUTPATIENT)
Dept: HEMATOLOGY | Age: 65
End: 2025-07-10

## 2025-07-10 NOTE — PROGRESS NOTES
MEDICAL ONCOLOGY PROGRESS NOTE                                                          Darcy Walker   1960 7/14/2025     Chief Complaint   Patient presents with    Follow-up     Follow up- pt stated that she thinks blood possibly coming from rectum or vaginally.has had some diarrhea , bright red X 3-4 days  Anal cancer (HCC)       HISTORY OF PRESENT ILLNESS:  Reason for MD visit-toxicity assessment his disease management.  History of Present Illness  The patient is a 65-year-old female with a history of anal cancer, P16 positive, who completed chemotherapy and radiation in 06/2021. She is currently under clinical and radiologic surveillance and has had recent CT scans. She is here to discuss the results and recommendations.    She reports feeling fatigued and experiencing intermittent lower abdominal pain, which started today. She also mentions irregular eating habits and is uncertain about the source of her bleeding, whether it is from the front or back. She has undergone a hysterectomy. Since the reversal of her colostomy bag, she has been dealing with diarrhea. She expresses concern about the possibility of her cancer returning.    She had several CT scans of the abdomen. Dr. Méndez ordered a CTA of the abdomen and pelvis for 07/24/2025 because last year when she had her endoscopy and colonoscopy, they found a mass on her pancreas. She went to him, he did not like the test, so he ordered it again. She had another test, which was a CT scan too. He said he did not want surgery, so this month he ordered that and she will see him on 08/01/2025 at the [facility] to follow up on that.    She had 2 kidney surgeries 2 months ago due to stones. Her neurologist wanted to do an MRI, but she cannot have an MRI of her head because she has metal in her nose from a childhood injury that was not removed. She had an MRI of the abdomen in the past. The pellet is still in her nose and has moved up toward the corner of her

## 2025-07-10 NOTE — TELEPHONE ENCOUNTER
I called patient and left detailed voicemail about their appointment on 07/14/2025. I made patient aware not to arrive any earlier than the appointment time and to come at the time of the follow up not the time of the lab appointment if it is different than the follow up appt time. I also made patient aware to eat and drink plenty of water to hydrate properly before coming to these appointments because this will make their lab draw much easier. Made patient aware that we are now located at the [facility] at [address] [place] [address]. Located between [facility] and the [facility]

## 2025-07-14 ENCOUNTER — OFFICE VISIT (OUTPATIENT)
Dept: HEMATOLOGY | Age: 65
End: 2025-07-14
Payer: MEDICARE

## 2025-07-14 ENCOUNTER — HOSPITAL ENCOUNTER (OUTPATIENT)
Dept: INFUSION THERAPY | Age: 65
Discharge: HOME | End: 2025-07-14
Payer: MEDICARE

## 2025-07-14 VITALS
OXYGEN SATURATION: 97 % | DIASTOLIC BLOOD PRESSURE: 72 MMHG | SYSTOLIC BLOOD PRESSURE: 120 MMHG | BODY MASS INDEX: 29.64 KG/M2 | WEIGHT: 167.3 LBS | HEIGHT: 63 IN | HEART RATE: 93 BPM | TEMPERATURE: 97.3 F

## 2025-07-14 DIAGNOSIS — Z08 ENCOUNTER FOR FOLLOW-UP SURVEILLANCE OF ANAL CANCER: ICD-10-CM

## 2025-07-14 DIAGNOSIS — Z85.048 ENCOUNTER FOR FOLLOW-UP SURVEILLANCE OF ANAL CANCER: ICD-10-CM

## 2025-07-14 DIAGNOSIS — C21.0 ANAL CANCER (HCC): Primary | ICD-10-CM

## 2025-07-14 PROCEDURE — 99213 OFFICE O/P EST LOW 20 MIN: CPT | Performed by: INTERNAL MEDICINE

## 2025-07-14 PROCEDURE — 3074F SYST BP LT 130 MM HG: CPT | Performed by: INTERNAL MEDICINE

## 2025-07-14 PROCEDURE — G2211 COMPLEX E/M VISIT ADD ON: HCPCS | Performed by: INTERNAL MEDICINE

## 2025-07-14 PROCEDURE — 99213 OFFICE O/P EST LOW 20 MIN: CPT

## 2025-07-14 PROCEDURE — 3078F DIAST BP <80 MM HG: CPT | Performed by: INTERNAL MEDICINE

## 2025-07-14 PROCEDURE — 1124F ACP DISCUSS-NO DSCNMKR DOCD: CPT | Performed by: INTERNAL MEDICINE

## 2025-07-14 RX ORDER — OXYCODONE AND ACETAMINOPHEN 10; 325 MG/1; MG/1
1 TABLET ORAL EVERY 4 HOURS PRN
COMMUNITY
Start: 2025-06-26

## 2025-07-24 ENCOUNTER — HOSPITAL ENCOUNTER (OUTPATIENT)
Dept: CT IMAGING | Age: 65
Discharge: HOME | End: 2025-07-24

## 2025-07-24 DIAGNOSIS — K86.2 PANCREAS CYST: ICD-10-CM

## 2025-07-24 DIAGNOSIS — Z08 ENCOUNTER FOR FOLLOW-UP SURVEILLANCE OF ANAL CANCER: ICD-10-CM

## 2025-07-24 DIAGNOSIS — Z85.048 ENCOUNTER FOR FOLLOW-UP SURVEILLANCE OF ANAL CANCER: ICD-10-CM

## 2025-07-24 DIAGNOSIS — C21.0 ANAL CANCER (HCC): ICD-10-CM

## 2025-07-28 ENCOUNTER — HOSPITAL ENCOUNTER (OUTPATIENT)
Dept: CT IMAGING | Age: 65
Discharge: HOME | End: 2025-07-28
Payer: MEDICARE

## 2025-07-28 ENCOUNTER — HOSPITAL ENCOUNTER (OUTPATIENT)
Dept: CT IMAGING | Age: 65
Discharge: HOME | End: 2025-07-28
Attending: INTERNAL MEDICINE
Payer: MEDICARE

## 2025-07-28 DIAGNOSIS — C21.0 ANAL CANCER (HCC): ICD-10-CM

## 2025-07-28 DIAGNOSIS — Z08 ENCOUNTER FOR FOLLOW-UP SURVEILLANCE OF ANAL CANCER: ICD-10-CM

## 2025-07-28 DIAGNOSIS — K86.2 PANCREAS CYST: ICD-10-CM

## 2025-07-28 DIAGNOSIS — Z85.048 ENCOUNTER FOR FOLLOW-UP SURVEILLANCE OF ANAL CANCER: ICD-10-CM

## 2025-07-28 LAB
CREAT SERPL-MCNC: 0.6 MG/DL (ref 0.3–1.3)
PERFORMED ON: NORMAL

## 2025-07-28 PROCEDURE — 71250 CT THORAX DX C-: CPT

## 2025-07-28 PROCEDURE — 82565 ASSAY OF CREATININE: CPT

## 2025-07-28 PROCEDURE — 74176 CT ABD & PELVIS W/O CONTRAST: CPT

## (undated) DEVICE — BAG DRNGE COMB PK

## (undated) DEVICE — CATHETER URET 5FR L70CM OPN END SGL LUMN INJ HUB FLEXIMA

## (undated) DEVICE — TOTAL TRAY, 16FR 10ML SIL FOLEY, URN: Brand: MEDLINE

## (undated) DEVICE — CATHETER,URETHRAL,REDRUBBER,STRL,16FR: Brand: MEDLINE

## (undated) DEVICE — INFLATION DEVICE: Brand: ENCORE™ 26

## (undated) DEVICE — NITINOL STONE RETRIEVAL DEVICE: Brand: DAKOTA

## (undated) DEVICE — PAD,EYE,1-5/8X2 5/8,STERILE,LF,1/PK: Brand: MEDLINE

## (undated) DEVICE — SET IRRIG L94IN DIA0.281IN L BOR W/ 2 N VENT SPIK 2 ON/OFF

## (undated) DEVICE — SURGICAL PROCEDURE PACK CYTOSCOPY

## (undated) DEVICE — SEAL ENDO INSTR SELF SEAL UROLOGY

## (undated) DEVICE — SPNG LAP PREWSH SFTPK 18X18IN STRL PK/5

## (undated) DEVICE — GLOVE SURG SZ 75 CRM LTX FREE POLYISOPRENE POLYMER BEAD ANTI

## (undated) DEVICE — CONTRAST IOTHALAMATE MEGLUMINE 60% 50 ML INJ CONRAY 60

## (undated) DEVICE — GUIDEWIRE ENDOSCP L150CM DIA0.035IN TIP 3CM PTFE NIT

## (undated) DEVICE — CLTH CLENS READYCLEANSE PERI CARE PK/5

## (undated) DEVICE — Z INACTIVE USE 2660664 SOLUTION IRRIG 3000ML 0.9% SOD CHL USP UROMATIC PLAS CONT

## (undated) DEVICE — PAD LAP CHOLE: Brand: MEDLINE INDUSTRIES, INC.

## (undated) DEVICE — Y-TYPE TUR/BLADDER IRRIGATION SET, REGULATING CLAMP

## (undated) DEVICE — SOLUTION IRRIG 3000ML 0.9% SOD CHL USP UROMATIC PLAS CONT

## (undated) DEVICE — SUT SILK 3/0 SUTUPAK TIES 24IN SA74H

## (undated) DEVICE — GLOVE SURG SZ 7 L12IN FNGR THK79MIL GRN LTX FREE

## (undated) DEVICE — ST TBG PNEUMOCLEAR EVAC SMOKE HIFLO

## (undated) DEVICE — ULTRASONIC GENERATOR: Brand: SONICFUSION

## (undated) DEVICE — 1-PIECE DRAINABLE OSTOMY POUCH, CERAPLUS: Brand: PREMIER

## (undated) DEVICE — Z DISCONTINUED NO SUB IDED FIBER LSR DIA365UM FLEXSHIELD COAT HOLM HI PWR POLISHED

## (undated) DEVICE — Device

## (undated) DEVICE — SUT SILK 2/0 SH CR8 18IN CR8 C012D

## (undated) DEVICE — ENDOPATH PNEUMONEEDLE INSUFFLATION NEEDLES WITH LUER LOCK CONNECTORS 120MM: Brand: ENDOPATH

## (undated) DEVICE — URETERAL ACCESS SHEATH SET: Brand: NAVIGATOR HD

## (undated) DEVICE — ENDOPATH XCEL DILATING TIP TROCARS WITH STABILITY SLEEVES: Brand: ENDOPATH XCEL

## (undated) DEVICE — TUBE ET 7MM NSL ORAL BASIC CUF INTMED MURPHY EYE RADPQ MRK

## (undated) DEVICE — STERILE LATEX POWDER FREE SURGICAL GLOVES WITH HYDROGEL COATING: Brand: PROTEXIS

## (undated) DEVICE — SKIN AFFIX SURG ADHESIVE 72/CS 0.55ML: Brand: MEDLINE

## (undated) DEVICE — BALLOON DILATATION CATHETER: Brand: UROMAX ULTRA

## (undated) DEVICE — INTENDED FOR TISSUE SEPARATION, AND OTHER PROCEDURES THAT REQUIRE A SHARP SURGICAL BLADE TO PUNCTURE OR CUT.: Brand: BARD-PARKER ® STAINLESS STEEL BLADES

## (undated) DEVICE — APPL CHLORAPREP HI/LITE 26ML ORNG

## (undated) DEVICE — PK TURNOVER RM ADV

## (undated) DEVICE — MONOPOLAR METZENBAUM SCISSOR, MINI BLADE TIP, DISPOSABLE: Brand: MONOPOLAR METZENBAUM SCISSOR, MINI BLADE TIP, DISPOSABLE

## (undated) DEVICE — SUT SILK 2/0 SUTUPAK TIES 24IN SA75H

## (undated) DEVICE — Z DISCONTINUED NO SUB IDED BF FIBER LSR DIA365UM HOLM 2 WVLNGTH DEL SYS REUSE SLM LN

## (undated) DEVICE — TUBING, SUCTION, 1/4" X 12', STRAIGHT: Brand: MEDLINE

## (undated) DEVICE — SOLUTION IV 1000 ML 0.9 NACL INJ USP EXCEL PLAS CONTAINER

## (undated) DEVICE — FIBER LASER 200UM 2J 80HZ 60W D F L FOR LITHO MOSES

## (undated) DEVICE — ENSEAL LAPAROSCOPIC TISSUE SEALER G2 STRAIGHT JAW FOR USE WITH G2 GENERATOR 5MM DIAMETER 35CM SHAFT LENGTH: Brand: ENSEAL

## (undated) DEVICE — ANTIBACTERIAL UNDYED BRAIDED (POLYGLACTIN 910), SYNTHETIC ABSORBABLE SUTURE: Brand: COATED VICRYL

## (undated) DEVICE — ENDOPATH XCEL WITH OPTIVIEW TECHNOLOGY DILATING TIP TROCARS WITH STABILITY SLEEVES: Brand: ENDOPATH XCEL OPTIVIEW

## (undated) DEVICE — SUT VIC 0 UR6 27IN VCP603H